# Patient Record
Sex: MALE | Race: WHITE | NOT HISPANIC OR LATINO | Employment: OTHER | URBAN - METROPOLITAN AREA
[De-identification: names, ages, dates, MRNs, and addresses within clinical notes are randomized per-mention and may not be internally consistent; named-entity substitution may affect disease eponyms.]

---

## 2017-08-01 ENCOUNTER — OFFICE VISIT (OUTPATIENT)
Dept: FAMILY MEDICINE | Facility: CLINIC | Age: 62
End: 2017-08-01
Payer: COMMERCIAL

## 2017-08-01 VITALS
RESPIRATION RATE: 20 BRPM | SYSTOLIC BLOOD PRESSURE: 132 MMHG | DIASTOLIC BLOOD PRESSURE: 90 MMHG | HEIGHT: 71 IN | HEART RATE: 72 BPM | WEIGHT: 315 LBS | BODY MASS INDEX: 44.1 KG/M2

## 2017-08-01 DIAGNOSIS — L03.116 BILATERAL LOWER LEG CELLULITIS: ICD-10-CM

## 2017-08-01 DIAGNOSIS — E11.9 TYPE 2 DIABETES MELLITUS WITHOUT COMPLICATION, WITHOUT LONG-TERM CURRENT USE OF INSULIN: Primary | ICD-10-CM

## 2017-08-01 DIAGNOSIS — Z28.39 IMMUNIZATION DEFICIENCY: ICD-10-CM

## 2017-08-01 DIAGNOSIS — R60.0 EDEMA LEG: ICD-10-CM

## 2017-08-01 DIAGNOSIS — L03.115 BILATERAL LOWER LEG CELLULITIS: ICD-10-CM

## 2017-08-01 DIAGNOSIS — E78.01 FAMILIAL HYPERCHOLESTEROLEMIA: ICD-10-CM

## 2017-08-01 DIAGNOSIS — I10 ESSENTIAL HYPERTENSION: ICD-10-CM

## 2017-08-01 DIAGNOSIS — E66.9 NON MORBID OBESITY, UNSPECIFIED OBESITY TYPE: ICD-10-CM

## 2017-08-01 LAB
ALBUMIN SERPL BCP-MCNC: 3.1 G/DL
ALP SERPL-CCNC: 108 U/L
ALT SERPL W/O P-5'-P-CCNC: 8 U/L
ANION GAP SERPL CALC-SCNC: 14 MMOL/L
AST SERPL-CCNC: 28 U/L
BASOPHILS # BLD AUTO: 0.07 K/UL
BASOPHILS NFR BLD: 0.6 %
BILIRUB SERPL-MCNC: 1.4 MG/DL
BUN SERPL-MCNC: 18 MG/DL
CALCIUM SERPL-MCNC: 8.6 MG/DL
CHLORIDE SERPL-SCNC: 105 MMOL/L
CHOLEST/HDLC SERPL: 3 {RATIO}
CO2 SERPL-SCNC: 20 MMOL/L
CREAT SERPL-MCNC: 1 MG/DL
DIFFERENTIAL METHOD: ABNORMAL
EOSINOPHIL # BLD AUTO: 0.7 K/UL
EOSINOPHIL NFR BLD: 5.8 %
ERYTHROCYTE [DISTWIDTH] IN BLOOD BY AUTOMATED COUNT: 19.4 %
ERYTHROCYTE [SEDIMENTATION RATE] IN BLOOD BY WESTERGREN METHOD: 4 MM/HR
EST. GFR  (AFRICAN AMERICAN): >60 ML/MIN/1.73 M^2
EST. GFR  (NON AFRICAN AMERICAN): >60 ML/MIN/1.73 M^2
GLUCOSE SERPL-MCNC: 95 MG/DL
HCT VFR BLD AUTO: 46.9 %
HDL/CHOLESTEROL RATIO: 33.3 %
HDLC SERPL-MCNC: 114 MG/DL
HDLC SERPL-MCNC: 38 MG/DL
HGB BLD-MCNC: 14.3 G/DL
LDLC SERPL CALC-MCNC: 58.8 MG/DL
LYMPHOCYTES # BLD AUTO: 1.3 K/UL
LYMPHOCYTES NFR BLD: 11 %
MCH RBC QN AUTO: 26.8 PG
MCHC RBC AUTO-ENTMCNC: 30.5 G/DL
MCV RBC AUTO: 88 FL
MONOCYTES # BLD AUTO: 1.1 K/UL
MONOCYTES NFR BLD: 9.2 %
NEUTROPHILS # BLD AUTO: 8.9 K/UL
NEUTROPHILS NFR BLD: 73.4 %
NONHDLC SERPL-MCNC: 76 MG/DL
PLATELET # BLD AUTO: 227 K/UL
PMV BLD AUTO: 11.2 FL
POTASSIUM SERPL-SCNC: 4.8 MMOL/L
PROT SERPL-MCNC: 7.9 G/DL
RBC # BLD AUTO: 5.34 M/UL
SODIUM SERPL-SCNC: 139 MMOL/L
T4 FREE SERPL-MCNC: 1.24 NG/DL
TRIGL SERPL-MCNC: 86 MG/DL
TSH SERPL DL<=0.005 MIU/L-ACNC: 0.03 UIU/ML
WBC # BLD AUTO: 12.11 K/UL

## 2017-08-01 PROCEDURE — 90471 IMMUNIZATION ADMIN: CPT | Mod: S$GLB,,, | Performed by: FAMILY MEDICINE

## 2017-08-01 PROCEDURE — 80061 LIPID PANEL: CPT

## 2017-08-01 PROCEDURE — 85651 RBC SED RATE NONAUTOMATED: CPT

## 2017-08-01 PROCEDURE — 83036 HEMOGLOBIN GLYCOSYLATED A1C: CPT

## 2017-08-01 PROCEDURE — 99215 OFFICE O/P EST HI 40 MIN: CPT | Mod: 25,S$GLB,, | Performed by: FAMILY MEDICINE

## 2017-08-01 PROCEDURE — 85025 COMPLETE CBC W/AUTO DIFF WBC: CPT

## 2017-08-01 PROCEDURE — 84439 ASSAY OF FREE THYROXINE: CPT

## 2017-08-01 PROCEDURE — 99999 PR PBB SHADOW E&M-EST. PATIENT-LVL III: CPT | Mod: PBBFAC,,, | Performed by: FAMILY MEDICINE

## 2017-08-01 PROCEDURE — 36415 COLL VENOUS BLD VENIPUNCTURE: CPT | Mod: S$GLB,,, | Performed by: FAMILY MEDICINE

## 2017-08-01 PROCEDURE — 90715 TDAP VACCINE 7 YRS/> IM: CPT | Mod: S$GLB,,, | Performed by: FAMILY MEDICINE

## 2017-08-01 PROCEDURE — 80053 COMPREHEN METABOLIC PANEL: CPT

## 2017-08-01 PROCEDURE — 84443 ASSAY THYROID STIM HORMONE: CPT

## 2017-08-01 PROCEDURE — 3044F HG A1C LEVEL LT 7.0%: CPT | Mod: S$GLB,,, | Performed by: FAMILY MEDICINE

## 2017-08-01 RX ORDER — SILVER SULFADIAZINE 10 G/1000G
CREAM TOPICAL 2 TIMES DAILY
Qty: 85 G | Refills: 3 | Status: SHIPPED | OUTPATIENT
Start: 2017-08-01 | End: 2017-08-21

## 2017-08-01 RX ORDER — NIFEDIPINE 60 MG/1
120 TABLET, EXTENDED RELEASE ORAL DAILY
Qty: 180 TABLET | Refills: 3 | Status: SHIPPED | OUTPATIENT
Start: 2017-08-01 | End: 2017-08-17 | Stop reason: SDUPTHER

## 2017-08-01 RX ORDER — METFORMIN HYDROCHLORIDE 850 MG/1
TABLET ORAL
Qty: 180 TABLET | Refills: 1 | Status: SHIPPED | OUTPATIENT
Start: 2017-08-01 | End: 2018-02-09 | Stop reason: ALTCHOICE

## 2017-08-01 RX ORDER — FUROSEMIDE 40 MG/1
40 TABLET ORAL DAILY
Qty: 90 TABLET | Refills: 3 | Status: SHIPPED | OUTPATIENT
Start: 2017-08-01 | End: 2018-08-24

## 2017-08-01 RX ORDER — AMOXICILLIN 500 MG
2 CAPSULE ORAL DAILY
COMMUNITY
End: 2017-12-08

## 2017-08-01 RX ORDER — CELECOXIB 200 MG/1
200 CAPSULE ORAL DAILY
Qty: 30 CAPSULE | Refills: 5 | Status: SHIPPED | OUTPATIENT
Start: 2017-08-01 | End: 2017-12-08

## 2017-08-01 RX ORDER — PRAVASTATIN SODIUM 40 MG/1
40 TABLET ORAL NIGHTLY
Qty: 90 TABLET | Refills: 1 | Status: SHIPPED | OUTPATIENT
Start: 2017-08-01 | End: 2018-02-09 | Stop reason: SDUPTHER

## 2017-08-01 RX ORDER — ACARBOSE 25 MG/1
25 TABLET ORAL
Qty: 270 TABLET | Refills: 1 | Status: SHIPPED | OUTPATIENT
Start: 2017-08-01 | End: 2017-12-08

## 2017-08-01 RX ORDER — LEVOFLOXACIN 500 MG/1
500 TABLET, FILM COATED ORAL DAILY
Qty: 10 TABLET | Refills: 0 | Status: SHIPPED | OUTPATIENT
Start: 2017-08-01 | End: 2017-08-08 | Stop reason: SINTOL

## 2017-08-01 NOTE — PROGRESS NOTES
Subjective:       Patient ID: Peewee Nunes is a 62 y.o. male.    Chief Complaint: Follow-up (Rx refills) and Knee Pain    Pt is a 62 y.o. male who presents for check up for Type 2 diabetes mellitus without complication, without long-term current use of insulin  (primary encounter diagnosis)  Essential hypertension  Non morbid obesity, unspecified obesity type  Bmi 60.0-69.9, adult  Edema leg  Bilateral lower leg cellulitis. Doing well on current meds. Denies any side effects. Prevention is up to date.      Review of Systems   Constitutional: Negative for activity change, appetite change and unexpected weight change.   HENT: Negative for congestion, ear pain, hearing loss, rhinorrhea, sneezing, sore throat and trouble swallowing.    Eyes: Negative for discharge, redness and visual disturbance.   Respiratory: Negative for cough, chest tightness, wheezing and stridor.    Cardiovascular: Negative for chest pain and palpitations.   Gastrointestinal: Negative for abdominal pain, blood in stool, constipation, diarrhea, nausea and vomiting.   Endocrine: Negative for polydipsia and polyuria.   Genitourinary: Negative for difficulty urinating, dysuria, hematuria and urgency.   Musculoskeletal: Positive for arthralgias. Negative for back pain, joint swelling, myalgias and neck pain.   Skin: Positive for wound. Negative for rash.   Neurological: Negative for dizziness, weakness and headaches.   Psychiatric/Behavioral: Negative for agitation, confusion and dysphoric mood. The patient is not nervous/anxious.        Objective:      Physical Exam   Constitutional: He is oriented to person, place, and time. He appears well-developed and well-nourished.   HENT:   Head: Normocephalic.   Eyes: Pupils are equal, round, and reactive to light.   Neck: Normal range of motion. Neck supple. No thyromegaly present.   Cardiovascular: Normal rate and regular rhythm.  Exam reveals no friction rub.    No murmur heard.  Pulmonary/Chest: Effort  normal. No respiratory distress. He has no wheezes.   Abdominal: There is no tenderness. There is no rebound and no guarding.   Musculoskeletal: Normal range of motion. He exhibits edema. He exhibits no tenderness.   Lymphadenopathy:     He has no cervical adenopathy.   Neurological: He is alert and oriented to person, place, and time. He has normal reflexes. No cranial nerve deficit.   Skin: Skin is warm and dry.   Bli/ legs are weeping and 2++ swollen and red   Psychiatric: He has a normal mood and affect. Judgment and thought content normal.       Assessment:       1. Type 2 diabetes mellitus without complication, without long-term current use of insulin    2. Essential hypertension    3. Non morbid obesity, unspecified obesity type    4. BMI 60.0-69.9, adult    5. Edema leg    6. Bilateral lower leg cellulitis        Plan:   Peewee was seen today for follow-up and knee pain.    Diagnoses and all orders for this visit:    Type 2 diabetes mellitus without complication, without long-term current use of insulin    Essential hypertension    Non morbid obesity, unspecified obesity type    BMI 60.0-69.9, adult    Edema leg    Bilateral lower leg cellulitis

## 2017-08-02 LAB
ESTIMATED AVG GLUCOSE: 148 MG/DL
HBA1C MFR BLD HPLC: 6.8 %

## 2017-08-03 ENCOUNTER — PATIENT MESSAGE (OUTPATIENT)
Dept: FAMILY MEDICINE | Facility: CLINIC | Age: 62
End: 2017-08-03

## 2017-08-05 ENCOUNTER — PATIENT MESSAGE (OUTPATIENT)
Dept: FAMILY MEDICINE | Facility: CLINIC | Age: 62
End: 2017-08-05

## 2017-08-08 ENCOUNTER — OFFICE VISIT (OUTPATIENT)
Dept: FAMILY MEDICINE | Facility: CLINIC | Age: 62
End: 2017-08-08
Payer: COMMERCIAL

## 2017-08-08 VITALS
WEIGHT: 315 LBS | BODY MASS INDEX: 44.1 KG/M2 | RESPIRATION RATE: 22 BRPM | HEIGHT: 71 IN | SYSTOLIC BLOOD PRESSURE: 142 MMHG | DIASTOLIC BLOOD PRESSURE: 80 MMHG | HEART RATE: 81 BPM

## 2017-08-08 DIAGNOSIS — L03.115 BILATERAL LOWER LEG CELLULITIS: Primary | ICD-10-CM

## 2017-08-08 DIAGNOSIS — L03.116 BILATERAL LOWER LEG CELLULITIS: Primary | ICD-10-CM

## 2017-08-08 DIAGNOSIS — I87.309 STASIS EDEMA, UNSPECIFIED LATERALITY: ICD-10-CM

## 2017-08-08 PROBLEM — L03.90 CELLULITIS DUE TO METHICILLIN-RESISTANT STAPHYLOCOCCUS AUREUS (MRSA): Status: ACTIVE | Noted: 2017-08-08

## 2017-08-08 PROBLEM — B95.62 CELLULITIS DUE TO METHICILLIN-RESISTANT STAPHYLOCOCCUS AUREUS (MRSA): Status: ACTIVE | Noted: 2017-08-08

## 2017-08-08 PROCEDURE — 99213 OFFICE O/P EST LOW 20 MIN: CPT | Mod: 25,S$GLB,, | Performed by: FAMILY MEDICINE

## 2017-08-08 PROCEDURE — 99999 PR PBB SHADOW E&M-EST. PATIENT-LVL III: CPT | Mod: PBBFAC,,, | Performed by: FAMILY MEDICINE

## 2017-08-08 PROCEDURE — 3008F BODY MASS INDEX DOCD: CPT | Mod: S$GLB,,, | Performed by: FAMILY MEDICINE

## 2017-08-08 PROCEDURE — 3077F SYST BP >= 140 MM HG: CPT | Mod: S$GLB,,, | Performed by: FAMILY MEDICINE

## 2017-08-08 PROCEDURE — 3079F DIAST BP 80-89 MM HG: CPT | Mod: S$GLB,,, | Performed by: FAMILY MEDICINE

## 2017-08-08 PROCEDURE — 96372 THER/PROPH/DIAG INJ SC/IM: CPT | Mod: S$GLB,,, | Performed by: FAMILY MEDICINE

## 2017-08-08 RX ORDER — CEFTRIAXONE 1 G/1
1 INJECTION, POWDER, FOR SOLUTION INTRAMUSCULAR; INTRAVENOUS
Status: COMPLETED | OUTPATIENT
Start: 2017-08-08 | End: 2017-08-08

## 2017-08-08 RX ORDER — BISOPROLOL FUMARATE 10 MG/1
10 TABLET, FILM COATED ORAL DAILY
COMMUNITY
End: 2017-08-08 | Stop reason: SDUPTHER

## 2017-08-08 RX ORDER — CEPHALEXIN 500 MG/1
500 CAPSULE ORAL EVERY 6 HOURS
Qty: 40 CAPSULE | Refills: 0 | Status: SHIPPED | OUTPATIENT
Start: 2017-08-08 | End: 2017-08-18

## 2017-08-08 RX ORDER — BISOPROLOL FUMARATE 10 MG/1
10 TABLET, FILM COATED ORAL 2 TIMES DAILY
Qty: 60 TABLET | Refills: 11
Start: 2017-08-08 | End: 2017-08-17 | Stop reason: SDUPTHER

## 2017-08-08 RX ADMIN — CEFTRIAXONE 1 G: 1 INJECTION, POWDER, FOR SOLUTION INTRAMUSCULAR; INTRAVENOUS at 12:08

## 2017-08-08 NOTE — PROGRESS NOTES
Subjective:       Patient ID: Peewee Nunes is a 62 y.o. male.    Chief Complaint: Follow-up (1wk)    Pt is a 62 y.o. male who presents for check up for Type 2 diabetes mellitus without complication, without long-term current use of insulin  (primary encounter diagnosis)  Essential hypertension  Non morbid obesity, unspecified obesity type  Bmi 60.0-69.9, adult  Edema leg  Bilateral lower leg cellulitis. Doing well on current meds. Denies any side effects. Prevention is up to date.      Review of Systems   Constitutional: Negative for activity change, appetite change and unexpected weight change.   HENT: Negative for congestion, ear pain, hearing loss, rhinorrhea, sneezing, sore throat and trouble swallowing.    Eyes: Negative for discharge, redness and visual disturbance.   Respiratory: Negative for cough, chest tightness, wheezing and stridor.    Cardiovascular: Negative for chest pain and palpitations.   Gastrointestinal: Negative for abdominal pain, blood in stool, constipation, diarrhea, nausea and vomiting.   Endocrine: Negative for polydipsia and polyuria.   Genitourinary: Negative for difficulty urinating, dysuria, hematuria and urgency.   Musculoskeletal: Positive for arthralgias. Negative for back pain, joint swelling, myalgias and neck pain.   Skin: Positive for wound. Negative for rash.   Neurological: Negative for dizziness, weakness and headaches.   Psychiatric/Behavioral: Negative for agitation, confusion and dysphoric mood. The patient is not nervous/anxious.        Objective:      Physical Exam   Constitutional: He is oriented to person, place, and time. He appears well-developed and well-nourished.   HENT:   Head: Normocephalic.   Eyes: Pupils are equal, round, and reactive to light.   Neck: Normal range of motion. Neck supple. No thyromegaly present.   Cardiovascular: Normal rate and regular rhythm.  Exam reveals no friction rub.    No murmur heard.  Pulmonary/Chest: Effort normal. No  respiratory distress. He has no wheezes.   Abdominal: There is no tenderness. There is no rebound and no guarding.   Musculoskeletal: Normal range of motion. He exhibits edema. He exhibits no tenderness.   Lymphadenopathy:     He has no cervical adenopathy.   Neurological: He is alert and oriented to person, place, and time. He has normal reflexes. No cranial nerve deficit.   Skin: Skin is warm and dry.   Bli/ legs are weeping and 2++ swollen and red   Psychiatric: He has a normal mood and affect. Judgment and thought content normal.       Assessment:       1. Cellulitis due to methicillin-resistant Staphylococcus aureus (MRSA)    2. Bilateral lower leg cellulitis        Plan:   Peewee was seen today for follow-up.    Diagnoses and all orders for this visit:    Cellulitis due to methicillin-resistant Staphylococcus aureus (MRSA)    Bilateral lower leg cellulitis  -     cephALEXin (KEFLEX) 500 MG capsule; Take 1 capsule (500 mg total) by mouth every 6 (six) hours.    Other orders  -     cefTRIAXone injection 1 g; Inject 1 g into the muscle one time.  -     bisoprolol (ZEBETA) 10 MG tablet; Take 1 tablet (10 mg total) by mouth 2 (two) times daily.

## 2017-08-17 ENCOUNTER — OFFICE VISIT (OUTPATIENT)
Dept: FAMILY MEDICINE | Facility: CLINIC | Age: 62
End: 2017-08-17
Payer: COMMERCIAL

## 2017-08-17 VITALS
RESPIRATION RATE: 22 BRPM | HEART RATE: 78 BPM | HEIGHT: 71 IN | OXYGEN SATURATION: 91 % | DIASTOLIC BLOOD PRESSURE: 82 MMHG | WEIGHT: 315 LBS | BODY MASS INDEX: 44.1 KG/M2 | SYSTOLIC BLOOD PRESSURE: 142 MMHG

## 2017-08-17 DIAGNOSIS — Z74.09 IMPAIRED MOBILITY AND ACTIVITIES OF DAILY LIVING: ICD-10-CM

## 2017-08-17 DIAGNOSIS — I87.303 STASIS EDEMA, BILATERAL: Primary | ICD-10-CM

## 2017-08-17 DIAGNOSIS — L03.115 BILATERAL LOWER LEG CELLULITIS: ICD-10-CM

## 2017-08-17 DIAGNOSIS — I10 ESSENTIAL HYPERTENSION: ICD-10-CM

## 2017-08-17 DIAGNOSIS — L03.116 BILATERAL LOWER LEG CELLULITIS: ICD-10-CM

## 2017-08-17 DIAGNOSIS — Z78.9 IMPAIRED MOBILITY AND ACTIVITIES OF DAILY LIVING: ICD-10-CM

## 2017-08-17 PROCEDURE — 3077F SYST BP >= 140 MM HG: CPT | Mod: S$GLB,,, | Performed by: FAMILY MEDICINE

## 2017-08-17 PROCEDURE — 29580 STRAPPING UNNA BOOT: CPT | Mod: 50,S$GLB,, | Performed by: FAMILY MEDICINE

## 2017-08-17 PROCEDURE — 99999 PR PBB SHADOW E&M-EST. PATIENT-LVL III: CPT | Mod: PBBFAC,,, | Performed by: FAMILY MEDICINE

## 2017-08-17 PROCEDURE — 3079F DIAST BP 80-89 MM HG: CPT | Mod: S$GLB,,, | Performed by: FAMILY MEDICINE

## 2017-08-17 PROCEDURE — 3008F BODY MASS INDEX DOCD: CPT | Mod: S$GLB,,, | Performed by: FAMILY MEDICINE

## 2017-08-17 PROCEDURE — 99213 OFFICE O/P EST LOW 20 MIN: CPT | Mod: 25,S$GLB,, | Performed by: FAMILY MEDICINE

## 2017-08-17 RX ORDER — NIFEDIPINE 60 MG/1
120 TABLET, EXTENDED RELEASE ORAL DAILY
Qty: 180 TABLET | Refills: 3 | Status: SHIPPED | OUTPATIENT
Start: 2017-08-17 | End: 2018-07-02 | Stop reason: SDUPTHER

## 2017-08-17 RX ORDER — BISOPROLOL FUMARATE 10 MG/1
10 TABLET, FILM COATED ORAL 2 TIMES DAILY
Qty: 180 TABLET | Refills: 3 | Status: SHIPPED | OUTPATIENT
Start: 2017-08-17 | End: 2018-08-10 | Stop reason: SDUPTHER

## 2017-08-17 RX ORDER — LOSARTAN POTASSIUM 100 MG/1
100 TABLET ORAL DAILY
Qty: 90 TABLET | Refills: 3 | Status: SHIPPED | OUTPATIENT
Start: 2017-08-17 | End: 2018-08-10 | Stop reason: SDUPTHER

## 2017-08-17 NOTE — PROGRESS NOTES
Subjective:       Patient ID: Peewee Nunes is a 62 y.o. male.    Chief Complaint: Follow-up (felicia boot)    Pt is a 62 y.o. male who presents for check up for Stasis edema, bilateral  (primary encounter diagnosis)  Essential hypertension. Doing well on current meds. Denies any side effects. Prevention is up to date.      Review of Systems   Constitutional: Negative for appetite change.   HENT: Negative for congestion, ear pain, sneezing and sore throat.    Eyes: Negative for redness and visual disturbance.   Respiratory: Negative for cough, chest tightness and stridor.    Cardiovascular: Negative for chest pain.   Gastrointestinal: Negative for abdominal pain, blood in stool, diarrhea, nausea and vomiting.   Genitourinary: Negative for difficulty urinating, dysuria and hematuria.   Musculoskeletal: Positive for arthralgias, gait problem and joint swelling. Negative for back pain, myalgias and neck pain.        Legs with 3+++ edema   Skin: Positive for wound. Negative for rash.        Maik lower extremity edema and serous drainage   Neurological: Negative for dizziness.   Psychiatric/Behavioral: Negative for agitation. The patient is not nervous/anxious.        Objective:      Physical Exam   Constitutional: He is oriented to person, place, and time. He appears well-developed. He appears distressed.   Obese   HENT:   Head: Normocephalic.   Eyes: Pupils are equal, round, and reactive to light.   Neck: Normal range of motion. Neck supple. No thyromegaly present.   Cardiovascular: Normal rate and regular rhythm.  Exam reveals no friction rub.    No murmur heard.  Pulmonary/Chest: Effort normal. No respiratory distress. He has no wheezes.   Abdominal: There is no tenderness. There is no rebound and no guarding.   Musculoskeletal: Normal range of motion. He exhibits tenderness and deformity. He exhibits no edema.   Lymphadenopathy:     He has no cervical adenopathy.   Neurological: He is alert and oriented to person,  place, and time. No cranial nerve deficit.   Pt is unable to barely walk nor stand bec/ of weight and joint pain and poor ROM   Skin: Skin is warm and dry.   Maik/lower extremities with stasis edema & patches of ulcerations anterior and behind both calves   Psychiatric: He has a normal mood and affect. Judgment and thought content normal.       Assessment:       1. Stasis edema, bilateral    2. Essential hypertension    3. BMI 60.0-69.9, adult    4. Bilateral lower leg cellulitis    5. Impaired mobility and activities of daily living        Plan:   Peewee was seen today for follow-up.    Diagnoses and all orders for this visit:    Stasis edema, bilateral  -     WHEELCHAIR FOR HOME USE  -     bisoprolol (ZEBETA) 10 MG tablet; Take 1 tablet (10 mg total) by mouth 2 (two) times daily.    Essential hypertension  -     bisoprolol (ZEBETA) 10 MG tablet; Take 1 tablet (10 mg total) by mouth 2 (two) times daily.  -     losartan (COZAAR) 100 MG tablet; Take 1 tablet (100 mg total) by mouth once daily.  -     nifedipine (PROCARDIA-XL) 60 MG (OSM) 24 hr tablet; Take 2 tablets (120 mg total) by mouth once daily.    BMI 60.0-69.9, adult    Bilateral lower leg cellulitis    Impaired mobility and activities of daily living    Unna boots maik for 4 days

## 2017-08-18 ENCOUNTER — TELEPHONE (OUTPATIENT)
Dept: FAMILY MEDICINE | Facility: CLINIC | Age: 62
End: 2017-08-18

## 2017-08-21 ENCOUNTER — OFFICE VISIT (OUTPATIENT)
Dept: FAMILY MEDICINE | Facility: CLINIC | Age: 62
End: 2017-08-21
Payer: COMMERCIAL

## 2017-08-21 VITALS
BODY MASS INDEX: 44.1 KG/M2 | HEART RATE: 71 BPM | DIASTOLIC BLOOD PRESSURE: 70 MMHG | RESPIRATION RATE: 16 BRPM | HEIGHT: 71 IN | OXYGEN SATURATION: 87 % | WEIGHT: 315 LBS | SYSTOLIC BLOOD PRESSURE: 158 MMHG

## 2017-08-21 DIAGNOSIS — I87.309 STASIS EDEMA, UNSPECIFIED LATERALITY: Primary | ICD-10-CM

## 2017-08-21 DIAGNOSIS — L03.116 BILATERAL LOWER LEG CELLULITIS: ICD-10-CM

## 2017-08-21 DIAGNOSIS — L03.115 BILATERAL LOWER LEG CELLULITIS: ICD-10-CM

## 2017-08-21 PROCEDURE — 3008F BODY MASS INDEX DOCD: CPT | Mod: S$GLB,,, | Performed by: FAMILY MEDICINE

## 2017-08-21 PROCEDURE — 3077F SYST BP >= 140 MM HG: CPT | Mod: S$GLB,,, | Performed by: FAMILY MEDICINE

## 2017-08-21 PROCEDURE — 29580 STRAPPING UNNA BOOT: CPT | Mod: 50,S$GLB,, | Performed by: FAMILY MEDICINE

## 2017-08-21 PROCEDURE — 99213 OFFICE O/P EST LOW 20 MIN: CPT | Mod: 25,S$GLB,, | Performed by: FAMILY MEDICINE

## 2017-08-21 PROCEDURE — 99999 PR PBB SHADOW E&M-EST. PATIENT-LVL III: CPT | Mod: PBBFAC,,, | Performed by: FAMILY MEDICINE

## 2017-08-21 PROCEDURE — 3078F DIAST BP <80 MM HG: CPT | Mod: S$GLB,,, | Performed by: FAMILY MEDICINE

## 2017-08-21 NOTE — PROGRESS NOTES
Subjective:       Patient ID: Peewee Nunes is a 62 y.o. male.    Chief Complaint: Follow-up ( legs)    Pt is a 62 y.o. male who presents for check up for Stasis edema, bilateral  (primary encounter diagnosis)  Essential hypertension. Doing well on current meds. Denies any side effects. Prevention is up to date.      Review of Systems   Constitutional: Negative for appetite change.   HENT: Negative for congestion, ear pain, sneezing and sore throat.    Eyes: Negative for redness and visual disturbance.   Respiratory: Negative for cough, chest tightness and stridor.    Cardiovascular: Negative for chest pain.   Gastrointestinal: Negative for abdominal pain, blood in stool, diarrhea, nausea and vomiting.   Genitourinary: Negative for difficulty urinating, dysuria and hematuria.   Musculoskeletal: Positive for arthralgias, gait problem and joint swelling. Negative for back pain, myalgias and neck pain.        Legs with 3+++ edema   Skin: Positive for wound. Negative for rash.        Maik lower extremity edema and serous drainage; unna boots have been on for 4 days, & will be removed and replaced on this visit for 4 more days   Neurological: Negative for dizziness.   Psychiatric/Behavioral: Negative for agitation. The patient is not nervous/anxious.        Objective:      Physical Exam   Constitutional: He is oriented to person, place, and time. He appears well-developed. He appears distressed.   Obese   HENT:   Head: Normocephalic.   Eyes: Pupils are equal, round, and reactive to light.   Neck: Normal range of motion. Neck supple. No thyromegaly present.   Cardiovascular: Normal rate and regular rhythm.  Exam reveals no friction rub.    No murmur heard.  Pulmonary/Chest: Effort normal. No respiratory distress. He has no wheezes.   Abdominal: There is no tenderness. There is no rebound and no guarding.   Musculoskeletal: Normal range of motion. He exhibits tenderness and deformity. He exhibits no edema.    Lymphadenopathy:     He has no cervical adenopathy.   Neurological: He is alert and oriented to person, place, and time. No cranial nerve deficit.   Pt is unable to barely walk nor stand bec/ of weight and joint pain and poor ROM   Skin: Skin is warm and dry.   Maik/lower extremities with stasis edema & patches of ulcerations anterior and behind both calves, will less inflammation and less serous d'c   Psychiatric: He has a normal mood and affect. Judgment and thought content normal.       Assessment:       1. Stasis edema, unspecified laterality    2. Bilateral lower leg cellulitis        Plan:   Peewee was seen today for follow-up.    Diagnoses and all orders for this visit:    Stasis edema, unspecified laterality    Bilateral lower leg cellulitis    Unna boots removed and will be replaced  maik for 3-4 days

## 2017-08-22 DIAGNOSIS — Z12.11 COLON CANCER SCREENING: ICD-10-CM

## 2017-08-24 ENCOUNTER — OFFICE VISIT (OUTPATIENT)
Dept: FAMILY MEDICINE | Facility: CLINIC | Age: 62
End: 2017-08-24
Payer: COMMERCIAL

## 2017-08-24 VITALS
HEIGHT: 71 IN | HEART RATE: 94 BPM | DIASTOLIC BLOOD PRESSURE: 74 MMHG | RESPIRATION RATE: 24 BRPM | SYSTOLIC BLOOD PRESSURE: 168 MMHG | BODY MASS INDEX: 44.1 KG/M2 | WEIGHT: 315 LBS

## 2017-08-24 DIAGNOSIS — L03.116 BILATERAL LOWER LEG CELLULITIS: ICD-10-CM

## 2017-08-24 DIAGNOSIS — I87.309 STASIS EDEMA, UNSPECIFIED LATERALITY: Primary | ICD-10-CM

## 2017-08-24 DIAGNOSIS — L03.115 BILATERAL LOWER LEG CELLULITIS: ICD-10-CM

## 2017-08-24 DIAGNOSIS — E11.9 TYPE 2 DIABETES MELLITUS WITHOUT COMPLICATION, WITHOUT LONG-TERM CURRENT USE OF INSULIN: ICD-10-CM

## 2017-08-24 PROCEDURE — 3077F SYST BP >= 140 MM HG: CPT | Mod: S$GLB,,, | Performed by: FAMILY MEDICINE

## 2017-08-24 PROCEDURE — 3078F DIAST BP <80 MM HG: CPT | Mod: S$GLB,,, | Performed by: FAMILY MEDICINE

## 2017-08-24 PROCEDURE — 4010F ACE/ARB THERAPY RXD/TAKEN: CPT | Mod: S$GLB,,, | Performed by: FAMILY MEDICINE

## 2017-08-24 PROCEDURE — 99999 PR PBB SHADOW E&M-EST. PATIENT-LVL III: CPT | Mod: PBBFAC,,, | Performed by: FAMILY MEDICINE

## 2017-08-24 PROCEDURE — 3044F HG A1C LEVEL LT 7.0%: CPT | Mod: S$GLB,,, | Performed by: FAMILY MEDICINE

## 2017-08-24 PROCEDURE — 3008F BODY MASS INDEX DOCD: CPT | Mod: S$GLB,,, | Performed by: FAMILY MEDICINE

## 2017-08-24 PROCEDURE — 99213 OFFICE O/P EST LOW 20 MIN: CPT | Mod: S$GLB,,, | Performed by: FAMILY MEDICINE

## 2017-08-24 NOTE — PROGRESS NOTES
Subjective:       Patient ID: Peewee Nunes is a 62 y.o. male.    Chief Complaint: Follow-up (legs)    Pt is a 62 y.o. male who presents for check up for dolores lower leg cellulitis. Doing well on current meds. Denies any side effects. Prevention is up to date.      Review of Systems   Constitutional: Negative for appetite change.   HENT: Negative for congestion, ear pain, sneezing and sore throat.    Eyes: Negative for redness and visual disturbance.   Respiratory: Negative for cough, chest tightness and stridor.    Cardiovascular: Negative for chest pain.   Gastrointestinal: Negative for abdominal pain, blood in stool, diarrhea, nausea and vomiting.   Genitourinary: Negative for difficulty urinating, dysuria and hematuria.   Musculoskeletal: Negative for arthralgias, back pain, joint swelling, myalgias and neck pain.   Skin: Positive for color change, rash and wound.        legs with unna boots for the last week w improved results   Neurological: Negative for dizziness.   Psychiatric/Behavioral: Negative for agitation. The patient is not nervous/anxious.        Objective:      Physical Exam   Constitutional: He is oriented to person, place, and time. He appears well-developed.   Obese 63 y/o W M with leg wounds   HENT:   Head: Normocephalic.   Eyes: Pupils are equal, round, and reactive to light.   Neck: Normal range of motion. Neck supple. No thyromegaly present.   Cardiovascular: Normal rate and regular rhythm.  Exam reveals no friction rub.    No murmur heard.  Pulmonary/Chest: Effort normal. No respiratory distress. He has no wheezes.   Abdominal: There is no tenderness. There is no rebound and no guarding.   Musculoskeletal: Normal range of motion. He exhibits no edema or tenderness.   Lymphadenopathy:     He has no cervical adenopathy.   Neurological: He is alert and oriented to person, place, and time. He has normal reflexes. No cranial nerve deficit.   Skin: Skin is warm and dry.   dolores lower extremities  with 1+ edema and less eschar/ exudative dryness--70 % healed   Psychiatric: He has a normal mood and affect. Judgment and thought content normal.       Assessment:       1. Stasis edema, unspecified laterality    2. Type 2 diabetes mellitus without complication, without long-term current use of insulin    3. Bilateral lower leg cellulitis        Plan:   Peewee was seen today for follow-up.    Diagnoses and all orders for this visit:    Stasis edema, unspecified laterality    Type 2 diabetes mellitus without complication, without long-term current use of insulin    Bilateral lower leg cellulitis

## 2017-09-07 ENCOUNTER — OFFICE VISIT (OUTPATIENT)
Dept: FAMILY MEDICINE | Facility: CLINIC | Age: 62
End: 2017-09-07
Payer: COMMERCIAL

## 2017-09-07 VITALS
SYSTOLIC BLOOD PRESSURE: 172 MMHG | BODY MASS INDEX: 44.1 KG/M2 | WEIGHT: 315 LBS | DIASTOLIC BLOOD PRESSURE: 80 MMHG | RESPIRATION RATE: 28 BRPM | HEIGHT: 71 IN | HEART RATE: 64 BPM

## 2017-09-07 DIAGNOSIS — E11.9 TYPE 2 DIABETES MELLITUS WITHOUT COMPLICATION, WITHOUT LONG-TERM CURRENT USE OF INSULIN: Primary | ICD-10-CM

## 2017-09-07 DIAGNOSIS — R60.0 EDEMA LEG: ICD-10-CM

## 2017-09-07 DIAGNOSIS — E66.9 NON MORBID OBESITY, UNSPECIFIED OBESITY TYPE: ICD-10-CM

## 2017-09-07 DIAGNOSIS — I87.309 STASIS EDEMA, UNSPECIFIED LATERALITY: ICD-10-CM

## 2017-09-07 PROCEDURE — 3077F SYST BP >= 140 MM HG: CPT | Mod: S$GLB,,, | Performed by: FAMILY MEDICINE

## 2017-09-07 PROCEDURE — 99213 OFFICE O/P EST LOW 20 MIN: CPT | Mod: S$GLB,,, | Performed by: FAMILY MEDICINE

## 2017-09-07 PROCEDURE — 3044F HG A1C LEVEL LT 7.0%: CPT | Mod: S$GLB,,, | Performed by: FAMILY MEDICINE

## 2017-09-07 PROCEDURE — 99999 PR PBB SHADOW E&M-EST. PATIENT-LVL III: CPT | Mod: PBBFAC,,, | Performed by: FAMILY MEDICINE

## 2017-09-07 PROCEDURE — 3079F DIAST BP 80-89 MM HG: CPT | Mod: S$GLB,,, | Performed by: FAMILY MEDICINE

## 2017-09-07 PROCEDURE — 4010F ACE/ARB THERAPY RXD/TAKEN: CPT | Mod: S$GLB,,, | Performed by: FAMILY MEDICINE

## 2017-09-07 PROCEDURE — 3008F BODY MASS INDEX DOCD: CPT | Mod: S$GLB,,, | Performed by: FAMILY MEDICINE

## 2017-09-07 NOTE — PROGRESS NOTES
Subjective:       Patient ID: Peewee Nunes is a 62 y.o. male.    Chief Complaint: Follow-up (recheck cellulitis)    Pt is a 62 y.o. male who presents for check up for [facility] of dolores lower leg cellulitis and stasis edema. Doing well on current meds. Denies any side effects. Prevention is up to date.      Review of Systems   Constitutional: Negative for appetite change.   HENT: Negative for congestion, ear pain, sneezing and sore throat.    Eyes: Negative for redness and visual disturbance.   Respiratory: Negative for cough, chest tightness and stridor.    Cardiovascular: Negative for chest pain.   Gastrointestinal: Negative for abdominal pain, blood in stool, diarrhea, nausea and vomiting.   Genitourinary: Negative for difficulty urinating, dysuria and hematuria.   Musculoskeletal: Negative for arthralgias, back pain, joint swelling, myalgias and neck pain.   Skin: Positive for color change and wound. Negative for rash.        Less edema and less d'c of both legs   Neurological: Negative for dizziness.   Psychiatric/Behavioral: Negative for agitation. The patient is not nervous/anxious.        Objective:      Physical Exam   Constitutional: He is oriented to person, place, and time. He appears well-developed and well-nourished.   HENT:   Head: Normocephalic.   Eyes: Pupils are equal, round, and reactive to light.   Neck: Normal range of motion. Neck supple. No thyromegaly present.   Cardiovascular: Normal rate and regular rhythm.  Exam reveals no friction rub.    No murmur heard.  Pulmonary/Chest: Effort normal. No respiratory distress. He has no wheezes.   Abdominal: There is no tenderness. There is no rebound and no guarding.   Musculoskeletal: Normal range of motion. He exhibits edema. He exhibits no tenderness.   Lymphadenopathy:     He has no cervical adenopathy.   Neurological: He is alert and oriented to person, place, and time. He has normal reflexes. No cranial nerve deficit.   Skin: Skin is warm and dry.    Psychiatric: He has a normal mood and affect. Judgment and thought content normal.       Assessment:       1. Type 2 diabetes mellitus without complication, without long-term current use of insulin    2. Non morbid obesity, unspecified obesity type    3. Stasis edema, unspecified laterality    4. Edema leg        Plan:   Peewee was seen today for follow-up.    Diagnoses and all orders for this visit:    Type 2 diabetes mellitus without complication, without long-term current use of insulin    Non morbid obesity, unspecified obesity type    Stasis edema, unspecified laterality    Edema leg

## 2017-10-09 ENCOUNTER — OFFICE VISIT (OUTPATIENT)
Dept: FAMILY MEDICINE | Facility: CLINIC | Age: 62
End: 2017-10-09
Payer: COMMERCIAL

## 2017-10-09 VITALS
WEIGHT: 315 LBS | HEART RATE: 72 BPM | RESPIRATION RATE: 18 BRPM | DIASTOLIC BLOOD PRESSURE: 74 MMHG | BODY MASS INDEX: 44.1 KG/M2 | HEIGHT: 71 IN | SYSTOLIC BLOOD PRESSURE: 142 MMHG

## 2017-10-09 DIAGNOSIS — Z78.9 IMPAIRED MOBILITY AND ACTIVITIES OF DAILY LIVING: Primary | ICD-10-CM

## 2017-10-09 DIAGNOSIS — L03.116 BILATERAL LOWER LEG CELLULITIS: ICD-10-CM

## 2017-10-09 DIAGNOSIS — L03.115 BILATERAL LOWER LEG CELLULITIS: ICD-10-CM

## 2017-10-09 DIAGNOSIS — Z74.09 IMPAIRED MOBILITY AND ACTIVITIES OF DAILY LIVING: Primary | ICD-10-CM

## 2017-10-09 DIAGNOSIS — I87.309 STASIS EDEMA, UNSPECIFIED LATERALITY: ICD-10-CM

## 2017-10-09 PROCEDURE — 99213 OFFICE O/P EST LOW 20 MIN: CPT | Mod: S$GLB,,, | Performed by: FAMILY MEDICINE

## 2017-10-09 PROCEDURE — 99999 PR PBB SHADOW E&M-EST. PATIENT-LVL III: CPT | Mod: PBBFAC,,, | Performed by: FAMILY MEDICINE

## 2017-10-09 RX ORDER — FLUOCINOLONE ACETONIDE 0.11 MG/ML
OIL TOPICAL 2 TIMES DAILY
Qty: 118 BOTTLE | Refills: 2 | Status: SHIPPED | OUTPATIENT
Start: 2017-10-09 | End: 2018-08-24

## 2017-10-09 NOTE — PROGRESS NOTES
Subjective:       Patient ID: Peewee Nunes is a 62 y.o. male.    Chief Complaint: Follow-up    Pt is a 62 y.o. male who presents for check up for Impaired mobility and activities of daily living  (primary encounter diagnosis)  Stasis edema, unspecified laterality  Bmi 50.0-59.9, adult  Bilateral lower leg cellulitis. Doing well on current meds. Denies any side effects. Prevention is up to date.      Review of Systems   Constitutional: Negative for appetite change.   HENT: Negative for congestion, ear pain, sneezing and sore throat.    Eyes: Negative for redness and visual disturbance.   Respiratory: Negative for cough, chest tightness and stridor.    Cardiovascular: Negative for chest pain.   Gastrointestinal: Negative for abdominal pain, blood in stool, diarrhea, nausea and vomiting.   Genitourinary: Negative for difficulty urinating, dysuria and hematuria.   Musculoskeletal: Negative for arthralgias, back pain, joint swelling, myalgias and neck pain.   Skin: Positive for wound. Negative for rash.        Wounds are healing well;   Neurological: Negative for dizziness.   Psychiatric/Behavioral: Negative for agitation. The patient is not nervous/anxious.        Objective:      Physical Exam   Constitutional: He is oriented to person, place, and time. He appears well-developed and well-nourished.   HENT:   Head: Normocephalic.   Eyes: Pupils are equal, round, and reactive to light.   Neck: Normal range of motion. Neck supple. No thyromegaly present.   Cardiovascular: Normal rate and regular rhythm.  Exam reveals no friction rub.    No murmur heard.  Pulmonary/Chest: Effort normal. No respiratory distress. He has no wheezes.   Abdominal: There is no tenderness. There is no rebound and no guarding.   Musculoskeletal: Normal range of motion. He exhibits no edema or tenderness.   Lymphadenopathy:     He has no cervical adenopathy.   Neurological: He is alert and oriented to person, place, and time. He has normal  reflexes. No cranial nerve deficit.   Skin: Skin is warm and dry. There is erythema.   Leg wounds are healing but still with dryness   Psychiatric: He has a normal mood and affect. Judgment and thought content normal.       Assessment:       1. Impaired mobility and activities of daily living    2. Stasis edema, unspecified laterality    3. BMI 50.0-59.9, adult    4. Bilateral lower leg cellulitis        Plan:   Peewee was seen today for follow-up.    Diagnoses and all orders for this visit:    Impaired mobility and activities of daily living    Stasis edema, unspecified laterality    BMI 50.0-59.9, adult    Bilateral lower leg cellulitis

## 2017-11-24 ENCOUNTER — PATIENT OUTREACH (OUTPATIENT)
Dept: ADMINISTRATIVE | Facility: HOSPITAL | Age: 62
End: 2017-11-24

## 2017-12-08 ENCOUNTER — OFFICE VISIT (OUTPATIENT)
Dept: FAMILY MEDICINE | Facility: CLINIC | Age: 62
End: 2017-12-08
Payer: COMMERCIAL

## 2017-12-08 VITALS
SYSTOLIC BLOOD PRESSURE: 132 MMHG | RESPIRATION RATE: 18 BRPM | DIASTOLIC BLOOD PRESSURE: 82 MMHG | OXYGEN SATURATION: 98 % | HEART RATE: 68 BPM | HEIGHT: 71 IN | BODY MASS INDEX: 44.1 KG/M2 | WEIGHT: 315 LBS

## 2017-12-08 DIAGNOSIS — E11.9 TYPE 2 DIABETES MELLITUS WITHOUT COMPLICATION, WITHOUT LONG-TERM CURRENT USE OF INSULIN: Primary | ICD-10-CM

## 2017-12-08 DIAGNOSIS — E78.5 HYPERLIPIDEMIA, UNSPECIFIED HYPERLIPIDEMIA TYPE: ICD-10-CM

## 2017-12-08 DIAGNOSIS — I10 ESSENTIAL HYPERTENSION: ICD-10-CM

## 2017-12-08 DIAGNOSIS — M17.10 UNILATERAL OSTEOARTHRITIS OF KNEE: ICD-10-CM

## 2017-12-08 PROCEDURE — 99999 PR PBB SHADOW E&M-EST. PATIENT-LVL III: CPT | Mod: PBBFAC,,, | Performed by: FAMILY MEDICINE

## 2017-12-08 PROCEDURE — 99213 OFFICE O/P EST LOW 20 MIN: CPT | Mod: S$GLB,,, | Performed by: FAMILY MEDICINE

## 2017-12-08 NOTE — PROGRESS NOTES
Subjective:       Patient ID: Peewee Nunes is a 62 y.o. male.    Chief Complaint: Follow-up (2 month followup)    Pt is a 62 y.o. male who presents for check up for DM and metabolic syn. Doing well on current meds. Denies any side effects. Prevention is up to date.      Review of Systems   Constitutional: Negative for appetite change.   HENT: Negative for congestion, ear pain, sneezing and sore throat.    Eyes: Negative for redness and visual disturbance.   Respiratory: Negative for cough, chest tightness and stridor.    Cardiovascular: Negative for chest pain.   Gastrointestinal: Negative for abdominal pain, blood in stool, diarrhea, nausea and vomiting.   Genitourinary: Negative for difficulty urinating, dysuria and hematuria.   Musculoskeletal: Negative for arthralgias, back pain, joint swelling, myalgias and neck pain.   Skin: Negative for rash.        Lower legs with  some redness   Neurological: Negative for dizziness.   Psychiatric/Behavioral: Negative for agitation. The patient is not nervous/anxious.        Objective:      Physical Exam   Constitutional: He is oriented to person, place, and time. He appears well-developed and well-nourished.   HENT:   Head: Normocephalic.   Eyes: Pupils are equal, round, and reactive to light.   Neck: Normal range of motion. Neck supple. No thyromegaly present.   Cardiovascular: Normal rate and regular rhythm.  Exam reveals no friction rub.    No murmur heard.  Pulmonary/Chest: Effort normal. No respiratory distress. He has no wheezes.   Abdominal: There is no tenderness. There is no rebound and no guarding.   Musculoskeletal: Normal range of motion. He exhibits no edema or tenderness.   Lymphadenopathy:     He has no cervical adenopathy.   Neurological: He is alert and oriented to person, place, and time. He has normal reflexes. No cranial nerve deficit.   Skin: Skin is warm and dry.   Psychiatric: He has a normal mood and affect. Judgment and thought content normal.        Assessment:       1. Type 2 diabetes mellitus without complication, without long-term current use of insulin    2. Unilateral osteoarthritis of knee    3. Hyperlipidemia, unspecified hyperlipidemia type    4. Essential hypertension        Plan:   Peewee was seen today for follow-up.    Diagnoses and all orders for this visit:    Type 2 diabetes mellitus without complication, without long-term current use of insulin  -     Hemoglobin A1c; Future  -     Comprehensive metabolic panel; Future  -     CBC auto differential; Future  -     Microalbumin/creatinine urine ratio; Future  -     Vitamin D; Future    Unilateral osteoarthritis of knee    Hyperlipidemia, unspecified hyperlipidemia type    Essential hypertension

## 2018-01-19 DIAGNOSIS — Z13.5 DIABETIC RETINOPATHY SCREENING: ICD-10-CM

## 2018-02-02 ENCOUNTER — CLINICAL SUPPORT (OUTPATIENT)
Dept: FAMILY MEDICINE | Facility: CLINIC | Age: 63
End: 2018-02-02
Payer: COMMERCIAL

## 2018-02-02 DIAGNOSIS — E11.9 TYPE 2 DIABETES MELLITUS WITHOUT COMPLICATION, WITHOUT LONG-TERM CURRENT USE OF INSULIN: ICD-10-CM

## 2018-02-02 LAB
25(OH)D3+25(OH)D2 SERPL-MCNC: 11 NG/ML
ALBUMIN SERPL BCP-MCNC: 3.9 G/DL
ALP SERPL-CCNC: 84 U/L
ALT SERPL W/O P-5'-P-CCNC: 6 U/L
ANION GAP SERPL CALC-SCNC: 12 MMOL/L
AST SERPL-CCNC: 16 U/L
BASOPHILS # BLD AUTO: 0.09 K/UL
BASOPHILS NFR BLD: 0.8 %
BILIRUB SERPL-MCNC: 1.4 MG/DL
BUN SERPL-MCNC: 18 MG/DL
CALCIUM SERPL-MCNC: 9.7 MG/DL
CHLORIDE SERPL-SCNC: 102 MMOL/L
CO2 SERPL-SCNC: 25 MMOL/L
CREAT SERPL-MCNC: 1.1 MG/DL
DIFFERENTIAL METHOD: ABNORMAL
EOSINOPHIL # BLD AUTO: 0.3 K/UL
EOSINOPHIL NFR BLD: 2.8 %
ERYTHROCYTE [DISTWIDTH] IN BLOOD BY AUTOMATED COUNT: 14.7 %
EST. GFR  (AFRICAN AMERICAN): >60 ML/MIN/1.73 M^2
EST. GFR  (NON AFRICAN AMERICAN): >60 ML/MIN/1.73 M^2
ESTIMATED AVG GLUCOSE: 97 MG/DL
GLUCOSE SERPL-MCNC: 104 MG/DL
HBA1C MFR BLD HPLC: 5 %
HCT VFR BLD AUTO: 43.4 %
HGB BLD-MCNC: 14.2 G/DL
LYMPHOCYTES # BLD AUTO: 2 K/UL
LYMPHOCYTES NFR BLD: 16.9 %
MCH RBC QN AUTO: 30 PG
MCHC RBC AUTO-ENTMCNC: 32.7 G/DL
MCV RBC AUTO: 92 FL
MONOCYTES # BLD AUTO: 1 K/UL
MONOCYTES NFR BLD: 8.5 %
NEUTROPHILS # BLD AUTO: 8.3 K/UL
NEUTROPHILS NFR BLD: 71 %
PLATELET # BLD AUTO: 263 K/UL
PMV BLD AUTO: 11.5 FL
POTASSIUM SERPL-SCNC: 4.7 MMOL/L
PROT SERPL-MCNC: 8.2 G/DL
RBC # BLD AUTO: 4.74 M/UL
SODIUM SERPL-SCNC: 139 MMOL/L
WBC # BLD AUTO: 11.71 K/UL

## 2018-02-02 PROCEDURE — 82306 VITAMIN D 25 HYDROXY: CPT

## 2018-02-02 PROCEDURE — 80053 COMPREHEN METABOLIC PANEL: CPT

## 2018-02-02 PROCEDURE — 83036 HEMOGLOBIN GLYCOSYLATED A1C: CPT

## 2018-02-02 PROCEDURE — 36415 COLL VENOUS BLD VENIPUNCTURE: CPT | Mod: S$GLB,,, | Performed by: FAMILY MEDICINE

## 2018-02-02 PROCEDURE — 85025 COMPLETE CBC W/AUTO DIFF WBC: CPT

## 2018-02-09 ENCOUNTER — OFFICE VISIT (OUTPATIENT)
Dept: FAMILY MEDICINE | Facility: CLINIC | Age: 63
End: 2018-02-09
Payer: COMMERCIAL

## 2018-02-09 VITALS
HEART RATE: 66 BPM | RESPIRATION RATE: 16 BRPM | SYSTOLIC BLOOD PRESSURE: 134 MMHG | DIASTOLIC BLOOD PRESSURE: 72 MMHG | BODY MASS INDEX: 44.1 KG/M2 | OXYGEN SATURATION: 97 % | HEIGHT: 71 IN | WEIGHT: 315 LBS

## 2018-02-09 DIAGNOSIS — E55.9 VITAMIN D INSUFFICIENCY: Primary | ICD-10-CM

## 2018-02-09 DIAGNOSIS — E11.9 TYPE 2 DIABETES MELLITUS WITHOUT COMPLICATION, WITHOUT LONG-TERM CURRENT USE OF INSULIN: ICD-10-CM

## 2018-02-09 DIAGNOSIS — E78.01 FAMILIAL HYPERCHOLESTEROLEMIA: ICD-10-CM

## 2018-02-09 PROCEDURE — 99999 PR PBB SHADOW E&M-EST. PATIENT-LVL III: CPT | Mod: PBBFAC,,, | Performed by: FAMILY MEDICINE

## 2018-02-09 PROCEDURE — 99214 OFFICE O/P EST MOD 30 MIN: CPT | Mod: S$GLB,,, | Performed by: FAMILY MEDICINE

## 2018-02-09 PROCEDURE — 3008F BODY MASS INDEX DOCD: CPT | Mod: S$GLB,,, | Performed by: FAMILY MEDICINE

## 2018-02-09 RX ORDER — PRAVASTATIN SODIUM 40 MG/1
40 TABLET ORAL NIGHTLY
Qty: 90 TABLET | Refills: 1 | Status: SHIPPED | OUTPATIENT
Start: 2018-02-09 | End: 2018-08-10 | Stop reason: SDUPTHER

## 2018-02-09 RX ORDER — METFORMIN HYDROCHLORIDE 500 MG/1
500 TABLET ORAL 2 TIMES DAILY WITH MEALS
Qty: 180 TABLET | Refills: 1 | Status: SHIPPED | OUTPATIENT
Start: 2018-02-09 | End: 2018-08-10 | Stop reason: ALTCHOICE

## 2018-02-09 RX ORDER — ASPIRIN 325 MG
50000 TABLET, DELAYED RELEASE (ENTERIC COATED) ORAL WEEKLY
Qty: 24 CAPSULE | Refills: 0 | Status: SHIPPED | OUTPATIENT
Start: 2018-02-09 | End: 2018-02-21 | Stop reason: SDUPTHER

## 2018-02-09 NOTE — PROGRESS NOTES
Subjective:       Patient ID: Peewee Nunes is a 62 y.o. male.    Chief Complaint: Follow-up ( 2 month )    Pt is a 62 y.o. male who presents for check up for Type 2 diabetes mellitus without complication, without long-term current use of insulin  Familial hypercholesterolemia  Vitamin d insufficiency  (primary encounter diagnosis). Doing well on current meds. Denies any side effects. Prevention is up to date.      Review of Systems   Constitutional: Negative for appetite change.   HENT: Negative for congestion, ear pain, sneezing and sore throat.    Eyes: Negative for redness and visual disturbance.   Respiratory: Negative for cough, chest tightness and stridor.    Cardiovascular: Negative for chest pain.   Gastrointestinal: Negative for abdominal pain, blood in stool, diarrhea, nausea and vomiting.   Genitourinary: Negative for difficulty urinating, dysuria and hematuria.   Musculoskeletal: Negative for arthralgias, back pain, joint swelling, myalgias and neck pain.   Skin: Negative for rash.   Neurological: Negative for dizziness.   Psychiatric/Behavioral: Negative for agitation. The patient is not nervous/anxious.        Objective:      Physical Exam   Constitutional: He is oriented to person, place, and time. He appears well-developed.   150 # s wt loss; 61 y/o W M   HENT:   Head: Normocephalic.   Eyes: Pupils are equal, round, and reactive to light.   Neck: Normal range of motion. Neck supple. No thyromegaly present.   Cardiovascular: Normal rate and regular rhythm.  Exam reveals no friction rub.    No murmur heard.  Pulses:       Dorsalis pedis pulses are 2+ on the right side, and 2+ on the left side.        Posterior tibial pulses are 2+ on the right side, and 2+ on the left side.   Pulmonary/Chest: Effort normal. No respiratory distress. He has no wheezes.   Abdominal: There is no tenderness. There is no rebound and no guarding.   Genitourinary: Prostate normal.   Musculoskeletal: Normal range of motion.  He exhibits no edema or tenderness.        Right foot: There is normal range of motion and no deformity.        Left foot: There is normal range of motion and no deformity.   Feet:   Right Foot:   Protective Sensation: 8 sites tested. 8 sites sensed.   Skin Integrity: Positive for dry skin. Negative for ulcer, blister, skin breakdown, erythema, warmth or callus.   Left Foot:   Protective Sensation: 8 sites tested. 8 sites sensed.   Skin Integrity: Positive for dry skin. Negative for ulcer, blister, skin breakdown, erythema, warmth or callus.   Lymphadenopathy:     He has no cervical adenopathy.   Neurological: He is alert and oriented to person, place, and time. He has normal reflexes. No cranial nerve deficit.   Skin: Skin is warm and dry. There is erythema.   Legs are healed; scaly apearance   Psychiatric: He has a normal mood and affect. Judgment and thought content normal.       Assessment:       1. Vitamin D insufficiency    2. Type 2 diabetes mellitus without complication, without long-term current use of insulin    3. Familial hypercholesterolemia    4. BMI 40.0-44.9, adult        Plan:   Peewee was seen today for follow-up.    Diagnoses and all orders for this visit:    Vitamin D insufficiency  -     Vitamin D; Future    Type 2 diabetes mellitus without complication, without long-term current use of insulin  -     linagliptin (TRADJENTA) 5 mg Tab tablet; Take 1 tablet (5 mg total) by mouth once daily.  -     Hemoglobin A1c; Future    Familial hypercholesterolemia  -     pravastatin (PRAVACHOL) 40 MG tablet; Take 1 tablet (40 mg total) by mouth every evening.  -     Comprehensive metabolic panel; Future  -     Lipid panel; Future    BMI 40.0-44.9, adult    Other orders  -     cholecalciferol, vitamin D3, 50,000 unit capsule; Take 1 capsule (50,000 Units total) by mouth once a week.  -     metFORMIN (GLUCOPHAGE) 500 MG tablet; Take 1 tablet (500 mg total) by mouth 2 (two) times daily with meals.

## 2018-02-11 ENCOUNTER — PATIENT MESSAGE (OUTPATIENT)
Dept: FAMILY MEDICINE | Facility: CLINIC | Age: 63
End: 2018-02-11

## 2018-02-14 ENCOUNTER — PATIENT MESSAGE (OUTPATIENT)
Dept: FAMILY MEDICINE | Facility: CLINIC | Age: 63
End: 2018-02-14

## 2018-02-21 NOTE — TELEPHONE ENCOUNTER
Spoke with pt wife please re-send Vit D to [facility] dose not cover.Please review and advise.Thank you.

## 2018-02-22 RX ORDER — ASPIRIN 325 MG
50000 TABLET, DELAYED RELEASE (ENTERIC COATED) ORAL WEEKLY
Qty: 24 CAPSULE | Refills: 0 | Status: SHIPPED | OUTPATIENT
Start: 2018-02-22 | End: 2018-11-16

## 2018-07-02 DIAGNOSIS — I10 ESSENTIAL HYPERTENSION: ICD-10-CM

## 2018-07-02 RX ORDER — NIFEDIPINE 60 MG/1
TABLET, EXTENDED RELEASE ORAL
Qty: 180 TABLET | Refills: 3 | OUTPATIENT
Start: 2018-07-02

## 2018-07-02 RX ORDER — NIFEDIPINE 60 MG/1
120 TABLET, EXTENDED RELEASE ORAL DAILY
Qty: 180 TABLET | Refills: 3 | Status: SHIPPED | OUTPATIENT
Start: 2018-07-02 | End: 2018-08-10 | Stop reason: SDUPTHER

## 2018-07-02 NOTE — TELEPHONE ENCOUNTER
Peewee Nunes would like a refill of the following medications:        nifedipine (PROCARDIA-XL) 60 MG (OSM) 24 hr tablet [Jacob Piedra MD]      Patient Comment: Appointment is scheduled for August.  Somehow Peewee will be short on this med.  he is taking 2 per day -120 mg). [facility] will be calling about a renewal.    Preferred pharmacy: [facility] - [place], [place] - [address] AT PORTAL TO [facility]ED [facility] SITES

## 2018-08-06 ENCOUNTER — CLINICAL SUPPORT (OUTPATIENT)
Dept: FAMILY MEDICINE | Facility: CLINIC | Age: 63
End: 2018-08-06
Payer: MEDICARE

## 2018-08-06 DIAGNOSIS — E11.9 TYPE 2 DIABETES MELLITUS WITHOUT COMPLICATION, WITHOUT LONG-TERM CURRENT USE OF INSULIN: ICD-10-CM

## 2018-08-06 DIAGNOSIS — E78.01 FAMILIAL HYPERCHOLESTEROLEMIA: ICD-10-CM

## 2018-08-06 LAB
25(OH)D3+25(OH)D2 SERPL-MCNC: 16 NG/ML
ALBUMIN SERPL BCP-MCNC: 4 G/DL
ALBUMIN/CREAT UR: 125.8 UG/MG
ALP SERPL-CCNC: 88 U/L
ALT SERPL W/O P-5'-P-CCNC: 9 U/L
ANION GAP SERPL CALC-SCNC: 15 MMOL/L
AST SERPL-CCNC: 15 U/L
BASOPHILS # BLD AUTO: 0.07 K/UL
BASOPHILS NFR BLD: 0.3 %
BILIRUB SERPL-MCNC: 1.5 MG/DL
BUN SERPL-MCNC: 9 MG/DL
CALCIUM SERPL-MCNC: 9.5 MG/DL
CHLORIDE SERPL-SCNC: 97 MMOL/L
CHOLEST SERPL-MCNC: 115 MG/DL
CHOLEST/HDLC SERPL: 2.6 {RATIO}
CO2 SERPL-SCNC: 17 MMOL/L
COMPLEXED PSA SERPL-MCNC: 1 NG/ML
CREAT SERPL-MCNC: 0.8 MG/DL
CREAT UR-MCNC: 152.6 MG/DL
DIFFERENTIAL METHOD: ABNORMAL
EOSINOPHIL # BLD AUTO: 0.1 K/UL
EOSINOPHIL NFR BLD: 0.5 %
ERYTHROCYTE [DISTWIDTH] IN BLOOD BY AUTOMATED COUNT: 13.4 %
EST. GFR  (AFRICAN AMERICAN): >60 ML/MIN/1.73 M^2
EST. GFR  (NON AFRICAN AMERICAN): >60 ML/MIN/1.73 M^2
ESTIMATED AVG GLUCOSE: 105 MG/DL
GLUCOSE SERPL-MCNC: 120 MG/DL
HBA1C MFR BLD HPLC: 5.3 %
HCT VFR BLD AUTO: 46.8 %
HDLC SERPL-MCNC: 44 MG/DL
HDLC SERPL: 38.3 %
HGB BLD-MCNC: 16.8 G/DL
LDLC SERPL CALC-MCNC: 52.8 MG/DL
LYMPHOCYTES # BLD AUTO: 2.8 K/UL
LYMPHOCYTES NFR BLD: 12.6 %
MCH RBC QN AUTO: 31.2 PG
MCHC RBC AUTO-ENTMCNC: 35.9 G/DL
MCV RBC AUTO: 87 FL
MICROALBUMIN UR DL<=1MG/L-MCNC: 192 UG/ML
MONOCYTES # BLD AUTO: 1.8 K/UL
MONOCYTES NFR BLD: 8.1 %
NEUTROPHILS # BLD AUTO: 17.7 K/UL
NEUTROPHILS NFR BLD: 78.5 %
NONHDLC SERPL-MCNC: 71 MG/DL
PLATELET # BLD AUTO: 364 K/UL
PMV BLD AUTO: 11.2 FL
POTASSIUM SERPL-SCNC: 4.3 MMOL/L
PROT SERPL-MCNC: 7.2 G/DL
RBC # BLD AUTO: 5.39 M/UL
SODIUM SERPL-SCNC: 129 MMOL/L
TRIGL SERPL-MCNC: 91 MG/DL
WBC # BLD AUTO: 22.57 K/UL

## 2018-08-06 PROCEDURE — 83036 HEMOGLOBIN GLYCOSYLATED A1C: CPT

## 2018-08-06 PROCEDURE — 84153 ASSAY OF PSA TOTAL: CPT

## 2018-08-06 PROCEDURE — 85025 COMPLETE CBC W/AUTO DIFF WBC: CPT

## 2018-08-06 PROCEDURE — 99999 PR STA SHADOW: CPT | Mod: PBBFAC,,, | Performed by: FAMILY MEDICINE

## 2018-08-06 PROCEDURE — 80053 COMPREHEN METABOLIC PANEL: CPT

## 2018-08-06 PROCEDURE — 36415 COLL VENOUS BLD VENIPUNCTURE: CPT | Mod: PBBFAC

## 2018-08-06 PROCEDURE — 80061 LIPID PANEL: CPT

## 2018-08-06 PROCEDURE — 82043 UR ALBUMIN QUANTITATIVE: CPT

## 2018-08-06 PROCEDURE — 82306 VITAMIN D 25 HYDROXY: CPT

## 2018-08-10 ENCOUNTER — OFFICE VISIT (OUTPATIENT)
Dept: FAMILY MEDICINE | Facility: CLINIC | Age: 63
End: 2018-08-10
Payer: MEDICARE

## 2018-08-10 VITALS
HEART RATE: 98 BPM | DIASTOLIC BLOOD PRESSURE: 82 MMHG | OXYGEN SATURATION: 97 % | RESPIRATION RATE: 20 BRPM | BODY MASS INDEX: 33.58 KG/M2 | HEIGHT: 71 IN | SYSTOLIC BLOOD PRESSURE: 120 MMHG | WEIGHT: 239.88 LBS

## 2018-08-10 DIAGNOSIS — I87.303 STASIS EDEMA, BILATERAL: ICD-10-CM

## 2018-08-10 DIAGNOSIS — E78.01 FAMILIAL HYPERCHOLESTEROLEMIA: ICD-10-CM

## 2018-08-10 DIAGNOSIS — E11.9 TYPE 2 DIABETES MELLITUS WITHOUT COMPLICATION, WITHOUT LONG-TERM CURRENT USE OF INSULIN: ICD-10-CM

## 2018-08-10 DIAGNOSIS — D72.829 LEUKOCYTOSIS, UNSPECIFIED TYPE: Primary | ICD-10-CM

## 2018-08-10 DIAGNOSIS — I10 ESSENTIAL HYPERTENSION: ICD-10-CM

## 2018-08-10 LAB
AMYLASE SERPL-CCNC: 20 U/L
ANISOCYTOSIS BLD QL SMEAR: SLIGHT
BASOPHILS NFR BLD: 0 %
BILIRUB SERPL-MCNC: NORMAL MG/DL
BLOOD URINE, POC: NORMAL
BURR CELLS BLD QL SMEAR: ABNORMAL
COLOR, POC UA: NORMAL
DACRYOCYTES BLD QL SMEAR: ABNORMAL
DIFFERENTIAL METHOD: ABNORMAL
EOSINOPHIL NFR BLD: 0 %
ERYTHROCYTE [DISTWIDTH] IN BLOOD BY AUTOMATED COUNT: 13.4 %
ERYTHROCYTE [SEDIMENTATION RATE] IN BLOOD BY WESTERGREN METHOD: 3 MM/HR
GIANT PLATELETS BLD QL SMEAR: PRESENT
GLUCOSE UR QL STRIP: NORMAL
HCT VFR BLD AUTO: 48.2 %
HGB BLD-MCNC: 17.3 G/DL
KETONES UR QL STRIP: NORMAL
LEUKOCYTE ESTERASE URINE, POC: NORMAL
LIPASE SERPL-CCNC: 23 U/L
LYMPHOCYTES NFR BLD: 15 %
MCH RBC QN AUTO: 31 PG
MCHC RBC AUTO-ENTMCNC: 35.9 G/DL
MCV RBC AUTO: 86 FL
MONOCYTES NFR BLD: 9 %
NEUTROPHILS NFR BLD: 76 %
NITRITE, POC UA: NORMAL
PH, POC UA: 7
PLATELET # BLD AUTO: 399 K/UL
PMV BLD AUTO: 10.9 FL
POIKILOCYTOSIS BLD QL SMEAR: SLIGHT
PROTEIN, POC: NORMAL
RBC # BLD AUTO: 5.58 M/UL
SPECIFIC GRAVITY, POC UA: 1.01
SPHEROCYTES BLD QL SMEAR: ABNORMAL
UROBILINOGEN, POC UA: 4
WBC # BLD AUTO: 24.18 K/UL

## 2018-08-10 PROCEDURE — 81000 URINALYSIS NONAUTO W/SCOPE: CPT | Mod: PBBFAC,59 | Performed by: FAMILY MEDICINE

## 2018-08-10 PROCEDURE — 99213 OFFICE O/P EST LOW 20 MIN: CPT | Mod: PBBFAC | Performed by: FAMILY MEDICINE

## 2018-08-10 PROCEDURE — 81001 URINALYSIS AUTO W/SCOPE: CPT | Mod: PBBFAC | Performed by: FAMILY MEDICINE

## 2018-08-10 PROCEDURE — 83690 ASSAY OF LIPASE: CPT

## 2018-08-10 PROCEDURE — 85027 COMPLETE CBC AUTOMATED: CPT

## 2018-08-10 PROCEDURE — 82150 ASSAY OF AMYLASE: CPT

## 2018-08-10 PROCEDURE — 99999 POCT URINE SEDIMENT EXAM: CPT | Mod: PBBFAC,,, | Performed by: FAMILY MEDICINE

## 2018-08-10 PROCEDURE — 99999 PR PBB SHADOW E&M-EST. PATIENT-LVL III: CPT | Mod: PBBFAC,,, | Performed by: FAMILY MEDICINE

## 2018-08-10 PROCEDURE — 99999 PR STA SHADOW: CPT | Mod: PBBFAC,,, | Performed by: FAMILY MEDICINE

## 2018-08-10 PROCEDURE — 99213 OFFICE O/P EST LOW 20 MIN: CPT | Mod: S$PBB | Performed by: FAMILY MEDICINE

## 2018-08-10 PROCEDURE — 36415 COLL VENOUS BLD VENIPUNCTURE: CPT | Mod: PBBFAC

## 2018-08-10 PROCEDURE — 85007 BL SMEAR W/DIFF WBC COUNT: CPT

## 2018-08-10 PROCEDURE — 85651 RBC SED RATE NONAUTOMATED: CPT

## 2018-08-10 PROCEDURE — 99999 POCT URINALYSIS, DIPSTICK OR TABLET REAGENT, AUTOMATED, WITH MICROSCOP: CPT | Mod: PBBFAC,,, | Performed by: FAMILY MEDICINE

## 2018-08-10 RX ORDER — PRAVASTATIN SODIUM 40 MG/1
40 TABLET ORAL NIGHTLY
Qty: 90 TABLET | Refills: 1 | Status: ON HOLD | OUTPATIENT
Start: 2018-08-10 | End: 2019-02-18 | Stop reason: HOSPADM

## 2018-08-10 RX ORDER — ASPIRIN 325 MG
50000 TABLET, DELAYED RELEASE (ENTERIC COATED) ORAL WEEKLY
Qty: 24 CAPSULE | Refills: 0 | Status: SHIPPED | OUTPATIENT
Start: 2018-08-10 | End: 2018-08-24 | Stop reason: SDUPTHER

## 2018-08-10 RX ORDER — NIFEDIPINE 60 MG/1
120 TABLET, EXTENDED RELEASE ORAL DAILY
Qty: 180 TABLET | Refills: 3 | Status: SHIPPED | OUTPATIENT
Start: 2018-08-10 | End: 2018-11-16

## 2018-08-10 RX ORDER — DOXYCYCLINE HYCLATE 100 MG
100 TABLET ORAL 2 TIMES DAILY
Qty: 20 TABLET | Refills: 0 | Status: SHIPPED | OUTPATIENT
Start: 2018-08-10 | End: 2018-08-20

## 2018-08-10 RX ORDER — LOSARTAN POTASSIUM 100 MG/1
100 TABLET ORAL DAILY
Qty: 90 TABLET | Refills: 3 | Status: SHIPPED | OUTPATIENT
Start: 2018-08-10 | End: 2018-11-16 | Stop reason: ALTCHOICE

## 2018-08-10 RX ORDER — BISOPROLOL FUMARATE 10 MG/1
10 TABLET, FILM COATED ORAL 2 TIMES DAILY
Qty: 180 TABLET | Refills: 3 | Status: SHIPPED | OUTPATIENT
Start: 2018-08-10 | End: 2018-11-16 | Stop reason: ALTCHOICE

## 2018-08-10 NOTE — PROGRESS NOTES
Subjective:       Patient ID: Peewee Nunes is a 63 y.o. male.    Chief Complaint: Follow-up (6 mo)    Pt is a 63 y.o. male who presents for check up for high white count and feeling poorly and weakness. Doing well on current meds. Denies any side effects. Prevention is up to date.      Review of Systems   Constitutional: Negative for appetite change.   HENT: Negative for congestion, ear pain, sneezing and sore throat.    Eyes: Negative for redness and visual disturbance.   Respiratory: Negative for cough, chest tightness and stridor.    Cardiovascular: Negative for chest pain.   Gastrointestinal: Negative for abdominal pain, blood in stool, diarrhea, nausea and vomiting.        No appetite   Genitourinary: Negative for difficulty urinating, dysuria and hematuria.        Nocturia x 2; no dysuria   Musculoskeletal: Negative for arthralgias, back pain, joint swelling, myalgias and neck pain.   Skin: Negative for rash.   Neurological: Negative for dizziness.   Psychiatric/Behavioral: Negative for agitation. The patient is not nervous/anxious.        Objective:      Physical Exam   Constitutional: He is oriented to person, place, and time. He appears well-developed. He appears distressed.   Listless, pale 64 y/o WM   HENT:   Head: Normocephalic.   Eyes: Pupils are equal, round, and reactive to light.   Neck: Normal range of motion. Neck supple. No thyromegaly present.   Cardiovascular: Normal rate and regular rhythm.  Exam reveals no friction rub.    No murmur heard.  Pulmonary/Chest: Effort normal. No respiratory distress. He has no wheezes.   Abdominal: There is no tenderness. There is no rebound and no guarding.   Musculoskeletal: Normal range of motion. He exhibits no edema or tenderness.   Lymphadenopathy:     He has no cervical adenopathy.   Neurological: He is alert and oriented to person, place, and time. He has normal reflexes. No cranial nerve deficit.   Skin: Skin is warm and dry.   Psychiatric: He has a  normal mood and affect. Judgment and thought content normal.       Assessment:       1. Leukocytosis, unspecified type    2. Stasis edema, bilateral    3. Essential hypertension    4. Type 2 diabetes mellitus without complication, without long-term current use of insulin    5. Familial hypercholesterolemia        Plan:   Peewee was seen today for follow-up.    Diagnoses and all orders for this visit:    Leukocytosis, unspecified type  -     Amylase; Future  -     Lipase; Future  -     CBC auto differential; Future  -     Sedimentation rate; Future  -     POCT urinalysis, dipstick or tablet reag  -     POCT URINE SEDIMENT EXAM    Stasis edema, bilateral  -     bisoprolol (ZEBETA) 10 MG tablet; Take 1 tablet (10 mg total) by mouth 2 (two) times daily.    Essential hypertension  -     bisoprolol (ZEBETA) 10 MG tablet; Take 1 tablet (10 mg total) by mouth 2 (two) times daily.  -     NIFEdipine (PROCARDIA-XL) 60 MG (OSM) 24 hr tablet; Take 2 tablets (120 mg total) by mouth once daily.  -     losartan (COZAAR) 100 MG tablet; Take 1 tablet (100 mg total) by mouth once daily.    Type 2 diabetes mellitus without complication, without long-term current use of insulin  -     linagliptin (TRADJENTA) 5 mg Tab tablet; Take 1 tablet (5 mg total) by mouth once daily.    Familial hypercholesterolemia  -     pravastatin (PRAVACHOL) 40 MG tablet; Take 1 tablet (40 mg total) by mouth every evening.

## 2018-08-13 LAB
BACTERIA SPEC CULT: NORMAL
CASTS: NORMAL
CRYSTALS: NORMAL
RBC CELLS COUNTED: NORMAL
WHITE BLOOD CELLS: NORMAL

## 2018-08-15 ENCOUNTER — PATIENT MESSAGE (OUTPATIENT)
Dept: FAMILY MEDICINE | Facility: CLINIC | Age: 63
End: 2018-08-15

## 2018-08-17 ENCOUNTER — TELEPHONE (OUTPATIENT)
Dept: FAMILY MEDICINE | Facility: CLINIC | Age: 63
End: 2018-08-17

## 2018-08-17 DIAGNOSIS — D72.829 LEUKOCYTOSIS, UNSPECIFIED TYPE: Primary | ICD-10-CM

## 2018-08-17 NOTE — TELEPHONE ENCOUNTER
Patient coming in for lab  Work and ua on Tuesday-wife states you wanted to recheck wbc count-please order labs needed. Thanks

## 2018-08-20 ENCOUNTER — PATIENT MESSAGE (OUTPATIENT)
Dept: FAMILY MEDICINE | Facility: CLINIC | Age: 63
End: 2018-08-20

## 2018-08-21 ENCOUNTER — TELEPHONE (OUTPATIENT)
Dept: FAMILY MEDICINE | Facility: CLINIC | Age: 63
End: 2018-08-21

## 2018-08-21 ENCOUNTER — PATIENT MESSAGE (OUTPATIENT)
Dept: FAMILY MEDICINE | Facility: CLINIC | Age: 63
End: 2018-08-21

## 2018-08-21 ENCOUNTER — CLINICAL SUPPORT (OUTPATIENT)
Dept: FAMILY MEDICINE | Facility: CLINIC | Age: 63
End: 2018-08-21
Payer: MEDICARE

## 2018-08-21 DIAGNOSIS — D72.829 LEUKOCYTOSIS, UNSPECIFIED TYPE: Primary | ICD-10-CM

## 2018-08-21 DIAGNOSIS — R30.0 DYSURIA: ICD-10-CM

## 2018-08-21 LAB
BACTERIA SPEC CULT: NORMAL
BASOPHILS NFR BLD: 1 %
BILIRUB SERPL-MCNC: NORMAL MG/DL
BLOOD URINE, POC: NORMAL
BURR CELLS BLD QL SMEAR: ABNORMAL
CASTS: NORMAL
COLOR, POC UA: NORMAL
CRYSTALS: NORMAL
DIFFERENTIAL METHOD: ABNORMAL
EOSINOPHIL NFR BLD: 0 %
ERYTHROCYTE [DISTWIDTH] IN BLOOD BY AUTOMATED COUNT: 13.9 %
GIANT PLATELETS BLD QL SMEAR: PRESENT
GLUCOSE UR QL STRIP: NORMAL
HCT VFR BLD AUTO: 46.6 %
HGB BLD-MCNC: 16.4 G/DL
KETONES UR QL STRIP: NORMAL
LEUKOCYTE ESTERASE URINE, POC: NORMAL
LYMPHOCYTES NFR BLD: 16 %
MCH RBC QN AUTO: 31.5 PG
MCHC RBC AUTO-ENTMCNC: 35.2 G/DL
MCV RBC AUTO: 90 FL
MONOCYTES NFR BLD: 8 %
NEUTROPHILS NFR BLD: 75 %
NITRITE, POC UA: NORMAL
OVALOCYTES BLD QL SMEAR: ABNORMAL
PH, POC UA: 5
PLATELET # BLD AUTO: 326 K/UL
PMV BLD AUTO: 10.9 FL
PROTEIN, POC: 30
RBC # BLD AUTO: 5.2 M/UL
RBC CELLS COUNTED: NORMAL
SPECIFIC GRAVITY, POC UA: 1.02
TOXIC GRANULES BLD QL SMEAR: PRESENT
UROBILINOGEN, POC UA: NORMAL
WBC # BLD AUTO: 25.06 K/UL
WBC TOXIC VACUOLES BLD QL SMEAR: PRESENT
WHITE BLOOD CELLS: NORMAL

## 2018-08-21 PROCEDURE — 99999 PR STA SHADOW: CPT | Mod: PBBFAC,,, | Performed by: FAMILY MEDICINE

## 2018-08-21 PROCEDURE — 81000 URINALYSIS NONAUTO W/SCOPE: CPT | Mod: PBBFAC

## 2018-08-21 PROCEDURE — 36415 COLL VENOUS BLD VENIPUNCTURE: CPT | Mod: PBBFAC

## 2018-08-21 PROCEDURE — 85027 COMPLETE CBC AUTOMATED: CPT

## 2018-08-21 PROCEDURE — 81002 URINALYSIS NONAUTO W/O SCOPE: CPT | Mod: PBBFAC

## 2018-08-21 PROCEDURE — 85007 BL SMEAR W/DIFF WBC COUNT: CPT

## 2018-08-21 PROCEDURE — 99999 POCT URINE SEDIMENT EXAM: CPT | Mod: PBBFAC,,,

## 2018-08-21 PROCEDURE — 99999 POCT URINE DIPSTICK WITHOUT MICROSCOPE: CPT | Mod: PBBFAC,,,

## 2018-08-21 NOTE — PROGRESS NOTES
The following lab results were abnormal. The following recommendations were made:I spoke with his wife about the hi WBCs

## 2018-08-21 NOTE — TELEPHONE ENCOUNTER
Left message for patient to return my call regarding labs. Doctor Tadeo states urinalysis is normal.

## 2018-08-24 ENCOUNTER — OFFICE VISIT (OUTPATIENT)
Dept: FAMILY MEDICINE | Facility: CLINIC | Age: 63
End: 2018-08-24
Payer: MEDICARE

## 2018-08-24 VITALS
HEIGHT: 71 IN | SYSTOLIC BLOOD PRESSURE: 94 MMHG | DIASTOLIC BLOOD PRESSURE: 60 MMHG | HEART RATE: 90 BPM | WEIGHT: 229.38 LBS | BODY MASS INDEX: 32.11 KG/M2 | RESPIRATION RATE: 20 BRPM

## 2018-08-24 DIAGNOSIS — Z78.9 IMPAIRED MOBILITY AND ACTIVITIES OF DAILY LIVING: ICD-10-CM

## 2018-08-24 DIAGNOSIS — D51.0 PERNICIOUS ANEMIA: Primary | ICD-10-CM

## 2018-08-24 DIAGNOSIS — Z74.09 IMPAIRED MOBILITY AND ACTIVITIES OF DAILY LIVING: ICD-10-CM

## 2018-08-24 DIAGNOSIS — S16.1XXA ACUTE STRAIN OF NECK MUSCLE, INITIAL ENCOUNTER: ICD-10-CM

## 2018-08-24 DIAGNOSIS — I10 HYPERTENSION, UNSPECIFIED TYPE: ICD-10-CM

## 2018-08-24 PROCEDURE — 99999 PR PBB SHADOW E&M-EST. PATIENT-LVL III: CPT | Mod: PBBFAC,,, | Performed by: FAMILY MEDICINE

## 2018-08-24 PROCEDURE — 99999 PR STA SHADOW: CPT | Mod: PBBFAC,,, | Performed by: FAMILY MEDICINE

## 2018-08-24 PROCEDURE — 82607 VITAMIN B-12: CPT

## 2018-08-24 PROCEDURE — 99213 OFFICE O/P EST LOW 20 MIN: CPT | Mod: PBBFAC | Performed by: FAMILY MEDICINE

## 2018-08-24 PROCEDURE — 99214 OFFICE O/P EST MOD 30 MIN: CPT | Mod: S$PBB | Performed by: FAMILY MEDICINE

## 2018-08-24 RX ORDER — CYCLOBENZAPRINE HCL 10 MG
10 TABLET ORAL 3 TIMES DAILY PRN
Qty: 30 TABLET | Refills: 2 | Status: SHIPPED | OUTPATIENT
Start: 2018-08-24 | End: 2018-09-03

## 2018-08-24 NOTE — PROGRESS NOTES
Subjective:       Patient ID: Peewee Nunes is a 63 y.o. male.    Chief Complaint: Follow-up (2 weeks)    Pt is a 63 y.o. male who presents for check up for cachexia and UTI and elevated WBCs. Doing well on current meds. Denies any side effects. Prevention is up to date.      Review of Systems   Constitutional: Positive for activity change and unexpected weight change.   HENT: Negative for hearing loss, rhinorrhea and trouble swallowing.    Eyes: Negative for discharge and visual disturbance.   Respiratory: Negative for chest tightness and wheezing.    Cardiovascular: Positive for chest pain. Negative for palpitations.   Gastrointestinal: Positive for constipation. Negative for blood in stool, diarrhea and vomiting.   Endocrine: Negative for polydipsia and polyuria.   Genitourinary: Positive for hematuria. Negative for difficulty urinating and urgency.   Musculoskeletal: Positive for arthralgias and neck pain. Negative for joint swelling.   Neurological: Positive for weakness and headaches.   Psychiatric/Behavioral: Negative for confusion and dysphoric mood.       Objective:      Physical Exam   Constitutional: He is oriented to person, place, and time. He appears well-developed and well-nourished.   HENT:   Head: Normocephalic.   Eyes: Pupils are equal, round, and reactive to light.   Neck: Normal range of motion. Neck supple. No thyromegaly present.   Cardiovascular: Normal rate and regular rhythm. Exam reveals no friction rub.   No murmur heard.  Pulmonary/Chest: Effort normal. No respiratory distress. He has no wheezes.   Abdominal: There is no tenderness. There is no rebound and no guarding.   Musculoskeletal: Normal range of motion. He exhibits no edema or tenderness.   Lymphadenopathy:     He has no cervical adenopathy.   Neurological: He is alert and oriented to person, place, and time. He has normal reflexes. No cranial nerve deficit.   Skin: Skin is warm and dry.   Psychiatric: He has a normal mood and  affect. Judgment and thought content normal.       Assessment:       1. Pernicious anemia    2. Hypertension, unspecified type    3. Impaired mobility and activities of daily living    4. Acute strain of neck muscle, initial encounter        Plan:   Peewee was seen today for follow-up.    Diagnoses and all orders for this visit:    Pernicious anemia  -     Vitamin B12; Future  -     Cancel: Vitamin B12; Future    Hypertension, unspecified type    Impaired mobility and activities of daily living    Acute strain of neck muscle, initial encounter

## 2018-08-25 LAB — VIT B12 SERPL-MCNC: 401 PG/ML

## 2018-09-13 ENCOUNTER — OFFICE VISIT (OUTPATIENT)
Dept: FAMILY MEDICINE | Facility: CLINIC | Age: 63
End: 2018-09-13
Payer: MEDICARE

## 2018-09-13 VITALS
HEIGHT: 71 IN | DIASTOLIC BLOOD PRESSURE: 64 MMHG | HEART RATE: 88 BPM | WEIGHT: 219.38 LBS | SYSTOLIC BLOOD PRESSURE: 108 MMHG | BODY MASS INDEX: 30.71 KG/M2 | RESPIRATION RATE: 18 BRPM

## 2018-09-13 DIAGNOSIS — R63.0 ANOREXIA: Primary | ICD-10-CM

## 2018-09-13 LAB
ALBUMIN SERPL BCP-MCNC: 3.7 G/DL
ALP SERPL-CCNC: 90 U/L
ALT SERPL W/O P-5'-P-CCNC: 17 U/L
ANION GAP SERPL CALC-SCNC: 17 MMOL/L
AST SERPL-CCNC: 27 U/L
BASOPHILS # BLD AUTO: 0.07 K/UL
BASOPHILS NFR BLD: 0.3 %
BILIRUB SERPL-MCNC: 1.4 MG/DL
BUN SERPL-MCNC: 13 MG/DL
CALCIUM SERPL-MCNC: 9.8 MG/DL
CHLORIDE SERPL-SCNC: 103 MMOL/L
CO2 SERPL-SCNC: 18 MMOL/L
CREAT SERPL-MCNC: 1 MG/DL
DIFFERENTIAL METHOD: ABNORMAL
EOSINOPHIL # BLD AUTO: 0.1 K/UL
EOSINOPHIL NFR BLD: 0.4 %
ERYTHROCYTE [DISTWIDTH] IN BLOOD BY AUTOMATED COUNT: 15.2 %
ERYTHROCYTE [SEDIMENTATION RATE] IN BLOOD BY WESTERGREN METHOD: 22 MM/HR
EST. GFR  (AFRICAN AMERICAN): >60 ML/MIN/1.73 M^2
EST. GFR  (NON AFRICAN AMERICAN): >60 ML/MIN/1.73 M^2
GLUCOSE SERPL-MCNC: 178 MG/DL
HCT VFR BLD AUTO: 39.8 %
HGB BLD-MCNC: 13.3 G/DL
LYMPHOCYTES # BLD AUTO: 3.2 K/UL
LYMPHOCYTES NFR BLD: 15.1 %
MCH RBC QN AUTO: 31.5 PG
MCHC RBC AUTO-ENTMCNC: 33.4 G/DL
MCV RBC AUTO: 94 FL
MONOCYTES # BLD AUTO: 1.5 K/UL
MONOCYTES NFR BLD: 7.1 %
NEUTROPHILS # BLD AUTO: 16.5 K/UL
NEUTROPHILS NFR BLD: 77.1 %
PLATELET # BLD AUTO: 377 K/UL
PMV BLD AUTO: 12.1 FL
POTASSIUM SERPL-SCNC: 4 MMOL/L
PROT SERPL-MCNC: 7.3 G/DL
RBC # BLD AUTO: 4.22 M/UL
SODIUM SERPL-SCNC: 138 MMOL/L
WBC # BLD AUTO: 21.42 K/UL

## 2018-09-13 PROCEDURE — 99213 OFFICE O/P EST LOW 20 MIN: CPT | Mod: S$PBB | Performed by: FAMILY MEDICINE

## 2018-09-13 PROCEDURE — 99999 PR STA SHADOW: CPT | Mod: PBBFAC,,, | Performed by: FAMILY MEDICINE

## 2018-09-13 PROCEDURE — 99214 OFFICE O/P EST MOD 30 MIN: CPT | Mod: PBBFAC | Performed by: FAMILY MEDICINE

## 2018-09-13 PROCEDURE — 80053 COMPREHEN METABOLIC PANEL: CPT

## 2018-09-13 PROCEDURE — 36415 COLL VENOUS BLD VENIPUNCTURE: CPT | Mod: PBBFAC

## 2018-09-13 PROCEDURE — 85651 RBC SED RATE NONAUTOMATED: CPT

## 2018-09-13 PROCEDURE — 99999 PR PBB SHADOW E&M-EST. PATIENT-LVL IV: CPT | Mod: PBBFAC,,, | Performed by: FAMILY MEDICINE

## 2018-09-13 PROCEDURE — 85025 COMPLETE CBC W/AUTO DIFF WBC: CPT

## 2018-09-13 NOTE — PROGRESS NOTES
Subjective:       Patient ID: Peewee Nunes is a 63 y.o. male.    Chief Complaint: Follow-up (3 week recheck)    Pt is a 63 y.o. male who presents for check up for Anorexia  (primary encounter diagnosis). Doing well on current meds. Denies any side effects. Prevention is up to date.      Review of Systems   Constitutional: Positive for activity change, appetite change and fatigue.   Gastrointestinal:        Anorexia- food has not taste   Neurological: Negative for syncope.       Objective:      Physical Exam   Constitutional: He is oriented to person, place, and time. He appears well-developed and well-nourished.   Pallor 64 y/o W M   HENT:   Head: Normocephalic.   Eyes: Pupils are equal, round, and reactive to light.   Neck: Normal range of motion. Neck supple. No thyromegaly present.   Cardiovascular: Normal rate and regular rhythm. Exam reveals no friction rub.   No murmur heard.  Pulmonary/Chest: Effort normal. No respiratory distress. He has no wheezes.   Abdominal: There is no tenderness. There is no rebound and no guarding.   Musculoskeletal: Normal range of motion. He exhibits no edema or tenderness.   Lymphadenopathy:     He has no cervical adenopathy.   Neurological: He is alert and oriented to person, place, and time. He has normal reflexes. No cranial nerve deficit.   Skin: Skin is warm and dry.   Psychiatric: He has a normal mood and affect. Judgment and thought content normal.       Assessment:       1. Anorexia        Plan:   Peewee was seen today for follow-up.    Diagnoses and all orders for this visit:    Anorexia  -     CBC auto differential; Future  -     Comprehensive metabolic panel; Future  -     Sedimentation rate; Future

## 2018-09-14 DIAGNOSIS — Z12.11 COLON CANCER SCREENING: ICD-10-CM

## 2018-09-14 NOTE — PROGRESS NOTES
Please inform patient that the blood work labs were normal range.Sed rate is elevated up to 22(0-10), but there is some inflammation going on, as arthritis; blood sugar was a little hi at 178 and only very mild anemia with a lower white count # Mr Peewee

## 2018-10-22 ENCOUNTER — PATIENT MESSAGE (OUTPATIENT)
Dept: ADMINISTRATIVE | Facility: OTHER | Age: 63
End: 2018-10-22

## 2018-10-29 ENCOUNTER — OFFICE VISIT (OUTPATIENT)
Dept: FAMILY MEDICINE | Facility: CLINIC | Age: 63
End: 2018-10-29
Payer: MEDICARE

## 2018-10-29 VITALS
HEIGHT: 71 IN | HEART RATE: 60 BPM | WEIGHT: 204.63 LBS | DIASTOLIC BLOOD PRESSURE: 60 MMHG | BODY MASS INDEX: 28.65 KG/M2 | SYSTOLIC BLOOD PRESSURE: 102 MMHG | RESPIRATION RATE: 18 BRPM

## 2018-10-29 DIAGNOSIS — R63.4 WEIGHT LOSS: Primary | ICD-10-CM

## 2018-10-29 DIAGNOSIS — Z78.9 IMPAIRED MOBILITY AND ACTIVITIES OF DAILY LIVING: ICD-10-CM

## 2018-10-29 DIAGNOSIS — I10 HYPERTENSION, UNSPECIFIED TYPE: ICD-10-CM

## 2018-10-29 DIAGNOSIS — M62.50 MUSCULAR ATROPHY, UNSPECIFIED SITE: ICD-10-CM

## 2018-10-29 DIAGNOSIS — M75.41 IMPINGEMENT SYNDROME OF RIGHT SHOULDER: ICD-10-CM

## 2018-10-29 DIAGNOSIS — E03.9 HYPOTHYROIDISM, UNSPECIFIED TYPE: ICD-10-CM

## 2018-10-29 DIAGNOSIS — Z74.09 IMPAIRED MOBILITY AND ACTIVITIES OF DAILY LIVING: ICD-10-CM

## 2018-10-29 LAB
ALBUMIN SERPL BCP-MCNC: 3.6 G/DL
ALP SERPL-CCNC: 75 U/L
ALT SERPL W/O P-5'-P-CCNC: 6 U/L
ANION GAP SERPL CALC-SCNC: 16 MMOL/L
AST SERPL-CCNC: 24 U/L
BILIRUB SERPL-MCNC: 1.1 MG/DL
BUN SERPL-MCNC: 13 MG/DL
CALCIUM SERPL-MCNC: 9.6 MG/DL
CHLORIDE SERPL-SCNC: 100 MMOL/L
CK SERPL-CCNC: <7 U/L
CO2 SERPL-SCNC: 20 MMOL/L
CREAT SERPL-MCNC: 1 MG/DL
CRP SERPL-MCNC: 1.3 MG/L
ERYTHROCYTE [SEDIMENTATION RATE] IN BLOOD BY WESTERGREN METHOD: 4 MM/HR
EST. GFR  (AFRICAN AMERICAN): >60 ML/MIN/1.73 M^2
EST. GFR  (NON AFRICAN AMERICAN): >60 ML/MIN/1.73 M^2
GLUCOSE SERPL-MCNC: 129 MG/DL
POTASSIUM SERPL-SCNC: 4.2 MMOL/L
PROT SERPL-MCNC: 6.7 G/DL
SODIUM SERPL-SCNC: 136 MMOL/L
T3FREE SERPL-MCNC: 2.9 PG/ML
T4 FREE SERPL-MCNC: 1.39 NG/DL
TSH SERPL DL<=0.005 MIU/L-ACNC: 1.58 UIU/ML

## 2018-10-29 PROCEDURE — 84481 FREE ASSAY (FT-3): CPT

## 2018-10-29 PROCEDURE — 36415 COLL VENOUS BLD VENIPUNCTURE: CPT | Mod: PBBFAC

## 2018-10-29 PROCEDURE — 99999 PR PBB SHADOW E&M-EST. PATIENT-LVL III: CPT | Mod: PBBFAC,,, | Performed by: FAMILY MEDICINE

## 2018-10-29 PROCEDURE — 86800 THYROGLOBULIN ANTIBODY: CPT

## 2018-10-29 PROCEDURE — 86140 C-REACTIVE PROTEIN: CPT

## 2018-10-29 PROCEDURE — 3074F SYST BP LT 130 MM HG: CPT | Mod: CPTII,,, | Performed by: FAMILY MEDICINE

## 2018-10-29 PROCEDURE — 85651 RBC SED RATE NONAUTOMATED: CPT

## 2018-10-29 PROCEDURE — 80053 COMPREHEN METABOLIC PANEL: CPT

## 2018-10-29 PROCEDURE — 84439 ASSAY OF FREE THYROXINE: CPT

## 2018-10-29 PROCEDURE — 82550 ASSAY OF CK (CPK): CPT

## 2018-10-29 PROCEDURE — 3078F DIAST BP <80 MM HG: CPT | Mod: CPTII,,, | Performed by: FAMILY MEDICINE

## 2018-10-29 PROCEDURE — 3008F BODY MASS INDEX DOCD: CPT | Mod: CPTII,,, | Performed by: FAMILY MEDICINE

## 2018-10-29 PROCEDURE — 86376 MICROSOMAL ANTIBODY EACH: CPT

## 2018-10-29 PROCEDURE — 84443 ASSAY THYROID STIM HORMONE: CPT

## 2018-10-29 PROCEDURE — 99214 OFFICE O/P EST MOD 30 MIN: CPT | Mod: S$PBB,,, | Performed by: FAMILY MEDICINE

## 2018-10-29 PROCEDURE — 82085 ASSAY OF ALDOLASE: CPT

## 2018-10-29 PROCEDURE — 1100F PTFALLS ASSESS-DOCD GE2>/YR: CPT | Mod: CPTII,,, | Performed by: FAMILY MEDICINE

## 2018-10-29 PROCEDURE — 99213 OFFICE O/P EST LOW 20 MIN: CPT | Mod: PBBFAC | Performed by: FAMILY MEDICINE

## 2018-10-29 PROCEDURE — 3288F FALL RISK ASSESSMENT DOCD: CPT | Mod: CPTII,,, | Performed by: FAMILY MEDICINE

## 2018-10-29 NOTE — PROGRESS NOTES
Subjective:       Patient ID: Peewee Nunes is a 63 y.o. male.    Chief Complaint: Follow-up (6 week followup) and Dizziness (weak)    Pt is a 63 y.o. male who presents for check up for hypotension and low blood sugars. Doing well on current meds. Denies any side effects. Prevention is up to date.      Review of Systems   Constitutional: Positive for activity change and unexpected weight change. Negative for appetite change.   HENT: Negative for congestion, ear pain, hearing loss, rhinorrhea, sneezing, sore throat and trouble swallowing.    Eyes: Negative for discharge, redness and visual disturbance.   Respiratory: Negative for cough, chest tightness, wheezing and stridor.    Cardiovascular: Positive for chest pain. Negative for palpitations.   Gastrointestinal: Negative for abdominal pain, blood in stool, constipation, diarrhea, nausea and vomiting.        Having a BM once/week or longer   Endocrine: Negative for polydipsia and polyuria.   Genitourinary: Negative for difficulty urinating, dysuria, hematuria and urgency.   Musculoskeletal: Positive for arthralgias and neck pain. Negative for back pain, joint swelling and myalgias.        R shoulder aches on int & ext rotation   Skin: Negative for rash.   Neurological: Positive for weakness and headaches. Negative for dizziness.   Psychiatric/Behavioral: Negative for agitation, confusion and dysphoric mood. The patient is not nervous/anxious.        Objective:      Physical Exam   Constitutional: He is oriented to person, place, and time. He appears well-developed.     Weak in a W/C 64 y/o W M   HENT:   Head: Normocephalic.   Eyes: Pupils are equal, round, and reactive to light.   Neck: Normal range of motion. Neck supple. No thyromegaly present.   Cardiovascular: Normal rate and regular rhythm. Exam reveals no friction rub.   No murmur heard.  Pulmonary/Chest: Effort normal. No respiratory distress. He has no wheezes.   Abdominal: There is no tenderness. There is  no rebound and no guarding.   Musculoskeletal: Normal range of motion. He exhibits no edema or tenderness.   Lymphadenopathy:     He has no cervical adenopathy.   Neurological: He is alert and oriented to person, place, and time. He has normal reflexes. No cranial nerve deficit.   Skin: Skin is warm and dry.   Psychiatric: He has a normal mood and affect. Judgment and thought content normal.       Assessment:       1. Weight loss    2. Muscular atrophy, unspecified site    3. Impaired mobility and activities of daily living    4. Hypothyroidism, unspecified type    5. Hypertension, unspecified type    6. Impingement syndrome of right shoulder        Plan:   Peewee was seen today for follow-up and dizziness.    Diagnoses and all orders for this visit:    Weight loss  -     T3, free; Future  -     T4, free; Future  -     Anti-thyroglobulin antibody; Future  -     Thyroid peroxidase antibody; Future  -     Thyrotropin receptor antibody; Future  -     TSH  -     Comprehensive metabolic panel; Future  -     C-reactive protein; Future  -     Sedimentation rate; Future    Muscular atrophy, unspecified site  -     CK; Future  -     Aldolase; Future    Impaired mobility and activities of daily living    Hypothyroidism, unspecified type    Hypertension, unspecified type    Impingement syndrome of right shoulder

## 2018-10-30 LAB
THYROGLOB AB SERPL IA-ACNC: <4 IU/ML
THYROPEROXIDASE IGG SERPL-ACNC: <6 IU/ML

## 2018-10-31 ENCOUNTER — PATIENT MESSAGE (OUTPATIENT)
Dept: FAMILY MEDICINE | Facility: CLINIC | Age: 63
End: 2018-10-31

## 2018-10-31 LAB — ALDOLASE SERPL-CCNC: 6 U/L

## 2018-11-16 ENCOUNTER — OFFICE VISIT (OUTPATIENT)
Dept: FAMILY MEDICINE | Facility: CLINIC | Age: 63
End: 2018-11-16
Payer: MEDICARE

## 2018-11-16 VITALS
RESPIRATION RATE: 18 BRPM | HEIGHT: 71 IN | DIASTOLIC BLOOD PRESSURE: 62 MMHG | HEART RATE: 68 BPM | BODY MASS INDEX: 28.54 KG/M2 | SYSTOLIC BLOOD PRESSURE: 90 MMHG

## 2018-11-16 DIAGNOSIS — E11.9 TYPE 2 DIABETES MELLITUS WITHOUT COMPLICATION, WITHOUT LONG-TERM CURRENT USE OF INSULIN: ICD-10-CM

## 2018-11-16 DIAGNOSIS — I10 HYPERTENSION, UNSPECIFIED TYPE: Primary | ICD-10-CM

## 2018-11-16 DIAGNOSIS — I10 ESSENTIAL HYPERTENSION: ICD-10-CM

## 2018-11-16 DIAGNOSIS — M17.0 PRIMARY OSTEOARTHRITIS OF BOTH KNEES: ICD-10-CM

## 2018-11-16 DIAGNOSIS — I87.303 STASIS EDEMA, BILATERAL: ICD-10-CM

## 2018-11-16 PROCEDURE — 3044F HG A1C LEVEL LT 7.0%: CPT | Mod: CPTII,S$GLB,, | Performed by: FAMILY MEDICINE

## 2018-11-16 PROCEDURE — 99213 OFFICE O/P EST LOW 20 MIN: CPT | Mod: S$GLB,,, | Performed by: FAMILY MEDICINE

## 2018-11-16 PROCEDURE — 3008F BODY MASS INDEX DOCD: CPT | Mod: CPTII,S$GLB,, | Performed by: FAMILY MEDICINE

## 2018-11-16 PROCEDURE — 1101F PT FALLS ASSESS-DOCD LE1/YR: CPT | Mod: CPTII,S$GLB,, | Performed by: FAMILY MEDICINE

## 2018-11-16 PROCEDURE — 99999 PR PBB SHADOW E&M-EST. PATIENT-LVL III: CPT | Mod: PBBFAC,,, | Performed by: FAMILY MEDICINE

## 2018-11-16 PROCEDURE — 3074F SYST BP LT 130 MM HG: CPT | Mod: CPTII,S$GLB,, | Performed by: FAMILY MEDICINE

## 2018-11-16 PROCEDURE — 3078F DIAST BP <80 MM HG: CPT | Mod: CPTII,S$GLB,, | Performed by: FAMILY MEDICINE

## 2018-11-16 RX ORDER — BISOPROLOL FUMARATE 10 MG/1
10 TABLET, FILM COATED ORAL DAILY
Qty: 90 TABLET | Refills: 3 | Status: ON HOLD
Start: 2018-11-16 | End: 2019-01-18 | Stop reason: HOSPADM

## 2018-11-16 NOTE — PROGRESS NOTES
Subjective:       Patient ID: Peewee Nunes is a 63 y.o. male.    Chief Complaint: Follow-up (2 week follow up)    Pt is a 63 y.o. male who presents for check up for Hypertension, unspecified type  (primary encounter diagnosis)  Type 2 diabetes mellitus without complication, without long-term current use of insulin. Doing well on current meds. Denies any side effects. Prevention is up to date.      Review of Systems   Constitutional: Positive for activity change. Negative for appetite change.        Feeling a little stronger   HENT: Negative for congestion, ear pain, sneezing and sore throat.    Eyes: Negative for redness and visual disturbance.   Respiratory: Negative for cough, chest tightness and stridor.    Cardiovascular: Negative for chest pain.   Gastrointestinal: Negative for abdominal pain, blood in stool, diarrhea, nausea and vomiting.   Genitourinary: Negative for difficulty urinating, dysuria and hematuria.   Musculoskeletal: Negative for arthralgias, back pain, joint swelling, myalgias and neck pain.   Skin: Negative for rash.   Neurological: Positive for weakness. Negative for dizziness.   Psychiatric/Behavioral: Negative for agitation. The patient is not nervous/anxious.        Objective:      Physical Exam   Constitutional: He is oriented to person, place, and time. He appears well-developed and well-nourished.   HENT:   Head: Normocephalic.   Eyes: Pupils are equal, round, and reactive to light.   Neck: Normal range of motion. Neck supple. No thyromegaly present.   Cardiovascular: Normal rate and regular rhythm. Exam reveals no friction rub.   No murmur heard.  Pulmonary/Chest: Effort normal. No respiratory distress. He has no wheezes.   Abdominal: There is no tenderness. There is no rebound and no guarding.   Musculoskeletal: Normal range of motion. He exhibits no edema or tenderness.   Lymphadenopathy:     He has no cervical adenopathy.   Neurological: He is alert and oriented to person, place,  and time. He has normal reflexes. No cranial nerve deficit.   Skin: Skin is warm and dry.   Psychiatric: He has a normal mood and affect. Judgment and thought content normal.       Assessment:       1. Hypertension, unspecified type    2. Type 2 diabetes mellitus without complication, without long-term current use of insulin    3. Stasis edema, bilateral    4. Essential hypertension    5. Primary osteoarthritis of both knees        Plan:   Peewee was seen today for follow-up.    Diagnoses and all orders for this visit:    Hypertension, unspecified type    Type 2 diabetes mellitus without complication, without long-term current use of insulin    Stasis edema, bilateral  -     bisoprolol (ZEBETA) 10 MG tablet; Take 1 tablet (10 mg total) by mouth once daily.    Essential hypertension  -     bisoprolol (ZEBETA) 10 MG tablet; Take 1 tablet (10 mg total) by mouth once daily.    Primary osteoarthritis of both knees

## 2018-12-16 ENCOUNTER — HOSPITAL ENCOUNTER (EMERGENCY)
Facility: HOSPITAL | Age: 63
Discharge: HOME | End: 2018-12-17
Attending: SURGERY
Payer: MEDICARE

## 2018-12-16 DIAGNOSIS — S01.01XA SCALP LACERATION, INITIAL ENCOUNTER: Primary | ICD-10-CM

## 2018-12-16 DIAGNOSIS — R55 SYNCOPE: ICD-10-CM

## 2018-12-16 LAB
ALBUMIN SERPL BCP-MCNC: 3.8 G/DL
ALP SERPL-CCNC: 64 U/L
ALT SERPL W/O P-5'-P-CCNC: 8 U/L
ANION GAP SERPL CALC-SCNC: 14 MMOL/L
APTT BLDCRRT: 29.3 SEC
AST SERPL-CCNC: 17 U/L
BASOPHILS # BLD AUTO: 0.06 K/UL
BASOPHILS NFR BLD: 0.4 %
BILIRUB SERPL-MCNC: 1.5 MG/DL
BNP SERPL-MCNC: 185 PG/ML
BUN SERPL-MCNC: 19 MG/DL
CALCIUM SERPL-MCNC: 9.7 MG/DL
CHLORIDE SERPL-SCNC: 99 MMOL/L
CK MB SERPL-MCNC: 3.2 NG/ML
CK MB SERPL-RTO: 9.7 %
CK SERPL-CCNC: 33 U/L
CK SERPL-CCNC: 33 U/L
CO2 SERPL-SCNC: 22 MMOL/L
CREAT SERPL-MCNC: 0.8 MG/DL
D DIMER PPP IA.FEU-MCNC: 1.62 MG/L FEU
DIFFERENTIAL METHOD: ABNORMAL
EOSINOPHIL # BLD AUTO: 0.1 K/UL
EOSINOPHIL NFR BLD: 0.8 %
ERYTHROCYTE [DISTWIDTH] IN BLOOD BY AUTOMATED COUNT: 14.3 %
EST. GFR  (AFRICAN AMERICAN): >60 ML/MIN/1.73 M^2
EST. GFR  (NON AFRICAN AMERICAN): >60 ML/MIN/1.73 M^2
GLUCOSE SERPL-MCNC: 101 MG/DL
HCT VFR BLD AUTO: 46 %
HGB BLD-MCNC: 15.9 G/DL
INR PPP: 1
LYMPHOCYTES # BLD AUTO: 2.7 K/UL
LYMPHOCYTES NFR BLD: 19.5 %
MAGNESIUM SERPL-MCNC: 2 MG/DL
MCH RBC QN AUTO: 31.4 PG
MCHC RBC AUTO-ENTMCNC: 34.6 G/DL
MCV RBC AUTO: 91 FL
MONOCYTES # BLD AUTO: 1 K/UL
MONOCYTES NFR BLD: 7.5 %
NEUTROPHILS # BLD AUTO: 9.8 K/UL
NEUTROPHILS NFR BLD: 71.8 %
PHOSPHATE SERPL-MCNC: 3.2 MG/DL
PLATELET # BLD AUTO: 269 K/UL
PMV BLD AUTO: 10.3 FL
POTASSIUM SERPL-SCNC: 3.5 MMOL/L
PROT SERPL-MCNC: 6.7 G/DL
PROTHROMBIN TIME: 10.9 SEC
RBC # BLD AUTO: 5.07 M/UL
SODIUM SERPL-SCNC: 135 MMOL/L
TROPONIN I SERPL DL<=0.01 NG/ML-MCNC: 0.03 NG/ML
TSH SERPL DL<=0.005 MIU/L-ACNC: 0.61 UIU/ML
WBC # BLD AUTO: 13.61 K/UL

## 2018-12-16 PROCEDURE — 82550 ASSAY OF CK (CPK): CPT

## 2018-12-16 PROCEDURE — 82962 GLUCOSE BLOOD TEST: CPT

## 2018-12-16 PROCEDURE — 80053 COMPREHEN METABOLIC PANEL: CPT

## 2018-12-16 PROCEDURE — 25000003 PHARM REV CODE 250: Performed by: SURGERY

## 2018-12-16 PROCEDURE — 83880 ASSAY OF NATRIURETIC PEPTIDE: CPT

## 2018-12-16 PROCEDURE — 84443 ASSAY THYROID STIM HORMONE: CPT

## 2018-12-16 PROCEDURE — 99284 EMERGENCY DEPT VISIT MOD MDM: CPT

## 2018-12-16 PROCEDURE — 85379 FIBRIN DEGRADATION QUANT: CPT

## 2018-12-16 PROCEDURE — 84484 ASSAY OF TROPONIN QUANT: CPT

## 2018-12-16 PROCEDURE — 93010 ELECTROCARDIOGRAM REPORT: CPT | Mod: ,,, | Performed by: INTERNAL MEDICINE

## 2018-12-16 PROCEDURE — 93005 ELECTROCARDIOGRAM TRACING: CPT

## 2018-12-16 PROCEDURE — 36415 COLL VENOUS BLD VENIPUNCTURE: CPT

## 2018-12-16 PROCEDURE — 85730 THROMBOPLASTIN TIME PARTIAL: CPT

## 2018-12-16 PROCEDURE — 85610 PROTHROMBIN TIME: CPT

## 2018-12-16 PROCEDURE — 84100 ASSAY OF PHOSPHORUS: CPT

## 2018-12-16 PROCEDURE — 85025 COMPLETE CBC W/AUTO DIFF WBC: CPT

## 2018-12-16 PROCEDURE — 83735 ASSAY OF MAGNESIUM: CPT

## 2018-12-16 PROCEDURE — 82553 CREATINE MB FRACTION: CPT

## 2018-12-16 RX ORDER — CLONIDINE HYDROCHLORIDE 0.1 MG/1
0.2 TABLET ORAL
Status: COMPLETED | OUTPATIENT
Start: 2018-12-16 | End: 2018-12-16

## 2018-12-16 RX ADMIN — CLONIDINE HYDROCHLORIDE 0.2 MG: 0.1 TABLET ORAL at 10:12

## 2018-12-17 ENCOUNTER — PATIENT MESSAGE (OUTPATIENT)
Dept: FAMILY MEDICINE | Facility: CLINIC | Age: 63
End: 2018-12-17

## 2018-12-17 VITALS
OXYGEN SATURATION: 95 % | HEART RATE: 86 BPM | RESPIRATION RATE: 18 BRPM | DIASTOLIC BLOOD PRESSURE: 85 MMHG | WEIGHT: 190 LBS | SYSTOLIC BLOOD PRESSURE: 140 MMHG | BODY MASS INDEX: 26.5 KG/M2

## 2018-12-17 LAB — POCT GLUCOSE: 102 MG/DL (ref 70–110)

## 2018-12-17 NOTE — ED PROVIDER NOTES
Encounter Date: 12/16/2018       History     Chief Complaint   -- Head Injury     HPI   Peewee Nunes is a 63 y.o. male presents after head injury this evening  Patient has recently lost 200 pounds, recently taken off of BP medication  Patient's blood pressure had been running low, very high on ER triage  Patient states he fell and passed out and hit his head, positive LOC HX  Patient is now alert and appropriate with no obvious neuro deficit noted    History is provided by the patient in the family  Allergies: Levaquin  Medical history: CAD, CVD, DM, HLD, HTN, cardiomyopathy, obesity  Surgeries: Appendectomy, CABG, hemorrhoid surgery    Review of Systems and Physical Exam      Review of Systems   Constitutional: Negative for activity change, appetite change, fatigue, fever and unexpected weight change.   HENT: Negative for congestion, ear pain, mouth sores, nosebleeds, rhinorrhea, sinus pressure, sneezing and sore throat.    Eyes: Negative for pain, discharge, redness and itching.   Respiratory: Negative for apnea, cough, chest tightness and shortness of breath.    Cardiovascular: Negative for chest pain, palpitations and leg swelling.   Gastrointestinal: Negative for abdominal distention, abdominal pain, anal bleeding, constipation, diarrhea, nausea and vomiting.   Endocrine: Negative.    Genitourinary: Negative for dysuria, enuresis, flank pain and frequency.   Musculoskeletal: Negative for arthralgias, back pain, neck pain and neck stiffness.   Skin: Negative for color change and wound.   Allergic/Immunologic: Negative.    Neurological: Positive for dizziness, syncope and headaches. Negative for tremors, seizures, facial asymmetry, speech difficulty, weakness, light-headedness and numbness.   Hematological: Negative for adenopathy. Does not bruise/bleed easily.   Psychiatric/Behavioral: Negative for agitation, behavioral problems, hallucinations, self-injury and suicidal ideas. The patient is not  nervous/anxious.      BP (!) 208/119  Pulse 90   Resp 18   Wt 86.2 kg (190 lb)   SpO2 99%    Physical Exam    Nursing note and vitals reviewed.  Constitutional: He appears well-developed.   HENT:   Head: Normocephalic.   Eyes: EOM are normal. Pupils are equal, round, and reactive to light.   Neck: Normal range of motion.   Cardiovascular: Normal rate and regular rhythm.   Pulmonary/Chest: Breath sounds normal.   Abdominal: Soft.   Musculoskeletal: Normal range of motion.   Neurological: He is alert and oriented to person, place, and time. He has normal strength.   Skin: Skin is warm and dry.   Right posterior scalp laceration, 2 centimeters       ED Course   Procedures  Labs Reviewed   TSH   MAGNESIUM   PHOSPHORUS   URINALYSIS, REFLEX TO URINE CULTURE   COMPREHENSIVE METABOLIC PANEL   CBC W/ AUTO DIFFERENTIAL   TROPONIN I   CK   CK-MB   B-TYPE NATRIURETIC PEPTIDE   PROTIME-INR   APTT   D DIMER, QUANTITATIVE     Laceration Repair  -- Performed by: MARILY COOPER  -- Consent Done: Emergent Situation  -- Body area: Right scalp  -- Laceration length: 2 centimeter  -- Tendon involvement: none  -- Nerve involvement: none  -- Vascular damage: no  -- Skin closure: Dermabond  EKG Readings: (Independently Interpreted)   Initial Reading: No STEMI.   Sinus rhythm with PACs; right bundle delay pattern; left axis deviation. Non-specific ST-T changes.       Imaging Results    None       X-Rays:   Independently Interpreted Readings:   Other Readings:  CXR- cardiomegaly; degenerative changes of the aorta and spine.    Medical Decision Making:   Initial Assessment:   Syncope with right parietal scalp laceration  Differential Diagnosis:   Scalp laceration  Syncope  Diabetes mellitus  Independently Interpreted Test(s):   I have ordered and independently interpreted EKG Reading(s) - see prior notes  Clinical Tests:   Lab Tests: Ordered and Reviewed  Radiological Study: Ordered and Reviewed  Medical Tests: Ordered and Reviewed                       Clinical Impression:   The primary encounter diagnosis was Scalp laceration, initial encounter. A diagnosis of Syncope was also pertinent to this visit.      Disposition:   Disposition: Discharged  Condition: Stable                        Zhanna Tracy MD  12/17/18 0106

## 2018-12-17 NOTE — ED TRIAGE NOTES
"Patient got dizzy at home upon standing and fell, hitting head on floor. Spouse reports patient "passed out" for about 2 minutes. Patient currently AAOx3. No deficits.  per EMS.  "

## 2018-12-17 NOTE — ED NOTES
Discharged to home/self care.    - Condition at discharge: Good  - Mode of Discharge: wheelchair  - The patient left the ED accompanied by a family member.  - The discharge instructions were discussed with the patient.  - They state an understanding of the discharge instructions.  - Wheeled pt to the discharge station.

## 2018-12-18 ENCOUNTER — PATIENT MESSAGE (OUTPATIENT)
Dept: FAMILY MEDICINE | Facility: CLINIC | Age: 63
End: 2018-12-18

## 2018-12-20 ENCOUNTER — ANESTHESIA (OUTPATIENT)
Dept: SURGERY | Facility: HOSPITAL | Age: 63
DRG: 003 | End: 2018-12-20
Payer: MEDICARE

## 2018-12-20 ENCOUNTER — ANESTHESIA EVENT (OUTPATIENT)
Dept: SURGERY | Facility: HOSPITAL | Age: 63
DRG: 003 | End: 2018-12-20
Payer: MEDICARE

## 2018-12-20 ENCOUNTER — HOSPITAL ENCOUNTER (INPATIENT)
Facility: HOSPITAL | Age: 63
LOS: 39 days | Discharge: NURSING, REHAB OR LONG-TERM CARE | DRG: 003 | End: 2019-01-28
Attending: EMERGENCY MEDICINE | Admitting: PSYCHIATRY & NEUROLOGY
Payer: MEDICARE

## 2018-12-20 ENCOUNTER — HOSPITAL ENCOUNTER (EMERGENCY)
Facility: HOSPITAL | Age: 63
Discharge: NURSING, REHAB OR LONG-TERM CARE | End: 2018-12-20
Attending: SURGERY
Payer: MEDICARE

## 2018-12-20 VITALS
TEMPERATURE: 98 F | RESPIRATION RATE: 18 BRPM | BODY MASS INDEX: 26.38 KG/M2 | WEIGHT: 189.13 LBS | DIASTOLIC BLOOD PRESSURE: 70 MMHG | HEART RATE: 89 BPM | SYSTOLIC BLOOD PRESSURE: 135 MMHG | OXYGEN SATURATION: 100 %

## 2018-12-20 DIAGNOSIS — I60.9 SUBARACHNOID HEMORRHAGE: ICD-10-CM

## 2018-12-20 DIAGNOSIS — G40.901 STATUS EPILEPTICUS: ICD-10-CM

## 2018-12-20 DIAGNOSIS — I48.91 NEW ONSET ATRIAL FIBRILLATION: ICD-10-CM

## 2018-12-20 DIAGNOSIS — S06.5XAA SDH (SUBDURAL HEMATOMA): ICD-10-CM

## 2018-12-20 DIAGNOSIS — A41.9 SEPTIC SHOCK: ICD-10-CM

## 2018-12-20 DIAGNOSIS — S06.5XAA SUBDURAL HEMATOMA: Primary | ICD-10-CM

## 2018-12-20 DIAGNOSIS — G93.5 BRAIN COMPRESSION: ICD-10-CM

## 2018-12-20 DIAGNOSIS — R65.21 SEPTIC SHOCK: ICD-10-CM

## 2018-12-20 DIAGNOSIS — R53.1 WEAKNESS: ICD-10-CM

## 2018-12-20 DIAGNOSIS — L89.312 PRESSURE INJURY OF RIGHT BUTTOCK, STAGE 2: ICD-10-CM

## 2018-12-20 DIAGNOSIS — R00.0 TACHYCARDIA: ICD-10-CM

## 2018-12-20 DIAGNOSIS — I10 ELEVATED HEART RATE WITH ELEVATED BLOOD PRESSURE AND DIAGNOSIS OF HYPERTENSION: ICD-10-CM

## 2018-12-20 DIAGNOSIS — R00.9 ELEVATED HEART RATE WITH ELEVATED BLOOD PRESSURE AND DIAGNOSIS OF HYPERTENSION: ICD-10-CM

## 2018-12-20 DIAGNOSIS — R56.9 SEIZURE: ICD-10-CM

## 2018-12-20 DIAGNOSIS — J96.01 ACUTE RESPIRATORY FAILURE WITH HYPOXIA: ICD-10-CM

## 2018-12-20 DIAGNOSIS — G93.6 CEREBRAL EDEMA: ICD-10-CM

## 2018-12-20 PROBLEM — S06.4XAA EPIDURAL HEMATOMA: Status: ACTIVE | Noted: 2018-12-20

## 2018-12-20 LAB
ABO GROUP BLD: NORMAL
ALBUMIN SERPL BCP-MCNC: 3.4 G/DL
ALP SERPL-CCNC: 63 U/L
ALT SERPL W/O P-5'-P-CCNC: 6 U/L
ANION GAP SERPL CALC-SCNC: 16 MMOL/L
APTT BLDCRRT: 22.1 SEC
APTT BLDCRRT: 31.9 SEC
AST SERPL-CCNC: 13 U/L
BASOPHILS # BLD AUTO: 0.02 K/UL
BASOPHILS # BLD AUTO: 0.03 K/UL
BASOPHILS NFR BLD: 0.1 %
BASOPHILS NFR BLD: 0.2 %
BILIRUB SERPL-MCNC: 1.5 MG/DL
BLD GP AB SCN CELLS X3 SERPL QL: NORMAL
BLD PROD TYP BPU: NORMAL
BLOOD UNIT EXPIRATION DATE: NORMAL
BLOOD UNIT TYPE CODE: 600
BLOOD UNIT TYPE: NORMAL
BNP SERPL-MCNC: 719 PG/ML
BUN SERPL-MCNC: 16 MG/DL
CALCIUM SERPL-MCNC: 9.7 MG/DL
CHLORIDE SERPL-SCNC: 99 MMOL/L
CHOLEST SERPL-MCNC: 169 MG/DL
CHOLEST/HDLC SERPL: 3 {RATIO}
CK MB SERPL-MCNC: 2.6 NG/ML
CK MB SERPL-RTO: 23.6 %
CK SERPL-CCNC: 11 U/L
CK SERPL-CCNC: 11 U/L
CO2 SERPL-SCNC: 22 MMOL/L
CODING SYSTEM: NORMAL
CREAT SERPL-MCNC: 0.8 MG/DL
DIFFERENTIAL METHOD: ABNORMAL
DIFFERENTIAL METHOD: ABNORMAL
DISPENSE STATUS: NORMAL
EOSINOPHIL # BLD AUTO: 0 K/UL
EOSINOPHIL # BLD AUTO: 0 K/UL
EOSINOPHIL NFR BLD: 0.1 %
EOSINOPHIL NFR BLD: 0.1 %
ERYTHROCYTE [DISTWIDTH] IN BLOOD BY AUTOMATED COUNT: 14.2 %
ERYTHROCYTE [DISTWIDTH] IN BLOOD BY AUTOMATED COUNT: 14.3 %
EST. GFR  (AFRICAN AMERICAN): >60 ML/MIN/1.73 M^2
EST. GFR  (NON AFRICAN AMERICAN): >60 ML/MIN/1.73 M^2
ESTIMATED AVG GLUCOSE: 91 MG/DL
ETHANOL SERPL-MCNC: <10 MG/DL
GLUCOSE SERPL-MCNC: 103 MG/DL
HBA1C MFR BLD HPLC: 4.8 %
HCT VFR BLD AUTO: 44 %
HCT VFR BLD AUTO: 45 %
HDLC SERPL-MCNC: 56 MG/DL
HDLC SERPL: 33.1 %
HGB BLD-MCNC: 15.2 G/DL
HGB BLD-MCNC: 15.6 G/DL
IMM GRANULOCYTES # BLD AUTO: 0.09 K/UL
IMM GRANULOCYTES NFR BLD AUTO: 0.5 %
INFLUENZA A, MOLECULAR: NEGATIVE
INFLUENZA B, MOLECULAR: NEGATIVE
INR PPP: 1
INR PPP: 1
LDLC SERPL CALC-MCNC: 92.6 MG/DL
LYMPHOCYTES # BLD AUTO: 1.7 K/UL
LYMPHOCYTES # BLD AUTO: 1.9 K/UL
LYMPHOCYTES NFR BLD: 10.8 %
LYMPHOCYTES NFR BLD: 9.9 %
MCH RBC QN AUTO: 31.5 PG
MCH RBC QN AUTO: 31.6 PG
MCHC RBC AUTO-ENTMCNC: 34.5 G/DL
MCHC RBC AUTO-ENTMCNC: 34.7 G/DL
MCV RBC AUTO: 91 FL
MCV RBC AUTO: 91 FL
MONOCYTES # BLD AUTO: 1.1 K/UL
MONOCYTES # BLD AUTO: 1.2 K/UL
MONOCYTES NFR BLD: 6.6 %
MONOCYTES NFR BLD: 6.8 %
NEUTROPHILS # BLD AUTO: 14.1 K/UL
NEUTROPHILS # BLD AUTO: 14.6 K/UL
NEUTROPHILS NFR BLD: 81.6 %
NEUTROPHILS NFR BLD: 83.3 %
NONHDLC SERPL-MCNC: 113 MG/DL
NRBC BLD-RTO: 0 /100 WBC
PLATELET # BLD AUTO: 243 K/UL
PLATELET # BLD AUTO: 248 K/UL
PMV BLD AUTO: 11 FL
PMV BLD AUTO: 11.2 FL
POTASSIUM SERPL-SCNC: 4.8 MMOL/L
PROT SERPL-MCNC: 6.5 G/DL
PROTHROMBIN TIME: 10.6 SEC
PROTHROMBIN TIME: 10.9 SEC
RBC # BLD AUTO: 4.82 M/UL
RBC # BLD AUTO: 4.94 M/UL
RH BLD: NORMAL
SODIUM SERPL-SCNC: 137 MMOL/L
SPECIMEN SOURCE: NORMAL
TRANS PLATPHERESIS VOL PATIENT: NORMAL ML
TRIGL SERPL-MCNC: 102 MG/DL
TROPONIN I SERPL DL<=0.01 NG/ML-MCNC: 0.02 NG/ML
TSH SERPL DL<=0.005 MIU/L-ACNC: 0.67 UIU/ML
WBC # BLD AUTO: 16.97 K/UL
WBC # BLD AUTO: 17.85 K/UL

## 2018-12-20 PROCEDURE — 63600175 PHARM REV CODE 636 W HCPCS: Performed by: NURSE PRACTITIONER

## 2018-12-20 PROCEDURE — 82553 CREATINE MB FRACTION: CPT

## 2018-12-20 PROCEDURE — 27201423 OPTIME MED/SURG SUP & DEVICES STERILE SUPPLY: Performed by: NEUROLOGICAL SURGERY

## 2018-12-20 PROCEDURE — 93005 ELECTROCARDIOGRAM TRACING: CPT

## 2018-12-20 PROCEDURE — 36000710: Performed by: NEUROLOGICAL SURGERY

## 2018-12-20 PROCEDURE — 96374 THER/PROPH/DIAG INJ IV PUSH: CPT

## 2018-12-20 PROCEDURE — 63600175 PHARM REV CODE 636 W HCPCS: Performed by: NEUROLOGICAL SURGERY

## 2018-12-20 PROCEDURE — 80320 DRUG SCREEN QUANTALCOHOLS: CPT

## 2018-12-20 PROCEDURE — D9220A PRA ANESTHESIA: Mod: ANES,,, | Performed by: ANESTHESIOLOGY

## 2018-12-20 PROCEDURE — C1729 CATH, DRAINAGE: HCPCS | Performed by: NEUROLOGICAL SURGERY

## 2018-12-20 PROCEDURE — 99285 EMERGENCY DEPT VISIT HI MDM: CPT | Mod: 25

## 2018-12-20 PROCEDURE — 61312 CRNEC/CRNOT STTL XDRL/SDRL: CPT | Mod: ,,, | Performed by: NEUROLOGICAL SURGERY

## 2018-12-20 PROCEDURE — 86900 BLOOD TYPING SEROLOGIC ABO: CPT | Mod: 91

## 2018-12-20 PROCEDURE — 86850 RBC ANTIBODY SCREEN: CPT | Mod: 91

## 2018-12-20 PROCEDURE — 83880 ASSAY OF NATRIURETIC PEPTIDE: CPT

## 2018-12-20 PROCEDURE — P9021 RED BLOOD CELLS UNIT: HCPCS

## 2018-12-20 PROCEDURE — 84443 ASSAY THYROID STIM HORMONE: CPT

## 2018-12-20 PROCEDURE — 85730 THROMBOPLASTIN TIME PARTIAL: CPT | Mod: 91

## 2018-12-20 PROCEDURE — 36430 TRANSFUSION BLD/BLD COMPNT: CPT

## 2018-12-20 PROCEDURE — 86901 BLOOD TYPING SEROLOGIC RH(D): CPT | Mod: 91

## 2018-12-20 PROCEDURE — 63600175 PHARM REV CODE 636 W HCPCS: Performed by: SURGERY

## 2018-12-20 PROCEDURE — C1713 ANCHOR/SCREW BN/BN,TIS/BN: HCPCS | Performed by: NEUROLOGICAL SURGERY

## 2018-12-20 PROCEDURE — 80053 COMPREHEN METABOLIC PANEL: CPT

## 2018-12-20 PROCEDURE — 85610 PROTHROMBIN TIME: CPT

## 2018-12-20 PROCEDURE — 20000000 HC ICU ROOM

## 2018-12-20 PROCEDURE — 85025 COMPLETE CBC W/AUTO DIFF WBC: CPT

## 2018-12-20 PROCEDURE — 36415 COLL VENOUS BLD VENIPUNCTURE: CPT

## 2018-12-20 PROCEDURE — 37000008 HC ANESTHESIA 1ST 15 MINUTES: Performed by: NEUROLOGICAL SURGERY

## 2018-12-20 PROCEDURE — 83036 HEMOGLOBIN GLYCOSYLATED A1C: CPT

## 2018-12-20 PROCEDURE — 25000003 PHARM REV CODE 250: Performed by: NURSE ANESTHETIST, CERTIFIED REGISTERED

## 2018-12-20 PROCEDURE — 63600175 PHARM REV CODE 636 W HCPCS: Performed by: NURSE ANESTHETIST, CERTIFIED REGISTERED

## 2018-12-20 PROCEDURE — 99291 PR CRITICAL CARE, E/M 30-74 MINUTES: ICD-10-PCS | Mod: GC,,, | Performed by: PSYCHIATRY & NEUROLOGY

## 2018-12-20 PROCEDURE — 99283 PR EMERGENCY DEPT VISIT,LEVEL III: ICD-10-PCS | Mod: ,,, | Performed by: PHYSICIAN ASSISTANT

## 2018-12-20 PROCEDURE — 80061 LIPID PANEL: CPT

## 2018-12-20 PROCEDURE — P9035 PLATELET PHERES LEUKOREDUCED: HCPCS

## 2018-12-20 PROCEDURE — 25000003 PHARM REV CODE 250: Performed by: NURSE PRACTITIONER

## 2018-12-20 PROCEDURE — 87502 INFLUENZA DNA AMP PROBE: CPT

## 2018-12-20 PROCEDURE — 99223 1ST HOSP IP/OBS HIGH 75: CPT | Mod: ,,, | Performed by: NEUROLOGICAL SURGERY

## 2018-12-20 PROCEDURE — 99291 CRITICAL CARE FIRST HOUR: CPT | Mod: GC,,, | Performed by: PSYCHIATRY & NEUROLOGY

## 2018-12-20 PROCEDURE — 99291 PR CRITICAL CARE, E/M 30-74 MINUTES: ICD-10-PCS | Mod: ,,, | Performed by: EMERGENCY MEDICINE

## 2018-12-20 PROCEDURE — 99223 PR INITIAL HOSPITAL CARE,LEVL III: ICD-10-PCS | Mod: ,,, | Performed by: NEUROLOGICAL SURGERY

## 2018-12-20 PROCEDURE — 93010 ELECTROCARDIOGRAM REPORT: CPT | Mod: ,,, | Performed by: INTERNAL MEDICINE

## 2018-12-20 PROCEDURE — 84484 ASSAY OF TROPONIN QUANT: CPT

## 2018-12-20 PROCEDURE — 99291 CRITICAL CARE FIRST HOUR: CPT

## 2018-12-20 PROCEDURE — 25000003 PHARM REV CODE 250

## 2018-12-20 PROCEDURE — 86920 COMPATIBILITY TEST SPIN: CPT

## 2018-12-20 PROCEDURE — 96361 HYDRATE IV INFUSION ADD-ON: CPT

## 2018-12-20 PROCEDURE — 96365 THER/PROPH/DIAG IV INF INIT: CPT | Mod: 59

## 2018-12-20 PROCEDURE — 63600175 PHARM REV CODE 636 W HCPCS: Performed by: PSYCHIATRY & NEUROLOGY

## 2018-12-20 PROCEDURE — 61312 PR OPEN SKULL EVAC HEMATOMA, SUPRATENTORIAL, SUB/ EXTRADURAL: ICD-10-PCS | Mod: ,,, | Performed by: NEUROLOGICAL SURGERY

## 2018-12-20 PROCEDURE — 36000711: Performed by: NEUROLOGICAL SURGERY

## 2018-12-20 PROCEDURE — D9220A PRA ANESTHESIA: Mod: CRNA,,, | Performed by: NURSE ANESTHETIST, CERTIFIED REGISTERED

## 2018-12-20 PROCEDURE — 85730 THROMBOPLASTIN TIME PARTIAL: CPT

## 2018-12-20 PROCEDURE — 85025 COMPLETE CBC W/AUTO DIFF WBC: CPT | Mod: 91

## 2018-12-20 PROCEDURE — 99291 CRITICAL CARE FIRST HOUR: CPT | Mod: ,,, | Performed by: EMERGENCY MEDICINE

## 2018-12-20 PROCEDURE — 85610 PROTHROMBIN TIME: CPT | Mod: 91

## 2018-12-20 PROCEDURE — 99283 EMERGENCY DEPT VISIT LOW MDM: CPT | Mod: ,,, | Performed by: PHYSICIAN ASSISTANT

## 2018-12-20 PROCEDURE — 82550 ASSAY OF CK (CPK): CPT

## 2018-12-20 PROCEDURE — 37000009 HC ANESTHESIA EA ADD 15 MINS: Performed by: NEUROLOGICAL SURGERY

## 2018-12-20 PROCEDURE — 86850 RBC ANTIBODY SCREEN: CPT

## 2018-12-20 RX ORDER — NICARDIPINE HYDROCHLORIDE 0.2 MG/ML
INJECTION INTRAVENOUS
Status: COMPLETED
Start: 2018-12-20 | End: 2018-12-20

## 2018-12-20 RX ORDER — HYDROCODONE BITARTRATE AND ACETAMINOPHEN 500; 5 MG/1; MG/1
TABLET ORAL
Status: DISCONTINUED | OUTPATIENT
Start: 2018-12-20 | End: 2018-12-28

## 2018-12-20 RX ORDER — SODIUM,POTASSIUM PHOSPHATES 280-250MG
2 POWDER IN PACKET (EA) ORAL
Status: DISCONTINUED | OUTPATIENT
Start: 2018-12-20 | End: 2018-12-23

## 2018-12-20 RX ORDER — HYDRALAZINE HYDROCHLORIDE 20 MG/ML
INJECTION INTRAMUSCULAR; INTRAVENOUS
Status: DISPENSED
Start: 2018-12-20 | End: 2018-12-21

## 2018-12-20 RX ORDER — NICARDIPINE HYDROCHLORIDE 0.2 MG/ML
2.5 INJECTION INTRAVENOUS CONTINUOUS
Status: DISCONTINUED | OUTPATIENT
Start: 2018-12-20 | End: 2018-12-20

## 2018-12-20 RX ORDER — FENTANYL CITRATE 50 UG/ML
INJECTION, SOLUTION INTRAMUSCULAR; INTRAVENOUS
Status: DISCONTINUED | OUTPATIENT
Start: 2018-12-20 | End: 2018-12-21

## 2018-12-20 RX ORDER — SODIUM CHLORIDE 0.9 % (FLUSH) 0.9 %
3 SYRINGE (ML) INJECTION
Status: CANCELLED | OUTPATIENT
Start: 2018-12-20

## 2018-12-20 RX ORDER — AMOXICILLIN 250 MG
1 CAPSULE ORAL DAILY
Status: DISCONTINUED | OUTPATIENT
Start: 2018-12-21 | End: 2018-12-26

## 2018-12-20 RX ORDER — NICARDIPINE HYDROCHLORIDE 0.2 MG/ML
2.5 INJECTION INTRAVENOUS CONTINUOUS
Status: DISCONTINUED | OUTPATIENT
Start: 2018-12-20 | End: 2018-12-22

## 2018-12-20 RX ORDER — OXYCODONE HYDROCHLORIDE 5 MG/1
5 TABLET ORAL
Status: CANCELLED | OUTPATIENT
Start: 2018-12-20

## 2018-12-20 RX ORDER — POTASSIUM CHLORIDE 20 MEQ/15ML
40 SOLUTION ORAL
Status: DISCONTINUED | OUTPATIENT
Start: 2018-12-20 | End: 2018-12-23

## 2018-12-20 RX ORDER — SODIUM CHLORIDE 0.9 % (FLUSH) 0.9 %
3 SYRINGE (ML) INJECTION EVERY 8 HOURS
Status: DISCONTINUED | OUTPATIENT
Start: 2018-12-20 | End: 2019-01-28 | Stop reason: HOSPADM

## 2018-12-20 RX ORDER — HYDRALAZINE HYDROCHLORIDE 20 MG/ML
10 INJECTION INTRAMUSCULAR; INTRAVENOUS
Status: COMPLETED | OUTPATIENT
Start: 2018-12-20 | End: 2018-12-20

## 2018-12-20 RX ORDER — LEVETIRACETAM 5 MG/ML
500 INJECTION INTRAVASCULAR EVERY 12 HOURS
Status: DISCONTINUED | OUTPATIENT
Start: 2018-12-20 | End: 2018-12-22

## 2018-12-20 RX ORDER — LANOLIN ALCOHOL/MO/W.PET/CERES
800 CREAM (GRAM) TOPICAL
Status: DISCONTINUED | OUTPATIENT
Start: 2018-12-20 | End: 2018-12-23

## 2018-12-20 RX ORDER — HYDRALAZINE HYDROCHLORIDE 20 MG/ML
10 INJECTION INTRAMUSCULAR; INTRAVENOUS EVERY 4 HOURS PRN
Status: DISCONTINUED | OUTPATIENT
Start: 2018-12-20 | End: 2018-12-24

## 2018-12-20 RX ORDER — NICARDIPINE HYDROCHLORIDE 0.2 MG/ML
INJECTION INTRAVENOUS
Status: DISPENSED
Start: 2018-12-20 | End: 2018-12-21

## 2018-12-20 RX ORDER — CLONIDINE HYDROCHLORIDE 0.1 MG/1
0.2 TABLET ORAL
Status: COMPLETED | OUTPATIENT
Start: 2018-12-20 | End: 2018-12-20

## 2018-12-20 RX ORDER — LIDOCAINE HCL/PF 100 MG/5ML
SYRINGE (ML) INTRAVENOUS
Status: DISCONTINUED | OUTPATIENT
Start: 2018-12-20 | End: 2018-12-21

## 2018-12-20 RX ORDER — SUCCINYLCHOLINE CHLORIDE 20 MG/ML
INJECTION INTRAMUSCULAR; INTRAVENOUS
Status: DISCONTINUED | OUTPATIENT
Start: 2018-12-20 | End: 2018-12-21

## 2018-12-20 RX ORDER — PROPOFOL 10 MG/ML
VIAL (ML) INTRAVENOUS
Status: DISCONTINUED | OUTPATIENT
Start: 2018-12-20 | End: 2018-12-21

## 2018-12-20 RX ORDER — ONDANSETRON 2 MG/ML
4 INJECTION INTRAMUSCULAR; INTRAVENOUS DAILY PRN
Status: CANCELLED | OUTPATIENT
Start: 2018-12-20

## 2018-12-20 RX ORDER — FENTANYL CITRATE 50 UG/ML
25 INJECTION, SOLUTION INTRAMUSCULAR; INTRAVENOUS EVERY 5 MIN PRN
Status: CANCELLED | OUTPATIENT
Start: 2018-12-20

## 2018-12-20 RX ORDER — ONDANSETRON 8 MG/1
8 TABLET, ORALLY DISINTEGRATING ORAL EVERY 8 HOURS PRN
Status: DISCONTINUED | OUTPATIENT
Start: 2018-12-20 | End: 2018-12-24

## 2018-12-20 RX ORDER — ROCURONIUM BROMIDE 10 MG/ML
INJECTION, SOLUTION INTRAVENOUS
Status: DISCONTINUED | OUTPATIENT
Start: 2018-12-20 | End: 2018-12-21

## 2018-12-20 RX ADMIN — ROCURONIUM BROMIDE 50 MG: 10 INJECTION, SOLUTION INTRAVENOUS at 11:12

## 2018-12-20 RX ADMIN — SODIUM CHLORIDE 500 ML: 0.9 INJECTION, SOLUTION INTRAVENOUS at 09:12

## 2018-12-20 RX ADMIN — PROPOFOL 150 MG: 10 INJECTION, EMULSION INTRAVENOUS at 11:12

## 2018-12-20 RX ADMIN — NICARDIPINE HYDROCHLORIDE 2.5 MG/HR: 0.2 INJECTION, SOLUTION INTRAVENOUS at 03:12

## 2018-12-20 RX ADMIN — PHENYLEPHRINE HYDROCHLORIDE 100 MCG: 10 INJECTION INTRAVENOUS at 11:12

## 2018-12-20 RX ADMIN — PROPOFOL 50 MG: 10 INJECTION, EMULSION INTRAVENOUS at 11:12

## 2018-12-20 RX ADMIN — LIDOCAINE HYDROCHLORIDE 80 MG: 20 INJECTION, SOLUTION INTRAVENOUS at 11:12

## 2018-12-20 RX ADMIN — HYDRALAZINE HYDROCHLORIDE 10 MG: 20 INJECTION INTRAMUSCULAR; INTRAVENOUS at 09:12

## 2018-12-20 RX ADMIN — SUCCINYLCHOLINE CHLORIDE 160 MG: 20 INJECTION, SOLUTION INTRAMUSCULAR; INTRAVENOUS at 11:12

## 2018-12-20 RX ADMIN — SODIUM CHLORIDE, SODIUM GLUCONATE, SODIUM ACETATE, POTASSIUM CHLORIDE, MAGNESIUM CHLORIDE, SODIUM PHOSPHATE, DIBASIC, AND POTASSIUM PHOSPHATE: .53; .5; .37; .037; .03; .012; .00082 INJECTION, SOLUTION INTRAVENOUS at 11:12

## 2018-12-20 RX ADMIN — NICARDIPINE HYDROCHLORIDE 2.5 MG/HR: 0.2 INJECTION INTRAVENOUS at 03:12

## 2018-12-20 RX ADMIN — HYDRALAZINE HYDROCHLORIDE 10 MG: 20 INJECTION INTRAMUSCULAR; INTRAVENOUS at 11:12

## 2018-12-20 RX ADMIN — CLONIDINE HYDROCHLORIDE 0.2 MG: 0.1 TABLET ORAL at 09:12

## 2018-12-20 RX ADMIN — LEVETIRACETAM 500 MG: 5 INJECTION INTRAVENOUS at 05:12

## 2018-12-20 NOTE — ED NOTES
Hourly rounding complete. Patient resting in stretcher and is in NAD at this time. Pt is awake and alert, respirations even and unlabored. Pt denies pain at this time. Pt updated on POC. Family at bedside. Bed low and locked with side rails up x2, call bell in pt reach. Pt provided blankets per request. Pt denies any other needs at this time.

## 2018-12-20 NOTE — ED NOTES
ED MD at bedside and aware of pt /91, MD doing neuro exam during measurement, states to reassess BP in 15 minutes.

## 2018-12-20 NOTE — ED NOTES
ED MD notified pt BP remains elevated at 161/99, verbal order with read back received from Dr Zamora to start cardene infusion.

## 2018-12-20 NOTE — SUBJECTIVE & OBJECTIVE
(Not in a hospital admission)    Review of patient's allergies indicates:   Allergen Reactions    Levaquin [levofloxacin] Nausea Only       Past Medical History:   Diagnosis Date    Coronary artery disease     CVD (cardiovascular disease)     Diabetes mellitus type II     Hyperlipidemia     Hypertension     Ischemic cardiomyopathy     Obesity     Obesity, morbid      Past Surgical History:   Procedure Laterality Date    APPENDECTOMY      CORONARY ARTERY BYPASS GRAFT      HEMORRHOID SURGERY       Family History     Problem Relation (Age of Onset)    Cancer Father    Diabetes Mother    Heart disease Mother, Father    Hyperlipidemia Mother    Hypertension Mother    Stroke Mother        Tobacco Use    Smoking status: Former Smoker     Packs/day: 1.50     Years: 25.00     Pack years: 37.50     Types: Cigarettes     Last attempt to quit: 2002     Years since quittin.0    Smokeless tobacco: Never Used   Substance and Sexual Activity    Alcohol use: No    Drug use: No    Sexual activity: Yes     Review of Systems   Constitutional: no fever or chills  Eyes: no visual changes  ENT: no nasal congestion or sore throat  Respiratory: no cough or shortness of breath  Cardiovascular: no chest pain or palpitations  Gastrointestinal: no nausea or vomiting, tolerating diet  Genitourinary: no hematuria or dysuria  Integument/Breast: no rash or pruritis  Hematologic/Lymphatic: no easy bruising or lymphadenopathy  Musculoskeletal: no arthralgias or myalgias. Weakness in proximal LEs  Neurological: no seizures or tremors. Admits confusion, AMS  Behavioral/Psych: no auditory or visual hallucinations  Endocrine: no heat or cold intolerance      Objective:     Weight: 85.7 kg (189 lb)  Body mass index is 26.36 kg/m².  Vital Signs (Most Recent):  Temp: 98.8 °F (37.1 °C) (18 1412)  Pulse: 92 (18 1555)  Resp: 18 (18 1555)  BP: 114/67 (18 1555)  SpO2: 99 % (18 1555) Vital Signs (24h  Range):  Temp:  [98 °F (36.7 °C)-98.8 °F (37.1 °C)] 98.8 °F (37.1 °C)  Pulse:  [81-94] 92  Resp:  [16-28] 18  SpO2:  [96 %-100 %] 99 %  BP: (112-212)/() 114/67            Neurosurgery Physical Exam  General: well developed, well nourished, no distress.   Head: normocephalic, contusion on right lateral head  Neurologic: Alert and oriented to place. Thought content appropriate.   GCS: Motor: 6/Verbal: 5/Eyes: 4 GCS Total: 15  Mental Status: Awake, Alert, Oriented x1 to place. Not oriented to year or name.  Language: Expressive and Receptive aphasia  Speech: No dysarthria  Cranial nerves: face symmetric, tongue midline, CN II-XII grossly intact.   Eyes: pupils equal, round, reactive to light, EOMI.   Pulmonary: normal respirations, no signs of respiratory distress  Sensory: intact to light touch throughout    Motor Strength: No abnormal movements seen. Good tone. Patient confused and unable to follow some commands.     Strength  Deltoids Triceps Biceps Wrist Extension Wrist Flexion Hand    Upper: R 5/5 5/5 5/5 5/5 5/5 5/5    L 5/5 5/5 5/5 5/5 5/5 5/5     Iliopsoas Quadriceps Knee  Flexion Tibialis  anterior Gastro- cnemius EHL   Lower: R 1/5 5/5 5/5 5/5 5/5 5/5    L 1/5 5/5 5/5 5/5 5/5 5/5     Pronator Drift: right sided drift.  Finger-to-nose: unable to assess 2/2 confusion  Bailey: absent  Clonus: absent  Babinski: absent   Skin: Skin is warm, dry and intact.      Significant Labs:  Recent Labs   Lab 12/20/18  0935         K 4.8   CL 99   CO2 22*   BUN 16   CREATININE 0.8   CALCIUM 9.7     Recent Labs   Lab 12/20/18  0935   WBC 16.97*   HGB 15.2   HCT 44.0        Recent Labs   Lab 12/20/18  0935   INR 1.0   APTT 31.9     Microbiology Results (last 7 days)     ** No results found for the last 168 hours. **          Significant Diagnostics:  CT: Ct Head Without Contrast    Result Date: 12/20/2018  Primarily left frontal parietal and temporal subdural hematoma measuring up to 1.7 cm in  greatest thickness with blood extending along the left tentorium and left anterior falx.  There is localized mass effect midline shift to the right of approximately 5 mm.  Additionally, there is subarachnoid blood seen in the left quadrigeminal plate cistern. COMMUNICATION This critical result was discovered/received at 10/20/2018 at 09:55 hours.  The critical information above was relayed directly by me by telephone to Dr. Rosenbaum on 12/20/2018 at 957 hr. Electronically signed by: Keara Goldstein MD Date:    12/20/2018 Time:    10:03

## 2018-12-20 NOTE — HPI
63 year old male h/o CABG on ASA 81 mg (last dose 72 hours PTA) presenting as transfer from OSH for EDH with SDH. Patient reportedly had a mechanical fall from standing on Sunday and went to ED where CTH was negative for any acute intracranial pathology. Overnight, patient developed confusion and presented to OSH ED where CTH was consistent with EDH with SDH. Patient has progressively worsened throughout the day developing intermittent global aphasia. NSGY consulted in ED at [facility] and stat CTH has been ordered. NCC consulted for admission and medical management. Patient on weaning off of cardene at time of evaluation with sbp of 120. Reported presenting sbp in 150s.     Interval HPI: course complicated by S.E.; was on ketamine gtt, now controlled on vimpat. T&P, opens eyes to voice, not following requests. Hyponatremic, on salt tabs, resolved. Insurance issues prolonged post-acute care placement. Stable, ready for LTAC.

## 2018-12-20 NOTE — H&P
[facility]  Neurocritical Care  History & Physical    Admit Date: 12/20/2018  Service Date: 12/20/2018  Length of Stay: 0    Subjective:     Chief Complaint: SDH (subdural hematoma)    History of Present Illness: 63 year old male h/o CABG on ASA 81 mg (last dose 72 hours PTA) presenting as transfer from OSH for EDH with SDH. Patient reportedly had a mechanical fall from standing on Sunday and went to ED where CTH was negative for any acute intracranial pathology. Overnight, patient developed confusion and presented to OSH ED where CTH was consistent with EDH with SDH. Patient has progressively worsened throughout the day developing intermittent global aphasia. NSGY consulted in ED at [facility] and stat CTH has been ordered. NCC consulted for admission and medical management. Patient on weaning off of cardene at time of evaluation with sbp of 120. Reported presenting sbp in 150s.    Past Medical History:   Diagnosis Date    Coronary artery disease     CVD (cardiovascular disease)     Diabetes mellitus type II     Hyperlipidemia     Hypertension     Ischemic cardiomyopathy     Obesity     Obesity, morbid      Past Surgical History:   Procedure Laterality Date    APPENDECTOMY      CORONARY ARTERY BYPASS GRAFT      HEMORRHOID SURGERY        Current Facility-Administered Medications on File Prior to Encounter   Medication Dose Route Frequency Provider Last Rate Last Dose    [COMPLETED] cloNIDine tablet 0.2 mg  0.2 mg Oral ED 1 Time Ariela Rice NP   0.2 mg at 12/20/18 0929    [COMPLETED] hydrALAZINE injection 10 mg  10 mg Intravenous ED 1 Time Andrew Rosenbaum Jr., MD   10 mg at 12/20/18 1114    [COMPLETED] sodium chloride 0.9% bolus 500 mL  500 mL Intravenous ED 1 Time Ariela Rice NP   Stopped at 12/20/18 1015     Current Outpatient Medications on File Prior to Encounter   Medication Sig Dispense Refill    aspirin (ECOTRIN) 81 MG EC tablet Take 81 mg by mouth once daily.         bisoprolol (ZEBETA) 10 MG tablet Take 1 tablet (10 mg total) by mouth once daily. 90 tablet 3    linagliptin (TRADJENTA) 5 mg Tab tablet Take 1 tablet (5 mg total) by mouth once daily. 90 tablet 1    mv-min 16/folic acid/lut/lycop (REQ49+ ORAL)       pravastatin (PRAVACHOL) 40 MG tablet Take 1 tablet (40 mg total) by mouth every evening. 90 tablet 1      Allergies: Levaquin [levofloxacin]    Family History   Problem Relation Age of Onset    Heart disease Mother     Hypertension Mother     Hyperlipidemia Mother     Diabetes Mother     Stroke Mother     Heart disease Father     Cancer Father         LUNG     Social History     Tobacco Use    Smoking status: Former Smoker     Packs/day: 1.50     Years: 25.00     Pack years: 37.50     Types: Cigarettes     Last attempt to quit: 2002     Years since quittin.0    Smokeless tobacco: Never Used   Substance Use Topics    Alcohol use: No    Drug use: No     Review of Systems   Unable to perform ROS: Patient nonverbal     Objective:     Vitals:    Temp: 98.8 °F (37.1 °C)  Pulse: 94  BP: 132/77  MAP (mmHg): 98  Resp: 18  SpO2: 98 %  O2 Device (Oxygen Therapy): room air    Temp  Min: 98 °F (36.7 °C)  Max: 98.8 °F (37.1 °C)  Pulse  Min: 81  Max: 94  BP  Min: 112/73  Max: 212/115  MAP (mmHg)  Min: 82  Max: 158  Resp  Min: 16  Max: 28  SpO2  Min: 95 %  Max: 100 %    No intake/output data recorded.           Physical Exam   Constitutional: He appears well-developed.   HENT:   R occipital laceration   Eyes: EOM are normal. Pupils are equal, round, and reactive to light.   Hazy conjunctivae    Neck: Normal range of motion. Neck supple.   Cardiovascular: Normal rate.   Pulmonary/Chest: Effort normal. No respiratory distress.   Abdominal: Soft. He exhibits no distension. There is no tenderness.   Neurological: He is alert. No cranial nerve deficit or sensory deficit. GCS eye subscore is 4. GCS verbal subscore is 2. GCS motor subscore is 5.   BL UE strength  intact  BL LE 2/5  Intermittently follows commands           Assessment/Plan:     Neuro   * SDH (subdural hematoma)    Epidural with subdural component  -last asa was approx 72 hours ago  -STAT CT head without contrast  -NSGY consulted  --160  -pt/ot/st  -elevate HOB  -NPO until repeat CTH  -keppra  -cEEG if patient's CTH is non-revealing      Cerebral edema    See SDH     Brain compression    See SDH     Epidural hematoma    See SDH     Cardiac/Vascular   Essential hypertension    Wean cardene  PRN hydralazine             The patient is being Prophylaxed for:  Venous Thromboembolism with: Mechanical  Stress Ulcer with: Not Applicable   Ventilator Pneumonia with: not applicable    Activity Orders          None        Full Code    Don Vincent MD  Neurocritical Care  [facility] [address]

## 2018-12-20 NOTE — HOSPITAL COURSE
12/20: pt presents to the ED with SDH and SAH per CTH. Repeat CTH. Admit to NCC and NSGY to follow  12/21: POD#0 from [facility] for SDH evacuation.   1/5: naeon  1/6: naeon  1/7: AFVSS, NAEON, labs stable, exam stable  1/8: NAEON. Peg and Trach today  1/9:  -122,  -210, ow AFVSS, trach and PEG yesterday, ow NAEON, leukocytosis likely reactive, ow labs stable, exam stable   1/10:  -130,  -190, ow AFVSS, NAEON, leukocytosis to 14, ow labs stable, exam stable  1/12-1/13: NAEON,  exam stable  1/14: HR range 83 -126, SBP range 138 -196, leukocytosis 17 from 15, Na 132, ow labs stable, exam stable  1/15: HR 75 -136,  -172, NAEON, labs stable, exam stable  1/16 -  -185, NAEON, labs stable, exam stable  1/17 - continues to be intermittently tachycardic, labs stable, exam stable  1/18 - tachycardic, NAEON, labs stable, exam stable  1/19-20 - AFVSS, NAEON, labs stable, exam stable

## 2018-12-20 NOTE — CONSULTS
[facility]  Neurosurgery  Consult Note    Consults  Subjective:     Chief Complaint/Reason for Admission: SDH    History of Present Illness: 64 yo M with PMH of CAD, CABG in 2006, DM, HTN, HLD on ASA presents to the ED for AMS. Patient fell 4 days ago with about 2 minutes of LOC. Family is unsure of what caused the fall. Full workup done including a CTH at [facility] that was negative. Patient went home and stopped taking his ASA. This morning, his family noticed he was more confused. He went to [facility] and CTH was done that revealed a 1.7cm left frontalpariental SDH with a 5 mm right sided midline shift and a left SAH. [place] [facility] History was obtained per family as patient unable to give history 2/2 confusion with expressive and receptive aphasia.      (Not in a hospital admission)    Review of patient's allergies indicates:   Allergen Reactions    Levaquin [levofloxacin] Nausea Only       Past Medical History:   Diagnosis Date    Coronary artery disease     CVD (cardiovascular disease)     Diabetes mellitus type II     Hyperlipidemia     Hypertension     Ischemic cardiomyopathy     Obesity     Obesity, morbid      Past Surgical History:   Procedure Laterality Date    APPENDECTOMY      CORONARY ARTERY BYPASS GRAFT      HEMORRHOID SURGERY       Family History     Problem Relation (Age of Onset)    Cancer Father    Diabetes Mother    Heart disease Mother, Father    Hyperlipidemia Mother    Hypertension Mother    Stroke Mother        Tobacco Use    Smoking status: Former Smoker     Packs/day: 1.50     Years: 25.00     Pack years: 37.50     Types: Cigarettes     Last attempt to quit: 2002     Years since quittin.0    Smokeless tobacco: Never Used   Substance and Sexual Activity    Alcohol use: No    Drug use: No    Sexual activity: Yes     Review of Systems   Constitutional: no fever or chills  Eyes: no visual changes  ENT: no nasal congestion or sore  throat  Respiratory: no cough or shortness of breath  Cardiovascular: no chest pain or palpitations  Gastrointestinal: no nausea or vomiting, tolerating diet  Genitourinary: no hematuria or dysuria  Integument/Breast: no rash or pruritis  Hematologic/Lymphatic: no easy bruising or lymphadenopathy  Musculoskeletal: no arthralgias or myalgias. Weakness in proximal LEs  Neurological: no seizures or tremors. Admits confusion, AMS  Behavioral/Psych: no auditory or visual hallucinations  Endocrine: no heat or cold intolerance      Objective:     Weight: 85.7 kg (189 lb)  Body mass index is 26.36 kg/m².  Vital Signs (Most Recent):  Temp: 98.8 °F (37.1 °C) (12/20/18 1412)  Pulse: 92 (12/20/18 1555)  Resp: 18 (12/20/18 1555)  BP: 114/67 (12/20/18 1555)  SpO2: 99 % (12/20/18 1555) Vital Signs (24h Range):  Temp:  [98 °F (36.7 °C)-98.8 °F (37.1 °C)] 98.8 °F (37.1 °C)  Pulse:  [81-94] 92  Resp:  [16-28] 18  SpO2:  [96 %-100 %] 99 %  BP: (112-212)/() 114/67            Neurosurgery Physical Exam  General: well developed, well nourished, no distress.   Head: normocephalic, contusion on right lateral head  Neurologic: Alert and oriented to place. Thought content appropriate.   GCS: Motor: 6/Verbal: 5/Eyes: 4 GCS Total: 15  Mental Status: Awake, Alert, Oriented x1 to place. Not oriented to year or name.  Language: Expressive and Receptive aphasia  Speech: No dysarthria  Cranial nerves: face symmetric, tongue midline, CN II-XII grossly intact.   Eyes: pupils equal, round, reactive to light, EOMI.   Pulmonary: normal respirations, no signs of respiratory distress  Sensory: intact to light touch throughout    Motor Strength: No abnormal movements seen. Good tone. Patient confused and unable to follow some commands.     Strength  Deltoids Triceps Biceps Wrist Extension Wrist Flexion Hand    Upper: R 5/5 5/5 5/5 5/5 5/5 5/5    L 5/5 5/5 5/5 5/5 5/5 5/5     Iliopsoas Quadriceps Knee  Flexion Tibialis  anterior Gastro- cnemius  EHL   Lower: R 1/5 5/5 5/5 5/5 5/5 5/5    L 1/5 5/5 5/5 5/5 5/5 5/5     Pronator Drift: right sided drift.  Finger-to-nose: unable to assess 2/2 confusion  Bailey: absent  Clonus: absent  Babinski: absent   Skin: Skin is warm, dry and intact.      Significant Labs:  Recent Labs   Lab 12/20/18  0935         K 4.8   CL 99   CO2 22*   BUN 16   CREATININE 0.8   CALCIUM 9.7     Recent Labs   Lab 12/20/18  0935   WBC 16.97*   HGB 15.2   HCT 44.0        Recent Labs   Lab 12/20/18  0935   INR 1.0   APTT 31.9     Microbiology Results (last 7 days)     ** No results found for the last 168 hours. **          Significant Diagnostics:  CT: Ct Head Without Contrast    Result Date: 12/20/2018  Primarily left frontal parietal and temporal subdural hematoma measuring up to 1.7 cm in greatest thickness with blood extending along the left tentorium and left anterior falx.  There is localized mass effect midline shift to the right of approximately 5 mm.  Additionally, there is subarachnoid blood seen in the left quadrigeminal plate cistern. COMMUNICATION This critical result was discovered/received at 10/20/2018 at 09:55 hours.  The critical information above was relayed directly by me by telephone to Dr. Rosenbaum on 12/20/2018 at 957 hr. Electronically signed by: Keara Goldstein MD Date:    12/20/2018 Time:    10:03    Assessment/Plan:     * SDH (subdural hematoma)    64 yo M with PMH of CAD s/p CABG 2006 on ASA, HTN, DM, HLD presents to ED s/p fall 4 days ago acquiring left 1.7cm frontoparietal SDH with 5 mm midline shift and left SAH.     -- No acute neurosurgical interventions at this time  -- Repeat CTH  -- H/H, PT/INR and PTT wnl  -- Consulted NCC and discussed patient. Recommend NCC to follow  -- hold aspirin  -- SBP < 160  -- Answered all patient's and family's questions  -- Discussed with Dr. Hull             Thank you for your consult. I will follow-up with patient. Please contact us if you have any  additional questions.    Roselyn Raman PA-C  Neurosurgery  [facility]

## 2018-12-20 NOTE — ED NOTES
Bed: 25  Expected date: 12/20/18  Expected time: 11:54 AM  Means of arrival:   Comments:  Subdural Transfer

## 2018-12-20 NOTE — ASSESSMENT & PLAN NOTE
64 yo M with PMH of CAD s/p CABG 2006 on ASA, HTN, DM, HLD presents to ED s/p fall 4 days ago acquiring left 1.7cm frontoparietal SDH with 5 mm midline shift and left SAH.     -- No acute neurosurgical interventions at this time  -- Repeat CTH  -- H/H, PT/INR and PTT wnl  -- Recommend consulting NCC to follow  -- hold aspirin  -- SBP < 160  -- Answered all patient's and family's questions  -- Discussed with Dr. Hull

## 2018-12-20 NOTE — ED TRIAGE NOTES
Patient transferred from [facility] for aphasia and confusion by anjum. Patient had a fall 4 days ago. Patients CT scan showed a subdural. Patient is oriented to self at this time.

## 2018-12-20 NOTE — ED PROVIDER NOTES
Encounter Date: 12/20/2018       History     Chief Complaint   Patient presents with    Fatigue     Patient presents with a 2-3 day history of progressive weakness, generalized body aches, fatigue, and altered mental status.  The family reports that 4 days ago he had a fall with positive loss of consciousness.  He was seen and evaluated in the ER.  He had an extensive workup including head CT and was discharged home.  The family is concerned because since then he has developed progressive weakness, generalized body aches, fatigue, and altered mental status.  Patient reports a mild headache at this time.  Pain is rated 2/10.  Patient denies chest pain, shortness of breath, abdominal pain, nausea, diarrhea, fever, recent URI symptoms.  Denies any numbness or tingling.  Remains a full range of motion.  He reports 1 episode of vomiting yesterday.  No vomiting today.  No blood in vomit.  Patient also reports symptoms of dehydration including difficulty urinating.  Reports that he last urinated last night.  Current the patient is awake alert and oriented.  He is answering questions appropriately.      The history is provided by the patient and the spouse.     Review of patient's allergies indicates:   Allergen Reactions    Levaquin [levofloxacin] Nausea Only     Past Medical History:   Diagnosis Date    Coronary artery disease     CVD (cardiovascular disease)     Diabetes mellitus type II     Hyperlipidemia     Hypertension     Ischemic cardiomyopathy     Obesity     Obesity, morbid      Past Surgical History:   Procedure Laterality Date    APPENDECTOMY      CORONARY ARTERY BYPASS GRAFT      HEMORRHOID SURGERY       Family History   Problem Relation Age of Onset    Heart disease Mother     Hypertension Mother     Hyperlipidemia Mother     Diabetes Mother     Stroke Mother     Heart disease Father     Cancer Father         LUNG     Social History     Tobacco Use    Smoking status: Former Smoker      Packs/day: 1.50     Years: 25.00     Pack years: 37.50     Types: Cigarettes     Last attempt to quit: 2002     Years since quittin.0    Smokeless tobacco: Never Used   Substance Use Topics    Alcohol use: No    Drug use: No     Review of Systems   Constitutional: Positive for fatigue. Negative for fever.   HENT: Negative for congestion, ear pain, rhinorrhea, sore throat and trouble swallowing.    Eyes: Negative for pain, discharge, redness and visual disturbance.   Respiratory: Negative for cough, shortness of breath and wheezing.    Cardiovascular: Negative for chest pain and leg swelling.   Gastrointestinal: Positive for vomiting. Negative for abdominal pain, constipation, diarrhea and nausea.   Genitourinary: Positive for difficulty urinating. Negative for dysuria and flank pain.   Musculoskeletal: Positive for myalgias. Negative for arthralgias, back pain and neck pain.   Skin: Negative for rash and wound.   Neurological: Positive for weakness. Negative for seizures, facial asymmetry, speech difficulty, numbness and headaches.   Psychiatric/Behavioral: Positive for confusion.       Physical Exam     Initial Vitals [18 0903]   BP Pulse Resp Temp SpO2   (!) 212/115 90 (!) 22 98 °F (36.7 °C) 100 %      MAP       --         Physical Exam    Nursing note and vitals reviewed.  Constitutional: No distress.   HENT:   Head: Normocephalic and atraumatic.   Right Ear: Tympanic membrane, external ear and ear canal normal.   Left Ear: Tympanic membrane, external ear and ear canal normal.   Nose: Nose normal.   Mouth/Throat: Oropharynx is clear and moist.   Eyes: Conjunctivae, EOM and lids are normal. Pupils are equal, round, and reactive to light.   Neck: Neck supple.   Cardiovascular: Normal rate, regular rhythm, normal heart sounds and intact distal pulses.   Pulmonary/Chest: Breath sounds normal. No respiratory distress.   Abdominal: Soft. Bowel sounds are normal. There is no tenderness.    Musculoskeletal: Normal range of motion.   Neurological: He is alert and oriented to person, place, and time. He has normal strength.   Skin: Skin is warm and dry. Capillary refill takes less than 2 seconds. No rash noted.   Psychiatric: He has a normal mood and affect. His behavior is normal.         ED Course   Procedures  Labs Reviewed   INFLUENZA A & B BY MOLECULAR   CBC W/ AUTO DIFFERENTIAL   COMPREHENSIVE METABOLIC PANEL   CK-MB   CK   TROPONIN I   ALCOHOL,MEDICAL (ETHANOL)   APTT   PROTIME-INR   B-TYPE NATRIURETIC PEPTIDE   TSH   URINALYSIS, REFLEX TO URINE CULTURE   DRUG SCREEN PANEL, URINE EMERGENCY     EKG Readings: (Independently Interpreted)   EKG read with MD at the time it was performed. EKG without concerning findings.        Imaging Results          CT Head Without Contrast (In process)                    CT findings noted.  Transfer Center contacted to arrange transfer for neurosurgical services.                    Clinical Impression:   The primary encounter diagnosis was Subdural hematoma. A diagnosis of Weakness was also pertinent to this visit.      Disposition:   Disposition: Transferred  Condition: Stable                        Andrew Rosenbaum Jr., MD  12/20/18 1112       Andrew Rosenbaum Jr., MD  12/20/18 1112
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
## 2018-12-20 NOTE — ED NOTES
Pt accepted to [facility] by Dr. Hull. ED to ED. Number for report is [phone]. Transfer center reported they will set up transfer.

## 2018-12-20 NOTE — HPI
64 yo M with PMH of CAD, CABG in 2006, DM, HTN, HLD on ASA presents to the ED for AMS. Patient fell 4 days ago with about 2 minutes of LOC. Family is unsure of what caused the fall. Full workup done including a CTH at [facility] that was negative. Patient went home and stopped taking his ASA. This morning, his family noticed he was more confused. He went to [facility] and CTH was done that revealed a 1.7cm left frontalpariental SDH with a 5 mm right sided midline shift and a left SAH. Patient was transferred to [facility]. History was obtained per family as patient unable to give history 2/2 confusion with expressive and receptive aphasia.

## 2018-12-20 NOTE — ED NOTES
Hourly rounding complete. Patient resting in stretcher and is in NAD at this time. Pt is awake and alert, respirations even and unlabored. Pt denies pain at this time. Pt updated on POC. Family at bedside. Bed low and locked with side rails up x2, call bell in pt reach. Pt voices no needs at this time.

## 2018-12-20 NOTE — ED NOTES
Patient identifiers verified verbally with patient and correct for Peewee Nunes.    LOC/ APPEARANCE: Pt updated on POC. Bed low and locked with side rails up x2, call bell in pt reach.  SKIN: Skin is warm dry and intact, and color is pallor. Capillary refill <3 seconds. Wound noted to right posterior of head, scabbed, no active bleeding noted.. Mucus membranes moist, acyanotic.  RESPIRATORY: Airway is open and patent. Respirations-spontaneous, unlabored, regular rate, equal bilaterally on inspiration and expiration. No accessory muscle use noted. Lungs clear to auscultation in all fields bilaterally anterior and posterior.   CARDIAC: Patient has regular heart rate. No peripheral edema noted, and patient has no c/o chest pain. Peripheral pulses present equal and strong throughout.  ABDOMEN: Soft and non-tender to palpation with no distention noted. Normoactive bowel sounds x4 quadrants. Pt has no complaints of abnormal bowel movements, denies blood in stool. Pt reports normal appetite.    NEUROLOGIC: SEE NEURO ASSESSMENT FLOW SHEET  MUSCULOSKELETAL: Spontaneous movement noted to all extremities.   : No complaints of frequency, burning, urgency or blood in the urine. No complaints of incontinence.

## 2018-12-20 NOTE — ED NOTES
Neuro ICU at bedside, notified that pt would state name on arrival to ED, now does not state name but will agree to correct name with nod of head.

## 2018-12-20 NOTE — SUBJECTIVE & OBJECTIVE
Past Medical History:   Diagnosis Date    Coronary artery disease     CVD (cardiovascular disease)     Diabetes mellitus type II     Hyperlipidemia     Hypertension     Ischemic cardiomyopathy     Obesity     Obesity, morbid      Past Surgical History:   Procedure Laterality Date    APPENDECTOMY      CORONARY ARTERY BYPASS GRAFT      HEMORRHOID SURGERY        Current Facility-Administered Medications on File Prior to Encounter   Medication Dose Route Frequency Provider Last Rate Last Dose    [COMPLETED] cloNIDine tablet 0.2 mg  0.2 mg Oral ED 1 Time Ariela Rice NP   0.2 mg at 18 0929    [COMPLETED] hydrALAZINE injection 10 mg  10 mg Intravenous ED 1 Time Andrew Rosenbaum Jr., MD   10 mg at 18 1114    [COMPLETED] sodium chloride 0.9% bolus 500 mL  500 mL Intravenous ED 1 Time Ariela Rice NP   Stopped at 18 1015     Current Outpatient Medications on File Prior to Encounter   Medication Sig Dispense Refill    aspirin (ECOTRIN) 81 MG EC tablet Take 81 mg by mouth once daily.        bisoprolol (ZEBETA) 10 MG tablet Take 1 tablet (10 mg total) by mouth once daily. 90 tablet 3    linagliptin (TRADJENTA) 5 mg Tab tablet Take 1 tablet (5 mg total) by mouth once daily. 90 tablet 1    mv-min 16/folic acid/lut/lycop (REQ49+ ORAL)       pravastatin (PRAVACHOL) 40 MG tablet Take 1 tablet (40 mg total) by mouth every evening. 90 tablet 1      Allergies: Levaquin [levofloxacin]    Family History   Problem Relation Age of Onset    Heart disease Mother     Hypertension Mother     Hyperlipidemia Mother     Diabetes Mother     Stroke Mother     Heart disease Father     Cancer Father         LUNG     Social History     Tobacco Use    Smoking status: Former Smoker     Packs/day: 1.50     Years: 25.00     Pack years: 37.50     Types: Cigarettes     Last attempt to quit: 2002     Years since quittin.0    Smokeless tobacco: Never Used   Substance Use Topics    Alcohol use: No     Drug use: No     Review of Systems   Unable to perform ROS: Patient nonverbal     Objective:     Vitals:    Temp: 98.8 °F (37.1 °C)  Pulse: 94  BP: 132/77  MAP (mmHg): 98  Resp: 18  SpO2: 98 %  O2 Device (Oxygen Therapy): room air    Temp  Min: 98 °F (36.7 °C)  Max: 98.8 °F (37.1 °C)  Pulse  Min: 81  Max: 94  BP  Min: 112/73  Max: 212/115  MAP (mmHg)  Min: 82  Max: 158  Resp  Min: 16  Max: 28  SpO2  Min: 95 %  Max: 100 %    No intake/output data recorded.           Physical Exam   Constitutional: He appears well-developed.   HENT:   R occipital laceration   Eyes: EOM are normal. Pupils are equal, round, and reactive to light.   Hazy conjunctivae    Neck: Normal range of motion. Neck supple.   Cardiovascular: Normal rate.   Pulmonary/Chest: Effort normal. No respiratory distress.   Abdominal: Soft. He exhibits no distension. There is no tenderness.   Neurological: He is alert. No cranial nerve deficit or sensory deficit. GCS eye subscore is 4. GCS verbal subscore is 2. GCS motor subscore is 5.   BL UE strength intact  BL LE 2/5  Intermittently follows commands

## 2018-12-20 NOTE — ASSESSMENT & PLAN NOTE
Epidural with subdural component  -last asa was approx 72 hours ago  -STAT CT head without contrast  -NSGY consulted  --160  -pt/ot/st  -elevate HOB  -NPO until repeat CTH  -keppra  -cEEG if patient's CTH is non-revealing

## 2018-12-21 PROBLEM — L89.302 PRESSURE INJURY OF BUTTOCK, STAGE 2: Status: ACTIVE | Noted: 2018-12-21

## 2018-12-21 PROBLEM — E43 SEVERE MALNUTRITION: Status: ACTIVE | Noted: 2018-12-21

## 2018-12-21 LAB
ALBUMIN SERPL BCP-MCNC: 3.2 G/DL
ALP SERPL-CCNC: 64 U/L
ALT SERPL W/O P-5'-P-CCNC: 7 U/L
ANION GAP SERPL CALC-SCNC: 22 MMOL/L
ASCENDING AORTA: 3.5 CM
AST SERPL-CCNC: 19 U/L
AV INDEX (PROSTH): 0.66
AV MEAN GRADIENT: 8.92 MMHG
AV PEAK GRADIENT: 14.9 MMHG
AV VALVE AREA: 2.11 CM2
BILIRUB SERPL-MCNC: 1.3 MG/DL
BSA FOR ECHO PROCEDURE: 2.06 M2
BUN SERPL-MCNC: 15 MG/DL
CALCIUM SERPL-MCNC: 9 MG/DL
CHLORIDE SERPL-SCNC: 100 MMOL/L
CO2 SERPL-SCNC: 15 MMOL/L
CREAT SERPL-MCNC: 0.7 MG/DL
CV ECHO LV RWT: 0.57 CM
DOP CALC AO PEAK VEL: 1.93 M/S
DOP CALC AO VTI: 31.13 CM
DOP CALC LVOT AREA: 3.2 CM2
DOP CALC LVOT DIAMETER: 2.02 CM
DOP CALC LVOT STROKE VOLUME: 65.73 CM3
DOP CALCLVOT PEAK VEL VTI: 20.52 CM
ECHO LV POSTERIOR WALL: 1.25 CM (ref 0.6–1.1)
EST. GFR  (AFRICAN AMERICAN): >60 ML/MIN/1.73 M^2
EST. GFR  (NON AFRICAN AMERICAN): >60 ML/MIN/1.73 M^2
FRACTIONAL SHORTENING: 32 % (ref 28–44)
GLUCOSE SERPL-MCNC: 125 MG/DL
INTERVENTRICULAR SEPTUM: 1.22 CM (ref 0.6–1.1)
LA MAJOR: 6.27 CM
LA MINOR: 6.55 CM
LA WIDTH: 4.36 CM
LEFT ATRIUM SIZE: 4.26 CM
LEFT ATRIUM VOLUME INDEX: 49.6 ML/M2
LEFT ATRIUM VOLUME: 101.15 CM3
LEFT INTERNAL DIMENSION IN SYSTOLE: 2.96 CM (ref 2.1–4)
LEFT VENTRICLE DIASTOLIC VOLUME INDEX: 41.92 ML/M2
LEFT VENTRICLE DIASTOLIC VOLUME: 85.42 ML
LEFT VENTRICLE MASS INDEX: 96.2 G/M2
LEFT VENTRICLE SYSTOLIC VOLUME INDEX: 16.7 ML/M2
LEFT VENTRICLE SYSTOLIC VOLUME: 33.98 ML
LEFT VENTRICULAR INTERNAL DIMENSION IN DIASTOLE: 4.35 CM (ref 3.5–6)
LEFT VENTRICULAR MASS: 196.04 G
MAGNESIUM SERPL-MCNC: 1.9 MG/DL
PHOSPHATE SERPL-MCNC: 3 MG/DL
POCT GLUCOSE: 122 MG/DL (ref 70–110)
POTASSIUM SERPL-SCNC: 3.2 MMOL/L
PROT SERPL-MCNC: 6.3 G/DL
RA MAJOR: 5.44 CM
RA WIDTH: 4.26 CM
RIGHT VENTRICULAR END-DIASTOLIC DIMENSION: 3.22 CM
SINUS: 3.07 CM
SODIUM SERPL-SCNC: 137 MMOL/L
STJ: 3.33 CM
TDI LATERAL: 0.06
TDI SEPTAL: 0.06
TDI: 0.06
TRICUSPID ANNULAR PLANE SYSTOLIC EXCURSION: 1.73 CM

## 2018-12-21 PROCEDURE — C9113 INJ PANTOPRAZOLE SODIUM, VIA: HCPCS | Performed by: STUDENT IN AN ORGANIZED HEALTH CARE EDUCATION/TRAINING PROGRAM

## 2018-12-21 PROCEDURE — 25000003 PHARM REV CODE 250: Performed by: PSYCHIATRY & NEUROLOGY

## 2018-12-21 PROCEDURE — 25000003 PHARM REV CODE 250: Performed by: NURSE ANESTHETIST, CERTIFIED REGISTERED

## 2018-12-21 PROCEDURE — 92610 EVALUATE SWALLOWING FUNCTION: CPT

## 2018-12-21 PROCEDURE — 25000003 PHARM REV CODE 250: Performed by: NEUROLOGICAL SURGERY

## 2018-12-21 PROCEDURE — 63600175 PHARM REV CODE 636 W HCPCS: Performed by: STUDENT IN AN ORGANIZED HEALTH CARE EDUCATION/TRAINING PROGRAM

## 2018-12-21 PROCEDURE — 20000000 HC ICU ROOM

## 2018-12-21 PROCEDURE — 97162 PT EVAL MOD COMPLEX 30 MIN: CPT

## 2018-12-21 PROCEDURE — 92523 SPEECH SOUND LANG COMPREHEN: CPT

## 2018-12-21 PROCEDURE — A4216 STERILE WATER/SALINE, 10 ML: HCPCS | Performed by: PSYCHIATRY & NEUROLOGY

## 2018-12-21 PROCEDURE — 94761 N-INVAS EAR/PLS OXIMETRY MLT: CPT

## 2018-12-21 PROCEDURE — G8989 SELF CARE D/C STATUS: HCPCS | Mod: CL

## 2018-12-21 PROCEDURE — 99233 PR SUBSEQUENT HOSPITAL CARE,LEVL III: ICD-10-PCS | Mod: GC,,, | Performed by: PSYCHIATRY & NEUROLOGY

## 2018-12-21 PROCEDURE — 97165 OT EVAL LOW COMPLEX 30 MIN: CPT

## 2018-12-21 PROCEDURE — 63600175 PHARM REV CODE 636 W HCPCS: Performed by: PSYCHIATRY & NEUROLOGY

## 2018-12-21 PROCEDURE — 86580 TB INTRADERMAL TEST: CPT | Performed by: PSYCHIATRY & NEUROLOGY

## 2018-12-21 PROCEDURE — 25000003 PHARM REV CODE 250: Performed by: STUDENT IN AN ORGANIZED HEALTH CARE EDUCATION/TRAINING PROGRAM

## 2018-12-21 PROCEDURE — G8987 SELF CARE CURRENT STATUS: HCPCS | Mod: CL

## 2018-12-21 PROCEDURE — 25000003 PHARM REV CODE 250: Performed by: NURSE PRACTITIONER

## 2018-12-21 PROCEDURE — G8988 SELF CARE GOAL STATUS: HCPCS | Mod: CK

## 2018-12-21 PROCEDURE — 63600175 PHARM REV CODE 636 W HCPCS: Performed by: NURSE ANESTHETIST, CERTIFIED REGISTERED

## 2018-12-21 PROCEDURE — 84100 ASSAY OF PHOSPHORUS: CPT

## 2018-12-21 PROCEDURE — 83735 ASSAY OF MAGNESIUM: CPT

## 2018-12-21 PROCEDURE — 80053 COMPREHEN METABOLIC PANEL: CPT

## 2018-12-21 PROCEDURE — 99233 SBSQ HOSP IP/OBS HIGH 50: CPT | Mod: GC,,, | Performed by: PSYCHIATRY & NEUROLOGY

## 2018-12-21 DEVICE — PLATE BONE 2X2 HOLE SM BOX: Type: IMPLANTABLE DEVICE | Site: CRANIAL | Status: FUNCTIONAL

## 2018-12-21 DEVICE — IMPLANTABLE DEVICE: Type: IMPLANTABLE DEVICE | Site: CRANIAL | Status: FUNCTIONAL

## 2018-12-21 DEVICE — PLATE BONE BUR HOLE COVER 10MM: Type: IMPLANTABLE DEVICE | Site: CRANIAL | Status: FUNCTIONAL

## 2018-12-21 RX ORDER — MUPIROCIN 20 MG/G
1 OINTMENT TOPICAL 2 TIMES DAILY
Status: COMPLETED | OUTPATIENT
Start: 2018-12-21 | End: 2018-12-25

## 2018-12-21 RX ORDER — CEFAZOLIN SODIUM 1 G/3ML
1 INJECTION, POWDER, FOR SOLUTION INTRAMUSCULAR; INTRAVENOUS
Status: DISCONTINUED | OUTPATIENT
Start: 2018-12-21 | End: 2018-12-22

## 2018-12-21 RX ORDER — BUPIVACAINE HYDROCHLORIDE AND EPINEPHRINE 5; 5 MG/ML; UG/ML
INJECTION, SOLUTION EPIDURAL; INTRACAUDAL; PERINEURAL
Status: DISCONTINUED | OUTPATIENT
Start: 2018-12-21 | End: 2018-12-21 | Stop reason: HOSPADM

## 2018-12-21 RX ORDER — ONDANSETRON HYDROCHLORIDE 2 MG/ML
INJECTION, SOLUTION INTRAMUSCULAR; INTRAVENOUS
Status: DISCONTINUED | OUTPATIENT
Start: 2018-12-21 | End: 2018-12-21

## 2018-12-21 RX ORDER — ACETAMINOPHEN 325 MG/1
650 TABLET ORAL EVERY 6 HOURS PRN
Status: DISCONTINUED | OUTPATIENT
Start: 2018-12-21 | End: 2018-12-26

## 2018-12-21 RX ORDER — LIDOCAINE HYDROCHLORIDE AND EPINEPHRINE 10; 10 MG/ML; UG/ML
INJECTION, SOLUTION INFILTRATION; PERINEURAL
Status: DISCONTINUED | OUTPATIENT
Start: 2018-12-21 | End: 2018-12-21 | Stop reason: HOSPADM

## 2018-12-21 RX ORDER — SODIUM CHLORIDE, SODIUM LACTATE, POTASSIUM CHLORIDE, CALCIUM CHLORIDE 600; 310; 30; 20 MG/100ML; MG/100ML; MG/100ML; MG/100ML
INJECTION, SOLUTION INTRAVENOUS CONTINUOUS
Status: DISCONTINUED | OUTPATIENT
Start: 2018-12-21 | End: 2018-12-24

## 2018-12-21 RX ORDER — PHENYLEPHRINE HYDROCHLORIDE 10 MG/ML
INJECTION INTRAVENOUS
Status: DISCONTINUED | OUTPATIENT
Start: 2018-12-20 | End: 2018-12-21

## 2018-12-21 RX ORDER — BACITRACIN ZINC 500 UNIT/G
OINTMENT (GRAM) TOPICAL
Status: DISCONTINUED | OUTPATIENT
Start: 2018-12-21 | End: 2018-12-21 | Stop reason: HOSPADM

## 2018-12-21 RX ORDER — FENTANYL CITRATE 50 UG/ML
INJECTION, SOLUTION INTRAMUSCULAR; INTRAVENOUS
Status: DISCONTINUED | OUTPATIENT
Start: 2018-12-21 | End: 2018-12-21

## 2018-12-21 RX ORDER — BACITRACIN 50000 [IU]/1
INJECTION, POWDER, FOR SOLUTION INTRAMUSCULAR
Status: DISCONTINUED | OUTPATIENT
Start: 2018-12-20 | End: 2018-12-21 | Stop reason: HOSPADM

## 2018-12-21 RX ORDER — HYDROCODONE BITARTRATE AND ACETAMINOPHEN 5; 325 MG/1; MG/1
1 TABLET ORAL EVERY 4 HOURS PRN
Status: DISCONTINUED | OUTPATIENT
Start: 2018-12-21 | End: 2018-12-26

## 2018-12-21 RX ORDER — PANTOPRAZOLE SODIUM 40 MG/10ML
40 INJECTION, POWDER, LYOPHILIZED, FOR SOLUTION INTRAVENOUS DAILY
Status: DISCONTINUED | OUTPATIENT
Start: 2018-12-21 | End: 2018-12-21

## 2018-12-21 RX ORDER — MORPHINE SULFATE 2 MG/ML
0.5 INJECTION, SOLUTION INTRAMUSCULAR; INTRAVENOUS EVERY 6 HOURS PRN
Status: DISCONTINUED | OUTPATIENT
Start: 2018-12-21 | End: 2018-12-28

## 2018-12-21 RX ORDER — FENTANYL CITRATE 50 UG/ML
25 INJECTION, SOLUTION INTRAMUSCULAR; INTRAVENOUS ONCE
Status: DISCONTINUED | OUTPATIENT
Start: 2018-12-21 | End: 2018-12-21

## 2018-12-21 RX ORDER — SODIUM CHLORIDE 9 MG/ML
INJECTION, SOLUTION INTRAVENOUS CONTINUOUS
Status: DISCONTINUED | OUTPATIENT
Start: 2018-12-21 | End: 2018-12-21

## 2018-12-21 RX ORDER — OXYCODONE HYDROCHLORIDE 5 MG/1
10 TABLET ORAL EVERY 4 HOURS PRN
Status: DISCONTINUED | OUTPATIENT
Start: 2018-12-21 | End: 2018-12-21

## 2018-12-21 RX ORDER — ONDANSETRON 2 MG/ML
4 INJECTION INTRAMUSCULAR; INTRAVENOUS EVERY 12 HOURS PRN
Status: DISCONTINUED | OUTPATIENT
Start: 2018-12-21 | End: 2019-01-24

## 2018-12-21 RX ADMIN — VASOPRESSIN 1 UNITS: 20 INJECTION INTRAVENOUS at 12:12

## 2018-12-21 RX ADMIN — Medication 3 ML: at 06:12

## 2018-12-21 RX ADMIN — MUPIROCIN 1 G: 20 OINTMENT TOPICAL at 08:12

## 2018-12-21 RX ADMIN — PHENYLEPHRINE HYDROCHLORIDE 100 MCG: 10 INJECTION INTRAVENOUS at 12:12

## 2018-12-21 RX ADMIN — SODIUM CHLORIDE, SODIUM LACTATE, POTASSIUM CHLORIDE, AND CALCIUM CHLORIDE: .6; .31; .03; .02 INJECTION, SOLUTION INTRAVENOUS at 12:12

## 2018-12-21 RX ADMIN — NICARDIPINE HYDROCHLORIDE 12.5 MG/HR: 0.2 INJECTION, SOLUTION INTRAVENOUS at 05:12

## 2018-12-21 RX ADMIN — Medication 0.5 MG: at 10:12

## 2018-12-21 RX ADMIN — FENTANYL CITRATE 50 MCG: 50 INJECTION, SOLUTION INTRAMUSCULAR; INTRAVENOUS at 12:12

## 2018-12-21 RX ADMIN — Medication 0.5 MG: at 03:12

## 2018-12-21 RX ADMIN — CEFAZOLIN 1 G: 1 INJECTION, POWDER, FOR SOLUTION INTRAMUSCULAR; INTRAVENOUS at 02:12

## 2018-12-21 RX ADMIN — PANTOPRAZOLE SODIUM 40 MG: 40 INJECTION, POWDER, FOR SOLUTION INTRAVENOUS at 08:12

## 2018-12-21 RX ADMIN — Medication 5 UNITS: at 05:12

## 2018-12-21 RX ADMIN — PHENYLEPHRINE HYDROCHLORIDE 200 MCG: 10 INJECTION INTRAVENOUS at 12:12

## 2018-12-21 RX ADMIN — Medication 3 ML: at 10:12

## 2018-12-21 RX ADMIN — LEVETIRACETAM 500 MG: 5 INJECTION INTRAVENOUS at 08:12

## 2018-12-21 RX ADMIN — VASOPRESSIN 1 UNITS: 20 INJECTION INTRAVENOUS at 01:12

## 2018-12-21 RX ADMIN — MUPIROCIN 1 G: 20 OINTMENT TOPICAL at 10:12

## 2018-12-21 RX ADMIN — CEFAZOLIN 1 G: 1 INJECTION, POWDER, FOR SOLUTION INTRAMUSCULAR; INTRAVENOUS at 05:12

## 2018-12-21 RX ADMIN — Medication 3 ML: at 02:12

## 2018-12-21 RX ADMIN — LEVETIRACETAM 500 MG: 5 INJECTION INTRAVENOUS at 10:12

## 2018-12-21 RX ADMIN — ONDANSETRON 4 MG: 2 INJECTION, SOLUTION INTRAMUSCULAR; INTRAVENOUS at 01:12

## 2018-12-21 RX ADMIN — HYDROCODONE BITARTRATE AND ACETAMINOPHEN 1 TABLET: 5; 325 TABLET ORAL at 02:12

## 2018-12-21 RX ADMIN — CEFAZOLIN 1 G: 1 INJECTION, POWDER, FOR SOLUTION INTRAMUSCULAR; INTRAVENOUS at 10:12

## 2018-12-21 RX ADMIN — SODIUM CHLORIDE, SODIUM LACTATE, POTASSIUM CHLORIDE, AND CALCIUM CHLORIDE 1000 ML: .6; .31; .03; .02 INJECTION, SOLUTION INTRAVENOUS at 11:12

## 2018-12-21 NOTE — PLAN OF CARE
Problem: Adult Inpatient Plan of Care  Goal: Plan of Care Review  Outcome: Ongoing (interventions implemented as appropriate)  Nutrition assessment completed. Please see RD note for details.    Recommendation/Intervention:   As medically able, advance diet to Regular with texture per SLP recommendations. Add Boost Plus (Chocolate) TID to increase caloric intake.     Pt with 130lb unintentional weight loss over previous year and decreased intake.  Pt with severe malnutrition in the context of chronic illness.    RD to monitor.  Goals: Pt to receive nutrition by RD follow up  Nutrition Goal Status: new  Communication of RD Recs: reviewed with RN

## 2018-12-21 NOTE — PROGRESS NOTES
[facility] team notified of decrease in pt urine output. 1L LR bolus ordered and pt started on IV fluids. Will continue to monitor

## 2018-12-21 NOTE — PROGRESS NOTES
Reviewed repeat CTH with Dr. Hull and scan slightly worsened. Patient more confused now than during previous exam.     Plan:  -- transfused 1 unit platelets as patient has been on ASA  -- Plan for SDH evacuation today  -- Notified ED and NCC of update    Roselyn Raman PA-C  Neurosurgery

## 2018-12-21 NOTE — PLAN OF CARE
Problem: Adult Inpatient Plan of Care  Goal: Plan of Care Review  Outcome: Ongoing (interventions implemented as appropriate)  POC reviewed with pt at 0500. Pt unable to verbalize understanding. Questions and concerns addressed with pts wife.  Pt went to OR for evac.  2 drains placed.  Cardene for BP control.  CT of head completed.   No acute events overnight. Pt progressing toward goals. Will continue to monitor. See flowsheets for full assessment and VS info

## 2018-12-21 NOTE — SUBJECTIVE & OBJECTIVE
Interval History: NAEON. POD#0 from L crani for SDH evacuation.     Medications:  Continuous Infusions:   lactated ringers 100 mL/hr at 12/21/18 1400    nicardipine Stopped (12/21/18 1134)     Scheduled Meds:   ceFAZolin (ANCEF) IVPB  1 g Intravenous Q8H    levetiracetam IVPB  500 mg Intravenous Q12H    mupirocin  1 g Nasal BID    senna-docusate 8.6-50 mg  1 tablet Oral Daily    sodium chloride 0.9%  3 mL Intravenous Q8H     PRN Meds:sodium chloride, acetaminophen, hydrALAZINE, HYDROcodone-acetaminophen, magnesium oxide, magnesium oxide, morphine, ondansetron, ondansetron, potassium chloride 10%, potassium chloride 10%, potassium chloride 10%, potassium, sodium phosphates, potassium, sodium phosphates, potassium, sodium phosphates     Review of Systems  Objective:     Weight: 85.7 kg (189 lb 0 oz)  Body mass index is 26.36 kg/m².  Vital Signs (Most Recent):  Temp: 97 °F (36.1 °C) (12/21/18 1102)  Pulse: 103 (12/21/18 1400)  Resp: (!) 21 (12/21/18 1400)  BP: (!) 152/72 (12/21/18 1400)  SpO2: (!) 94 % (12/21/18 1400) Vital Signs (24h Range):  Temp:  [97 °F (36.1 °C)-98 °F (36.7 °C)] 97 °F (36.1 °C)  Pulse:  [] 103  Resp:  [14-34] 21  SpO2:  [94 %-100 %] 94 %  BP: ()/() 152/72     Date 12/21/18 0700 - 12/22/18 0659   Shift 8272-7551 0191-1851 3148-8334 24 Hour Total   INTAKE   I.V.(mL/kg) 249.4(2.9)   249.4(2.9)   IV Piggyback 100   100   Shift Total(mL/kg) 349.4(4.1)   349.4(4.1)   OUTPUT   Urine(mL/kg/hr) 425   425   Drains 70   70   Shift Total(mL/kg) 495(5.8)   495(5.8)   Weight (kg) 85.7 85.7 85.7 85.7                        Closed/Suction Drain 12/21/18 0111 Left Other (Comment) Accordion 10 Fr. (Active)   Site Description Unable to view 12/21/2018 11:02 AM   Dressing Type [place] 12/21/2018 11:02 AM   Dressing Status Clean;Dry;Intact 12/21/2018 11:02 AM   Dressing Intervention Dressing reinforced 12/21/2018 11:02 AM   Drainage Bloody 12/21/2018 11:02 AM   Status To bulb suction  "12/21/2018 11:02 AM   Output (mL) 80 mL 12/21/2018  6:00 AM            Closed/Suction Drain 12/21/18 0112 Left Other (Comment) Accordion 10 Fr. (Active)   Site Description Unable to view 12/21/2018 11:02 AM   Dressing Type [place] 12/21/2018 11:02 AM   Dressing Status Clean;Dry;Intact 12/21/2018 11:02 AM   Dressing Intervention Dressing reinforced 12/21/2018 11:02 AM   Drainage Bloody 12/21/2018 11:02 AM   Status To bulb suction 12/21/2018 11:02 AM   Output (mL) 70 mL 12/21/2018 11:00 AM            Urethral Catheter 12/20/18 2350 Non-latex;Straight-tip 16 Fr. (Active)   Site Assessment Clean;Intact 12/21/2018 11:02 AM   Collection Container Urimeter 12/21/2018 11:02 AM   Securement Method secured to top of thigh w/ adhesive device 12/21/2018 11:02 AM   Catheter Care Performed yes 12/21/2018 11:02 AM   Reason for Continuing Urinary Catheterization Urinary retention 12/21/2018 11:02 AM   CAUTI Prevention Bundle StatLock in place w 1" slack;Intact seal between catheter & drainage tubing;Drainage bag off the floor;Green sheeting clip in use;Drainage bag not overfilled (<2/3 full);No dependent loops or kinks;Drainage bag below bladder 12/21/2018 11:02 AM   Output (mL) 100 mL 12/21/2018  2:00 PM       Neurosurgery Physical Exam   E3V2M5  Eyes to voice  PERRL  WDx4      Significant Labs:  Recent Labs   Lab 12/20/18  0935 12/21/18  0203    125*    137   K 4.8 3.2*   CL 99 100   CO2 22* 15*   BUN 16 15   CREATININE 0.8 0.7   CALCIUM 9.7 9.0   MG  --  1.9     Recent Labs   Lab 12/20/18  0935 12/20/18  1559   WBC 16.97* 17.85*   HGB 15.2 15.6   HCT 44.0 45.0    248     Recent Labs   Lab 12/20/18  0935 12/20/18  1559   INR 1.0 1.0   APTT 31.9 22.1     Microbiology Results (last 7 days)     ** No results found for the last 168 hours. **        Recent Lab Results       12/21/18  0257   12/21/18  0203   12/20/18  1707   12/20/18  1559        Immature Granulocytes       0.5     Immature Grans (Abs)       " 0.09  Comment:  Mild elevation in immature granulocytes is non specific and   can be seen in a variety of conditions including stress response,   acute inflammation, trauma and pregnancy. Correlation with other   laboratory and clinical findings is essential.       Unit Blood Type Code     9500            9500            0600       Unit Expiration     [id number]            [id number]            [id number]       Unit Blood Type     O NEG            O NEG            A NEG       ABO     O       Albumin   3.2         Alkaline Phosphatase   64         ALT   7         Anion Gap   22         aPTT       22.1  Comment:  aPTT therapeutic range = 39-69 seconds     AST   19         Baso #       0.03     Basophil%       0.2     Total Bilirubin   1.3  Comment:  For infants and newborns, interpretation of results should be based  on gestational age, weight and in agreement with clinical  observations.  Premature Infant recommended reference ranges:  Up to 24 hours.............<8.0 mg/dL  Up to 48 hours............<12.0 mg/dL  3-5 days..................<15.0 mg/dL  6-29 days.................<15.0 mg/dL           BUN, Bld   15         Calcium   9.0         Chloride   100         Cholesterol       169  Comment:  The National Cholesterol Education Program (NCEP) has set the  following guidelines (reference ranges) for Cholesterol:  Optimal.....................<200 mg/dL  Borderline High.............200-239 mg/dL  High........................> or = 240 mg/dL       CO2   15         CODING SYSTEM     OOPL212            [id number]            [id number]       Creatinine   0.7         Differential Method       Automated     DISPENSE STATUS     TRANSFUSED            TRANSFUSED            TRANSFUSED       eGFR if    >60.0         eGFR if non    >60.0  Comment:  Calculation used to obtain the estimated glomerular filtration  rate (eGFR) is the CKD-EPI equation.            Eos #       0.0     Eosinophil%        0.1     Estimated Avg Glucose       91     Glucose   125         Gran # (ANC)       14.6     Gran%       81.6     HDL       56  Comment:  The National Cholesterol Education Program (NCEP) has set the  following guidelines (reference values) for HDL Cholesterol:  Low...............<40 mg/dL  Optimal...........>60 mg/dL       HDL/Chol Ratio       33.1     Hematocrit       45.0     Hemoglobin       15.6     Hemoglobin A1C       4.8  Comment:  ADA Screening Guidelines:  5.7-6.4%  Consistent with prediabetes  >or=6.5%  Consistent with diabetes  High levels of fetal hemoglobin interfere with the HbA1C  assay. Heterozygous hemoglobin variants (HbS, HgC, etc)do  not significantly interfere with this assay.   However, presence of multiple variants may affect accuracy.       INDIRECT INEZ     NEG       Coumadin Monitoring INR       1.0  Comment:  Coumadin Therapy:  2.0 - 3.0 for INR for all indicators except mechanical heart valves  and antiphospholipid syndromes which should use 2.5 - 3.5.       LDL Cholesterol       92.6  Comment:  The National Cholesterol Education Program (NCEP) has set the  following guidelines (reference values) for LDL Cholesterol:  Optimal.......................<130 mg/dL  Borderline High...............130-159 mg/dL  High..........................160-189 mg/dL  Very High.....................>190 mg/dL       Lymph #       1.9     Lymph%       10.8     Magnesium   1.9         MCH       31.6     MCHC       34.7     MCV       91     Mono #       1.2     Mono%       6.8     MPV       11.2     Non-HDL Cholesterol       113  Comment:  Risk category and Non-HDL cholesterol goals:  Coronary heart disease (CHD)or equivalent (10-year risk of CHD >20%):  Non-HDL cholesterol goal     <130 mg/dL  Two or more CHD risk factors and 10-year risk of CHD <= 20%:  Non-HDL cholesterol goal     <160 mg/dL  0 to 1 CHD risk factor:  Non-HDL cholesterol goal     <190 mg/dL       nRBC       0     Phosphorus   3.0          Platelets       248     POCT Glucose 122           Potassium   3.2         PRODUCT CODE     [id number]            [id number]            [id number]       Total Protein   6.3         Protime       10.6     RBC       4.94     RDW       14.2     Rh Type     NEG       Sodium   137         Total Cholesterol/HDL Ratio       3.0     Triglycerides       102  Comment:  The National Cholesterol Education Program (NCEP) has set the  following guidelines (reference values) for triglycerides:  Normal......................<150 mg/dL  Borderline High.............150-199 mg/dL  High........................200-499 mg/dL       UNIT NUMBER     [id number]            [id number]            [id number]       WBC       17.85           Significant Diagnostics:  I have reviewed all pertinent imaging results/findings within the past 24 hours.

## 2018-12-21 NOTE — ASSESSMENT & PLAN NOTE
62 yo M with PMH of CAD s/p CABG 2006 on ASA, HTN, DM, HLD presents to ED s/p fall 4 days ago acquiring left 1.7cm frontoparietal SDH with 5 mm midline shift and left SAH.     -- POD#0 L crani for SDH evacuation  -- Continue medical management per primary team   -- Continue HV x2  -- Continue abx ppx whiel HV in place.   -- SBP < 160  - CPT  - Post op CT reviewed    Consider TTF tomorrow if off Cardene gtt    Discussed w/Dr. Hull

## 2018-12-21 NOTE — PROGRESS NOTES
Consult received on patient. Stage 2 pressure injury noted to patient's sacral spine.   Recommend dressing with sacral foam dressing and change twice a week. Patient on EHOB waffle overlay and jeffrey low airloss surface.Turn every 2hrs. Nursing to continue care. Wound care to follow prn.     12/21/18 1136       Pressure Injury 12/21/18 1136 Sacral spine Stage 2   Date First Assessed/Time First Assessed: 12/21/18 1136   Pressure Injury Present on Admission: yes  Location: Sacral spine  Is this injury device related?: No  Staging: Stage 2   Wound Image    Staging 2   Drainage Amount Scant   Drainage Characteristics/Odor Serous;No odor   Appearance Moist;Pink   Tissue loss description Partial thickness   Periwound Area Redness  (non blanchable redness)   Wound Length (cm) 1 cm   Wound Width (cm) 0.5 cm   Wound Depth (cm) 0 cm   Wound Volume (cm^3) 0 cm^3   Wound Surface Area (cm^2) 0.5 cm^2   Dressing Foam   Dressing Change Due 12/25/18   Skin Interventions   Pressure Reduction Devices pressure-redistributing mattress utilized  (EHOB waffle overlay and jeffrey low airloss surface)

## 2018-12-21 NOTE — ED NOTES
Neuro ICU notified pt has not urinated since arrival and bladder scan result 400cc, states to straight cath patient for urine.

## 2018-12-21 NOTE — PT/OT/SLP EVAL
"Occupational Therapy   Evaluation    Name: Peewee Nunes  MRN: [id number]  Admitting Diagnosis:  SDH (subdural hematoma) 1 Day Post-Op  CRANIOTOMY, FOR SUBDURAL HEMATOMA EVACUATION (Left)    Recommendations:     Discharge Recommendations: nursing facility, skilled  Discharge Equipment Recommendations:  (TBD)  Barriers to discharge: increased assistance required    History:     Occupational Profile:  *history obtained from pt's spouse as pt unable to provide due to aphasia  Living Environment: Pt lives with spouse and son in [facility] with ramp to enter.  Prior Level of Function: previously ambulating with SW short distances, occasionally requiring wheelchair; progressive decline since fall and has lost ~260 lbs since August 2017 due to "losing taste buds".   DME owned: standard walker, BSC, wheelchair   Caregiver Assistance: 24/7 assistance from spouse as needed    Past Medical History:   Diagnosis Date    Coronary artery disease     CVD (cardiovascular disease)     Diabetes mellitus type II     Hyperlipidemia     Hypertension     Ischemic cardiomyopathy     Obesity     Obesity, morbid        Past Surgical History:   Procedure Laterality Date    APPENDECTOMY      CORONARY ARTERY BYPASS GRAFT      CRANIOTOMY, FOR SUBDURAL HEMATOMA EVACUATION Left 12/20/2018    Performed by Stevan Hull MD at [facility] OR [address] [place] [address]    HEMORRHOID SURGERY         Subjective     Chief Complaint: Unable to state  Patient/Family Comments/goals: Pt unable to state    Pain/Comfort:  · Pain Rating 1: (unable to rate; indicated pain with movement)    Patients cultural, spiritual, Mu-ism conflicts given the current situation: no    Objective:     Communicated with: RN prior to session. Patient found with HOB elevated telemetry, blood pressure cuff, pulse ox (continuous), hemovac, mobley catheter, central line upon OT entry to room, family present.    General Precautions: Standard, aphasia, aspiration, fall   Orthopedic Precautions:N/A "   Braces: N/A     Occupational Performance:    Bed Mobility:    · Patient completed Rolling/Turning to Right with minimum assistance  · Patient completed Supine to Sit with maximal assistance  · Patient completed Sit to Supine with maximal assistance    Functional Mobility/Transfers:  · Patient completed Sit <> Stand Transfer with Min A x 2 person assist from EOB with hand-held assist   · Functional Mobility: Did not occur; pt maintained static standing ~15 seconds and had near syncopal episode, eyes rolled back into head; RN notified immediately and pt was returned to supine and was alert and responsive-- following this episode, spouse reports this happens at home    Activities of Daily Living:  · Upper Body Dressing: moderate assistance to don gown around back    Cognitive/Visual Perceptual:  Cognitive/Psychosocial Skills:    -       Oriented to: QIANA; expressive aphasia  -       Follows Commands/attention: one-step commands ~75%  -       Communication: nonverbal throughout session, expressive aphasia  -       Safety awareness/insight to disability: Impaired  Visual/Perceptual: QIANA    Physical Exam:  Balance: good-fair sitting, fair-poor standing  Postural examination/scapula alignment: rounded shoulders, head forward  Skin integrity: thin, bruised  Upper Extremity Range of Motion: WFL PROM, decreased AROM due to arthritis and weakness  Upper Extremity Strength: generalized weakness B UE   Strength: B grossly decreased    AMPAC 6 Click ADL:  AMPAC Total Score: 12    Treatment & Education:  OT/PT eval; educated pt and family on OT role and POC  Education:    Patient left HOB elevated with all lines intact, call button in reach, RN notified and family present    Assessment:     Peewee Nunes is a 63 y.o. male with a medical diagnosis of SDH (subdural hematoma).  He presents with the following performance deficits affecting function: weakness, impaired endurance, impaired self care skills, impaired functional  "mobilty, impaired balance, impaired cognition, decreased coordination, decreased upper extremity function, decreased lower extremity function, decreased safety awareness, impaired cardiopulmonary response to activity. Pt would continue to benefit from OT to increase functional independence and safety. Recommend SNF upon D/C to return to PLOF.    Rehab Prognosis: Good; patient would benefit from acute skilled OT services to address these deficits and reach maximum level of function.         Clinical Decision Makin.  OT Low:  "Pt evaluation falls under low complexity for evaluation coding due to performance deficits noted in 1-3 areas as stated above and 0 co-morbities affecting current functional status. Data obtained from problem focused assessments. No modifications or assistance was required for completion of evaluation. Only brief occupational profile and history review completed."     Plan:     Patient to be seen 3 x/week to address the above listed problems via self-care/home management, therapeutic activities, therapeutic exercises, neuromuscular re-education, cognitive retraining  · Plan of Care Expires: 19  · Plan of Care Reviewed with: patient, spouse    This Plan of care has been discussed with the patient who was involved in its development and understands and is in agreement with the identified goals and treatment plan    GOALS:   Multidisciplinary Problems     Occupational Therapy Goals        Problem: Occupational Therapy Goal    Goal Priority Disciplines Outcome Interventions   Occupational Therapy Goal     OT, PT/OT Ongoing (interventions implemented as appropriate)    Description:  Goals to be met by: 7 days (18)     Patient will increase functional independence with ADLs by performing:    UE Dressing with Stand-by Assistance.  LE Dressing with Minimal Assistance.  Grooming while EOB with Contact Guard Assistance.  Toileting from bedside commode with Moderate Assistance for " hygiene and clothing management.   Supine to sit with Minimal Assistance.  Toilet transfer to bedside commode with Moderate Assistance.  Increased functional strength to WFL for ADLs.                      Time Tracking:     OT Date of Treatment: 12/21/18  OT Start Time: 1006  OT Stop Time: 1028  OT Total Time (min): 22 min    Billable Minutes:Evaluation 22 minutes    ASHLEY Escudero  12/21/2018

## 2018-12-21 NOTE — PLAN OF CARE
ALFONSO met with Pt wife and family at bedside. Discussed therapy recs for SNF and provided list. Family to review and give choices asap.    ALFONSO completed the LOCET via phone. ALFONSO faxed [facility] to obtain the 142 for NH admission.    Caroline Soares LMSW  Neurocritical Care   [facility]  [id number]

## 2018-12-21 NOTE — PT/OT/SLP EVAL
"Speech Language Pathology Evaluation  Cognitive/Bedside Swallow    Patient Name:  Peewee Nunes   MRN:  [id number]  Admitting Diagnosis: SDH (subdural hematoma)    Recommendations:                  General Recommendations:  Dysphagia therapy and Speech/language therapy  Diet recommendations:  Puree, Thin   Aspiration Precautions: 1 bite/sip at a time, Feed only when awake/alert, HOB to 90 degrees, Meds crushed in puree and Small bites/sips   General Precautions: Standard, aspiration    History:     Past Medical History:   Diagnosis Date    Coronary artery disease     CVD (cardiovascular disease)     Diabetes mellitus type II     Hyperlipidemia     Hypertension     Ischemic cardiomyopathy     Obesity     Obesity, morbid        Past Surgical History:   Procedure Laterality Date    APPENDECTOMY      CORONARY ARTERY BYPASS GRAFT      CRANIOTOMY, FOR SUBDURAL HEMATOMA EVACUATION Left 12/20/2018    Performed by Stevan Hull MD at [facility] OR [address] [place] [address]    HEMORRHOID SURGERY         Social History: Patient lives with family.    Prior diet: Soft/thin    Subjective     Awake/aphasic      Pain/Comfort:  Pain Rating 1: 0/10  Pain Rating Post-Intervention 1: 0/10    Objective:     Cognitive Status:    Attention No obvious deficits observed        Receptive Language:   Comprehension:      Questions Simple yes/no 75% accy via head nod  Commands  One step 2/8 accy with max cues      Expressive Language:  Verbal:    Pt unabel to complete rote speech tasks despite max cues. He did perseverate on "yes" throughout session.   Nonverbal:    Able to utilize head nod appropriately    Motor Speech:  TBA    Voice:   TBA    Visual-Spatial:  TBA    Reading:   TBA     Written Expression:   TBA    Oral Musculature Evaluation  Oral Musculature: unable to assess due to poor participation/comprehension  Volitional Swallow: delayed    Bedside Swallow Eval:   Consistencies Assessed:  · Thin liquids x5 oz tsp, cup, straw  · Puree x5     Oral " Phase:   · WFL    Pharyngeal Phase:   · delayed swallow initation  · no overt clinical signs/symptoms of aspiration    Treatment: SLP educated pt's family on aphasia, diet recs, swallow precautions, and tasks to complete outside of tx to increase expression.    Assessment:     Peewee Nunes is a 63 y.o. male with an SLP diagnosis of Aphasia and Dysphagia.    Goals:   Multidisciplinary Problems     SLP Goals        Problem: SLP Goal    Goal Priority Disciplines Outcome   SLP Goal     SLP    Description:  Speech Language Pathology Goals  Goals expected to be met by 12/28    1. Pt will tolerate puree diet/thin liquids without overt clinical signs of airway compromise.   2. Pt will participate in ongoing swallow assessment  3. Pt will participate in speech language cognitive eval                       Plan:     · Patient to be seen:  4 x/week   · Plan of Care expires:   1/19/19  · Plan of Care reviewed with:  patient   · SLP Follow-Up:  Yes       Discharge recommendations:    TBD      Time Tracking:     SLP Treatment Date:   12/21/18  Speech Start Time:  1113  Speech Stop Time:  1129     Speech Total Time (min):  16 min    Billable Minutes: Eval 8  and Eval Swallow and Oral Function 8    Jia Johnson CCC-SLP  12/21/2018

## 2018-12-21 NOTE — ED NOTES
Neurosurgery at bedside. States pt will go to OR tonight and is to remains NPO. SEBLE not performed for this reason. Family informed. Blood and surgical consents signed by MD, patient's wife, and hospital witness - left at bedside.

## 2018-12-21 NOTE — PROGRESS NOTES
[facility]  Neurosurgery  Progress Note    Subjective:     History of Present Illness: 62 yo M with PMH of CAD, CABG in 2006, DM, HTN, HLD on ASA presents to the ED for AMS. Patient fell 4 days ago with about 2 minutes of LOC. Family is unsure of what caused the fall. Full workup done including a CTH at [facility] that was negative. Patient went home and stopped taking his ASA. This morning, his family noticed he was more confused. He went to [facility] and CTH was done that revealed a 1.7cm left frontalpariental SDH with a 5 mm right sided midline shift and a left SAH. Patient was transferred to [facility]. History was obtained per family as patient unable to give history 2/2 confusion with expressive and receptive aphasia.    Post-Op Info:  Procedure(s) (LRB):  CRANIOTOMY, FOR SUBDURAL HEMATOMA EVACUATION (Left)   1 Day Post-Op     Interval History: NAEON. POD#0 from  crani for SDH evacuation.     Medications:  Continuous Infusions:   lactated ringers 100 mL/hr at 12/21/18 1400    nicardipine Stopped (12/21/18 1134)     Scheduled Meds:   ceFAZolin (ANCEF) IVPB  1 g Intravenous Q8H    levetiracetam IVPB  500 mg Intravenous Q12H    mupirocin  1 g Nasal BID    senna-docusate 8.6-50 mg  1 tablet Oral Daily    sodium chloride 0.9%  3 mL Intravenous Q8H     PRN Meds:sodium chloride, acetaminophen, hydrALAZINE, HYDROcodone-acetaminophen, magnesium oxide, magnesium oxide, morphine, ondansetron, ondansetron, potassium chloride 10%, potassium chloride 10%, potassium chloride 10%, potassium, sodium phosphates, potassium, sodium phosphates, potassium, sodium phosphates     Review of Systems  Objective:     Weight: 85.7 kg (189 lb 0 oz)  Body mass index is 26.36 kg/m².  Vital Signs (Most Recent):  Temp: 97 °F (36.1 °C) (12/21/18 1102)  Pulse: 103 (12/21/18 1400)  Resp: (!) 21 (12/21/18 1400)  BP: (!) 152/72 (12/21/18 1400)  SpO2: (!) 94 % (12/21/18 1400) Vital Signs (24h Range):  Temp:  [97 °F (36.1 °C)-98 °F  "(36.7 °C)] 97 °F (36.1 °C)  Pulse:  [] 103  Resp:  [14-34] 21  SpO2:  [94 %-100 %] 94 %  BP: ()/() 152/72     Date 12/21/18 0700 - 12/22/18 0659   Shift 7099-7179 3882-9508 6848-8539 24 Hour Total   INTAKE   I.V.(mL/kg) 249.4(2.9)   249.4(2.9)   IV Piggyback 100   100   Shift Total(mL/kg) 349.4(4.1)   349.4(4.1)   OUTPUT   Urine(mL/kg/hr) 425   425   Drains 70   70   Shift Total(mL/kg) 495(5.8)   495(5.8)   Weight (kg) 85.7 85.7 85.7 85.7                        Closed/Suction Drain 12/21/18 0111 Left Other (Comment) Accordion 10 Fr. (Active)   Site Description Unable to view 12/21/2018 11:02 AM   Dressing Type [place] 12/21/2018 11:02 AM   Dressing Status Clean;Dry;Intact 12/21/2018 11:02 AM   Dressing Intervention Dressing reinforced 12/21/2018 11:02 AM   Drainage Bloody 12/21/2018 11:02 AM   Status To bulb suction 12/21/2018 11:02 AM   Output (mL) 80 mL 12/21/2018  6:00 AM            Closed/Suction Drain 12/21/18 0112 Left Other (Comment) Accordion 10 Fr. (Active)   Site Description Unable to view 12/21/2018 11:02 AM   Dressing Type [place] 12/21/2018 11:02 AM   Dressing Status Clean;Dry;Intact 12/21/2018 11:02 AM   Dressing Intervention Dressing reinforced 12/21/2018 11:02 AM   Drainage Bloody 12/21/2018 11:02 AM   Status To bulb suction 12/21/2018 11:02 AM   Output (mL) 70 mL 12/21/2018 11:00 AM            Urethral Catheter 12/20/18 2350 Non-latex;Straight-tip 16 Fr. (Active)   Site Assessment Clean;Intact 12/21/2018 11:02 AM   Collection Container Urimeter 12/21/2018 11:02 AM   Securement Method secured to top of thigh w/ adhesive device 12/21/2018 11:02 AM   Catheter Care Performed yes 12/21/2018 11:02 AM   Reason for Continuing Urinary Catheterization Urinary retention 12/21/2018 11:02 AM   CAUTI Prevention Bundle StatLock in place w 1" slack;Intact seal between catheter & drainage tubing;Drainage bag off the floor;Green sheeting clip in use;Drainage bag not overfilled (<2/3 " full);No dependent loops or kinks;Drainage bag below bladder 12/21/2018 11:02 AM   Output (mL) 100 mL 12/21/2018  2:00 PM       Neurosurgery Physical Exam   E3V2M5  Eyes to voice  PERRL  WDx4      Significant Labs:  Recent Labs   Lab 12/20/18  0935 12/21/18  0203    125*    137   K 4.8 3.2*   CL 99 100   CO2 22* 15*   BUN 16 15   CREATININE 0.8 0.7   CALCIUM 9.7 9.0   MG  --  1.9     Recent Labs   Lab 12/20/18  0935 12/20/18  1559   WBC 16.97* 17.85*   HGB 15.2 15.6   HCT 44.0 45.0    248     Recent Labs   Lab 12/20/18  0935 12/20/18  1559   INR 1.0 1.0   APTT 31.9 22.1     Microbiology Results (last 7 days)     ** No results found for the last 168 hours. **        Recent Lab Results       12/21/18  0257   12/21/18  0203   12/20/18  1707   12/20/18  1559        Immature Granulocytes       0.5     Immature Grans (Abs)       0.09  Comment:  Mild elevation in immature granulocytes is non specific and   can be seen in a variety of conditions including stress response,   acute inflammation, trauma and pregnancy. Correlation with other   laboratory and clinical findings is essential.       Unit Blood Type Code     9500            9500            0600       Unit Expiration     [id number]            [id number]            [id number]       Unit Blood Type     O NEG            O NEG            A NEG       ABO     O       Albumin   3.2         Alkaline Phosphatase   64         ALT   7         Anion Gap   22         aPTT       22.1  Comment:  aPTT therapeutic range = 39-69 seconds     AST   19         Baso #       0.03     Basophil%       0.2     Total Bilirubin   1.3  Comment:  For infants and newborns, interpretation of results should be based  on gestational age, weight and in agreement with clinical  observations.  Premature Infant recommended reference ranges:  Up to 24 hours.............<8.0 mg/dL  Up to 48 hours............<12.0 mg/dL  3-5 days..................<15.0 mg/dL  6-29  days.................<15.0 mg/dL           BUN, Bld   15         Calcium   9.0         Chloride   100         Cholesterol       169  Comment:  The National Cholesterol Education Program (NCEP) has set the  following guidelines (reference ranges) for Cholesterol:  Optimal.....................<200 mg/dL  Borderline High.............200-239 mg/dL  High........................> or = 240 mg/dL       CO2   15         CODING SYSTEM     [id number]            [id number]            [id number]       Creatinine   0.7         Differential Method       Automated     DISPENSE STATUS     TRANSFUSED            TRANSFUSED            TRANSFUSED       eGFR if    >60.0         eGFR if non    >60.0  Comment:  Calculation used to obtain the estimated glomerular filtration  rate (eGFR) is the CKD-EPI equation.            Eos #       0.0     Eosinophil%       0.1     Estimated Avg Glucose       91     Glucose   125         Gran # (ANC)       14.6     Gran%       81.6     HDL       56  Comment:  The National Cholesterol Education Program (NCEP) has set the  following guidelines (reference values) for HDL Cholesterol:  Low...............<40 mg/dL  Optimal...........>60 mg/dL       HDL/Chol Ratio       33.1     Hematocrit       45.0     Hemoglobin       15.6     Hemoglobin A1C       4.8  Comment:  ADA Screening Guidelines:  5.7-6.4%  Consistent with prediabetes  >or=6.5%  Consistent with diabetes  High levels of fetal hemoglobin interfere with the HbA1C  assay. Heterozygous hemoglobin variants (HbS, HgC, etc)do  not significantly interfere with this assay.   However, presence of multiple variants may affect accuracy.       INDIRECT INEZ     NEG       Coumadin Monitoring INR       1.0  Comment:  Coumadin Therapy:  2.0 - 3.0 for INR for all indicators except mechanical heart valves  and antiphospholipid syndromes which should use 2.5 - 3.5.       LDL Cholesterol       92.6  Comment:  The National Cholesterol Education  Program (NCEP) has set the  following guidelines (reference values) for LDL Cholesterol:  Optimal.......................<130 mg/dL  Borderline High...............130-159 mg/dL  High..........................160-189 mg/dL  Very High.....................>190 mg/dL       Lymph #       1.9     Lymph%       10.8     Magnesium   1.9         MCH       31.6     MCHC       34.7     MCV       91     Mono #       1.2     Mono%       6.8     MPV       11.2     Non-HDL Cholesterol       113  Comment:  Risk category and Non-HDL cholesterol goals:  Coronary heart disease (CHD)or equivalent (10-year risk of CHD >20%):  Non-HDL cholesterol goal     <130 mg/dL  Two or more CHD risk factors and 10-year risk of CHD <= 20%:  Non-HDL cholesterol goal     <160 mg/dL  0 to 1 CHD risk factor:  Non-HDL cholesterol goal     <190 mg/dL       nRBC       0     Phosphorus   3.0         Platelets       248     POCT Glucose 122           Potassium   3.2         PRODUCT CODE     [id number]            [id number]            [id number]       Total Protein   6.3         Protime       10.6     RBC       4.94     RDW       14.2     Rh Type     NEG       Sodium   137         Total Cholesterol/HDL Ratio       3.0     Triglycerides       102  Comment:  The National Cholesterol Education Program (NCEP) has set the  following guidelines (reference values) for triglycerides:  Normal......................<150 mg/dL  Borderline High.............150-199 mg/dL  High........................200-499 mg/dL       UNIT NUMBER     [id number]            [id number]            [id number]       WBC       17.85           Significant Diagnostics:  I have reviewed all pertinent imaging results/findings within the past 24 hours.    Assessment/Plan:     * SDH (subdural hematoma)    62 yo M with PMH of CAD s/p CABG 2006 on ASA, HTN, DM, HLD presents to ED s/p fall 4 days ago acquiring left 1.7cm frontoparietal SDH with 5 mm midline shift and left SAH.     -- POD#0 L crani for  SDH evacuation  -- Continue medical management per primary team   -- Continue HV x2  -- Continue abx ppx whiel HV in place.   -- SBP < 160  - CPT  - Post op CT reviewed    Consider TTF tomorrow if off Cardene gtt    Discussed w/Dr. Kurtis Rasheed MD  Neurosurgery  [facility]

## 2018-12-21 NOTE — PLAN OF CARE
Problem: Adult Inpatient Plan of Care  Goal: Plan of Care Review  POC reviewed with pt and family at 1400. Family verbalized understanding. Questions and concerns addressed. No acute events today. Pt passed by speech to have meds crushed with apple sauce. Pt has order to have pain BP taken while the pt is sitting or supine because of orthostatic hypertension. Pt spouse states he has had multiple occurrences of the pt fainting after standing. Pt remains nonverbal but will follow commands. No fevers noted. Pt progressing toward goals. Will continue to monitor. See flowsheets for full assessment and VS info.

## 2018-12-21 NOTE — PROGRESS NOTES
Patient arrived to [facility] from [facility] ED     Type of stroke/diagnosis: SDH    Current symptoms: pupils 3mm/brisk/equal; oriented to place and self; intermittently following commands      Skin assessment done: Yes    Wounds noted: Laceration to R head; blanchable redness/shear to sacrum    NCC notified: NANDINI Hyatt

## 2018-12-21 NOTE — ASSESSMENT & PLAN NOTE
Malnutrition in the context of Chronic Illness/Injury    Related to (etiology):  Decreased intake, appetite    Signs and Symptoms (as evidenced by):  Energy Intake: <75% of estimated energy requirement for > 1 year  Body Fat Depletion: severe depletion of orbitals, triceps and thoracic and lumbar region   Muscle Mass Depletion: severe depletion of temples, clavicle region, scapular region, interosseous muscle and lower extremities   Weight Loss: 44% x 1 year     Interventions/Recommendations (treatment strategy):  Initiate general healthful diet, add commercial beverage    Nutrition Diagnosis Status:  New

## 2018-12-21 NOTE — PROGRESS NOTES
Unable to draw blood from pt to send CBC to lab. NCC notifed. Plan for arterial line insertion later today.   PA ok at this time with holding off on attempting to draw blood for CBC again.

## 2018-12-21 NOTE — PLAN OF CARE
12/21/18 1214   Discharge Assessment   Assessment Type Discharge Planning Assessment   Confirmed/corrected address and phone number on facesheet? Yes   Assessment information obtained from? Caregiver  (wife)   Expected Length of Stay (days) 5   Communicated expected length of stay with patient/caregiver yes   Prior to hospitilization cognitive status: Alert/Oriented   Prior to hospitalization functional status: Independent   Current cognitive status: Unable to Assess   Current Functional Status: Completely Dependent   Facility Arrived From: [facility]   Lives With spouse   Able to Return to Prior Arrangements yes   Is patient able to care for self after discharge? Unable to determine at this time (comments)   Who are your caregiver(s) and their phone number(s)? Lyndsey  (wife) [phone]   Patient's perception of discharge disposition admitted as an inpatient   Readmission Within the Last 30 Days no previous admission in last 30 days   Patient currently being followed by outpatient case management? No   Patient currently receives any other outside agency services? No   Equipment Currently Used at Home none   Do you have any problems affording any of your prescribed medications? No   Is the patient taking medications as prescribed? yes   Does the patient have transportation home? Yes   Transportation Anticipated family or friend will provide   Does the patient receive services at the [facility]madin Clinic? No   Discharge Plan A Rehab   Discharge Plan B Home with family;Home Health   Patient/Family in Agreement with Plan yes         Discharge/ My Health Packet Folder Given to patient/family:    yes        PCP:  Jacob Piedra MD        Pharmacy:    [facility]/pharmacy #[id number] - [place] - [address]  [address]  [place] [address]  Phone: [phone] Fax: [phone]    [facility] [place] [place] - [address] AT Portal to Registered [facility] Sites  [address]  [facility] [address]  Phone:  [phone] Fax: [phone]    [facility] [address] [place] [address]  Phone: [phone] Fax: [phone]          Emergency Contacts:  Extended Emergency Contact Information  Primary Emergency Contact: Dona Nunes  Address: [address] [place] [address] [id number] [place] [address]  Home Phone: [phone]  Mobile Phone: [phone]  Relation: Spouse      Insurance:  Payor: [employer] MANAGED MCARE / Plan: [facility] [employer] MEDICARE ADVANTAGE / Product Type: Medicare Advantage /     Azeb Flores RN, CCRN-K, CCM  Neuro-Critical Care   X [phone]

## 2018-12-21 NOTE — PROGRESS NOTES
[facility]  Neurocritical Care  Progress Note    Admit Date: 12/20/2018  Service Date: 12/21/2018  Length of Stay: 1    Subjective:     Chief Complaint: SDH (subdural hematoma)    History of Present Illness: 63 year old male h/o CABG on ASA 81 mg (last dose 72 hours PTA) presenting as transfer from OSH for EDH with SDH. Patient reportedly had a mechanical fall from standing on Sunday and went to ED where CTH was negative for any acute intracranial pathology. Overnight, patient developed confusion and presented to OSH ED where CTH was consistent with EDH with SDH. Patient has progressively worsened throughout the day developing intermittent global aphasia. NSGY consulted in ED at [facility] and stat CTH has been ordered. NCC consulted for admission and medical management. Patient on weaning off of cardene at time of evaluation with sbp of 120. Reported presenting sbp in 150s.    Hospital Course: No notes on file    Past Medical History:   Diagnosis Date    Coronary artery disease     CVD (cardiovascular disease)     Diabetes mellitus type II     Hyperlipidemia     Hypertension     Ischemic cardiomyopathy     Obesity     Obesity, morbid      Past Surgical History:   Procedure Laterality Date    APPENDECTOMY      CORONARY ARTERY BYPASS GRAFT      CRANIOTOMY, FOR SUBDURAL HEMATOMA EVACUATION Left 12/20/2018    Performed by Stevan Hull MD at [facility] OR [address] [place] [address]    HEMORRHOID SURGERY        No current facility-administered medications on file prior to encounter.      Current Outpatient Medications on File Prior to Encounter   Medication Sig Dispense Refill    aspirin (ECOTRIN) 81 MG EC tablet Take 81 mg by mouth once daily.        bisoprolol (ZEBETA) 10 MG tablet Take 1 tablet (10 mg total) by mouth once daily. 90 tablet 3    linagliptin (TRADJENTA) 5 mg Tab tablet Take 1 tablet (5 mg total) by mouth once daily. 90 tablet 1    mv-min 16/folic acid/lut/lycop (REQ49+ ORAL)       pravastatin  (PRAVACHOL) 40 MG tablet Take 1 tablet (40 mg total) by mouth every evening. 90 tablet 1      Allergies: Levaquin [levofloxacin]    Family History   Problem Relation Age of Onset    Heart disease Mother     Hypertension Mother     Hyperlipidemia Mother     Diabetes Mother     Stroke Mother     Heart disease Father     Cancer Father         LUNG     Social History     Tobacco Use    Smoking status: Former Smoker     Packs/day: 1.50     Years: 25.00     Pack years: 37.50     Types: Cigarettes     Last attempt to quit: 2002     Years since quittin.0    Smokeless tobacco: Never Used   Substance Use Topics    Alcohol use: No    Drug use: No     Review of Systems   Unable to perform ROS: Patient nonverbal     Objective:     Vitals:    Temp: 97.3 °F (36.3 °C)  Pulse: 106  Rhythm: sinus tachycardia  BP: 130/72  MAP (mmHg): 95  Resp: 18  SpO2: 95 %  O2 Device (Oxygen Therapy): room air    Temp  Min: 97 °F (36.1 °C)  Max: 98 °F (36.7 °C)  Pulse  Min: 86  Max: 107  BP  Min: 99/54  Max: 190/101  MAP (mmHg)  Min: 71  Max: 138  Resp  Min: 14  Max: 34  SpO2  Min: 94 %  Max: 100 %     0701 -  0700  In: 1578.6 [I.V.:1328.6]  Out: 2167 [Urine:1992; Drains:175]   Unmeasured Output  Stool Occurrence: 0       Physical Exam   Constitutional: He appears well-developed.   HENT:   R occipital laceration   Eyes: EOM are normal. Pupils are equal, round, and reactive to light.   Hazy conjunctivae    Neck: Normal range of motion. Neck supple.   Cardiovascular: Normal rate.   Pulmonary/Chest: Effort normal. No respiratory distress.   Abdominal: Soft. He exhibits no distension. There is no tenderness.   Neurological: He is alert. No cranial nerve deficit or sensory deficit. GCS eye subscore is 4. GCS verbal subscore is 2. GCS motor subscore is 5.   BL UE strength intact  BL LE 2/5  Intermittently follows commands           Assessment/Plan:     Neuro   * SDH (subdural hematoma)    Epidural with subdural  component  -last asa was approx 72 hours PTA  -NSGY consulted s/p evac with SG drain  --160  -pt/ot/st  -elevate HOB  -NPO until cleared  -keppra  -cEEG as patient's speech has not improved s/p evac of SDH     Cerebral edema    See SDH     Brain compression    See SDH     Epidural hematoma    See SDH     Cardiac/Vascular   Essential hypertension    Wean cardene  PRN hydralazine             The patient is being Prophylaxed for:  Venous Thromboembolism with: Mechanical  Stress Ulcer with: Not Applicable   Ventilator Pneumonia with: not applicable    Activity Orders          None        Full Code    Don Vincnet MD  Neurocritical Care  [facility]

## 2018-12-21 NOTE — ANESTHESIA POSTPROCEDURE EVALUATION
"Anesthesia Post Evaluation    Patient: Peewee Nunes    Procedure(s) Performed: Procedure(s) (LRB):  CRANIOTOMY, FOR SUBDURAL HEMATOMA EVACUATION (Left)    Final Anesthesia Type: general  Patient location during evaluation: ICU  Patient participation: No - Unable to Participate, Sedation  Level of consciousness: sedated  Post-procedure vital signs: reviewed and stable  Pain management: adequate  Airway patency: patent  PONV status at discharge: No PONV  Anesthetic complications: no      Cardiovascular status: blood pressure returned to baseline  Respiratory status: ventilator and ETT  Hydration status: euvolemic  Follow-up not needed.        Visit Vitals  /62 (BP Location: Left arm, Patient Position: Lying)   Pulse 102   Temp 36.6 °C (97.8 °F) (Axillary)   Resp (!) 30   Ht 5' 11" (1.803 m)   Wt 85.7 kg (189 lb 0 oz)   SpO2 97%   BMI 26.36 kg/m²       Pain/Enrrique Score: Pain Rating Prior to Med Admin: 7 (12/21/2018  3:40 AM)  Pain Rating Post Med Admin: 0 (12/21/2018  4:10 AM)        "

## 2018-12-21 NOTE — SUBJECTIVE & OBJECTIVE
Past Medical History:   Diagnosis Date    Coronary artery disease     CVD (cardiovascular disease)     Diabetes mellitus type II     Hyperlipidemia     Hypertension     Ischemic cardiomyopathy     Obesity     Obesity, morbid      Past Surgical History:   Procedure Laterality Date    APPENDECTOMY      CORONARY ARTERY BYPASS GRAFT      CRANIOTOMY, FOR SUBDURAL HEMATOMA EVACUATION Left 2018    Performed by Stevan Hull MD at [facility] OR [address] [place] [address]    HEMORRHOID SURGERY        No current facility-administered medications on file prior to encounter.      Current Outpatient Medications on File Prior to Encounter   Medication Sig Dispense Refill    aspirin (ECOTRIN) 81 MG EC tablet Take 81 mg by mouth once daily.        bisoprolol (ZEBETA) 10 MG tablet Take 1 tablet (10 mg total) by mouth once daily. 90 tablet 3    linagliptin (TRADJENTA) 5 mg Tab tablet Take 1 tablet (5 mg total) by mouth once daily. 90 tablet 1    mv-min 16/folic acid/lut/lycop (REQ49+ ORAL)       pravastatin (PRAVACHOL) 40 MG tablet Take 1 tablet (40 mg total) by mouth every evening. 90 tablet 1      Allergies: Levaquin [levofloxacin]    Family History   Problem Relation Age of Onset    Heart disease Mother     Hypertension Mother     Hyperlipidemia Mother     Diabetes Mother     Stroke Mother     Heart disease Father     Cancer Father         LUNG     Social History     Tobacco Use    Smoking status: Former Smoker     Packs/day: 1.50     Years: 25.00     Pack years: 37.50     Types: Cigarettes     Last attempt to quit: 2002     Years since quittin.0    Smokeless tobacco: Never Used   Substance Use Topics    Alcohol use: No    Drug use: No     Review of Systems   Unable to perform ROS: Patient nonverbal     Objective:     Vitals:    Temp: 97.3 °F (36.3 °C)  Pulse: 106  Rhythm: sinus tachycardia  BP: 130/72  MAP (mmHg): 95  Resp: 18  SpO2: 95 %  O2 Device (Oxygen Therapy): room air    Temp  Min: 97 °F (36.1 °C)   Max: 98 °F (36.7 °C)  Pulse  Min: 86  Max: 107  BP  Min: 99/54  Max: 190/101  MAP (mmHg)  Min: 71  Max: 138  Resp  Min: 14  Max: 34  SpO2  Min: 94 %  Max: 100 %    12/20 0701 - 12/21 0700  In: 1578.6 [I.V.:1328.6]  Out: 2167 [Urine:1992; Drains:175]   Unmeasured Output  Stool Occurrence: 0       Physical Exam   Constitutional: He appears well-developed.   HENT:   R occipital laceration   Eyes: EOM are normal. Pupils are equal, round, and reactive to light.   Hazy conjunctivae    Neck: Normal range of motion. Neck supple.   Cardiovascular: Normal rate.   Pulmonary/Chest: Effort normal. No respiratory distress.   Abdominal: Soft. He exhibits no distension. There is no tenderness.   Neurological: He is alert. No cranial nerve deficit or sensory deficit. GCS eye subscore is 4. GCS verbal subscore is 2. GCS motor subscore is 5.   BL UE strength intact  BL LE 2/5  Intermittently follows commands

## 2018-12-21 NOTE — CONSULTS
"  [facility]  Adult Nutrition  Consult Note    SUMMARY     Recommendations    Recommendation/Intervention:   As medically able, advance diet to Regular with texture per SLP recommendations. Add Boost Plus (Chocolate) TID to increase caloric intake.     Pt with 130lb unintentional weight loss over previous year and decreased intake.  Pt with severe malnutrition in the context of chronic illness.    RD to monitor.  Goals: Pt to receive nutrition by RD follow up  Nutrition Goal Status: new  Communication of RD Recs: reviewed with RN    Reason for Assessment    Reason For Assessment: consult  Diagnosis: other (see comments)(SDH)  Relevant Medical History: CAD, DM, HTN, HLD  Interdisciplinary Rounds: attended  General Information Comments: Pt remains NPO s/p craniotomy. SLP recs for puree and thin liquids. Per wife pt with approx 130lb weight loss over previosu year. Chart review confirms weight loss. Pt previously 337lb in Dec 2017, now 189lb. NFPE completed- pt with severe muscle and fat depletion meeting criteria for severe malnutrition at this time. Wife reports decreased intake 2/2 loss of appetite and taste bud changes. Pt occassionally drinks Boost/Ensure per wife.  Nutrition Discharge Planning: adequate po intake for optimal nutrition    Nutrition Risk Screen    Nutrition Risk Screen: no indicators present    Nutrition/Diet History    Spiritual, Cultural Beliefs, Latter-day Practices, Values that Affect Care: no  Factors Affecting Nutritional Intake: NPO    Anthropometrics    Temp: 97 °F (36.1 °C)  Height Method: Estimated  Height: 5' 11" (180.3 cm)  Height (inches): 71 in  Weight Method: Bed Scale  Weight: 85.7 kg (189 lb 0 oz)  Weight (lb): 189 lb  Ideal Body Weight (IBW), Male: 172 lb  % Ideal Body Weight, Male (lb): 109.88 lb  BMI (Calculated): 26.4  BMI Grade: 25 - 29.9 - overweight  Weight Loss: unintentional  Usual Body Weight (UBW), kg: (!) 153.1 kg  % Usual Body Weight: 56.11  % Weight " Change From Usual Weight: -44 %       Lab/Procedures/Meds    Pertinent Labs Reviewed: reviewed  Pertinent Labs Comments: HgbA1c 4.8, K 3.2  Pertinent Medications Reviewed: reviewed  Pertinent Medications Comments: cardene    Physical Findings/Assessment         Estimated/Assessed Needs    Weight Used For Calorie Calculations: 85.7 kg (188 lb 15 oz)  Energy Calorie Requirements (kcal): 2080  Energy Need Method: McNairy-St Jeor(PAL 1.25)  Protein Requirements: 86-103g(1.0-1.2g/kg)  Weight Used For Protein Calculations: 85.7 kg (188 lb 15 oz)  Fluid Requirements (mL): 1mL/kcal or per MD     RDA Method (mL): 2080         Nutrition Prescription Ordered    Current Diet Order: NPO    Evaluation of Received Nutrient/Fluid Intake       % Intake of Estimated Energy Needs: 0 - 25 %  % Meal Intake: NPO    Nutrition Risk    Level of Risk/Frequency of Follow-up: (f/u 2x/week)     Assessment and Plan    Severe malnutrition    Malnutrition in the context of Chronic Illness/Injury    Related to (etiology):  Decreased intake, appetite    Signs and Symptoms (as evidenced by):  Energy Intake: <75% of estimated energy requirement for > 1 year  Body Fat Depletion: severe depletion of orbitals, triceps and thoracic and lumbar region   Muscle Mass Depletion: severe depletion of temples, clavicle region, scapular region, interosseous muscle and lower extremities   Weight Loss: 44% x 1 year     Interventions/Recommendations (treatment strategy):  Initiate general healthful diet, add commercial beverage    Nutrition Diagnosis Status:  New              Monitor and Evaluation    Food and Nutrient Intake: energy intake, food and beverage intake  Food and Nutrient Adminstration: diet order  Anthropometric Measurements: weight, weight change, body mass index  Biochemical Data, Medical Tests and Procedures: electrolyte and renal panel, glucose/endocrine profile, gastrointestinal profile, inflammatory profile, lipid profile  Nutrition-Focused  Physical Findings: overall appearance     Malnutrition Assessment  Malnutrition Type: chronic illness  Energy Intake: severe energy intake          Weight Loss (Malnutrition): greater than 20% in 1 year  Energy Intake (Malnutrition): less than 75% for greater than or equal to 3 months  Subcutaneous Fat (Malnutrition): severe depletion  Muscle Mass (Malnutrition): severe depletion   Orbital Region (Subcutaneous Fat Loss): moderate depletion  Upper Arm Region (Subcutaneous Fat Loss): severe depletion  Thoracic and Lumbar Region: severe depletion   Lutheran Region (Muscle Loss): moderate depletion  Clavicle Bone Region (Muscle Loss): severe depletion  Clavicle and Acromion Bone Region (Muscle Loss): severe depletion  Scapular Bone Region (Muscle Loss): severe depletion  Dorsal Hand (Muscle Loss): moderate depletion  Patellar Region (Muscle Loss): severe depletion  Anterior Thigh Region (Muscle Loss): severe depletion  Posterior Calf Region (Muscle Loss): severe depletion       Subcutaneous Fat Loss (Final Summary): severe protein-calorie malnutrition  Muscle Loss Evaluation (Final Summary): severe protein-calorie malnutrition    Moderate Weight Loss (Malnutrition): other (see comments)  Severe Weight Loss (Malnutrition): greater than 20% in 1 year    Nutrition Follow-Up    RD Follow-up?: Yes

## 2018-12-21 NOTE — ASSESSMENT & PLAN NOTE
Epidural with subdural component  -last asa was approx 72 hours PTA  -NSGY consulted s/p evac with SG drain  --160  -pt/ot/st  -elevate HOB  -NPO until cleared  -keppra  -cEEG as patient's speech has not improved s/p evac of SDH

## 2018-12-21 NOTE — PLAN OF CARE
Problem: Occupational Therapy Goal  Goal: Occupational Therapy Goal  Goals to be met by: 7 days (12/28/18)     Patient will increase functional independence with ADLs by performing:    UE Dressing with Stand-by Assistance.  LE Dressing with Minimal Assistance.  Grooming while EOB with Contact Guard Assistance.  Toileting from bedside commode with Moderate Assistance for hygiene and clothing management.   Supine to sit with Minimal Assistance.  Toilet transfer to bedside commode with Moderate Assistance.  Increased functional strength to WFL for ADLs.    Outcome: Ongoing (interventions implemented as appropriate)  Eval and POC set 12/21/18

## 2018-12-21 NOTE — CONSULTS
Inpatient consult to Physical Medicine Rehab  Consult performed by: Ofelia Vickers NP  Consult ordered by: Don Vincent MD  Reason for consult: assess rehab needs        Reviewed patient history and current admission.  Rehab team following.  Full consult to follow.    SALVATORE Quiroz, FNP-C  Physical Medicine & Rehabilitation   12/21/2018  Spectralink: [phone]

## 2018-12-21 NOTE — PLAN OF CARE
Problem: Physical Therapy Goal  Goal: Physical Therapy Goal  Goals to be met by: 19    Patient will increase functional independence with mobility by performin. Supine to sit with Contact Guard Assistance  2. Sit to supine with Contact Guard Assistance  3. Sit to stand transfer with Contact Guard Assistance  4. Bed to chair transfer with Minimal Assistance  5. Gait  x 15 feet with Minimal Assistance using Rolling Walker.   6. Stand for 3 minutes with Contact Guard Assistance using Rolling Walker  7. Lower extremity exercise program x15 reps per handout, with assistance as needed    Outcome: Ongoing (interventions implemented as appropriate)  PT evaluation completed- see note for details. Goals established and POC initiated.     Alena Lee, PT, DPT   2018  Pager: [phone]

## 2018-12-21 NOTE — PROGRESS NOTES
Anesthesia at bedside to transport pt to OR.  Portable monitor and cardene infusion transported with patient.

## 2018-12-21 NOTE — BRIEF OP NOTE
[facility]  Brief Operative Note    SUMMARY     Surgery Date: 12/20/2018     Surgeon(s) and Role:     * Stevan Hull MD - Primary     * Christine Lozoya MD - Resident - Assisting        Pre-op Diagnosis:  Subdural hematoma [S06.5X9A]    Post-op Diagnosis:  Post-Op Diagnosis Codes:     * Subdural hematoma [S06.5X9A]    Procedure(s) (LRB):  CRANIOTOMY, FOR SUBDURAL HEMATOMA EVACUATION (Left)    Anesthesia: General    Description of Procedure: As above    Description of the findings of the procedure: L craniotomy for evacuation of SDH, 2 subgaleal HV drains placed.     Estimated Blood Loss: * No values recorded between 12/21/2018 12:15 AM and 12/21/2018  1:48 AM *         Specimens:   Specimen (12h ago, onward)    None

## 2018-12-21 NOTE — PT/OT/SLP EVAL
Physical Therapy Evaluation    Patient Name:  Peewee Nunes   MRN:  [id number]    Recommendations:     Discharge Recommendations:  nursing facility, skilled   Discharge Equipment Recommendations: walker, rolling   Barriers to discharge: increased level of assistance required; fall risk     Assessment:     Peewee Nunes is a 63 y.o. male admitted with a medical diagnosis of SDH (subdural hematoma); pt is s/p craniotomy for SDH evacuation. Pt able to follow simple commands inconsistently, difficulty with expressive communication noted. Pt with near syncopal episode during trial of sit>stand, RN notified and further activity held. Vital signs stable.  He presents with the following impairments/functional limitations:  weakness, impaired endurance, impaired self care skills, gait instability, impaired functional mobilty, impaired balance, decreased safety awareness, impaired cognition, impaired cardiopulmonary response to activity, impaired skin. Pt would benefit from skilled PT intervention to address listed functional deficits, reduce fall risk, and maximize (I) and safety with functional mobility.     Rehab Prognosis: Good; patient would benefit from acute skilled PT services to address these deficits and reach maximum level of function.      Recent Surgery: Procedure(s) (LRB):  CRANIOTOMY, FOR SUBDURAL HEMATOMA EVACUATION (Left) 1 Day Post-Op    Plan:     During this hospitalization, patient to be seen 4 x/week to address the identified rehab impairments via gait training, therapeutic activities, therapeutic exercises, neuromuscular re-education, wheelchair management/training and progress toward the following goals:    · Plan of Care Expires:  01/19/19    Subjective     Patient/Family Comments/goals: Pt unable to state goals   Pain/Comfort:  · Pain Rating 1: (Pt unable to rate pain 2/2 aphasia)    Patients cultural, spiritual, Rastafari conflicts given the current situation: no    Patient History:   *history  obtained from pt's spouse  Living Environment: Pt lives spouse and son in [facility] with ramp to enter.  Prior Level of Function: previously ambulating with SW short distances, occasionally requiring wheelchair; progressive decline since fall.   DME owned: standard walker, BSC, wheelchair   Caregiver Assistance: 24/7 assistance from spouse as needed    Objective:     Communicated with RN (Justin) prior to session.  Patient found HOB elevated with  telemetry, peripheral IV, pulse ox (continuous), blood pressure cuff, mobley catheter, central line, hemovac  upon PT entry to room. Pt's spouse at bedside.     General Precautions: Standard, fall, aspiration, aphasia   Orthopedic Precautions:N/A   Braces: N/A     Exams:  · Cognitive Exam:  Pt with aphasia- unable to assess orientation; pt able to follow simple commands   · Postural Exam:  Patient presented with the following abnormalities:    · -       Rounded shoulders  · -       Forward head  · Sensation:    · -      grossly Intact light touch sensation  · RLE ROM: WFL  · RLE Strength: 3+/5   · LLE ROM: WFL  · LLE Strength: 3+/5     Functional Mobility:    Bed Mobility:   · Rolling: to right with minimum assistance  · Supine > Sit: maximum assistance   · Sit > Supine: maximum assistance      Sitting Balance at Edge of Bed:  · Assistance level: contact guard assistance    Transfers:   · Sit <> Stand Transfer: minimum assistance x 2 persons from EOB x 1 trial with no AD  *Pt tolerated static standing ~10-15 second trial; appeared to roll eyes back in head and non responsive with near syncopal episode- therapist immediately called RN to room while returning pt to supine. Pt alert and responsive following return to supine. RN assumed care. Further activity held.   *Per pt's wife, pt has history of syncopal episodes prior to admit.     Standing Balance:   · Assistance level: moderate assistance with no AD                 Gait: Unable to assess       Therapeutic Activities and  Exercises:  Pt and spouse educated on:  - Role of PT and POC/goals for therapy   - Safety with mobility  - Instructed to call nursing staff for assistance with mobility as needed 2/2 fall risk     Pt's spouse verbalized understanding and expressed no further concerns/questions.     AM-PAC 6 CLICK MOBILITY  Total Score:11     Patient left HOB elevated with all lines intact, call button in reach, bed alarm on and RN present.    GOALS:   Multidisciplinary Problems     Physical Therapy Goals        Problem: Physical Therapy Goal    Goal Priority Disciplines Outcome Goal Variances Interventions   Physical Therapy Goal     PT, PT/OT Ongoing (interventions implemented as appropriate)     Description:  Goals to be met by: 19    Patient will increase functional independence with mobility by performin. Supine to sit with Contact Guard Assistance  2. Sit to supine with Contact Guard Assistance  3. Sit to stand transfer with Contact Guard Assistance  4. Bed to chair transfer with Minimal Assistance  5. Gait  x 15 feet with Minimal Assistance using Rolling Walker.   6. Stand for 3 minutes with Contact Guard Assistance using Rolling Walker  7. Lower extremity exercise program x15 reps per handout, with assistance as needed                      History:     Past Medical History:   Diagnosis Date    Coronary artery disease     CVD (cardiovascular disease)     Diabetes mellitus type II     Hyperlipidemia     Hypertension     Ischemic cardiomyopathy     Obesity     Obesity, morbid        Past Surgical History:   Procedure Laterality Date    APPENDECTOMY      CORONARY ARTERY BYPASS GRAFT      CRANIOTOMY, FOR SUBDURAL HEMATOMA EVACUATION Left 2018    Performed by Stevan Hull MD at [facility] OR [address] [place] [address]    HEMORRHOID SURGERY         Clinical Decision Making:     Moderate complexity evaluation:   · Evolving clinical presentation   · 1-2 personal factors/comorbidities affecting treatment   · Examining and  addressing 3+ functional deficits, activity limitations, and participation restrictions    Time Tracking:     PT Received On: 12/21/18  PT Start Time: 1006     PT Stop Time: 1030  PT Total Time (min): 24 min     Billable Minutes: Evaluation 24 min (Co-eval with OT)      Alena Lee PT, DPT   12/21/2018  Pager: [phone]

## 2018-12-21 NOTE — TRANSFER OF CARE
"Anesthesia Transfer of Care Note    Patient: Peewee Nunes    Procedure(s) Performed: Procedure(s) (LRB):  CRANIOTOMY, FOR SUBDURAL HEMATOMA EVACUATION (Left)    Patient location: ICU    Anesthesia Type: general    Transport from OR: Upon arrival to PACU/ICU, patient attached to ventilator and auscultated to confirm bilateral breath sounds and adequate TV. Continuous ECG monitoring in transport. Continuous SpO2 monitoring in transport. Continuos invasive BP monitoring in transport    Post pain: adequate analgesia    Post assessment: no apparent anesthetic complications and tolerated procedure well    Post vital signs: stable    Level of consciousness: sedated and unresponsive    Nausea/Vomiting: no nausea/vomiting    Complications: none    Transfer of care protocol was followed      Last vitals:   Visit Vitals  BP (!) 147/77 (BP Location: Left arm, Patient Position: Lying)   Pulse 97   Temp 36.3 °C (97.3 °F) (Oral)   Resp 15   Ht 5' 11" (1.803 m)   Wt 85.7 kg (189 lb 0 oz)   SpO2 97%   BMI 26.36 kg/m²     "
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
## 2018-12-21 NOTE — PROGRESS NOTES
Yas BAILEY of [facility] team notified of pt having an fainting episodes while standing up working with pt. Yas came to bed side. Pt returned to baseline  neuro exam prior to fainting after laying supine in bed. Pt wife states it common for pt to have fainting episodes. Will continue to monitor

## 2018-12-21 NOTE — PLAN OF CARE
12/21/18 1616   Post-Acute Status   Post-Acute Authorization Placement   Post-Acute Placement Status Patient List Provided

## 2018-12-22 LAB
ALBUMIN SERPL BCP-MCNC: 3 G/DL
ALP SERPL-CCNC: 55 U/L
ALT SERPL W/O P-5'-P-CCNC: 5 U/L
ANION GAP SERPL CALC-SCNC: 12 MMOL/L
AST SERPL-CCNC: 15 U/L
BASOPHILS # BLD AUTO: 0.08 K/UL
BASOPHILS NFR BLD: 0.5 %
BILIRUB SERPL-MCNC: 1.5 MG/DL
BUN SERPL-MCNC: 9 MG/DL
CALCIUM SERPL-MCNC: 9.2 MG/DL
CHLORIDE SERPL-SCNC: 102 MMOL/L
CO2 SERPL-SCNC: 25 MMOL/L
CREAT SERPL-MCNC: 0.6 MG/DL
DIFFERENTIAL METHOD: ABNORMAL
EOSINOPHIL # BLD AUTO: 0 K/UL
EOSINOPHIL NFR BLD: 0.1 %
ERYTHROCYTE [DISTWIDTH] IN BLOOD BY AUTOMATED COUNT: 14.1 %
EST. GFR  (AFRICAN AMERICAN): >60 ML/MIN/1.73 M^2
EST. GFR  (NON AFRICAN AMERICAN): >60 ML/MIN/1.73 M^2
GLUCOSE SERPL-MCNC: 89 MG/DL
HCT VFR BLD AUTO: 41.1 %
HGB BLD-MCNC: 13.8 G/DL
IMM GRANULOCYTES # BLD AUTO: 0.06 K/UL
IMM GRANULOCYTES NFR BLD AUTO: 0.3 %
LYMPHOCYTES # BLD AUTO: 1.6 K/UL
LYMPHOCYTES NFR BLD: 9.1 %
MAGNESIUM SERPL-MCNC: 1.4 MG/DL
MAGNESIUM SERPL-MCNC: 1.5 MG/DL
MCH RBC QN AUTO: 31.9 PG
MCHC RBC AUTO-ENTMCNC: 33.6 G/DL
MCV RBC AUTO: 95 FL
MONOCYTES # BLD AUTO: 1.7 K/UL
MONOCYTES NFR BLD: 9.3 %
NEUTROPHILS # BLD AUTO: 14.3 K/UL
NEUTROPHILS NFR BLD: 80.7 %
NRBC BLD-RTO: 0 /100 WBC
PHOSPHATE SERPL-MCNC: 2.3 MG/DL
PLATELET # BLD AUTO: 219 K/UL
PMV BLD AUTO: 10.6 FL
POTASSIUM SERPL-SCNC: 3 MMOL/L
POTASSIUM SERPL-SCNC: 3.6 MMOL/L
PROT SERPL-MCNC: 6.1 G/DL
RBC # BLD AUTO: 4.33 M/UL
SODIUM SERPL-SCNC: 139 MMOL/L
WBC # BLD AUTO: 17.67 K/UL

## 2018-12-22 PROCEDURE — 83735 ASSAY OF MAGNESIUM: CPT

## 2018-12-22 PROCEDURE — 99233 SBSQ HOSP IP/OBS HIGH 50: CPT | Mod: GC,,, | Performed by: PSYCHIATRY & NEUROLOGY

## 2018-12-22 PROCEDURE — 25000003 PHARM REV CODE 250: Performed by: PSYCHIATRY & NEUROLOGY

## 2018-12-22 PROCEDURE — 25000003 PHARM REV CODE 250: Performed by: NURSE PRACTITIONER

## 2018-12-22 PROCEDURE — A4216 STERILE WATER/SALINE, 10 ML: HCPCS | Performed by: PSYCHIATRY & NEUROLOGY

## 2018-12-22 PROCEDURE — 63600175 PHARM REV CODE 636 W HCPCS: Performed by: PSYCHIATRY & NEUROLOGY

## 2018-12-22 PROCEDURE — 63600175 PHARM REV CODE 636 W HCPCS: Performed by: STUDENT IN AN ORGANIZED HEALTH CARE EDUCATION/TRAINING PROGRAM

## 2018-12-22 PROCEDURE — 85025 COMPLETE CBC W/AUTO DIFF WBC: CPT

## 2018-12-22 PROCEDURE — 94667 MNPJ CHEST WALL 1ST: CPT

## 2018-12-22 PROCEDURE — 95813 EEG EXTND MNTR 61-119 MIN: CPT

## 2018-12-22 PROCEDURE — 20000000 HC ICU ROOM

## 2018-12-22 PROCEDURE — 63600175 PHARM REV CODE 636 W HCPCS: Performed by: NURSE PRACTITIONER

## 2018-12-22 PROCEDURE — 95813 EEG EXTND MNTR 61-119 MIN: CPT | Mod: 26,,, | Performed by: PSYCHIATRY & NEUROLOGY

## 2018-12-22 PROCEDURE — 84132 ASSAY OF SERUM POTASSIUM: CPT

## 2018-12-22 PROCEDURE — 95813 PR EEG, EXTENDED, 61-119 MINS: ICD-10-PCS | Mod: 26,,, | Performed by: PSYCHIATRY & NEUROLOGY

## 2018-12-22 PROCEDURE — S0166 INJ OLANZAPINE 2.5MG: HCPCS | Performed by: PSYCHIATRY & NEUROLOGY

## 2018-12-22 PROCEDURE — 25000003 PHARM REV CODE 250: Performed by: STUDENT IN AN ORGANIZED HEALTH CARE EDUCATION/TRAINING PROGRAM

## 2018-12-22 PROCEDURE — 84100 ASSAY OF PHOSPHORUS: CPT

## 2018-12-22 PROCEDURE — 80053 COMPREHEN METABOLIC PANEL: CPT

## 2018-12-22 PROCEDURE — 83735 ASSAY OF MAGNESIUM: CPT | Mod: 91

## 2018-12-22 PROCEDURE — 99233 PR SUBSEQUENT HOSPITAL CARE,LEVL III: ICD-10-PCS | Mod: GC,,, | Performed by: PSYCHIATRY & NEUROLOGY

## 2018-12-22 RX ORDER — CLONIDINE HYDROCHLORIDE 0.1 MG/1
0.1 TABLET ORAL 2 TIMES DAILY
Status: DISCONTINUED | OUTPATIENT
Start: 2018-12-22 | End: 2018-12-24

## 2018-12-22 RX ORDER — BISOPROLOL FUMARATE 5 MG/1
10 TABLET, FILM COATED ORAL DAILY
Status: DISCONTINUED | OUTPATIENT
Start: 2018-12-22 | End: 2018-12-24

## 2018-12-22 RX ORDER — LEVETIRACETAM 500 MG/1
500 TABLET ORAL 2 TIMES DAILY
Status: DISCONTINUED | OUTPATIENT
Start: 2018-12-22 | End: 2018-12-23

## 2018-12-22 RX ORDER — HEPARIN SODIUM 5000 [USP'U]/ML
5000 INJECTION, SOLUTION INTRAVENOUS; SUBCUTANEOUS EVERY 8 HOURS
Status: DISCONTINUED | OUTPATIENT
Start: 2018-12-23 | End: 2019-01-28 | Stop reason: HOSPADM

## 2018-12-22 RX ORDER — POTASSIUM CHLORIDE 20 MEQ/1
40 TABLET, EXTENDED RELEASE ORAL ONCE
Status: COMPLETED | OUTPATIENT
Start: 2018-12-22 | End: 2018-12-22

## 2018-12-22 RX ORDER — NICARDIPINE HYDROCHLORIDE 0.2 MG/ML
1 INJECTION INTRAVENOUS CONTINUOUS
Status: DISCONTINUED | OUTPATIENT
Start: 2018-12-22 | End: 2018-12-24

## 2018-12-22 RX ORDER — OLANZAPINE 10 MG/2ML
2.5 INJECTION, POWDER, FOR SOLUTION INTRAMUSCULAR ONCE
Status: COMPLETED | OUTPATIENT
Start: 2018-12-22 | End: 2018-12-22

## 2018-12-22 RX ORDER — POTASSIUM CHLORIDE 750 MG/1
10 CAPSULE, EXTENDED RELEASE ORAL ONCE
Status: COMPLETED | OUTPATIENT
Start: 2018-12-22 | End: 2018-12-22

## 2018-12-22 RX ORDER — MORPHINE SULFATE 2 MG/ML
0.5 INJECTION, SOLUTION INTRAMUSCULAR; INTRAVENOUS ONCE
Status: COMPLETED | OUTPATIENT
Start: 2018-12-22 | End: 2018-12-22

## 2018-12-22 RX ORDER — LEVETIRACETAM 5 MG/ML
500 INJECTION INTRAVASCULAR ONCE
Status: COMPLETED | OUTPATIENT
Start: 2018-12-22 | End: 2018-12-23

## 2018-12-22 RX ORDER — PRAVASTATIN SODIUM 40 MG/1
40 TABLET ORAL NIGHTLY
Status: DISCONTINUED | OUTPATIENT
Start: 2018-12-22 | End: 2018-12-26

## 2018-12-22 RX ADMIN — Medication 3 ML: at 06:12

## 2018-12-22 RX ADMIN — MUPIROCIN 1 G: 20 OINTMENT TOPICAL at 08:12

## 2018-12-22 RX ADMIN — POTASSIUM & SODIUM PHOSPHATES POWDER PACK 280-160-250 MG 2 PACKET: 280-160-250 PACK at 08:12

## 2018-12-22 RX ADMIN — Medication 0.5 MG: at 06:12

## 2018-12-22 RX ADMIN — LEVETIRACETAM 500 MG: 5 INJECTION INTRAVENOUS at 11:12

## 2018-12-22 RX ADMIN — MAGNESIUM OXIDE TAB 400 MG (241.3 MG ELEMENTAL MG) 800 MG: 400 (241.3 MG) TAB at 04:12

## 2018-12-22 RX ADMIN — Medication 0.5 MG: at 03:12

## 2018-12-22 RX ADMIN — MAGNESIUM OXIDE TAB 400 MG (241.3 MG ELEMENTAL MG) 800 MG: 400 (241.3 MG) TAB at 02:12

## 2018-12-22 RX ADMIN — POTASSIUM CHLORIDE 10 MEQ: 750 CAPSULE, EXTENDED RELEASE ORAL at 06:12

## 2018-12-22 RX ADMIN — Medication 3 ML: at 02:12

## 2018-12-22 RX ADMIN — MUPIROCIN 1 G: 20 OINTMENT TOPICAL at 10:12

## 2018-12-22 RX ADMIN — CEFAZOLIN 1 G: 1 INJECTION, POWDER, FOR SOLUTION INTRAMUSCULAR; INTRAVENOUS at 10:12

## 2018-12-22 RX ADMIN — HYDRALAZINE HYDROCHLORIDE 10 MG: 20 INJECTION INTRAMUSCULAR; INTRAVENOUS at 11:12

## 2018-12-22 RX ADMIN — NICARDIPINE HYDROCHLORIDE 5 MG/HR: 0.2 INJECTION, SOLUTION INTRAVENOUS at 07:12

## 2018-12-22 RX ADMIN — CEFAZOLIN 1 G: 1 INJECTION, POWDER, FOR SOLUTION INTRAMUSCULAR; INTRAVENOUS at 02:12

## 2018-12-22 RX ADMIN — POTASSIUM CHLORIDE 40 MEQ: 1500 TABLET, EXTENDED RELEASE ORAL at 04:12

## 2018-12-22 RX ADMIN — LEVETIRACETAM 500 MG: 5 INJECTION INTRAVENOUS at 08:12

## 2018-12-22 RX ADMIN — BISOPROLOL FUMARATE 10 MG: 5 TABLET ORAL at 02:12

## 2018-12-22 RX ADMIN — OLANZAPINE 2.5 MG: 10 INJECTION, POWDER, LYOPHILIZED, FOR SOLUTION INTRAMUSCULAR at 05:12

## 2018-12-22 RX ADMIN — POTASSIUM & SODIUM PHOSPHATES POWDER PACK 280-160-250 MG 2 PACKET: 280-160-250 PACK at 04:12

## 2018-12-22 RX ADMIN — MAGNESIUM OXIDE TAB 400 MG (241.3 MG ELEMENTAL MG) 800 MG: 400 (241.3 MG) TAB at 08:12

## 2018-12-22 RX ADMIN — Medication 0.5 MG: at 04:12

## 2018-12-22 RX ADMIN — Medication 0.5 MG: at 11:12

## 2018-12-22 RX ADMIN — HYDRALAZINE HYDROCHLORIDE 10 MG: 20 INJECTION INTRAMUSCULAR; INTRAVENOUS at 04:12

## 2018-12-22 RX ADMIN — STANDARDIZED SENNA CONCENTRATE AND DOCUSATE SODIUM 1 TABLET: 8.6; 5 TABLET, FILM COATED ORAL at 08:12

## 2018-12-22 NOTE — PLAN OF CARE
Problem: Adult Inpatient Plan of Care  Goal: Plan of Care Review  POC reviewed with pt and family. Family verbalized understanding. Questions and concerns addressed. Pt after having drains removed by neuro surgery had a neuro change. Travelled to Ct. No abnormal findings noted. Pt nonverbal indicators of pain became evident after drain was removed. Vitals change post removal. NG placed. Pt transfer orders to the floor d/c after sudden changes in pt exam.  Will continue to monitor. See flowsheets for full assessment and VS info.

## 2018-12-22 NOTE — PLAN OF CARE
Problem: Adult Inpatient Plan of Care  Goal: Plan of Care Review  Outcome: Ongoing (interventions implemented as appropriate)   12/22/18 0713   Plan of Care Review   Plan of Care Reviewed With patient;family     POC reviewed with pt and pt's son at 0500. Pt verbalized understanding. Questions and concerns addressed. No acute events overnight. Prn meds given for pain and electrolyte replacements initiated. Pt progressing toward goals. Will continue to monitor. See flowsheets for full assessment and VS info     Goal: Patient-Specific Goal (Individualization)  Outcome: Ongoing (interventions implemented as appropriate)   12/20/18 2100 12/20/18 2230   OTHER   Anxieties, Fears or Concerns surgery  --    Individualized Care Needs extra blankets, family at bedside  --    Patient-Specific Goal (Individualization)   Patient-Specific Goals (Include Timeframe) --  pt will not develop skin breakdown during stay

## 2018-12-22 NOTE — HOSPITAL COURSE
12/22: no major events overnight. Weaned off of cardene. This morning AOx3. Drain removed by NSGY at 11 AM, and planned to step down to NSGY.  AMS noted around 1PM, patient aphasic and not following commands, STAT CTH unremarkable.   12/26: wean ketamine, follow EEG, send urine studuies-Na 133, NS bolus, advance TF  12/27: off ketamine, d/c levo, continue EEG, and AEDs, 500 NS bolus, increase enteral water flushes, continue abx for another day  1/2: extubation trial.   1/3: failed extubation trial. Plan for trach and peg  1/4: plan for trach, general surgery consult  1/5: NAEO  1/6: NAEO. Awaiting trach and peg  1/7: plan for trach/peg, opens eyes to voice no commands  1/8: s/p trach/peg today  1/9: restarting tf this afternoon, ltac planning, eeg today to help guide aed weaning  1/12 Trach trials tolerating well. Neuro checks q2 in am/ q4in pm metoprolol prn HR.120  1/13 Continues to tolerate Trach. Start Norvasc. eval thyroid function baseline.   1/14 hyponatremic, pending urine studies, finasteride, increased secretions, CPT, pending LTAC  01/15: Awaiting approval for transfer to LTAC  01/16: Gen surg about sutures and trach pressure wound  01/17: Med route adjustments and awaiting wound care recs, Pending LTAC placement  01/18: Placed orders for LTAC placement, Insurance company issues  1/19 No  Significant events overnight. No changes in assessment. Awaiting LTAC placement  1/20 no significant changes overnight. Continue trach collar. No vent over night. Hyperventilation therapy - bag/suction q6h. CXR in am pending  LTAC pending  1/20 alk phos, AST elevated, will check ggt. cEEG stable will decrease vimpat 50mg bid. [facility]  1/21: Dispo Planning  1/22: Pending Case Management Rounds  1/23: dc to LTAC  1/24: discharge held due to documentation   1/27: reports of some confusion at night, rozerem hs prn

## 2018-12-22 NOTE — ASSESSMENT & PLAN NOTE
64 yo male with L aSDH now s/p craniotomy for evacuation (12/20).    No acute events overnight. Pt GCS 15 this morning. No focal deficit. Surgical dressing removed, incision is dry,intact and non-inflammed. Subgaleal drains removed at bedside.     --Continue care per primary team.  --Continue levetiracetam 500 bid.  --Pt cleared from neurosurgical perspective for stepdown under neurosurgery service.   --We will continue to monitor closely, please contact us with any questions or concerns.

## 2018-12-22 NOTE — SUBJECTIVE & OBJECTIVE
Interval History:    AAOx3 this AM, drain removed by NSGY. Patient off of cardene, and started on a diet.  Patient became disoriented and aphasic later in the day. STAT CTH unchanged. NSGY aware    Review of Systems   Unable to perform ROS: Mental status change   Constitutional: Negative for chills and fever.   HENT: Positive for trouble swallowing.    Gastrointestinal: Negative for vomiting.   Genitourinary: Negative for decreased urine volume.   Allergic/Immunologic: Negative for immunocompromised state.   Neurological: Positive for speech difficulty and weakness. Negative for seizures.   Psychiatric/Behavioral: Positive for confusion. Negative for agitation. The patient is not nervous/anxious.        Objective:     Vitals:  Temp: 98.2 °F (36.8 °C)  Pulse: 108  Rhythm: sinus tachycardia  BP: (!) 192/89  MAP (mmHg): 126  Resp: (!) 38  SpO2: 95 %  O2 Device (Oxygen Therapy): nasal cannula    Temp  Min: 97 °F (36.1 °C)  Max: 98.4 °F (36.9 °C)  Pulse  Min: 98  Max: 121  BP  Min: 130/73  Max: 192/89  MAP (mmHg)  Min: 95  Max: 132  Resp  Min: 12  Max: 38  SpO2  Min: 93 %  Max: 100 %    12/21 0701 - 12/22 0700  In: 2140.2 [I.V.:1940.2]  Out: 2017.5 [Urine:1825; Drains:192.5]   Unmeasured Output  Stool Occurrence: 0       Physical Exam   Constitutional: He appears well-developed and well-nourished. He is cooperative. No distress.   HENT:   Head: Normocephalic and atraumatic.   Mouth/Throat: Mucous membranes are normal.   Eyes: Conjunctivae are normal.   Neck: Normal range of motion. Neck supple.   Cardiovascular: Normal rate and regular rhythm. Exam reveals no gallop.   No murmur heard.  Pulses:       Radial pulses are 2+ on the right side, and 2+ on the left side.        Dorsalis pedis pulses are 2+ on the right side, and 2+ on the left side.   Pulmonary/Chest: Effort normal. No tachypnea. No respiratory distress. He has no decreased breath sounds.   Abdominal: Soft. He exhibits no distension.   Musculoskeletal: Normal  range of motion. He exhibits no edema or tenderness.   Neurological: He displays no seizure activity.   Initially alert, awake and oriented, following commands  Later during the day became disoriented and aphasic, no commands     Skin: Skin is warm. No rash noted. He is not diaphoretic. No erythema.   Nursing note and vitals reviewed.    Unable to test coordination, gait due to level of consciousness and weakness.    Medications:  Continuous  lactated ringers Last Rate: 100 mL/hr at 12/22/18 1600   Scheduled  bisoprolol 10 mg Daily   cloNIDine 0.1 mg BID   [START ON 12/23/2018] heparin (porcine) 5,000 Units Q8H   levETIRAcetam 500 mg BID   mupirocin 1 g BID   pravastatin 40 mg QHS   senna-docusate 8.6-50 mg 1 tablet Daily   sodium chloride 0.9% 3 mL Q8H   PRN  sodium chloride  Q24H PRN   acetaminophen 650 mg Q6H PRN   hydrALAZINE 10 mg Q4H PRN   HYDROcodone-acetaminophen 1 tablet Q4H PRN   magnesium oxide 800 mg PRN   magnesium oxide 800 mg PRN   morphine 0.5 mg Q6H PRN   ondansetron 8 mg Q8H PRN   ondansetron 4 mg Q12H PRN   potassium chloride 10% 40 mEq PRN   potassium chloride 10% 40 mEq PRN   potassium chloride 10% 40 mEq PRN   potassium, sodium phosphates 2 packet PRN   potassium, sodium phosphates 2 packet PRN   potassium, sodium phosphates 2 packet PRN     Today I personally reviewed pertinent medications, lines/drains/airways, imaging, cardiology results, laboratory results, microbiology results, notably:    EEG (12/22):  Mild diffuse slowing. No seizure activity    Diet  No diet orders on file  No diet orders on file

## 2018-12-22 NOTE — PROGRESS NOTES
Pt travelling to ED for stat CT with Justin RN for neuro exam change. Vitals stable. Will continue to monitor.

## 2018-12-22 NOTE — PROGRESS NOTES
Notified Lamont Dumont NP of pt's potassium level 3.0. Reported pt is NPO w/ meds crushed in puree only. Per orders ok to oral pill form. Please give pt 40 meq now and another 40 meq in 1 hr. Please recheck 4 hrs after last replacement.

## 2018-12-22 NOTE — PROGRESS NOTES
Dr Geo BAILEY of NCC team notified of changes in pt vitals and increased restlessness and that the pt was to go to the floor. Ordered by MD to stop pt from going to the floor after the changes in pt health post drain removal

## 2018-12-22 NOTE — PROGRESS NOTES
Altagracia NP of [facility] team called because of increased vocal pain expressed by the pt accompanied by restlessness. Extra dose of morphine ordered.

## 2018-12-22 NOTE — PROGRESS NOTES
[facility]  Neurosurgery  Progress Note    Subjective:     History of Present Illness: 62 yo M with PMH of CAD, CABG in 2006, DM, HTN, HLD on ASA presents to the ED for AMS. Patient fell 4 days ago with about 2 minutes of LOC. Family is unsure of what caused the fall. Full workup done including a CTH at [place] that was negative. Patient went home and stopped taking his ASA. This morning, his family noticed he was more confused. He went to [facility] and CTH was done that revealed a 1.7cm left frontalpariental SDH with a 5 mm right sided midline shift and a left SAH. Patient was transferred to [facility]. History was obtained per family as patient unable to give history 2/2 confusion with expressive and receptive aphasia.    Post-Op Info:  Procedure(s) (LRB):  CRANIOTOMY, FOR SUBDURAL HEMATOMA EVACUATION (Left)   2 Days Post-Op         Medications:  Continuous Infusions:   lactated ringers 100 mL/hr at 12/22/18 1300     Scheduled Meds:   bisoprolol  10 mg Oral Daily    [START ON 12/23/2018] heparin (porcine)  5,000 Units Subcutaneous Q8H    levetiracetam IVPB  500 mg Intravenous Q12H    mupirocin  1 g Nasal BID    pravastatin  40 mg Oral QHS    senna-docusate 8.6-50 mg  1 tablet Oral Daily    sodium chloride 0.9%  3 mL Intravenous Q8H     PRN Meds:sodium chloride, acetaminophen, hydrALAZINE, HYDROcodone-acetaminophen, magnesium oxide, magnesium oxide, morphine, ondansetron, ondansetron, potassium chloride 10%, potassium chloride 10%, potassium chloride 10%, potassium, sodium phosphates, potassium, sodium phosphates, potassium, sodium phosphates     Review of Systems    Objective:     Weight: 85.7 kg (189 lb)  Body mass index is 27.12 kg/m².  Vital Signs (Most Recent):  Temp: 98.1 °F (36.7 °C) (12/22/18 1102)  Pulse: (!) 117 (12/22/18 1302)  Resp: (!) 21 (12/22/18 1302)  BP: 130/73 (12/22/18 1302)  SpO2: (!) 94 % (12/22/18 1302) Vital Signs (24h Range):  Temp:  [97 °F (36.1 °C)-98.4 °F (36.9 °C)]  "98.1 °F (36.7 °C)  Pulse:  [] 117  Resp:  [12-38] 21  SpO2:  [93 %-100 %] 94 %  BP: (130-180)/() 130/73     Date 12/22/18 0700 - 12/23/18 0659   Shift 0049-4717 4494-2895 0142-2407 24 Hour Total   INTAKE   I.V.(mL/kg) 700(8.2)   700(8.2)   IV Piggyback 100   100   Shift Total(mL/kg) 800(9.3)   800(9.3)   OUTPUT   Urine(mL/kg/hr) 830   830   Shift Total(mL/kg) 830(9.7)   830(9.7)   Weight (kg) 85.7 85.7 85.7 85.7                        Closed/Suction Drain 12/21/18 0111 Left Other (Comment) Accordion 10 Fr. (Active)   Site Description Unable to view 12/21/2018 11:02 AM   Dressing Type [place] 12/21/2018 11:02 AM   Dressing Status Clean;Dry;Intact 12/21/2018 11:02 AM   Dressing Intervention Dressing reinforced 12/21/2018 11:02 AM   Drainage Bloody 12/21/2018 11:02 AM   Status To bulb suction 12/21/2018 11:02 AM   Output (mL) 80 mL 12/21/2018  6:00 AM            Closed/Suction Drain 12/21/18 0112 Left Other (Comment) Accordion 10 Fr. (Active)   Site Description Unable to view 12/21/2018 11:02 AM   Dressing Type [place] 12/21/2018 11:02 AM   Dressing Status Clean;Dry;Intact 12/21/2018 11:02 AM   Dressing Intervention Dressing reinforced 12/21/2018 11:02 AM   Drainage Bloody 12/21/2018 11:02 AM   Status To bulb suction 12/21/2018 11:02 AM   Output (mL) 70 mL 12/21/2018 11:00 AM            Urethral Catheter 12/20/18 2350 Non-latex;Straight-tip 16 Fr. (Active)   Site Assessment Clean;Intact 12/21/2018 11:02 AM   Collection Container Urimeter 12/21/2018 11:02 AM   Securement Method secured to top of thigh w/ adhesive device 12/21/2018 11:02 AM   Catheter Care Performed yes 12/21/2018 11:02 AM   Reason for Continuing Urinary Catheterization Urinary retention 12/21/2018 11:02 AM   CAUTI Prevention Bundle StatLock in place w 1" slack;Intact seal between catheter & drainage tubing;Drainage bag off the floor;Green sheeting clip in use;Drainage bag not overfilled (<2/3 full);No dependent loops or " kinks;Drainage bag below bladder 12/21/2018 11:02 AM   Output (mL) 100 mL 12/21/2018  2:00 PM       Neurosurgery Physical Exam     [id number]  Eyes to voice  PERRL  WDx4      Significant Labs:  Recent Labs   Lab 12/21/18  0203 12/22/18  0117 12/22/18  1053   * 89  --     139  --    K 3.2* 3.0* 3.6    102  --    CO2 15* 25  --    BUN 15 9  --    CREATININE 0.7 0.6  --    CALCIUM 9.0 9.2  --    MG 1.9 1.5* 1.4*     Recent Labs   Lab 12/20/18  1559 12/22/18  0117   WBC 17.85* 17.67*   HGB 15.6 13.8*   HCT 45.0 41.1    219     Recent Labs   Lab 12/20/18  1559   INR 1.0   APTT 22.1     Microbiology Results (last 7 days)     ** No results found for the last 168 hours. **        Recent Lab Results       12/22/18  1053   12/22/18  0117   12/21/18  1522        Immature Granulocytes   0.3       Immature Grans (Abs)   0.06  Comment:  Mild elevation in immature granulocytes is non specific and   can be seen in a variety of conditions including stress response,   acute inflammation, trauma and pregnancy. Correlation with other   laboratory and clinical findings is essential.         Albumin   3.0       Alkaline Phosphatase   55       ALT   5       Anion Gap   12       Ascending aorta     3.50     Ao peak hien     1.93     Ao VTI     31.13     AST   15       AV valve area     2.11     AV mean gradient     8.92     AV peak gradient     14.90     Baso #   0.08       Basophil%   0.5       Total Bilirubin   1.5  Comment:  For infants and newborns, interpretation of results should be based  on gestational age, weight and in agreement with clinical  observations.  Premature Infant recommended reference ranges:  Up to 24 hours.............<8.0 mg/dL  Up to 48 hours............<12.0 mg/dL  3-5 days..................<15.0 mg/dL  6-29 days.................<15.0 mg/dL         BSA     2.06     BUN, Bld   9       Calcium   9.2       Chloride   102       CO2   25       Creatinine   0.6       Left Ventricle Relative Wall  Thickness     0.57     Differential Method   Automated       AV index (prosthetic)     0.66     eGFR if    >60.0       eGFR if non    >60.0  Comment:  Calculation used to obtain the estimated glomerular filtration  rate (eGFR) is the CKD-EPI equation.          Eos #   0.0       Eosinophil%   0.1       FS     32     Glucose   89       Gran # (ANC)   14.3       Gran%   80.7       Hematocrit   41.1       Hemoglobin   13.8       IVS     1.22     LA WIDTH     4.36     Left Atrium Major Axis     6.27     Left Atrium Minor Axis     6.55     LA size     4.26     LA volume     101.15     LA Volume Index     49.6     LVOT area     3.20     LV Diastolic Volume     85.42     LV Diastolic Volume Index     41.92     LVIDD     4.35     LVIDS     2.96     LV mass     196.04     LV Mass Index     96.2     LVOT diameter     2.02     LVOT stroke volume     65.73     LVOT peak VTI     20.52     LV Systolic Volume     33.98     LV Systolic Volume Index     16.7     Lymph #   1.6       Lymph%   9.1       Magnesium 1.4 1.5       MCH   31.9       MCHC   33.6       MCV   95       Mean e'     0.06     Mono #   1.7       Mono%   9.3       MPV   10.6       nRBC   0       Phosphorus   2.3       Platelets   219       Potassium 3.6 3.0       Total Protein   6.1       PW     1.25     RA Major Axis     5.44     RA Width     4.26     RBC   4.33       RDW   14.1       RVDD     3.22     Sinus     3.07     Sodium   139       STJ     3.33     TAPSE     1.73     TDI SEPTAL     0.06     TDI LATERAL     0.06     WBC   17.67             Significant Diagnostics:  I have reviewed all pertinent imaging results/findings within the past 24 hours.    Assessment/Plan:     * SDH (subdural hematoma)    62 yo male with L aSDH now s/p craniotomy for evacuation (12/20).    No acute events overnight. Pt GCS 15 this morning. No focal deficit. Surgical dressing removed, incision is dry,intact and non-inflammed. Subgaleal drains removed at  bedside.     --Continue care per primary team.  --Continue levetiracetam 500 bid.  --Pt cleared from neurosurgical perspective for stepdown under neurosurgery service.   --We will continue to monitor closely, please contact us with any questions or concerns.               Murray Mendes MD  Neurosurgery  [facility]

## 2018-12-22 NOTE — PROGRESS NOTES
"Altagracia of [facility] team notified of change in pt neuro exam. Pt no longer able to say name or where he is. His response to questions are "yo"  "

## 2018-12-22 NOTE — PROGRESS NOTES
Pharmacist Intervention IV to PO Note    Peewee Nunes is a 63 y.o. male being treated with IV medication levetiracetam    Patient Data:    Vital Signs (Most Recent):  Temp: 98.2 °F (36.8 °C) (12/22/18 1502)  Pulse: (!) 121 (12/22/18 1502)  Resp: 19 (12/22/18 1502)  BP: (!) 167/88 (12/22/18 1502)  SpO2: 98 % (12/22/18 1502)   Vital Signs (72h Range):  Temp:  [97 °F (36.1 °C)-98.8 °F (37.1 °C)]   Pulse:  []   Resp:  [12-38]   BP: ()/()   SpO2:  [93 %-100 %]      Dietary Orders:  Diet Orders            Diet Dysphagia Pureed (IDDSI Level 4) [facility]; Thin: Dysphagia 1 (Pureed) starting at 12/22 1033            Based on the following criteria, this patient qualifies for intravenous to oral conversion:  [x] The patients gastrointestinal tract is functioning (tolerating medications via oral or enteral route for 24 hours and tolerating food or enteral feeds for 24 hours).  [x] The patient is hemodynamically stable for 24 hours (heart rate <100 beats per minute, systolic blood pressure >99 mm Hg, and respiratory rate <20 breaths per minute).    IV levetiracetam 500 mg BID will be changed to PO levetiracetam tabs 500 mg BID    Pharmacist's Name: Neda Bender PharmD  Pharmacist's Extension: [phone]

## 2018-12-22 NOTE — PROCEDURES
DATE OF SERVICE:  12/22/2018    ELECTROENCEPHALOGRAM REPORT    Extended Recording    METHODOLOGY:  Electroencephalographic (EEG) is recorded with electrodes placed   according to the International 10-20 placement system.  Thirty two (32) channels   of digital signal (sampling rate of 512/sec), including T1 and T2, were   simultaneously recorded from the scalp and may include EKG, EMG, and/or eye   monitors.  Recording band pass was 0.1 to 512 Hz.  Digital video recording of   the patient is simultaneously recorded with the EEG.  The patient is instructed   to report clinical symptoms which may occur during the recording session.  EEG   and video recording are stored and archived in digital format.  Activation   procedures, which include photic stimulation, hyperventilation and instructing   patients to perform simple tasks, are done in selected patients.    The EEG is displayed on a monitor screen and can be reviewed using different   montages.  Computer-assisted analysis is employed to detect spike and   electrographic seizure activity.  The entire record is submitted for computer   analysis.  The entire recording is visually reviewed, and the times identified   by computer analysis as being spikes or seizures are reviewed again.    Compressed spectral analysis (CSA) is also performed on the activity recorded   from each individual channel.  This is displayed as a power display of   frequencies from 0 to 30 Hz over time.  The CSA is reviewed looking for   asymmetries in power between homologous areas of the scalp, then compared with   the original EEG recording.    [employer] software was also utilized in the review of this study.  This software   suite analyzes the EEG recording in multiple domains.  Coherence and rhythmicity   are computed to identify EEG sections which may contain organized seizures.    Each channel undergoes analysis to detect the presence of spike and sharp waves   which have special and  morphological characteristics of epileptic activity.  The   routine EEG recording is converted from special into frequency domain.  This is   then displayed comparing homologous areas to identify areas of significant   asymmetry.  Algorithm to identify non-cortically generated artifact is used to   separate artifact from the EEG.    RECORDING TIMES:  Duration is 1 hour and 57 minutes.    EEG FINDINGS:  This study is medium amplitude and consists of primarily 6 to 8   Hz theta activity at baseline with abundant frontally predominant and   generalized rhythmic delta activity 2 Hz seen on and off throughout the record.    Bursts of rhythmic delta activity typically last 1 to 3 seconds.  The overall   background is dominated by theta activity with some superimposed faster   frequencies.  The record is generally symmetrical without any epileptiform   discharges.  There are no seizures.  There are no clinical events noted.  No   other significant findings are seen.    INTERPRETATION:  Abnormal EEG due to mild diffuse slowing of the waking   background rhythms as described above.  Sleep was not captured.  No focal or   epileptiform features were seen.      NBB/IN  dd: 12/22/2018 14:36:20 (CST)  td: 12/22/2018 14:48:14 (CST)  Doc ID   #[id number]  Job ID #[id number]    CC:

## 2018-12-22 NOTE — PROGRESS NOTES
Pt back in the room from CT. No events during travel. Altagracia ARRIETA notified of pt return from CT

## 2018-12-22 NOTE — SUBJECTIVE & OBJECTIVE
Medications:  Continuous Infusions:   lactated ringers 100 mL/hr at 12/22/18 1300     Scheduled Meds:   bisoprolol  10 mg Oral Daily    [START ON 12/23/2018] heparin (porcine)  5,000 Units Subcutaneous Q8H    levetiracetam IVPB  500 mg Intravenous Q12H    mupirocin  1 g Nasal BID    pravastatin  40 mg Oral QHS    senna-docusate 8.6-50 mg  1 tablet Oral Daily    sodium chloride 0.9%  3 mL Intravenous Q8H     PRN Meds:sodium chloride, acetaminophen, hydrALAZINE, HYDROcodone-acetaminophen, magnesium oxide, magnesium oxide, morphine, ondansetron, ondansetron, potassium chloride 10%, potassium chloride 10%, potassium chloride 10%, potassium, sodium phosphates, potassium, sodium phosphates, potassium, sodium phosphates     Review of Systems    Objective:     Weight: 85.7 kg (189 lb)  Body mass index is 27.12 kg/m².  Vital Signs (Most Recent):  Temp: 98.1 °F (36.7 °C) (12/22/18 1102)  Pulse: (!) 117 (12/22/18 1302)  Resp: (!) 21 (12/22/18 1302)  BP: 130/73 (12/22/18 1302)  SpO2: (!) 94 % (12/22/18 1302) Vital Signs (24h Range):  Temp:  [97 °F (36.1 °C)-98.4 °F (36.9 °C)] 98.1 °F (36.7 °C)  Pulse:  [] 117  Resp:  [12-38] 21  SpO2:  [93 %-100 %] 94 %  BP: (130-180)/() 130/73     Date 12/22/18 0700 - 12/23/18 0659   Shift 8972-9996 0636-5394 1805-2229 24 Hour Total   INTAKE   I.V.(mL/kg) 700(8.2)   700(8.2)   IV Piggyback 100   100   Shift Total(mL/kg) 800(9.3)   800(9.3)   OUTPUT   Urine(mL/kg/hr) 830   830   Shift Total(mL/kg) 830(9.7)   830(9.7)   Weight (kg) 85.7 85.7 85.7 85.7                        Closed/Suction Drain 12/21/18 0111 Left Other (Comment) Accordion 10 Fr. (Active)   Site Description Unable to view 12/21/2018 11:02 AM   Dressing Type [place] 12/21/2018 11:02 AM   Dressing Status Clean;Dry;Intact 12/21/2018 11:02 AM   Dressing Intervention Dressing reinforced 12/21/2018 11:02 AM   Drainage Bloody 12/21/2018 11:02 AM   Status To bulb suction 12/21/2018 11:02 AM   Output (mL) 80  "mL 12/21/2018  6:00 AM            Closed/Suction Drain 12/21/18 0112 Left Other (Comment) Accordion 10 Fr. (Active)   Site Description Unable to view 12/21/2018 11:02 AM   Dressing Type [place] 12/21/2018 11:02 AM   Dressing Status Clean;Dry;Intact 12/21/2018 11:02 AM   Dressing Intervention Dressing reinforced 12/21/2018 11:02 AM   Drainage Bloody 12/21/2018 11:02 AM   Status To bulb suction 12/21/2018 11:02 AM   Output (mL) 70 mL 12/21/2018 11:00 AM            Urethral Catheter 12/20/18 2350 Non-latex;Straight-tip 16 Fr. (Active)   Site Assessment Clean;Intact 12/21/2018 11:02 AM   Collection Container Urimeter 12/21/2018 11:02 AM   Securement Method secured to top of thigh w/ adhesive device 12/21/2018 11:02 AM   Catheter Care Performed yes 12/21/2018 11:02 AM   Reason for Continuing Urinary Catheterization Urinary retention 12/21/2018 11:02 AM   CAUTI Prevention Bundle StatLock in place w 1" slack;Intact seal between catheter & drainage tubing;Drainage bag off the floor;Green sheeting clip in use;Drainage bag not overfilled (<2/3 full);No dependent loops or kinks;Drainage bag below bladder 12/21/2018 11:02 AM   Output (mL) 100 mL 12/21/2018  2:00 PM       Neurosurgery Physical Exam     E3V2M5  Eyes to voice  PERRL  WDx4      Significant Labs:  Recent Labs   Lab 12/21/18  0203 12/22/18  0117 12/22/18  1053   * 89  --     139  --    K 3.2* 3.0* 3.6    102  --    CO2 15* 25  --    BUN 15 9  --    CREATININE 0.7 0.6  --    CALCIUM 9.0 9.2  --    MG 1.9 1.5* 1.4*     Recent Labs   Lab 12/20/18  1559 12/22/18  0117   WBC 17.85* 17.67*   HGB 15.6 13.8*   HCT 45.0 41.1    219     Recent Labs   Lab 12/20/18  1559   INR 1.0   APTT 22.1     Microbiology Results (last 7 days)     ** No results found for the last 168 hours. **        Recent Lab Results       12/22/18  1053   12/22/18  0117   12/21/18  1522        Immature Granulocytes   0.3       Immature Grans (Abs)   0.06  Comment:  Mild " elevation in immature granulocytes is non specific and   can be seen in a variety of conditions including stress response,   acute inflammation, trauma and pregnancy. Correlation with other   laboratory and clinical findings is essential.         Albumin   3.0       Alkaline Phosphatase   55       ALT   5       Anion Gap   12       Ascending aorta     3.50     Ao peak hien     1.93     Ao VTI     31.13     AST   15       AV valve area     2.11     AV mean gradient     8.92     AV peak gradient     14.90     Baso #   0.08       Basophil%   0.5       Total Bilirubin   1.5  Comment:  For infants and newborns, interpretation of results should be based  on gestational age, weight and in agreement with clinical  observations.  Premature Infant recommended reference ranges:  Up to 24 hours.............<8.0 mg/dL  Up to 48 hours............<12.0 mg/dL  3-5 days..................<15.0 mg/dL  6-29 days.................<15.0 mg/dL         BSA     2.06     BUN, Bld   9       Calcium   9.2       Chloride   102       CO2   25       Creatinine   0.6       Left Ventricle Relative Wall Thickness     0.57     Differential Method   Automated       AV index (prosthetic)     0.66     eGFR if    >60.0       eGFR if non    >60.0  Comment:  Calculation used to obtain the estimated glomerular filtration  rate (eGFR) is the CKD-EPI equation.          Eos #   0.0       Eosinophil%   0.1       FS     32     Glucose   89       Gran # (ANC)   14.3       Gran%   80.7       Hematocrit   41.1       Hemoglobin   13.8       IVS     1.22     LA WIDTH     4.36     Left Atrium Major Axis     6.27     Left Atrium Minor Axis     6.55     LA size     4.26     LA volume     101.15     LA Volume Index     49.6     LVOT area     3.20     LV Diastolic Volume     85.42     LV Diastolic Volume Index     41.92     LVIDD     4.35     LVIDS     2.96     LV mass     196.04     LV Mass Index     96.2     LVOT diameter     2.02     LVOT  stroke volume     65.73     LVOT peak VTI     20.52     LV Systolic Volume     33.98     LV Systolic Volume Index     16.7     Lymph #   1.6       Lymph%   9.1       Magnesium 1.4 1.5       MCH   31.9       MCHC   33.6       MCV   95       Mean e'     0.06     Mono #   1.7       Mono%   9.3       MPV   10.6       nRBC   0       Phosphorus   2.3       Platelets   219       Potassium 3.6 3.0       Total Protein   6.1       PW     1.25     RA Major Axis     5.44     RA Width     4.26     RBC   4.33       RDW   14.1       RVDD     3.22     Sinus     3.07     Sodium   139       STJ     3.33     TAPSE     1.73     TDI SEPTAL     0.06     TDI LATERAL     0.06     WBC   17.67             Significant Diagnostics:  I have reviewed all pertinent imaging results/findings within the past 24 hours.

## 2018-12-23 ENCOUNTER — ANESTHESIA EVENT (OUTPATIENT)
Dept: NEUROLOGY | Facility: HOSPITAL | Age: 63
DRG: 003 | End: 2018-12-23
Payer: MEDICARE

## 2018-12-23 ENCOUNTER — ANESTHESIA (OUTPATIENT)
Dept: NEUROLOGY | Facility: HOSPITAL | Age: 63
DRG: 003 | End: 2018-12-23
Payer: MEDICARE

## 2018-12-23 LAB
ALBUMIN SERPL BCP-MCNC: 2.1 G/DL
ALBUMIN SERPL BCP-MCNC: 2.5 G/DL
ALLENS TEST: ABNORMAL
ALP SERPL-CCNC: 54 U/L
ALP SERPL-CCNC: 59 U/L
ALT SERPL W/O P-5'-P-CCNC: <5 U/L
ALT SERPL W/O P-5'-P-CCNC: <5 U/L
ANION GAP SERPL CALC-SCNC: 12 MMOL/L
ANION GAP SERPL CALC-SCNC: 13 MMOL/L
APTT BLDCRRT: 28 SEC
AST SERPL-CCNC: 10 U/L
AST SERPL-CCNC: 13 U/L
BASOPHILS # BLD AUTO: 0.02 K/UL
BASOPHILS NFR BLD: 0.1 %
BILIRUB SERPL-MCNC: 1.4 MG/DL
BILIRUB SERPL-MCNC: 1.6 MG/DL
BUN SERPL-MCNC: 18 MG/DL
BUN SERPL-MCNC: 9 MG/DL
BURR CELLS BLD QL SMEAR: ABNORMAL
BURR CELLS BLD QL SMEAR: ABNORMAL
CALCIUM SERPL-MCNC: 8.9 MG/DL
CALCIUM SERPL-MCNC: 9.2 MG/DL
CHLORIDE SERPL-SCNC: 98 MMOL/L
CHLORIDE SERPL-SCNC: 99 MMOL/L
CO2 SERPL-SCNC: 25 MMOL/L
CO2 SERPL-SCNC: 26 MMOL/L
CREAT SERPL-MCNC: 0.6 MG/DL
CREAT SERPL-MCNC: 0.9 MG/DL
DELSYS: ABNORMAL
DIFFERENTIAL METHOD: ABNORMAL
EOSINOPHIL # BLD AUTO: 0 K/UL
EOSINOPHIL NFR BLD: 0 %
EOSINOPHIL NFR BLD: 0 %
EOSINOPHIL NFR BLD: 0.1 %
ERYTHROCYTE [DISTWIDTH] IN BLOOD BY AUTOMATED COUNT: 14.4 %
ERYTHROCYTE [DISTWIDTH] IN BLOOD BY AUTOMATED COUNT: 14.5 %
ERYTHROCYTE [DISTWIDTH] IN BLOOD BY AUTOMATED COUNT: 14.5 %
ERYTHROCYTE [SEDIMENTATION RATE] IN BLOOD BY WESTERGREN METHOD: 16 MM/H
EST. GFR  (AFRICAN AMERICAN): >60 ML/MIN/1.73 M^2
EST. GFR  (AFRICAN AMERICAN): >60 ML/MIN/1.73 M^2
EST. GFR  (NON AFRICAN AMERICAN): >60 ML/MIN/1.73 M^2
EST. GFR  (NON AFRICAN AMERICAN): >60 ML/MIN/1.73 M^2
FIO2: 65
GIANT PLATELETS BLD QL SMEAR: PRESENT
GIANT PLATELETS BLD QL SMEAR: PRESENT
GLUCOSE SERPL-MCNC: 121 MG/DL
GLUCOSE SERPL-MCNC: 98 MG/DL
HCO3 UR-SCNC: 26.3 MMOL/L (ref 24–28)
HCT VFR BLD AUTO: 32.4 %
HCT VFR BLD AUTO: 32.4 %
HCT VFR BLD AUTO: 35.3 %
HGB BLD-MCNC: 11.3 G/DL
HGB BLD-MCNC: 11.3 G/DL
HGB BLD-MCNC: 12.3 G/DL
IMM GRANULOCYTES # BLD AUTO: 0.13 K/UL
IMM GRANULOCYTES NFR BLD AUTO: 0.6 %
IMM GRANULOCYTES NFR BLD AUTO: 0.6 %
IMM GRANULOCYTES NFR BLD AUTO: 0.8 %
INR PPP: 1.1
LACTATE SERPL-SCNC: 1.2 MMOL/L
LYMPHOCYTES # BLD AUTO: 1.3 K/UL
LYMPHOCYTES NFR BLD: 5.6 %
LYMPHOCYTES NFR BLD: 5.6 %
LYMPHOCYTES NFR BLD: 7.9 %
MAGNESIUM SERPL-MCNC: 1.5 MG/DL
MAGNESIUM SERPL-MCNC: 2.1 MG/DL
MCH RBC QN AUTO: 31.3 PG
MCH RBC QN AUTO: 31.5 PG
MCH RBC QN AUTO: 31.5 PG
MCHC RBC AUTO-ENTMCNC: 34.8 G/DL
MCHC RBC AUTO-ENTMCNC: 34.9 G/DL
MCHC RBC AUTO-ENTMCNC: 34.9 G/DL
MCV RBC AUTO: 90 FL
MODE: ABNORMAL
MONOCYTES # BLD AUTO: 1.2 K/UL
MONOCYTES # BLD AUTO: 1.2 K/UL
MONOCYTES # BLD AUTO: 1.5 K/UL
MONOCYTES NFR BLD: 5.4 %
MONOCYTES NFR BLD: 5.4 %
MONOCYTES NFR BLD: 8.7 %
NEUTROPHILS # BLD AUTO: 14 K/UL
NEUTROPHILS # BLD AUTO: 20.1 K/UL
NEUTROPHILS # BLD AUTO: 20.1 K/UL
NEUTROPHILS NFR BLD: 82.4 %
NEUTROPHILS NFR BLD: 88.3 %
NEUTROPHILS NFR BLD: 88.3 %
NRBC BLD-RTO: 0 /100 WBC
PCO2 BLDA: 33.9 MMHG (ref 35–45)
PEEP: 5
PH SMN: 7.5 [PH] (ref 7.35–7.45)
PHOSPHATE SERPL-MCNC: 2.5 MG/DL
PHOSPHATE SERPL-MCNC: 2.9 MG/DL
PHOSPHATE SERPL-MCNC: 2.9 MG/DL
PLATELET # BLD AUTO: 204 K/UL
PLATELET BLD QL SMEAR: ABNORMAL
PLATELET BLD QL SMEAR: ABNORMAL
PMV BLD AUTO: 10.9 FL
PMV BLD AUTO: 10.9 FL
PMV BLD AUTO: 11.1 FL
PO2 BLDA: 194 MMHG (ref 80–100)
POC BE: 3 MMOL/L
POC SATURATED O2: 100 % (ref 95–100)
POC TCO2: 27 MMOL/L (ref 23–27)
POIKILOCYTOSIS BLD QL SMEAR: SLIGHT
POIKILOCYTOSIS BLD QL SMEAR: SLIGHT
POTASSIUM SERPL-SCNC: 3.2 MMOL/L
POTASSIUM SERPL-SCNC: 3.7 MMOL/L
PROT SERPL-MCNC: 4.9 G/DL
PROT SERPL-MCNC: 5.6 G/DL
PROTHROMBIN TIME: 11.6 SEC
RBC # BLD AUTO: 3.59 M/UL
RBC # BLD AUTO: 3.59 M/UL
RBC # BLD AUTO: 3.93 M/UL
SAMPLE: ABNORMAL
SITE: ABNORMAL
SODIUM SERPL-SCNC: 136 MMOL/L
SODIUM SERPL-SCNC: 137 MMOL/L
SP02: 100
VT: 500
WBC # BLD AUTO: 16.94 K/UL
WBC # BLD AUTO: 22.78 K/UL
WBC # BLD AUTO: 22.78 K/UL

## 2018-12-23 PROCEDURE — 63600175 PHARM REV CODE 636 W HCPCS: Performed by: NURSE PRACTITIONER

## 2018-12-23 PROCEDURE — 84100 ASSAY OF PHOSPHORUS: CPT

## 2018-12-23 PROCEDURE — 31500 INSERT EMERGENCY AIRWAY: CPT | Mod: ,,, | Performed by: PSYCHIATRY & NEUROLOGY

## 2018-12-23 PROCEDURE — 95951 HC EEG MONITORING/VIDEO RECORD: CPT

## 2018-12-23 PROCEDURE — 86901 BLOOD TYPING SEROLOGIC RH(D): CPT

## 2018-12-23 PROCEDURE — 25000003 PHARM REV CODE 250

## 2018-12-23 PROCEDURE — 63600175 PHARM REV CODE 636 W HCPCS: Performed by: PSYCHIATRY & NEUROLOGY

## 2018-12-23 PROCEDURE — 85730 THROMBOPLASTIN TIME PARTIAL: CPT

## 2018-12-23 PROCEDURE — 25500020 PHARM REV CODE 255: Performed by: PSYCHIATRY & NEUROLOGY

## 2018-12-23 PROCEDURE — 82803 BLOOD GASES ANY COMBINATION: CPT

## 2018-12-23 PROCEDURE — 63600175 PHARM REV CODE 636 W HCPCS: Performed by: STUDENT IN AN ORGANIZED HEALTH CARE EDUCATION/TRAINING PROGRAM

## 2018-12-23 PROCEDURE — 25000003 PHARM REV CODE 250: Performed by: STUDENT IN AN ORGANIZED HEALTH CARE EDUCATION/TRAINING PROGRAM

## 2018-12-23 PROCEDURE — 25000003 PHARM REV CODE 250: Performed by: PSYCHIATRY & NEUROLOGY

## 2018-12-23 PROCEDURE — 83605 ASSAY OF LACTIC ACID: CPT

## 2018-12-23 PROCEDURE — 85025 COMPLETE CBC W/AUTO DIFF WBC: CPT | Mod: 91

## 2018-12-23 PROCEDURE — 94761 N-INVAS EAR/PLS OXIMETRY MLT: CPT

## 2018-12-23 PROCEDURE — 99291 CRITICAL CARE FIRST HOUR: CPT | Mod: 25,GC,, | Performed by: PSYCHIATRY & NEUROLOGY

## 2018-12-23 PROCEDURE — 25000003 PHARM REV CODE 250: Performed by: NURSE PRACTITIONER

## 2018-12-23 PROCEDURE — 31622 DX BRONCHOSCOPE/WASH: CPT

## 2018-12-23 PROCEDURE — 85610 PROTHROMBIN TIME: CPT

## 2018-12-23 PROCEDURE — 99900025 HC BRONCHOSCOPY-ASST (STAT)

## 2018-12-23 PROCEDURE — A4216 STERILE WATER/SALINE, 10 ML: HCPCS | Performed by: PSYCHIATRY & NEUROLOGY

## 2018-12-23 PROCEDURE — 31500 INTUBATION: ICD-10-PCS | Mod: ,,, | Performed by: PSYCHIATRY & NEUROLOGY

## 2018-12-23 PROCEDURE — 36620 INSERTION CATHETER ARTERY: CPT

## 2018-12-23 PROCEDURE — A9585 GADOBUTROL INJECTION: HCPCS | Performed by: PSYCHIATRY & NEUROLOGY

## 2018-12-23 PROCEDURE — 84100 ASSAY OF PHOSPHORUS: CPT | Mod: 91

## 2018-12-23 PROCEDURE — 99900026 HC AIRWAY MAINTENANCE (STAT)

## 2018-12-23 PROCEDURE — 95951 PR EEG MONITORING/VIDEORECORD: CPT | Mod: 26,,, | Performed by: PSYCHIATRY & NEUROLOGY

## 2018-12-23 PROCEDURE — 94002 VENT MGMT INPAT INIT DAY: CPT

## 2018-12-23 PROCEDURE — 95951 PR EEG MONITORING/VIDEORECORD: ICD-10-PCS | Mod: 26,,, | Performed by: PSYCHIATRY & NEUROLOGY

## 2018-12-23 PROCEDURE — 83735 ASSAY OF MAGNESIUM: CPT

## 2018-12-23 PROCEDURE — 83735 ASSAY OF MAGNESIUM: CPT | Mod: 91

## 2018-12-23 PROCEDURE — 37799 UNLISTED PX VASCULAR SURGERY: CPT

## 2018-12-23 PROCEDURE — 20000000 HC ICU ROOM

## 2018-12-23 PROCEDURE — 63600175 PHARM REV CODE 636 W HCPCS

## 2018-12-23 PROCEDURE — 80053 COMPREHEN METABOLIC PANEL: CPT

## 2018-12-23 PROCEDURE — 80053 COMPREHEN METABOLIC PANEL: CPT | Mod: 91

## 2018-12-23 PROCEDURE — 99291 PR CRITICAL CARE, E/M 30-74 MINUTES: ICD-10-PCS | Mod: 25,GC,, | Performed by: PSYCHIATRY & NEUROLOGY

## 2018-12-23 RX ORDER — MAGNESIUM SULFATE HEPTAHYDRATE 40 MG/ML
2 INJECTION, SOLUTION INTRAVENOUS
Status: DISCONTINUED | OUTPATIENT
Start: 2018-12-23 | End: 2018-12-28 | Stop reason: SDUPTHER

## 2018-12-23 RX ORDER — LEVETIRACETAM 15 MG/ML
1500 INJECTION INTRAVASCULAR ONCE
Status: COMPLETED | OUTPATIENT
Start: 2018-12-23 | End: 2018-12-23

## 2018-12-23 RX ORDER — CHLORHEXIDINE GLUCONATE ORAL RINSE 1.2 MG/ML
15 SOLUTION DENTAL 2 TIMES DAILY
Status: DISCONTINUED | OUTPATIENT
Start: 2018-12-23 | End: 2018-12-29

## 2018-12-23 RX ORDER — PHENYLEPHRINE HCL IN 0.9% NACL 1 MG/10 ML
SYRINGE (ML) INTRAVENOUS
Status: DISPENSED
Start: 2018-12-23 | End: 2018-12-24

## 2018-12-23 RX ORDER — GADOBUTROL 604.72 MG/ML
9 INJECTION INTRAVENOUS
Status: COMPLETED | OUTPATIENT
Start: 2018-12-23 | End: 2018-12-23

## 2018-12-23 RX ORDER — MAGNESIUM SULFATE HEPTAHYDRATE 40 MG/ML
4 INJECTION, SOLUTION INTRAVENOUS
Status: DISCONTINUED | OUTPATIENT
Start: 2018-12-23 | End: 2018-12-28 | Stop reason: SDUPTHER

## 2018-12-23 RX ORDER — DICLOFENAC SODIUM 10 MG/G
GEL TOPICAL 3 TIMES DAILY PRN
Status: DISCONTINUED | OUTPATIENT
Start: 2018-12-23 | End: 2018-12-28

## 2018-12-23 RX ORDER — ETOMIDATE 2 MG/ML
10 INJECTION INTRAVENOUS ONCE
Status: COMPLETED | OUTPATIENT
Start: 2018-12-23 | End: 2018-12-23

## 2018-12-23 RX ORDER — POTASSIUM CHLORIDE 20 MEQ/15ML
40 SOLUTION ORAL
Status: DISCONTINUED | OUTPATIENT
Start: 2018-12-23 | End: 2019-01-19

## 2018-12-23 RX ORDER — DEXMEDETOMIDINE HYDROCHLORIDE 4 UG/ML
INJECTION, SOLUTION INTRAVENOUS
Status: DISPENSED
Start: 2018-12-23 | End: 2018-12-23

## 2018-12-23 RX ORDER — SODIUM,POTASSIUM PHOSPHATES 280-250MG
2 POWDER IN PACKET (EA) ORAL
Status: DISCONTINUED | OUTPATIENT
Start: 2018-12-23 | End: 2019-01-19

## 2018-12-23 RX ORDER — LIDOCAINE HYDROCHLORIDE 10 MG/ML
1 INJECTION, SOLUTION EPIDURAL; INFILTRATION; INTRACAUDAL; PERINEURAL ONCE
Status: COMPLETED | OUTPATIENT
Start: 2018-12-23 | End: 2018-12-23

## 2018-12-23 RX ORDER — NOREPINEPHRINE BITARTRATE/D5W 4MG/250ML
0.02 PLASTIC BAG, INJECTION (ML) INTRAVENOUS CONTINUOUS
Status: DISCONTINUED | OUTPATIENT
Start: 2018-12-23 | End: 2018-12-27

## 2018-12-23 RX ORDER — POTASSIUM CHLORIDE 20 MEQ/15ML
60 SOLUTION ORAL
Status: DISCONTINUED | OUTPATIENT
Start: 2018-12-23 | End: 2019-01-19

## 2018-12-23 RX ORDER — LIDOCAINE HYDROCHLORIDE 10 MG/ML
1 INJECTION INFILTRATION; PERINEURAL ONCE
Status: DISCONTINUED | OUTPATIENT
Start: 2018-12-23 | End: 2018-12-23

## 2018-12-23 RX ORDER — LIDOCAINE HYDROCHLORIDE 10 MG/ML
INJECTION, SOLUTION EPIDURAL; INFILTRATION; INTRACAUDAL; PERINEURAL
Status: COMPLETED
Start: 2018-12-23 | End: 2018-12-23

## 2018-12-23 RX ORDER — POTASSIUM CHLORIDE 7.45 MG/ML
60 INJECTION INTRAVENOUS
Status: DISCONTINUED | OUTPATIENT
Start: 2018-12-23 | End: 2018-12-23

## 2018-12-23 RX ORDER — MAGNESIUM SULFATE HEPTAHYDRATE 40 MG/ML
4 INJECTION, SOLUTION INTRAVENOUS
Status: DISCONTINUED | OUTPATIENT
Start: 2018-12-23 | End: 2018-12-23

## 2018-12-23 RX ORDER — LEVETIRACETAM 5 MG/ML
500 INJECTION INTRAVASCULAR EVERY 12 HOURS
Status: DISCONTINUED | OUTPATIENT
Start: 2018-12-23 | End: 2018-12-23

## 2018-12-23 RX ORDER — FAMOTIDINE 20 MG/1
20 TABLET, FILM COATED ORAL 2 TIMES DAILY
Status: DISCONTINUED | OUTPATIENT
Start: 2018-12-23 | End: 2019-01-17

## 2018-12-23 RX ORDER — POTASSIUM CHLORIDE 7.45 MG/ML
40 INJECTION INTRAVENOUS
Status: DISCONTINUED | OUTPATIENT
Start: 2018-12-23 | End: 2018-12-23

## 2018-12-23 RX ORDER — LORAZEPAM 2 MG/ML
2 INJECTION INTRAMUSCULAR ONCE
Status: COMPLETED | OUTPATIENT
Start: 2018-12-23 | End: 2018-12-23

## 2018-12-23 RX ORDER — MIDAZOLAM HYDROCHLORIDE 1 MG/ML
2 INJECTION INTRAMUSCULAR; INTRAVENOUS ONCE
Status: DISCONTINUED | OUTPATIENT
Start: 2018-12-23 | End: 2018-12-23

## 2018-12-23 RX ORDER — LORAZEPAM 2 MG/ML
2 INJECTION INTRAMUSCULAR ONCE
Status: DISCONTINUED | OUTPATIENT
Start: 2018-12-23 | End: 2018-12-28

## 2018-12-23 RX ORDER — PHENYLEPHRINE HYDROCHLORIDE 10 MG/ML
INJECTION INTRAVENOUS
Status: DISCONTINUED
Start: 2018-12-23 | End: 2018-12-23 | Stop reason: WASHOUT

## 2018-12-23 RX ORDER — POTASSIUM CHLORIDE 7.45 MG/ML
80 INJECTION INTRAVENOUS
Status: DISCONTINUED | OUTPATIENT
Start: 2018-12-23 | End: 2018-12-23

## 2018-12-23 RX ORDER — ROCURONIUM BROMIDE 10 MG/ML
100 INJECTION, SOLUTION INTRAVENOUS ONCE
Status: COMPLETED | OUTPATIENT
Start: 2018-12-23 | End: 2018-12-23

## 2018-12-23 RX ORDER — POTASSIUM CHLORIDE 7.45 MG/ML
10 INJECTION INTRAVENOUS
Status: COMPLETED | OUTPATIENT
Start: 2018-12-23 | End: 2018-12-23

## 2018-12-23 RX ORDER — DEXMEDETOMIDINE HYDROCHLORIDE 4 UG/ML
0.2 INJECTION, SOLUTION INTRAVENOUS CONTINUOUS
Status: DISCONTINUED | OUTPATIENT
Start: 2018-12-23 | End: 2018-12-24

## 2018-12-23 RX ORDER — LANOLIN ALCOHOL/MO/W.PET/CERES
800 CREAM (GRAM) TOPICAL
Status: DISCONTINUED | OUTPATIENT
Start: 2018-12-23 | End: 2019-01-19

## 2018-12-23 RX ORDER — LEVETIRACETAM 10 MG/ML
1000 INJECTION INTRAVASCULAR EVERY 12 HOURS
Status: DISCONTINUED | OUTPATIENT
Start: 2018-12-23 | End: 2018-12-28

## 2018-12-23 RX ORDER — LORAZEPAM 2 MG/ML
INJECTION INTRAMUSCULAR
Status: DISPENSED
Start: 2018-12-23 | End: 2018-12-23

## 2018-12-23 RX ORDER — LORAZEPAM 2 MG/ML
2 INJECTION INTRAMUSCULAR ONCE
Status: COMPLETED | OUTPATIENT
Start: 2018-12-24 | End: 2018-12-23

## 2018-12-23 RX ORDER — MAGNESIUM SULFATE HEPTAHYDRATE 40 MG/ML
2 INJECTION, SOLUTION INTRAVENOUS
Status: DISCONTINUED | OUTPATIENT
Start: 2018-12-23 | End: 2018-12-23

## 2018-12-23 RX ORDER — LORAZEPAM 2 MG/ML
INJECTION INTRAMUSCULAR
Status: COMPLETED
Start: 2018-12-23 | End: 2018-12-23

## 2018-12-23 RX ADMIN — LEVETIRACETAM INJECTION 1500 MG: 15 INJECTION INTRAVENOUS at 02:12

## 2018-12-23 RX ADMIN — LIDOCAINE HYDROCHLORIDE 10 MG: 10 INJECTION, SOLUTION EPIDURAL; INFILTRATION; INTRACAUDAL; PERINEURAL at 01:12

## 2018-12-23 RX ADMIN — CLONIDINE HYDROCHLORIDE 0.1 MG: 0.1 TABLET ORAL at 09:12

## 2018-12-23 RX ADMIN — POTASSIUM CHLORIDE 10 MEQ: 7.46 INJECTION, SOLUTION INTRAVENOUS at 10:12

## 2018-12-23 RX ADMIN — HYDRALAZINE HYDROCHLORIDE 10 MG: 20 INJECTION INTRAMUSCULAR; INTRAVENOUS at 04:12

## 2018-12-23 RX ADMIN — LORAZEPAM 2 MG: 2 INJECTION INTRAMUSCULAR; INTRAVENOUS at 10:12

## 2018-12-23 RX ADMIN — LORAZEPAM 2 MG: 2 INJECTION INTRAMUSCULAR at 11:12

## 2018-12-23 RX ADMIN — CHLORHEXIDINE GLUCONATE 0.12% ORAL RINSE 15 ML: 1.2 LIQUID ORAL at 09:12

## 2018-12-23 RX ADMIN — NICARDIPINE HYDROCHLORIDE 2.5 MG/HR: 0.2 INJECTION, SOLUTION INTRAVENOUS at 04:12

## 2018-12-23 RX ADMIN — LEVETIRACETAM 500 MG: 5 INJECTION INTRAVENOUS at 09:12

## 2018-12-23 RX ADMIN — MUPIROCIN 1 G: 20 OINTMENT TOPICAL at 09:12

## 2018-12-23 RX ADMIN — HEPARIN SODIUM 5000 UNITS: 5000 INJECTION, SOLUTION INTRAVENOUS; SUBCUTANEOUS at 02:12

## 2018-12-23 RX ADMIN — LORAZEPAM 2 MG: 2 INJECTION INTRAMUSCULAR; INTRAVENOUS at 11:12

## 2018-12-23 RX ADMIN — FAMOTIDINE 20 MG: 20 TABLET ORAL at 09:12

## 2018-12-23 RX ADMIN — Medication 0.02 MCG/KG/MIN: at 10:12

## 2018-12-23 RX ADMIN — Medication 3 ML: at 09:12

## 2018-12-23 RX ADMIN — LEVETIRACETAM 1000 MG: 10 INJECTION INTRAVENOUS at 09:12

## 2018-12-23 RX ADMIN — GADOBUTROL 9 ML: 604.72 INJECTION INTRAVENOUS at 12:12

## 2018-12-23 RX ADMIN — HEPARIN SODIUM 5000 UNITS: 5000 INJECTION, SOLUTION INTRAVENOUS; SUBCUTANEOUS at 09:12

## 2018-12-23 RX ADMIN — POTASSIUM CHLORIDE 10 MEQ: 7.46 INJECTION, SOLUTION INTRAVENOUS at 08:12

## 2018-12-23 RX ADMIN — MAGNESIUM SULFATE IN WATER 4 G: 40 INJECTION, SOLUTION INTRAVENOUS at 06:12

## 2018-12-23 RX ADMIN — POTASSIUM CHLORIDE 10 MEQ: 7.46 INJECTION, SOLUTION INTRAVENOUS at 09:12

## 2018-12-23 RX ADMIN — ROCURONIUM BROMIDE 100 MG: 10 INJECTION, SOLUTION INTRAVENOUS at 01:12

## 2018-12-23 RX ADMIN — LEVETIRACETAM INJECTION 1500 MG: 15 INJECTION INTRAVENOUS at 05:12

## 2018-12-23 RX ADMIN — POTASSIUM CHLORIDE 10 MEQ: 7.46 INJECTION, SOLUTION INTRAVENOUS at 06:12

## 2018-12-23 RX ADMIN — HEPARIN SODIUM 5000 UNITS: 5000 INJECTION, SOLUTION INTRAVENOUS; SUBCUTANEOUS at 06:12

## 2018-12-23 RX ADMIN — ETOMIDATE 10 MG: 2 INJECTION, SOLUTION INTRAVENOUS at 01:12

## 2018-12-23 RX ADMIN — PRAVASTATIN SODIUM 40 MG: 40 TABLET ORAL at 09:12

## 2018-12-23 RX ADMIN — Medication 3 ML: at 02:12

## 2018-12-23 NOTE — PROCEDURES
Emergency Diagnostic Flexible Bronchoscopy Procedure Note:     Date of Procedure: 12/23/2018     Indication: SDH (subdural hematoma)    Post-procedure diagnosis: same    Consent: Emergency procedure secondary to persistent hypoxia with suspected mucous plug    Technique: A time out was performed. Clean technique per hospital protocol was used, including gloves and mask. The patient was placed on 100% FiO2 with adequate minute ventilation and sedated with RSI for intubation immediately prior to bronchoscopy. Blood pressure was supported. A fiberoptic bronchoscope was inserted, and 5 mL 1% lidocaine was injected above the abby. The abby was blunted. The right and left lungs were examined to the subsegmental level. Secretions were copious in the LLL as well as RML and suctioned. Lung collapse and mucus plugging in LLL were relieved. Estimated blood loss was none.    Complications: None.      Post-bronchoscopy saturations were 100% on reduced ventilator settings.

## 2018-12-23 NOTE — ASSESSMENT & PLAN NOTE
62 yo male with L aSDH now s/p craniotomy for evacuation (12/20).    Interval development of global aphasia. Head CT and EEG negative.    --Continue care per primary team.  --Continue levetiracetam 500 bid.  --Will obtain MRI brain to investigate etiology of neurological decline.  --We will continue to monitor closely, please contact us with any questions or concerns.

## 2018-12-23 NOTE — PROGRESS NOTES
[facility]  Neurosurgery  Progress Note    Subjective:     History of Present Illness: 64 yo M with PMH of CAD, CABG in 2006, DM, HTN, HLD on ASA presents to the ED for AMS. Patient fell 4 days ago with about 2 minutes of LOC. Family is unsure of what caused the fall. Full workup done including a CTH at [facility] that was negative. Patient went home and stopped taking his ASA. This morning, his family noticed he was more confused. He went to [facility] and CTH was done that revealed a 1.7cm left frontalpariental SDH with a 5 mm right sided midline shift and a left SAH. Patient was transferred to [facility]. History was obtained per family as patient unable to give history 2/2 confusion with expressive and receptive aphasia.    Post-Op Info:  Procedure(s) (LRB):  CRANIOTOMY, FOR SUBDURAL HEMATOMA EVACUATION (Left)   3 Days Post-Op         Medications:  Continuous Infusions:   lactated ringers 50 mL/hr at 12/23/18 1000    nicardipine Stopped (12/23/18 0501)     Scheduled Meds:   bisoprolol  10 mg Oral Daily    cloNIDine  0.1 mg Per NG tube BID    heparin (porcine)  5,000 Units Subcutaneous Q8H    levETIRAcetam in NaCl (iso-os)  500 mg Intravenous Q12H    mupirocin  1 g Nasal BID    pravastatin  40 mg Oral QHS    senna-docusate 8.6-50 mg  1 tablet Oral Daily    sodium chloride 0.9%  3 mL Intravenous Q8H     PRN Meds:sodium chloride, acetaminophen, diclofenac sodium, hydrALAZINE, HYDROcodone-acetaminophen, magnesium sulfate IVPB, magnesium sulfate IVPB, morphine, ondansetron, ondansetron     Review of Systems    Objective:     Weight: 85.7 kg (189 lb)  Body mass index is 27.12 kg/m².  Vital Signs (Most Recent):  Temp: 98.8 °F (37.1 °C) (12/23/18 0702)  Pulse: 105 (12/23/18 1000)  Resp: 16 (12/23/18 1000)  BP: (!) 156/89 (12/23/18 1000)  SpO2: 98 % (12/23/18 1000) Vital Signs (24h Range):  Temp:  [98.1 °F (36.7 °C)-98.8 °F (37.1 °C)] 98.8 °F (37.1 °C)  Pulse:  [105-121] 105  Resp:  [16-41] 16  SpO2:   "[88 %-100 %] 98 %  BP: (130-199)/() 156/89     Date 12/23/18 0700 - 12/24/18 0659   Shift 8645-1247 5966-6730 2943-6876 24 Hour Total   INTAKE   I.V.(mL/kg) 195.8(2.3)   195.8(2.3)   Shift Total(mL/kg) 195.8(2.3)   195.8(2.3)   OUTPUT   Urine(mL/kg/hr) 195   195   Shift Total(mL/kg) 195(2.3)   195(2.3)   Weight (kg) 85.7 85.7 85.7 85.7                        Closed/Suction Drain 12/21/18 0111 Left Other (Comment) Accordion 10 Fr. (Active)   Site Description Unable to view 12/21/2018 11:02 AM   Dressing Type [place] 12/21/2018 11:02 AM   Dressing Status Clean;Dry;Intact 12/21/2018 11:02 AM   Dressing Intervention Dressing reinforced 12/21/2018 11:02 AM   Drainage Bloody 12/21/2018 11:02 AM   Status To bulb suction 12/21/2018 11:02 AM   Output (mL) 80 mL 12/21/2018  6:00 AM            Closed/Suction Drain 12/21/18 0112 Left Other (Comment) Accordion 10 Fr. (Active)   Site Description Unable to view 12/21/2018 11:02 AM   Dressing Type [place] 12/21/2018 11:02 AM   Dressing Status Clean;Dry;Intact 12/21/2018 11:02 AM   Dressing Intervention Dressing reinforced 12/21/2018 11:02 AM   Drainage Bloody 12/21/2018 11:02 AM   Status To bulb suction 12/21/2018 11:02 AM   Output (mL) 70 mL 12/21/2018 11:00 AM            Urethral Catheter 12/20/18 2350 Non-latex;Straight-tip 16 Fr. (Active)   Site Assessment Clean;Intact 12/21/2018 11:02 AM   Collection Container Urimeter 12/21/2018 11:02 AM   Securement Method secured to top of thigh w/ adhesive device 12/21/2018 11:02 AM   Catheter Care Performed yes 12/21/2018 11:02 AM   Reason for Continuing Urinary Catheterization Urinary retention 12/21/2018 11:02 AM   CAUTI Prevention Bundle StatLock in place w 1" slack;Intact seal between catheter & drainage tubing;Drainage bag off the floor;Green sheeting clip in use;Drainage bag not overfilled (<2/3 full);No dependent loops or kinks;Drainage bag below bladder 12/21/2018 11:02 AM   Output (mL) 100 mL 12/21/2018  2:00 PM "       Neurosurgery Physical Exam     [id number]  Eyes to voice  PERRL        Significant Labs:  Recent Labs   Lab 12/22/18  0117 12/22/18  1053 12/23/18  0419   GLU 89  --  98     --  137   K 3.0* 3.6 3.2*     --  98   CO2 25  --  26   BUN 9  --  9   CREATININE 0.6  --  0.6   CALCIUM 9.2  --  9.2   MG 1.5* 1.4* 1.5*     Recent Labs   Lab 12/22/18  0117 12/23/18  0419   WBC 17.67* 16.94*   HGB 13.8* 12.3*   HCT 41.1 35.3*    204     Recent Labs   Lab 12/23/18  0430   INR 1.1   APTT 28.0     Microbiology Results (last 7 days)     ** No results found for the last 168 hours. **        Recent Lab Results       12/23/18  0430   12/23/18 0419   12/22/18  1053        Immature Granulocytes   0.8       Immature Grans (Abs)   0.13  Comment:  Mild elevation in immature granulocytes is non specific and   can be seen in a variety of conditions including stress response,   acute inflammation, trauma and pregnancy. Correlation with other   laboratory and clinical findings is essential.         Albumin   2.5       Alkaline Phosphatase   59       ALT   <5       Anion Gap   13       aPTT 28.0  Comment:  aPTT therapeutic range = 39-69 seconds         AST   13       Baso #   0.02       Basophil%   0.1       Total Bilirubin   1.4  Comment:  For infants and newborns, interpretation of results should be based  on gestational age, weight and in agreement with clinical  observations.  Premature Infant recommended reference ranges:  Up to 24 hours.............<8.0 mg/dL  Up to 48 hours............<12.0 mg/dL  3-5 days..................<15.0 mg/dL  6-29 days.................<15.0 mg/dL         BUN, Bld   9       Calcium   9.2       Chloride   98       CO2   26       Creatinine   0.6       Differential Method   Automated       eGFR if    >60.0       eGFR if non    >60.0  Comment:  Calculation used to obtain the estimated glomerular filtration  rate (eGFR) is the CKD-EPI equation.          Eos #    0.0       Eosinophil%   0.1       Glucose   98       Gran # (ANC)   14.0       Gran%   82.4       Hematocrit   35.3       Hemoglobin   12.3       Coumadin Monitoring INR 1.1  Comment:  Coumadin Therapy:  2.0 - 3.0 for INR for all indicators except mechanical heart valves  and antiphospholipid syndromes which should use 2.5 - 3.5.           Lymph #   1.3       Lymph%   7.9       Magnesium   1.5 1.4     MCH   31.3       MCHC   34.8       MCV   90       Mono #   1.5       Mono%   8.7       MPV   11.1       nRBC   0       Phosphorus   2.5       Platelets   204       Potassium   3.2 3.6     Total Protein   5.6       Protime 11.6         RBC   3.93       RDW   14.4       Sodium   137       WBC   16.94             Significant Diagnostics:  I have reviewed all pertinent imaging results/findings within the past 24 hours.    Assessment/Plan:     * SDH (subdural hematoma)    62 yo male with L aSDH now s/p craniotomy for evacuation (12/20).    Interval development of global aphasia. Head CT and EEG negative.    --Continue care per primary team.  --Continue levetiracetam 500 bid.  --Will obtain MRI brain to investigate etiology of neurological decline.  --We will continue to monitor closely, please contact us with any questions or concerns.               Murray Mendes MD  Neurosurgery  [facility]

## 2018-12-23 NOTE — ANESTHESIA PROCEDURE NOTES
Intubation    Diagnosis: acute respiratory failure  Patient location during procedure: ICU  Procedure start time: 12/23/2018 1:00 PM  Timeout: 12/23/2018 12:59 PM  Procedure end time: 12/23/2018 1:03 PM  Staffing  Resident/CRNA: Madhavi Cruz DO  Performed: resident/CRNA   Preanesthetic Checklist  Completed: patient identified, site marked, surgical consent, pre-op evaluation, timeout performed, IV checked, risks and benefits discussed, monitors and equipment checked and anesthesia consent given  Intubation  Indication: respiratory failure  Pre-oxygenation. Induction: intravenous, mask ventilation: moderately difficult with oral airway.  Intubation: postinduction, laryngoscopy video-assisted, Allie 3.  Endotracheal Tube: oral, 8.0 mm ID, cuffed (inflated to minimal occlusive pressure)  Attempts: 1, Grade I - full view of cords  Tube secured at 24 cm at the lips.  Findings post-intubation: bilateral breath sounds, positive ETCO2  Position Confirmation: auscultation

## 2018-12-23 NOTE — PROGRESS NOTES
Witness seizure like activity noted while Dr. SHRESTHA of NCC team was at bedside examining pt. Ativan and keppra ordered. Will continue to monitor

## 2018-12-23 NOTE — ASSESSMENT & PLAN NOTE
Epidural with subdural component s/p evac with SG drain (12/20)  - last asa was approx 72 hours PTA  - EEG w/o seizure  - goal sBP <160  - cleared for puree thin by SLP  - continue PT/OT  - keppra 500 mg q12

## 2018-12-23 NOTE — PROGRESS NOTES
Altagracia of [facility] team notified of the pt persistent high BP above 160 after prn BP and pain medication given. Awaiting new orders. Will continue to monitor

## 2018-12-23 NOTE — CARE UPDATE
Per Pharmacy, patient not eligible to receive Phos replacements at this time through IV due to shortage.  Eligible for one time dose of 40 mg IV potassium.  MD Philip Notified.  Will carry out orders and cont to monitor.

## 2018-12-23 NOTE — PROGRESS NOTES
Per primary RN, during transport back to room from [facility], pt desaturated to 60's. Pt brought back to room on 15 L non re-breather.     Pt continued to desaturate despite non re-breather. Decision made by MD to emergently intubate pt. Pt ventilated w/ 100% FiO2 via ambu bag. 10 mg Etomidate given IV followed by 100 mg Rocuronium IV. Pt successfully intubated w/ size 8.0 ETT measured at 25 at the lips. Positive color change noted on EtCO2 detector. Breath sounds auscultated. CXR ordered by MD. Will continue to monitor.

## 2018-12-23 NOTE — CARE UPDATE
NGT attempted by day shift with resulting tube in lung.  Tube removed and retried at 1900.  Resulted tube coiling in distal esophagus.  Removed and retried at 2200.  Again coiled in distal esophagus.  Appropriate medication changed to IV.  MD Philip ordered to retry in AM.  Possible structural issue.

## 2018-12-23 NOTE — ASSESSMENT & PLAN NOTE
Off of cardene in AM, however resumed in the evening  Resumed home meds: clonidine 0.1 q12, bisoprolol 10 qd  PRN hydralazine for sBP>160

## 2018-12-23 NOTE — EICU
Called into room for emergent intubation. Dr Decker at bedside preparing to intubate , SP02 82, /69.  12:52 ETT in place with positive color change and bilateral breath sounds per team CXR ordered.  12:54 SP02 70% ETT removed and bagging commenced with oral airway  12:56 SP02 83% BP95/53, anesthesia present with Dr Decker  12:59 , SP02 81%, BP 86/53  13:03 Pt reintubated with an 8.0 ETT 24 cms at the lip SP02 98%

## 2018-12-23 NOTE — SUBJECTIVE & OBJECTIVE
Medications:  Continuous Infusions:   lactated ringers 50 mL/hr at 12/23/18 1000    nicardipine Stopped (12/23/18 0501)     Scheduled Meds:   bisoprolol  10 mg Oral Daily    cloNIDine  0.1 mg Per NG tube BID    heparin (porcine)  5,000 Units Subcutaneous Q8H    levETIRAcetam in NaCl (iso-os)  500 mg Intravenous Q12H    mupirocin  1 g Nasal BID    pravastatin  40 mg Oral QHS    senna-docusate 8.6-50 mg  1 tablet Oral Daily    sodium chloride 0.9%  3 mL Intravenous Q8H     PRN Meds:sodium chloride, acetaminophen, diclofenac sodium, hydrALAZINE, HYDROcodone-acetaminophen, magnesium sulfate IVPB, magnesium sulfate IVPB, morphine, ondansetron, ondansetron     Review of Systems    Objective:     Weight: 85.7 kg (189 lb)  Body mass index is 27.12 kg/m².  Vital Signs (Most Recent):  Temp: 98.8 °F (37.1 °C) (12/23/18 0702)  Pulse: 105 (12/23/18 1000)  Resp: 16 (12/23/18 1000)  BP: (!) 156/89 (12/23/18 1000)  SpO2: 98 % (12/23/18 1000) Vital Signs (24h Range):  Temp:  [98.1 °F (36.7 °C)-98.8 °F (37.1 °C)] 98.8 °F (37.1 °C)  Pulse:  [105-121] 105  Resp:  [16-41] 16  SpO2:  [88 %-100 %] 98 %  BP: (130-199)/() 156/89     Date 12/23/18 0700 - 12/24/18 0659   Shift 9830-7363 9746-1057 0050-4982 24 Hour Total   INTAKE   I.V.(mL/kg) 195.8(2.3)   195.8(2.3)   Shift Total(mL/kg) 195.8(2.3)   195.8(2.3)   OUTPUT   Urine(mL/kg/hr) 195   195   Shift Total(mL/kg) 195(2.3)   195(2.3)   Weight (kg) 85.7 85.7 85.7 85.7                        Closed/Suction Drain 12/21/18 0111 Left Other (Comment) Accordion 10 Fr. (Active)   Site Description Unable to view 12/21/2018 11:02 AM   Dressing Type [place] 12/21/2018 11:02 AM   Dressing Status Clean;Dry;Intact 12/21/2018 11:02 AM   Dressing Intervention Dressing reinforced 12/21/2018 11:02 AM   Drainage Bloody 12/21/2018 11:02 AM   Status To bulb suction 12/21/2018 11:02 AM   Output (mL) 80 mL 12/21/2018  6:00 AM            Closed/Suction Drain 12/21/18 0112 Left Other  "(Comment) Accordion 10 Fr. (Active)   Site Description Unable to view 12/21/2018 11:02 AM   Dressing Type [place] 12/21/2018 11:02 AM   Dressing Status Clean;Dry;Intact 12/21/2018 11:02 AM   Dressing Intervention Dressing reinforced 12/21/2018 11:02 AM   Drainage Bloody 12/21/2018 11:02 AM   Status To bulb suction 12/21/2018 11:02 AM   Output (mL) 70 mL 12/21/2018 11:00 AM            Urethral Catheter 12/20/18 2350 Non-latex;Straight-tip 16 Fr. (Active)   Site Assessment Clean;Intact 12/21/2018 11:02 AM   Collection Container Urimeter 12/21/2018 11:02 AM   Securement Method secured to top of thigh w/ adhesive device 12/21/2018 11:02 AM   Catheter Care Performed yes 12/21/2018 11:02 AM   Reason for Continuing Urinary Catheterization Urinary retention 12/21/2018 11:02 AM   CAUTI Prevention Bundle StatLock in place w 1" slack;Intact seal between catheter & drainage tubing;Drainage bag off the floor;Green sheeting clip in use;Drainage bag not overfilled (<2/3 full);No dependent loops or kinks;Drainage bag below bladder 12/21/2018 11:02 AM   Output (mL) 100 mL 12/21/2018  2:00 PM       Neurosurgery Physical Exam     E3V2M5  Eyes to voice  PERRL        Significant Labs:  Recent Labs   Lab 12/22/18 0117 12/22/18  1053 12/23/18  0419   GLU 89  --  98     --  137   K 3.0* 3.6 3.2*     --  98   CO2 25  --  26   BUN 9  --  9   CREATININE 0.6  --  0.6   CALCIUM 9.2  --  9.2   MG 1.5* 1.4* 1.5*     Recent Labs   Lab 12/22/18  0117 12/23/18  0419   WBC 17.67* 16.94*   HGB 13.8* 12.3*   HCT 41.1 35.3*    204     Recent Labs   Lab 12/23/18  0430   INR 1.1   APTT 28.0     Microbiology Results (last 7 days)     ** No results found for the last 168 hours. **        Recent Lab Results       12/23/18  0430   12/23/18  0419   12/22/18  1053        Immature Granulocytes   0.8       Immature Grans (Abs)   0.13  Comment:  Mild elevation in immature granulocytes is non specific and   can be seen in a variety of " conditions including stress response,   acute inflammation, trauma and pregnancy. Correlation with other   laboratory and clinical findings is essential.         Albumin   2.5       Alkaline Phosphatase   59       ALT   <5       Anion Gap   13       aPTT 28.0  Comment:  aPTT therapeutic range = 39-69 seconds         AST   13       Baso #   0.02       Basophil%   0.1       Total Bilirubin   1.4  Comment:  For infants and newborns, interpretation of results should be based  on gestational age, weight and in agreement with clinical  observations.  Premature Infant recommended reference ranges:  Up to 24 hours.............<8.0 mg/dL  Up to 48 hours............<12.0 mg/dL  3-5 days..................<15.0 mg/dL  6-29 days.................<15.0 mg/dL         BUN, Bld   9       Calcium   9.2       Chloride   98       CO2   26       Creatinine   0.6       Differential Method   Automated       eGFR if    >60.0       eGFR if non    >60.0  Comment:  Calculation used to obtain the estimated glomerular filtration  rate (eGFR) is the CKD-EPI equation.          Eos #   0.0       Eosinophil%   0.1       Glucose   98       Gran # (ANC)   14.0       Gran%   82.4       Hematocrit   35.3       Hemoglobin   12.3       Coumadin Monitoring INR 1.1  Comment:  Coumadin Therapy:  2.0 - 3.0 for INR for all indicators except mechanical heart valves  and antiphospholipid syndromes which should use 2.5 - 3.5.           Lymph #   1.3       Lymph%   7.9       Magnesium   1.5 1.4     MCH   31.3       MCHC   34.8       MCV   90       Mono #   1.5       Mono%   8.7       MPV   11.1       nRBC   0       Phosphorus   2.5       Platelets   204       Potassium   3.2 3.6     Total Protein   5.6       Protime 11.6         RBC   3.93       RDW   14.4       Sodium   137       WBC   16.94             Significant Diagnostics:  I have reviewed all pertinent imaging results/findings within the past 24 hours.

## 2018-12-23 NOTE — CARE UPDATE
Patient having trouble clearing secretions.  Respiratory therapist at bedside to perform CPT and suctioning.

## 2018-12-23 NOTE — PROGRESS NOTES
[facility]  Neurocritical Care  Progress Note    Admit Date: 12/20/2018  Service Date: 12/22/2018  Length of Stay: 2    Subjective:     Chief Complaint: SDH (subdural hematoma)    History of Present Illness: 63 year old male h/o CABG on ASA 81 mg (last dose 72 hours PTA) presenting as transfer from OSH for EDH with SDH. Patient reportedly had a mechanical fall from standing on Sunday and went to ED where CTH was negative for any acute intracranial pathology. Overnight, patient developed confusion and presented to OSH ED where CTH was consistent with EDH with SDH. Patient has progressively worsened throughout the day developing intermittent global aphasia. NSGY consulted in ED at [facility] and stat CTH has been ordered. NCC consulted for admission and medical management. Patient on weaning off of cardene at time of evaluation with sbp of 120. Reported presenting sbp in 150s.    Hospital Course: 12/22: no major events overnight. Weaned off of cardene. This morning AOx3. Drain removed by NSGY at 11 AM, and planned to step down to NSGY.  AMS noted around 1PM, patient aphasic and not following commands, STAT CTH unremarkable.     Interval History:    AAOx3 this AM, drain removed by NSGY. Patient off of cardene, and started on a diet.  Patient became disoriented and aphasic later in the day. STAT CTH unchanged. NSGY aware    Review of Systems   Unable to perform ROS: Mental status change   Constitutional: Negative for chills and fever.   HENT: Positive for trouble swallowing.    Gastrointestinal: Negative for vomiting.   Genitourinary: Negative for decreased urine volume.   Allergic/Immunologic: Negative for immunocompromised state.   Neurological: Positive for speech difficulty and weakness. Negative for seizures.   Psychiatric/Behavioral: Positive for confusion. Negative for agitation. The patient is not nervous/anxious.        Objective:     Vitals:  Temp: 98.2 °F (36.8 °C)  Pulse: 108  Rhythm: sinus  tachycardia  BP: (!) 192/89  MAP (mmHg): 126  Resp: (!) 38  SpO2: 95 %  O2 Device (Oxygen Therapy): nasal cannula    Temp  Min: 97 °F (36.1 °C)  Max: 98.4 °F (36.9 °C)  Pulse  Min: 98  Max: 121  BP  Min: 130/73  Max: 192/89  MAP (mmHg)  Min: 95  Max: 132  Resp  Min: 12  Max: 38  SpO2  Min: 93 %  Max: 100 %    12/21 0701 - 12/22 0700  In: 2140.2 [I.V.:1940.2]  Out: 2017.5 [Urine:1825; Drains:192.5]   Unmeasured Output  Stool Occurrence: 0       Physical Exam   Constitutional: He appears well-developed and well-nourished. He is cooperative. No distress.   HENT:   Head: Normocephalic and atraumatic.   Mouth/Throat: Mucous membranes are normal.   Eyes: Conjunctivae are normal.   Neck: Normal range of motion. Neck supple.   Cardiovascular: Normal rate and regular rhythm. Exam reveals no gallop.   No murmur heard.  Pulses:       Radial pulses are 2+ on the right side, and 2+ on the left side.        Dorsalis pedis pulses are 2+ on the right side, and 2+ on the left side.   Pulmonary/Chest: Effort normal. No tachypnea. No respiratory distress. He has no decreased breath sounds.   Abdominal: Soft. He exhibits no distension.   Musculoskeletal: Normal range of motion. He exhibits no edema or tenderness.   Neurological: He displays no seizure activity.   Initially alert, awake and oriented, following commands  Later during the day became disoriented and aphasic, no commands     Skin: Skin is warm. No rash noted. He is not diaphoretic. No erythema.   Nursing note and vitals reviewed.    Unable to test coordination, gait due to level of consciousness and weakness.    Medications:  Continuous  lactated ringers Last Rate: 100 mL/hr at 12/22/18 1600   Scheduled  bisoprolol 10 mg Daily   cloNIDine 0.1 mg BID   [START ON 12/23/2018] heparin (porcine) 5,000 Units Q8H   levETIRAcetam 500 mg BID   mupirocin 1 g BID   pravastatin 40 mg QHS   senna-docusate 8.6-50 mg 1 tablet Daily   sodium chloride 0.9% 3 mL Q8H   PRN  sodium chloride   Q24H PRN   acetaminophen 650 mg Q6H PRN   hydrALAZINE 10 mg Q4H PRN   HYDROcodone-acetaminophen 1 tablet Q4H PRN   magnesium oxide 800 mg PRN   magnesium oxide 800 mg PRN   morphine 0.5 mg Q6H PRN   ondansetron 8 mg Q8H PRN   ondansetron 4 mg Q12H PRN   potassium chloride 10% 40 mEq PRN   potassium chloride 10% 40 mEq PRN   potassium chloride 10% 40 mEq PRN   potassium, sodium phosphates 2 packet PRN   potassium, sodium phosphates 2 packet PRN   potassium, sodium phosphates 2 packet PRN     Today I personally reviewed pertinent medications, lines/drains/airways, imaging, cardiology results, laboratory results, microbiology results, notably:    EEG (12/22):  Mild diffuse slowing. No seizure activity    Diet  No diet orders on file  No diet orders on file        Assessment/Plan:     Neuro   * SDH (subdural hematoma)    Epidural with subdural component s/p evac with SG drain (12/20)  - last asa was approx 72 hours PTA  - EEG w/o seizure  - goal sBP <160  - cleared for puree thin by SLP  - continue PT/OT  - keppra 500 mg q12     Brain compression    See SDH     Cardiac/Vascular   Essential hypertension    Off of cardene in AM, however resumed in the evening  Resumed home meds: clonidine 0.1 q12, bisoprolol 10 qd  PRN hydralazine for sBP>160         The patient is being Prophylaxed for:  Venous Thromboembolism with: Chemical  Stress Ulcer with: Not Applicable   Ventilator Pneumonia with: not applicable    Activity Orders          None        Full Code    Yas Eddy MD  Neurocritical Care  [facility]

## 2018-12-23 NOTE — PLAN OF CARE
Problem: Adult Inpatient Plan of Care  Goal: Plan of Care Review  Outcome: Ongoing (interventions implemented as appropriate)  POC reviewed with pt at 0500. Pt unable to verbalize understanding due to aphasia. Questions and concerns of wife addressed.  See notes for overnight events.  Patient started on cardene at start of shift.  PRNs utilized.  Voltaren Gel ordered for arthritis pain.  NGT still unsuccessful may need x ray guided assistence.  Night bath complete.  Patient turned q 2 with skin breakdown remaining present on sacrum.  Dressing intact, in place per wound care.  Pt progressing toward goals. Will continue to monitor. See flowsheets for full assessment and VS info

## 2018-12-24 PROBLEM — R65.21 SEPTIC SHOCK: Status: ACTIVE | Noted: 2018-12-24

## 2018-12-24 PROBLEM — A41.9 SEPTIC SHOCK: Status: ACTIVE | Noted: 2018-12-24

## 2018-12-24 PROBLEM — J96.00 ACUTE RESPIRATORY FAILURE: Status: ACTIVE | Noted: 2018-12-24

## 2018-12-24 PROBLEM — G40.901 STATUS EPILEPTICUS: Status: ACTIVE | Noted: 2018-12-24

## 2018-12-24 PROBLEM — I48.91 NEW ONSET ATRIAL FIBRILLATION: Status: ACTIVE | Noted: 2018-12-24

## 2018-12-24 PROBLEM — N17.9 AKI (ACUTE KIDNEY INJURY): Status: ACTIVE | Noted: 2018-12-24

## 2018-12-24 LAB
ABO + RH BLD: NORMAL
ALBUMIN SERPL BCP-MCNC: 2 G/DL
ALLENS TEST: ABNORMAL
ALP SERPL-CCNC: 59 U/L
ALT SERPL W/O P-5'-P-CCNC: <5 U/L
ANION GAP SERPL CALC-SCNC: 13 MMOL/L
ANISOCYTOSIS BLD QL SMEAR: SLIGHT
AST SERPL-CCNC: 9 U/L
BASOPHILS # BLD AUTO: 0.02 K/UL
BASOPHILS NFR BLD: 0.1 %
BILIRUB SERPL-MCNC: 1.6 MG/DL
BLD GP AB SCN CELLS X3 SERPL QL: NORMAL
BLD PROD TYP BPU: NORMAL
BLD PROD TYP BPU: NORMAL
BLOOD UNIT EXPIRATION DATE: NORMAL
BLOOD UNIT EXPIRATION DATE: NORMAL
BLOOD UNIT TYPE CODE: 9500
BLOOD UNIT TYPE CODE: 9500
BLOOD UNIT TYPE: NORMAL
BLOOD UNIT TYPE: NORMAL
BUN SERPL-MCNC: 20 MG/DL
BURR CELLS BLD QL SMEAR: ABNORMAL
CALCIUM SERPL-MCNC: 8.8 MG/DL
CHLORIDE SERPL-SCNC: 99 MMOL/L
CO2 SERPL-SCNC: 23 MMOL/L
CODING SYSTEM: NORMAL
CODING SYSTEM: NORMAL
CREAT SERPL-MCNC: 1.1 MG/DL
DELSYS: ABNORMAL
DIFFERENTIAL METHOD: ABNORMAL
DISPENSE STATUS: NORMAL
DISPENSE STATUS: NORMAL
EOSINOPHIL # BLD AUTO: 0 K/UL
EOSINOPHIL NFR BLD: 0 %
ERYTHROCYTE [DISTWIDTH] IN BLOOD BY AUTOMATED COUNT: 14.6 %
ERYTHROCYTE [SEDIMENTATION RATE] IN BLOOD BY WESTERGREN METHOD: 16 MM/H
EST. GFR  (AFRICAN AMERICAN): >60 ML/MIN/1.73 M^2
EST. GFR  (NON AFRICAN AMERICAN): >60 ML/MIN/1.73 M^2
FIO2: 50
GLUCOSE SERPL-MCNC: 124 MG/DL
HCO3 UR-SCNC: 29.1 MMOL/L (ref 24–28)
HCT VFR BLD AUTO: 33 %
HGB BLD-MCNC: 11.6 G/DL
IMM GRANULOCYTES # BLD AUTO: 0.14 K/UL
IMM GRANULOCYTES NFR BLD AUTO: 0.6 %
LACTATE SERPL-SCNC: 1 MMOL/L
LYMPHOCYTES # BLD AUTO: 2.1 K/UL
LYMPHOCYTES NFR BLD: 8.6 %
MAGNESIUM SERPL-MCNC: 2 MG/DL
MCH RBC QN AUTO: 32.5 PG
MCHC RBC AUTO-ENTMCNC: 35.2 G/DL
MCV RBC AUTO: 92 FL
MODE: ABNORMAL
MONOCYTES # BLD AUTO: 1.6 K/UL
MONOCYTES NFR BLD: 6.6 %
NEUTROPHILS # BLD AUTO: 20.5 K/UL
NEUTROPHILS NFR BLD: 84.1 %
NRBC BLD-RTO: 0 /100 WBC
PCO2 BLDA: 34 MMHG (ref 35–45)
PEEP: 5
PH SMN: 7.54 [PH] (ref 7.35–7.45)
PHOSPHATE SERPL-MCNC: 2.9 MG/DL
PLATELET # BLD AUTO: 255 K/UL
PLATELET BLD QL SMEAR: ABNORMAL
PMV BLD AUTO: 10.7 FL
PO2 BLDA: 126 MMHG (ref 80–100)
POC BE: 7 MMOL/L
POC SATURATED O2: 99 % (ref 95–100)
POC TCO2: 30 MMOL/L (ref 23–27)
POIKILOCYTOSIS BLD QL SMEAR: SLIGHT
POTASSIUM SERPL-SCNC: 3.7 MMOL/L
POTASSIUM SERPL-SCNC: 4.1 MMOL/L
PROCALCITONIN SERPL IA-MCNC: 0.33 NG/ML
PROT SERPL-MCNC: 5.1 G/DL
RBC # BLD AUTO: 3.57 M/UL
SAMPLE: ABNORMAL
SITE: ABNORMAL
SODIUM SERPL-SCNC: 135 MMOL/L
SP02: 100
TB INDURATION 48 - 72 HR READ: 0 MM
TRANS ERYTHROCYTES VOL PATIENT: NORMAL ML
TRANS ERYTHROCYTES VOL PATIENT: NORMAL ML
VT: 500
WBC # BLD AUTO: 24.31 K/UL

## 2018-12-24 PROCEDURE — 99900035 HC TECH TIME PER 15 MIN (STAT)

## 2018-12-24 PROCEDURE — 63600175 PHARM REV CODE 636 W HCPCS: Performed by: STUDENT IN AN ORGANIZED HEALTH CARE EDUCATION/TRAINING PROGRAM

## 2018-12-24 PROCEDURE — 87070 CULTURE OTHR SPECIMN AEROBIC: CPT

## 2018-12-24 PROCEDURE — 94761 N-INVAS EAR/PLS OXIMETRY MLT: CPT

## 2018-12-24 PROCEDURE — A4216 STERILE WATER/SALINE, 10 ML: HCPCS | Performed by: PSYCHIATRY & NEUROLOGY

## 2018-12-24 PROCEDURE — 63600175 PHARM REV CODE 636 W HCPCS: Performed by: NURSE PRACTITIONER

## 2018-12-24 PROCEDURE — 84100 ASSAY OF PHOSPHORUS: CPT

## 2018-12-24 PROCEDURE — 94668 MNPJ CHEST WALL SBSQ: CPT

## 2018-12-24 PROCEDURE — 94003 VENT MGMT INPAT SUBQ DAY: CPT

## 2018-12-24 PROCEDURE — 25000003 PHARM REV CODE 250: Performed by: STUDENT IN AN ORGANIZED HEALTH CARE EDUCATION/TRAINING PROGRAM

## 2018-12-24 PROCEDURE — 84145 PROCALCITONIN (PCT): CPT

## 2018-12-24 PROCEDURE — 85025 COMPLETE CBC W/AUTO DIFF WBC: CPT

## 2018-12-24 PROCEDURE — 99291 PR CRITICAL CARE, E/M 30-74 MINUTES: ICD-10-PCS | Mod: 25,GC,, | Performed by: PSYCHIATRY & NEUROLOGY

## 2018-12-24 PROCEDURE — 87205 SMEAR GRAM STAIN: CPT

## 2018-12-24 PROCEDURE — 84132 ASSAY OF SERUM POTASSIUM: CPT

## 2018-12-24 PROCEDURE — 83735 ASSAY OF MAGNESIUM: CPT

## 2018-12-24 PROCEDURE — 31622 DX BRONCHOSCOPE/WASH: CPT | Mod: GC,,, | Performed by: PSYCHIATRY & NEUROLOGY

## 2018-12-24 PROCEDURE — C9254 INJECTION, LACOSAMIDE: HCPCS | Performed by: NURSE PRACTITIONER

## 2018-12-24 PROCEDURE — 87040 BLOOD CULTURE FOR BACTERIA: CPT

## 2018-12-24 PROCEDURE — 95951 HC EEG MONITORING/VIDEO RECORD: CPT

## 2018-12-24 PROCEDURE — 95951 PR EEG MONITORING/VIDEORECORD: CPT | Mod: 26,,, | Performed by: PSYCHIATRY & NEUROLOGY

## 2018-12-24 PROCEDURE — 95951 PR EEG MONITORING/VIDEORECORD: ICD-10-PCS | Mod: 26,,, | Performed by: PSYCHIATRY & NEUROLOGY

## 2018-12-24 PROCEDURE — 25000003 PHARM REV CODE 250: Performed by: PSYCHIATRY & NEUROLOGY

## 2018-12-24 PROCEDURE — 25000003 PHARM REV CODE 250: Performed by: NURSE PRACTITIONER

## 2018-12-24 PROCEDURE — 80053 COMPREHEN METABOLIC PANEL: CPT

## 2018-12-24 PROCEDURE — 27000221 HC OXYGEN, UP TO 24 HOURS

## 2018-12-24 PROCEDURE — 99291 CRITICAL CARE FIRST HOUR: CPT | Mod: 25,GC,, | Performed by: PSYCHIATRY & NEUROLOGY

## 2018-12-24 PROCEDURE — 20000000 HC ICU ROOM

## 2018-12-24 PROCEDURE — 83605 ASSAY OF LACTIC ACID: CPT

## 2018-12-24 PROCEDURE — 27200966 HC CLOSED SUCTION SYSTEM

## 2018-12-24 PROCEDURE — 99900026 HC AIRWAY MAINTENANCE (STAT)

## 2018-12-24 PROCEDURE — 31622 PR BRONCHOSCOPY,DIAGNOSTIC: ICD-10-PCS | Mod: GC,,, | Performed by: PSYCHIATRY & NEUROLOGY

## 2018-12-24 PROCEDURE — 63600175 PHARM REV CODE 636 W HCPCS: Performed by: PSYCHIATRY & NEUROLOGY

## 2018-12-24 RX ORDER — CEFEPIME HYDROCHLORIDE 1 G/1
1 INJECTION, POWDER, FOR SOLUTION INTRAMUSCULAR; INTRAVENOUS
Status: DISCONTINUED | OUTPATIENT
Start: 2018-12-24 | End: 2018-12-24

## 2018-12-24 RX ORDER — VANCOMYCIN HCL IN 5 % DEXTROSE 1.5G/250ML
1500 PLASTIC BAG, INJECTION (ML) INTRAVENOUS ONCE
Status: COMPLETED | OUTPATIENT
Start: 2018-12-24 | End: 2018-12-24

## 2018-12-24 RX ORDER — CEFEPIME HYDROCHLORIDE 1 G/1
1 INJECTION, POWDER, FOR SOLUTION INTRAMUSCULAR; INTRAVENOUS
Status: DISCONTINUED | OUTPATIENT
Start: 2018-12-24 | End: 2018-12-25

## 2018-12-24 RX ADMIN — CHLORHEXIDINE GLUCONATE 0.12% ORAL RINSE 15 ML: 1.2 LIQUID ORAL at 08:12

## 2018-12-24 RX ADMIN — SODIUM CHLORIDE, SODIUM LACTATE, POTASSIUM CHLORIDE, AND CALCIUM CHLORIDE 500 ML: .6; .31; .03; .02 INJECTION, SOLUTION INTRAVENOUS at 10:12

## 2018-12-24 RX ADMIN — LEVETIRACETAM 1000 MG: 10 INJECTION INTRAVENOUS at 08:12

## 2018-12-24 RX ADMIN — Medication 3 ML: at 02:12

## 2018-12-24 RX ADMIN — FAMOTIDINE 20 MG: 20 TABLET ORAL at 09:12

## 2018-12-24 RX ADMIN — FAMOTIDINE 20 MG: 20 TABLET ORAL at 08:12

## 2018-12-24 RX ADMIN — MUPIROCIN 1 G: 20 OINTMENT TOPICAL at 09:12

## 2018-12-24 RX ADMIN — Medication 0.02 MCG/KG/MIN: at 09:12

## 2018-12-24 RX ADMIN — CHLORHEXIDINE GLUCONATE 0.12% ORAL RINSE 15 ML: 1.2 LIQUID ORAL at 09:12

## 2018-12-24 RX ADMIN — POTASSIUM CHLORIDE 40 MEQ: 20 SOLUTION ORAL at 05:12

## 2018-12-24 RX ADMIN — HEPARIN SODIUM 5000 UNITS: 5000 INJECTION, SOLUTION INTRAVENOUS; SUBCUTANEOUS at 05:12

## 2018-12-24 RX ADMIN — HEPARIN SODIUM 5000 UNITS: 5000 INJECTION, SOLUTION INTRAVENOUS; SUBCUTANEOUS at 02:12

## 2018-12-24 RX ADMIN — MUPIROCIN 1 G: 20 OINTMENT TOPICAL at 08:12

## 2018-12-24 RX ADMIN — KETAMINE HYDROCHLORIDE 30 MCG/KG/MIN: 100 INJECTION INTRAMUSCULAR; INTRAVENOUS at 08:12

## 2018-12-24 RX ADMIN — STANDARDIZED SENNA CONCENTRATE AND DOCUSATE SODIUM 1 TABLET: 8.6; 5 TABLET, FILM COATED ORAL at 08:12

## 2018-12-24 RX ADMIN — SODIUM CHLORIDE 400 MG: 9 INJECTION, SOLUTION INTRAVENOUS at 09:12

## 2018-12-24 RX ADMIN — VANCOMYCIN HYDROCHLORIDE 1500 MG: 10 INJECTION, POWDER, LYOPHILIZED, FOR SOLUTION INTRAVENOUS at 11:12

## 2018-12-24 RX ADMIN — PRAVASTATIN SODIUM 40 MG: 40 TABLET ORAL at 09:12

## 2018-12-24 RX ADMIN — CEFEPIME 1 G: 1 INJECTION, POWDER, FOR SOLUTION INTRAMUSCULAR; INTRAVENOUS at 09:12

## 2018-12-24 RX ADMIN — Medication 3 ML: at 09:12

## 2018-12-24 RX ADMIN — LEVETIRACETAM 1000 MG: 10 INJECTION INTRAVENOUS at 09:12

## 2018-12-24 RX ADMIN — HEPARIN SODIUM 5000 UNITS: 5000 INJECTION, SOLUTION INTRAVENOUS; SUBCUTANEOUS at 09:12

## 2018-12-24 RX ADMIN — SODIUM CHLORIDE 200 MG: 9 INJECTION, SOLUTION INTRAVENOUS at 09:12

## 2018-12-24 RX ADMIN — Medication 3 ML: at 05:12

## 2018-12-24 NOTE — PROCEDURES
"Peewee Nunes is a 63 y.o. male patient.    Temp: 98.9 °F (37.2 °C) (12/24/18 0305)  Pulse: 81 (12/24/18 0605)  Resp: 17 (12/24/18 0605)  BP: (!) 148/86 (12/24/18 0605)  SpO2: 100 % (12/24/18 0605)  Weight: 85.7 kg (189 lb) (12/21/18 1502)  Height: 5' 10" (177.8 cm) (12/21/18 1502)       Intubation  Date/Time: 12/23/2018 7:01 AM  Location procedure was performed: [facility]  Performed by: Kenneth Decker MD  Authorized by: Kenneth Decker MD   Consent Done: Emergent Situation  Indications: respiratory failure and  hypoxemia  Intubation method: direct  Patient status: paralyzed (RSI)  Preoxygenation: BVM and nasal cannula  Sedatives: etomidate  Paralytic: rocuronium  Laryngoscope size: Mac 4  Tube size: 8.0 mm  Tube type: cuffed  Number of attempts: 2  Cricoid pressure: yes  Cords visualized: yes  Post-procedure assessment: CO2 detector and chest rise  Breath sounds: reduced on left  Cuff inflated: yes  ETT to lip: 25 cm  Complications: Yes (hypoxia)  Comments: ETT placed through vc under direct visualization, note of copious thick secretions in oropharynx, hypoxia persistent  Due to hypoxia ett removed and bvm attempted w sats in mid 80s despite oral airway  Anesthesia called and placed tube w jose luis scope with improved sats          Kenneth Decker  12/24/2018  "

## 2018-12-24 NOTE — PROGRESS NOTES
RN notified NCC that patient's are now sustaining in the low 50's. MD Cedrick to bedside. 250cc LR bolus given per verbal orders. RN will continue to monitor.

## 2018-12-24 NOTE — PROCEDURES
DATE OF SERVICE:  12/23/2018    EEG NUMBER: [id number].PD--2,  -3,  -4,  -5,  -6,   -7,   -8    REFERRING PHYSICIAN:  Dr. Decker.    This EEG was performed to assess for status epilepticus.    ICU EEG/VIDEO MONITORING REPORT     METHODOLOGY:  Electroencephalographic (EEG) is recorded with electrodes placed   according to the International 10-20 placement system.  Thirty two (32) channels   of digital signal (sampling rate of 512/sec), including T1 and T2, were   simultaneously recorded from the scalp and may include EKG, EMG, and/or eye   monitors.  Recording band pass was 0.1 to 512 Hz.  Digital video recording of   the patient is simultaneously recorded with the EEG.  The patient is instructed   to report clinical symptoms which may occur during the recording session.  EEG   and video recording are stored and archived in digital format.  Activation   procedures, which include photic stimulation, hyperventilation and instructing   patients to perform simple tasks, are done in selected patients.    The EEG is displayed on a monitor screen and can be reviewed using different   montages.  Computer-assisted analysis is employed to detect spike and   electrographic seizure activity.   The entire record is submitted for computer   analysis.  The entire recording is visually reviewed, and the times identified   by computer analysis as being spikes or seizures are reviewed again.    Compressed spectral analysis (CSA) is also performed on the activity recorded   from each individual channel.  This is displayed as a power display of   frequencies from 0 to 30 Hz over time.   The CSA is reviewed looking for   asymmetries in power between homologous areas of the scalp, then compared with   the original EEG recording.    Stitcher software was also utilized in the review of this study.  This software   suite analyzes the EEG recording in multiple domains.  Coherence  and rhythmicity   are computed to identify EEG sections which may contain organized seizures.    Each channel undergoes analysis to detect the presence of spike and sharp waves   which have special and morphological characteristics of epileptic activity.  The   routine EEG recording is converted from special into frequency domain.  This is   then displayed comparing homologous areas to identify areas of significant   asymmetry.  Algorithm to identify non-cortically generated artifact is used to   separate artifact from the EEG.      RECORDING TIMES:  Start on 12/23/2018, at hour 15, minute 9, second 15.  End on 12/24/2018 at hour 7, minute 0, second 6.  Total time of video EEG recording for this study was 15 hours and 50 minutes.    EEG FINDINGS:  The recording was obtained with a number of standard bipolar and   referential montages during obtunded state.  In this state, the background was   diffusely disorganized and asymmetric.  The right hemisphere comprised of a   nonsustained posterior dominant rhythm of up to 8 Hz.  Mixed theta range slowing   was noted throughout the record.  The left hemisphere was relatively suppressed   and comprised of low-amplitude fast activity intermixed delta range activity.    The left hemisphere was punctuated by frequent runs of 1 to 2 per second sharp   waves, maximal at T3, C3 and P3.  At times, these runs of periodic sharp waves   evolved in frequency and morphology.  In addition, several electrographic   seizures were recorded, which were associated with facial twitches and jaw jerk   on video observation.  Electrographically, the seizures were characterized by   buildup of sharply contoured periodic activity in the left parasagittal and   temporal regions, which evolved in periodicity and morphology to a spike and   wave discharges at a frequency of 1 to 2 per second.  These electrographic   seizures were associated with spread to the contralateral hemisphere   characterized  by rhythmical slowing.  The overall burden of the seizures varied   throughout the record.  From the beginning of the record until hour 19, minute   25, two electrographic seizures were recorded.  From hour 19, minute 25 on   12/23/2018 to hour 0, minute 24 on 12/24/2018, the frequency of seizures ranged   from 6 to 10 per hour.  From hour 0, minute 24 on 12/24/2018 until the end of   this study, continuous 1 to 2 per second left lateralized spike and polyspike   and wave discharges were noted, which persisted till the end of the study.  At   times, these wax and wane in amplitude and morphology and were associated with   clinical symptoms.    The EKG channel revealed an irregular rhythm.    IMPRESSION:  This is an abnormal EEG during obtunded state.  Diffuse slowing of   the background was noted.  Left hemisphere suppression and asymmetry in the   background was noted.  Pseudoperiodic left parietotemporal maximum sharp waves   were noted.  Several electrographic seizures emanating from the left   parasagittal region were noted.  Periodic left lateralized epileptiform   discharges were also noted.    RECORDING TIMES:  Start on 12/24/2018, at hour 7, minute 0, second 50.  End on 12/25/2018 at hour 7, minute 0, second 7.  Total time of video EEG recording for this study was 23 hours and 58 minutes.    EEG FINDINGS:  The recording was obtained with a number of standard bipolar and   referential montages during obtunded state.  In this state, the background was   diffusely disorganized and asymmetric.  The right hemisphere comprised of a   nonsustained posterior dominant rhythm of up to 8 Hz.  Mixed theta range slowing   was noted throughout the record.  The left hemisphere showed continuous 1 to 2 per second left lateralized spike and polyspike   and wave discharges. At times, these wax and wane in amplitude and morphology and were associated with   clinical symptoms. By 10:16 the background was discontinuous with  periods of suppression lasting up to 6 seconds.   Left PLEDs persisted but without clinical correlate. Over the course oft he recording there was a decrease in the amplitude of the PLEDs    The EKG channel revealed an irregular rhythm.    IMPRESSION:  This is an abnormal EEG during obtunded state.  Diffuse slowing of   the background was noted.  Left hemisphere suppression and asymmetry in the   background was noted. By 10:16 the background was discontinuous with periods of suppression lasting up to 6 seconds.   Periodic left lateralized epileptiform discharges were noted.    RECORDING TIMES:  Start on 12/25/2018, at hour 7, minute 0, second 33.  End on 12/26/2018 at hour 7, minute 0, second 9.  Total time of video EEG recording for this study was 23 hours and 58 minutes.    EEG FINDINGS:  The recording was obtained with a number of standard bipolar and   referential montages during obtunded state.  In this state, the background was   diffusely disorganized and asymmetric.  The right hemisphere comprised of a   nonsustained posterior dominant rhythm of up to 8 Hz.  Mixed theta range slowing   was noted throughout the record.Left PLEDs persisted but without clinical correlate.   Over the course oft he recording there was a decrease in the amplitude of the PLEDs  Rare seizures were noted. These were restricted to FP1 and without clinical correlate.  The EKG channel revealed an irregular rhythm.    IMPRESSION:  This is an abnormal EEG during obtunded state.  Diffuse slowing of   the background was noted.  Left hemisphere suppression and asymmetry in the   background was noted. Periodic left lateralized epileptiform discharges were noted.  Rare seizures restricted to FP1 and without clinical correlate.      RECORDING TIMES:  Start on 12/26/2018, at hour 7, minute 0, second 36.  End on 12/27/2018 at hour 7, minute 0, second 8.  Total time of video EEG recording for this study was 23 hours and 57 minutes.    EEG FINDINGS:   The recording was obtained with a number of standard bipolar and   referential montages during obtunded state.  In this state, the background was   diffusely disorganized and supressed. Mixed delta and theta range slowing   was noted throughout the record.Low amplitude left PLEDs persisted but without clinical correlate.   Intermittent seizures were noted. These were restricted to T3 / Fp1 and without clinical correlate.   Beginning 11:12 on 12/26 the seizures were repetitive and consistent with focal status epilepticus.   The EKG channel revealed an irregular rhythm.  Portions of the record were obscured by artifacts related to electrode F8.     IMPRESSION:  This is an abnormal EEG during obtunded state.  Diffuse slowing of   the background was noted.  Periodic left lateralized epileptiform discharges were noted.  Beginning 11:12 on 12/26 the seizures were repetitive and consistent with focal status epilepticus.     RECORDING TIMES:  Start on 12/27/2018, at hour 7, minute 0, second 48.  End on 12/28/2018 at hour 7, minute 0, second 3.  Total time of video EEG recording for this study was 23 hours and 42 minutes.    EEG FINDINGS:  The recording was obtained with a number of standard bipolar and   referential montages during obtunded state.  In this state, the background was   diffusely disorganized and supressed. Mixed delta and theta range slowing   was noted throughout the record.Low amplitude left PLEDs persisted but without clinical correlate.   Focal SE with  seizures were restricted to T3 / Fp1 and without clinical correlate.  Portions of the record were obscured by artifacts related to electrode F8.     IMPRESSION:  This is an abnormal EEG during obtunded state.  Diffuse slowing of   the background was noted.  Periodic left lateralized epileptiform discharges were noted.  Focal SE with  seizures were restricted to T3 / Fp1 and without clinical correlate.    RECORDING TIMES:  Start on 12/28/2018, at hour 7,  minute 0, second 31.  End on 12/28/2018 at hour 17, minute 46, second 27.  Total time of video EEG recording for this study was 10 hours and 44 minutes.    EEG FINDINGS:  The recording was obtained with a number of standard bipolar and   referential montages during obtunded state.  In this state, the background was   diffusely disorganized and supressed. Mixed delta and theta range slowing   was noted throughout the record.Low amplitude left PLEDs persisted but without clinical correlate. FIRDA was noted.   Frequent  seizures were restricted to T3 / Fp1 and without clinical correlate.  Portions of the record were obscured by artifacts related to electrode F8.     IMPRESSION:  This is an abnormal EEG during obtunded state.  Diffuse slowing of   the background was noted.  Periodic left lateralized epileptiform discharges were noted.  Frequent seizures were restricted to T3 / Fp1 and without clinical correlate.    RECORDING TIMES:  Start on 12/28/2018, at hour 20, minute 09, second 4.  End on 12/29/2018 at hour 8, minute 19, second 55.  Total time of video EEG recording for this study was 12 hours and 10 minutes.    EEG FINDINGS:  The recording was obtained with a number of standard bipolar and   referential montages during obtunded state.  In this state, the background was   diffusely disorganized and supressed. Mixed delta and theta range slowing   was noted on the right. The left electrodes were obscured by artifacts. Portions of the record were obscured by artifacts related to electrodes T4,F8.   Runs of low amplitude fast activity was noted in FP2 concerning for electrographic seizures.     IMPRESSION:  This is an abnormal EEG during obtunded state.  Diffuse slowing of   the background was noted.  The study was technically limited. Runs of low amplitude fast activity was noted in FP2 concerning for electrographic seizures.     RECORDING TIMES:  Start on 12/29/2018, at hour 12, minute 47, second 36.  End on  12/30/2018 at hour 7, minute 0, second 4.  Total time of video EEG recording for this study was 17 hours and 57 minutes.    EEG FINDINGS:  The recording was obtained with a number of standard bipolar and   referential montages during obtunded state.  In this state, the background was   diffusely disorganized and supressed. FIRDA was noted. Symmetric spindles were noted.   Left temporal focal slowing was noted. Left lateralized slow complexes were noted rarely.     IMPRESSION:  This is an abnormal EEG during obtunded state.  Diffuse slowing of   the background was noted.  FIRDA was noted. Left temporal focal slowing was noted. Left lateralized slow complexes were noted rarely.

## 2018-12-24 NOTE — PT/OT/SLP PROGRESS
Speech Language Pathology  Discharge    Peewee Nunes  MRN: [id number]    Patient not seen today secondary to pt intubated at this time. Please re-consult when pt medically appropriate    Jia Johnson CCC-SLP  12/24/2018

## 2018-12-24 NOTE — PROGRESS NOTES
While in  MRI, after its completion while pt was transferred from MRI table to his bed and the new pulse ox was placed, pt O2 sats were in the 60s.Prior to swap pt O2 sats were in the 90s and pt was responding to commands. Pt immediately swapped from nasal canula to non-rebreather. O2 sats did not improve. Pt rushed to unit from [facility] with Justin RN and Kathy RN. Charge called while pt being bagged and brought up and told about pt resp status and to have Resp and MD at bedside.While travelling to room pt O2 sats remained in the 60s. when in the room pt sats in the 70s while being bagged. Pt not following commands or responding to commands of nurse. Call made by MD to intubate. First pass was unsuccessful by MD. Anesthesia called. Anesthesia able to intubate pt. After intubation O2 sats improved to high 90s. Pt bronched because it was believed a mucus plugged caused his resp failure. Will continue to monitor

## 2018-12-24 NOTE — PLAN OF CARE
Problem: Adult Inpatient Plan of Care  Goal: Plan of Care Review  POC reviewed with pt and family. Family verbalized understanding. Questions and concerns addressed. New onset of a-fib noted. nCC team notified. Pt is now creating urine after multiple hours of not. Pt urine oputput is about 10ml/hr. To treat seizures, Pt started on ketamine @50mcq/kg/min. Pt back on Norepi to maintain MAPs above 65 @ 0.03mcq/kg/min. Will continue to monitor. See flowsheets for full assessment and VS info.

## 2018-12-24 NOTE — PT/OT/SLP DISCHARGE
Physical Therapy Discharge Summary    Name: Peewee Nunes  MRN: [id number]   Principal Problem: SDH (subdural hematoma)     Patient Discharged from acute Physical Therapy on 18.  Please refer to prior PT noted date on 18 for functional status.     Assessment:     Patient has not met goals.    Objective:     GOALS:   Multidisciplinary Problems     Physical Therapy Goals        Problem: Physical Therapy Goal    Goal Priority Disciplines Outcome Goal Variances Interventions   Physical Therapy Goal     PT, PT/OT Ongoing (interventions implemented as appropriate)     Description:  Goals to be met by: 19    Patient will increase functional independence with mobility by performin. Supine to sit with Contact Guard Assistance  2. Sit to supine with Contact Guard Assistance  3. Sit to stand transfer with Contact Guard Assistance  4. Bed to chair transfer with Minimal Assistance  5. Gait  x 15 feet with Minimal Assistance using Rolling Walker.   6. Stand for 3 minutes with Contact Guard Assistance using Rolling Walker  7. Lower extremity exercise program x15 reps per handout, with assistance as needed                      Reasons for Discontinuation of Therapy Services  Patient is unable to continue work toward goals because of medical or psychosocial complications.      Plan:     Patient remains in NCC; pt emergently intubated 18. Please re-consult therapy services when appropriate.     Alena Lee, PT  2018

## 2018-12-24 NOTE — PROGRESS NOTES
Dr SHRESTHA of [facility] team notified and came to bedside by Justin ALVAREZ because was having facial twitches. EEg read by Dr SHRESTHA.  Ordered to start pt on ketamine. Will continue to monitor

## 2018-12-24 NOTE — CONSULTS
Attempted to see patient this AM and intubated. Patient is too acute at this time; rehab potential cannot be appropriately assessed.  Recommend early mobility, OOB in chair, and PT/OT/SLP when appropriate.  Will follow for additional rehab needs and post-acute recommendation when patient is medically stable and able to participate with therapy.    Thank you for consult.    SALVATORE Garcia, FNP-C  Physical Medicine & Rehabilitation   12/24/2018

## 2018-12-24 NOTE — PROGRESS NOTES
RN notified NCC that patient's MAPs are now sustaining in the 40's despite fluid bolus currently being given. RN ordered to give Itz bump and start Levo if MAP does not sustain >60. RN also ordered to send labs and complete 12-lead. Levo started @ 0.02. MD Cedrick to bedside and seizure was noted. Ativan 2mg given by RN for seizure activity. RN will continue to monitor closely.

## 2018-12-24 NOTE — PLAN OF CARE
Problem: Adult Inpatient Plan of Care  Goal: Plan of Care Review  Outcome: Ongoing (interventions implemented as appropriate)  POC reviewed with patient and patient's family at 0300. Patient unable to verbalize understanding. Questions and concerns addressed with patient's family.  Levo gtt started for unresolved hypotension after 2x LR boluses. 2mg ativan given for seizure activity. A-line placed. Midline consult in. LR gtt increased to 100cc/hr. Patient turned per protocol. Oral care provided per protocol. RN Will continue to monitor. See flowsheets for full assessment and VS info

## 2018-12-24 NOTE — PROGRESS NOTES
[facility]  Neurosurgery  Progress Note    Subjective:     History of Present Illness: 64 yo M with PMH of CAD, CABG in 2006, DM, HTN, HLD on ASA presents to the ED for AMS. Patient fell 4 days ago with about 2 minutes of LOC. Family is unsure of what caused the fall. Full workup done including a CTH at [place] that was negative. Patient went home and stopped taking his ASA. This morning, his family noticed he was more confused. He went to [place] and CTH was done that revealed a 1.7cm left frontalpariental SDH with a 5 mm right sided midline shift and a left SAH. Patient was transferred to [facility]. History was obtained per family as patient unable to give history 2/2 confusion with expressive and receptive aphasia.    Post-Op Info:  Procedure(s) (LRB):  CRANIOTOMY, FOR SUBDURAL HEMATOMA EVACUATION (Left)   4 Days Post-Op     Interval History: Intubated yesterday for desaturation despite supplemental O2. Ativan given overnight for possible seizure activity, no seizure activity on spot EEG. Started on continuous EEG this AM and levo gtt for hypotension.     Medications:  Continuous Infusions:   ketamine (KETALAR) infusion 50 mcg/kg/min (12/24/18 1100)    norepinephrine bitartrate-D5W 0.5 mcg/kg/min (12/24/18 1109)     Scheduled Meds:   ceFEPime (MAXIPIME) IVPB  1 g Intravenous Q12H    chlorhexidine  15 mL Mouth/Throat BID    famotidine  20 mg Per NG tube BID    heparin (porcine)  5,000 Units Subcutaneous Q8H    lacosamide (VIMPAT) in saline IVPB  200 mg Intravenous Q12H    lactated ringers  500 mL Intravenous Once    levetiracetam IVPB  1,000 mg Intravenous Q12H    lorazepam  2 mg Intravenous Once    mupirocin  1 g Nasal BID    pravastatin  40 mg Oral QHS    senna-docusate 8.6-50 mg  1 tablet Oral Daily    sodium chloride 0.9%  3 mL Intravenous Q8H    vancomycin (VANCOCIN) IVPB  1,500 mg Intravenous Once    Followed by    [START ON 12/25/2018] vancomycin (VANCOCIN) IVPB  15 mg/kg  (Dosing Weight) Intravenous Q24H     PRN Meds:sodium chloride, acetaminophen, diclofenac sodium, HYDROcodone-acetaminophen, magnesium oxide, magnesium oxide, magnesium sulfate IVPB, magnesium sulfate IVPB, morphine, ondansetron, potassium chloride 10%, potassium chloride 10%, potassium chloride 10%, potassium, sodium phosphates, potassium, sodium phosphates, potassium, sodium phosphates     Review of Systems  Objective:     Weight: 85.7 kg (189 lb)  Body mass index is 27.12 kg/m².  Vital Signs (Most Recent):  Temp: 97.9 °F (36.6 °C) (12/24/18 1102)  Pulse: (!) 112 (12/24/18 1113)  Resp: (!) 8 (12/24/18 1113)  BP: (!) 106/51 (12/24/18 1113)  SpO2: 99 % (12/24/18 1113) Vital Signs (24h Range):  Temp:  [97.9 °F (36.6 °C)-99.2 °F (37.3 °C)] 97.9 °F (36.6 °C)  Pulse:  [] 112  Resp:  [8-37] 8  SpO2:  [78 %-100 %] 99 %  BP: ()/(51-98) 106/51  Arterial Line BP: ()/(53-86) 87/60     Date 12/24/18 0700 - 12/25/18 0659   Shift 4545-1587 9808-9001 7106-1121 24 Hour Total   INTAKE   I.V.(mL/kg) 497.9(5.8)   497.9(5.8)   NG/GT 30   30   IV Piggyback 700   700   Shift Total(mL/kg) 1227.9(14.3)   1227.9(14.3)   OUTPUT   Urine(mL/kg/hr) 40   40   Shift Total(mL/kg) 40(0.5)   40(0.5)   Weight (kg) 85.7 85.7 85.7 85.7              Vent Mode: A/C  Oxygen Concentration (%):  [] 50  Resp Rate Total:  [16 br/min-27 br/min] 16 br/min  Vt Set:  [500 mL] 500 mL  PEEP/CPAP:  [5 cmH20] 5 cmH20  Mean Airway Pressure:  [8.1 cmH20-10 cmH20] 8.8 cmH20         NG/OG Tube 12/23/18 1500 Center mouth (Active)   Placement Check placement verified by aspirate characteristics;physician notified 12/24/2018  7:02 AM   Securement taped to commercial device 12/24/2018  7:02 AM   Clamp Status/Tolerance no abdominal discomfort;no abdominal distention;no emesis;no nausea;no residual;no restlessness;clamped 12/24/2018  7:02 AM   Suction Setting/Drainage Method dependent drainage 12/24/2018  7:02 AM   Insertion Site Appearance no redness,  "warmth, tenderness, skin breakdown, drainage 12/24/2018  7:02 AM   Drainage None 12/24/2018  7:02 AM   Flush/Irrigation flushed w/;water;no resistance met 12/24/2018  7:02 AM   Intake (mL) 30 mL 12/24/2018  9:00 AM   Residual Amount (ml) 0 ml 12/24/2018  7:02 AM            Urethral Catheter 12/20/18 2350 Non-latex;Straight-tip 16 Fr. (Active)   Site Assessment Clean;Intact 12/24/2018  7:02 AM   Collection Container Urimeter 12/24/2018  7:02 AM   Securement Method secured to top of thigh w/ adhesive device 12/24/2018  7:02 AM   Catheter Care Performed yes 12/24/2018  7:02 AM   Reason for Continuing Urinary Catheterization Urinary retention 12/24/2018  7:02 AM   CAUTI Prevention Bundle StatLock in place w 1" slack;Intact seal between catheter & drainage tubing;Drainage bag off the floor;Green sheeting clip in use;No dependent loops or kinks;Drainage bag not overfilled (<2/3 full);Drainage bag below bladder 12/24/2018  7:02 AM   Output (mL) 10 mL 12/24/2018 11:00 AM       Neurosurgery Physical Exam   [id number]  Intubated  Does not open eyes  PERRL  WDx4      Significant Labs:  Recent Labs   Lab 12/23/18 0419 12/23/18 2245 12/24/18 0248 12/24/18  0954   GLU 98 121* 124*  --     136 135*  --    K 3.2* 3.7 3.7 4.1   CL 98 99 99  --    CO2 26 25 23  --    BUN 9 18 20  --    CREATININE 0.6 0.9 1.1  --    CALCIUM 9.2 8.9 8.8  --    MG 1.5* 2.1 2.0  --      Recent Labs   Lab 12/23/18 0419 12/23/18 2245 12/24/18 0248   WBC 16.94* 22.78*  22.78* 24.31*   HGB 12.3* 11.3*  11.3* 11.6*   HCT 35.3* 32.4*  32.4* 33.0*    204  204 255     Recent Labs   Lab 12/23/18  0430   INR 1.1   APTT 28.0     Microbiology Results (last 7 days)     Procedure Component Value Units Date/Time    Blood culture [[id number]] Collected:  12/24/18 0005    Order Status:  Completed Specimen:  Blood from Peripheral, Hand, Left Updated:  12/24/18 0915     Blood Culture, Routine No Growth to date    Blood culture [[id number]] Collected:  " 12/24/18 0006    Order Status:  Completed Specimen:  Blood from Line, Arterial, Right Updated:  12/24/18 0915     Blood Culture, Routine No Growth to date    Culture, Respiratory with Gram Stain [[id number]] Collected:  12/24/18 0446    Order Status:  Completed Specimen:  Respiratory from Endotracheal Aspirate Updated:  12/24/18 0905     Gram Stain (Respiratory) <10 epithelial cells per low power field.     Gram Stain (Respiratory) Moderate WBC's     Gram Stain (Respiratory) No organisms seen    Urine culture [[id number]]     Order Status:  Canceled Specimen:  Urine, Catheterized         Recent Lab Results       12/24/18  0954   12/24/18  0843   12/24/18 0446   12/24/18  0439   12/24/18  0248        Immature Granulocytes         0.6     Immature Grans (Abs)         0.14  Comment:  Mild elevation in immature granulocytes is non specific and   can be seen in a variety of conditions including stress response,   acute inflammation, trauma and pregnancy. Correlation with other   laboratory and clinical findings is essential.       TB Induration 48 - 72 hr read   0           Procalcitonin               Albumin         2.0     Alkaline Phosphatase         59     Allens Test       N/A       ALT         <5     Anion Gap         13     Aniso         Slight     AST         9     Baso #         0.02     Basophil%         0.1     Total Bilirubin         1.6  Comment:  For infants and newborns, interpretation of results should be based  on gestational age, weight and in agreement with clinical  observations.  Premature Infant recommended reference ranges:  Up to 24 hours.............<8.0 mg/dL  Up to 48 hours............<12.0 mg/dL  3-5 days..................<15.0 mg/dL  6-29 days.................<15.0 mg/dL       Blood Culture, Routine               Site       Kenny/UAC       BUN, Bld         20     [place] Cells         Occasional     Calcium         8.8     Chloride         99     CO2         23     Creatinine         1.1      [facility]       Adult Vent       Differential Method         Automated     eGFR if          >60.0     eGFR if non          >60.0  Comment:  Calculation used to obtain the estimated glomerular filtration  rate (eGFR) is the CKD-EPI equation.        Eos #         0.0     Eosinophil%         0.0     FiO2       50       Large/Giant Platelets               Glucose         124     Gram Stain (Respiratory)     <10 epithelial cells per low power field.              Moderate WBC's              No organisms seen         Gran # (ANC)         20.5     Gran%         84.1     Group & Rh               Hematocrit         33.0     Hemoglobin         11.6     INDIRECT INEZ               Lactate, Hemal 1.0  Comment:  Falsely low lactic acid results can be found in samples   containing >=13.0 mg/dL total bilirubin and/or >=3.5 mg/dL   direct bilirubin.               Lymph #         2.1     Lymph%         8.6     Magnesium         2.0     MCH         32.5     MCHC         35.2     MCV         92     Mode       AC/PRVC       Mono #         1.6     Mono%         6.6     MPV         10.7     nRBC         0     PEEP       5       Phosphorus         2.9     Platelet Estimate         Appears normal     Platelets         255     POC BE       7       POC HCO3       29.1       POC PCO2       34.0       POC PH       7.539       POC PO2       126       POC SATURATED O2       99       POC TCO2       30       Poik         Slight     Potassium 4.1       3.7     Total Protein         5.1     Rate       16       RBC         3.57     RDW         14.6     Sample       ARTERIAL       Sodium         135     Sp02       100       Vt       500       WBC         24.31                      12/24/18  0007   12/24/18  0006   12/24/18  0005   12/23/18  2258   12/23/18  2245        Immature Granulocytes         0.6              0.6     Immature Grans (Abs)         0.13  Comment:  Mild elevation in immature granulocytes is non specific  and   can be seen in a variety of conditions including stress response,   acute inflammation, trauma and pregnancy. Correlation with other   laboratory and clinical findings is essential.                0.13  Comment:  Mild elevation in immature granulocytes is non specific and   can be seen in a variety of conditions including stress response,   acute inflammation, trauma and pregnancy. Correlation with other   laboratory and clinical findings is essential.       TB Induration 48 - 72 hr read               Procalcitonin 0.33  Comment:  A concentration < 0.25 ng/mL represents a low risk bacterial   infection.  Procalcitonin may not be accurate among patients with localized   infection, recent trauma or major surgery, immunosuppressed state,   invasive fungal infection, renal dysfunction. Decisions regarding   initiation or continuation of antibiotic therapy should not be based   solely on procalcitonin levels.               Albumin         2.1     Alkaline Phosphatase         54     Allens Test               ALT         <5     Anion Gap         12     Aniso               AST         10     Baso #         0.02              0.02     Basophil%         0.1              0.1     Total Bilirubin         1.6  Comment:  For infants and newborns, interpretation of results should be based  on gestational age, weight and in agreement with clinical  observations.  Premature Infant recommended reference ranges:  Up to 24 hours.............<8.0 mg/dL  Up to 48 hours............<12.0 mg/dL  3-5 days..................<15.0 mg/dL  6-29 days.................<15.0 mg/dL       Blood Culture, Routine   No Growth to date[P] No Growth to date[P]         Site               BUN, Bld         18     [place] Cells         Occasional              Occasional     Calcium         8.9     Chloride         99     CO2         25     Creatinine         0.9     DelSys               Differential Method         Automated              Automated     eGFR if           >60.0     eGFR if non          >60.0  Comment:  Calculation used to obtain the estimated glomerular filtration  rate (eGFR) is the CKD-EPI equation.        Eos #         0.0              0.0     Eosinophil%         0.0              0.0     FiO2               Large/Giant Platelets         Present              Present     Glucose         121     Gram Stain (Respiratory)               Gran # (ANC)         20.1              20.1     Gran%         88.3              88.3     Group & Rh         O NEG     Hematocrit         32.4              32.4     Hemoglobin         11.3              11.3     INDIRECT INEZ         NEG     Lactate, Hemal       1.2  Comment:  Falsely low lactic acid results can be found in samples   containing >=13.0 mg/dL total bilirubin and/or >=3.5 mg/dL   direct bilirubin.         Lymph #         1.3              1.3     Lymph%         5.6              5.6     Magnesium         2.1     MCH         31.5              31.5     MCHC         34.9              34.9     MCV         90              90     Mode               Mono #         1.2              1.2     Mono%         5.4              5.4     MPV         10.9              10.9     nRBC         0              0     PEEP               Phosphorus         2.9              2.9     Platelet Estimate         Appears normal              Appears normal     Platelets         204              204     POC BE               POC HCO3               POC PCO2               POC PH               POC PO2               POC SATURATED O2               POC TCO2               Poik         Slight              Slight     Potassium         3.7     Total Protein         4.9     Rate               RBC         3.59              3.59     RDW         14.5              14.5     Sample               Sodium         136     Sp02               Vt               WBC         22.78              22.78                      12/23/18 2016        Immature  Granulocytes       Immature Grans (Abs)       TB Induration 48 - 72 hr read       Procalcitonin       Albumin       Alkaline Phosphatase       Allens Test N/A     ALT       Anion Gap       Aniso       AST       Baso #       Basophil%       Total Bilirubin       Blood Culture, Routine       Site Kenny/UAC     BUN, Bld       [place] Cells       Calcium       Chloride       CO2       Creatinine       DelSys Adult Vent     Differential Method       eGFR if        eGFR if non        Eos #       Eosinophil%       FiO2 65     Large/Giant Platelets       Glucose       Gram Stain (Respiratory)       Gran # (ANC)       Gran%       Group & Rh       Hematocrit       Hemoglobin       INDIRECT INEZ       Lactate, Hemal       Lymph #       Lymph%       Magnesium       MCH       MCHC       MCV       Mode AC/PRVC     Mono #       Mono%       MPV       nRBC       PEEP 5     Phosphorus       Platelet Estimate       Platelets       POC BE 3     POC HCO3 26.3     POC PCO2 33.9     POC PH 7.498     POC PO2 194     POC SATURATED O2 100     POC TCO2 27     Poik       Potassium       Total Protein       Rate 16     RBC       RDW       Sample ARTERIAL     Sodium       Sp02 100     Vt 500     WBC             Significant Diagnostics:  I have reviewed all pertinent imaging results/findings within the past 24 hours.    Assessment/Plan:     * SDH (subdural hematoma)    64 yo male with L aSDH now s/p craniotomy for evacuation (12/20).    --Continue care per primary team.  --Continue AED per NCC, f/u cEEG  --WTE per primary team  --MRI reviewed  --We will continue to monitor closely, please contact us with any questions or concerns.               Christine Rasheed MD  Neurosurgery  [facility]

## 2018-12-24 NOTE — ASSESSMENT & PLAN NOTE
62 yo male with L aSDH now s/p craniotomy for evacuation (12/20).    --Continue care per primary team.  --Continue AED per NCC, f/u cEEG  --WTE per primary team  --MRI reviewed  --We will continue to monitor closely, please contact us with any questions or concerns.

## 2018-12-24 NOTE — PLAN OF CARE
Problem: Occupational Therapy Goal  Goal: Occupational Therapy Goal  Goals to be met by: 7 days (12/28/18)     Patient will increase functional independence with ADLs by performing:    UE Dressing with Stand-by Assistance.  LE Dressing with Minimal Assistance.  Grooming while EOB with Contact Guard Assistance.  Toileting from bedside commode with Moderate Assistance for hygiene and clothing management.   Supine to sit with Minimal Assistance.  Toilet transfer to bedside commode with Moderate Assistance.  Increased functional strength to WFL for ADLs.     Outcome: Outcome(s) achieved Date Met: 12/24/18  D/C OT orders 12/24; patient remains in NCC; pt emergently intubated 12/23/18. Please re-consult therapy services when appropriate.

## 2018-12-24 NOTE — PROGRESS NOTES
Dr SHRESTHA notified of zero urine out put for over 6 hours. During rounds, pt heart, kidneys and bladder.  assessed by ultrasound by Dr. SHRESTHA. No urine found in pt bladder. Md reviewed pt labs. No definite finding to suggest reason for no urine output. Will continue to monitor

## 2018-12-24 NOTE — PROCEDURES
"Peewee Nunes is a 63 y.o. male patient.    Temp: 99 °F (37.2 °C) (18)  Pulse: 96 (18)  Resp: 19 (18)  BP: 108/70 (18)  SpO2: 100 % (18)  Weight: 85.7 kg (189 lb) (18 1502)  Height: 5' 10" (177.8 cm) (18 1502)       Arterial Line  Date/Time: 2018 8:43 PM  Location procedure was performed: [facility] NEURO CRITICAL CARE  Performed by: Alejandro Philip MD  Authorized by: Alejandro Philip MD   Consent Done: Yes  Consent: Verbal consent obtained.  Risks and benefits: risks, benefits and alternatives were discussed  Consent given by: spouse  Patient understanding: patient states understanding of the procedure being performed  Patient consent: the patient's understanding of the procedure matches consent given  Procedure consent: procedure consent matches procedure scheduled  Required items: required blood products, implants, devices, and special equipment available  Patient identity confirmed: , MRN and name  Time out: Immediately prior to procedure a "time out" was called to verify the correct patient, procedure, equipment, support staff and site/side marked as required.  Preparation: Patient was prepped and draped in the usual sterile fashion.  Indications: multiple ABGs and hemodynamic monitoring  Location: right radial  Needle gauge: 20  Seldinger technique: Seldinger technique used  Number of attempts: 1  Post-procedure: line sutured  Post-procedure CMS: normal  Patient tolerance: Patient tolerated the procedure well with no immediate complications          Alejandro Philip  2018    "

## 2018-12-24 NOTE — PROGRESS NOTES
[facility] team notified of change in pt heart rhythm from NS to a-fib with a rate of 105-115. 12 lead ordered. Will continue to monitor

## 2018-12-24 NOTE — SUBJECTIVE & OBJECTIVE
Interval History: Intubated yesterday for desaturation despite supplemental O2. Ativan given overnight for possible seizure activity, no seizure activity on spot EEG. Started on continuous EEG this AM and levo gtt for hypotension.     Medications:  Continuous Infusions:   ketamine (KETALAR) infusion 50 mcg/kg/min (12/24/18 1100)    norepinephrine bitartrate-D5W 0.5 mcg/kg/min (12/24/18 1109)     Scheduled Meds:   ceFEPime (MAXIPIME) IVPB  1 g Intravenous Q12H    chlorhexidine  15 mL Mouth/Throat BID    famotidine  20 mg Per NG tube BID    heparin (porcine)  5,000 Units Subcutaneous Q8H    lacosamide (VIMPAT) in saline IVPB  200 mg Intravenous Q12H    lactated ringers  500 mL Intravenous Once    levetiracetam IVPB  1,000 mg Intravenous Q12H    lorazepam  2 mg Intravenous Once    mupirocin  1 g Nasal BID    pravastatin  40 mg Oral QHS    senna-docusate 8.6-50 mg  1 tablet Oral Daily    sodium chloride 0.9%  3 mL Intravenous Q8H    vancomycin (VANCOCIN) IVPB  1,500 mg Intravenous Once    Followed by    [START ON 12/25/2018] vancomycin (VANCOCIN) IVPB  15 mg/kg (Dosing Weight) Intravenous Q24H     PRN Meds:sodium chloride, acetaminophen, diclofenac sodium, HYDROcodone-acetaminophen, magnesium oxide, magnesium oxide, magnesium sulfate IVPB, magnesium sulfate IVPB, morphine, ondansetron, potassium chloride 10%, potassium chloride 10%, potassium chloride 10%, potassium, sodium phosphates, potassium, sodium phosphates, potassium, sodium phosphates     Review of Systems  Objective:     Weight: 85.7 kg (189 lb)  Body mass index is 27.12 kg/m².  Vital Signs (Most Recent):  Temp: 97.9 °F (36.6 °C) (12/24/18 1102)  Pulse: (!) 112 (12/24/18 1113)  Resp: (!) 8 (12/24/18 1113)  BP: (!) 106/51 (12/24/18 1113)  SpO2: 99 % (12/24/18 1113) Vital Signs (24h Range):  Temp:  [97.9 °F (36.6 °C)-99.2 °F (37.3 °C)] 97.9 °F (36.6 °C)  Pulse:  [] 112  Resp:  [8-37] 8  SpO2:  [78 %-100 %] 99 %  BP: ()/(51-98)  "106/51  Arterial Line BP: ()/(53-86) 87/60     Date 12/24/18 0700 - 12/25/18 0659   Shift 8410-8657 6921-6501 0838-3967 24 Hour Total   INTAKE   I.V.(mL/kg) 497.9(5.8)   497.9(5.8)   NG/GT 30   30   IV Piggyback 700   700   Shift Total(mL/kg) 1227.9(14.3)   1227.9(14.3)   OUTPUT   Urine(mL/kg/hr) 40   40   Shift Total(mL/kg) 40(0.5)   40(0.5)   Weight (kg) 85.7 85.7 85.7 85.7              Vent Mode: A/C  Oxygen Concentration (%):  [] 50  Resp Rate Total:  [16 br/min-27 br/min] 16 br/min  Vt Set:  [500 mL] 500 mL  PEEP/CPAP:  [5 cmH20] 5 cmH20  Mean Airway Pressure:  [8.1 cmH20-10 cmH20] 8.8 cmH20         NG/OG Tube 12/23/18 1500 Center mouth (Active)   Placement Check placement verified by aspirate characteristics;physician notified 12/24/2018  7:02 AM   Securement taped to commercial device 12/24/2018  7:02 AM   Clamp Status/Tolerance no abdominal discomfort;no abdominal distention;no emesis;no nausea;no residual;no restlessness;clamped 12/24/2018  7:02 AM   Suction Setting/Drainage Method dependent drainage 12/24/2018  7:02 AM   Insertion Site Appearance no redness, warmth, tenderness, skin breakdown, drainage 12/24/2018  7:02 AM   Drainage None 12/24/2018  7:02 AM   Flush/Irrigation flushed w/;water;no resistance met 12/24/2018  7:02 AM   Intake (mL) 30 mL 12/24/2018  9:00 AM   Residual Amount (ml) 0 ml 12/24/2018  7:02 AM            Urethral Catheter 12/20/18 2350 Non-latex;Straight-tip 16 Fr. (Active)   Site Assessment Clean;Intact 12/24/2018  7:02 AM   Collection Container Urimeter 12/24/2018  7:02 AM   Securement Method secured to top of thigh w/ adhesive device 12/24/2018  7:02 AM   Catheter Care Performed yes 12/24/2018  7:02 AM   Reason for Continuing Urinary Catheterization Urinary retention 12/24/2018  7:02 AM   CAUTI Prevention Bundle StatLock in place w 1" slack;Intact seal between catheter & drainage tubing;Drainage bag off the floor;Green sheeting clip in use;No dependent loops or " kinks;Drainage bag not overfilled (<2/3 full);Drainage bag below bladder 12/24/2018  7:02 AM   Output (mL) 10 mL 12/24/2018 11:00 AM       Neurosurgery Physical Exam   [id number]  Intubated  Does not open eyes  PERRL  WDx4      Significant Labs:  Recent Labs   Lab 12/23/18 0419 12/23/18 2245 12/24/18  0248 12/24/18  0954   GLU 98 121* 124*  --     136 135*  --    K 3.2* 3.7 3.7 4.1   CL 98 99 99  --    CO2 26 25 23  --    BUN 9 18 20  --    CREATININE 0.6 0.9 1.1  --    CALCIUM 9.2 8.9 8.8  --    MG 1.5* 2.1 2.0  --      Recent Labs   Lab 12/23/18 0419 12/23/18 2245 12/24/18  0248   WBC 16.94* 22.78*  22.78* 24.31*   HGB 12.3* 11.3*  11.3* 11.6*   HCT 35.3* 32.4*  32.4* 33.0*    204  204 255     Recent Labs   Lab 12/23/18  0430   INR 1.1   APTT 28.0     Microbiology Results (last 7 days)     Procedure Component Value Units Date/Time    Blood culture [[id number]] Collected:  12/24/18 0005    Order Status:  Completed Specimen:  Blood from Peripheral, Hand, Left Updated:  12/24/18 0915     Blood Culture, Routine No Growth to date    Blood culture [[id number]] Collected:  12/24/18 0006    Order Status:  Completed Specimen:  Blood from Line, Arterial, Right Updated:  12/24/18 0915     Blood Culture, Routine No Growth to date    Culture, Respiratory with Gram Stain [[id number]] Collected:  12/24/18 0446    Order Status:  Completed Specimen:  Respiratory from Endotracheal Aspirate Updated:  12/24/18 0905     Gram Stain (Respiratory) <10 epithelial cells per low power field.     Gram Stain (Respiratory) Moderate WBC's     Gram Stain (Respiratory) No organisms seen    Urine culture [[id number]]     Order Status:  Canceled Specimen:  Urine, Catheterized         Recent Lab Results       12/24/18  0954   12/24/18  0843   12/24/18  0446   12/24/18  0439   12/24/18  0248        Immature Granulocytes         0.6     Immature Grans (Abs)         0.14  Comment:  Mild elevation in immature granulocytes is non specific  and   can be seen in a variety of conditions including stress response,   acute inflammation, trauma and pregnancy. Correlation with other   laboratory and clinical findings is essential.       TB Induration 48 - 72 hr read   0           Procalcitonin               Albumin         2.0     Alkaline Phosphatase         59     Allens Test       N/A       ALT         <5     Anion Gap         13     Aniso         Slight     AST         9     Baso #         0.02     Basophil%         0.1     Total Bilirubin         1.6  Comment:  For infants and newborns, interpretation of results should be based  on gestational age, weight and in agreement with clinical  observations.  Premature Infant recommended reference ranges:  Up to 24 hours.............<8.0 mg/dL  Up to 48 hours............<12.0 mg/dL  3-5 days..................<15.0 mg/dL  6-29 days.................<15.0 mg/dL       Blood Culture, Routine               Site       eKnny/UAC       BUN, Bld         20     [place] Cells         Occasional     Calcium         8.8     Chloride         99     CO2         23     Creatinine         1.1     DelSys       Adult Vent       Differential Method         Automated     eGFR if          >60.0     eGFR if non          >60.0  Comment:  Calculation used to obtain the estimated glomerular filtration  rate (eGFR) is the CKD-EPI equation.        Eos #         0.0     Eosinophil%         0.0     FiO2       50       Large/Giant Platelets               Glucose         124     Gram Stain (Respiratory)     <10 epithelial cells per low power field.              Moderate WBC's              No organisms seen         Gran # (ANC)         20.5     Gran%         84.1     Group & Rh               Hematocrit         33.0     Hemoglobin         11.6     INDIRECT INEZ               Lactate, Hemal 1.0  Comment:  Falsely low lactic acid results can be found in samples   containing >=13.0 mg/dL total bilirubin and/or >=3.5  mg/dL   direct bilirubin.               Lymph #         2.1     Lymph%         8.6     Magnesium         2.0     MCH         32.5     MCHC         35.2     MCV         92     Mode       AC/PRVC       Mono #         1.6     Mono%         6.6     MPV         10.7     nRBC         0     PEEP       5       Phosphorus         2.9     Platelet Estimate         Appears normal     Platelets         255     POC BE       7       POC HCO3       29.1       POC PCO2       34.0       POC PH       7.539       POC PO2       126       POC SATURATED O2       99       POC TCO2       30       Poik         Slight     Potassium 4.1       3.7     Total Protein         5.1     Rate       16       RBC         3.57     RDW         14.6     Sample       ARTERIAL       Sodium         135     Sp02       100       Vt       500       WBC         24.31                      12/24/18  0007   12/24/18  0006   12/24/18  0005   12/23/18  2258   12/23/18  2245        Immature Granulocytes         0.6              0.6     Immature Grans (Abs)         0.13  Comment:  Mild elevation in immature granulocytes is non specific and   can be seen in a variety of conditions including stress response,   acute inflammation, trauma and pregnancy. Correlation with other   laboratory and clinical findings is essential.                0.13  Comment:  Mild elevation in immature granulocytes is non specific and   can be seen in a variety of conditions including stress response,   acute inflammation, trauma and pregnancy. Correlation with other   laboratory and clinical findings is essential.       TB Induration 48 - 72 hr read               Procalcitonin 0.33  Comment:  A concentration < 0.25 ng/mL represents a low risk bacterial   infection.  Procalcitonin may not be accurate among patients with localized   infection, recent trauma or major surgery, immunosuppressed state,   invasive fungal infection, renal dysfunction. Decisions regarding   initiation or continuation  of antibiotic therapy should not be based   solely on procalcitonin levels.               Albumin         2.1     Alkaline Phosphatase         54     Allens Test               ALT         <5     Anion Gap         12     Aniso               AST         10     Baso #         0.02              0.02     Basophil%         0.1              0.1     Total Bilirubin         1.6  Comment:  For infants and newborns, interpretation of results should be based  on gestational age, weight and in agreement with clinical  observations.  Premature Infant recommended reference ranges:  Up to 24 hours.............<8.0 mg/dL  Up to 48 hours............<12.0 mg/dL  3-5 days..................<15.0 mg/dL  6-29 days.................<15.0 mg/dL       Blood Culture, Routine   No Growth to date[P] No Growth to date[P]         Site               BUN, Bld         18     [place] Cells         Occasional              Occasional     Calcium         8.9     Chloride         99     CO2         25     Creatinine         0.9     DelSys               Differential Method         Automated              Automated     eGFR if          >60.0     eGFR if non          >60.0  Comment:  Calculation used to obtain the estimated glomerular filtration  rate (eGFR) is the CKD-EPI equation.        Eos #         0.0              0.0     Eosinophil%         0.0              0.0     FiO2               Large/Giant Platelets         Present              Present     Glucose         121     Gram Stain (Respiratory)               Gran # (ANC)         20.1              20.1     Gran%         88.3              88.3     Group & Rh         O NEG     Hematocrit         32.4              32.4     Hemoglobin         11.3              11.3     INDIRECT INEZ         NEG     Lactate, Hemal       1.2  Comment:  Falsely low lactic acid results can be found in samples   containing >=13.0 mg/dL total bilirubin and/or >=3.5 mg/dL   direct bilirubin.          Lymph #         1.3              1.3     Lymph%         5.6              5.6     Magnesium         2.1     MCH         31.5              31.5     MCHC         34.9              34.9     MCV         90              90     Mode               Mono #         1.2              1.2     Mono%         5.4              5.4     MPV         10.9              10.9     nRBC         0              0     PEEP               Phosphorus         2.9              2.9     Platelet Estimate         Appears normal              Appears normal     Platelets         204              204     POC BE               POC HCO3               POC PCO2               POC PH               POC PO2               POC SATURATED O2               POC TCO2               Poik         Slight              Slight     Potassium         3.7     Total Protein         4.9     Rate               RBC         3.59              3.59     RDW         14.5              14.5     Sample               Sodium         136     Sp02               Vt               WBC         22.78              22.78                      12/23/18 2016        Immature Granulocytes       Immature Grans (Abs)       TB Induration 48 - 72 hr read       Procalcitonin       Albumin       Alkaline Phosphatase       Allens Test N/A     ALT       Anion Gap       Aniso       AST       Baso #       Basophil%       Total Bilirubin       Blood Culture, Routine       Site Kenny/UAC     BUN, Bld       [place] Cells       Calcium       Chloride       CO2       Creatinine       DelSys Adult Vent     Differential Method       eGFR if        eGFR if non        Eos #       Eosinophil%       FiO2 65     Large/Giant Platelets       Glucose       Gram Stain (Respiratory)       Gran # (ANC)       Gran%       Group & Rh       Hematocrit       Hemoglobin       INDIRECT INEZ       Lactate, Hemal       Lymph #       Lymph%       Magnesium       MCH       MCHC       MCV       Mode AC/PRVC     Mono #        Mono%       MPV       nRBC       PEEP 5     Phosphorus       Platelet Estimate       Platelets       POC BE 3     POC HCO3 26.3     POC PCO2 33.9     POC PH 7.498     POC PO2 194     POC SATURATED O2 100     POC TCO2 27     Poik       Potassium       Total Protein       Rate 16     RBC       RDW       Sample ARTERIAL     Sodium       Sp02 100     Vt 500     WBC             Significant Diagnostics:  I have reviewed all pertinent imaging results/findings within the past 24 hours.

## 2018-12-24 NOTE — PROGRESS NOTES
Worsening mental status since sd drains pulled  Clinical seizures seen at bedside this am w R facial twitching, tx w keppra load and ativan  S/p mri this am, ceeg placed after  became hypoxic with emergency ett placed     Vital signs, lab studies, and imaging reviewed by me  obtunded, no commands, eom full weak cough/gag, moves all extremities spontaneously   Warm and well perfused, regular rate and rhythm, no murmurs  No dependent edema  Prior to intubation breathing comfortably, copious thick secretions seen during laryngoscopy  Belly soft, nontender, no hepatosplenomegaly    A/p    Traumatic L sdh s/p evac, drains out, repeat ct without any reaccumulation, mri with punctate stroke and cortical irritation  -nsgy on board  -sbp < 160, will liberalize in setting of stroke    Seizure from problem above  -loaded w keppra, cont maintenance  -ceeg    Hypoxic respiratory failure, aspiration, s/p toileting bronch  -maintain ett today     I spent 30 min of uninterrupted critical care time caring for this critically ill patient exclusive of procedures and teaching.

## 2018-12-24 NOTE — PROGRESS NOTES
RN notified NCC that patient's MAPs are sustaining in the low 60's. RN ordered to give 250cc bolus of LR per MD Cedrick. MD Cedrick told RN to keep patient's MAP >60. RN will continue to monitor.

## 2018-12-24 NOTE — PT/OT/SLP DISCHARGE
Occupational Therapy Discharge Summary    Peewee Nunes  MRN: [id number]   Principal Problem: SDH (subdural hematoma)      Patient Discharged from acute Occupational Therapy on 12/24/18.  Please refer to prior OT note dated 12/21/18 for functional status.    Assessment:      Patient has not met goals.    Objective:     GOALS:   Multidisciplinary Problems     Occupational Therapy Goals        Problem: Occupational Therapy Goal    Goal Priority Disciplines Outcome Interventions   Occupational Therapy Goal     OT, PT/OT Ongoing (interventions implemented as appropriate)    Description:  Goals to be met by: 7 days (12/28/18)     Patient will increase functional independence with ADLs by performing:    UE Dressing with Stand-by Assistance.  LE Dressing with Minimal Assistance.  Grooming while EOB with Contact Guard Assistance.  Toileting from bedside commode with Moderate Assistance for hygiene and clothing management.   Supine to sit with Minimal Assistance.  Toilet transfer to bedside commode with Moderate Assistance.  Increased functional strength to WFL for ADLs.                      Reasons for Discontinuation of Therapy Services  Patient is unable to continue work toward goals because of medical or psychosocial complications.      Plan:     Patient Discharged to:   Patient remains in NCC; pt emergently intubated 12/23/18. Please re-consult therapy services when appropriate.         ASHLEY Escudero  12/24/2018

## 2018-12-24 NOTE — PROGRESS NOTES
RN notified NCC that patient had 0 U.O.  Bladder scan showed 0. MD Cedrick told RN to increase LR rate to 100ml/hr. RN will continue to monitor closely.

## 2018-12-25 LAB
ALBUMIN SERPL BCP-MCNC: 1.8 G/DL
ALLENS TEST: ABNORMAL
ALP SERPL-CCNC: 59 U/L
ALT SERPL W/O P-5'-P-CCNC: <5 U/L
ANION GAP SERPL CALC-SCNC: 10 MMOL/L
AST SERPL-CCNC: 10 U/L
BASOPHILS # BLD AUTO: 0.02 K/UL
BASOPHILS NFR BLD: 0.1 %
BILIRUB SERPL-MCNC: 1.5 MG/DL
BUN SERPL-MCNC: 26 MG/DL
CALCIUM SERPL-MCNC: 8.7 MG/DL
CHLORIDE SERPL-SCNC: 103 MMOL/L
CO2 SERPL-SCNC: 22 MMOL/L
CREAT SERPL-MCNC: 0.8 MG/DL
DELSYS: ABNORMAL
DIFFERENTIAL METHOD: ABNORMAL
EOSINOPHIL # BLD AUTO: 0 K/UL
EOSINOPHIL NFR BLD: 0.2 %
ERYTHROCYTE [DISTWIDTH] IN BLOOD BY AUTOMATED COUNT: 14.6 %
ERYTHROCYTE [SEDIMENTATION RATE] IN BLOOD BY WESTERGREN METHOD: 16 MM/H
EST. GFR  (AFRICAN AMERICAN): >60 ML/MIN/1.73 M^2
EST. GFR  (NON AFRICAN AMERICAN): >60 ML/MIN/1.73 M^2
FIO2: 70
GLUCOSE SERPL-MCNC: 96 MG/DL
HCO3 UR-SCNC: 26.2 MMOL/L (ref 24–28)
HCT VFR BLD AUTO: 31.5 %
HGB BLD-MCNC: 10.3 G/DL
IMM GRANULOCYTES # BLD AUTO: 0.09 K/UL
IMM GRANULOCYTES NFR BLD AUTO: 0.5 %
LYMPHOCYTES # BLD AUTO: 1.5 K/UL
LYMPHOCYTES NFR BLD: 8.7 %
MAGNESIUM SERPL-MCNC: 2 MG/DL
MCH RBC QN AUTO: 31.3 PG
MCHC RBC AUTO-ENTMCNC: 32.7 G/DL
MCV RBC AUTO: 96 FL
MIN VOL: 9.48
MODE: ABNORMAL
MONOCYTES # BLD AUTO: 1.5 K/UL
MONOCYTES NFR BLD: 8.5 %
NEUTROPHILS # BLD AUTO: 14 K/UL
NEUTROPHILS NFR BLD: 82 %
NRBC BLD-RTO: 0 /100 WBC
PCO2 BLDA: 35.5 MMHG (ref 35–45)
PEEP: 5
PH SMN: 7.48 [PH] (ref 7.35–7.45)
PHOSPHATE SERPL-MCNC: 2.8 MG/DL
PIP: 21
PLATELET # BLD AUTO: 220 K/UL
PMV BLD AUTO: 10.9 FL
PO2 BLDA: 104 MMHG (ref 80–100)
POC BE: 3 MMOL/L
POC SATURATED O2: 98 % (ref 95–100)
POC TCO2: 27 MMOL/L (ref 23–27)
POTASSIUM SERPL-SCNC: 4 MMOL/L
PROCALCITONIN SERPL IA-MCNC: 0.16 NG/ML
PROT SERPL-MCNC: 4.8 G/DL
RBC # BLD AUTO: 3.29 M/UL
SAMPLE: ABNORMAL
SITE: ABNORMAL
SODIUM SERPL-SCNC: 135 MMOL/L
SP02: 98
VT: 500
WBC # BLD AUTO: 17.06 K/UL

## 2018-12-25 PROCEDURE — 99291 CRITICAL CARE FIRST HOUR: CPT | Mod: GC,,, | Performed by: PSYCHIATRY & NEUROLOGY

## 2018-12-25 PROCEDURE — 94761 N-INVAS EAR/PLS OXIMETRY MLT: CPT

## 2018-12-25 PROCEDURE — 25000003 PHARM REV CODE 250: Performed by: PSYCHIATRY & NEUROLOGY

## 2018-12-25 PROCEDURE — 27000221 HC OXYGEN, UP TO 24 HOURS

## 2018-12-25 PROCEDURE — 25000003 PHARM REV CODE 250: Performed by: NURSE PRACTITIONER

## 2018-12-25 PROCEDURE — C9254 INJECTION, LACOSAMIDE: HCPCS | Performed by: NURSE PRACTITIONER

## 2018-12-25 PROCEDURE — 95951 HC EEG MONITORING/VIDEO RECORD: CPT

## 2018-12-25 PROCEDURE — 80053 COMPREHEN METABOLIC PANEL: CPT

## 2018-12-25 PROCEDURE — 27200966 HC CLOSED SUCTION SYSTEM

## 2018-12-25 PROCEDURE — 20000000 HC ICU ROOM

## 2018-12-25 PROCEDURE — 95951 PR EEG MONITORING/VIDEORECORD: ICD-10-PCS | Mod: 26,,, | Performed by: PSYCHIATRY & NEUROLOGY

## 2018-12-25 PROCEDURE — 84145 PROCALCITONIN (PCT): CPT

## 2018-12-25 PROCEDURE — 99900026 HC AIRWAY MAINTENANCE (STAT)

## 2018-12-25 PROCEDURE — 99291 PR CRITICAL CARE, E/M 30-74 MINUTES: ICD-10-PCS | Mod: GC,,, | Performed by: PSYCHIATRY & NEUROLOGY

## 2018-12-25 PROCEDURE — 63600175 PHARM REV CODE 636 W HCPCS: Performed by: NURSE PRACTITIONER

## 2018-12-25 PROCEDURE — 63600175 PHARM REV CODE 636 W HCPCS: Performed by: STUDENT IN AN ORGANIZED HEALTH CARE EDUCATION/TRAINING PROGRAM

## 2018-12-25 PROCEDURE — 84100 ASSAY OF PHOSPHORUS: CPT

## 2018-12-25 PROCEDURE — 63600175 PHARM REV CODE 636 W HCPCS: Performed by: PSYCHIATRY & NEUROLOGY

## 2018-12-25 PROCEDURE — 37799 UNLISTED PX VASCULAR SURGERY: CPT

## 2018-12-25 PROCEDURE — 83735 ASSAY OF MAGNESIUM: CPT

## 2018-12-25 PROCEDURE — 99900035 HC TECH TIME PER 15 MIN (STAT)

## 2018-12-25 PROCEDURE — 95951 PR EEG MONITORING/VIDEORECORD: CPT | Mod: 26,,, | Performed by: PSYCHIATRY & NEUROLOGY

## 2018-12-25 PROCEDURE — 94003 VENT MGMT INPAT SUBQ DAY: CPT

## 2018-12-25 PROCEDURE — 82803 BLOOD GASES ANY COMBINATION: CPT

## 2018-12-25 PROCEDURE — A4216 STERILE WATER/SALINE, 10 ML: HCPCS | Performed by: PSYCHIATRY & NEUROLOGY

## 2018-12-25 PROCEDURE — 85025 COMPLETE CBC W/AUTO DIFF WBC: CPT

## 2018-12-25 RX ORDER — CLONAZEPAM 1 MG/1
1 TABLET ORAL EVERY 8 HOURS
Status: DISCONTINUED | OUTPATIENT
Start: 2018-12-25 | End: 2018-12-25

## 2018-12-25 RX ORDER — CEFEPIME HYDROCHLORIDE 1 G/1
1 INJECTION, POWDER, FOR SOLUTION INTRAMUSCULAR; INTRAVENOUS
Status: DISCONTINUED | OUTPATIENT
Start: 2018-12-25 | End: 2018-12-27

## 2018-12-25 RX ORDER — POLYETHYLENE GLYCOL 3350 17 G/17G
17 POWDER, FOR SOLUTION ORAL DAILY
Status: DISCONTINUED | OUTPATIENT
Start: 2018-12-25 | End: 2019-01-07

## 2018-12-25 RX ADMIN — HEPARIN SODIUM 5000 UNITS: 5000 INJECTION, SOLUTION INTRAVENOUS; SUBCUTANEOUS at 09:12

## 2018-12-25 RX ADMIN — FAMOTIDINE 20 MG: 20 TABLET ORAL at 09:12

## 2018-12-25 RX ADMIN — Medication 3 ML: at 02:12

## 2018-12-25 RX ADMIN — FAMOTIDINE 20 MG: 20 TABLET ORAL at 08:12

## 2018-12-25 RX ADMIN — LEVETIRACETAM 1000 MG: 10 INJECTION INTRAVENOUS at 09:12

## 2018-12-25 RX ADMIN — Medication 3 ML: at 09:12

## 2018-12-25 RX ADMIN — Medication 3 ML: at 05:12

## 2018-12-25 RX ADMIN — KETAMINE HYDROCHLORIDE 50 MCG/KG/MIN: 100 INJECTION INTRAMUSCULAR; INTRAVENOUS at 05:12

## 2018-12-25 RX ADMIN — PRAVASTATIN SODIUM 40 MG: 40 TABLET ORAL at 09:12

## 2018-12-25 RX ADMIN — MUPIROCIN 1 G: 20 OINTMENT TOPICAL at 08:12

## 2018-12-25 RX ADMIN — POLYETHYLENE GLYCOL 3350 17 G: 17 POWDER, FOR SOLUTION ORAL at 01:12

## 2018-12-25 RX ADMIN — CHLORHEXIDINE GLUCONATE 0.12% ORAL RINSE 15 ML: 1.2 LIQUID ORAL at 08:12

## 2018-12-25 RX ADMIN — CEFEPIME 1 G: 1 INJECTION, POWDER, FOR SOLUTION INTRAMUSCULAR; INTRAVENOUS at 08:12

## 2018-12-25 RX ADMIN — MUPIROCIN 1 G: 20 OINTMENT TOPICAL at 09:12

## 2018-12-25 RX ADMIN — LEVETIRACETAM 1000 MG: 10 INJECTION INTRAVENOUS at 08:12

## 2018-12-25 RX ADMIN — CEFEPIME 1 G: 1 INJECTION, POWDER, FOR SOLUTION INTRAMUSCULAR; INTRAVENOUS at 03:12

## 2018-12-25 RX ADMIN — SODIUM CHLORIDE 200 MG: 9 INJECTION, SOLUTION INTRAVENOUS at 08:12

## 2018-12-25 RX ADMIN — CHLORHEXIDINE GLUCONATE 0.12% ORAL RINSE 15 ML: 1.2 LIQUID ORAL at 09:12

## 2018-12-25 RX ADMIN — STANDARDIZED SENNA CONCENTRATE AND DOCUSATE SODIUM 1 TABLET: 8.6; 5 TABLET, FILM COATED ORAL at 08:12

## 2018-12-25 RX ADMIN — HEPARIN SODIUM 5000 UNITS: 5000 INJECTION, SOLUTION INTRAVENOUS; SUBCUTANEOUS at 05:12

## 2018-12-25 RX ADMIN — SODIUM CHLORIDE 200 MG: 9 INJECTION, SOLUTION INTRAVENOUS at 09:12

## 2018-12-25 RX ADMIN — HEPARIN SODIUM 5000 UNITS: 5000 INJECTION, SOLUTION INTRAVENOUS; SUBCUTANEOUS at 01:12

## 2018-12-25 RX ADMIN — SODIUM CHLORIDE, SODIUM LACTATE, POTASSIUM CHLORIDE, AND CALCIUM CHLORIDE 1000 ML: .6; .31; .03; .02 INJECTION, SOLUTION INTRAVENOUS at 01:12

## 2018-12-25 RX ADMIN — VANCOMYCIN HYDROCHLORIDE 1250 MG: 10 INJECTION, POWDER, LYOPHILIZED, FOR SOLUTION INTRAVENOUS at 09:12

## 2018-12-25 NOTE — ASSESSMENT & PLAN NOTE
64 yo male with L aSDH now s/p craniotomy for evacuation (12/20).    Neuro stable  Cont neuro checks  --Continue care per primary team.  --Continue AED per NCC, f/u cEEG  SBP < 160  --WTE per primary team when safe.   Goal eunatremia.   SARAH/SCD's, sqh. Gi ppx.     dispo- continue ICU care.

## 2018-12-25 NOTE — ASSESSMENT & PLAN NOTE
Clinical R facial twitching  Continued ictal spikes and periodic discharges over L temporal     Start on ketamine   Add lacosamide  Cont keppra

## 2018-12-25 NOTE — ASSESSMENT & PLAN NOTE
eeg improved, now with intermittent low amplitude periodic discharges  On ketamine, on keppra, and lacosamide    Plan to begin weaning ketamine tomorrow  Consider adding clonazepam if wean unsucessful

## 2018-12-25 NOTE — PROCEDURES
"Peewee Nunes is a 63 y.o. male patient.    Temp: 97.9 °F (36.6 °C) (12/24/18 2305)  Pulse: 92 (12/25/18 0135)  Resp: 12 (12/25/18 0135)  BP: (!) 145/93 (12/25/18 0135)  SpO2: 96 % (12/25/18 0135)  Weight: 85.7 kg (189 lb) (12/21/18 1502)  Height: 5' 10" (177.8 cm) (12/21/18 1502)       Central Line  Date/Time: 12/25/2018 3:33 AM  Location procedure was performed: [facility]  Performed by: Don Vincent MD  Pre-operative Diagnosis: NCSE  Post-operative diagnosis: NCSE  Consent Done: Yes  Time out: Immediately prior to procedure a "time out" was called to verify the correct patient, procedure, equipment, support staff and site/side marked as required.  Indications: med administration, hemodynamic monitoring and vascular access  Preparation: skin prepped with ChloraPrep  Skin prep agent dried: skin prep agent completely dried prior to procedure  Sterile barriers: all five maximum sterile barriers used - cap, mask, sterile gown, sterile gloves, and large sterile sheet  Hand hygiene: hand hygiene performed prior to central venous catheter insertion  Location details: left internal jugular  Catheter type: triple lumen  Catheter size: 7 Fr  Catheter Length: 19cm    Ultrasound guidance: yes  Vessel Caliber: small, patent, compressibility normal  Needle advanced into vessel with real time Ultrasound guidance.  Guidewire confirmed in vessel.  Sterile sheath used.  Manometry: Yes  Number of attempts: 2  Assessment: placement verified by x-ray,  no pneumothorax on x-ray and successful placement  Complications: none  Post-procedure: line sutured,  chlorhexidine patch,  sterile dressing applied and blood return through all ports  Comments: Arterial stick on first attempt, no hematoma. Hemostasis ensured.          Don Vincent  12/25/2018  "

## 2018-12-25 NOTE — SUBJECTIVE & OBJECTIVE
Interval History:     Hypotensive overnight requiring levophed  Clinical seizures overnight, dosed w ativan    Objective:     Vitals:  Temp: 98.5 °F (36.9 °C)  Pulse: 105  Rhythm: atrial rhythm  BP: 130/79  MAP (mmHg): 104  CI (L/min/m2): 1.7 L/min/m2  SVI: 23  SVV: 17 %  Resp: 16  SpO2: 100 %  Oxygen Concentration (%): 50  O2 Device (Oxygen Therapy): ventilator  Vent Mode: A/C  Set Rate: 16 bmp  Vt Set: 500 mL  PEEP/CPAP: 5 cmH20  Peak Airway Pressure: 21 cmH2O  Mean Airway Pressure: 9.5 cmH20  Plateau Pressure: 0 cmH20    Temp  Min: 97.8 °F (36.6 °C)  Max: 99.2 °F (37.3 °C)  Pulse  Min: 77  Max: 112  BP  Min: 106/51  Max: 188/88  MAP (mmHg)  Min: 78  Max: 126  CI (L/min/m2)  Min: 1.7 L/min/m2  Max: 3.3 L/min/m2  SVI  Min: 23  Max: 34  SVV  Min: 11 %  Max: 17 %  Resp  Min: 5  Max: 27  SpO2  Min: 99 %  Max: 100 %  Oxygen Concentration (%)  Min: 50  Max: 51    12/23 0701 - 12/24 0700  In: 2354.5 [I.V.:1654.5]  Out: 385 [Urine:385]   Unmeasured Output  Stool Occurrence: 0       Physical Exam  Vital signs, lab studies, and imaging reviewed by me  obtunded, no commands, eom full weak cough/gag, moves all extremities spontaneously   Warm and well perfused, regular rate and rhythm, no murmurs  No dependent edema  Breathing comfortably on vent  Belly soft, nontender, no hepatosplenomegaly        Medications:  Continuous  ketamine (KETALAR) infusion Last Rate: 50 mcg/kg/min (12/24/18 2005)   norepinephrine bitartrate-D5W Last Rate: 0.03 mcg/kg/min (12/24/18 2005)   Scheduled  ceFEPime (MAXIPIME) IVPB 1 g Q12H   chlorhexidine 15 mL BID   famotidine 20 mg BID   heparin (porcine) 5,000 Units Q8H   lacosamide (VIMPAT) in saline IVPB 200 mg Q12H   levetiracetam IVPB 1,000 mg Q12H   lorazepam 2 mg Once   mupirocin 1 g BID   pravastatin 40 mg QHS   senna-docusate 8.6-50 mg 1 tablet Daily   sodium chloride 0.9% 3 mL Q8H   [START ON 12/25/2018] vancomycin (VANCOCIN) IVPB 15 mg/kg (Dosing Weight) Q24H   PRN  sodium chloride  Q24H PRN    acetaminophen 650 mg Q6H PRN   diclofenac sodium  TID PRN   HYDROcodone-acetaminophen 1 tablet Q4H PRN   magnesium oxide 800 mg PRN   magnesium oxide 800 mg PRN   magnesium sulfate IVPB 2 g PRN   magnesium sulfate IVPB 4 g PRN   morphine 0.5 mg Q6H PRN   ondansetron 4 mg Q12H PRN   potassium chloride 10% 40 mEq PRN   potassium chloride 10% 40 mEq PRN   potassium chloride 10% 60 mEq PRN   potassium, sodium phosphates 2 packet PRN   potassium, sodium phosphates 2 packet PRN   potassium, sodium phosphates 2 packet PRN     Diet  No diet orders on file  No diet orders on file

## 2018-12-25 NOTE — SUBJECTIVE & OBJECTIVE
Interval History:      eeg improving on anesthetics and aeds  uop reduced, s/p volume and pressors with some improvement  Improved fever curve, improved leukocytosis    Objective:     Vitals:  Temp: 97.7 °F (36.5 °C)  Pulse: (!) 115  Rhythm: atrial rhythm  BP: (!) 158/91  MAP (mmHg): 115  CI (L/min/m2): 1.6 L/min/m2  SVI: 17  SVV: 19 %  Resp: (!) 24  SpO2: 100 %  Oxygen Concentration (%): 60  O2 Device (Oxygen Therapy): ventilator  Vent Mode: A/C  Set Rate: 16 bmp  Vt Set: 500 mL  PEEP/CPAP: 8 cmH20  Peak Airway Pressure: 22 cmH2O  Mean Airway Pressure: 12 cmH20  Plateau Pressure: 0 cmH20    Temp  Min: 97.3 °F (36.3 °C)  Max: 98.5 °F (36.9 °C)  Pulse  Min: 88  Max: 115  BP  Min: 102/72  Max: 158/91  MAP (mmHg)  Min: 77  Max: 118  CI (L/min/m2)  Min: 1.6 L/min/m2  Max: 3.3 L/min/m2  SVI  Min: 17  Max: 34  SVV  Min: 12 %  Max: 19 %  Resp  Min: 0  Max: 27  SpO2  Min: 93 %  Max: 100 %  Oxygen Concentration (%)  Min: 50  Max: 100    12/24 0701 - 12/25 0700  In: 2730 [I.V.:1710]  Out: 440 [Urine:440]   Unmeasured Output  Stool Occurrence: 0       Physical Exam  Vital signs, lab studies, and imaging reviewed by me  obtunded, no commands, eom full w vor weak cough/gag, moves all extremities spontaneously   Warm and well perfused, regular rate and rhythm, no murmurs  No dependent edema  Breathing comfortably on vent  Belly soft, nontender, no hepatosplenomegaly    Medications:  Continuous  ketamine (KETALAR) infusion Last Rate: 50 mcg/kg/min (12/25/18 1600)   norepinephrine bitartrate-D5W Last Rate: 0.01 mcg/kg/min (12/25/18 1600)   Scheduled  ceFEPime (MAXIPIME) IVPB 1 g Q8H   chlorhexidine 15 mL BID   famotidine 20 mg BID   heparin (porcine) 5,000 Units Q8H   lacosamide (VIMPAT) in saline IVPB 200 mg Q12H   levetiracetam IVPB 1,000 mg Q12H   lorazepam 2 mg Once   mupirocin 1 g BID   polyethylene glycol 17 g Daily   pravastatin 40 mg QHS   senna-docusate 8.6-50 mg 1 tablet Daily   sodium chloride 0.9% 3 mL Q8H   vancomycin  (VANCOCIN) IVPB 15 mg/kg (Dosing Weight) Q24H   PRN  sodium chloride  Q24H PRN   acetaminophen 650 mg Q6H PRN   diclofenac sodium  TID PRN   HYDROcodone-acetaminophen 1 tablet Q4H PRN   magnesium oxide 800 mg PRN   magnesium oxide 800 mg PRN   magnesium sulfate IVPB 2 g PRN   magnesium sulfate IVPB 4 g PRN   morphine 0.5 mg Q6H PRN   ondansetron 4 mg Q12H PRN   potassium chloride 10% 40 mEq PRN   potassium chloride 10% 40 mEq PRN   potassium chloride 10% 60 mEq PRN   potassium, sodium phosphates 2 packet PRN   potassium, sodium phosphates 2 packet PRN   potassium, sodium phosphates 2 packet PRN     Diet  No diet orders on file  No diet orders on file

## 2018-12-25 NOTE — SUBJECTIVE & OBJECTIVE
Interval History: no AE. AFVSS.      Medications:  Continuous Infusions:   ketamine (KETALAR) infusion 50 mcg/kg/min (12/25/18 0700)    norepinephrine bitartrate-D5W 0.01 mcg/kg/min (12/25/18 0700)     Scheduled Meds:   ceFEPime (MAXIPIME) IVPB  1 g Intravenous Q12H    chlorhexidine  15 mL Mouth/Throat BID    famotidine  20 mg Per NG tube BID    heparin (porcine)  5,000 Units Subcutaneous Q8H    lacosamide (VIMPAT) in saline IVPB  200 mg Intravenous Q12H    levetiracetam IVPB  1,000 mg Intravenous Q12H    lorazepam  2 mg Intravenous Once    mupirocin  1 g Nasal BID    pravastatin  40 mg Oral QHS    senna-docusate 8.6-50 mg  1 tablet Oral Daily    sodium chloride 0.9%  3 mL Intravenous Q8H    vancomycin (VANCOCIN) IVPB  15 mg/kg (Dosing Weight) Intravenous Q24H     PRN Meds:sodium chloride, acetaminophen, diclofenac sodium, HYDROcodone-acetaminophen, magnesium oxide, magnesium oxide, magnesium sulfate IVPB, magnesium sulfate IVPB, morphine, ondansetron, potassium chloride 10%, potassium chloride 10%, potassium chloride 10%, potassium, sodium phosphates, potassium, sodium phosphates, potassium, sodium phosphates       Objective:     Weight: 85.7 kg (189 lb)  Body mass index is 27.12 kg/m².  Vital Signs (Most Recent):  Temp: 97.3 °F (36.3 °C) (12/25/18 0702)  Pulse: 93 (12/25/18 0702)  Resp: 12 (12/25/18 0702)  BP: 110/75 (12/25/18 0702)  SpO2: 95 % (12/25/18 0702) Vital Signs (24h Range):  Temp:  [97.3 °F (36.3 °C)-98.5 °F (36.9 °C)] 97.3 °F (36.3 °C)  Pulse:  [] 93  Resp:  [5-27] 12  SpO2:  [93 %-100 %] 95 %  BP: (102-188)/() 110/75  Arterial Line BP: ()/() 111/77     Date 12/25/18 0700 - 12/26/18 0659   Shift 1823-6162 7127-3276 9039-1112 24 Hour Total   INTAKE   I.V.(mL/kg) 36.5(0.4)   36.5(0.4)   Shift Total(mL/kg) 36.5(0.4)   36.5(0.4)   OUTPUT   Urine(mL/kg/hr) 10   10   Shift Total(mL/kg) 10(0.1)   10(0.1)   Weight (kg) 85.7 85.7 85.7 85.7              Vent Mode:  "A/C  Oxygen Concentration (%):  [] 71  Resp Rate Total:  [16 br/min-20 br/min] 16 br/min  Vt Set:  [500 mL] 500 mL  PEEP/CPAP:  [5 cmH20] 5 cmH20  Mean Airway Pressure:  [8.4 cmH20-10 cmH20] 8.8 cmH20         NG/OG Tube 12/23/18 1500 Center mouth (Active)   Placement Check placement verified by aspirate characteristics;physician notified 12/24/2018  7:02 AM   Securement taped to commercial device 12/24/2018  7:02 AM   Clamp Status/Tolerance no abdominal discomfort;no abdominal distention;no emesis;no nausea;no residual;no restlessness;clamped 12/24/2018  7:02 AM   Suction Setting/Drainage Method dependent drainage 12/24/2018  7:02 AM   Insertion Site Appearance no redness, warmth, tenderness, skin breakdown, drainage 12/24/2018  7:02 AM   Drainage None 12/24/2018  7:02 AM   Flush/Irrigation flushed w/;water;no resistance met 12/24/2018  7:02 AM   Intake (mL) 30 mL 12/24/2018  9:00 AM   Residual Amount (ml) 0 ml 12/24/2018  7:02 AM            Urethral Catheter 12/20/18 2350 Non-latex;Straight-tip 16 Fr. (Active)   Site Assessment Clean;Intact 12/24/2018  7:02 AM   Collection Container Urimeter 12/24/2018  7:02 AM   Securement Method secured to top of thigh w/ adhesive device 12/24/2018  7:02 AM   Catheter Care Performed yes 12/24/2018  7:02 AM   Reason for Continuing Urinary Catheterization Urinary retention 12/24/2018  7:02 AM   CAUTI Prevention Bundle StatLock in place w 1" slack;Intact seal between catheter & drainage tubing;Drainage bag off the floor;Green sheeting clip in use;No dependent loops or kinks;Drainage bag not overfilled (<2/3 full);Drainage bag below bladder 12/24/2018  7:02 AM   Output (mL) 10 mL 12/24/2018 11:00 AM       Neurosurgery Physical Exam     [id number]  Intubated., sedated. ketamine. Levo.   Does not open eyes  PERRL. + cough/gag.   WithDraws to stimx4      Significant Labs:  Recent Labs   Lab 12/23/18  0475 12/24/18  0248 12/24/18  0954 12/25/18  0232   * 124*  --  96    " 135*  --  135*   K 3.7 3.7 4.1 4.0   CL 99 99  --  103   CO2 25 23  --  22*   BUN 18 20  --  26*   CREATININE 0.9 1.1  --  0.8   CALCIUM 8.9 8.8  --  8.7   MG 2.1 2.0  --  2.0     Recent Labs   Lab 12/23/18  2245 12/24/18  0248 12/25/18  0232   WBC 22.78*  22.78* 24.31* 17.06*   HGB 11.3*  11.3* 11.6* 10.3*   HCT 32.4*  32.4* 33.0* 31.5*     204 255 220     No results for input(s): LABPT, INR, APTT in the last 48 hours.  Microbiology Results (last 7 days)     Procedure Component Value Units Date/Time    Blood culture [[id number]] Collected:  12/24/18 0005    Order Status:  Completed Specimen:  Blood from Peripheral, Hand, Left Updated:  12/25/18 0612     Blood Culture, Routine No Growth to date     Blood Culture, Routine No Growth to date    Blood culture [[id number]] Collected:  12/24/18 0006    Order Status:  Completed Specimen:  Blood from Line, Arterial, Right Updated:  12/25/18 0612     Blood Culture, Routine No Growth to date     Blood Culture, Routine No Growth to date    Culture, Respiratory with Gram Stain [[id number]] Collected:  12/24/18 0446    Order Status:  Completed Specimen:  Respiratory from Endotracheal Aspirate Updated:  12/24/18 0905     Gram Stain (Respiratory) <10 epithelial cells per low power field.     Gram Stain (Respiratory) Moderate WBC's     Gram Stain (Respiratory) No organisms seen    Urine culture [[id number]]     Order Status:  Canceled Specimen:  Urine, Catheterized         Recent Lab Results       12/25/18  0330   12/25/18  0232   12/24/18  0954   12/24/18  0843        Immature Granulocytes   0.5         Immature Grans (Abs)   0.09  Comment:  Mild elevation in immature granulocytes is non specific and   can be seen in a variety of conditions including stress response,   acute inflammation, trauma and pregnancy. Correlation with other   laboratory and clinical findings is essential.           TB Induration 48 - 72 hr read       0     Albumin   1.8         Alkaline Phosphatase    59         Allens Test N/A           ALT   <5         Anion Gap   10         AST   10         Baso #   0.02         Basophil%   0.1         Total Bilirubin   1.5  Comment:  For infants and newborns, interpretation of results should be based  on gestational age, weight and in agreement with clinical  observations.  Premature Infant recommended reference ranges:  Up to 24 hours.............<8.0 mg/dL  Up to 48 hours............<12.0 mg/dL  3-5 days..................<15.0 mg/dL  6-29 days.................<15.0 mg/dL           [facility]           BUN, Bld   26         Calcium   8.7         Chloride   103         CO2   22         Creatinine   0.8         [facility] Adult Vent           Differential Method   Automated         eGFR if    >60.0         eGFR if non    >60.0  Comment:  Calculation used to obtain the estimated glomerular filtration  rate (eGFR) is the CKD-EPI equation.            Eos #   0.0         Eosinophil%   0.2         FiO2 70           Glucose   96         Gran # (ANC)   14.0         Gran%   82.0         Hematocrit   31.5         Hemoglobin   10.3         Lactate, Hemal     1.0  Comment:  Falsely low lactic acid results can be found in samples   containing >=13.0 mg/dL total bilirubin and/or >=3.5 mg/dL   direct bilirubin.         Lymph #   1.5         Lymph%   8.7         Magnesium   2.0         MCH   31.3         MCHC   32.7         MCV   96         Min Vol 9.48           Mode AC/PRVC           Mono #   1.5         Mono%   8.5         MPV   10.9         nRBC   0         PEEP 5           Phosphorus   2.8         PiP 21           Platelets   220         POC BE 3           POC HCO3 26.2           POC PCO2 35.5           POC PH 7.476           POC PO2 104           POC SATURATED O2 98           POC TCO2 27           Potassium   4.0 4.1       Total Protein   4.8         Rate 16           RBC   3.29         RDW   14.6         Sample ARTERIAL           Sodium   135          Sp02 98           Vt 500           WBC   17.06               Significant Diagnostics:  I have reviewed all pertinent imaging results/findings within the past 24 hours.

## 2018-12-25 NOTE — ASSESSMENT & PLAN NOTE
Hypoxic, aspiration, bronch with mucous plugging in L lung    Maintain today in setting of mental status

## 2018-12-25 NOTE — PROGRESS NOTES
[facility]  Neurocritical Care  Progress Note    Admit Date: 12/20/2018  Service Date: 12/25/2018  Length of Stay: 5    Subjective:     Chief Complaint: SDH (subdural hematoma)    History of Present Illness: 63 year old male h/o CABG on ASA 81 mg (last dose 72 hours PTA) presenting as transfer from OSH for EDH with SDH. Patient reportedly had a mechanical fall from standing on Sunday and went to ED where CTH was negative for any acute intracranial pathology. Overnight, patient developed confusion and presented to OSH ED where CTH was consistent with EDH with SDH. Patient has progressively worsened throughout the day developing intermittent global aphasia. [place] [facility] [place]. NCC consulted for admission and medical management. Patient on weaning off of cardene at time of evaluation with sbp of 120. Reported presenting sbp in 150s.    Hospital Course: 12/22: no major events overnight. Weaned off of cardene. This morning AOx3. Drain removed by NSGY at 11 AM, and planned to step down to NSGY.  AMS noted around 1PM, patient aphasic and not following commands, STAT CTH unremarkable.     Interval History:      eeg improving on anesthetics and aeds  uop reduced, s/p volume and pressors with some improvement  Improved fever curve, improved leukocytosis    Objective:     Vitals:  Temp: 97.7 °F (36.5 °C)  Pulse: (!) 115  Rhythm: atrial rhythm  BP: (!) 158/91  MAP (mmHg): 115  CI (L/min/m2): 1.6 L/min/m2  SVI: 17  SVV: 19 %  Resp: (!) 24  SpO2: 100 %  Oxygen Concentration (%): 60  O2 Device (Oxygen Therapy): ventilator  Vent Mode: A/C  Set Rate: 16 bmp  Vt Set: 500 mL  PEEP/CPAP: 8 cmH20  Peak Airway Pressure: 22 cmH2O  Mean Airway Pressure: 12 cmH20  Plateau Pressure: 0 cmH20    Temp  Min: 97.3 °F (36.3 °C)  Max: 98.5 °F (36.9 °C)  Pulse  Min: 88  Max: 115  BP  Min: 102/72  Max: 158/91  MAP (mmHg)  Min: 77  Max: 118  CI (L/min/m2)  Min: 1.6 L/min/m2  Max: 3.3  L/min/m2  SVI  Min: 17  Max: 34  SVV  Min: 12 %  Max: 19 %  Resp  Min: 0  Max: 27  SpO2  Min: 93 %  Max: 100 %  Oxygen Concentration (%)  Min: 50  Max: 100    12/24 0701 - 12/25 0700  In: 2730 [I.V.:1710]  Out: 440 [Urine:440]   Unmeasured Output  Stool Occurrence: 0       Physical Exam  Vital signs, lab studies, and imaging reviewed by me  obtunded, no commands, eom full w vor weak cough/gag, moves all extremities spontaneously   Warm and well perfused, regular rate and rhythm, no murmurs  No dependent edema  Breathing comfortably on vent  Belly soft, nontender, no hepatosplenomegaly    Medications:  Continuous  ketamine (KETALAR) infusion Last Rate: 50 mcg/kg/min (12/25/18 1600)   norepinephrine bitartrate-D5W Last Rate: 0.01 mcg/kg/min (12/25/18 1600)   Scheduled  ceFEPime (MAXIPIME) IVPB 1 g Q8H   chlorhexidine 15 mL BID   famotidine 20 mg BID   heparin (porcine) 5,000 Units Q8H   lacosamide (VIMPAT) in saline IVPB 200 mg Q12H   levetiracetam IVPB 1,000 mg Q12H   lorazepam 2 mg Once   mupirocin 1 g BID   polyethylene glycol 17 g Daily   pravastatin 40 mg QHS   senna-docusate 8.6-50 mg 1 tablet Daily   sodium chloride 0.9% 3 mL Q8H   vancomycin (VANCOCIN) IVPB 15 mg/kg (Dosing Weight) Q24H   PRN  sodium chloride  Q24H PRN   acetaminophen 650 mg Q6H PRN   diclofenac sodium  TID PRN   HYDROcodone-acetaminophen 1 tablet Q4H PRN   magnesium oxide 800 mg PRN   magnesium oxide 800 mg PRN   magnesium sulfate IVPB 2 g PRN   magnesium sulfate IVPB 4 g PRN   morphine 0.5 mg Q6H PRN   ondansetron 4 mg Q12H PRN   potassium chloride 10% 40 mEq PRN   potassium chloride 10% 40 mEq PRN   potassium chloride 10% 60 mEq PRN   potassium, sodium phosphates 2 packet PRN   potassium, sodium phosphates 2 packet PRN   potassium, sodium phosphates 2 packet PRN     Diet  No diet orders on file  No diet orders on file        Assessment/Plan:     Neuro   Status epilepticus    eeg improved, now with intermittent low amplitude periodic  discharges  On ketamine, on keppra, and lacosamide    Plan to begin weaning ketamine tomorrow  Consider adding clonazepam if wean unsucessful     Pulmonary   Acute respiratory failure    Hypoxic, aspiration, bronch with mucous plugging in L lung    Maintain today in setting of mental status     Cardiac/Vascular   New onset atrial fibrillation    Rate acceptable    Avoiding beta blockers in setting of pressor requirement  Consider amio      ID   Septic shock    Leukocytosis, reduced uop, hypotensive  Suspect pulmonary source  On vanc/cefepime    F/u cultures  Prn pressors and fluid resuscitation     Other   Pressure injury of buttock, stage 2    Appreciate wound care reccs           The patient is being Prophylaxed for:  Venous Thromboembolism with: Mechanical or Chemical  Stress Ulcer with: H2B  Ventilator Pneumonia with: chlorhexidine oral care    Activity Orders          None        Full Code     I spent 30 min of uninterrupted critical care time caring for this critically ill patient exclusive of procedures and teaching.     Kenneth Decker MD  Neurocritical Care  [facility]

## 2018-12-25 NOTE — ASSESSMENT & PLAN NOTE
Leukocytosis, reduced uop, hypotensive  Suspect pulmonary source  On vanc/cefepime    F/u cultures  Prn pressors and fluid resuscitation

## 2018-12-25 NOTE — ASSESSMENT & PLAN NOTE
Epidural with subdural component s/p evac 12/20  Repeat cth stable  Mri with punctate stroke    nsgy on board  Pt/ot/slp when able

## 2018-12-25 NOTE — PROGRESS NOTES
[facility]  Neurocritical Care  Progress Note    Admit Date: 12/20/2018  Service Date: 12/24/2018  Length of Stay: 4    Subjective:     Chief Complaint: SDH (subdural hematoma)    History of Present Illness: 63 year old male h/o CABG on ASA 81 mg (last dose 72 hours PTA) presenting as transfer from OSH for EDH with SDH. Patient reportedly had a mechanical fall from standing on Sunday and went to ED where CTH was negative for any acute intracranial pathology. Overnight, patient developed confusion and presented to OSH ED where CTH was consistent with EDH with SDH. Patient has progressively worsened throughout the day developing intermittent global aphasia. [place] [facility] [place]. NCC consulted for admission and medical management. Patient on weaning off of cardene at time of evaluation with sbp of 120. Reported presenting sbp in 150s.    Hospital Course: 12/22: no major events overnight. Weaned off of cardene. This morning AOx3. Drain removed by NSGY at 11 AM, and planned to step down to NSGY.  AMS noted around 1PM, patient aphasic and not following commands, STAT CTH unremarkable.     Interval History:     Hypotensive overnight requiring levophed  Clinical seizures overnight, dosed w ativan    Objective:     Vitals:  Temp: 98.5 °F (36.9 °C)  Pulse: 105  Rhythm: atrial rhythm  BP: 130/79  MAP (mmHg): 104  CI (L/min/m2): 1.7 L/min/m2  SVI: 23  SVV: 17 %  Resp: 16  SpO2: 100 %  Oxygen Concentration (%): 50  O2 Device (Oxygen Therapy): ventilator  Vent Mode: A/C  Set Rate: 16 bmp  Vt Set: 500 mL  PEEP/CPAP: 5 cmH20  Peak Airway Pressure: 21 cmH2O  Mean Airway Pressure: 9.5 cmH20  Plateau Pressure: 0 cmH20    Temp  Min: 97.8 °F (36.6 °C)  Max: 99.2 °F (37.3 °C)  Pulse  Min: 77  Max: 112  BP  Min: 106/51  Max: 188/88  MAP (mmHg)  Min: 78  Max: 126  CI (L/min/m2)  Min: 1.7 L/min/m2  Max: 3.3 L/min/m2  SVI  Min: 23  Max: 34  SVV  Min: 11 %  Max: 17 %  Resp  Min: 5  Max:  27  SpO2  Min: 99 %  Max: 100 %  Oxygen Concentration (%)  Min: 50  Max: 51    12/23 0701 - 12/24 0700  In: 2354.5 [I.V.:1654.5]  Out: 385 [Urine:385]   Unmeasured Output  Stool Occurrence: 0       Physical Exam  Vital signs, lab studies, and imaging reviewed by me  obtunded, no commands, eom full weak cough/gag, moves all extremities spontaneously   Warm and well perfused, regular rate and rhythm, no murmurs  No dependent edema  Breathing comfortably on vent  Belly soft, nontender, no hepatosplenomegaly        Medications:  Continuous  ketamine (KETALAR) infusion Last Rate: 50 mcg/kg/min (12/24/18 2005)   norepinephrine bitartrate-D5W Last Rate: 0.03 mcg/kg/min (12/24/18 2005)   Scheduled  ceFEPime (MAXIPIME) IVPB 1 g Q12H   chlorhexidine 15 mL BID   famotidine 20 mg BID   heparin (porcine) 5,000 Units Q8H   lacosamide (VIMPAT) in saline IVPB 200 mg Q12H   levetiracetam IVPB 1,000 mg Q12H   lorazepam 2 mg Once   mupirocin 1 g BID   pravastatin 40 mg QHS   senna-docusate 8.6-50 mg 1 tablet Daily   sodium chloride 0.9% 3 mL Q8H   [START ON 12/25/2018] vancomycin (VANCOCIN) IVPB 15 mg/kg (Dosing Weight) Q24H   PRN  sodium chloride  Q24H PRN   acetaminophen 650 mg Q6H PRN   diclofenac sodium  TID PRN   HYDROcodone-acetaminophen 1 tablet Q4H PRN   magnesium oxide 800 mg PRN   magnesium oxide 800 mg PRN   magnesium sulfate IVPB 2 g PRN   magnesium sulfate IVPB 4 g PRN   morphine 0.5 mg Q6H PRN   ondansetron 4 mg Q12H PRN   potassium chloride 10% 40 mEq PRN   potassium chloride 10% 40 mEq PRN   potassium chloride 10% 60 mEq PRN   potassium, sodium phosphates 2 packet PRN   potassium, sodium phosphates 2 packet PRN   potassium, sodium phosphates 2 packet PRN     Diet  No diet orders on file  No diet orders on file          Assessment/Plan:     Neuro   * SDH (subdural hematoma)    Epidural with subdural component s/p evac 12/20  Repeat cth stable  Mri with punctate stroke    nsgy on board  Pt/ot/slp when able       Status  epilepticus    Clinical R facial twitching  Continued ictal spikes and periodic discharges over L temporal     Start on ketamine   Add lacosamide  Cont keppra     Pulmonary   Acute respiratory failure    Hypoxic, aspiration, bronch with mucous plugging in L lung    Maintain today in setting of mental status     Renal/   AYLIN (acute kidney injury)    Reduced uop in setting of shock    Fluid resuscitation  Monitor uop  Follow cmp daily     ID   Septic shock    Leukocytosis, reduced uop, hypotensive  Suspect pulmonary source    Start vanc and cefepime     Other   Pressure injury of buttock, stage 2    Appreciate wound care reccs           The patient is being Prophylaxed for:  Venous Thromboembolism with: Mechanical or Chemical  Stress Ulcer with: H2B  Ventilator Pneumonia with: chlorhexidine oral care    Activity Orders          None        Full Code     I spent 30 min of uninterrupted critical care time caring for this critically ill patient exclusive of procedures and teaching.   1111    Kenneth Decker MD  Neurocritical Care  [facility]

## 2018-12-26 LAB
ALBUMIN SERPL BCP-MCNC: 1.7 G/DL
ALLENS TEST: ABNORMAL
ALP SERPL-CCNC: 72 U/L
ALT SERPL W/O P-5'-P-CCNC: 5 U/L
ANION GAP SERPL CALC-SCNC: 7 MMOL/L
AST SERPL-CCNC: 9 U/L
BACTERIA SPEC AEROBE CULT: NORMAL
BACTERIA SPEC AEROBE CULT: NORMAL
BASOPHILS # BLD AUTO: 0.02 K/UL
BASOPHILS NFR BLD: 0.1 %
BILIRUB SERPL-MCNC: 1 MG/DL
BUN SERPL-MCNC: 27 MG/DL
CALCIUM SERPL-MCNC: 8.2 MG/DL
CHLORIDE SERPL-SCNC: 103 MMOL/L
CO2 SERPL-SCNC: 23 MMOL/L
CREAT SERPL-MCNC: 0.8 MG/DL
DELSYS: ABNORMAL
DIFFERENTIAL METHOD: ABNORMAL
EOSINOPHIL # BLD AUTO: 0 K/UL
EOSINOPHIL NFR BLD: 0.1 %
ERYTHROCYTE [DISTWIDTH] IN BLOOD BY AUTOMATED COUNT: 14.7 %
ERYTHROCYTE [SEDIMENTATION RATE] IN BLOOD BY WESTERGREN METHOD: 16 MM/H
EST. GFR  (AFRICAN AMERICAN): >60 ML/MIN/1.73 M^2
EST. GFR  (NON AFRICAN AMERICAN): >60 ML/MIN/1.73 M^2
FIO2: 60
GLUCOSE SERPL-MCNC: 97 MG/DL
GRAM STN SPEC: NORMAL
HCO3 UR-SCNC: 24.8 MMOL/L (ref 24–28)
HCT VFR BLD AUTO: 29.8 %
HGB BLD-MCNC: 10 G/DL
IMM GRANULOCYTES # BLD AUTO: 0.18 K/UL
IMM GRANULOCYTES NFR BLD AUTO: 1 %
LYMPHOCYTES # BLD AUTO: 1.9 K/UL
LYMPHOCYTES NFR BLD: 10.2 %
MAGNESIUM SERPL-MCNC: 1.7 MG/DL
MCH RBC QN AUTO: 31.1 PG
MCHC RBC AUTO-ENTMCNC: 33.6 G/DL
MCV RBC AUTO: 93 FL
MODE: ABNORMAL
MONOCYTES # BLD AUTO: 1.7 K/UL
MONOCYTES NFR BLD: 9.2 %
NEUTROPHILS # BLD AUTO: 14.5 K/UL
NEUTROPHILS NFR BLD: 79.4 %
NRBC BLD-RTO: 0 /100 WBC
OSMOLALITY UR: 427 MOSM/KG
PCO2 BLDA: 32.7 MMHG (ref 35–45)
PEEP: 8
PH SMN: 7.49 [PH] (ref 7.35–7.45)
PHOSPHATE SERPL-MCNC: 2.8 MG/DL
PLATELET # BLD AUTO: 244 K/UL
PMV BLD AUTO: 10.6 FL
PO2 BLDA: 150 MMHG (ref 80–100)
POC BE: 1 MMOL/L
POC SATURATED O2: 99 % (ref 95–100)
POC TCO2: 26 MMOL/L (ref 23–27)
POCT GLUCOSE: 96 MG/DL (ref 70–110)
POTASSIUM SERPL-SCNC: 4 MMOL/L
PROT SERPL-MCNC: 4.7 G/DL
RBC # BLD AUTO: 3.22 M/UL
SAMPLE: ABNORMAL
SITE: ABNORMAL
SODIUM SERPL-SCNC: 133 MMOL/L
SODIUM SERPL-SCNC: 136 MMOL/L
SODIUM UR-SCNC: <20 MMOL/L
SP02: 100
VANCOMYCIN TROUGH SERPL-MCNC: 13.1 UG/ML
VIT B12 SERPL-MCNC: 685 PG/ML
VT: 500
WBC # BLD AUTO: 18.25 K/UL

## 2018-12-26 PROCEDURE — 84295 ASSAY OF SERUM SODIUM: CPT

## 2018-12-26 PROCEDURE — 95951 PR EEG MONITORING/VIDEORECORD: ICD-10-PCS | Mod: 26,,, | Performed by: PSYCHIATRY & NEUROLOGY

## 2018-12-26 PROCEDURE — 25000003 PHARM REV CODE 250: Performed by: NURSE PRACTITIONER

## 2018-12-26 PROCEDURE — 94761 N-INVAS EAR/PLS OXIMETRY MLT: CPT

## 2018-12-26 PROCEDURE — 95951 HC EEG MONITORING/VIDEO RECORD: CPT

## 2018-12-26 PROCEDURE — 63600175 PHARM REV CODE 636 W HCPCS: Performed by: NURSE PRACTITIONER

## 2018-12-26 PROCEDURE — 94003 VENT MGMT INPAT SUBQ DAY: CPT

## 2018-12-26 PROCEDURE — 95951 PR EEG MONITORING/VIDEORECORD: CPT | Mod: 26,,, | Performed by: PSYCHIATRY & NEUROLOGY

## 2018-12-26 PROCEDURE — 37799 UNLISTED PX VASCULAR SURGERY: CPT

## 2018-12-26 PROCEDURE — 20000000 HC ICU ROOM

## 2018-12-26 PROCEDURE — 83735 ASSAY OF MAGNESIUM: CPT

## 2018-12-26 PROCEDURE — 84100 ASSAY OF PHOSPHORUS: CPT

## 2018-12-26 PROCEDURE — A4216 STERILE WATER/SALINE, 10 ML: HCPCS | Performed by: PSYCHIATRY & NEUROLOGY

## 2018-12-26 PROCEDURE — 27000221 HC OXYGEN, UP TO 24 HOURS

## 2018-12-26 PROCEDURE — 80202 ASSAY OF VANCOMYCIN: CPT

## 2018-12-26 PROCEDURE — 84300 ASSAY OF URINE SODIUM: CPT

## 2018-12-26 PROCEDURE — 63600175 PHARM REV CODE 636 W HCPCS: Performed by: PSYCHIATRY & NEUROLOGY

## 2018-12-26 PROCEDURE — 80053 COMPREHEN METABOLIC PANEL: CPT

## 2018-12-26 PROCEDURE — 82803 BLOOD GASES ANY COMBINATION: CPT

## 2018-12-26 PROCEDURE — 99291 PR CRITICAL CARE, E/M 30-74 MINUTES: ICD-10-PCS | Mod: GC,,, | Performed by: PSYCHIATRY & NEUROLOGY

## 2018-12-26 PROCEDURE — 85025 COMPLETE CBC W/AUTO DIFF WBC: CPT

## 2018-12-26 PROCEDURE — 63600175 PHARM REV CODE 636 W HCPCS: Performed by: STUDENT IN AN ORGANIZED HEALTH CARE EDUCATION/TRAINING PROGRAM

## 2018-12-26 PROCEDURE — 83921 ORGANIC ACID SINGLE QUANT: CPT

## 2018-12-26 PROCEDURE — 82607 VITAMIN B-12: CPT

## 2018-12-26 PROCEDURE — C9254 INJECTION, LACOSAMIDE: HCPCS | Performed by: NURSE PRACTITIONER

## 2018-12-26 PROCEDURE — 99291 CRITICAL CARE FIRST HOUR: CPT | Mod: GC,,, | Performed by: PSYCHIATRY & NEUROLOGY

## 2018-12-26 PROCEDURE — 25000003 PHARM REV CODE 250: Performed by: PSYCHIATRY & NEUROLOGY

## 2018-12-26 PROCEDURE — 99900026 HC AIRWAY MAINTENANCE (STAT)

## 2018-12-26 PROCEDURE — 36415 COLL VENOUS BLD VENIPUNCTURE: CPT

## 2018-12-26 PROCEDURE — 83935 ASSAY OF URINE OSMOLALITY: CPT

## 2018-12-26 RX ORDER — HYDROCODONE BITARTRATE AND ACETAMINOPHEN 5; 325 MG/1; MG/1
1 TABLET ORAL EVERY 4 HOURS PRN
Status: DISCONTINUED | OUTPATIENT
Start: 2018-12-26 | End: 2018-12-28

## 2018-12-26 RX ORDER — PRAVASTATIN SODIUM 40 MG/1
40 TABLET ORAL NIGHTLY
Status: DISCONTINUED | OUTPATIENT
Start: 2018-12-26 | End: 2019-01-17

## 2018-12-26 RX ORDER — VANCOMYCIN HCL IN 5 % DEXTROSE 1.5G/250ML
1500 PLASTIC BAG, INJECTION (ML) INTRAVENOUS
Status: DISCONTINUED | OUTPATIENT
Start: 2018-12-27 | End: 2018-12-27

## 2018-12-26 RX ORDER — AMOXICILLIN 250 MG
1 CAPSULE ORAL DAILY
Status: DISCONTINUED | OUTPATIENT
Start: 2018-12-27 | End: 2018-12-28

## 2018-12-26 RX ORDER — ACETAMINOPHEN 325 MG/1
650 TABLET ORAL EVERY 6 HOURS PRN
Status: DISCONTINUED | OUTPATIENT
Start: 2018-12-26 | End: 2019-01-17

## 2018-12-26 RX ADMIN — CEFEPIME 1 G: 1 INJECTION, POWDER, FOR SOLUTION INTRAMUSCULAR; INTRAVENOUS at 12:12

## 2018-12-26 RX ADMIN — SODIUM CHLORIDE 200 MG: 9 INJECTION, SOLUTION INTRAVENOUS at 08:12

## 2018-12-26 RX ADMIN — CEFEPIME 1 G: 1 INJECTION, POWDER, FOR SOLUTION INTRAMUSCULAR; INTRAVENOUS at 05:12

## 2018-12-26 RX ADMIN — PRAVASTATIN SODIUM 40 MG: 40 TABLET ORAL at 09:12

## 2018-12-26 RX ADMIN — HEPARIN SODIUM 5000 UNITS: 5000 INJECTION, SOLUTION INTRAVENOUS; SUBCUTANEOUS at 09:12

## 2018-12-26 RX ADMIN — Medication 3 ML: at 05:12

## 2018-12-26 RX ADMIN — FAMOTIDINE 20 MG: 20 TABLET ORAL at 08:12

## 2018-12-26 RX ADMIN — MAGNESIUM OXIDE TAB 400 MG (241.3 MG ELEMENTAL MG) 800 MG: 400 (241.3 MG) TAB at 08:12

## 2018-12-26 RX ADMIN — POLYETHYLENE GLYCOL 3350 17 G: 17 POWDER, FOR SOLUTION ORAL at 08:12

## 2018-12-26 RX ADMIN — CHLORHEXIDINE GLUCONATE 0.12% ORAL RINSE 15 ML: 1.2 LIQUID ORAL at 08:12

## 2018-12-26 RX ADMIN — LEVETIRACETAM 1000 MG: 10 INJECTION INTRAVENOUS at 08:12

## 2018-12-26 RX ADMIN — KETAMINE HYDROCHLORIDE 50 MCG/KG/MIN: 100 INJECTION INTRAMUSCULAR; INTRAVENOUS at 12:12

## 2018-12-26 RX ADMIN — MAGNESIUM OXIDE TAB 400 MG (241.3 MG ELEMENTAL MG) 800 MG: 400 (241.3 MG) TAB at 05:12

## 2018-12-26 RX ADMIN — VANCOMYCIN HYDROCHLORIDE 1250 MG: 10 INJECTION, POWDER, LYOPHILIZED, FOR SOLUTION INTRAVENOUS at 12:12

## 2018-12-26 RX ADMIN — Medication 3 ML: at 09:12

## 2018-12-26 RX ADMIN — SODIUM CHLORIDE 200 MG: 9 INJECTION, SOLUTION INTRAVENOUS at 09:12

## 2018-12-26 RX ADMIN — HEPARIN SODIUM 5000 UNITS: 5000 INJECTION, SOLUTION INTRAVENOUS; SUBCUTANEOUS at 02:12

## 2018-12-26 RX ADMIN — CHLORHEXIDINE GLUCONATE 0.12% ORAL RINSE 15 ML: 1.2 LIQUID ORAL at 09:12

## 2018-12-26 RX ADMIN — LEVETIRACETAM 1000 MG: 10 INJECTION INTRAVENOUS at 09:12

## 2018-12-26 RX ADMIN — HEPARIN SODIUM 5000 UNITS: 5000 INJECTION, SOLUTION INTRAVENOUS; SUBCUTANEOUS at 05:12

## 2018-12-26 RX ADMIN — CEFEPIME 1 G: 1 INJECTION, POWDER, FOR SOLUTION INTRAMUSCULAR; INTRAVENOUS at 08:12

## 2018-12-26 RX ADMIN — STANDARDIZED SENNA CONCENTRATE AND DOCUSATE SODIUM 1 TABLET: 8.6; 5 TABLET, FILM COATED ORAL at 08:12

## 2018-12-26 RX ADMIN — FAMOTIDINE 20 MG: 20 TABLET ORAL at 09:12

## 2018-12-26 NOTE — PROGRESS NOTES
[facility]  Neuorsurgery  Progress Notes      Subjective:     HPI:  62 yo M with PMH of CAD, CABG in 2006, DM, HTN, HLD on ASA presents to the ED for AMS. Patient fell 4 days ago with about 2 minutes of LOC. Family is unsure of what caused the fall. Full workup done including a CTH at [facility] that was negative. Patient went home and stopped taking his ASA. This morning, his family noticed he was more confused. He went to [facility] and CTH was done that revealed a 1.7cm left frontalpariental SDH with a 5 mm right sided midline shift and a left SAH. [place] [facility] was obtained per family as patient unable to give history 2/2 confusion with expressive and receptive aphasia.    Interval History: no AE. AFVSS.      Medications:  Continuous Infusions:   ketamine (KETALAR) infusion 50 mcg/kg/min (12/26/18 0605)    norepinephrine bitartrate-D5W 0.02 mcg/kg/min (12/26/18 0605)     Scheduled Meds:   ceFEPime (MAXIPIME) IVPB  1 g Intravenous Q8H    chlorhexidine  15 mL Mouth/Throat BID    famotidine  20 mg Per NG tube BID    heparin (porcine)  5,000 Units Subcutaneous Q8H    lacosamide (VIMPAT) in saline IVPB  200 mg Intravenous Q12H    levetiracetam IVPB  1,000 mg Intravenous Q12H    lorazepam  2 mg Intravenous Once    polyethylene glycol  17 g Per NG tube Daily    pravastatin  40 mg Oral QHS    senna-docusate 8.6-50 mg  1 tablet Oral Daily    sodium chloride 0.9%  3 mL Intravenous Q8H    vancomycin (VANCOCIN) IVPB  15 mg/kg (Dosing Weight) Intravenous Q24H     PRN Meds:sodium chloride, acetaminophen, diclofenac sodium, HYDROcodone-acetaminophen, magnesium oxide, magnesium oxide, magnesium sulfate IVPB, magnesium sulfate IVPB, morphine, ondansetron, potassium chloride 10%, potassium chloride 10%, potassium chloride 10%, potassium, sodium phosphates, potassium, sodium phosphates, potassium, sodium phosphates       Objective:     Weight: 85.7 kg (189 lb)  Body mass  "index is 27.12 kg/m².  Vital Signs (Most Recent):  Temp: 99.1 °F (37.3 °C) (12/26/18 0305)  Pulse: 96 (12/26/18 0721)  Resp: 17 (12/26/18 0721)  BP: (!) 142/95 (12/26/18 0605)  SpO2: 100 % (12/26/18 0721) Vital Signs (24h Range):  Temp:  [97.7 °F (36.5 °C)-99.1 °F (37.3 °C)] 99.1 °F (37.3 °C)  Pulse:  [] 96  Resp:  [0-24] 17  SpO2:  [95 %-100 %] 100 %  BP: (108-158)/() 142/95  Arterial Line BP: ()/() 136/86                 Vent Mode: A/C  Oxygen Concentration (%):  [60-71] 60  Resp Rate Total:  [16 br/min-21 br/min] 16 br/min  Vt Set:  [500 mL] 500 mL  PEEP/CPAP:  [5 cmH20-8 cmH20] 8 cmH20  Mean Airway Pressure:  [8.2 sqF87-71 cmH20] 11 cmH20         NG/OG Tube 12/23/18 1500 Center mouth (Active)   Placement Check placement verified by aspirate characteristics;physician notified 12/24/2018  7:02 AM   Securement taped to commercial device 12/24/2018  7:02 AM   Clamp Status/Tolerance no abdominal discomfort;no abdominal distention;no emesis;no nausea;no residual;no restlessness;clamped 12/24/2018  7:02 AM   Suction Setting/Drainage Method dependent drainage 12/24/2018  7:02 AM   Insertion Site Appearance no redness, warmth, tenderness, skin breakdown, drainage 12/24/2018  7:02 AM   Drainage None 12/24/2018  7:02 AM   Flush/Irrigation flushed w/;water;no resistance met 12/24/2018  7:02 AM   Intake (mL) 30 mL 12/24/2018  9:00 AM   Residual Amount (ml) 0 ml 12/24/2018  7:02 AM            Urethral Catheter 12/20/18 4770 Non-latex;Straight-tip 16 Fr. (Active)   Site Assessment Clean;Intact 12/24/2018  7:02 AM   Collection Container Urimeter 12/24/2018  7:02 AM   Securement Method secured to top of thigh w/ adhesive device 12/24/2018  7:02 AM   Catheter Care Performed yes 12/24/2018  7:02 AM   Reason for Continuing Urinary Catheterization Urinary retention 12/24/2018  7:02 AM   CAUTI Prevention Bundle StatLock in place w 1" slack;Intact seal between catheter & drainage tubing;Drainage bag off the " floor;Green sheeting clip in use;No dependent loops or kinks;Drainage bag not overfilled (<2/3 full);Drainage bag below bladder 12/24/2018  7:02 AM   Output (mL) 10 mL 12/24/2018 11:00 AM       Neurosurgery Physical Exam     E1VTM5  Intubated., sedated. ketamine. Levo.   Does not open eyes  PERRL. + cough/gag.   WithDraws to stimx4      Significant Labs:  Recent Labs   Lab 12/24/18  0954 12/25/18 0232 12/26/18 0225   GLU  --  96 97   NA  --  135* 133*   K 4.1 4.0 4.0   CL  --  103 103   CO2  --  22* 23   BUN  --  26* 27*   CREATININE  --  0.8 0.8   CALCIUM  --  8.7 8.2*   MG  --  2.0 1.7     Recent Labs   Lab 12/25/18 0232 12/26/18 0225   WBC 17.06* 18.25*   HGB 10.3* 10.0*   HCT 31.5* 29.8*    244     No results for input(s): LABPT, INR, APTT in the last 48 hours.  Microbiology Results (last 7 days)     Procedure Component Value Units Date/Time    Blood culture [[id number]] Collected:  12/24/18 0006    Order Status:  Completed Specimen:  Blood from Line, Arterial, Right Updated:  12/26/18 0612     Blood Culture, Routine No Growth to date     Blood Culture, Routine No Growth to date     Blood Culture, Routine No Growth to date    Blood culture [[id number]] Collected:  12/24/18 0005    Order Status:  Completed Specimen:  Blood from Peripheral, Hand, Left Updated:  12/26/18 0612     Blood Culture, Routine No Growth to date     Blood Culture, Routine No Growth to date     Blood Culture, Routine No Growth to date    Culture, Respiratory with Gram Stain [[id number]] Collected:  12/24/18 0446    Order Status:  Completed Specimen:  Respiratory from Endotracheal Aspirate Updated:  12/24/18 0905     Gram Stain (Respiratory) <10 epithelial cells per low power field.     Gram Stain (Respiratory) Moderate WBC's     Gram Stain (Respiratory) No organisms seen    Urine culture [[id number]]     Order Status:  Canceled Specimen:  Urine, Catheterized         Recent Lab Results       12/26/18  0321   12/26/18 0225    12/25/18  1400        Immature Granulocytes   1.0       Immature Grans (Abs)   0.18  Comment:  Mild elevation in immature granulocytes is non specific and   can be seen in a variety of conditions including stress response,   acute inflammation, trauma and pregnancy. Correlation with other   laboratory and clinical findings is essential.         Procalcitonin     0.16  Comment:  A concentration < 0.25 ng/mL represents a low risk bacterial   infection.  Procalcitonin may not be accurate among patients with localized   infection, recent trauma or major surgery, immunosuppressed state,   invasive fungal infection, renal dysfunction. Decisions regarding   initiation or continuation of antibiotic therapy should not be based   solely on procalcitonin levels.       Albumin   1.7       Alkaline Phosphatase   72       Allens Test N/A         ALT   5       Anion Gap   7       AST   9       Baso #   0.02       Basophil%   0.1       Total Bilirubin   1.0  Comment:  For infants and newborns, interpretation of results should be based  on gestational age, weight and in agreement with clinical  observations.  Premature Infant recommended reference ranges:  Up to 24 hours.............<8.0 mg/dL  Up to 48 hours............<12.0 mg/dL  3-5 days..................<15.0 mg/dL  6-29 days.................<15.0 mg/dL         Site [place]         BUN, Bld   27       Calcium   8.2       Chloride   103       CO2   23       Creatinine   0.8       DelSys Adult Vent         Differential Method   Automated       eGFR if    >60.0       eGFR if non    >60.0  Comment:  Calculation used to obtain the estimated glomerular filtration  rate (eGFR) is the CKD-EPI equation.          Eos #   0.0       Eosinophil%   0.1       FiO2 60         Glucose   97       Gran # (ANC)   14.5       Gran%   79.4       Hematocrit   29.8       Hemoglobin   10.0       Lymph #   1.9       Lymph%   10.2       Magnesium   1.7       MCH   31.1        MCHC   33.6       MCV   93       Mode AC/PRVC         Mono #   1.7       Mono%   9.2       MPV   10.6       nRBC   0       PEEP 8         Phosphorus   2.8       Platelets   244       POC BE 1         POC HCO3 24.8         POC PCO2 32.7         POC PH 7.488         POC PO2 150         POC SATURATED O2 99         POC TCO2 26         Potassium   4.0       Total Protein   4.7       Rate 16         RBC   3.22       RDW   14.7       Sample ARTERIAL         Sodium   133       Sp02 100         Vt 500         WBC   18.25             Significant Diagnostics:  I have reviewed all pertinent imaging results/findings within the past 24 hours.    Review of Systems        Assessment and Plan:     * SDH (subdural hematoma)    64 yo male with L aSDH now s/p craniotomy for evacuation (12/20).    No acute events overnight. Pt remains on norepinephrine and ketamine gtt. Minimal exam on sedation. E1VTM4.    --Continue care per primary team.  --Continue cEEG and antiepileptic regimen per epilepsy and primary team.  --Continue blood pressure parameters, SBP < 160.  --Continue to elevate head of bed 30-45 degrees.  --We will continue to monitor closely, please contact us with any questions or concerns.           Murrya Mendes MD  Neurosurgery  [facility]

## 2018-12-26 NOTE — ASSESSMENT & PLAN NOTE
62 yo male with L aSDH now s/p craniotomy for evacuation (12/20).    No acute events overnight. Pt remains on norepinephrine and ketamine gtt. Minimal exam on sedation. E1VTM4.    --Continue care per primary team.  --Continue cEEG and antiepileptic regimen per epilepsy and primary team.  --Continue blood pressure parameters, SBP < 160.  --Continue to elevate head of bed 30-45 degrees.  --We will continue to monitor closely, please contact us with any questions or concerns.

## 2018-12-26 NOTE — SUBJECTIVE & OBJECTIVE
Interval History: no AE. AFVSS.      Medications:  Continuous Infusions:   ketamine (KETALAR) infusion 50 mcg/kg/min (12/26/18 0605)    norepinephrine bitartrate-D5W 0.02 mcg/kg/min (12/26/18 0605)     Scheduled Meds:   ceFEPime (MAXIPIME) IVPB  1 g Intravenous Q8H    chlorhexidine  15 mL Mouth/Throat BID    famotidine  20 mg Per NG tube BID    heparin (porcine)  5,000 Units Subcutaneous Q8H    lacosamide (VIMPAT) in saline IVPB  200 mg Intravenous Q12H    levetiracetam IVPB  1,000 mg Intravenous Q12H    lorazepam  2 mg Intravenous Once    polyethylene glycol  17 g Per NG tube Daily    pravastatin  40 mg Oral QHS    senna-docusate 8.6-50 mg  1 tablet Oral Daily    sodium chloride 0.9%  3 mL Intravenous Q8H    vancomycin (VANCOCIN) IVPB  15 mg/kg (Dosing Weight) Intravenous Q24H     PRN Meds:sodium chloride, acetaminophen, diclofenac sodium, HYDROcodone-acetaminophen, magnesium oxide, magnesium oxide, magnesium sulfate IVPB, magnesium sulfate IVPB, morphine, ondansetron, potassium chloride 10%, potassium chloride 10%, potassium chloride 10%, potassium, sodium phosphates, potassium, sodium phosphates, potassium, sodium phosphates       Objective:     Weight: 85.7 kg (189 lb)  Body mass index is 27.12 kg/m².  Vital Signs (Most Recent):  Temp: 99.1 °F (37.3 °C) (12/26/18 0305)  Pulse: 96 (12/26/18 0721)  Resp: 17 (12/26/18 0721)  BP: (!) 142/95 (12/26/18 0605)  SpO2: 100 % (12/26/18 0721) Vital Signs (24h Range):  Temp:  [97.7 °F (36.5 °C)-99.1 °F (37.3 °C)] 99.1 °F (37.3 °C)  Pulse:  [] 96  Resp:  [0-24] 17  SpO2:  [95 %-100 %] 100 %  BP: (108-158)/() 142/95  Arterial Line BP: ()/() 136/86                 Vent Mode: A/C  Oxygen Concentration (%):  [60-71] 60  Resp Rate Total:  [16 br/min-21 br/min] 16 br/min  Vt Set:  [500 mL] 500 mL  PEEP/CPAP:  [5 cmH20-8 cmH20] 8 cmH20  Mean Airway Pressure:  [8.2 ocS15-74 cmH20] 11 cmH20         NG/OG Tube 12/23/18 1500 Center mouth (Active)  "  Placement Check placement verified by aspirate characteristics;physician notified 12/24/2018  7:02 AM   Securement taped to commercial device 12/24/2018  7:02 AM   Clamp Status/Tolerance no abdominal discomfort;no abdominal distention;no emesis;no nausea;no residual;no restlessness;clamped 12/24/2018  7:02 AM   Suction Setting/Drainage Method dependent drainage 12/24/2018  7:02 AM   Insertion Site Appearance no redness, warmth, tenderness, skin breakdown, drainage 12/24/2018  7:02 AM   Drainage None 12/24/2018  7:02 AM   Flush/Irrigation flushed w/;water;no resistance met 12/24/2018  7:02 AM   Intake (mL) 30 mL 12/24/2018  9:00 AM   Residual Amount (ml) 0 ml 12/24/2018  7:02 AM            Urethral Catheter 12/20/18 2350 Non-latex;Straight-tip 16 Fr. (Active)   Site Assessment Clean;Intact 12/24/2018  7:02 AM   Collection Container Urimeter 12/24/2018  7:02 AM   Securement Method secured to top of thigh w/ adhesive device 12/24/2018  7:02 AM   Catheter Care Performed yes 12/24/2018  7:02 AM   Reason for Continuing Urinary Catheterization Urinary retention 12/24/2018  7:02 AM   CAUTI Prevention Bundle StatLock in place w 1" slack;Intact seal between catheter & drainage tubing;Drainage bag off the floor;Green sheeting clip in use;No dependent loops or kinks;Drainage bag not overfilled (<2/3 full);Drainage bag below bladder 12/24/2018  7:02 AM   Output (mL) 10 mL 12/24/2018 11:00 AM       Neurosurgery Physical Exam     E1VTM5  Intubated., sedated. ketamine. Levo.   Does not open eyes  PERRL. + cough/gag.   WithDraws to stimx4      Significant Labs:  Recent Labs   Lab 12/24/18  0954 12/25/18 0232 12/26/18  0225   GLU  --  96 97   NA  --  135* 133*   K 4.1 4.0 4.0   CL  --  103 103   CO2  --  22* 23   BUN  --  26* 27*   CREATININE  --  0.8 0.8   CALCIUM  --  8.7 8.2*   MG  --  2.0 1.7     Recent Labs   Lab 12/25/18 0232 12/26/18  0225   WBC 17.06* 18.25*   HGB 10.3* 10.0*   HCT 31.5* 29.8*    244     No results " for input(s): LABPT, INR, APTT in the last 48 hours.  Microbiology Results (last 7 days)     Procedure Component Value Units Date/Time    Blood culture [[id number]] Collected:  12/24/18 0006    Order Status:  Completed Specimen:  Blood from Line, Arterial, Right Updated:  12/26/18 0612     Blood Culture, Routine No Growth to date     Blood Culture, Routine No Growth to date     Blood Culture, Routine No Growth to date    Blood culture [[id number]] Collected:  12/24/18 0005    Order Status:  Completed Specimen:  Blood from Peripheral, Hand, Left Updated:  12/26/18 0612     Blood Culture, Routine No Growth to date     Blood Culture, Routine No Growth to date     Blood Culture, Routine No Growth to date    Culture, Respiratory with Gram Stain [[id number]] Collected:  12/24/18 0446    Order Status:  Completed Specimen:  Respiratory from Endotracheal Aspirate Updated:  12/24/18 0905     Gram Stain (Respiratory) <10 epithelial cells per low power field.     Gram Stain (Respiratory) Moderate WBC's     Gram Stain (Respiratory) No organisms seen    Urine culture [[id number]]     Order Status:  Canceled Specimen:  Urine, Catheterized         Recent Lab Results       12/26/18  0321   12/26/18  0225   12/25/18  1400        Immature Granulocytes   1.0       Immature Grans (Abs)   0.18  Comment:  Mild elevation in immature granulocytes is non specific and   can be seen in a variety of conditions including stress response,   acute inflammation, trauma and pregnancy. Correlation with other   laboratory and clinical findings is essential.         Procalcitonin     0.16  Comment:  A concentration < 0.25 ng/mL represents a low risk bacterial   infection.  Procalcitonin may not be accurate among patients with localized   infection, recent trauma or major surgery, immunosuppressed state,   invasive fungal infection, renal dysfunction. Decisions regarding   initiation or continuation of antibiotic therapy should not be based   solely on  procalcitonin levels.       Albumin   1.7       Alkaline Phosphatase   72       Allens Test N/A         ALT   5       Anion Gap   7       AST   9       Baso #   0.02       Basophil%   0.1       Total Bilirubin   1.0  Comment:  For infants and newborns, interpretation of results should be based  on gestational age, weight and in agreement with clinical  observations.  Premature Infant recommended reference ranges:  Up to 24 hours.............<8.0 mg/dL  Up to 48 hours............<12.0 mg/dL  3-5 days..................<15.0 mg/dL  6-29 days.................<15.0 mg/dL         Site [place]/[facility]         BUN, Bld   27       Calcium   8.2       Chloride   103       CO2   23       Creatinine   0.8       DelSys Adult Vent         Differential Method   Automated       eGFR if    >60.0       eGFR if non    >60.0  Comment:  Calculation used to obtain the estimated glomerular filtration  rate (eGFR) is the CKD-EPI equation.          Eos #   0.0       Eosinophil%   0.1       FiO2 60         Glucose   97       Gran # (ANC)   14.5       Gran%   79.4       Hematocrit   29.8       Hemoglobin   10.0       Lymph #   1.9       Lymph%   10.2       Magnesium   1.7       MCH   31.1       MCHC   33.6       MCV   93       Mode AC/PRVC         Mono #   1.7       Mono%   9.2       MPV   10.6       nRBC   0       PEEP 8         Phosphorus   2.8       Platelets   244       POC BE 1         POC HCO3 24.8         POC PCO2 32.7         POC PH 7.488         POC PO2 150         POC SATURATED O2 99         POC TCO2 26         Potassium   4.0       Total Protein   4.7       Rate 16         RBC   3.22       RDW   14.7       Sample ARTERIAL         Sodium   133       Sp02 100         Vt 500         WBC   18.25             Significant Diagnostics:  I have reviewed all pertinent imaging results/findings within the past 24 hours.    Review of Systems

## 2018-12-26 NOTE — ASSESSMENT & PLAN NOTE
eeg improved, now with intermittent low amplitude periodic discharges  On ketamine, on keppra, and lacosamide    Plan to begin weaning ketamine today  Consider adding clonazepam if wean unsucessful

## 2018-12-26 NOTE — PLAN OF CARE
Problem: Adult Inpatient Plan of Care  Goal: Plan of Care Review  POC reviewed with pt and family at 1400. Family verbalized understanding. Questions and concerns addressed. No acute events today. Pt no longer showing seizure like activity on eeg. Plan is to possibly remove ketamine tomorrow if no new seizure occur. Pt started on trickle feeds and pt tolerating it well. Ketamine is at 50mcq/kg/min and levo is at 0.02 mcq/kg/min. WB of 30 ml q6 added.  Pt progressing toward goals. Will continue to monitor. See flowsheets for full assessment and VS info.

## 2018-12-26 NOTE — PROGRESS NOTES
[facility]  Neurocritical Care  Progress Note    Admit Date: 12/20/2018  Service Date: 12/26/2018  Length of Stay: 6    Subjective:     Chief Complaint: SDH (subdural hematoma)    History of Present Illness: 63 year old male h/o CABG on ASA 81 mg (last dose 72 hours PTA) presenting as transfer from OSH for EDH with SDH. Patient reportedly had a mechanical fall from standing on Sunday and went to ED where CTH was negative for any acute intracranial pathology. Overnight, patient developed confusion and presented to OSH ED where CTH was consistent with EDH with SDH. Patient has progressively worsened throughout the day developing intermittent global aphasia. NSGY consulted in ED at [facility] and stat CTH has been ordered. NCC consulted for admission and medical management. Patient on weaning off of cardene at time of evaluation with sbp of 120. Reported presenting sbp in 150s.    Hospital Course: 12/22: no major events overnight. Weaned off of cardene. This morning AOx3. Drain removed by NSGY at 11 AM, and planned to step down to NSGY.  AMS noted around 1PM, patient aphasic and not following commands, STAT CTH unremarkable.   12/26: wean ketamine, follow EEG, send urine studuies-Na 133, NS bolus, advance TF        Review of Systems   Unable to obtain a complete ROS due to level of consciousness-pt intubated  Objective:     Vitals:  Temp: 98.1 °F (36.7 °C)  Pulse: 100  Rhythm: atrial rhythm  BP: 117/77  MAP (mmHg): 92  Resp: 19  SpO2: 99 %  Oxygen Concentration (%): 51  O2 Device (Oxygen Therapy): ventilator  Vent Mode: A/C  Set Rate: 16 bmp  Vt Set: 500 mL  PEEP/CPAP: 8 cmH20  Peak Airway Pressure: 23 cmH2O  Mean Airway Pressure: 12 cmH20  Plateau Pressure: 0 cmH20    Temp  Min: 97.7 °F (36.5 °C)  Max: 99.1 °F (37.3 °C)  Pulse  Min: 88  Max: 115  BP  Min: 102/73  Max: 160/105  MAP (mmHg)  Min: 76  Max: 127  Resp  Min: 16  Max: 24  SpO2  Min: 94 %  Max: 100 %  Oxygen Concentration (%)  Min: 51  Max:  61    12/25 0701 - 12/26 0700  In: 2953.3 [I.V.:943.3]  Out: 695 [Urine:695]   Unmeasured Output  Stool Occurrence: 0       Physical Exam  GA: well developed, no acute distress on EEG  HEENT: No scleral icterus or JVD.   Pulmonary: Clear to auscultation A/L.   Cardiac: RRR S1 & S2 w/o rubs/murmurs/gallops.   Abdominal: Bowel sounds present x 4. No appreciable hepatosplenomegaly.  Skin: No jaundice, rashes, or visible lesions.  Neuro:  --GCS: E1 VT1 M4  --Mental Status:   sedated  --CN II-XII grossly intact.   --Pupils 3mm, PERRL.   --Corneal reflex, gag, cough intact.  --Unable to assess strength or movement pt is sedated on ketamine    Medications:  Continuous  ketamine (KETALAR) infusion Last Rate: 30 mcg/kg/min (12/26/18 1200)   norepinephrine bitartrate-D5W Last Rate: 0.01 mcg/kg/min (12/26/18 1200)   Scheduled  ceFEPime (MAXIPIME) IVPB 1 g Q8H   chlorhexidine 15 mL BID   famotidine 20 mg BID   heparin (porcine) 5,000 Units Q8H   lacosamide (VIMPAT) in saline IVPB 200 mg Q12H   levetiracetam IVPB 1,000 mg Q12H   lorazepam 2 mg Once   polyethylene glycol 17 g Daily   pravastatin 40 mg QHS   senna-docusate 8.6-50 mg 1 tablet Daily   sodium chloride 0.9% 3 mL Q8H   vancomycin (VANCOCIN) IVPB 15 mg/kg (Dosing Weight) Q24H   PRN  sodium chloride  Q24H PRN   acetaminophen 650 mg Q6H PRN   diclofenac sodium  TID PRN   HYDROcodone-acetaminophen 1 tablet Q4H PRN   magnesium oxide 800 mg PRN   magnesium oxide 800 mg PRN   magnesium sulfate IVPB 2 g PRN   magnesium sulfate IVPB 4 g PRN   morphine 0.5 mg Q6H PRN   ondansetron 4 mg Q12H PRN   potassium chloride 10% 40 mEq PRN   potassium chloride 10% 40 mEq PRN   potassium chloride 10% 60 mEq PRN   potassium, sodium phosphates 2 packet PRN   potassium, sodium phosphates 2 packet PRN   potassium, sodium phosphates 2 packet PRN     Today I personally reviewed pertinent medications, lines/drains/airways, imaging, laboratory results,    Diet  No diet orders on  file        Assessment/Plan:     Neuro   * SDH (subdural hematoma)    Epidural with subdural component s/p evac 12/20  Repeat cth stable  Mri with punctate stroke    nsgy on board  Pt/ot/slp when able       Status epilepticus    eeg improved, now with intermittent low amplitude periodic discharges  On ketamine, on keppra, and lacosamide    Plan to begin weaning ketamine today  Consider adding clonazepam if wean unsucessful     Pulmonary   Acute respiratory failure    Hypoxic, aspiration, bronch with mucous plugging in L lung    Maintain today in setting of mental status  Follow CXR and ABG daily     Cardiac/Vascular   New onset atrial fibrillation    Rate acceptable    Avoiding beta blockers in setting of pressor requirement  Consider amio      Essential hypertension    Off of cardene in AM, however resumed in the evening  Resumed home meds: clonidine 0.1 q12, bisoprolol 10 qd  PRN hydralazine for sBP>160       Renal/   AYLIN (acute kidney injury)    Reduced uop in setting of shock    Fluid resuscitation  Monitor uop  Follow cmp daily     Other   Pressure injury of buttock, stage 2    Appreciate wound care reccs           The patient is being Prophylaxed for:  Venous Thromboembolism with: Chemical  Stress Ulcer with: H2B  Ventilator Pneumonia with: chlorhexidine oral care    Full Code    Altagracia Gomez NP  Neurocritical Care  [facility]

## 2018-12-26 NOTE — PLAN OF CARE
Wean vent, ketamine, levo.  Discharge plan previously SNF, to be re-evaluated.      [id number] - [place], [place] - [address]  [address]  [place] [address]  Phone: [phone] Fax: [phone]    [facility] - [place], [place] - [address] AT Portal to [facility]  [address]  [facility] [address]  Phone: [phone] Fax: [phone]    [facility] [address] [place] [address]  Phone: [phone] Fax: [phone]    Payor: [employer] MCARE / Plan: [employer] MEDICARE ADVANTAGE / Product Type: Medicare Advantage /         12/26/18 1138   Discharge Reassessment   Assessment Type Discharge Planning Reassessment   Provided patient/caregiver education on the expected discharge date and the discharge plan No   Do you have any problems affording any of your prescribed medications? No   Discharge Plan A Skilled Nursing Facility   Discharge Plan B Home with family   Patient choice form signed by patient/caregiver No   Anticipated Discharge Disposition (mamadou)   Can the patient answer the patient profile reliably? No, cognitively impaired   How does the patient rate their overall health at the present time? (mamadou)   Describe the patient's ability to walk at the present time. Does not walk or unable to take any steps at all   How often would a person be available to care for the patient? Occasionally   Number of comorbid conditions (as recorded on the chart) Four   During the past month, has the patient often been bothered by feeling down, depressed or hopeless? (mamadou)   During the past month, has the patient often been bothered by little interest or pleasure in doing things? (mamadou)   Post-Acute Status   Post-Acute Authorization Placement   Post-Acute Placement Status Patient List Provided     Taylor Boyd RN/BSN/NICHOLAS  [phone]  [facility]

## 2018-12-26 NOTE — ASSESSMENT & PLAN NOTE
Hypoxic, aspiration, bronch with mucous plugging in L lung    Maintain today in setting of mental status  Follow CXR and ABG daily

## 2018-12-26 NOTE — SUBJECTIVE & OBJECTIVE
Review of Systems   Unable to obtain a complete ROS due to level of consciousness-pt intubated  Objective:     Vitals:  Temp: 98.1 °F (36.7 °C)  Pulse: 100  Rhythm: atrial rhythm  BP: 117/77  MAP (mmHg): 92  Resp: 19  SpO2: 99 %  Oxygen Concentration (%): 51  O2 Device (Oxygen Therapy): ventilator  Vent Mode: A/C  Set Rate: 16 bmp  Vt Set: 500 mL  PEEP/CPAP: 8 cmH20  Peak Airway Pressure: 23 cmH2O  Mean Airway Pressure: 12 cmH20  Plateau Pressure: 0 cmH20    Temp  Min: 97.7 °F (36.5 °C)  Max: 99.1 °F (37.3 °C)  Pulse  Min: 88  Max: 115  BP  Min: 102/73  Max: 160/105  MAP (mmHg)  Min: 76  Max: 127  Resp  Min: 16  Max: 24  SpO2  Min: 94 %  Max: 100 %  Oxygen Concentration (%)  Min: 51  Max: 61    12/25 0701 - 12/26 0700  In: 2953.3 [I.V.:943.3]  Out: 695 [Urine:695]   Unmeasured Output  Stool Occurrence: 0       Physical Exam  GA: well developed, no acute distress on EEG  HEENT: No scleral icterus or JVD.   Pulmonary: Clear to auscultation A/L.   Cardiac: RRR S1 & S2 w/o rubs/murmurs/gallops.   Abdominal: Bowel sounds present x 4. No appreciable hepatosplenomegaly.  Skin: No jaundice, rashes, or visible lesions.  Neuro:  --GCS: E1 VT1 M4  --Mental Status:   sedated  --CN II-XII grossly intact.   --Pupils 3mm, PERRL.   --Corneal reflex, gag, cough intact.  --Unable to assess strength or movement pt is sedated on ketamine    Medications:  Continuous  ketamine (KETALAR) infusion Last Rate: 30 mcg/kg/min (12/26/18 1200)   norepinephrine bitartrate-D5W Last Rate: 0.01 mcg/kg/min (12/26/18 1200)   Scheduled  ceFEPime (MAXIPIME) IVPB 1 g Q8H   chlorhexidine 15 mL BID   famotidine 20 mg BID   heparin (porcine) 5,000 Units Q8H   lacosamide (VIMPAT) in saline IVPB 200 mg Q12H   levetiracetam IVPB 1,000 mg Q12H   lorazepam 2 mg Once   polyethylene glycol 17 g Daily   pravastatin 40 mg QHS   senna-docusate 8.6-50 mg 1 tablet Daily   sodium chloride 0.9% 3 mL Q8H   vancomycin (VANCOCIN) IVPB 15 mg/kg (Dosing Weight) Q24H    PRN  sodium chloride  Q24H PRN   acetaminophen 650 mg Q6H PRN   diclofenac sodium  TID PRN   HYDROcodone-acetaminophen 1 tablet Q4H PRN   magnesium oxide 800 mg PRN   magnesium oxide 800 mg PRN   magnesium sulfate IVPB 2 g PRN   magnesium sulfate IVPB 4 g PRN   morphine 0.5 mg Q6H PRN   ondansetron 4 mg Q12H PRN   potassium chloride 10% 40 mEq PRN   potassium chloride 10% 40 mEq PRN   potassium chloride 10% 60 mEq PRN   potassium, sodium phosphates 2 packet PRN   potassium, sodium phosphates 2 packet PRN   potassium, sodium phosphates 2 packet PRN     Today I personally reviewed pertinent medications, lines/drains/airways, imaging, laboratory results,    Diet  No diet orders on file

## 2018-12-26 NOTE — PLAN OF CARE
Problem: Adult Inpatient Plan of Care  Goal: Plan of Care Review  Outcome: Ongoing (interventions implemented as appropriate)  POC reviewed with patient and patient's wife at 0300. Pt unable to verbalize understanding. Questions and concerns addressed with patient's spouse. Ketamine gtt continued. Levo gtt continued. Trickle feeds @10 continued. Bath given. Patient turned per protocol. Oral care provided per protocol. RN Will continue to monitor. See flowsheets for full assessment and VS info

## 2018-12-27 LAB
ABO + RH BLD: NORMAL
ALBUMIN SERPL BCP-MCNC: 1.5 G/DL
ALP SERPL-CCNC: 90 U/L
ALT SERPL W/O P-5'-P-CCNC: <5 U/L
ANION GAP SERPL CALC-SCNC: 5 MMOL/L
AST SERPL-CCNC: 14 U/L
BASOPHILS # BLD AUTO: 0.06 K/UL
BASOPHILS NFR BLD: 0.4 %
BILIRUB SERPL-MCNC: 0.6 MG/DL
BLD GP AB SCN CELLS X3 SERPL QL: NORMAL
BUN SERPL-MCNC: 32 MG/DL
CALCIUM SERPL-MCNC: 8.1 MG/DL
CHLORIDE SERPL-SCNC: 108 MMOL/L
CO2 SERPL-SCNC: 23 MMOL/L
CREAT SERPL-MCNC: 0.7 MG/DL
DIFFERENTIAL METHOD: ABNORMAL
EOSINOPHIL # BLD AUTO: 0 K/UL
EOSINOPHIL NFR BLD: 0.1 %
ERYTHROCYTE [DISTWIDTH] IN BLOOD BY AUTOMATED COUNT: 14.7 %
EST. GFR  (AFRICAN AMERICAN): >60 ML/MIN/1.73 M^2
EST. GFR  (NON AFRICAN AMERICAN): >60 ML/MIN/1.73 M^2
GLUCOSE SERPL-MCNC: 117 MG/DL
HCT VFR BLD AUTO: 30.7 %
HGB BLD-MCNC: 10 G/DL
IMM GRANULOCYTES # BLD AUTO: 0.12 K/UL
IMM GRANULOCYTES NFR BLD AUTO: 0.9 %
LYMPHOCYTES # BLD AUTO: 1.7 K/UL
LYMPHOCYTES NFR BLD: 12.3 %
MAGNESIUM SERPL-MCNC: 1.7 MG/DL
MCH RBC QN AUTO: 30.4 PG
MCHC RBC AUTO-ENTMCNC: 32.6 G/DL
MCV RBC AUTO: 93 FL
MONOCYTES # BLD AUTO: 1.3 K/UL
MONOCYTES NFR BLD: 9.5 %
NEUTROPHILS # BLD AUTO: 10.5 K/UL
NEUTROPHILS NFR BLD: 76.8 %
NRBC BLD-RTO: 0 /100 WBC
PHOSPHATE SERPL-MCNC: 1.9 MG/DL
PLATELET # BLD AUTO: 231 K/UL
PMV BLD AUTO: 10.7 FL
POTASSIUM SERPL-SCNC: 4.2 MMOL/L
PROT SERPL-MCNC: 4.7 G/DL
RBC # BLD AUTO: 3.29 M/UL
SODIUM SERPL-SCNC: 136 MMOL/L
WBC # BLD AUTO: 13.63 K/UL

## 2018-12-27 PROCEDURE — 63600175 PHARM REV CODE 636 W HCPCS: Performed by: NURSE PRACTITIONER

## 2018-12-27 PROCEDURE — 99900035 HC TECH TIME PER 15 MIN (STAT)

## 2018-12-27 PROCEDURE — 20000000 HC ICU ROOM

## 2018-12-27 PROCEDURE — 63600175 PHARM REV CODE 636 W HCPCS: Performed by: STUDENT IN AN ORGANIZED HEALTH CARE EDUCATION/TRAINING PROGRAM

## 2018-12-27 PROCEDURE — 94761 N-INVAS EAR/PLS OXIMETRY MLT: CPT

## 2018-12-27 PROCEDURE — A4216 STERILE WATER/SALINE, 10 ML: HCPCS | Performed by: PSYCHIATRY & NEUROLOGY

## 2018-12-27 PROCEDURE — 99291 PR CRITICAL CARE, E/M 30-74 MINUTES: ICD-10-PCS | Mod: GC,,, | Performed by: PSYCHIATRY & NEUROLOGY

## 2018-12-27 PROCEDURE — 95951 PR EEG MONITORING/VIDEORECORD: ICD-10-PCS | Mod: 26,,, | Performed by: PSYCHIATRY & NEUROLOGY

## 2018-12-27 PROCEDURE — 94003 VENT MGMT INPAT SUBQ DAY: CPT

## 2018-12-27 PROCEDURE — 99291 CRITICAL CARE FIRST HOUR: CPT | Mod: GC,,, | Performed by: PSYCHIATRY & NEUROLOGY

## 2018-12-27 PROCEDURE — C9254 INJECTION, LACOSAMIDE: HCPCS | Performed by: NURSE PRACTITIONER

## 2018-12-27 PROCEDURE — 99223 PR INITIAL HOSPITAL CARE,LEVL III: ICD-10-PCS | Mod: ,,, | Performed by: PSYCHIATRY & NEUROLOGY

## 2018-12-27 PROCEDURE — 25000003 PHARM REV CODE 250: Performed by: NURSE PRACTITIONER

## 2018-12-27 PROCEDURE — 85025 COMPLETE CBC W/AUTO DIFF WBC: CPT

## 2018-12-27 PROCEDURE — 99223 1ST HOSP IP/OBS HIGH 75: CPT | Mod: ,,, | Performed by: PSYCHIATRY & NEUROLOGY

## 2018-12-27 PROCEDURE — 27000221 HC OXYGEN, UP TO 24 HOURS

## 2018-12-27 PROCEDURE — 63600175 PHARM REV CODE 636 W HCPCS: Performed by: PSYCHIATRY & NEUROLOGY

## 2018-12-27 PROCEDURE — 25000003 PHARM REV CODE 250: Performed by: PSYCHIATRY & NEUROLOGY

## 2018-12-27 PROCEDURE — 95951 PR EEG MONITORING/VIDEORECORD: CPT | Mod: 26,,, | Performed by: PSYCHIATRY & NEUROLOGY

## 2018-12-27 PROCEDURE — 86901 BLOOD TYPING SEROLOGIC RH(D): CPT

## 2018-12-27 PROCEDURE — 99900026 HC AIRWAY MAINTENANCE (STAT)

## 2018-12-27 PROCEDURE — 95951 HC EEG MONITORING/VIDEO RECORD: CPT

## 2018-12-27 PROCEDURE — 80053 COMPREHEN METABOLIC PANEL: CPT

## 2018-12-27 PROCEDURE — 27200966 HC CLOSED SUCTION SYSTEM

## 2018-12-27 PROCEDURE — 84100 ASSAY OF PHOSPHORUS: CPT

## 2018-12-27 PROCEDURE — 83735 ASSAY OF MAGNESIUM: CPT

## 2018-12-27 RX ORDER — CEFEPIME HYDROCHLORIDE 1 G/1
1 INJECTION, POWDER, FOR SOLUTION INTRAMUSCULAR; INTRAVENOUS
Status: DISCONTINUED | OUTPATIENT
Start: 2018-12-27 | End: 2018-12-28

## 2018-12-27 RX ORDER — VANCOMYCIN HCL IN 5 % DEXTROSE 1.5G/250ML
1500 PLASTIC BAG, INJECTION (ML) INTRAVENOUS
Status: DISCONTINUED | OUTPATIENT
Start: 2018-12-27 | End: 2018-12-28

## 2018-12-27 RX ADMIN — PRAVASTATIN SODIUM 40 MG: 40 TABLET ORAL at 09:12

## 2018-12-27 RX ADMIN — STANDARDIZED SENNA CONCENTRATE AND DOCUSATE SODIUM 1 TABLET: 8.6; 5 TABLET, FILM COATED ORAL at 08:12

## 2018-12-27 RX ADMIN — CEFEPIME 1 G: 1 INJECTION, POWDER, FOR SOLUTION INTRAMUSCULAR; INTRAVENOUS at 08:12

## 2018-12-27 RX ADMIN — Medication 3 ML: at 06:12

## 2018-12-27 RX ADMIN — HEPARIN SODIUM 5000 UNITS: 5000 INJECTION, SOLUTION INTRAVENOUS; SUBCUTANEOUS at 06:12

## 2018-12-27 RX ADMIN — CEFEPIME 1 G: 1 INJECTION, POWDER, FOR SOLUTION INTRAMUSCULAR; INTRAVENOUS at 02:12

## 2018-12-27 RX ADMIN — CEFEPIME 1 G: 1 INJECTION, POWDER, FOR SOLUTION INTRAMUSCULAR; INTRAVENOUS at 03:12

## 2018-12-27 RX ADMIN — POTASSIUM & SODIUM PHOSPHATES POWDER PACK 280-160-250 MG 2 PACKET: 280-160-250 PACK at 04:12

## 2018-12-27 RX ADMIN — CHLORHEXIDINE GLUCONATE 0.12% ORAL RINSE 15 ML: 1.2 LIQUID ORAL at 08:12

## 2018-12-27 RX ADMIN — MAGNESIUM OXIDE TAB 400 MG (241.3 MG ELEMENTAL MG) 800 MG: 400 (241.3 MG) TAB at 04:12

## 2018-12-27 RX ADMIN — VANCOMYCIN HYDROCHLORIDE 1500 MG: 10 INJECTION, POWDER, LYOPHILIZED, FOR SOLUTION INTRAVENOUS at 11:12

## 2018-12-27 RX ADMIN — HEPARIN SODIUM 5000 UNITS: 5000 INJECTION, SOLUTION INTRAVENOUS; SUBCUTANEOUS at 09:12

## 2018-12-27 RX ADMIN — FAMOTIDINE 20 MG: 20 TABLET ORAL at 08:12

## 2018-12-27 RX ADMIN — SODIUM CHLORIDE 200 MG: 9 INJECTION, SOLUTION INTRAVENOUS at 08:12

## 2018-12-27 RX ADMIN — HEPARIN SODIUM 5000 UNITS: 5000 INJECTION, SOLUTION INTRAVENOUS; SUBCUTANEOUS at 02:12

## 2018-12-27 RX ADMIN — SODIUM CHLORIDE 200 MG: 9 INJECTION, SOLUTION INTRAVENOUS at 09:12

## 2018-12-27 RX ADMIN — POTASSIUM & SODIUM PHOSPHATES POWDER PACK 280-160-250 MG 2 PACKET: 280-160-250 PACK at 09:12

## 2018-12-27 RX ADMIN — POLYETHYLENE GLYCOL 3350 17 G: 17 POWDER, FOR SOLUTION ORAL at 08:12

## 2018-12-27 RX ADMIN — LEVETIRACETAM 1000 MG: 10 INJECTION INTRAVENOUS at 09:12

## 2018-12-27 RX ADMIN — SODIUM CHLORIDE 500 ML: 0.9 INJECTION, SOLUTION INTRAVENOUS at 10:12

## 2018-12-27 RX ADMIN — FAMOTIDINE 20 MG: 20 TABLET ORAL at 09:12

## 2018-12-27 RX ADMIN — Medication 3 ML: at 09:12

## 2018-12-27 RX ADMIN — MAGNESIUM OXIDE TAB 400 MG (241.3 MG ELEMENTAL MG) 800 MG: 400 (241.3 MG) TAB at 09:12

## 2018-12-27 RX ADMIN — LEVETIRACETAM 1000 MG: 10 INJECTION INTRAVENOUS at 08:12

## 2018-12-27 RX ADMIN — CHLORHEXIDINE GLUCONATE 0.12% ORAL RINSE 15 ML: 1.2 LIQUID ORAL at 09:12

## 2018-12-27 NOTE — ASSESSMENT & PLAN NOTE
eeg improved, now with intermittent low amplitude periodic discharges  Off ketamine   on keppra 1gm BID, and lacosamide 200mg BID  continuous EEG

## 2018-12-27 NOTE — PROGRESS NOTES
[facility]  Neurocritical Care  Progress Note    Admit Date: 12/20/2018  Service Date: 12/27/2018  Length of Stay: 7    Subjective:     Chief Complaint: SDH (subdural hematoma)    History of Present Illness: 63 year old male h/o CABG on ASA 81 mg (last dose 72 hours PTA) presenting as transfer from OSH for EDH with SDH. Patient reportedly had a mechanical fall from standing on Sunday and went to ED where CTH was negative for any acute intracranial pathology. Overnight, patient developed confusion and presented to OSH ED where CTH was consistent with EDH with SDH. Patient has progressively worsened throughout the day developing intermittent global aphasia. NSGY consulted in ED at [facility] and stat CTH has been ordered. NCC consulted for admission and medical management. Patient on weaning off of cardene at time of evaluation with sbp of 120. Reported presenting sbp in 150s.    Hospital Course: 12/22: no major events overnight. Weaned off of cardene. This morning AOx3. Drain removed by NSGY at 11 AM, and planned to step down to NSGY.  AMS noted around 1PM, patient aphasic and not following commands, STAT CTH unremarkable.   12/26: wean ketamine, follow EEG, send urine studuies-Na 133, NS bolus, advance TF  12/27: off ketamine, d/c levo, continue EEG, and AEDs, 500 NS bolus, increase enteral water flushes, continue abx for another day        Review of Systems  Unable to obtain a complete ROS due to level of consciousness.  Objective:     Vitals:  Temp: 99.4 °F (37.4 °C)  Pulse: (!) 115  Rhythm: (P) atrial rhythm  BP: 116/74  MAP (mmHg): 91  Resp: (!) 24  SpO2: 100 %  Oxygen Concentration (%): 50  O2 Device (Oxygen Therapy): ventilator  Vent Mode: A/C  Set Rate: 16 bmp  Vt Set: 500 mL  PEEP/CPAP: 8 cmH20  Peak Airway Pressure: 22 cmH2O  Mean Airway Pressure: 12 cmH20  Plateau Pressure: 0 cmH20    Temp  Min: 98.3 °F (36.8 °C)  Max: 99.6 °F (37.6 °C)  Pulse  Min: 103  Max: 115  BP  Min: 97/62  Max:  143/80  MAP (mmHg)  Min: 76  Max: 112  Resp  Min: 16  Max: 24  SpO2  Min: 99 %  Max: 100 %  Oxygen Concentration (%)  Min: 50  Max: 51    12/26 0701 - 12/27 0700  In: 2357.1 [I.V.:387.1]  Out: 1067 [Urine:1067]   Unmeasured Output  Stool Occurrence: 0       Physical Exam  GA: comfortable, no acute distress. On EEG  HEENT: No scleral icterus or JVD.   Pulmonary: Clear to auscultation A/L.  Cardiac: RRR S1 & S2 w/o rubs/murmurs/gallops.   Abdominal: Bowel sounds present x 4. No appreciable hepatosplenomegaly.  Skin: No jaundice, rashes, or visible lesions.  Neuro:  --GCS: E2 VT1 M5  --Mental Status:  Doesn't open eyes to pain, doesnt follow commands  --CN II-XII grossly intact.   --Pupils 3mm, PERRL.   --Corneal reflex, gag, cough intact.  --LUE and RUE withdraws  --LLE and RLE triple flexion    Medications:  Continuous Scheduled  ceFEPime (MAXIPIME) IVPB 1 g Q8H   chlorhexidine 15 mL BID   famotidine 20 mg BID   heparin (porcine) 5,000 Units Q8H   lacosamide (VIMPAT) in saline IVPB 200 mg Q12H   levetiracetam IVPB 1,000 mg Q12H   lorazepam 2 mg Once   polyethylene glycol 17 g Daily   pravastatin 40 mg QHS   senna-docusate 8.6-50 mg 1 tablet Daily   sodium chloride 0.9% 3 mL Q8H   vancomycin (VANCOCIN) IVPB 1,500 mg Q24H   PRN  sodium chloride  Q24H PRN   acetaminophen 650 mg Q6H PRN   diclofenac sodium  TID PRN   HYDROcodone-acetaminophen 1 tablet Q4H PRN   magnesium oxide 800 mg PRN   magnesium oxide 800 mg PRN   magnesium sulfate IVPB 2 g PRN   magnesium sulfate IVPB 4 g PRN   morphine 0.5 mg Q6H PRN   ondansetron 4 mg Q12H PRN   potassium chloride 10% 40 mEq PRN   potassium chloride 10% 40 mEq PRN   potassium chloride 10% 60 mEq PRN   potassium, sodium phosphates 2 packet PRN   potassium, sodium phosphates 2 packet PRN   potassium, sodium phosphates 2 packet PRN     Today I personally reviewed pertinent medications, lines/drains/airways, imaging, laboratory results,    Diet  No diet orders on  file      Assessment/Plan:     Neuro   * SDH (subdural hematoma)    Epidural with subdural component s/p evac 12/20  Repeat cth stable  Mri with punctate stroke    nsgy following  Pt/ot/slp when able       Status epilepticus    eeg improved, now with intermittent low amplitude periodic discharges  Off ketamine   on keppra 1gm BID, and lacosamide 200mg BID  continuous EEG     Epidural hematoma    See SDH     Pulmonary   Acute respiratory failure    Hypoxic, aspiration, bronch with mucous plugging in L lung    Maintain today in setting of mental status  Follow CXR and ABG daily     Cardiac/Vascular   Essential hypertension      Off Levo  SBP<160  If hypertenive weill resume home meds  PRN hydralazine for sBP>160       ID   Septic shock    Leukocytosis, reduced uop, hypotensive  Suspect pulmonary source  On vanc/cefepime    F/u cultures  Continue abx for one more day,            The patient is being Prophylaxed for:  Venous Thromboembolism with: Chemical  Stress Ulcer with: H2B  Ventilator Pneumonia with: chlorhexidine oral care    Full Code    Altagracia Gomze NP  Neurocritical Care  [facility]    Critical care >35 min

## 2018-12-27 NOTE — ASSESSMENT & PLAN NOTE
- In setting of recent SDH and evacuation    EE/26>27: diffuse slowing, some left sided periodic discharges, at times evolving into focal seizures      Recommendations:  - Continue Vimpat 200 mg BID, Keppra 1000 mg BID  - Ketamine discontinued   - Continue vEEG  - Consider Clobazam if additional AED needed    Plan of care discussed with NCC team, family at bedside.

## 2018-12-27 NOTE — ASSESSMENT & PLAN NOTE
Epidural with subdural component s/p evac 12/20  Repeat cth stable  Mri with punctate stroke    nsgy following  Pt/ot/slp when able

## 2018-12-27 NOTE — HPI
Ms. Nunes is a 62 yo male with significant past medical history of CABG on ASA who presented to [facility] as transfer from OSH for evaluation of SDH after mechanical fall from standing. Initial CTH negative for acute intracranial pathology. Patient reportedly became more confused, presented to OSH ER when CTH revealed SDH. Patient transferred to [facility] for higher level of care. He progressively worsened with development of intermittent global aphasia. NSGY consulted, patient taken for L craniotomy for evacuation of SDH on 12/20. Repeat CTH stable, MRI with punctate stroke. EEG initiated which showed intermittent low amplitude periodic discharges on left. Neurology Epilepsy consulted for additional assistance in management.

## 2018-12-27 NOTE — ASSESSMENT & PLAN NOTE
64 yo male with L aSDH now s/p craniotomy for evacuation (12/20).    --Continue care per primary team.  --Continue cEEG and antiepileptic regimen per epilepsy and primary team, f/up cEEG  --Continue blood pressure parameters, SBP < 160.  --Continue to elevate head of bed 30-45 degrees.  --We will continue to monitor closely, please contact us with any questions or concerns.

## 2018-12-27 NOTE — CONSULTS
[facility]  Neurology-Epilepsy  Consult Note    Patient Name: Peewee Nunes  MRN: [id number]  Admission Date: 12/20/2018  Hospital Length of Stay: 7 days  Code Status: Full Code   Attending Provider: Kenneth Decker MD   Consulting Provider: Roselyn Mead PA-C  Primary Care Physician: Jacob Piedra MD  Principal Problem:SDH (subdural hematoma)    Consults   Subjective:     HPI:   Ms. Nunes is a 62 yo male with significant past medical history of CABG on ASA who presented to [facility] as transfer from OSH for evaluation of SDH after mechanical fall from standing. Initial CTH negative for acute intracranial pathology. Patient reportedly became more confused, presented to OSH ER when CTH revealed SDH. Patient [place] [facility] [place] level of care. He progressively worsened with development of intermittent global aphasia. NSGY consulted, patient taken for L craniotomy for evacuation of SDH on 12/20. Repeat CTH stable, MRI with punctate stroke. EEG initiated which showed intermittent low amplitude periodic discharges on left. Neurology Epilepsy consulted for additional assistance in management.      Hospital Course:   No notes on file     Past Medical History:   Diagnosis Date    Coronary artery disease     CVD (cardiovascular disease)     Diabetes mellitus type II     Hyperlipidemia     Hypertension     Ischemic cardiomyopathy     Obesity     Obesity, morbid        Past Surgical History:   Procedure Laterality Date    APPENDECTOMY      CORONARY ARTERY BYPASS GRAFT      CRANIOTOMY, FOR SUBDURAL HEMATOMA EVACUATION Left 12/20/2018    Performed by Stevan Hull MD at [facility] OR [address] [place] [address]    HEMORRHOID SURGERY         Review of patient's allergies indicates:   Allergen Reactions    Levaquin [levofloxacin] Nausea Only       No current facility-administered medications on file prior to encounter.      Current Outpatient Medications on File Prior to Encounter   Medication Sig    aspirin  (ECOTRIN) 81 MG EC tablet Take 81 mg by mouth once daily.      bisoprolol (ZEBETA) 10 MG tablet Take 1 tablet (10 mg total) by mouth once daily.    linagliptin (TRADJENTA) 5 mg Tab tablet Take 1 tablet (5 mg total) by mouth once daily.    mv-min 16/folic acid/lut/lycop (REQ49+ ORAL)     pravastatin (PRAVACHOL) 40 MG tablet Take 1 tablet (40 mg total) by mouth every evening.     Continuous Infusions:    Family History     Problem Relation (Age of Onset)    Cancer Father    Diabetes Mother    Heart disease Mother, Father    Hyperlipidemia Mother    Hypertension Mother    Stroke Mother        Tobacco Use    Smoking status: Former Smoker     Packs/day: 1.50     Years: 25.00     Pack years: 37.50     Types: Cigarettes     Last attempt to quit: 2002     Years since quittin.0    Smokeless tobacco: Never Used   Substance and Sexual Activity    Alcohol use: No    Drug use: No    Sexual activity: Yes     Review of Systems   Unable to perform ROS: Intubated     Objective:     Vital Signs (Most Recent):  Temp: 99.4 °F (37.4 °C) (18 0700)  Pulse: (!) 111 (18 1116)  Resp: (!) 24 (18 1116)  BP: 116/74 (18 0505)  SpO2: 100 % (18 1116) Vital Signs (24h Range):  Temp:  [98.3 °F (36.8 °C)-99.6 °F (37.6 °C)] 99.4 °F (37.4 °C)  Pulse:  [] 111  Resp:  [16-24] 24  SpO2:  [99 %-100 %] 100 %  BP: ()/(62-98) 116/74  Arterial Line BP: (101-140)/() 114/85     Weight: 85.7 kg (189 lb)  Body mass index is 27.12 kg/m².    Physical Exam   Constitutional: He appears well-developed and well-nourished. He is intubated.   Eyes: Pupils are equal, round, and reactive to light.   Pulmonary/Chest: He is intubated.   Musculoskeletal: He exhibits no deformity.   Skin: He is not diaphoretic.       NEUROLOGICAL EXAMINATION:     CRANIAL NERVES     CN III, IV, VI   Pupils are equal, round, and reactive to light.      Significant Labs: All pertinent lab results from the past 24 hours have been  reviewed.    Significant Studies: I have reviewed all pertinent imaging results/findings within the past 24 hours.    Assessment and Plan:     * SDH (subdural hematoma)    - s/p L craniotomy for subdural hematoma evacuation, 7 days postop     Status epilepticus    - In setting of recent SDH and evacuation    EE/26>27: diffuse slowing, some left sided periodic discharges, at times evolving into focal seizures      Recommendations:  - Continue Vimpat 200 mg BID, Keppra 1000 mg BID  - Ketamine discontinued   - Continue vEEG  - Consider Clobazam if additional AED needed    Plan of care discussed with [facility] team, family at bedside.     New onset atrial fibrillation    - vitals reviewed, tachycardic, management per [facility]     Acute respiratory failure    - daily CXR, ABG, management per [facility]         VTE Risk Mitigation (From admission, onward)        Ordered     heparin (porcine) injection 5,000 Units  Every 8 hours      18 1405     Reason for No Pharmacological VTE Prophylaxis  Once      18 1640     IP VTE HIGH RISK PATIENT  Once      18 1640     Place sequential compression device  Until discontinued      18 1640          Thank you for your consult. I will follow-up with patient peripherally for now. Please contact us if you have any additional questions.    Roselyn Mead PA-C  Neurology-Epilepsy  [facility]  Staff: Dr. Rendon

## 2018-12-27 NOTE — OP NOTE
DATE OF PROCEDURE:  12/20/2018    SURGEON:  Stevan Hull M.D., Ph.D.    ASSISTANT:  Dr. Christine Polanco (the assistant is a [facility]   resident).    PREOPERATIVE DIAGNOSES:  Left frontotemporoparietal subdural hematoma with   associated cerebral edema and mass effect.    POSTOPERATIVE DIAGNOSES:  Left frontotemporoparietal subdural hematoma with   associated cerebral edema and mass effect.    PROCEDURE PERFORMED:  Left frontotemporoparietal craniotomy for removal of   subdural hematoma.    INDICATIONS IN DETAIL:  Mr. Peewee Nunes is a very pleasant 63-year-old   gentleman with past medical history of coronary artery disease, status post CABG   in 2006, diabetes mellitus who is on aspirin.  The patient fell four days prior   to admission with about 2-minute loss of consciousness.  The patient comes in   today with altered sensorium.  The patient had a CT scan that showed a left   frontotemporoparietal subdural hematoma measuring 1.7 cm at its greatest extent   and causing significant mass effect and midline shift.  The patient was seen and   after the risks, benefits and alternatives were described, wished to proceed   with surgical evacuation.    PROCEDURE IN DETAIL:  The patient was brought to the Operating Room and a   general anesthetic was administered.  All proper lines were placed.  A bump was   placed under the patient's left shoulder and head was turned towards the right   to allow access into the subdural hematoma.  The patient's head was placed in   3-point fixation.  A reverse question miesha line was drawn on the patient's scalp   over the temporoparietal and then frontal area.  The patient's head was then   cleaned, prepped and draped in the usual fashion.  A lidocaine-bupivacaine mix   was infiltrated under the skin.  An incision was made along the aforementioned   line and carried down to the calvarium using Bovie cautery.  A myocutaneous flap   was dissected forward and held in  retraction using fishhooks.  Three bur holes   were made, one in the frontal region, one in the temporal region and the other   one in the parietal.  This was done using a high-speed Benitez drill with an   acorn bur.  Once this was performed, the dura was dissected away from the   calvarium and then using a high-speed Benitez drill with a craniotome   attachment, a large frontotemporoparietal craniotomy flap was turned.  This was   removed by dissecting it away from the dura.  The dura was then opened at the   most posterior parietal region.  This was done using #15 blade.  We then   continued to open the dura along its edges using Metzenbaum scissors.  There was   a large area of hemorrhage underneath the dura.  This came out immediately   under pressure.  At this point, we then irrigated copiously over the cerebral   cortex allowing the subdural hematoma to gently move away.  There was a region   in the left parietal region where there was active bleeding.  This was   coagulated using bipolar cautery.  We continued to irrigate on brain until the   subdural hematoma that was immediately exposed was completely removed.  We then   irrigated under the bone edges and removed the hemorrhage there.  Once this was   complete, the Surgicel was placed underneath all the bone edges and all bleeding   was completely stopped.  At this point, we turned our attention to closure.    The dura was closed using 4-0 Nurolon sutures in interrupted fashion.  The bone   flap was replaced using Archbald plates and screws.  The soft tissues were then   closed in layers with staples on the skin.  The patient had a clean dressing   placed.  The patient was taken out of 3-point fixation and awakened by the   Anesthesia staff.  At the point, the patient was awake and following commands,   he was extubated.    There were no specimen during the surgery.    EBL was approximately 150 mL.    There were no intraprocedural complications.    All  counts were correct at the end of surgery.    Dr. Stevan Hull was present during the entire procedure.      TERE/AIMEE  dd: 12/26/2018 14:47:31 (CST)  td: 12/26/2018 20:23:20 (CST)  Doc ID   #[id number]  Job ID #[id number]    CC:

## 2018-12-27 NOTE — PROGRESS NOTES
[facility]  Neurosurgery  Progress Note    Subjective:     History of Present Illness: 62 yo M with PMH of CAD, CABG in 2006, DM, HTN, HLD on ASA presents to the ED for AMS. Patient fell 4 days ago with about 2 minutes of LOC. Family is unsure of what caused the fall. Full workup done including a CTH at [facility] that was negative. Patient went home and stopped taking his ASA. This morning, his family noticed he was more confused. He went to [facility] and CTH was done that revealed a 1.7cm left frontalpariental SDH with a 5 mm right sided midline shift and a left SAH. [place] [facility] History was obtained per family as patient unable to give history 2/2 confusion with expressive and receptive aphasia.    Post-Op Info:  Procedure(s) (LRB):  CRANIOTOMY, FOR SUBDURAL HEMATOMA EVACUATION (Left)   7 Days Post-Op     Interval History: NAEON. Off ketamine gtt. cEEG in place.     Medications:  Continuous Infusions:  Scheduled Meds:   ceFEPime (MAXIPIME) IVPB  1 g Intravenous Q8H    chlorhexidine  15 mL Mouth/Throat BID    famotidine  20 mg Per NG tube BID    heparin (porcine)  5,000 Units Subcutaneous Q8H    lacosamide (VIMPAT) in saline IVPB  200 mg Intravenous Q12H    levetiracetam IVPB  1,000 mg Intravenous Q12H    lorazepam  2 mg Intravenous Once    polyethylene glycol  17 g Per NG tube Daily    pravastatin  40 mg Per NG tube QHS    senna-docusate 8.6-50 mg  1 tablet Per NG tube Daily    sodium chloride 0.9%  3 mL Intravenous Q8H    vancomycin (VANCOCIN) IVPB  1,500 mg Intravenous Q24H     PRN Meds:sodium chloride, acetaminophen, diclofenac sodium, HYDROcodone-acetaminophen, magnesium oxide, magnesium oxide, magnesium sulfate IVPB, magnesium sulfate IVPB, morphine, ondansetron, potassium chloride 10%, potassium chloride 10%, potassium chloride 10%, potassium, sodium phosphates, potassium, sodium phosphates, potassium, sodium phosphates     Review of Systems  Objective:      Weight: 85.7 kg (189 lb)  Body mass index is 27.12 kg/m².  Vital Signs (Most Recent):  Temp: 99.4 °F (37.4 °C) (12/27/18 0700)  Pulse: (!) 111 (12/27/18 1116)  Resp: (!) 24 (12/27/18 1116)  BP: 116/74 (12/27/18 0505)  SpO2: 100 % (12/27/18 1116) Vital Signs (24h Range):  Temp:  [98.3 °F (36.8 °C)-99.6 °F (37.6 °C)] 99.4 °F (37.4 °C)  Pulse:  [] 111  Resp:  [16-24] 24  SpO2:  [99 %-100 %] 100 %  BP: ()/(62-98) 116/74  Arterial Line BP: (101-140)/() 114/85     Date 12/27/18 0700 - 12/28/18 0659   Shift 0267-2870 5147-8765 8786-7452 24 Hour Total   INTAKE   I.V.(mL/kg) 30(0.3)   30(0.3)   NG/   120   IV Piggyback 200   200   Shift Total(mL/kg) 350(4.1)   350(4.1)   OUTPUT   Urine(mL/kg/hr) 100   100   Shift Total(mL/kg) 100(1.2)   100(1.2)   Weight (kg) 85.7 85.7 85.7 85.7              Vent Mode: A/C  Oxygen Concentration (%):  [50-51] 50  Resp Rate Total:  [18 br/min-24 br/min] 24 br/min  Vt Set:  [500 mL] 500 mL  PEEP/CPAP:  [8 cmH20] 8 cmH20  Mean Airway Pressure:  [11 uqA69-04 cmH20] 14 cmH20         NG/OG Tube 12/23/18 1500 Center mouth (Active)   Placement Check placement verified by x-ray;placement verified by aspirate characteristics 12/27/2018  3:05 AM   Tolerance no signs/symptoms of discomfort 12/27/2018  3:05 AM   Securement taped to commercial device 12/27/2018  3:05 AM   Clamp Status/Tolerance unclamped 12/27/2018  3:05 AM   Suction Setting/Drainage Method dependent drainage 12/26/2018  3:05 AM   Insertion Site Appearance no redness, warmth, tenderness, skin breakdown, drainage 12/27/2018  3:05 AM   Drainage None 12/26/2018  3:05 AM   Flush/Irrigation flushed w/;water;no resistance met 12/27/2018  3:05 AM   Feeding Method continuous 12/27/2018  3:05 AM   Feeding Action feeding continued 12/27/2018  3:05 AM   Current Rate (mL/hr) 40 mL/hr 12/27/2018  3:05 AM   Goal Rate (mL/hr) 40 mL/hr 12/27/2018  3:05 AM   Intake (mL) 50 mL 12/26/2018  9:05 PM   Water Bolus (mL) 30 mL  "12/26/2018  5:05 AM   Intake (mL) - Formula Tube Feeding 40 12/27/2018  9:00 AM   Residual Amount (ml) 0 ml 12/26/2018  9:05 PM            Urethral Catheter 12/20/18 2350 Non-latex;Straight-tip 16 Fr. (Active)   Site Assessment Clean;Intact 12/27/2018  3:05 AM   Collection Container Urimeter 12/27/2018  3:05 AM   Securement Method secured to top of thigh w/ adhesive device 12/27/2018  3:05 AM   Catheter Care Performed yes 12/27/2018  3:05 AM   Reason for Continuing Urinary Catheterization Urinary retention 12/27/2018  3:05 AM   CAUTI Prevention Bundle StatLock in place w 1" slack;Intact seal between catheter & drainage tubing;Drainage bag off the floor;Green sheeting clip in use;No dependent loops or kinks;Drainage bag not overfilled (<2/3 full);Drainage bag below bladder 12/27/2018  3:05 AM   Output (mL) 40 mL 12/27/2018  9:00 AM       Neurosurgery Physical Exam     [id number]  Intubated  Does not open eyes  PERRL. + cough/gag.   Withdraws BUE  TF in BLE        Significant Labs:  Recent Labs   Lab 12/26/18 0225 12/26/18  0955 12/27/18  0243   GLU 97  --  117*   * 136 136   K 4.0  --  4.2     --  108   CO2 23  --  23   BUN 27*  --  32*   CREATININE 0.8  --  0.7   CALCIUM 8.2*  --  8.1*   MG 1.7  --  1.7     Recent Labs   Lab 12/26/18 0225 12/27/18  0243   WBC 18.25* 13.63*   HGB 10.0* 10.0*   HCT 29.8* 30.7*    231     No results for input(s): LABPT, INR, APTT in the last 48 hours.  Microbiology Results (last 7 days)     Procedure Component Value Units Date/Time    Blood culture [[id number]] Collected:  12/24/18 0006    Order Status:  Completed Specimen:  Blood from Line, Arterial, Right Updated:  12/27/18 0612     Blood Culture, Routine No Growth to date     Blood Culture, Routine No Growth to date     Blood Culture, Routine No Growth to date     Blood Culture, Routine No Growth to date    Blood culture [[id number]] Collected:  12/24/18 0005    Order Status:  Completed Specimen:  Blood from " Peripheral, Hand, Left Updated:  12/27/18 0612     Blood Culture, Routine No Growth to date     Blood Culture, Routine No Growth to date     Blood Culture, Routine No Growth to date     Blood Culture, Routine No Growth to date    Culture, Respiratory with Gram Stain [[id number]] Collected:  12/24/18 0446    Order Status:  Completed Specimen:  Respiratory from Endotracheal Aspirate Updated:  12/26/18 1007     Respiratory Culture Normal respiratory donnell     Respiratory Culture No S aureus or Pseudomonas isolated.     Gram Stain (Respiratory) <10 epithelial cells per low power field.     Gram Stain (Respiratory) Moderate WBC's     Gram Stain (Respiratory) No organisms seen    Urine culture [[id number]]     Order Status:  Canceled Specimen:  Urine, Catheterized         Recent Lab Results       12/27/18  0243        Immature Granulocytes 0.9     Immature Grans (Abs) 0.12  Comment:  Mild elevation in immature granulocytes is non specific and   can be seen in a variety of conditions including stress response,   acute inflammation, trauma and pregnancy. Correlation with other   laboratory and clinical findings is essential.       Albumin 1.5     Alkaline Phosphatase 90     ALT <5     Anion Gap 5     AST 14     Baso # 0.06     Basophil% 0.4     Total Bilirubin 0.6  Comment:  For infants and newborns, interpretation of results should be based  on gestational age, weight and in agreement with clinical  observations.  Premature Infant recommended reference ranges:  Up to 24 hours.............<8.0 mg/dL  Up to 48 hours............<12.0 mg/dL  3-5 days..................<15.0 mg/dL  6-29 days.................<15.0 mg/dL       BUN, Bld 32     Calcium 8.1     Chloride 108     CO2 23     Creatinine 0.7     Differential Method Automated     eGFR if African American >60.0     eGFR if non  >60.0  Comment:  Calculation used to obtain the estimated glomerular filtration  rate (eGFR) is the CKD-EPI equation.        Eos # 0.0      Eosinophil% 0.1     Glucose 117     Gran # (ANC) 10.5     Gran% 76.8     Group & Rh O NEG     Hematocrit 30.7     Hemoglobin 10.0     INDIRECT INEZ NEG     Lymph # 1.7     Lymph% 12.3     Magnesium 1.7     MCH 30.4     MCHC 32.6     MCV 93     Mono # 1.3     Mono% 9.5     MPV 10.7     nRBC 0     Phosphorus 1.9     Platelets 231     Potassium 4.2     Total Protein 4.7     RBC 3.29     RDW 14.7     Sodium 136     WBC 13.63           Significant Diagnostics:  I have reviewed all pertinent imaging results/findings within the past 24 hours.    Assessment/Plan:     * SDH (subdural hematoma)    62 yo male with L aSDH now s/p craniotomy for evacuation (12/20).    --Continue care per primary team.  --Continue cEEG and antiepileptic regimen per epilepsy and primary team, f/up cEEG  --Continue blood pressure parameters, SBP < 160.  --Continue to elevate head of bed 30-45 degrees.  --We will continue to monitor closely, please contact us with any questions or concerns.           Christine Rasheed MD  Neurosurgery  [facility]

## 2018-12-27 NOTE — SUBJECTIVE & OBJECTIVE
Past Medical History:   Diagnosis Date    Coronary artery disease     CVD (cardiovascular disease)     Diabetes mellitus type II     Hyperlipidemia     Hypertension     Ischemic cardiomyopathy     Obesity     Obesity, morbid        Past Surgical History:   Procedure Laterality Date    APPENDECTOMY      CORONARY ARTERY BYPASS GRAFT      CRANIOTOMY, FOR SUBDURAL HEMATOMA EVACUATION Left 2018    Performed by Stevan Hull MD at [facility] OR [address] [place] [address]    HEMORRHOID SURGERY         Review of patient's allergies indicates:   Allergen Reactions    Levaquin [levofloxacin] Nausea Only       No current facility-administered medications on file prior to encounter.      Current Outpatient Medications on File Prior to Encounter   Medication Sig    aspirin (ECOTRIN) 81 MG EC tablet Take 81 mg by mouth once daily.      bisoprolol (ZEBETA) 10 MG tablet Take 1 tablet (10 mg total) by mouth once daily.    linagliptin (TRADJENTA) 5 mg Tab tablet Take 1 tablet (5 mg total) by mouth once daily.    mv-min 16/folic acid/lut/lycop (REQ49+ ORAL)     pravastatin (PRAVACHOL) 40 MG tablet Take 1 tablet (40 mg total) by mouth every evening.     Continuous Infusions:    Family History     Problem Relation (Age of Onset)    Cancer Father    Diabetes Mother    Heart disease Mother, Father    Hyperlipidemia Mother    Hypertension Mother    Stroke Mother        Tobacco Use    Smoking status: Former Smoker     Packs/day: 1.50     Years: 25.00     Pack years: 37.50     Types: Cigarettes     Last attempt to quit: 2002     Years since quittin.0    Smokeless tobacco: Never Used   Substance and Sexual Activity    Alcohol use: No    Drug use: No    Sexual activity: Yes     Review of Systems   Unable to perform ROS: Intubated     Objective:     Vital Signs (Most Recent):  Temp: 99.4 °F (37.4 °C) (18 0700)  Pulse: (!) 111 (18 1116)  Resp: (!) 24 (18 1116)  BP: 116/74 (18 0505)  SpO2: 100 % (18  1116) Vital Signs (24h Range):  Temp:  [98.3 °F (36.8 °C)-99.6 °F (37.6 °C)] 99.4 °F (37.4 °C)  Pulse:  [] 111  Resp:  [16-24] 24  SpO2:  [99 %-100 %] 100 %  BP: ()/(62-98) 116/74  Arterial Line BP: (101-140)/() 114/85     Weight: 85.7 kg (189 lb)  Body mass index is 27.12 kg/m².    Physical Exam   Constitutional: He appears well-developed and well-nourished. He is intubated.   Eyes: Pupils are equal, round, and reactive to light.   Pulmonary/Chest: He is intubated.   Musculoskeletal: He exhibits no deformity.   Skin: He is not diaphoretic.       NEUROLOGICAL EXAMINATION:     CRANIAL NERVES     CN III, IV, VI   Pupils are equal, round, and reactive to light.      Significant Labs: All pertinent lab results from the past 24 hours have been reviewed.    Significant Studies: I have reviewed all pertinent imaging results/findings within the past 24 hours.

## 2018-12-27 NOTE — PLAN OF CARE
PT IS NOT MEDICALLY READY FOR DISCHARGE     12/27/18 110   Discharge Reassessment   Assessment Type Discharge Planning Reassessment   Provided patient/caregiver education on the expected discharge date and the discharge plan No   Do you have any problems affording any of your prescribed medications? No   Discharge Plan A Rehab

## 2018-12-27 NOTE — PLAN OF CARE
Problem: Adult Inpatient Plan of Care  Goal: Plan of Care Review  Outcome: Ongoing (interventions implemented as appropriate)  POC reviewed with pt and spouse at 2200. Pt displayed no evidence of learning due to being intubated and cognitively impaired. Spouse verbalized understanding. Questions and concerns addressed. No acute events today. Pt progressing toward goals. Will continue to monitor. See flowsheets for full assessment and VS info.

## 2018-12-27 NOTE — ASSESSMENT & PLAN NOTE
Malnutrition in the context of Chronic Illness/Injury    Related to (etiology):  Decreased intake, appetite    Signs and Symptoms (as evidenced by):  Energy Intake: <75% of estimated energy requirement for > 1 year  Body Fat Depletion: severe depletion of orbitals, triceps and thoracic and lumbar region   Muscle Mass Depletion: severe depletion of temples, clavicle region, scapular region, interosseous muscle and lower extremities   Weight Loss: 44% x 1 year     Interventions/Recommendations (treatment strategy):  Modify rate of enteral nutrition    Nutrition Diagnosis Status:  Continues

## 2018-12-27 NOTE — ASSESSMENT & PLAN NOTE
Leukocytosis, reduced uop, hypotensive  Suspect pulmonary source  On vanc/cefepime    F/u cultures  Continue abx for one more day,

## 2018-12-27 NOTE — SUBJECTIVE & OBJECTIVE
Review of Systems  Unable to obtain a complete ROS due to level of consciousness.  Objective:     Vitals:  Temp: 99.4 °F (37.4 °C)  Pulse: (!) 115  Rhythm: (P) atrial rhythm  BP: 116/74  MAP (mmHg): 91  Resp: (!) 24  SpO2: 100 %  Oxygen Concentration (%): 50  O2 Device (Oxygen Therapy): ventilator  Vent Mode: A/C  Set Rate: 16 bmp  Vt Set: 500 mL  PEEP/CPAP: 8 cmH20  Peak Airway Pressure: 22 cmH2O  Mean Airway Pressure: 12 cmH20  Plateau Pressure: 0 cmH20    Temp  Min: 98.3 °F (36.8 °C)  Max: 99.6 °F (37.6 °C)  Pulse  Min: 103  Max: 115  BP  Min: 97/62  Max: 143/80  MAP (mmHg)  Min: 76  Max: 112  Resp  Min: 16  Max: 24  SpO2  Min: 99 %  Max: 100 %  Oxygen Concentration (%)  Min: 50  Max: 51    12/26 0701 - 12/27 0700  In: 2357.1 [I.V.:387.1]  Out: 1067 [Urine:1067]   Unmeasured Output  Stool Occurrence: 0       Physical Exam  GA: comfortable, no acute distress. On EEG  HEENT: No scleral icterus or JVD.   Pulmonary: Clear to auscultation A/L.  Cardiac: RRR S1 & S2 w/o rubs/murmurs/gallops.   Abdominal: Bowel sounds present x 4. No appreciable hepatosplenomegaly.  Skin: No jaundice, rashes, or visible lesions.  Neuro:  --GCS: E2 VT1 M5  --Mental Status:  Doesn't open eyes to pain, doesnt follow commands  --CN II-XII grossly intact.   --Pupils 3mm, PERRL.   --Corneal reflex, gag, cough intact.  --LUE and RUE withdraws  --LLE and RLE triple flexion    Medications:  Continuous Scheduled  ceFEPime (MAXIPIME) IVPB 1 g Q8H   chlorhexidine 15 mL BID   famotidine 20 mg BID   heparin (porcine) 5,000 Units Q8H   lacosamide (VIMPAT) in saline IVPB 200 mg Q12H   levetiracetam IVPB 1,000 mg Q12H   lorazepam 2 mg Once   polyethylene glycol 17 g Daily   pravastatin 40 mg QHS   senna-docusate 8.6-50 mg 1 tablet Daily   sodium chloride 0.9% 3 mL Q8H   vancomycin (VANCOCIN) IVPB 1,500 mg Q24H   PRN  sodium chloride  Q24H PRN   acetaminophen 650 mg Q6H PRN   diclofenac sodium  TID PRN   HYDROcodone-acetaminophen 1 tablet Q4H PRN    magnesium oxide 800 mg PRN   magnesium oxide 800 mg PRN   magnesium sulfate IVPB 2 g PRN   magnesium sulfate IVPB 4 g PRN   morphine 0.5 mg Q6H PRN   ondansetron 4 mg Q12H PRN   potassium chloride 10% 40 mEq PRN   potassium chloride 10% 40 mEq PRN   potassium chloride 10% 60 mEq PRN   potassium, sodium phosphates 2 packet PRN   potassium, sodium phosphates 2 packet PRN   potassium, sodium phosphates 2 packet PRN     Today I personally reviewed pertinent medications, lines/drains/airways, imaging, laboratory results,    Diet  No diet orders on file

## 2018-12-27 NOTE — PROGRESS NOTES
"[facility]  Adult Nutrition  Progress Note    SUMMARY       Recommendations    Recommendation/Intervention:   1. Recommend increasing TF goal rate to best meet pt's needs.  Impact Peptide @ 55mL/hr.   - Provides 1980kcals, 124g protein and 1016mL free water.   - Hold for residuals >500mL.     2. If able to extubate and advance diet, recommend Regular diet with Boost Plus TID, texture per SLP recommendations.     Pt continues to have severe malnutrition in the context of chronic illness.     RD to monitor.    Goals: Pt to receive nutrition by RD follow up  Nutrition Goal Status: goal met  Communication of RD Recs: reviewed with RN    Reason for Assessment    Reason For Assessment: RD follow-up  Diagnosis: other (see comments)(SDH)  Relevant Medical History: CAD, DM, HTN, HLD  Interdisciplinary Rounds: attended  General Information Comments: Pt intubated 12/23 with respiratory distress. Remains intubated with TF at goal, tolerating appropriately. Pt previous diagnosed with severe malnutrition on 12/21- see note for details. Exam remains unchanged.   Nutrition Discharge Planning: unable to determine at this time    Nutrition Risk Screen    Nutrition Risk Screen: no indicators present    Nutrition/Diet History    Spiritual, Cultural Beliefs, Confucianism Practices, Values that Affect Care: no  Factors Affecting Nutritional Intake: NPO, on mechanical ventilation    Anthropometrics    Temp: 99.4 °F (37.4 °C)  Height Method: Estimated  Height: 5' 10" (177.8 cm)  Height (inches): 70 in  Weight Method: Bed Scale  Weight: 85.7 kg (189 lb)  Weight (lb): 189 lb  Ideal Body Weight (IBW), Male: 166 lb  % Ideal Body Weight, Male (lb): 113.86 lb  BMI (Calculated): 27.2  BMI Grade: 25 - 29.9 - overweight  Weight Loss: unintentional  Usual Body Weight (UBW), kg: (!) 153.1 kg  % Usual Body Weight: 56.11  % Weight Change From Usual Weight: -44 %       Lab/Procedures/Meds    Pertinent Labs Reviewed: reviewed  Pertinent " Labs Comments: noted  Pertinent Medications Reviewed: reviewed  Pertinent Medications Comments: heparin, vimpat    Estimated/Assessed Needs    Weight Used For Calorie Calculations: 85.7 kg (188 lb 15 oz)  Energy Calorie Requirements (kcal): 2058  Energy Need Method: [facility]  Protein Requirements: 103-129g(1.2-1.5g/kg)  Weight Used For Protein Calculations: 85.7 kg (188 lb 15 oz)  Fluid Requirements (mL): 1mL/kcal or per MD     RDA Method (mL): 2058         Nutrition Prescription Ordered    Current Diet Order: NPO  Nutrition Order Comments: TF at goal  Current Nutrition Support Formula Ordered: Impact Peptide 1.5  Current Nutrition Support Rate Ordered: 40 (ml)  Current Nutrition Support Frequency Ordered: mL/hr    Evaluation of Received Nutrient/Fluid Intake    Enteral Calories (kcal): 1440  Enteral Protein (gm): 90  Enteral (Free Water) Fluid (mL): 739    % Kcal Needs: 70  % Protein Needs: 87    I/O: +I/O, good UOP, LBM 12/16    Energy Calories Required: not meeting needs  Protein Required: meeting needs  Total Fluid Intake (mL/kg): 1mL/kcal or per MD    Comments: 0mL residuals 12/25  Tolerance: tolerating    % Intake of Estimated Energy Needs: 50 - 75 %  % Meal Intake: NPO    Nutrition Risk    Level of Risk/Frequency of Follow-up: (f/u 2x/week)     Assessment and Plan    Severe malnutrition    Malnutrition in the context of Chronic Illness/Injury    Related to (etiology):  Decreased intake, appetite    Signs and Symptoms (as evidenced by):  Energy Intake: <75% of estimated energy requirement for > 1 year  Body Fat Depletion: severe depletion of orbitals, triceps and thoracic and lumbar region   Muscle Mass Depletion: severe depletion of temples, clavicle region, scapular region, interosseous muscle and lower extremities   Weight Loss: 44% x 1 year     Interventions/Recommendations (treatment strategy):  Modify rate of enteral nutrition    Nutrition Diagnosis Status:  Continues              Monitor and  Evaluation    Food and Nutrient Intake: energy intake, food and beverage intake, enteral nutrition intake  Food and Nutrient Adminstration: enteral and parenteral nutrition administration, diet order  Anthropometric Measurements: weight, weight change, body mass index  Biochemical Data, Medical Tests and Procedures: electrolyte and renal panel, glucose/endocrine profile, gastrointestinal profile, inflammatory profile, lipid profile  Nutrition-Focused Physical Findings: overall appearance     Malnutrition Assessment  Malnutrition Type: chronic illness  Energy Intake: severe energy intake          Weight Loss (Malnutrition): greater than 20% in 1 year  Energy Intake (Malnutrition): less than 75% for greater than or equal to 3 months  Subcutaneous Fat (Malnutrition): severe depletion  Muscle Mass (Malnutrition): severe depletion   Orbital Region (Subcutaneous Fat Loss): moderate depletion  Upper Arm Region (Subcutaneous Fat Loss): severe depletion  Thoracic and Lumbar Region: severe depletion   Christianity Region (Muscle Loss): moderate depletion  Clavicle Bone Region (Muscle Loss): severe depletion  Clavicle and Acromion Bone Region (Muscle Loss): severe depletion  Scapular Bone Region (Muscle Loss): severe depletion  Dorsal Hand (Muscle Loss): moderate depletion  Patellar Region (Muscle Loss): severe depletion  Anterior Thigh Region (Muscle Loss): severe depletion  Posterior Calf Region (Muscle Loss): severe depletion       Subcutaneous Fat Loss (Final Summary): severe protein-calorie malnutrition  Muscle Loss Evaluation (Final Summary): severe protein-calorie malnutrition    Moderate Weight Loss (Malnutrition): other (see comments)  Severe Weight Loss (Malnutrition): greater than 20% in 1 year    Nutrition Follow-Up    RD Follow-up?: Yes

## 2018-12-27 NOTE — SUBJECTIVE & OBJECTIVE
Interval History: NAEON. Off ketamine gtt. cEEG in place.     Medications:  Continuous Infusions:  Scheduled Meds:   ceFEPime (MAXIPIME) IVPB  1 g Intravenous Q8H    chlorhexidine  15 mL Mouth/Throat BID    famotidine  20 mg Per NG tube BID    heparin (porcine)  5,000 Units Subcutaneous Q8H    lacosamide (VIMPAT) in saline IVPB  200 mg Intravenous Q12H    levetiracetam IVPB  1,000 mg Intravenous Q12H    lorazepam  2 mg Intravenous Once    polyethylene glycol  17 g Per NG tube Daily    pravastatin  40 mg Per NG tube QHS    senna-docusate 8.6-50 mg  1 tablet Per NG tube Daily    sodium chloride 0.9%  3 mL Intravenous Q8H    vancomycin (VANCOCIN) IVPB  1,500 mg Intravenous Q24H     PRN Meds:sodium chloride, acetaminophen, diclofenac sodium, HYDROcodone-acetaminophen, magnesium oxide, magnesium oxide, magnesium sulfate IVPB, magnesium sulfate IVPB, morphine, ondansetron, potassium chloride 10%, potassium chloride 10%, potassium chloride 10%, potassium, sodium phosphates, potassium, sodium phosphates, potassium, sodium phosphates     Review of Systems  Objective:     Weight: 85.7 kg (189 lb)  Body mass index is 27.12 kg/m².  Vital Signs (Most Recent):  Temp: 99.4 °F (37.4 °C) (12/27/18 0700)  Pulse: (!) 111 (12/27/18 1116)  Resp: (!) 24 (12/27/18 1116)  BP: 116/74 (12/27/18 0505)  SpO2: 100 % (12/27/18 1116) Vital Signs (24h Range):  Temp:  [98.3 °F (36.8 °C)-99.6 °F (37.6 °C)] 99.4 °F (37.4 °C)  Pulse:  [] 111  Resp:  [16-24] 24  SpO2:  [99 %-100 %] 100 %  BP: ()/(62-98) 116/74  Arterial Line BP: (101-140)/() 114/85     Date 12/27/18 0700 - 12/28/18 0659   Shift 0068-0587 1183-8849 5333-3652 24 Hour Total   INTAKE   I.V.(mL/kg) 30(0.3)   30(0.3)   NG/   120   IV Piggyback 200   200   Shift Total(mL/kg) 350(4.1)   350(4.1)   OUTPUT   Urine(mL/kg/hr) 100   100   Shift Total(mL/kg) 100(1.2)   100(1.2)   Weight (kg) 85.7 85.7 85.7 85.7              Vent Mode: A/C  Oxygen Concentration  "(%):  [50-51] 50  Resp Rate Total:  [18 br/min-24 br/min] 24 br/min  Vt Set:  [500 mL] 500 mL  PEEP/CPAP:  [8 cmH20] 8 cmH20  Mean Airway Pressure:  [11 eyT57-25 cmH20] 14 cmH20         NG/OG Tube 12/23/18 1500 Center mouth (Active)   Placement Check placement verified by x-ray;placement verified by aspirate characteristics 12/27/2018  3:05 AM   Tolerance no signs/symptoms of discomfort 12/27/2018  3:05 AM   Securement taped to commercial device 12/27/2018  3:05 AM   Clamp Status/Tolerance unclamped 12/27/2018  3:05 AM   Suction Setting/Drainage Method dependent drainage 12/26/2018  3:05 AM   Insertion Site Appearance no redness, warmth, tenderness, skin breakdown, drainage 12/27/2018  3:05 AM   Drainage None 12/26/2018  3:05 AM   Flush/Irrigation flushed w/;water;no resistance met 12/27/2018  3:05 AM   Feeding Method continuous 12/27/2018  3:05 AM   Feeding Action feeding continued 12/27/2018  3:05 AM   Current Rate (mL/hr) 40 mL/hr 12/27/2018  3:05 AM   Goal Rate (mL/hr) 40 mL/hr 12/27/2018  3:05 AM   Intake (mL) 50 mL 12/26/2018  9:05 PM   Water Bolus (mL) 30 mL 12/26/2018  5:05 AM   Intake (mL) - Formula Tube Feeding 40 12/27/2018  9:00 AM   Residual Amount (ml) 0 ml 12/26/2018  9:05 PM            Urethral Catheter 12/20/18 2350 Non-latex;Straight-tip 16 Fr. (Active)   Site Assessment Clean;Intact 12/27/2018  3:05 AM   Collection Container Urimeter 12/27/2018  3:05 AM   Securement Method secured to top of thigh w/ adhesive device 12/27/2018  3:05 AM   Catheter Care Performed yes 12/27/2018  3:05 AM   Reason for Continuing Urinary Catheterization Urinary retention 12/27/2018  3:05 AM   CAUTI Prevention Bundle StatLock in place w 1" slack;Intact seal between catheter & drainage tubing;Drainage bag off the floor;Green sheeting clip in use;No dependent loops or kinks;Drainage bag not overfilled (<2/3 full);Drainage bag below bladder 12/27/2018  3:05 AM   Output (mL) 40 mL 12/27/2018  9:00 AM       Neurosurgery " Physical Exam     E1VTM4  Intubated  Does not open eyes  PERRL. + cough/gag.   Withdraws BUE  TF in BLE        Significant Labs:  Recent Labs   Lab 12/26/18 0225 12/26/18  0955 12/27/18  0243   GLU 97  --  117*   * 136 136   K 4.0  --  4.2     --  108   CO2 23  --  23   BUN 27*  --  32*   CREATININE 0.8  --  0.7   CALCIUM 8.2*  --  8.1*   MG 1.7  --  1.7     Recent Labs   Lab 12/26/18 0225 12/27/18 0243   WBC 18.25* 13.63*   HGB 10.0* 10.0*   HCT 29.8* 30.7*    231     No results for input(s): LABPT, INR, APTT in the last 48 hours.  Microbiology Results (last 7 days)     Procedure Component Value Units Date/Time    Blood culture [[id number]] Collected:  12/24/18 0006    Order Status:  Completed Specimen:  Blood from Line, Arterial, Right Updated:  12/27/18 0612     Blood Culture, Routine No Growth to date     Blood Culture, Routine No Growth to date     Blood Culture, Routine No Growth to date     Blood Culture, Routine No Growth to date    Blood culture [[id number]] Collected:  12/24/18 0005    Order Status:  Completed Specimen:  Blood from Peripheral, Hand, Left Updated:  12/27/18 0612     Blood Culture, Routine No Growth to date     Blood Culture, Routine No Growth to date     Blood Culture, Routine No Growth to date     Blood Culture, Routine No Growth to date    Culture, Respiratory with Gram Stain [[id number]] Collected:  12/24/18 0446    Order Status:  Completed Specimen:  Respiratory from Endotracheal Aspirate Updated:  12/26/18 1007     Respiratory Culture Normal respiratory donnell     Respiratory Culture No S aureus or Pseudomonas isolated.     Gram Stain (Respiratory) <10 epithelial cells per low power field.     Gram Stain (Respiratory) Moderate WBC's     Gram Stain (Respiratory) No organisms seen    Urine culture [[id number]]     Order Status:  Canceled Specimen:  Urine, Catheterized         Recent Lab Results       12/27/18 0243        Immature Granulocytes 0.9     Immature Grans  (Abs) 0.12  Comment:  Mild elevation in immature granulocytes is non specific and   can be seen in a variety of conditions including stress response,   acute inflammation, trauma and pregnancy. Correlation with other   laboratory and clinical findings is essential.       Albumin 1.5     Alkaline Phosphatase 90     ALT <5     Anion Gap 5     AST 14     Baso # 0.06     Basophil% 0.4     Total Bilirubin 0.6  Comment:  For infants and newborns, interpretation of results should be based  on gestational age, weight and in agreement with clinical  observations.  Premature Infant recommended reference ranges:  Up to 24 hours.............<8.0 mg/dL  Up to 48 hours............<12.0 mg/dL  3-5 days..................<15.0 mg/dL  6-29 days.................<15.0 mg/dL       BUN, Bld 32     Calcium 8.1     Chloride 108     CO2 23     Creatinine 0.7     Differential Method Automated     eGFR if African American >60.0     eGFR if non  >60.0  Comment:  Calculation used to obtain the estimated glomerular filtration  rate (eGFR) is the CKD-EPI equation.        Eos # 0.0     Eosinophil% 0.1     Glucose 117     Gran # (ANC) 10.5     Gran% 76.8     Group & Rh O NEG     Hematocrit 30.7     Hemoglobin 10.0     INDIRECT INEZ NEG     Lymph # 1.7     Lymph% 12.3     Magnesium 1.7     MCH 30.4     MCHC 32.6     MCV 93     Mono # 1.3     Mono% 9.5     MPV 10.7     nRBC 0     Phosphorus 1.9     Platelets 231     Potassium 4.2     Total Protein 4.7     RBC 3.29     RDW 14.7     Sodium 136     WBC 13.63           Significant Diagnostics:  I have reviewed all pertinent imaging results/findings within the past 24 hours.

## 2018-12-28 LAB
ALBUMIN SERPL BCP-MCNC: 1.6 G/DL
ALLENS TEST: ABNORMAL
ALP SERPL-CCNC: 136 U/L
ALT SERPL W/O P-5'-P-CCNC: 6 U/L
AMMONIA PLAS-SCNC: 32 UMOL/L
ANION GAP SERPL CALC-SCNC: 7 MMOL/L
AST SERPL-CCNC: 15 U/L
BASOPHILS # BLD AUTO: 0.05 K/UL
BASOPHILS NFR BLD: 0.3 %
BILIRUB SERPL-MCNC: 0.6 MG/DL
BUN SERPL-MCNC: 41 MG/DL
CALCIUM SERPL-MCNC: 8.1 MG/DL
CHLORIDE SERPL-SCNC: 108 MMOL/L
CO2 SERPL-SCNC: 24 MMOL/L
CREAT SERPL-MCNC: 0.7 MG/DL
DELSYS: ABNORMAL
DIFFERENTIAL METHOD: ABNORMAL
EOSINOPHIL # BLD AUTO: 0 K/UL
EOSINOPHIL NFR BLD: 0.1 %
ERYTHROCYTE [DISTWIDTH] IN BLOOD BY AUTOMATED COUNT: 14.9 %
ERYTHROCYTE [SEDIMENTATION RATE] IN BLOOD BY WESTERGREN METHOD: 16 MM/H
EST. GFR  (AFRICAN AMERICAN): >60 ML/MIN/1.73 M^2
EST. GFR  (NON AFRICAN AMERICAN): >60 ML/MIN/1.73 M^2
FIO2: 50
GLUCOSE SERPL-MCNC: 137 MG/DL
HCO3 UR-SCNC: 25.3 MMOL/L (ref 24–28)
HCT VFR BLD AUTO: 32 %
HGB BLD-MCNC: 10.4 G/DL
IMM GRANULOCYTES # BLD AUTO: 0.19 K/UL
IMM GRANULOCYTES NFR BLD AUTO: 1.3 %
LYMPHOCYTES # BLD AUTO: 1.3 K/UL
LYMPHOCYTES NFR BLD: 8.7 %
MAGNESIUM SERPL-MCNC: 1.8 MG/DL
MCH RBC QN AUTO: 31.3 PG
MCHC RBC AUTO-ENTMCNC: 32.5 G/DL
MCV RBC AUTO: 96 FL
MIN VOL: 11.7
MODE: ABNORMAL
MONOCYTES # BLD AUTO: 1.5 K/UL
MONOCYTES NFR BLD: 10 %
NEUTROPHILS # BLD AUTO: 11.7 K/UL
NEUTROPHILS NFR BLD: 79.6 %
NRBC BLD-RTO: 0 /100 WBC
PCO2 BLDA: 31.1 MMHG (ref 35–45)
PEEP: 8
PH SMN: 7.52 [PH] (ref 7.35–7.45)
PHOSPHATE SERPL-MCNC: 1.5 MG/DL
PHOSPHATE SERPL-MCNC: 1.7 MG/DL
PLATELET # BLD AUTO: 230 K/UL
PMV BLD AUTO: 10.2 FL
PO2 BLDA: 156 MMHG (ref 80–100)
POC BE: 2 MMOL/L
POC SATURATED O2: 100 % (ref 95–100)
POC TCO2: 26 MMOL/L (ref 23–27)
POCT GLUCOSE: 137 MG/DL (ref 70–110)
POTASSIUM SERPL-SCNC: 4.4 MMOL/L
PROT SERPL-MCNC: 5.1 G/DL
RBC # BLD AUTO: 3.32 M/UL
SAMPLE: ABNORMAL
SITE: ABNORMAL
SODIUM SERPL-SCNC: 139 MMOL/L
SP02: 100
VT: 500
WBC # BLD AUTO: 14.76 K/UL

## 2018-12-28 PROCEDURE — 25000003 PHARM REV CODE 250: Performed by: PSYCHIATRY & NEUROLOGY

## 2018-12-28 PROCEDURE — 84100 ASSAY OF PHOSPHORUS: CPT | Mod: 91

## 2018-12-28 PROCEDURE — 25000003 PHARM REV CODE 250: Performed by: STUDENT IN AN ORGANIZED HEALTH CARE EDUCATION/TRAINING PROGRAM

## 2018-12-28 PROCEDURE — 99900026 HC AIRWAY MAINTENANCE (STAT)

## 2018-12-28 PROCEDURE — 25000003 PHARM REV CODE 250: Performed by: NURSE PRACTITIONER

## 2018-12-28 PROCEDURE — 37799 UNLISTED PX VASCULAR SURGERY: CPT

## 2018-12-28 PROCEDURE — 99900035 HC TECH TIME PER 15 MIN (STAT)

## 2018-12-28 PROCEDURE — 36415 COLL VENOUS BLD VENIPUNCTURE: CPT

## 2018-12-28 PROCEDURE — 95951 PR EEG MONITORING/VIDEORECORD: ICD-10-PCS | Mod: 26,,, | Performed by: PSYCHIATRY & NEUROLOGY

## 2018-12-28 PROCEDURE — 99233 SBSQ HOSP IP/OBS HIGH 50: CPT | Mod: ,,, | Performed by: PSYCHIATRY & NEUROLOGY

## 2018-12-28 PROCEDURE — 82803 BLOOD GASES ANY COMBINATION: CPT

## 2018-12-28 PROCEDURE — 99291 PR CRITICAL CARE, E/M 30-74 MINUTES: ICD-10-PCS | Mod: GC,,, | Performed by: PSYCHIATRY & NEUROLOGY

## 2018-12-28 PROCEDURE — 83735 ASSAY OF MAGNESIUM: CPT

## 2018-12-28 PROCEDURE — 94003 VENT MGMT INPAT SUBQ DAY: CPT

## 2018-12-28 PROCEDURE — A4216 STERILE WATER/SALINE, 10 ML: HCPCS | Performed by: PSYCHIATRY & NEUROLOGY

## 2018-12-28 PROCEDURE — 63600175 PHARM REV CODE 636 W HCPCS: Performed by: NURSE PRACTITIONER

## 2018-12-28 PROCEDURE — 94761 N-INVAS EAR/PLS OXIMETRY MLT: CPT

## 2018-12-28 PROCEDURE — 82140 ASSAY OF AMMONIA: CPT

## 2018-12-28 PROCEDURE — 95951 HC EEG MONITORING/VIDEO RECORD: CPT

## 2018-12-28 PROCEDURE — 99291 CRITICAL CARE FIRST HOUR: CPT | Mod: GC,,, | Performed by: PSYCHIATRY & NEUROLOGY

## 2018-12-28 PROCEDURE — 20000000 HC ICU ROOM

## 2018-12-28 PROCEDURE — 80053 COMPREHEN METABOLIC PANEL: CPT

## 2018-12-28 PROCEDURE — 27000221 HC OXYGEN, UP TO 24 HOURS

## 2018-12-28 PROCEDURE — 99233 PR SUBSEQUENT HOSPITAL CARE,LEVL III: ICD-10-PCS | Mod: ,,, | Performed by: PSYCHIATRY & NEUROLOGY

## 2018-12-28 PROCEDURE — 95951 PR EEG MONITORING/VIDEORECORD: CPT | Mod: 26,,, | Performed by: PSYCHIATRY & NEUROLOGY

## 2018-12-28 PROCEDURE — 84100 ASSAY OF PHOSPHORUS: CPT

## 2018-12-28 PROCEDURE — 85025 COMPLETE CBC W/AUTO DIFF WBC: CPT

## 2018-12-28 PROCEDURE — 63600175 PHARM REV CODE 636 W HCPCS: Performed by: STUDENT IN AN ORGANIZED HEALTH CARE EDUCATION/TRAINING PROGRAM

## 2018-12-28 PROCEDURE — 27200966 HC CLOSED SUCTION SYSTEM

## 2018-12-28 PROCEDURE — C9254 INJECTION, LACOSAMIDE: HCPCS | Performed by: NURSE PRACTITIONER

## 2018-12-28 PROCEDURE — 82800 BLOOD PH: CPT

## 2018-12-28 RX ORDER — CLONAZEPAM 1 MG/1
1 TABLET ORAL 3 TIMES DAILY
Status: DISCONTINUED | OUTPATIENT
Start: 2018-12-28 | End: 2019-01-03

## 2018-12-28 RX ORDER — AMOXICILLIN 250 MG
1 CAPSULE ORAL 2 TIMES DAILY
Status: DISCONTINUED | OUTPATIENT
Start: 2018-12-28 | End: 2019-01-17

## 2018-12-28 RX ORDER — LEVETIRACETAM 5 MG/ML
500 INJECTION INTRAVASCULAR EVERY 12 HOURS
Status: DISCONTINUED | OUTPATIENT
Start: 2018-12-28 | End: 2018-12-29

## 2018-12-28 RX ADMIN — HEPARIN SODIUM 5000 UNITS: 5000 INJECTION, SOLUTION INTRAVENOUS; SUBCUTANEOUS at 06:12

## 2018-12-28 RX ADMIN — POTASSIUM & SODIUM PHOSPHATES POWDER PACK 280-160-250 MG 2 PACKET: 280-160-250 PACK at 02:12

## 2018-12-28 RX ADMIN — HEPARIN SODIUM 5000 UNITS: 5000 INJECTION, SOLUTION INTRAVENOUS; SUBCUTANEOUS at 10:12

## 2018-12-28 RX ADMIN — CHLORHEXIDINE GLUCONATE 0.12% ORAL RINSE 15 ML: 1.2 LIQUID ORAL at 08:12

## 2018-12-28 RX ADMIN — CEFEPIME 1 G: 1 INJECTION, POWDER, FOR SOLUTION INTRAMUSCULAR; INTRAVENOUS at 09:12

## 2018-12-28 RX ADMIN — SODIUM CHLORIDE 200 MG: 9 INJECTION, SOLUTION INTRAVENOUS at 08:12

## 2018-12-28 RX ADMIN — POLYETHYLENE GLYCOL 3350 17 G: 17 POWDER, FOR SOLUTION ORAL at 09:12

## 2018-12-28 RX ADMIN — CLONAZEPAM 1 MG: 1 TABLET ORAL at 06:12

## 2018-12-28 RX ADMIN — STANDARDIZED SENNA CONCENTRATE AND DOCUSATE SODIUM 1 TABLET: 8.6; 5 TABLET, FILM COATED ORAL at 08:12

## 2018-12-28 RX ADMIN — CLONAZEPAM 1 MG: 1 TABLET ORAL at 08:12

## 2018-12-28 RX ADMIN — FAMOTIDINE 20 MG: 20 TABLET ORAL at 08:12

## 2018-12-28 RX ADMIN — STANDARDIZED SENNA CONCENTRATE AND DOCUSATE SODIUM 1 TABLET: 8.6; 5 TABLET, FILM COATED ORAL at 09:12

## 2018-12-28 RX ADMIN — CHLORHEXIDINE GLUCONATE 0.12% ORAL RINSE 15 ML: 1.2 LIQUID ORAL at 09:12

## 2018-12-28 RX ADMIN — SODIUM CHLORIDE 200 MG: 9 INJECTION, SOLUTION INTRAVENOUS at 09:12

## 2018-12-28 RX ADMIN — Medication 3 ML: at 06:12

## 2018-12-28 RX ADMIN — POTASSIUM & SODIUM PHOSPHATES POWDER PACK 280-160-250 MG 2 PACKET: 280-160-250 PACK at 03:12

## 2018-12-28 RX ADMIN — Medication 3 ML: at 02:12

## 2018-12-28 RX ADMIN — LEVETIRACETAM 500 MG: 5 INJECTION INTRAVENOUS at 08:12

## 2018-12-28 RX ADMIN — CEFEPIME 1 G: 1 INJECTION, POWDER, FOR SOLUTION INTRAMUSCULAR; INTRAVENOUS at 02:12

## 2018-12-28 RX ADMIN — SODIUM CHLORIDE 1000 ML: 0.9 INJECTION, SOLUTION INTRAVENOUS at 12:12

## 2018-12-28 RX ADMIN — FAMOTIDINE 20 MG: 20 TABLET ORAL at 09:12

## 2018-12-28 RX ADMIN — PRAVASTATIN SODIUM 40 MG: 40 TABLET ORAL at 08:12

## 2018-12-28 RX ADMIN — ACETAMINOPHEN 650 MG: 325 TABLET, FILM COATED ORAL at 09:12

## 2018-12-28 RX ADMIN — Medication 3 ML: at 10:12

## 2018-12-28 RX ADMIN — HEPARIN SODIUM 5000 UNITS: 5000 INJECTION, SOLUTION INTRAVENOUS; SUBCUTANEOUS at 03:12

## 2018-12-28 RX ADMIN — VANCOMYCIN HYDROCHLORIDE 1500 MG: 10 INJECTION, POWDER, LYOPHILIZED, FOR SOLUTION INTRAVENOUS at 11:12

## 2018-12-28 RX ADMIN — LEVETIRACETAM 1000 MG: 10 INJECTION INTRAVENOUS at 10:12

## 2018-12-28 RX ADMIN — POTASSIUM & SODIUM PHOSPHATES POWDER PACK 280-160-250 MG 2 PACKET: 280-160-250 PACK at 08:12

## 2018-12-28 NOTE — PROGRESS NOTES
[facility]  Neurosurgery  Progress Note    Subjective:     History of Present Illness: 64 yo M with PMH of CAD, CABG in 2006, DM, HTN, HLD on ASA presents to the ED for AMS. Patient fell 4 days ago with about 2 minutes of LOC. Family is unsure of what caused the fall. Full workup done including a CTH at [place] that was negative. Patient went home and stopped taking his ASA. This morning, his family noticed he was more confused. He went to [place] and CTH was done that revealed a 1.7cm left frontalpariental SDH with a 5 mm right sided midline shift and a left SAH. [place] [facility] [place] was obtained per family as patient unable to give history 2/2 confusion with expressive and receptive aphasia.    Post-Op Info:  Procedure(s) (LRB):  CRANIOTOMY, FOR SUBDURAL HEMATOMA EVACUATION (Left)   8 Days Post-Op         Medications:  Continuous Infusions:    Scheduled Meds:   ceFEPime (MAXIPIME) IVPB  1 g Intravenous Q8H    chlorhexidine  15 mL Mouth/Throat BID    famotidine  20 mg Per NG tube BID    heparin (porcine)  5,000 Units Subcutaneous Q8H    lacosamide (VIMPAT) in saline IVPB  200 mg Intravenous Q12H    levetiracetam IVPB  1,000 mg Intravenous Q12H    lorazepam  2 mg Intravenous Once    polyethylene glycol  17 g Per NG tube Daily    pravastatin  40 mg Per NG tube QHS    senna-docusate 8.6-50 mg  1 tablet Per NG tube Daily    sodium chloride 0.9%  3 mL Intravenous Q8H    vancomycin (VANCOCIN) IVPB  1,500 mg Intravenous Q24H     PRN Meds:sodium chloride, acetaminophen, diclofenac sodium, HYDROcodone-acetaminophen, magnesium oxide, magnesium oxide, magnesium sulfate IVPB, magnesium sulfate IVPB, morphine, ondansetron, potassium chloride 10%, potassium chloride 10%, potassium chloride 10%, potassium, sodium phosphates, potassium, sodium phosphates, potassium, sodium phosphates       Objective:     Weight: 85.7 kg (189 lb)  Body mass index is 27.12 kg/m².  Vital Signs  "(Most Recent):  Temp: (!) 100.4 °F (38 °C) (12/28/18 0702)  Pulse: (!) 118 (12/28/18 0733)  Resp: (!) 35 (12/28/18 0733)  BP: 126/75 (12/28/18 0702)  SpO2: 100 % (12/28/18 0733) Vital Signs (24h Range):  Temp:  [99.1 °F (37.3 °C)-100.4 °F (38 °C)] 100.4 °F (38 °C)  Pulse:  [108-127] 118  Resp:  [18-36] 35  SpO2:  [99 %-100 %] 100 %  BP: (116-159)/() 126/75  Arterial Line BP: ()/() 119/76                 Vent Mode: A/C  Oxygen Concentration (%):  [50-54] 51  Resp Rate Total:  [18 br/min-28 br/min] 18 br/min  Vt Set:  [500 mL] 500 mL  PEEP/CPAP:  [8 cmH20] 8 cmH20  Mean Airway Pressure:  [11 cmH20-15 cmH20] 11 cmH20         NG/OG Tube 12/23/18 1500 Center mouth (Active)   Placement Check placement verified by aspirate characteristics;physician notified 12/24/2018  7:02 AM   Securement taped to commercial device 12/24/2018  7:02 AM   Clamp Status/Tolerance no abdominal discomfort;no abdominal distention;no emesis;no nausea;no residual;no restlessness;clamped 12/24/2018  7:02 AM   Suction Setting/Drainage Method dependent drainage 12/24/2018  7:02 AM   Insertion Site Appearance no redness, warmth, tenderness, skin breakdown, drainage 12/24/2018  7:02 AM   Drainage None 12/24/2018  7:02 AM   Flush/Irrigation flushed w/;water;no resistance met 12/24/2018  7:02 AM   Intake (mL) 30 mL 12/24/2018  9:00 AM   Residual Amount (ml) 0 ml 12/24/2018  7:02 AM            Urethral Catheter 12/20/18 6040 Non-latex;Straight-tip 16 Fr. (Active)   Site Assessment Clean;Intact 12/24/2018  7:02 AM   Collection Container Urimeter 12/24/2018  7:02 AM   Securement Method secured to top of thigh w/ adhesive device 12/24/2018  7:02 AM   Catheter Care Performed yes 12/24/2018  7:02 AM   Reason for Continuing Urinary Catheterization Urinary retention 12/24/2018  7:02 AM   CAUTI Prevention Bundle StatLock in place w 1" slack;Intact seal between catheter & drainage tubing;Drainage bag off the floor;Green sheeting clip in use;No " dependent loops or kinks;Drainage bag not overfilled (<2/3 full);Drainage bag below bladder 12/24/2018  7:02 AM   Output (mL) 10 mL 12/24/2018 11:00 AM       Neurosurgery Physical Exam     E1VTM5  Intubated., sedated. ketamine. Levo.   Does not open eyes  PERRL. + cough/gag.   WithDraws to stimx4      Significant Labs:  Recent Labs   Lab 12/26/18  0955 12/27/18 0243 12/28/18  0230   GLU  --  117* 137*    136 139   K  --  4.2 4.4   CL  --  108 108   CO2  --  23 24   BUN  --  32* 41*   CREATININE  --  0.7 0.7   CALCIUM  --  8.1* 8.1*   MG  --  1.7 1.8     Recent Labs   Lab 12/27/18  0243 12/28/18  0230   WBC 13.63* 14.76*   HGB 10.0* 10.4*   HCT 30.7* 32.0*    230     No results for input(s): LABPT, INR, APTT in the last 48 hours.  Microbiology Results (last 7 days)     Procedure Component Value Units Date/Time    Blood culture [[id number]] Collected:  12/24/18 0005    Order Status:  Completed Specimen:  Blood from Peripheral, Hand, Left Updated:  12/28/18 0612     Blood Culture, Routine No Growth to date     Blood Culture, Routine No Growth to date     Blood Culture, Routine No Growth to date     Blood Culture, Routine No Growth to date     Blood Culture, Routine No Growth to date    Blood culture [[id number]] Collected:  12/24/18 0006    Order Status:  Completed Specimen:  Blood from Line, Arterial, Right Updated:  12/28/18 0612     Blood Culture, Routine No Growth to date     Blood Culture, Routine No Growth to date     Blood Culture, Routine No Growth to date     Blood Culture, Routine No Growth to date     Blood Culture, Routine No Growth to date    Culture, Respiratory with Gram Stain [[id number]] Collected:  12/24/18 0446    Order Status:  Completed Specimen:  Respiratory from Endotracheal Aspirate Updated:  12/26/18 1007     Respiratory Culture Normal respiratory donnell     Respiratory Culture No S aureus or Pseudomonas isolated.     Gram Stain (Respiratory) <10 epithelial cells per low power field.      Gram Stain (Respiratory) Moderate WBC's     Gram Stain (Respiratory) No organisms seen    Urine culture [[id number]]     Order Status:  Canceled Specimen:  Urine, Catheterized         Recent Lab Results       12/28/18  0351   12/28/18  0230        Immature Granulocytes   1.3     Immature Grans (Abs)   0.19  Comment:  Mild elevation in immature granulocytes is non specific and   can be seen in a variety of conditions including stress response,   acute inflammation, trauma and pregnancy. Correlation with other   laboratory and clinical findings is essential.       Albumin   1.6     Alkaline Phosphatase   136     Allens Test N/A       ALT   6     Anion Gap   7     AST   15     Baso #   0.05     Basophil%   0.3     Total Bilirubin   0.6  Comment:  For infants and newborns, interpretation of results should be based  on gestational age, weight and in agreement with clinical  observations.  Premature Infant recommended reference ranges:  Up to 24 hours.............<8.0 mg/dL  Up to 48 hours............<12.0 mg/dL  3-5 days..................<15.0 mg/dL  6-29 days.................<15.0 mg/dL       Site Kenny/UAC       BUN, Bld   41     Calcium   8.1     Chloride   108     CO2   24     Creatinine   0.7     Dels Adult Vent       Differential Method   Automated     eGFR if    >60.0     eGFR if non    >60.0  Comment:  Calculation used to obtain the estimated glomerular filtration  rate (eGFR) is the CKD-EPI equation.        Eos #   0.0     Eosinophil%   0.1     FiO2 50       Glucose   137     Gran # (ANC)   11.7     Gran%   79.6     Hematocrit   32.0     Hemoglobin   10.4     Lymph #   1.3     Lymph%   8.7     Magnesium   1.8     MCH   31.3     MCHC   32.5     MCV   96     Min Vol 11.7       Mode AC/PRVC       Mono #   1.5     Mono%   10.0     MPV   10.2     nRBC   0     PEEP 8       Phosphorus   1.7     Platelets   230     POC BE 2       POC HCO3 25.3       POC PCO2 31.1       POC PH 7.519        POC PO2 156       POC SATURATED O2 100       POC TCO2 26       Potassium   4.4     Total Protein   5.1     Rate 16       RBC   3.32     RDW   14.9     Sample ARTERIAL       Sodium   139     Sp02 100       Vt 500       WBC   14.76           Significant Diagnostics:  I have reviewed all pertinent imaging results/findings within the past 24 hours.    Review of Systems        Assessment/Plan:     * SDH (subdural hematoma)    64 yo male with L aSDH now s/p craniotomy for evacuation (12/20).    Pt with persistent poor clinical exam. Pt with continued PLEDs on cEEG.     --Continue care per primary team.  --Continue cEEG and antiepileptic regimen per epilepsy.  --Will obtain interval head CT once cEEG is discontinued.  --Recommend consideration for early tracheostomy and gastrostomy placement.  --We will continue to monitor closely, please contact us with any questions or concerns.           Murray Mendes MD  Neurosurgery  [facility]

## 2018-12-28 NOTE — ASSESSMENT & PLAN NOTE
- In setting of recent SDH and evacuation    EE/26>: diffuse slowing, some left sided periodic discharges, at times evolving into focal seizures  >: Similar to prior day, diffuse slowing, no spread of seizure activity    Recommendations:  - Continue Vimpat 200 mg BID, Keppra 1000 mg BID  - Ketamine discontinued   - Continue vEEG  - Consider Clobazam if additional AED needed    Plan of care discussed with [facility] team, family at bedside.

## 2018-12-28 NOTE — PROGRESS NOTES
[facility]  Neurology-Epilepsy  Progress Note    Patient Name: Peewee Nunes  MRN: [id number]  Admission Date: 2018  Hospital Length of Stay: 8 days  Code Status: Full Code   Attending Provider: Kenneth Decker MD  Primary Care Physician: Jacob Piedra MD   Principal Problem:SDH (subdural hematoma)    Subjective:     Hospital Course:   EE/23>: left hemisphere suppression and asymmetry in the background, pseudoperiodic left parietotemporal maximum sharp waves, several electrographic seizures emanating from the left parasaggital region  >: diffuse slowing of background, left hemisphere suppression and asymmetry. Periodic left lateralized epileptiform discharges.  : diffuse slowing, left sided periodic complexes, focal L seizures  : diffuse slowing, left sided discharges which at times evolve, but no spread to R    Interval History: No acute events overnight. EEG stable, no evidence of spread of epileptiform activity to R.    Current Facility-Administered Medications   Medication Dose Route Frequency Provider Last Rate Last Dose    acetaminophen tablet 650 mg  650 mg Per NG tube Q6H PRN Altagracia Miinea, NP   650 mg at 18 0911    chlorhexidine 0.12 % solution 15 mL  15 mL Mouth/Throat BID Don Vincent MD   15 mL at 18 09    famotidine tablet 20 mg  20 mg Per NG tube BID Don Vincent MD   20 mg at 18 0911    heparin (porcine) injection 5,000 Units  5,000 Units Subcutaneous Q8H Murray Mendes MD   5,000 Units at 18 0625    lacosamide (VIMPAT) 200 mg in sodium chloride 0.9% 100 mL IVPB  200 mg Intravenous Q12H Marie Maher  mL/hr at 18 0911 200 mg at 18 0911    levETIRAcetam in NaCl (iso-os) IVPB 500 mg  500 mg Intravenous Q12H Yas Eddy MD        magnesium oxide tablet 800 mg  800 mg Per NG tube PRN Altagracia Miinea, NP   800 mg at 18    magnesium oxide tablet 800 mg  800 mg  Per NG tube PRN Altagraciadayami Gomez, NP        ondansetron injection 4 mg  4 mg Intravenous Q12H PRN Christine Lozoya MD        polyethylene glycol packet 17 g  17 g Per NG tube Daily Altagracia Jason, NP   17 g at 12/28/18 0911    potassium chloride 10% oral solution 40 mEq  40 mEq Per NG tube PRN Altagracia Miinea, NP   40 mEq at 12/24/18 0501    potassium chloride 10% oral solution 40 mEq  40 mEq Per NG tube PRN Altagracia Jason, NP        potassium chloride 10% oral solution 60 mEq  60 mEq Per NG tube PRN Altagracia Jason, NP        potassium, sodium phosphates 280-160-250 mg packet 2 packet  2 packet Per NG tube PRN Altagracia Miinea, NP   2 packet at 12/27/18 1600    potassium, sodium phosphates 280-160-250 mg packet 2 packet  2 packet Per NG tube PRN Altagracia Miinea, NP   2 packet at 12/28/18 0235    potassium, sodium phosphates 280-160-250 mg packet 2 packet  2 packet Per NG tube PRN Altagraciadayami Gomez, NP        pravastatin tablet 40 mg  40 mg Per NG tube QHS Altagracia Jason, NP   40 mg at 12/27/18 2130    senna-docusate 8.6-50 mg per tablet 1 tablet  1 tablet Per NG tube Daily Altagracia Jason, NP   1 tablet at 12/28/18 0911    sodium chloride 0.9% flush 3 mL  3 mL Intravenous Q8H Don Vincent MD   3 mL at 12/28/18 0625     Continuous Infusions:    Review of Systems   Unable to perform ROS: Intubated     Objective:     Vital Signs (Most Recent):  Temp: 99.6 °F (37.6 °C) (12/28/18 1102)  Pulse: 110 (12/28/18 1202)  Resp: (!) 26 (12/28/18 1202)  BP: (!) 139/91 (12/28/18 1202)  SpO2: 100 % (12/28/18 1202) Vital Signs (24h Range):  Temp:  [99.4 °F (37.4 °C)-100.4 °F (38 °C)] 99.6 °F (37.6 °C)  Pulse:  [107-127] 110  Resp:  [18-36] 26  SpO2:  [97 %-100 %] 100 %  BP: (116-159)/() 139/91  Arterial Line BP: ()/() 100/92     Weight: 85.7 kg (189 lb)  Body mass index is 27.12 kg/m².    Physical Exam   Constitutional: He appears well-developed and well-nourished. He is intubated.   Eyes: Pupils are equal, round, and reactive  to light.   Cardiovascular: Intact distal pulses.   Pulmonary/Chest: No stridor. He is intubated.   Musculoskeletal: He exhibits no deformity.   Skin: Skin is warm and dry. He is not diaphoretic.   Psychiatric:   Unable to assess       NEUROLOGICAL EXAMINATION:     CRANIAL NERVES     CN III, IV, VI   Pupils are equal, round, and reactive to light.       Some spontaneous movement of extremities in response to stimulation, does not follow commands  CN:   Corneal intact OU   + gag, weak cough  Face symmetric   Tongue midline   Some spontaneous movement of extremities in response to stimulation  DTR 2+ symmetric     Exam findings to suggest seizures:  Myoclonus - no   eye twitching - no   Nystagmus - no   gaze deviation - no   waxy rigidity - no        Significant Labs: All pertinent lab results from the past 24 hours have been reviewed.    Significant Studies: I have reviewed all pertinent imaging results/findings within the past 24 hours.    Assessment and Plan:     * SDH (subdural hematoma)    - s/p L craniotomy for subdural hematoma evacuation on 18 by NSGY (Dr. Hull)     Status epilepticus    - In setting of recent SDH and evacuation    EE/26>: diffuse slowing, some left sided periodic discharges, at times evolving into focal seizures  >: Similar to prior day, diffuse slowing, no spread of seizure activity    Recommendations:  - Continue Vimpat 200 mg BID, Keppra 1000 mg BID  - Ketamine discontinued   - Continue vEEG  - Consider Clobazam if additional AED needed    Plan of care discussed with [facility] team, family at bedside.     New onset atrial fibrillation    - vitals reviewed, tachycardic, management per [facility]     Acute respiratory failure    - daily CXR, ABG, management per [facility]         VTE Risk Mitigation (From admission, onward)        Ordered     heparin (porcine) injection 5,000 Units  Every 8 hours      18 1405     Reason for No Pharmacological VTE Prophylaxis  Once      18  1640     IP VTE HIGH RISK PATIENT  Once      12/20/18 1640     Place sequential compression device  Until discontinued      12/20/18 1640          Roselyn Mead PA-C  Neurology-Epilepsy  [facility]  Staff: Dr. Rendon

## 2018-12-28 NOTE — ASSESSMENT & PLAN NOTE
Hypoxic, aspiration, bronch with mucous plugging in L lung  Hypoxia improving today    Maintain today in setting of mental status  Follow CXR and ABG daily

## 2018-12-28 NOTE — PHYSICIAN QUERY
PT Name: Peewee Nunes  MR #: [id number]    Physician Query Form - Atrial Fibrillation Specificity     CDS/: Bean Olvera               Contact information: [phone]     This form is a permanent document in the medical record.     Query Date: December 28, 2018    By submitting this query, we are merely seeking further clarification of documentation. Please utilize your independent clinical judgment when addressing the question(s) below.    The medical record contains the following:   Indicators     Supporting Clinical Findings Location in Medical Record   x Atrial Fibrillation New onset atrial fibrillation     Rate acceptable      Progress note 12/26 [facility] ([facility])    EKG results     x Medication Avoiding beta blockers in setting of pressor requirement  Consider amio  Progress note 12/26 [facility] ([facility])    Treatment      Other         Provider, please further specify the Atrial Fibrillation diagnosis.    [  ] Paroxysmal   [  ] Permanent   [ x ] Persistent   [  ] Other (please specify):   [  ] Clinically Undetermined       Please document in your progress notes daily for the duration of treatment until resolved, and include in your discharge summary.

## 2018-12-28 NOTE — HOSPITAL COURSE
EE: left hemisphere suppression and asymmetry in the background, pseudoperiodic left parietotemporal maximum sharp waves, several electrographic seizures emanating from the left parasaggital region  >: diffuse slowing of background, left hemisphere suppression and asymmetry. Periodic left lateralized epileptiform discharges.  : diffuse slowing, left sided periodic complexes, focal L seizures  >: diffuse slowing, left sided discharges which at times evolve, but no spread to R

## 2018-12-28 NOTE — SUBJECTIVE & OBJECTIVE
Medications:  Continuous Infusions:    Scheduled Meds:   ceFEPime (MAXIPIME) IVPB  1 g Intravenous Q8H    chlorhexidine  15 mL Mouth/Throat BID    famotidine  20 mg Per NG tube BID    heparin (porcine)  5,000 Units Subcutaneous Q8H    lacosamide (VIMPAT) in saline IVPB  200 mg Intravenous Q12H    levetiracetam IVPB  1,000 mg Intravenous Q12H    lorazepam  2 mg Intravenous Once    polyethylene glycol  17 g Per NG tube Daily    pravastatin  40 mg Per NG tube QHS    senna-docusate 8.6-50 mg  1 tablet Per NG tube Daily    sodium chloride 0.9%  3 mL Intravenous Q8H    vancomycin (VANCOCIN) IVPB  1,500 mg Intravenous Q24H     PRN Meds:sodium chloride, acetaminophen, diclofenac sodium, HYDROcodone-acetaminophen, magnesium oxide, magnesium oxide, magnesium sulfate IVPB, magnesium sulfate IVPB, morphine, ondansetron, potassium chloride 10%, potassium chloride 10%, potassium chloride 10%, potassium, sodium phosphates, potassium, sodium phosphates, potassium, sodium phosphates       Objective:     Weight: 85.7 kg (189 lb)  Body mass index is 27.12 kg/m².  Vital Signs (Most Recent):  Temp: (!) 100.4 °F (38 °C) (12/28/18 0702)  Pulse: (!) 118 (12/28/18 0733)  Resp: (!) 35 (12/28/18 0733)  BP: 126/75 (12/28/18 0702)  SpO2: 100 % (12/28/18 0733) Vital Signs (24h Range):  Temp:  [99.1 °F (37.3 °C)-100.4 °F (38 °C)] 100.4 °F (38 °C)  Pulse:  [108-127] 118  Resp:  [18-36] 35  SpO2:  [99 %-100 %] 100 %  BP: (116-159)/() 126/75  Arterial Line BP: ()/() 119/76                 Vent Mode: A/C  Oxygen Concentration (%):  [50-54] 51  Resp Rate Total:  [18 br/min-28 br/min] 18 br/min  Vt Set:  [500 mL] 500 mL  PEEP/CPAP:  [8 cmH20] 8 cmH20  Mean Airway Pressure:  [11 cmH20-15 cmH20] 11 cmH20         NG/OG Tube 12/23/18 1500 Center mouth (Active)   Placement Check placement verified by aspirate characteristics;physician notified 12/24/2018  7:02 AM   Securement taped to commercial device 12/24/2018  7:02 AM  "  Clamp Status/Tolerance no abdominal discomfort;no abdominal distention;no emesis;no nausea;no residual;no restlessness;clamped 12/24/2018  7:02 AM   Suction Setting/Drainage Method dependent drainage 12/24/2018  7:02 AM   Insertion Site Appearance no redness, warmth, tenderness, skin breakdown, drainage 12/24/2018  7:02 AM   Drainage None 12/24/2018  7:02 AM   Flush/Irrigation flushed w/;water;no resistance met 12/24/2018  7:02 AM   Intake (mL) 30 mL 12/24/2018  9:00 AM   Residual Amount (ml) 0 ml 12/24/2018  7:02 AM            Urethral Catheter 12/20/18 2350 Non-latex;Straight-tip 16 Fr. (Active)   Site Assessment Clean;Intact 12/24/2018  7:02 AM   Collection Container Urimeter 12/24/2018  7:02 AM   Securement Method secured to top of thigh w/ adhesive device 12/24/2018  7:02 AM   Catheter Care Performed yes 12/24/2018  7:02 AM   Reason for Continuing Urinary Catheterization Urinary retention 12/24/2018  7:02 AM   CAUTI Prevention Bundle StatLock in place w 1" slack;Intact seal between catheter & drainage tubing;Drainage bag off the floor;Green sheeting clip in use;No dependent loops or kinks;Drainage bag not overfilled (<2/3 full);Drainage bag below bladder 12/24/2018  7:02 AM   Output (mL) 10 mL 12/24/2018 11:00 AM       Neurosurgery Physical Exam     [id number]  Intubated., sedated. ketamine. Levo.   Does not open eyes  PERRL. + cough/gag.   WithDraws to stimx4      Significant Labs:  Recent Labs   Lab 12/26/18  0955 12/27/18 0243 12/28/18  0230   GLU  --  117* 137*    136 139   K  --  4.2 4.4   CL  --  108 108   CO2  --  23 24   BUN  --  32* 41*   CREATININE  --  0.7 0.7   CALCIUM  --  8.1* 8.1*   MG  --  1.7 1.8     Recent Labs   Lab 12/27/18  0243 12/28/18  0230   WBC 13.63* 14.76*   HGB 10.0* 10.4*   HCT 30.7* 32.0*    230     No results for input(s): LABPT, INR, APTT in the last 48 hours.  Microbiology Results (last 7 days)     Procedure Component Value Units Date/Time    Blood culture " [[id number]] Collected:  12/24/18 0005    Order Status:  Completed Specimen:  Blood from Peripheral, Hand, Left Updated:  12/28/18 0612     Blood Culture, Routine No Growth to date     Blood Culture, Routine No Growth to date     Blood Culture, Routine No Growth to date     Blood Culture, Routine No Growth to date     Blood Culture, Routine No Growth to date    Blood culture [[id number]] Collected:  12/24/18 0006    Order Status:  Completed Specimen:  Blood from Line, Arterial, Right Updated:  12/28/18 0612     Blood Culture, Routine No Growth to date     Blood Culture, Routine No Growth to date     Blood Culture, Routine No Growth to date     Blood Culture, Routine No Growth to date     Blood Culture, Routine No Growth to date    Culture, Respiratory with Gram Stain [[id number]] Collected:  12/24/18 0446    Order Status:  Completed Specimen:  Respiratory from Endotracheal Aspirate Updated:  12/26/18 1007     Respiratory Culture Normal respiratory donnell     Respiratory Culture No S aureus or Pseudomonas isolated.     Gram Stain (Respiratory) <10 epithelial cells per low power field.     Gram Stain (Respiratory) Moderate WBC's     Gram Stain (Respiratory) No organisms seen    Urine culture [[id number]]     Order Status:  Canceled Specimen:  Urine, Catheterized         Recent Lab Results       12/28/18  0351   12/28/18  0230        Immature Granulocytes   1.3     Immature Grans (Abs)   0.19  Comment:  Mild elevation in immature granulocytes is non specific and   can be seen in a variety of conditions including stress response,   acute inflammation, trauma and pregnancy. Correlation with other   laboratory and clinical findings is essential.       Albumin   1.6     Alkaline Phosphatase   136     Allens Test N/A       ALT   6     Anion Gap   7     AST   15     Baso #   0.05     Basophil%   0.3     Total Bilirubin   0.6  Comment:  For infants and newborns, interpretation of results should be based  on gestational age, weight  and in agreement with clinical  observations.  Premature Infant recommended reference ranges:  Up to 24 hours.............<8.0 mg/dL  Up to 48 hours............<12.0 mg/dL  3-5 days..................<15.0 mg/dL  6-29 days.................<15.0 mg/dL       Site Kenny/UAC       BUN, Bld   41     Calcium   8.1     Chloride   108     CO2   24     Creatinine   0.7     DelSys Adult Vent       Differential Method   Automated     eGFR if    >60.0     eGFR if non    >60.0  Comment:  Calculation used to obtain the estimated glomerular filtration  rate (eGFR) is the CKD-EPI equation.        Eos #   0.0     Eosinophil%   0.1     FiO2 50       Glucose   137     Gran # (ANC)   11.7     Gran%   79.6     Hematocrit   32.0     Hemoglobin   10.4     Lymph #   1.3     Lymph%   8.7     Magnesium   1.8     MCH   31.3     MCHC   32.5     MCV   96     Min Vol 11.7       Mode AC/PRVC       Mono #   1.5     Mono%   10.0     MPV   10.2     nRBC   0     PEEP 8       Phosphorus   1.7     Platelets   230     POC BE 2       POC HCO3 25.3       POC PCO2 31.1       POC PH 7.519       POC PO2 156       POC SATURATED O2 100       POC TCO2 26       Potassium   4.4     Total Protein   5.1     Rate 16       RBC   3.32     RDW   14.9     Sample ARTERIAL       Sodium   139     Sp02 100       Vt 500       WBC   14.76           Significant Diagnostics:  I have reviewed all pertinent imaging results/findings within the past 24 hours.    Review of Systems

## 2018-12-28 NOTE — SUBJECTIVE & OBJECTIVE
Interval History:      No further seizures, mental status remains poor off ketamine  Remains off pressors, low grade temp, wbcs improving  Bun high, uop adequate    Objective:     Vitals:  Temp: 99.6 °F (37.6 °C)  Pulse: (!) 112  Rhythm: atrial rhythm  BP: (!) 139/91  MAP (mmHg): 108  Resp: (!) 29  SpO2: 99 %  Oxygen Concentration (%): 40  O2 Device (Oxygen Therapy): ventilator  Vent Mode: A/C  Set Rate: 16 bmp  Vt Set: 440 mL  PEEP/CPAP: 8 cmH20  Peak Airway Pressure: 21 cmH2O  Mean Airway Pressure: 13 cmH20  Plateau Pressure: 0 cmH20    Temp  Min: 99.4 °F (37.4 °C)  Max: 100.4 °F (38 °C)  Pulse  Min: 107  Max: 127  BP  Min: 116/86  Max: 159/96  MAP (mmHg)  Min: 94  Max: 119  Resp  Min: 20  Max: 36  SpO2  Min: 97 %  Max: 100 %  Oxygen Concentration (%)  Min: 40  Max: 100    12/27 0701 - 12/28 0700  In: 2630 [I.V.:230]  Out: 772 [Urine:772]   Unmeasured Output  Stool Occurrence: 0       Physical Exam  Vital signs, lab studies, and imaging reviewed by me  Obtunded, pupils reactive, no commands, withdraws to pain, moves all limbs spontaneously intermittently   Warm and well perfused, regular rate and rhythm, no murmurs  No dependent edema  Breathing comfortably, breath sounds clear  Belly soft, nontender, no hepatosplenomegaly    Medications:  Continuous Scheduled  chlorhexidine 15 mL BID   famotidine 20 mg BID   heparin (porcine) 5,000 Units Q8H   lacosamide (VIMPAT) in saline IVPB 200 mg Q12H   levetiracetam IVPB 500 mg Q12H   polyethylene glycol 17 g Daily   pravastatin 40 mg QHS   senna-docusate 8.6-50 mg 1 tablet Daily   sodium chloride 0.9% 3 mL Q8H   PRN  acetaminophen 650 mg Q6H PRN   magnesium oxide 800 mg PRN   magnesium oxide 800 mg PRN   ondansetron 4 mg Q12H PRN   potassium chloride 10% 40 mEq PRN   potassium chloride 10% 40 mEq PRN   potassium chloride 10% 60 mEq PRN   potassium, sodium phosphates 2 packet PRN   potassium, sodium phosphates 2 packet PRN   potassium, sodium phosphates 2 packet PRN      Diet  No diet orders on file  No diet orders on file

## 2018-12-28 NOTE — PHYSICIAN QUERY
PT Name: Peewee Nunes  MR #: [id number]    Physician Query Form - Nutrition Clarification     CDS/: Bean Olvera               Contact information: [phone]    This form is a permanent document in the medical record.     Query Date: December 28, 2018    By submitting this query, we are merely seeking further clarification of documentation.. Please utilize your independent clinical judgment when addressing the question(s) below.    The Medical record contains the following:   Indicators  Supporting Clinical Findings Location in Medical Record   x % of Estimated Energy Intake over a time frame from p.o., TF, or TPN . Recommend increasing TF goal rate to best meet pt's needs.  Impact Peptide @ 55mL/hr.   - Provides 1980kcals, 124g protein and 1016mL free water.   - Hold for residuals >500mL.     Related to (etiology):  Decreased intake, appetite   Nutrition Note 12/27    Weight Status over a time frame     x Subcutaneous Fat and/or Muscle Loss Body Fat Depletion: severe depletion of orbitals, triceps and thoracic and lumbar region   Muscle Mass Depletion: severe depletion of temples, clavicle region, scapular region, interosseous muscle and lower extremities   Weight Loss: 44% x 1 year  Nutrition Note 12/27    Fluid Accumulation or Edema      Reduced  Strength     x Wt / BMI / Usual Body Weight BMI (Calculated): 27.2  BMI Grade: 25 - 29.9 - overweight  Weight Loss: unintentional  Usual Body Weight (UBW), kg: (!) 153.1 kg  % Usual Body Weight: 56.11  % Weight Change From Usual Weight: -44 %     Nutrition Note 12/27    Delayed Wound Healing / Failure to Thrive     x Acute or Chronic Illness Relevant Medical History: CAD, DM, HTN, HLD  Interdisciplinary Rounds: attended  General Information Comments: Pt intubated 12/23 with respiratory distress     Nutrition Note 12/27    Medication      Treatment     x Other Pt continues to have severe malnutrition in the context of chronic illness    Pt previous  diagnosed with severe malnutrition on 12/21- see note for details. Exam remains unchanged.     Energy Calories Required: not meeting needs  Protein Required: meeting needs    Severe malnutrition     Malnutrition in the context of Chronic Illness/Injury             Nutrition Note 12/27     AND / ASPEN Clinical Characteristics (October 2011)  A minimum of two characteristics is recommended for diagnosing either moderate or severe malnutrition   Mild Malnutrition Moderate Malnutrition Severe Malnutrition   Energy Intake from p.o., TF or TPN. < 75% intake of estimated energy needs for less than 7 days < 75% intake of estimated energy needs for greater than 7 days < 50% intake of estimated energy needs for > 5 days   Weight Loss 1-2% in 1 month  5% in 3 months  7.5% in 6 months  10% in 1 year 1-2 % in 1 week  5% in 1 month  7.5% in 3 months  10% in 6 months  20% in 1 year > 2% in 1 week  > 5% in 1 month  > 7.5% in 3 months  > 10% in 6 months  > 20% in 1 year   Physical Findings     None *Mild subcutaneous fat and/or muscle loss  *Mild fluid accumulation  *Stage II decubitus  *Surgical wound or non-healing wound *Mod/severe subcutaneous fat and/or muscle loss  *Mod/severe fluid accumulation  *Stage III or IV decubitus  *Non-healing surgical wound     Provider, please specify diagnosis or diagnoses associated with above clinical findings.    [ x ] Severe Protein-Calorie Malnutrition   [  ] Other Nutritional Diagnosis (please specify):    [  ] Other:    [  ] Clinically Undetermined       Please document in your progress notes daily for the duration of treatment until resolved and include in your discharge summary.

## 2018-12-28 NOTE — SUBJECTIVE & OBJECTIVE
Interval History: No acute events overnight. EEG stable, no evidence of spread of epileptiform activity to R.    Current Facility-Administered Medications   Medication Dose Route Frequency Provider Last Rate Last Dose    acetaminophen tablet 650 mg  650 mg Per NG tube Q6H PRN Altagracia Mijovita, NP   650 mg at 12/28/18 0911    chlorhexidine 0.12 % solution 15 mL  15 mL Mouth/Throat BID Don Vincent MD   15 mL at 12/28/18 0911    famotidine tablet 20 mg  20 mg Per NG tube BID Don Vincent MD   20 mg at 12/28/18 0911    heparin (porcine) injection 5,000 Units  5,000 Units Subcutaneous Q8H Murray Mendes MD   5,000 Units at 12/28/18 0625    lacosamide (VIMPAT) 200 mg in sodium chloride 0.9% 100 mL IVPB  200 mg Intravenous Q12H Marie Maher  mL/hr at 12/28/18 0911 200 mg at 12/28/18 0911    levETIRAcetam in NaCl (iso-os) IVPB 500 mg  500 mg Intravenous Q12H Yas Eddy MD        magnesium oxide tablet 800 mg  800 mg Per NG tube PRN Altagracia Miinea, NP   800 mg at 12/27/18 2132    magnesium oxide tablet 800 mg  800 mg Per NG tube PRN Altagracia Jason, NP        ondansetron injection 4 mg  4 mg Intravenous Q12H PRN Christine Lozoya MD        polyethylene glycol packet 17 g  17 g Per NG tube Daily Altagracia Mijovita, NP   17 g at 12/28/18 0911    potassium chloride 10% oral solution 40 mEq  40 mEq Per NG tube PRN Altagracia Miinea, NP   40 mEq at 12/24/18 0501    potassium chloride 10% oral solution 40 mEq  40 mEq Per NG tube PRN Altagracia Miinea, NP        potassium chloride 10% oral solution 60 mEq  60 mEq Per NG tube PRN Altagracia Miinea, NP        potassium, sodium phosphates 280-160-250 mg packet 2 packet  2 packet Per NG tube PRN Altagracia Miinea, NP   2 packet at 12/27/18 1600    potassium, sodium phosphates 280-160-250 mg packet 2 packet  2 packet Per NG tube PRN Altagracia Miinea, NP   2 packet at 12/28/18 0235    potassium, sodium phosphates 280-160-250 mg packet 2 packet  2 packet Per NG tube PRN Altagracia  Jason, NP        pravastatin tablet 40 mg  40 mg Per NG tube QHS Altagracia Gomez NP   40 mg at 12/27/18 2130    senna-docusate 8.6-50 mg per tablet 1 tablet  1 tablet Per NG tube Daily Altagracia Gomez NP   1 tablet at 12/28/18 0911    sodium chloride 0.9% flush 3 mL  3 mL Intravenous Q8H Don Vincent MD   3 mL at 12/28/18 0625     Continuous Infusions:    Review of Systems   Unable to perform ROS: Intubated     Objective:     Vital Signs (Most Recent):  Temp: 99.6 °F (37.6 °C) (12/28/18 1102)  Pulse: 110 (12/28/18 1202)  Resp: (!) 26 (12/28/18 1202)  BP: (!) 139/91 (12/28/18 1202)  SpO2: 100 % (12/28/18 1202) Vital Signs (24h Range):  Temp:  [99.4 °F (37.4 °C)-100.4 °F (38 °C)] 99.6 °F (37.6 °C)  Pulse:  [107-127] 110  Resp:  [18-36] 26  SpO2:  [97 %-100 %] 100 %  BP: (116-159)/() 139/91  Arterial Line BP: ()/() 100/92     Weight: 85.7 kg (189 lb)  Body mass index is 27.12 kg/m².    Physical Exam   Constitutional: He appears well-developed and well-nourished. He is intubated.   Eyes: Pupils are equal, round, and reactive to light.   Cardiovascular: Intact distal pulses.   Pulmonary/Chest: No stridor. He is intubated.   Musculoskeletal: He exhibits no deformity.   Skin: Skin is warm and dry. He is not diaphoretic.   Psychiatric:   Unable to assess       NEUROLOGICAL EXAMINATION:     CRANIAL NERVES     CN III, IV, VI   Pupils are equal, round, and reactive to light.       Some spontaneous movement of extremities in response to stimulation, does not follow commands  CN:   Corneal intact OU   + gag, weak cough  Face symmetric   Tongue midline   Some spontaneous movement of extremities in response to stimulation  DTR 2+ symmetric     Exam findings to suggest seizures:  Myoclonus - no   eye twitching - no   Nystagmus - no   gaze deviation - no   waxy rigidity - no        Significant Labs: All pertinent lab results from the past 24 hours have been reviewed.    Significant Studies: I have reviewed  all pertinent imaging results/findings within the past 24 hours.

## 2018-12-28 NOTE — ASSESSMENT & PLAN NOTE
64 yo male with L aSDH now s/p craniotomy for evacuation (12/20).    Pt with persistent poor clinical exam. Pt with continued PLEDs on cEEG.     --Continue care per primary team.  --Continue cEEG and antiepileptic regimen per epilepsy.  --Will obtain interval head CT once cEEG is discontinued.  --Recommend consideration for early tracheostomy and gastrostomy placement.  --We will continue to monitor closely, please contact us with any questions or concerns.

## 2018-12-28 NOTE — PLAN OF CARE
Problem: Adult Inpatient Plan of Care  Goal: Plan of Care Review  Outcome: Ongoing (interventions implemented as appropriate)  POC reviewed with pt and son at 2200. Pt displayed no evidence of learning due to being intubated and cognitively impaired. Son verbalized understanding. Questions and concerns addressed. No acute events today. Pt progressing toward goals. Will continue to monitor. See flowsheets for full assessment and VS info.

## 2018-12-28 NOTE — PHYSICIAN QUERY
PT Name: Peewee Nunes  MR #: [id number]     PHYSICIAN QUERY -  ELECTROLYTE CLARIFICATION      CDS/: Bean Olvera               Contact information: [phone]  This form is a permanent document in the medical record.     Query Date: December 28, 2018    By submitting this query, we are merely seeking further clarification of documentation to reflect the severity of illness of your patient. Please utilize your independent clinical judgment when addressing the question(s) below.    The Medical record reflects the following:     Indicators   Supporting Clinical Findings Location in Medical Record   x Lab Value(s) Potassium Level 4.8, 3.0, 4.4    Magnesium Level 1.9, 1.4, 1.8   Labs 12/20, 12/22, 12/28    Labs 12/21, 12/22, 12/28   x Treatment                                 Medication Magnesium Oxide 800 Mg    Magnesium Sulfate 2 G IV PB    Potassium Chloride 10% Oral solution 40 Meq   Mar 12/26, 12/27    Mar 12/23    Mar 12/24   x Other   Eligible for one time dose of 40 mg IV potassium     Nursing note 12/23     Provider, please specify the diagnosis or diagnoses that correspond(s) to the above indicators. Perry all that apply:    [  x ] Hypokalemia   [ x  ] Hypomagnesemia   [   ] Other electrolyte disturbance (please specify): _______   [   ]  Clinically Undetermined       Please document in your progress notes daily for the duration of treatment until resolved, and include in your discharge summary.

## 2018-12-28 NOTE — PROGRESS NOTES
[facility]  Neurocritical Care  Progress Note    Admit Date: 12/20/2018  Service Date: 12/28/2018  Length of Stay: 8    Subjective:     Chief Complaint: SDH (subdural hematoma)    History of Present Illness: 63 year old male h/o CABG on ASA 81 mg (last dose 72 hours PTA) presenting as transfer from OSH for EDH with SDH. Patient reportedly had a mechanical fall from standing on Sunday and went to ED where CTH was negative for any acute intracranial pathology. Overnight, patient developed confusion and presented to OSH ED where CTH was consistent with EDH with SDH. Patient has progressively worsened throughout the day developing intermittent global aphasia. NSGY consulted in [facility] and stat CTH has been ordered. NCC consulted for admission and medical management. Patient on weaning off of cardene at time of evaluation with sbp of 120. Reported presenting sbp in 150s.    Hospital Course: 12/22: no major events overnight. Weaned off of cardene. This morning AOx3. Drain removed by NSGY at 11 AM, and planned to step down to NSGY.  AMS noted around 1PM, patient aphasic and not following commands, STAT CTH unremarkable.   12/26: wean ketamine, follow EEG, send urine studuies-Na 133, NS bolus, advance TF  12/27: off ketamine, d/c levo, continue EEG, and AEDs, 500 NS bolus, increase enteral water flushes, continue abx for another day    Interval History:      No further seizures, mental status remains poor off ketamine  Remains off pressors, low grade temp, wbcs improving  Bun high, uop adequate    Objective:     Vitals:  Temp: 99.6 °F (37.6 °C)  Pulse: (!) 112  Rhythm: atrial rhythm  BP: (!) 139/91  MAP (mmHg): 108  Resp: (!) 29  SpO2: 99 %  Oxygen Concentration (%): 40  O2 Device (Oxygen Therapy): ventilator  Vent Mode: A/C  Set Rate: 16 bmp  Vt Set: 440 mL  PEEP/CPAP: 8 cmH20  Peak Airway Pressure: 21 cmH2O  Mean Airway Pressure: 13 cmH20  Plateau Pressure: 0 cmH20    Temp  Min: 99.4 °F (37.4 °C)   Max: 100.4 °F (38 °C)  Pulse  Min: 107  Max: 127  BP  Min: 116/86  Max: 159/96  MAP (mmHg)  Min: 94  Max: 119  Resp  Min: 20  Max: 36  SpO2  Min: 97 %  Max: 100 %  Oxygen Concentration (%)  Min: 40  Max: 100    12/27 0701 - 12/28 0700  In: 2630 [I.V.:230]  Out: 772 [Urine:772]   Unmeasured Output  Stool Occurrence: 0       Physical Exam  Vital signs, lab studies, and imaging reviewed by me  Obtunded, pupils reactive, no commands, withdraws to pain, moves all limbs spontaneously intermittently   Warm and well perfused, regular rate and rhythm, no murmurs  No dependent edema  Breathing comfortably, breath sounds clear  Belly soft, nontender, no hepatosplenomegaly    Medications:  Continuous Scheduled  chlorhexidine 15 mL BID   famotidine 20 mg BID   heparin (porcine) 5,000 Units Q8H   lacosamide (VIMPAT) in saline IVPB 200 mg Q12H   levetiracetam IVPB 500 mg Q12H   polyethylene glycol 17 g Daily   pravastatin 40 mg QHS   senna-docusate 8.6-50 mg 1 tablet Daily   sodium chloride 0.9% 3 mL Q8H   PRN  acetaminophen 650 mg Q6H PRN   magnesium oxide 800 mg PRN   magnesium oxide 800 mg PRN   ondansetron 4 mg Q12H PRN   potassium chloride 10% 40 mEq PRN   potassium chloride 10% 40 mEq PRN   potassium chloride 10% 60 mEq PRN   potassium, sodium phosphates 2 packet PRN   potassium, sodium phosphates 2 packet PRN   potassium, sodium phosphates 2 packet PRN     Diet  No diet orders on file  No diet orders on file        Assessment/Plan:     Neuro   * SDH (subdural hematoma)    Epidural with subdural component s/p evac 12/20  Repeat cth stable  Mri with punctate stroke    nsgy following  Pt/ot/slp when able       Status epilepticus    No further seizures off ketamine,   Mental status remains poor    Reduce keppra to , and lacosamide 200mg BID  continuous EEG  Consider repeat imaging if mental status doesn't improve     Pulmonary   Acute respiratory failure    Hypoxic, aspiration, bronch with mucous plugging in L lung  Hypoxia  improving today    Maintain today in setting of mental status  Follow CXR and ABG daily     Cardiac/Vascular   New onset atrial fibrillation    Rate acceptable    Monitor off ac     Renal/   AYLIN (acute kidney injury)    uop improved  Bun rising    Cont fluid resuscitation  Increase fwf  Follow uop           The patient is being Prophylaxed for:  Venous Thromboembolism with: Mechanical or Chemical  Stress Ulcer with: H2B  Ventilator Pneumonia with: chlorhexidine oral care    Activity Orders          None        Full Code     I spent 30 min of uninterrupted critical care time caring for this critically ill patient exclusive of procedures and teaching.       Kenneth Decker MD  Neurocritical Care  [facility]

## 2018-12-29 LAB
ALBUMIN SERPL BCP-MCNC: 1.6 G/DL
ALLENS TEST: ABNORMAL
ALP SERPL-CCNC: 180 U/L
ALT SERPL W/O P-5'-P-CCNC: 18 U/L
ANION GAP SERPL CALC-SCNC: 5 MMOL/L
AST SERPL-CCNC: 44 U/L
BACTERIA BLD CULT: NORMAL
BACTERIA BLD CULT: NORMAL
BASOPHILS # BLD AUTO: 0.08 K/UL
BASOPHILS NFR BLD: 0.6 %
BILIRUB SERPL-MCNC: 0.6 MG/DL
BUN SERPL-MCNC: 48 MG/DL
CALCIUM SERPL-MCNC: 7.8 MG/DL
CHLORIDE SERPL-SCNC: 109 MMOL/L
CO2 SERPL-SCNC: 25 MMOL/L
CREAT SERPL-MCNC: 0.6 MG/DL
DELSYS: ABNORMAL
DIFFERENTIAL METHOD: ABNORMAL
EOSINOPHIL # BLD AUTO: 0.1 K/UL
EOSINOPHIL NFR BLD: 0.7 %
ERYTHROCYTE [DISTWIDTH] IN BLOOD BY AUTOMATED COUNT: 15.1 %
ERYTHROCYTE [SEDIMENTATION RATE] IN BLOOD BY WESTERGREN METHOD: 16 MM/H
EST. GFR  (AFRICAN AMERICAN): >60 ML/MIN/1.73 M^2
EST. GFR  (NON AFRICAN AMERICAN): >60 ML/MIN/1.73 M^2
FIO2: 40
GLUCOSE SERPL-MCNC: 124 MG/DL
HCO3 UR-SCNC: 22.6 MMOL/L (ref 24–28)
HCT VFR BLD AUTO: 29.9 %
HGB BLD-MCNC: 9.8 G/DL
IMM GRANULOCYTES # BLD AUTO: 0.2 K/UL
IMM GRANULOCYTES NFR BLD AUTO: 1.5 %
LYMPHOCYTES # BLD AUTO: 1.9 K/UL
LYMPHOCYTES NFR BLD: 14.7 %
MAGNESIUM SERPL-MCNC: 1.6 MG/DL
MAGNESIUM SERPL-MCNC: 1.7 MG/DL
MCH RBC QN AUTO: 32 PG
MCHC RBC AUTO-ENTMCNC: 32.8 G/DL
MCV RBC AUTO: 98 FL
MODE: ABNORMAL
MONOCYTES # BLD AUTO: 1.2 K/UL
MONOCYTES NFR BLD: 9.3 %
NEUTROPHILS # BLD AUTO: 9.5 K/UL
NEUTROPHILS NFR BLD: 73.2 %
NRBC BLD-RTO: 0 /100 WBC
PCO2 BLDA: 31.2 MMHG (ref 35–45)
PEEP: 8
PH SMN: 7.47 [PH] (ref 7.35–7.45)
PHOSPHATE SERPL-MCNC: 1.7 MG/DL
PHOSPHATE SERPL-MCNC: 1.7 MG/DL
PLATELET # BLD AUTO: 245 K/UL
PMV BLD AUTO: 10.2 FL
PO2 BLDA: 169 MMHG (ref 80–100)
POC BE: -1 MMOL/L
POC SATURATED O2: 100 % (ref 95–100)
POC TCO2: 24 MMOL/L (ref 23–27)
POCT GLUCOSE: 128 MG/DL (ref 70–110)
POCT GLUCOSE: 136 MG/DL (ref 70–110)
POCT GLUCOSE: 138 MG/DL (ref 70–110)
POCT GLUCOSE: 143 MG/DL (ref 70–110)
POTASSIUM SERPL-SCNC: 4.5 MMOL/L
PROT SERPL-MCNC: 4.9 G/DL
RBC # BLD AUTO: 3.06 M/UL
SAMPLE: ABNORMAL
SITE: ABNORMAL
SODIUM SERPL-SCNC: 139 MMOL/L
VT: 440
WBC # BLD AUTO: 12.92 K/UL

## 2018-12-29 PROCEDURE — 95951 PR EEG MONITORING/VIDEORECORD: CPT | Mod: 26,,, | Performed by: PSYCHIATRY & NEUROLOGY

## 2018-12-29 PROCEDURE — 85025 COMPLETE CBC W/AUTO DIFF WBC: CPT

## 2018-12-29 PROCEDURE — 27200966 HC CLOSED SUCTION SYSTEM

## 2018-12-29 PROCEDURE — 25000003 PHARM REV CODE 250: Performed by: NURSE PRACTITIONER

## 2018-12-29 PROCEDURE — 94761 N-INVAS EAR/PLS OXIMETRY MLT: CPT

## 2018-12-29 PROCEDURE — 82803 BLOOD GASES ANY COMBINATION: CPT

## 2018-12-29 PROCEDURE — C9254 INJECTION, LACOSAMIDE: HCPCS | Performed by: NURSE PRACTITIONER

## 2018-12-29 PROCEDURE — 99900035 HC TECH TIME PER 15 MIN (STAT)

## 2018-12-29 PROCEDURE — 20000000 HC ICU ROOM

## 2018-12-29 PROCEDURE — 63600175 PHARM REV CODE 636 W HCPCS: Performed by: STUDENT IN AN ORGANIZED HEALTH CARE EDUCATION/TRAINING PROGRAM

## 2018-12-29 PROCEDURE — 94668 MNPJ CHEST WALL SBSQ: CPT

## 2018-12-29 PROCEDURE — 36600 WITHDRAWAL OF ARTERIAL BLOOD: CPT

## 2018-12-29 PROCEDURE — 25000003 PHARM REV CODE 250: Performed by: STUDENT IN AN ORGANIZED HEALTH CARE EDUCATION/TRAINING PROGRAM

## 2018-12-29 PROCEDURE — 83735 ASSAY OF MAGNESIUM: CPT | Mod: 91

## 2018-12-29 PROCEDURE — 94003 VENT MGMT INPAT SUBQ DAY: CPT

## 2018-12-29 PROCEDURE — 84100 ASSAY OF PHOSPHORUS: CPT

## 2018-12-29 PROCEDURE — 63600175 PHARM REV CODE 636 W HCPCS: Performed by: NURSE PRACTITIONER

## 2018-12-29 PROCEDURE — 84100 ASSAY OF PHOSPHORUS: CPT | Mod: 91

## 2018-12-29 PROCEDURE — 25000003 PHARM REV CODE 250: Performed by: PSYCHIATRY & NEUROLOGY

## 2018-12-29 PROCEDURE — 27000221 HC OXYGEN, UP TO 24 HOURS

## 2018-12-29 PROCEDURE — 95951 HC EEG MONITORING/VIDEO RECORD: CPT

## 2018-12-29 PROCEDURE — 99291 CRITICAL CARE FIRST HOUR: CPT | Mod: GC,,, | Performed by: PSYCHIATRY & NEUROLOGY

## 2018-12-29 PROCEDURE — 99900026 HC AIRWAY MAINTENANCE (STAT)

## 2018-12-29 PROCEDURE — 95951 PR EEG MONITORING/VIDEORECORD: ICD-10-PCS | Mod: 26,,, | Performed by: PSYCHIATRY & NEUROLOGY

## 2018-12-29 PROCEDURE — 83735 ASSAY OF MAGNESIUM: CPT

## 2018-12-29 PROCEDURE — A4216 STERILE WATER/SALINE, 10 ML: HCPCS | Performed by: PSYCHIATRY & NEUROLOGY

## 2018-12-29 PROCEDURE — 99291 PR CRITICAL CARE, E/M 30-74 MINUTES: ICD-10-PCS | Mod: GC,,, | Performed by: PSYCHIATRY & NEUROLOGY

## 2018-12-29 PROCEDURE — 80053 COMPREHEN METABOLIC PANEL: CPT

## 2018-12-29 RX ORDER — METOPROLOL TARTRATE 25 MG/1
25 TABLET, FILM COATED ORAL 3 TIMES DAILY
Status: DISCONTINUED | OUTPATIENT
Start: 2018-12-29 | End: 2018-12-31

## 2018-12-29 RX ORDER — BISACODYL 10 MG
10 SUPPOSITORY, RECTAL RECTAL DAILY
Status: DISCONTINUED | OUTPATIENT
Start: 2018-12-29 | End: 2018-12-30

## 2018-12-29 RX ORDER — CHLORHEXIDINE GLUCONATE ORAL RINSE 1.2 MG/ML
15 SOLUTION DENTAL 4 TIMES DAILY
Status: DISCONTINUED | OUTPATIENT
Start: 2018-12-29 | End: 2019-01-28 | Stop reason: HOSPADM

## 2018-12-29 RX ADMIN — CLONAZEPAM 1 MG: 1 TABLET ORAL at 03:12

## 2018-12-29 RX ADMIN — Medication 3 ML: at 10:12

## 2018-12-29 RX ADMIN — METOPROLOL TARTRATE 25 MG: 25 TABLET ORAL at 12:12

## 2018-12-29 RX ADMIN — PRAVASTATIN SODIUM 40 MG: 40 TABLET ORAL at 08:12

## 2018-12-29 RX ADMIN — LEVETIRACETAM 500 MG: 5 INJECTION INTRAVENOUS at 08:12

## 2018-12-29 RX ADMIN — CHLORHEXIDINE GLUCONATE 0.12% ORAL RINSE 15 ML: 1.2 LIQUID ORAL at 08:12

## 2018-12-29 RX ADMIN — STANDARDIZED SENNA CONCENTRATE AND DOCUSATE SODIUM 1 TABLET: 8.6; 5 TABLET, FILM COATED ORAL at 08:12

## 2018-12-29 RX ADMIN — POTASSIUM & SODIUM PHOSPHATES POWDER PACK 280-160-250 MG 2 PACKET: 280-160-250 PACK at 12:12

## 2018-12-29 RX ADMIN — HEPARIN SODIUM 5000 UNITS: 5000 INJECTION, SOLUTION INTRAVENOUS; SUBCUTANEOUS at 05:12

## 2018-12-29 RX ADMIN — HEPARIN SODIUM 5000 UNITS: 5000 INJECTION, SOLUTION INTRAVENOUS; SUBCUTANEOUS at 09:12

## 2018-12-29 RX ADMIN — SODIUM CHLORIDE 200 MG: 9 INJECTION, SOLUTION INTRAVENOUS at 08:12

## 2018-12-29 RX ADMIN — SODIUM CHLORIDE 1000 ML: 0.9 INJECTION, SOLUTION INTRAVENOUS at 04:12

## 2018-12-29 RX ADMIN — CLONAZEPAM 1 MG: 1 TABLET ORAL at 08:12

## 2018-12-29 RX ADMIN — CHLORHEXIDINE GLUCONATE 0.12% ORAL RINSE 15 ML: 1.2 LIQUID ORAL at 04:12

## 2018-12-29 RX ADMIN — BISACODYL 10 MG: 10 SUPPOSITORY RECTAL at 12:12

## 2018-12-29 RX ADMIN — Medication 3 ML: at 03:12

## 2018-12-29 RX ADMIN — HEPARIN SODIUM 5000 UNITS: 5000 INJECTION, SOLUTION INTRAVENOUS; SUBCUTANEOUS at 03:12

## 2018-12-29 RX ADMIN — FAMOTIDINE 20 MG: 20 TABLET ORAL at 08:12

## 2018-12-29 RX ADMIN — METOPROLOL TARTRATE 25 MG: 25 TABLET ORAL at 03:12

## 2018-12-29 RX ADMIN — Medication 3 ML: at 05:12

## 2018-12-29 RX ADMIN — METOPROLOL TARTRATE 25 MG: 25 TABLET ORAL at 08:12

## 2018-12-29 RX ADMIN — CHLORHEXIDINE GLUCONATE 0.12% ORAL RINSE 15 ML: 1.2 LIQUID ORAL at 12:12

## 2018-12-29 RX ADMIN — POLYETHYLENE GLYCOL 3350 17 G: 17 POWDER, FOR SOLUTION ORAL at 08:12

## 2018-12-29 RX ADMIN — SODIUM CHLORIDE 1000 ML: 0.9 INJECTION, SOLUTION INTRAVENOUS at 12:12

## 2018-12-29 RX ADMIN — MAGNESIUM OXIDE TAB 400 MG (241.3 MG ELEMENTAL MG) 800 MG: 400 (241.3 MG) TAB at 08:12

## 2018-12-29 RX ADMIN — MAGNESIUM OXIDE TAB 400 MG (241.3 MG ELEMENTAL MG) 800 MG: 400 (241.3 MG) TAB at 05:12

## 2018-12-29 NOTE — PLAN OF CARE
Problem: Adult Inpatient Plan of Care  Goal: Plan of Care Review  POC reviewed with pt and sister at 0500. Pt unable to verbalize understanding due to ETT and cognitive status. Questions and concerns addressed. Electrolytes replaced. No acute events overnight. Pt progressing toward goals. Will continue to monitor. See flowsheets for full assessment and VS info

## 2018-12-29 NOTE — SUBJECTIVE & OBJECTIVE
CLARK.    Medications:  Continuous Infusions:    Scheduled Meds:   chlorhexidine  15 mL Mouth/Throat BID    clonazePAM  1 mg Per NG tube TID    famotidine  20 mg Per NG tube BID    heparin (porcine)  5,000 Units Subcutaneous Q8H    lacosamide (VIMPAT) in saline IVPB  200 mg Intravenous Q12H    levetiracetam IVPB  500 mg Intravenous Q12H    polyethylene glycol  17 g Per NG tube Daily    pravastatin  40 mg Per NG tube QHS    senna-docusate 8.6-50 mg  1 tablet Per NG tube BID    sodium chloride 0.9%  3 mL Intravenous Q8H     PRN Meds:acetaminophen, magnesium oxide, magnesium oxide, ondansetron, potassium chloride 10%, potassium chloride 10%, potassium chloride 10%, potassium, sodium phosphates, potassium, sodium phosphates, potassium, sodium phosphates       Objective:     Weight: 85.7 kg (189 lb)  Body mass index is 27.12 kg/m².  Vital Signs (Most Recent):  Temp: 98.1 °F (36.7 °C) (12/29/18 0505)  Pulse: (!) 122 (12/29/18 0605)  Resp: (!) 30 (12/29/18 0605)  BP: (!) 156/81 (12/29/18 0605)  SpO2: 100 % (12/29/18 0605) Vital Signs (24h Range):  Temp:  [98.1 °F (36.7 °C)-100.1 °F (37.8 °C)] 98.1 °F (36.7 °C)  Pulse:  [106-122] 122  Resp:  [20-37] 30  SpO2:  [96 %-100 %] 100 %  BP: (113-157)/(69-99) 156/81  Arterial Line BP: ()/(63-96) 127/86            Vent Mode: A/C  Oxygen Concentration (%):  [] 40  Resp Rate Total:  [18 br/min-29 br/min] 29 br/min  Vt Set:  [440 mL-500 mL] 440 mL  PEEP/CPAP:  [8 cmH20] 8 cmH20  Mean Airway Pressure:  [10 dzE67-75 cmH20] 10 cmH20         NG/OG Tube 12/23/18 1500 Center mouth (Active)   Placement Check placement verified by aspirate characteristics;physician notified 12/24/2018  7:02 AM   Securement taped to commercial device 12/24/2018  7:02 AM   Clamp Status/Tolerance no abdominal discomfort;no abdominal distention;no emesis;no nausea;no residual;no restlessness;clamped 12/24/2018  7:02 AM   Suction Setting/Drainage Method dependent drainage 12/24/2018  7:02 AM  "  Insertion Site Appearance no redness, warmth, tenderness, skin breakdown, drainage 12/24/2018  7:02 AM   Drainage None 12/24/2018  7:02 AM   Flush/Irrigation flushed w/;water;no resistance met 12/24/2018  7:02 AM   Intake (mL) 30 mL 12/24/2018  9:00 AM   Residual Amount (ml) 0 ml 12/24/2018  7:02 AM            Urethral Catheter 12/20/18 2350 Non-latex;Straight-tip 16 Fr. (Active)   Site Assessment Clean;Intact 12/24/2018  7:02 AM   Collection Container Urimeter 12/24/2018  7:02 AM   Securement Method secured to top of thigh w/ adhesive device 12/24/2018  7:02 AM   Catheter Care Performed yes 12/24/2018  7:02 AM   Reason for Continuing Urinary Catheterization Urinary retention 12/24/2018  7:02 AM   CAUTI Prevention Bundle StatLock in place w 1" slack;Intact seal between catheter & drainage tubing;Drainage bag off the floor;Green sheeting clip in use;No dependent loops or kinks;Drainage bag not overfilled (<2/3 full);Drainage bag below bladder 12/24/2018  7:02 AM   Output (mL) 10 mL 12/24/2018 11:00 AM       Neurosurgery Physical Exam     E1VTM5  Intubated  Does not open eyes  PERRL. + cough/gag.   WithDraws to stim x4      Significant Labs:  Recent Labs   Lab 12/28/18  0230 12/29/18  0242   * 124*    139   K 4.4 4.5    109   CO2 24 25   BUN 41* 48*   CREATININE 0.7 0.6   CALCIUM 8.1* 7.8*   MG 1.8 1.7     Recent Labs   Lab 12/28/18  0230 12/29/18  0242   WBC 14.76* 12.92*   HGB 10.4* 9.8*   HCT 32.0* 29.9*    245     No results for input(s): LABPT, INR, APTT in the last 48 hours.  Microbiology Results (last 7 days)     Procedure Component Value Units Date/Time    Blood culture [[id number]] Collected:  12/24/18 0006    Order Status:  Completed Specimen:  Blood from Line, Arterial, Right Updated:  12/29/18 0612     Blood Culture, Routine No growth after 5 days.    Blood culture [[id number]] Collected:  12/24/18 0005    Order Status:  Completed Specimen:  Blood from Peripheral, Hand, Left " Updated:  12/29/18 0612     Blood Culture, Routine No growth after 5 days.    Culture, Respiratory with Gram Stain [[id number]] Collected:  12/24/18 0446    Order Status:  Completed Specimen:  Respiratory from Endotracheal Aspirate Updated:  12/26/18 1007     Respiratory Culture Normal respiratory donnell     Respiratory Culture No S aureus or Pseudomonas isolated.     Gram Stain (Respiratory) <10 epithelial cells per low power field.     Gram Stain (Respiratory) Moderate WBC's     Gram Stain (Respiratory) No organisms seen    Urine culture [[id number]]     Order Status:  Canceled Specimen:  Urine, Catheterized         Recent Lab Results       12/29/18  0535   12/29/18  0407   12/29/18  0242   12/29/18  0007   12/28/18  1441        Immature Granulocytes     1.5         Immature Grans (Abs)     0.20  Comment:  Mild elevation in immature granulocytes is non specific and   can be seen in a variety of conditions including stress response,   acute inflammation, trauma and pregnancy. Correlation with other   laboratory and clinical findings is essential.           Albumin     1.6         Alkaline Phosphatase     180         Allens Test   Pass           ALT     18         Ammonia         32     Anion Gap     5         AST     44         Baso #     0.08         Basophil%     0.6         Total Bilirubin     0.6  Comment:  For infants and newborns, interpretation of results should be based  on gestational age, weight and in agreement with clinical  observations.  Premature Infant recommended reference ranges:  Up to 24 hours.............<8.0 mg/dL  Up to 48 hours............<12.0 mg/dL  3-5 days..................<15.0 mg/dL  6-29 days.................<15.0 mg/dL           Site   LR           BUN, Bld     48         Calcium     7.8         Chloride     109         CO2     25         Creatinine     0.6         [employer]   Adult Vent           Differential Method     Automated         eGFR if      >60.0         eGFR if  non      >60.0  Comment:  Calculation used to obtain the estimated glomerular filtration  rate (eGFR) is the CKD-EPI equation.            Eos #     0.1         Eosinophil%     0.7         FiO2   40           Glucose     124         Gran # (ANC)     9.5         Gran%     73.2         Hematocrit     29.9         Hemoglobin     9.8         Lymph #     1.9         Lymph%     14.7         Magnesium     1.7         MCH     32.0         MCHC     32.8         MCV     98         Mode   AC/PRVC           Mono #     1.2         Mono%     9.3         MPV     10.2         nRBC     0         PEEP   8           Phosphorus     1.7         Platelets     245         POC BE   -1           POC HCO3   22.6           POC PCO2   31.2           POC PH   7.469           POC PO2   169           POC SATURATED O2   100           POC TCO2   24           POCT Glucose 143     136       Potassium     4.5         Total Protein     4.9         Rate   16           RBC     3.06         RDW     15.1         Sample   ARTERIAL           Sodium     139         Vt   440           WBC     12.92                          12/28/18  1100   12/28/18  1054        Immature Granulocytes         Immature Grans (Abs)         Albumin         Alkaline Phosphatase         Allens Test         ALT         Ammonia         Anion Gap         AST         Baso #         Basophil%         Total Bilirubin         Site         BUN, Bld         Calcium         Chloride         CO2         Creatinine         DelSys         Differential Method         eGFR if          eGFR if non          Eos #         Eosinophil%         FiO2         Glucose         Gran # (ANC)         Gran%         Hematocrit         Hemoglobin         Lymph #         Lymph%         Magnesium         MCH         MCHC         MCV         Mode         Mono #         Mono%         MPV         nRBC         PEEP         Phosphorus   1.5     Platelets         POC BE          POC HCO3         POC PCO2         POC PH         POC PO2         POC SATURATED O2         POC TCO2         POCT Glucose 137       Potassium         Total Protein         Rate         RBC         RDW         Sample         Sodium         Vt         WBC               Significant Diagnostics:  I have reviewed all pertinent imaging results/findings within the past 24 hours.    Review of Systems

## 2018-12-29 NOTE — NURSING
Pt transferred to CT per RN and RT on portable vent and continuous monitoring.  cEEG removed for scan.  Pt tolerated well, returned to room in stable condition. Will continue to monitor closely.

## 2018-12-29 NOTE — PLAN OF CARE
Problem: Adult Inpatient Plan of Care  Goal: Plan of Care Review  Outcome: Ongoing (interventions implemented as appropriate)  POC reviewed with pt and family at 1600. Pt unable to verbalize understanding at this time.  Wife and two sons are at bedside and agreed to plan of care.  All questions and concerns were addressed by myself or Dr. Decker. verbalized understanding. Pt remains on tube feeds at goal of 55cc/hr, cEEG monitoring in progress.  Neuro exam unchanged at this time. SBP <160 without PRN medication.  Will continue to monitor. See flowsheets for full assessment and VS info.

## 2018-12-29 NOTE — PROGRESS NOTES
EEG cap placed and EGG monitor set up by RN x 2. Epileptology paged to confirm setup. Will continue to monitor.

## 2018-12-29 NOTE — PROGRESS NOTES
[facility]  Neurosurgery  Progress Note    Subjective:     History of Present Illness: 62 yo M with PMH of CAD, CABG in 2006, DM, HTN, HLD on ASA presents to the ED for AMS. Patient fell 4 days ago with about 2 minutes of LOC. Family is unsure of what caused the fall. Full workup done including a CTH at [place] that was negative. Patient went home and stopped taking his ASA. This morning, his family noticed he was more confused. He went to [place] and CTH was done that revealed a 1.7cm left frontalpariental SDH with a 5 mm right sided midline shift and a left SAH. Patient was transferred to [facility]. History was obtained per family as patient unable to give history 2/2 confusion with expressive and receptive aphasia.    Post-Op Info:  Procedure(s) (LRB):  CRANIOTOMY, FOR SUBDURAL HEMATOMA EVACUATION (Left)   9 Days Post-Op     NAEON.    Medications:  Continuous Infusions:    Scheduled Meds:   chlorhexidine  15 mL Mouth/Throat BID    clonazePAM  1 mg Per NG tube TID    famotidine  20 mg Per NG tube BID    heparin (porcine)  5,000 Units Subcutaneous Q8H    lacosamide (VIMPAT) in saline IVPB  200 mg Intravenous Q12H    levetiracetam IVPB  500 mg Intravenous Q12H    polyethylene glycol  17 g Per NG tube Daily    pravastatin  40 mg Per NG tube QHS    senna-docusate 8.6-50 mg  1 tablet Per NG tube BID    sodium chloride 0.9%  3 mL Intravenous Q8H     PRN Meds:acetaminophen, magnesium oxide, magnesium oxide, ondansetron, potassium chloride 10%, potassium chloride 10%, potassium chloride 10%, potassium, sodium phosphates, potassium, sodium phosphates, potassium, sodium phosphates       Objective:     Weight: 85.7 kg (189 lb)  Body mass index is 27.12 kg/m².  Vital Signs (Most Recent):  Temp: 98.1 °F (36.7 °C) (12/29/18 0505)  Pulse: (!) 122 (12/29/18 0605)  Resp: (!) 30 (12/29/18 0605)  BP: (!) 156/81 (12/29/18 0605)  SpO2: 100 % (12/29/18 0605) Vital Signs (24h Range):  Temp:  [98.1 °F (36.7  "°C)-100.1 °F (37.8 °C)] 98.1 °F (36.7 °C)  Pulse:  [106-122] 122  Resp:  [20-37] 30  SpO2:  [96 %-100 %] 100 %  BP: (113-157)/(69-99) 156/81  Arterial Line BP: ()/(63-96) 127/86            Vent Mode: A/C  Oxygen Concentration (%):  [] 40  Resp Rate Total:  [18 br/min-29 br/min] 29 br/min  Vt Set:  [440 mL-500 mL] 440 mL  PEEP/CPAP:  [8 cmH20] 8 cmH20  Mean Airway Pressure:  [10 reR87-90 cmH20] 10 cmH20         NG/OG Tube 12/23/18 1500 Center mouth (Active)   Placement Check placement verified by aspirate characteristics;physician notified 12/24/2018  7:02 AM   Securement taped to commercial device 12/24/2018  7:02 AM   Clamp Status/Tolerance no abdominal discomfort;no abdominal distention;no emesis;no nausea;no residual;no restlessness;clamped 12/24/2018  7:02 AM   Suction Setting/Drainage Method dependent drainage 12/24/2018  7:02 AM   Insertion Site Appearance no redness, warmth, tenderness, skin breakdown, drainage 12/24/2018  7:02 AM   Drainage None 12/24/2018  7:02 AM   Flush/Irrigation flushed w/;water;no resistance met 12/24/2018  7:02 AM   Intake (mL) 30 mL 12/24/2018  9:00 AM   Residual Amount (ml) 0 ml 12/24/2018  7:02 AM            Urethral Catheter 12/20/18 2350 Non-latex;Straight-tip 16 Fr. (Active)   Site Assessment Clean;Intact 12/24/2018  7:02 AM   Collection Container Urimeter 12/24/2018  7:02 AM   Securement Method secured to top of thigh w/ adhesive device 12/24/2018  7:02 AM   Catheter Care Performed yes 12/24/2018  7:02 AM   Reason for Continuing Urinary Catheterization Urinary retention 12/24/2018  7:02 AM   CAUTI Prevention Bundle StatLock in place w 1" slack;Intact seal between catheter & drainage tubing;Drainage bag off the floor;Green sheeting clip in use;No dependent loops or kinks;Drainage bag not overfilled (<2/3 full);Drainage bag below bladder 12/24/2018  7:02 AM   Output (mL) 10 mL 12/24/2018 11:00 AM       Neurosurgery Physical Exam     E1VTM5  Intubated  Does not open " eyes  PERRL. + cough/gag.   WithDraws to stim x4      Significant Labs:  Recent Labs   Lab 12/28/18  0230 12/29/18  0242   * 124*    139   K 4.4 4.5    109   CO2 24 25   BUN 41* 48*   CREATININE 0.7 0.6   CALCIUM 8.1* 7.8*   MG 1.8 1.7     Recent Labs   Lab 12/28/18  0230 12/29/18  0242   WBC 14.76* 12.92*   HGB 10.4* 9.8*   HCT 32.0* 29.9*    245     No results for input(s): LABPT, INR, APTT in the last 48 hours.  Microbiology Results (last 7 days)     Procedure Component Value Units Date/Time    Blood culture [[id number]] Collected:  12/24/18 0006    Order Status:  Completed Specimen:  Blood from Line, Arterial, Right Updated:  12/29/18 0612     Blood Culture, Routine No growth after 5 days.    Blood culture [[id number]] Collected:  12/24/18 0005    Order Status:  Completed Specimen:  Blood from Peripheral, Hand, Left Updated:  12/29/18 0612     Blood Culture, Routine No growth after 5 days.    Culture, Respiratory with Gram Stain [[id number]] Collected:  12/24/18 0446    Order Status:  Completed Specimen:  Respiratory from Endotracheal Aspirate Updated:  12/26/18 1007     Respiratory Culture Normal respiratory donnell     Respiratory Culture No S aureus or Pseudomonas isolated.     Gram Stain (Respiratory) <10 epithelial cells per low power field.     Gram Stain (Respiratory) Moderate WBC's     Gram Stain (Respiratory) No organisms seen    Urine culture [[id number]]     Order Status:  Canceled Specimen:  Urine, Catheterized         Recent Lab Results       12/29/18  0535   12/29/18  0407   12/29/18  0242   12/29/18  0007   12/28/18  1441        Immature Granulocytes     1.5         Immature Grans (Abs)     0.20  Comment:  Mild elevation in immature granulocytes is non specific and   can be seen in a variety of conditions including stress response,   acute inflammation, trauma and pregnancy. Correlation with other   laboratory and clinical findings is essential.           Albumin     1.6          Alkaline Phosphatase     180         Allens Test   Pass           ALT     18         Ammonia         32     Anion Gap     5         AST     44         Baso #     0.08         Basophil%     0.6         Total Bilirubin     0.6  Comment:  For infants and newborns, interpretation of results should be based  on gestational age, weight and in agreement with clinical  observations.  Premature Infant recommended reference ranges:  Up to 24 hours.............<8.0 mg/dL  Up to 48 hours............<12.0 mg/dL  3-5 days..................<15.0 mg/dL  6-29 days.................<15.0 mg/dL           Site   LR           BUN, Bld     48         Calcium     7.8         Chloride     109         CO2     25         Creatinine     0.6         DelSys   Adult Vent           Differential Method     Automated         eGFR if      >60.0         eGFR if non      >60.0  Comment:  Calculation used to obtain the estimated glomerular filtration  rate (eGFR) is the CKD-EPI equation.            Eos #     0.1         Eosinophil%     0.7         FiO2   40           Glucose     124         Gran # (ANC)     9.5         Gran%     73.2         Hematocrit     29.9         Hemoglobin     9.8         Lymph #     1.9         Lymph%     14.7         Magnesium     1.7         MCH     32.0         MCHC     32.8         MCV     98         Mode   AC/PRVC           Mono #     1.2         Mono%     9.3         MPV     10.2         nRBC     0         PEEP   8           Phosphorus     1.7         Platelets     245         POC BE   -1           POC HCO3   22.6           POC PCO2   31.2           POC PH   7.469           POC PO2   169           POC SATURATED O2   100           POC TCO2   24           POCT Glucose 143     136       Potassium     4.5         Total Protein     4.9         Rate   16           RBC     3.06         RDW     15.1         Sample   ARTERIAL           Sodium     139         Vt   440           WBC     12.92                           12/28/18  1100   12/28/18  1054        Immature Granulocytes         Immature Grans (Abs)         Albumin         Alkaline Phosphatase         Allens Test         ALT         Ammonia         Anion Gap         AST         Baso #         Basophil%         Total Bilirubin         Site         BUN, Bld         Calcium         Chloride         CO2         Creatinine         DelSys         Differential Method         eGFR if          eGFR if non          Eos #         Eosinophil%         FiO2         Glucose         Gran # (ANC)         Gran%         Hematocrit         Hemoglobin         Lymph #         Lymph%         Magnesium         MCH         MCHC         MCV         Mode         Mono #         Mono%         MPV         nRBC         PEEP         Phosphorus   1.5     Platelets         POC BE         POC HCO3         POC PCO2         POC PH         POC PO2         POC SATURATED O2         POC TCO2         POCT Glucose 137       Potassium         Total Protein         Rate         RBC         RDW         Sample         Sodium         Vt         WBC               Significant Diagnostics:  I have reviewed all pertinent imaging results/findings within the past 24 hours.    Review of Systems        Assessment/Plan:     * SDH (subdural hematoma)    62 yo male with L aSDH now s/p craniotomy for evacuation (12/20).    Pt with persistent poor clinical exam.     -- Continue care per primary team.  -- CTH with expected postoperative changes, nothing to explain current exam.  -- Recommend consideration for early tracheostomy and gastrostomy placement.  -- Continue seizure treatment per neurology.   -- Staple removal 1/3.  -- We will continue to monitor closely, please contact us with any questions or concerns.           Christine Lozoya MD  Neurosurgery  [facility]

## 2018-12-30 PROBLEM — R63.4 WEIGHT LOSS, UNINTENTIONAL: Status: ACTIVE | Noted: 2018-12-30

## 2018-12-30 LAB
ALBUMIN SERPL BCP-MCNC: 1.5 G/DL
ALLENS TEST: ABNORMAL
ALP SERPL-CCNC: 205 U/L
ALT SERPL W/O P-5'-P-CCNC: 33 U/L
ANION GAP SERPL CALC-SCNC: 4 MMOL/L
AST SERPL-CCNC: 60 U/L
BASOPHILS # BLD AUTO: 0.05 K/UL
BASOPHILS NFR BLD: 0.5 %
BILIRUB SERPL-MCNC: 0.5 MG/DL
BUN SERPL-MCNC: 43 MG/DL
CALCIUM SERPL-MCNC: 7.6 MG/DL
CHLORIDE SERPL-SCNC: 111 MMOL/L
CO2 SERPL-SCNC: 24 MMOL/L
CREAT SERPL-MCNC: 0.5 MG/DL
DELSYS: ABNORMAL
DIFFERENTIAL METHOD: ABNORMAL
EOSINOPHIL # BLD AUTO: 0.2 K/UL
EOSINOPHIL NFR BLD: 2 %
ERYTHROCYTE [DISTWIDTH] IN BLOOD BY AUTOMATED COUNT: 15.1 %
ERYTHROCYTE [SEDIMENTATION RATE] IN BLOOD BY WESTERGREN METHOD: 16 MM/H
EST. GFR  (AFRICAN AMERICAN): >60 ML/MIN/1.73 M^2
EST. GFR  (NON AFRICAN AMERICAN): >60 ML/MIN/1.73 M^2
FIO2: 40
GLUCOSE SERPL-MCNC: 120 MG/DL
HCO3 UR-SCNC: 22 MMOL/L (ref 24–28)
HCT VFR BLD AUTO: 27.4 %
HGB BLD-MCNC: 8.7 G/DL
IMM GRANULOCYTES # BLD AUTO: 0.23 K/UL
IMM GRANULOCYTES NFR BLD AUTO: 2.1 %
LYMPHOCYTES # BLD AUTO: 2 K/UL
LYMPHOCYTES NFR BLD: 18.4 %
MAGNESIUM SERPL-MCNC: 1.8 MG/DL
MCH RBC QN AUTO: 30.7 PG
MCHC RBC AUTO-ENTMCNC: 31.8 G/DL
MCV RBC AUTO: 97 FL
MODE: ABNORMAL
MONOCYTES # BLD AUTO: 0.8 K/UL
MONOCYTES NFR BLD: 7.5 %
NEUTROPHILS # BLD AUTO: 7.6 K/UL
NEUTROPHILS NFR BLD: 69.5 %
NRBC BLD-RTO: 0 /100 WBC
PCO2 BLDA: 31.4 MMHG (ref 35–45)
PEEP: 8
PH SMN: 7.45 [PH] (ref 7.35–7.45)
PHOSPHATE SERPL-MCNC: 1.4 MG/DL
PLATELET # BLD AUTO: 213 K/UL
PMV BLD AUTO: 10.4 FL
PO2 BLDA: 161 MMHG (ref 80–100)
POC BE: -2 MMOL/L
POC SATURATED O2: 100 % (ref 95–100)
POC TCO2: 23 MMOL/L (ref 23–27)
POCT GLUCOSE: 131 MG/DL (ref 70–110)
POCT GLUCOSE: 131 MG/DL (ref 70–110)
POCT GLUCOSE: 140 MG/DL (ref 70–110)
POCT GLUCOSE: 144 MG/DL (ref 70–110)
POTASSIUM SERPL-SCNC: 4.3 MMOL/L
PROT SERPL-MCNC: 4.4 G/DL
RBC # BLD AUTO: 2.83 M/UL
SAMPLE: ABNORMAL
SITE: ABNORMAL
SODIUM SERPL-SCNC: 139 MMOL/L
VT: 440
WBC # BLD AUTO: 10.95 K/UL

## 2018-12-30 PROCEDURE — 25000003 PHARM REV CODE 250: Performed by: NURSE PRACTITIONER

## 2018-12-30 PROCEDURE — 95951 HC EEG MONITORING/VIDEO RECORD: CPT

## 2018-12-30 PROCEDURE — 99900035 HC TECH TIME PER 15 MIN (STAT)

## 2018-12-30 PROCEDURE — C9254 INJECTION, LACOSAMIDE: HCPCS | Performed by: NURSE PRACTITIONER

## 2018-12-30 PROCEDURE — 25000003 PHARM REV CODE 250: Performed by: STUDENT IN AN ORGANIZED HEALTH CARE EDUCATION/TRAINING PROGRAM

## 2018-12-30 PROCEDURE — 84100 ASSAY OF PHOSPHORUS: CPT

## 2018-12-30 PROCEDURE — 25000003 PHARM REV CODE 250: Performed by: PSYCHIATRY & NEUROLOGY

## 2018-12-30 PROCEDURE — 36600 WITHDRAWAL OF ARTERIAL BLOOD: CPT

## 2018-12-30 PROCEDURE — 99291 PR CRITICAL CARE, E/M 30-74 MINUTES: ICD-10-PCS | Mod: GC,,, | Performed by: PSYCHIATRY & NEUROLOGY

## 2018-12-30 PROCEDURE — 95951 PR EEG MONITORING/VIDEORECORD: ICD-10-PCS | Mod: 26,,, | Performed by: PSYCHIATRY & NEUROLOGY

## 2018-12-30 PROCEDURE — 95951 PR EEG MONITORING/VIDEORECORD: CPT | Mod: 26,,, | Performed by: PSYCHIATRY & NEUROLOGY

## 2018-12-30 PROCEDURE — A4216 STERILE WATER/SALINE, 10 ML: HCPCS | Performed by: PSYCHIATRY & NEUROLOGY

## 2018-12-30 PROCEDURE — 83735 ASSAY OF MAGNESIUM: CPT

## 2018-12-30 PROCEDURE — 99900026 HC AIRWAY MAINTENANCE (STAT)

## 2018-12-30 PROCEDURE — 82803 BLOOD GASES ANY COMBINATION: CPT

## 2018-12-30 PROCEDURE — 94761 N-INVAS EAR/PLS OXIMETRY MLT: CPT

## 2018-12-30 PROCEDURE — 63600175 PHARM REV CODE 636 W HCPCS: Performed by: STUDENT IN AN ORGANIZED HEALTH CARE EDUCATION/TRAINING PROGRAM

## 2018-12-30 PROCEDURE — 94003 VENT MGMT INPAT SUBQ DAY: CPT

## 2018-12-30 PROCEDURE — 80053 COMPREHEN METABOLIC PANEL: CPT

## 2018-12-30 PROCEDURE — 20000000 HC ICU ROOM

## 2018-12-30 PROCEDURE — 63600175 PHARM REV CODE 636 W HCPCS: Performed by: NURSE PRACTITIONER

## 2018-12-30 PROCEDURE — 27000221 HC OXYGEN, UP TO 24 HOURS

## 2018-12-30 PROCEDURE — 85025 COMPLETE CBC W/AUTO DIFF WBC: CPT

## 2018-12-30 PROCEDURE — 99291 CRITICAL CARE FIRST HOUR: CPT | Mod: GC,,, | Performed by: PSYCHIATRY & NEUROLOGY

## 2018-12-30 RX ADMIN — Medication 3 ML: at 10:12

## 2018-12-30 RX ADMIN — POTASSIUM & SODIUM PHOSPHATES POWDER PACK 280-160-250 MG 2 PACKET: 280-160-250 PACK at 09:12

## 2018-12-30 RX ADMIN — CLONAZEPAM 1 MG: 1 TABLET ORAL at 04:12

## 2018-12-30 RX ADMIN — MAGNESIUM OXIDE TAB 400 MG (241.3 MG ELEMENTAL MG) 800 MG: 400 (241.3 MG) TAB at 05:12

## 2018-12-30 RX ADMIN — CHLORHEXIDINE GLUCONATE 0.12% ORAL RINSE 15 ML: 1.2 LIQUID ORAL at 08:12

## 2018-12-30 RX ADMIN — CHLORHEXIDINE GLUCONATE 0.12% ORAL RINSE 15 ML: 1.2 LIQUID ORAL at 12:12

## 2018-12-30 RX ADMIN — METOPROLOL TARTRATE 25 MG: 25 TABLET ORAL at 04:12

## 2018-12-30 RX ADMIN — STANDARDIZED SENNA CONCENTRATE AND DOCUSATE SODIUM 1 TABLET: 8.6; 5 TABLET, FILM COATED ORAL at 08:12

## 2018-12-30 RX ADMIN — SODIUM CHLORIDE 200 MG: 9 INJECTION, SOLUTION INTRAVENOUS at 09:12

## 2018-12-30 RX ADMIN — CHLORHEXIDINE GLUCONATE 0.12% ORAL RINSE 15 ML: 1.2 LIQUID ORAL at 04:12

## 2018-12-30 RX ADMIN — CLONAZEPAM 1 MG: 1 TABLET ORAL at 09:12

## 2018-12-30 RX ADMIN — FAMOTIDINE 20 MG: 20 TABLET ORAL at 08:12

## 2018-12-30 RX ADMIN — CHLORHEXIDINE GLUCONATE 0.12% ORAL RINSE 15 ML: 1.2 LIQUID ORAL at 09:12

## 2018-12-30 RX ADMIN — STANDARDIZED SENNA CONCENTRATE AND DOCUSATE SODIUM 1 TABLET: 8.6; 5 TABLET, FILM COATED ORAL at 09:12

## 2018-12-30 RX ADMIN — CLONAZEPAM 1 MG: 1 TABLET ORAL at 08:12

## 2018-12-30 RX ADMIN — POTASSIUM & SODIUM PHOSPHATES POWDER PACK 280-160-250 MG 2 PACKET: 280-160-250 PACK at 12:12

## 2018-12-30 RX ADMIN — POTASSIUM & SODIUM PHOSPHATES POWDER PACK 280-160-250 MG 2 PACKET: 280-160-250 PACK at 05:12

## 2018-12-30 RX ADMIN — Medication 3 ML: at 06:12

## 2018-12-30 RX ADMIN — HEPARIN SODIUM 5000 UNITS: 5000 INJECTION, SOLUTION INTRAVENOUS; SUBCUTANEOUS at 05:12

## 2018-12-30 RX ADMIN — METOPROLOL TARTRATE 25 MG: 25 TABLET ORAL at 09:12

## 2018-12-30 RX ADMIN — Medication 3 ML: at 02:12

## 2018-12-30 RX ADMIN — HEPARIN SODIUM 5000 UNITS: 5000 INJECTION, SOLUTION INTRAVENOUS; SUBCUTANEOUS at 09:12

## 2018-12-30 RX ADMIN — METOPROLOL TARTRATE 25 MG: 25 TABLET ORAL at 08:12

## 2018-12-30 RX ADMIN — FAMOTIDINE 20 MG: 20 TABLET ORAL at 09:12

## 2018-12-30 RX ADMIN — HEPARIN SODIUM 5000 UNITS: 5000 INJECTION, SOLUTION INTRAVENOUS; SUBCUTANEOUS at 04:12

## 2018-12-30 RX ADMIN — PRAVASTATIN SODIUM 40 MG: 40 TABLET ORAL at 08:12

## 2018-12-30 NOTE — SUBJECTIVE & OBJECTIVE
CLARK. Mild improvement in exam.    Medications:  Continuous Infusions:    Scheduled Meds:   bisacodyl  10 mg Rectal Daily    chlorhexidine  15 mL Mouth/Throat QID    clonazePAM  1 mg Per NG tube TID    famotidine  20 mg Per NG tube BID    heparin (porcine)  5,000 Units Subcutaneous Q8H    lacosamide (VIMPAT) in saline IVPB  200 mg Intravenous Q12H    metoprolol tartrate  25 mg Oral TID    polyethylene glycol  17 g Per NG tube Daily    pravastatin  40 mg Per NG tube QHS    senna-docusate 8.6-50 mg  1 tablet Per NG tube BID    sodium chloride 0.9%  3 mL Intravenous Q8H     PRN Meds:acetaminophen, magnesium oxide, magnesium oxide, ondansetron, potassium chloride 10%, potassium chloride 10%, potassium chloride 10%, potassium, sodium phosphates, potassium, sodium phosphates, potassium, sodium phosphates       Objective:     Weight: 85.7 kg (189 lb)  Body mass index is 27.12 kg/m².  Vital Signs (Most Recent):  Temp: 98.2 °F (36.8 °C) (12/30/18 0702)  Pulse: (!) 114 (12/30/18 0902)  Resp: (!) 24 (12/30/18 0902)  BP: (!) 142/88 (12/30/18 0902)  SpO2: 100 % (12/30/18 0902) Vital Signs (24h Range):  Temp:  [97.7 °F (36.5 °C)-99 °F (37.2 °C)] 98.2 °F (36.8 °C)  Pulse:  [] 114  Resp:  [22-34] 24  SpO2:  [96 %-100 %] 100 %  BP: (110-166)/(66-97) 142/88  Arterial Line BP: (82)/(68) 82/68            Vent Mode: A/C  Oxygen Concentration (%):  [40] 40  Resp Rate Total:  [23 br/min-31 br/min] 23 br/min  Vt Set:  [440 mL] 440 mL  PEEP/CPAP:  [8 cmH20] 8 cmH20  Mean Airway Pressure:  [10 byX74-71 cmH20] 10 cmH20         NG/OG Tube 12/23/18 1500 Center mouth (Active)   Placement Check placement verified by aspirate characteristics;physician notified 12/24/2018  7:02 AM   Securement taped to commercial device 12/24/2018  7:02 AM   Clamp Status/Tolerance no abdominal discomfort;no abdominal distention;no emesis;no nausea;no residual;no restlessness;clamped 12/24/2018  7:02 AM   Suction Setting/Drainage Method dependent  "drainage 12/24/2018  7:02 AM   Insertion Site Appearance no redness, warmth, tenderness, skin breakdown, drainage 12/24/2018  7:02 AM   Drainage None 12/24/2018  7:02 AM   Flush/Irrigation flushed w/;water;no resistance met 12/24/2018  7:02 AM   Intake (mL) 30 mL 12/24/2018  9:00 AM   Residual Amount (ml) 0 ml 12/24/2018  7:02 AM            Urethral Catheter 12/20/18 2350 Non-latex;Straight-tip 16 Fr. (Active)   Site Assessment Clean;Intact 12/24/2018  7:02 AM   Collection Container Urimeter 12/24/2018  7:02 AM   Securement Method secured to top of thigh w/ adhesive device 12/24/2018  7:02 AM   Catheter Care Performed yes 12/24/2018  7:02 AM   Reason for Continuing Urinary Catheterization Urinary retention 12/24/2018  7:02 AM   CAUTI Prevention Bundle StatLock in place w 1" slack;Intact seal between catheter & drainage tubing;Drainage bag off the floor;Green sheeting clip in use;No dependent loops or kinks;Drainage bag not overfilled (<2/3 full);Drainage bag below bladder 12/24/2018  7:02 AM   Output (mL) 10 mL 12/24/2018 11:00 AM       Neurosurgery Physical Exam     [id number]  Intubated  PERRL. + cough/gag.   WithDraws to stim x4      Significant Labs:  Recent Labs   Lab 12/29/18  0242 12/29/18  1810 12/30/18  0406   *  --  120*     --  139   K 4.5  --  4.3     --  111*   CO2 25  --  24   BUN 48*  --  43*   CREATININE 0.6  --  0.5   CALCIUM 7.8*  --  7.6*   MG 1.7 1.6 1.8     Recent Labs   Lab 12/29/18  0242 12/30/18  0406   WBC 12.92* 10.95   HGB 9.8* 8.7*   HCT 29.9* 27.4*    213     No results for input(s): LABPT, INR, APTT in the last 48 hours.  Microbiology Results (last 7 days)     Procedure Component Value Units Date/Time    Blood culture [[id number]] Collected:  12/24/18 0006    Order Status:  Completed Specimen:  Blood from Line, Arterial, Right Updated:  12/29/18 0612     Blood Culture, Routine No growth after 5 days.    Blood culture [[id number]] Collected:  12/24/18 0005    Order " Status:  Completed Specimen:  Blood from Peripheral, Hand, Left Updated:  12/29/18 0612     Blood Culture, Routine No growth after 5 days.    Culture, Respiratory with Gram Stain [[id number]] Collected:  12/24/18 0446    Order Status:  Completed Specimen:  Respiratory from Endotracheal Aspirate Updated:  12/26/18 1007     Respiratory Culture Normal respiratory donnell     Respiratory Culture No S aureus or Pseudomonas isolated.     Gram Stain (Respiratory) <10 epithelial cells per low power field.     Gram Stain (Respiratory) Moderate WBC's     Gram Stain (Respiratory) No organisms seen    Urine culture [[id number]]     Order Status:  Canceled Specimen:  Urine, Catheterized         Recent Lab Results       12/30/18  0532   12/30/18  0440   12/30/18  0406   12/30/18  0043   12/29/18  1813        Immature Granulocytes     2.1         Immature Grans (Abs)     0.23  Comment:  Mild elevation in immature granulocytes is non specific and   can be seen in a variety of conditions including stress response,   acute inflammation, trauma and pregnancy. Correlation with other   laboratory and clinical findings is essential.           Albumin     1.5         Alkaline Phosphatase     205         Allens Test   Pass           ALT     33         Anion Gap     4         AST     60         Baso #     0.05         Basophil%     0.5         Total Bilirubin     0.5  Comment:  For infants and newborns, interpretation of results should be based  on gestational age, weight and in agreement with clinical  observations.  Premature Infant recommended reference ranges:  Up to 24 hours.............<8.0 mg/dL  Up to 48 hours............<12.0 mg/dL  3-5 days..................<15.0 mg/dL  6-29 days.................<15.0 mg/dL           Site   LR           BUN, Bld     43         Calcium     7.6         Chloride     111         CO2     24         Creatinine     0.5         DelSys   Adult Vent           Differential Method     Automated         eGFR if       >60.0         eGFR if non      >60.0  Comment:  Calculation used to obtain the estimated glomerular filtration  rate (eGFR) is the CKD-EPI equation.            Eos #     0.2         Eosinophil%     2.0         FiO2   40           Glucose     120         Gran # (ANC)     7.6         Gran%     69.5         Hematocrit     27.4         Hemoglobin     8.7         Lymph #     2.0         Lymph%     18.4         Magnesium     1.8         MCH     30.7         MCHC     31.8         MCV     97         Mode   AC/PRVC           Mono #     0.8         Mono%     7.5         MPV     10.4         nRBC     0         PEEP   8           Phosphorus     1.4         Platelets     213         POC BE   -2           POC HCO3   22.0           POC PCO2   31.4           POC PH   7.454           POC PO2   161           POC SATURATED O2   100           POC TCO2   23           POCT Glucose 140     144 128     Potassium     4.3         Total Protein     4.4         Rate   16           RBC     2.83         RDW     15.1         Sample   ARTERIAL           Sodium     139         Vt   440           WBC     10.95                          12/29/18  1810   12/29/18  1144        Immature Granulocytes         Immature Grans (Abs)         Albumin         Alkaline Phosphatase         Allens Test         ALT         Anion Gap         AST         Baso #         Basophil%         Total Bilirubin         Site         BUN, Bld         Calcium         Chloride         CO2         Creatinine         DelSys         Differential Method         eGFR if          eGFR if non          Eos #         Eosinophil%         FiO2         Glucose         Gran # (ANC)         Gran%         Hematocrit         Hemoglobin         Lymph #         Lymph%         Magnesium 1.6       MCH         MCHC         MCV         Mode         Mono #         Mono%         MPV         nRBC         PEEP         Phosphorus          Platelets         POC BE         POC HCO3         POC PCO2         POC PH         POC PO2         POC SATURATED O2         POC TCO2         POCT Glucose   138     Potassium         Total Protein         Rate         RBC         RDW         Sample         Sodium         Vt         WBC               Significant Diagnostics:  I have reviewed all pertinent imaging results/findings within the past 24 hours.    Review of Systems

## 2018-12-30 NOTE — PLAN OF CARE
Problem: Adult Inpatient Plan of Care  Goal: Plan of Care Review  Outcome: Ongoing (interventions implemented as appropriate)  POC reviewed with pt and spouse at 0500. Pt unable to verbalize understanding, spouse verbalized understanding. Questions and concerns addressed. No acute events overnight. Pt progressing toward goals. Will continue to monitor. See flowsheets for full assessment and VS info

## 2018-12-30 NOTE — ASSESSMENT & PLAN NOTE
uop improved  Bun rising  Increased insensible losses due to open mouth    Cont fluid resuscitation  Increase fwf  Follow uop

## 2018-12-30 NOTE — PROGRESS NOTES
[facility]  Neurosurgery  Progress Note    Subjective:     History of Present Illness: 64 yo M with PMH of CAD, CABG in 2006, DM, HTN, HLD on ASA presents to the ED for AMS. Patient fell 4 days ago with about 2 minutes of LOC. Family is unsure of what caused the fall. Full workup done including a CTH at [place] that was negative. Patient went home and stopped taking his ASA. This morning, his family noticed he was more confused. He went to [place] and CTH was done that revealed a 1.7cm left frontalpariental SDH with a 5 mm right sided midline shift and a left SAH. Patient was transferred to [facility]. History was obtained per family as patient unable to give history 2/2 confusion with expressive and receptive aphasia.    Post-Op Info:  Procedure(s) (LRB):  CRANIOTOMY, FOR SUBDURAL HEMATOMA EVACUATION (Left)   10 Days Post-Op     NAEON. Mild improvement in exam.    Medications:  Continuous Infusions:    Scheduled Meds:   bisacodyl  10 mg Rectal Daily    chlorhexidine  15 mL Mouth/Throat QID    clonazePAM  1 mg Per NG tube TID    famotidine  20 mg Per NG tube BID    heparin (porcine)  5,000 Units Subcutaneous Q8H    lacosamide (VIMPAT) in saline IVPB  200 mg Intravenous Q12H    metoprolol tartrate  25 mg Oral TID    polyethylene glycol  17 g Per NG tube Daily    pravastatin  40 mg Per NG tube QHS    senna-docusate 8.6-50 mg  1 tablet Per NG tube BID    sodium chloride 0.9%  3 mL Intravenous Q8H     PRN Meds:acetaminophen, magnesium oxide, magnesium oxide, ondansetron, potassium chloride 10%, potassium chloride 10%, potassium chloride 10%, potassium, sodium phosphates, potassium, sodium phosphates, potassium, sodium phosphates       Objective:     Weight: 85.7 kg (189 lb)  Body mass index is 27.12 kg/m².  Vital Signs (Most Recent):  Temp: 98.2 °F (36.8 °C) (12/30/18 0702)  Pulse: (!) 114 (12/30/18 0902)  Resp: (!) 24 (12/30/18 0902)  BP: (!) 142/88 (12/30/18 0902)  SpO2: 100 % (12/30/18 0902)  "Vital Signs (24h Range):  Temp:  [97.7 °F (36.5 °C)-99 °F (37.2 °C)] 98.2 °F (36.8 °C)  Pulse:  [] 114  Resp:  [22-34] 24  SpO2:  [96 %-100 %] 100 %  BP: (110-166)/(66-97) 142/88  Arterial Line BP: (82)/(68) 82/68            Vent Mode: A/C  Oxygen Concentration (%):  [40] 40  Resp Rate Total:  [23 br/min-31 br/min] 23 br/min  Vt Set:  [440 mL] 440 mL  PEEP/CPAP:  [8 cmH20] 8 cmH20  Mean Airway Pressure:  [10 agE95-31 cmH20] 10 cmH20         NG/OG Tube 12/23/18 1500 Center mouth (Active)   Placement Check placement verified by aspirate characteristics;physician notified 12/24/2018  7:02 AM   Securement taped to commercial device 12/24/2018  7:02 AM   Clamp Status/Tolerance no abdominal discomfort;no abdominal distention;no emesis;no nausea;no residual;no restlessness;clamped 12/24/2018  7:02 AM   Suction Setting/Drainage Method dependent drainage 12/24/2018  7:02 AM   Insertion Site Appearance no redness, warmth, tenderness, skin breakdown, drainage 12/24/2018  7:02 AM   Drainage None 12/24/2018  7:02 AM   Flush/Irrigation flushed w/;water;no resistance met 12/24/2018  7:02 AM   Intake (mL) 30 mL 12/24/2018  9:00 AM   Residual Amount (ml) 0 ml 12/24/2018  7:02 AM            Urethral Catheter 12/20/18 2350 Non-latex;Straight-tip 16 Fr. (Active)   Site Assessment Clean;Intact 12/24/2018  7:02 AM   Collection Container Urimeter 12/24/2018  7:02 AM   Securement Method secured to top of thigh w/ adhesive device 12/24/2018  7:02 AM   Catheter Care Performed yes 12/24/2018  7:02 AM   Reason for Continuing Urinary Catheterization Urinary retention 12/24/2018  7:02 AM   CAUTI Prevention Bundle StatLock in place w 1" slack;Intact seal between catheter & drainage tubing;Drainage bag off the floor;Green sheeting clip in use;No dependent loops or kinks;Drainage bag not overfilled (<2/3 full);Drainage bag below bladder 12/24/2018  7:02 AM   Output (mL) 10 mL 12/24/2018 11:00 AM       Neurosurgery Physical Exam   "   E3VTM5  Intubated  PERRL. + cough/gag.   WithDraws to stim x4      Significant Labs:  Recent Labs   Lab 12/29/18  0242 12/29/18  1810 12/30/18  0406   *  --  120*     --  139   K 4.5  --  4.3     --  111*   CO2 25  --  24   BUN 48*  --  43*   CREATININE 0.6  --  0.5   CALCIUM 7.8*  --  7.6*   MG 1.7 1.6 1.8     Recent Labs   Lab 12/29/18  0242 12/30/18  0406   WBC 12.92* 10.95   HGB 9.8* 8.7*   HCT 29.9* 27.4*    213     No results for input(s): LABPT, INR, APTT in the last 48 hours.  Microbiology Results (last 7 days)     Procedure Component Value Units Date/Time    Blood culture [[id number]] Collected:  12/24/18 0006    Order Status:  Completed Specimen:  Blood from Line, Arterial, Right Updated:  12/29/18 0612     Blood Culture, Routine No growth after 5 days.    Blood culture [[id number]] Collected:  12/24/18 0005    Order Status:  Completed Specimen:  Blood from Peripheral, Hand, Left Updated:  12/29/18 0612     Blood Culture, Routine No growth after 5 days.    Culture, Respiratory with Gram Stain [[id number]] Collected:  12/24/18 0446    Order Status:  Completed Specimen:  Respiratory from Endotracheal Aspirate Updated:  12/26/18 1007     Respiratory Culture Normal respiratory donnell     Respiratory Culture No S aureus or Pseudomonas isolated.     Gram Stain (Respiratory) <10 epithelial cells per low power field.     Gram Stain (Respiratory) Moderate WBC's     Gram Stain (Respiratory) No organisms seen    Urine culture [[id number]]     Order Status:  Canceled Specimen:  Urine, Catheterized         Recent Lab Results       12/30/18  0532   12/30/18  0440   12/30/18  0406   12/30/18  0043   12/29/18  1813        Immature Granulocytes     2.1         Immature Grans (Abs)     0.23  Comment:  Mild elevation in immature granulocytes is non specific and   can be seen in a variety of conditions including stress response,   acute inflammation, trauma and pregnancy. Correlation with other    laboratory and clinical findings is essential.           Albumin     1.5         Alkaline Phosphatase     205         Allens Test   Pass           ALT     33         Anion Gap     4         AST     60         Baso #     0.05         Basophil%     0.5         Total Bilirubin     0.5  Comment:  For infants and newborns, interpretation of results should be based  on gestational age, weight and in agreement with clinical  observations.  Premature Infant recommended reference ranges:  Up to 24 hours.............<8.0 mg/dL  Up to 48 hours............<12.0 mg/dL  3-5 days..................<15.0 mg/dL  6-29 days.................<15.0 mg/dL           Site   LR           BUN, Bld     43         Calcium     7.6         Chloride     111         CO2     24         Creatinine     0.5         DelSys   Adult Vent           Differential Method     Automated         eGFR if      >60.0         eGFR if non      >60.0  Comment:  Calculation used to obtain the estimated glomerular filtration  rate (eGFR) is the CKD-EPI equation.            Eos #     0.2         Eosinophil%     2.0         FiO2   40           Glucose     120         Gran # (ANC)     7.6         Gran%     69.5         Hematocrit     27.4         Hemoglobin     8.7         Lymph #     2.0         Lymph%     18.4         Magnesium     1.8         MCH     30.7         MCHC     31.8         MCV     97         Mode   AC/PRVC           Mono #     0.8         Mono%     7.5         MPV     10.4         nRBC     0         PEEP   8           Phosphorus     1.4         Platelets     213         POC BE   -2           POC HCO3   22.0           POC PCO2   31.4           POC PH   7.454           POC PO2   161           POC SATURATED O2   100           POC TCO2   23           POCT Glucose 140     144 128     Potassium     4.3         Total Protein     4.4         Rate   16           RBC     2.83         RDW     15.1         Sample   ARTERIAL            Sodium     139         Vt   440           WBC     10.95                          12/29/18  1810   12/29/18  1144        Immature Granulocytes         Immature Grans (Abs)         Albumin         Alkaline Phosphatase         Allens Test         ALT         Anion Gap         AST         Baso #         Basophil%         Total Bilirubin         Site         BUN, Bld         Calcium         Chloride         CO2         Creatinine         DelSys         Differential Method         eGFR if          eGFR if non          Eos #         Eosinophil%         FiO2         Glucose         Gran # (ANC)         Gran%         Hematocrit         Hemoglobin         Lymph #         Lymph%         Magnesium 1.6       MCH         MCHC         MCV         Mode         Mono #         Mono%         MPV         nRBC         PEEP         Phosphorus         Platelets         POC BE         POC HCO3         POC PCO2         POC PH         POC PO2         POC SATURATED O2         POC TCO2         POCT Glucose   138     Potassium         Total Protein         Rate         RBC         RDW         Sample         Sodium         Vt         WBC               Significant Diagnostics:  I have reviewed all pertinent imaging results/findings within the past 24 hours.    Review of Systems        Assessment/Plan:     * SDH (subdural hematoma)    64 yo male with L aSDH now s/p craniotomy for evacuation (12/20).    Pt with persistent poor clinical exam.     -- Continue care per primary team.  -- Recommend consideration for early tracheostomy and gastrostomy placement.  -- Continue seizure treatment per neurology.   -- Staple removal 1/3.  -- We will continue to monitor closely, please contact us with any questions or concerns.           Pancho Zapata MD  Neurosurgery  [facility]

## 2018-12-30 NOTE — SUBJECTIVE & OBJECTIVE
Interval History:      Overnight complicated by  Tachycardia  Oliguria  Low grade fever    Cth done yday for more suppressed eeg, no changes from prior    Objective:     Vitals:  Temp: 97.7 °F (36.5 °C)  Pulse: 100  Rhythm: atrial rhythm  BP: (!) 141/84  MAP (mmHg): 107  Resp: (!) 29  SpO2: 100 %  Oxygen Concentration (%): 40  O2 Device (Oxygen Therapy): ventilator  Vent Mode: A/C  Set Rate: 16 bmp  Vt Set: 440 mL  PEEP/CPAP: 8 cmH20  Peak Airway Pressure: 21 cmH2O  Mean Airway Pressure: 12 cmH20  Plateau Pressure: 0 cmH20    Temp  Min: 97.7 °F (36.5 °C)  Max: 100.1 °F (37.8 °C)  Pulse  Min: 90  Max: 130  BP  Min: 110/77  Max: 172/81  MAP (mmHg)  Min: 86  Max: 115  Resp  Min: 24  Max: 37  SpO2  Min: 96 %  Max: 100 %  Oxygen Concentration (%)  Min: 40  Max: 40    12/28 0701 - 12/29 0700  In: 3723 [I.V.:176]  Out: 970 [Urine:970]   Unmeasured Output  Stool Occurrence: 0       Physical Exam  Vital signs, lab studies, and imaging reviewed by me  Obtunded, pupils reactive, no commands, withdraws to pain in all limbs, less spontaneous movements   Warm and well perfused, regular rate and rhythm, no murmurs  No dependent edema  Breathing comfortably, breath sounds clear  Belly soft, nontender, no hepatosplenomegaly      Medications:  Continuous Scheduled  bisacodyl 10 mg Daily   chlorhexidine 15 mL QID   clonazePAM 1 mg TID   famotidine 20 mg BID   heparin (porcine) 5,000 Units Q8H   lacosamide (VIMPAT) in saline IVPB 200 mg Q12H   metoprolol tartrate 25 mg TID   polyethylene glycol 17 g Daily   pravastatin 40 mg QHS   senna-docusate 8.6-50 mg 1 tablet BID   sodium chloride 0.9% 3 mL Q8H   PRN  acetaminophen 650 mg Q6H PRN   magnesium oxide 800 mg PRN   magnesium oxide 800 mg PRN   ondansetron 4 mg Q12H PRN   potassium chloride 10% 40 mEq PRN   potassium chloride 10% 40 mEq PRN   potassium chloride 10% 60 mEq PRN   potassium, sodium phosphates 2 packet PRN   potassium, sodium phosphates 2 packet PRN   potassium, sodium  phosphates 2 packet PRN     Diet  No diet orders on file  No diet orders on file

## 2018-12-30 NOTE — PROGRESS NOTES
[facility]  Neurocritical Care  Progress Note    Admit Date: 12/20/2018  Service Date: 12/29/2018  Length of Stay: 9    Subjective:     Chief Complaint: SDH (subdural hematoma)    History of Present Illness: 63 year old male h/o CABG on ASA 81 mg (last dose 72 hours PTA) presenting as transfer from OSH for EDH with SDH. Patient reportedly had a mechanical fall from standing on Sunday and went to ED where CTH was negative for any acute intracranial pathology. Overnight, patient developed confusion and presented to OSH ED where CTH was consistent with EDH with SDH. Patient has progressively worsened throughout the day developing intermittent global aphasia. NSGY consulted in ED at [facility] [place] and stat CTH has been ordered. NCC consulted for admission and medical management. Patient on weaning off of cardene at time of evaluation with sbp of 120. Reported presenting sbp in 150s.    Hospital Course: 12/22: no major events overnight. Weaned off of cardene. This morning AOx3. Drain removed by NSGY at 11 AM, and planned to step down to NSGY.  AMS noted around 1PM, patient aphasic and not following commands, STAT CTH unremarkable.   12/26: wean ketamine, follow EEG, send urine studuies-Na 133, NS bolus, advance TF  12/27: off ketamine, d/c levo, continue EEG, and AEDs, 500 NS bolus, increase enteral water flushes, continue abx for another day    Interval History:      Overnight complicated by  Tachycardia  Oliguria  Low grade fever    Cth done yday for more suppressed eeg, no changes from prior    Objective:     Vitals:  Temp: 97.7 °F (36.5 °C)  Pulse: 100  Rhythm: atrial rhythm  BP: (!) 141/84  MAP (mmHg): 107  Resp: (!) 29  SpO2: 100 %  Oxygen Concentration (%): 40  O2 Device (Oxygen Therapy): ventilator  Vent Mode: A/C  Set Rate: 16 bmp  Vt Set: 440 mL  PEEP/CPAP: 8 cmH20  Peak Airway Pressure: 21 cmH2O  Mean Airway Pressure: 12 cmH20  Plateau Pressure: 0 cmH20    Temp  Min: 97.7 °F (36.5 °C)  Max: 100.1 °F  (37.8 °C)  Pulse  Min: 90  Max: 130  BP  Min: 110/77  Max: 172/81  MAP (mmHg)  Min: 86  Max: 115  Resp  Min: 24  Max: 37  SpO2  Min: 96 %  Max: 100 %  Oxygen Concentration (%)  Min: 40  Max: 40    12/28 0701 - 12/29 0700  In: 3723 [I.V.:176]  Out: 970 [Urine:970]   Unmeasured Output  Stool Occurrence: 0       Physical Exam  Vital signs, lab studies, and imaging reviewed by me  Obtunded, pupils reactive, no commands, withdraws to pain in all limbs, less spontaneous movements   Warm and well perfused, regular rate and rhythm, no murmurs  No dependent edema  Breathing comfortably, breath sounds clear  Belly soft, nontender, no hepatosplenomegaly      Medications:  Continuous Scheduled  bisacodyl 10 mg Daily   chlorhexidine 15 mL QID   clonazePAM 1 mg TID   famotidine 20 mg BID   heparin (porcine) 5,000 Units Q8H   lacosamide (VIMPAT) in saline IVPB 200 mg Q12H   metoprolol tartrate 25 mg TID   polyethylene glycol 17 g Daily   pravastatin 40 mg QHS   senna-docusate 8.6-50 mg 1 tablet BID   sodium chloride 0.9% 3 mL Q8H   PRN  acetaminophen 650 mg Q6H PRN   magnesium oxide 800 mg PRN   magnesium oxide 800 mg PRN   ondansetron 4 mg Q12H PRN   potassium chloride 10% 40 mEq PRN   potassium chloride 10% 40 mEq PRN   potassium chloride 10% 60 mEq PRN   potassium, sodium phosphates 2 packet PRN   potassium, sodium phosphates 2 packet PRN   potassium, sodium phosphates 2 packet PRN     Diet  No diet orders on file  No diet orders on file        Assessment/Plan:     Neuro   Status epilepticus    No further seizures off ketamine,   Mental status remains poor    Dc keppra, cont klp and lac  continuous EEG       Pulmonary   Acute respiratory failure    Hypoxic, aspiration, bronch with mucous plugging in L lung  Now on minimal vent settings    Maintain today in setting of mental status  Follow CXR and ABG daily     Cardiac/Vascular   New onset atrial fibrillation    Tachycardic    Monitor off ac  Start bb today     Renal/   AYLIN  (acute kidney injury)    uop improved  Bun rising  Increased insensible losses due to open mouth    Cont fluid resuscitation  Increase fwf  Follow uop           The patient is being Prophylaxed for:  Venous Thromboembolism with: Mechanical or Chemical  Stress Ulcer with: H2B  Ventilator Pneumonia with: chlorhexidine oral care    Activity Orders          None        Full Code     I spent 30 min of uninterrupted critical care time caring for this critically ill patient exclusive of procedures and teaching.     Kenneth Decker MD  Neurocritical Care  [facility]

## 2018-12-30 NOTE — ASSESSMENT & PLAN NOTE
64 yo male with L aSDH now s/p craniotomy for evacuation (12/20).    Pt with persistent poor clinical exam.     -- Continue care per primary team.  -- Recommend consideration for early tracheostomy and gastrostomy placement.  -- Continue seizure treatment per neurology.   -- Staple removal 1/3.  -- We will continue to monitor closely, please contact us with any questions or concerns.

## 2018-12-30 NOTE — ASSESSMENT & PLAN NOTE
Hypoxic, aspiration, bronch with mucous plugging in L lung  Now on minimal vent settings    Maintain today in setting of mental status  Follow CXR and ABG daily

## 2018-12-30 NOTE — ASSESSMENT & PLAN NOTE
No further seizures off ketamine,   Mental status remains poor    Dc keppra, cont klp and lac  continuous EEG

## 2018-12-31 LAB
ALBUMIN SERPL BCP-MCNC: 1.6 G/DL
ALLENS TEST: ABNORMAL
ALP SERPL-CCNC: 272 U/L
ALT SERPL W/O P-5'-P-CCNC: 45 U/L
AMYLASE SERPL-CCNC: 22 U/L
ANION GAP SERPL CALC-SCNC: 4 MMOL/L
AST SERPL-CCNC: 63 U/L
BASOPHILS # BLD AUTO: 0.04 K/UL
BASOPHILS NFR BLD: 0.3 %
BILIRUB SERPL-MCNC: 0.6 MG/DL
BUN SERPL-MCNC: 41 MG/DL
CALCIUM SERPL-MCNC: 7.9 MG/DL
CHLORIDE SERPL-SCNC: 108 MMOL/L
CO2 SERPL-SCNC: 24 MMOL/L
CREAT SERPL-MCNC: 0.5 MG/DL
DELSYS: ABNORMAL
DIFFERENTIAL METHOD: ABNORMAL
EOSINOPHIL # BLD AUTO: 0.3 K/UL
EOSINOPHIL NFR BLD: 2 %
ERYTHROCYTE [DISTWIDTH] IN BLOOD BY AUTOMATED COUNT: 15.3 %
ERYTHROCYTE [SEDIMENTATION RATE] IN BLOOD BY WESTERGREN METHOD: 16 MM/H
EST. GFR  (AFRICAN AMERICAN): >60 ML/MIN/1.73 M^2
EST. GFR  (NON AFRICAN AMERICAN): >60 ML/MIN/1.73 M^2
FIO2: 40
GLUCOSE SERPL-MCNC: 113 MG/DL
HCO3 UR-SCNC: 23.4 MMOL/L (ref 24–28)
HCT VFR BLD AUTO: 27.9 %
HGB BLD-MCNC: 9.3 G/DL
IMM GRANULOCYTES # BLD AUTO: 0.29 K/UL
IMM GRANULOCYTES NFR BLD AUTO: 2.1 %
LIPASE SERPL-CCNC: 24 U/L
LYMPHOCYTES # BLD AUTO: 2.2 K/UL
LYMPHOCYTES NFR BLD: 15.8 %
MAGNESIUM SERPL-MCNC: 1.8 MG/DL
MCH RBC QN AUTO: 31.7 PG
MCHC RBC AUTO-ENTMCNC: 33.3 G/DL
MCV RBC AUTO: 95 FL
MODE: ABNORMAL
MONOCYTES # BLD AUTO: 1 K/UL
MONOCYTES NFR BLD: 7.1 %
NEUTROPHILS # BLD AUTO: 9.9 K/UL
NEUTROPHILS NFR BLD: 72.7 %
NRBC BLD-RTO: 0 /100 WBC
PCO2 BLDA: 31.8 MMHG (ref 35–45)
PEEP: 8
PH SMN: 7.47 [PH] (ref 7.35–7.45)
PHOSPHATE SERPL-MCNC: 1.7 MG/DL
PLATELET # BLD AUTO: 241 K/UL
PMV BLD AUTO: 10.1 FL
PO2 BLDA: 109 MMHG (ref 80–100)
POC BE: 0 MMOL/L
POC SATURATED O2: 99 % (ref 95–100)
POC TCO2: 24 MMOL/L (ref 23–27)
POCT GLUCOSE: 139 MG/DL (ref 70–110)
POTASSIUM SERPL-SCNC: 4.3 MMOL/L
PROT SERPL-MCNC: 4.7 G/DL
RBC # BLD AUTO: 2.93 M/UL
SAMPLE: ABNORMAL
SITE: ABNORMAL
SODIUM SERPL-SCNC: 136 MMOL/L
VT: 440
WBC # BLD AUTO: 13.57 K/UL

## 2018-12-31 PROCEDURE — 95813 PR EEG, EXTENDED, 61-119 MINS: ICD-10-PCS | Mod: 26,,, | Performed by: PSYCHIATRY & NEUROLOGY

## 2018-12-31 PROCEDURE — 99233 SBSQ HOSP IP/OBS HIGH 50: CPT | Mod: GC,,, | Performed by: PSYCHIATRY & NEUROLOGY

## 2018-12-31 PROCEDURE — 63600175 PHARM REV CODE 636 W HCPCS: Performed by: STUDENT IN AN ORGANIZED HEALTH CARE EDUCATION/TRAINING PROGRAM

## 2018-12-31 PROCEDURE — 25000003 PHARM REV CODE 250: Performed by: PSYCHIATRY & NEUROLOGY

## 2018-12-31 PROCEDURE — 99900035 HC TECH TIME PER 15 MIN (STAT)

## 2018-12-31 PROCEDURE — 94761 N-INVAS EAR/PLS OXIMETRY MLT: CPT

## 2018-12-31 PROCEDURE — 25500020 PHARM REV CODE 255: Performed by: STUDENT IN AN ORGANIZED HEALTH CARE EDUCATION/TRAINING PROGRAM

## 2018-12-31 PROCEDURE — 80053 COMPREHEN METABOLIC PANEL: CPT

## 2018-12-31 PROCEDURE — 36600 WITHDRAWAL OF ARTERIAL BLOOD: CPT

## 2018-12-31 PROCEDURE — 85025 COMPLETE CBC W/AUTO DIFF WBC: CPT

## 2018-12-31 PROCEDURE — A4216 STERILE WATER/SALINE, 10 ML: HCPCS | Performed by: PSYCHIATRY & NEUROLOGY

## 2018-12-31 PROCEDURE — 82150 ASSAY OF AMYLASE: CPT

## 2018-12-31 PROCEDURE — 25000003 PHARM REV CODE 250: Performed by: NURSE PRACTITIONER

## 2018-12-31 PROCEDURE — 83735 ASSAY OF MAGNESIUM: CPT

## 2018-12-31 PROCEDURE — 27200966 HC CLOSED SUCTION SYSTEM

## 2018-12-31 PROCEDURE — 27000221 HC OXYGEN, UP TO 24 HOURS

## 2018-12-31 PROCEDURE — 99900026 HC AIRWAY MAINTENANCE (STAT)

## 2018-12-31 PROCEDURE — 83690 ASSAY OF LIPASE: CPT

## 2018-12-31 PROCEDURE — C9254 INJECTION, LACOSAMIDE: HCPCS | Performed by: NURSE PRACTITIONER

## 2018-12-31 PROCEDURE — 25000003 PHARM REV CODE 250: Performed by: STUDENT IN AN ORGANIZED HEALTH CARE EDUCATION/TRAINING PROGRAM

## 2018-12-31 PROCEDURE — 82803 BLOOD GASES ANY COMBINATION: CPT

## 2018-12-31 PROCEDURE — 63600175 PHARM REV CODE 636 W HCPCS: Performed by: NURSE PRACTITIONER

## 2018-12-31 PROCEDURE — 25500020 PHARM REV CODE 255: Performed by: PSYCHIATRY & NEUROLOGY

## 2018-12-31 PROCEDURE — 84100 ASSAY OF PHOSPHORUS: CPT

## 2018-12-31 PROCEDURE — 94003 VENT MGMT INPAT SUBQ DAY: CPT

## 2018-12-31 PROCEDURE — 99233 PR SUBSEQUENT HOSPITAL CARE,LEVL III: ICD-10-PCS | Mod: GC,,, | Performed by: PSYCHIATRY & NEUROLOGY

## 2018-12-31 PROCEDURE — 95813 EEG EXTND MNTR 61-119 MIN: CPT | Mod: 26,,, | Performed by: PSYCHIATRY & NEUROLOGY

## 2018-12-31 PROCEDURE — 20000000 HC ICU ROOM

## 2018-12-31 RX ORDER — LOSARTAN POTASSIUM 100 MG/1
100 TABLET ORAL DAILY
Status: ON HOLD | COMMUNITY
End: 2019-01-18 | Stop reason: HOSPADM

## 2018-12-31 RX ORDER — ASPIRIN 325 MG
50000 TABLET, DELAYED RELEASE (ENTERIC COATED) ORAL WEEKLY
Status: ON HOLD | COMMUNITY
End: 2019-01-18 | Stop reason: HOSPADM

## 2018-12-31 RX ORDER — SODIUM CHLORIDE 9 MG/ML
INJECTION, SOLUTION INTRAVENOUS CONTINUOUS
Status: ACTIVE | OUTPATIENT
Start: 2018-12-31 | End: 2019-01-01

## 2018-12-31 RX ORDER — NIFEDIPINE 60 MG/1
120 TABLET, EXTENDED RELEASE ORAL DAILY
Status: ON HOLD | COMMUNITY
End: 2019-01-18 | Stop reason: HOSPADM

## 2018-12-31 RX ORDER — METOPROLOL TARTRATE 25 MG/1
25 TABLET, FILM COATED ORAL 3 TIMES DAILY
Status: DISCONTINUED | OUTPATIENT
Start: 2018-12-31 | End: 2019-01-03

## 2018-12-31 RX ADMIN — STANDARDIZED SENNA CONCENTRATE AND DOCUSATE SODIUM 1 TABLET: 8.6; 5 TABLET, FILM COATED ORAL at 09:12

## 2018-12-31 RX ADMIN — CHLORHEXIDINE GLUCONATE 0.12% ORAL RINSE 15 ML: 1.2 LIQUID ORAL at 01:12

## 2018-12-31 RX ADMIN — Medication 3 ML: at 02:12

## 2018-12-31 RX ADMIN — FAMOTIDINE 20 MG: 20 TABLET ORAL at 09:12

## 2018-12-31 RX ADMIN — IOHEXOL 15 ML: 350 INJECTION, SOLUTION INTRAVENOUS at 01:12

## 2018-12-31 RX ADMIN — HEPARIN SODIUM 5000 UNITS: 5000 INJECTION, SOLUTION INTRAVENOUS; SUBCUTANEOUS at 02:12

## 2018-12-31 RX ADMIN — STANDARDIZED SENNA CONCENTRATE AND DOCUSATE SODIUM 1 TABLET: 8.6; 5 TABLET, FILM COATED ORAL at 08:12

## 2018-12-31 RX ADMIN — PRAVASTATIN SODIUM 40 MG: 40 TABLET ORAL at 09:12

## 2018-12-31 RX ADMIN — METOPROLOL TARTRATE 25 MG: 25 TABLET ORAL at 08:12

## 2018-12-31 RX ADMIN — IOHEXOL 100 ML: 350 INJECTION, SOLUTION INTRAVENOUS at 02:12

## 2018-12-31 RX ADMIN — POTASSIUM & SODIUM PHOSPHATES POWDER PACK 280-160-250 MG 2 PACKET: 280-160-250 PACK at 05:12

## 2018-12-31 RX ADMIN — CHLORHEXIDINE GLUCONATE 0.12% ORAL RINSE 15 ML: 1.2 LIQUID ORAL at 08:12

## 2018-12-31 RX ADMIN — CLONAZEPAM 1 MG: 1 TABLET ORAL at 02:12

## 2018-12-31 RX ADMIN — POTASSIUM & SODIUM PHOSPHATES POWDER PACK 280-160-250 MG 2 PACKET: 280-160-250 PACK at 01:12

## 2018-12-31 RX ADMIN — POTASSIUM & SODIUM PHOSPHATES POWDER PACK 280-160-250 MG 2 PACKET: 280-160-250 PACK at 10:12

## 2018-12-31 RX ADMIN — Medication 3 ML: at 06:12

## 2018-12-31 RX ADMIN — CHLORHEXIDINE GLUCONATE 0.12% ORAL RINSE 15 ML: 1.2 LIQUID ORAL at 05:12

## 2018-12-31 RX ADMIN — MAGNESIUM OXIDE TAB 400 MG (241.3 MG ELEMENTAL MG) 800 MG: 400 (241.3 MG) TAB at 05:12

## 2018-12-31 RX ADMIN — SODIUM CHLORIDE 200 MG: 9 INJECTION, SOLUTION INTRAVENOUS at 08:12

## 2018-12-31 RX ADMIN — CLONAZEPAM 1 MG: 1 TABLET ORAL at 09:12

## 2018-12-31 RX ADMIN — METOPROLOL TARTRATE 25 MG: 25 TABLET ORAL at 09:12

## 2018-12-31 RX ADMIN — METOPROLOL TARTRATE 25 MG: 25 TABLET ORAL at 02:12

## 2018-12-31 RX ADMIN — FAMOTIDINE 20 MG: 20 TABLET ORAL at 08:12

## 2018-12-31 RX ADMIN — IOHEXOL 15 ML: 350 INJECTION, SOLUTION INTRAVENOUS at 12:12

## 2018-12-31 RX ADMIN — HEPARIN SODIUM 5000 UNITS: 5000 INJECTION, SOLUTION INTRAVENOUS; SUBCUTANEOUS at 05:12

## 2018-12-31 RX ADMIN — HEPARIN SODIUM 5000 UNITS: 5000 INJECTION, SOLUTION INTRAVENOUS; SUBCUTANEOUS at 09:12

## 2018-12-31 RX ADMIN — CLONAZEPAM 1 MG: 1 TABLET ORAL at 08:12

## 2018-12-31 NOTE — PLAN OF CARE
Problem: Adult Inpatient Plan of Care  Goal: Plan of Care Review  Outcome: Ongoing (interventions implemented as appropriate)  POC reviewed with pt and family at 1700. Pt intubated and unable to verbalize understanding;  Family at bedside verbalized understanding, all questions and concerns addressed. TF at goal, mobley intact draining clear yellow urine, BP maintained <180 without PRN medications; cEEG in progress.  Pt progressing toward goals. Will continue to monitor. See flowsheets for full assessment and VS info.

## 2018-12-31 NOTE — ASSESSMENT & PLAN NOTE
Malnutrition in the context of Chronic Illness/Injury    Related to (etiology):  Decreased intake, appetite    Signs and Symptoms (as evidenced by):  Energy Intake: <75% of estimated energy requirement for > 1 year  Body Fat Depletion: severe depletion of orbitals, triceps and thoracic and lumbar region   Muscle Mass Depletion: severe depletion of temples, clavicle region, scapular region, interosseous muscle and lower extremities   Weight Loss: 44% x 1 year     Interventions/Recommendations (treatment strategy):  Continue enteral nutrition    Nutrition Diagnosis Status:  Continues

## 2018-12-31 NOTE — PROGRESS NOTES
[facility]  Neurosurgery  Progress Note    Subjective:     History of Present Illness: 62 yo M with PMH of CAD, CABG in 2006, DM, HTN, HLD on ASA presents to the ED for AMS. Patient fell 4 days ago with about 2 minutes of LOC. Family is unsure of what caused the fall. Full workup done including a CTH at [place] that was negative. Patient went home and stopped taking his ASA. This morning, his family noticed he was more confused. He went to [place] and CTH was done that revealed a 1.7cm left frontalpariental SDH with a 5 mm right sided midline shift and a left SAH. Patient was transferred to [facility]. History was obtained per family as patient unable to give history 2/2 confusion with expressive and receptive aphasia.    Post-Op Info:  Procedure(s) (LRB):  CRANIOTOMY, FOR SUBDURAL HEMATOMA EVACUATION (Left)   11 Days Post-Op     Interval History: NAEON.     Medications:  Continuous Infusions:  Scheduled Meds:   chlorhexidine  15 mL Mouth/Throat QID    clonazePAM  1 mg Per NG tube TID    famotidine  20 mg Per NG tube BID    heparin (porcine)  5,000 Units Subcutaneous Q8H    lacosamide (VIMPAT) in saline IVPB  200 mg Intravenous Q12H    metoprolol tartrate  25 mg Per NG tube TID    polyethylene glycol  17 g Per NG tube Daily    pravastatin  40 mg Per NG tube QHS    senna-docusate 8.6-50 mg  1 tablet Per NG tube BID    sodium chloride 0.9%  3 mL Intravenous Q8H     PRN Meds:acetaminophen, magnesium oxide, magnesium oxide, ondansetron, potassium chloride 10%, potassium chloride 10%, potassium chloride 10%, potassium, sodium phosphates, potassium, sodium phosphates, potassium, sodium phosphates     Review of Systems  Objective:     Weight: 85.7 kg (189 lb)  Body mass index is 27.12 kg/m².  Vital Signs (Most Recent):  Temp: 98.1 °F (36.7 °C) (12/31/18 0715)  Pulse: 88 (12/31/18 0913)  Resp: (!) 28 (12/31/18 0913)  BP: 129/77 (12/31/18 0903)  SpO2: 100 % (12/31/18 0913) Vital Signs (24h  Range):  Temp:  [98.1 °F (36.7 °C)-99.3 °F (37.4 °C)] 98.1 °F (36.7 °C)  Pulse:  [] 88  Resp:  [26-33] 28  SpO2:  [96 %-100 %] 100 %  BP: (119-165)/() 129/77     Date 12/31/18 0700 - 01/01/19 0659   Shift 6097-1737 8885-6109 0540-9149 24 Hour Total   INTAKE   I.V.(mL/kg) 15(0.2)   15(0.2)   NG/   165   IV Piggyback 100   100   Shift Total(mL/kg) 280(3.3)   280(3.3)   OUTPUT   Urine(mL/kg/hr) 150   150   Shift Total(mL/kg) 150(1.7)   150(1.7)   Weight (kg) 85.7 85.7 85.7 85.7              Vent Mode: A/C  Oxygen Concentration (%):  [40-41] 40  Resp Rate Total:  [24 br/min-32 br/min] 25 br/min  Vt Set:  [440 mL] 440 mL  PEEP/CPAP:  [8 cmH20] 8 cmH20  Mean Airway Pressure:  [10 ipP60-74 cmH20] 12 cmH20         NG/OG Tube 12/23/18 1500 Center mouth (Active)   Placement Check placement verified by x-ray;placement verified by distal tube length measurement 12/31/2018  7:15 AM   Tolerance no signs/symptoms of discomfort 12/31/2018  7:15 AM   Securement taped to commercial device 12/31/2018  7:15 AM   Clamp Status/Tolerance unclamped;no abdominal discomfort;no abdominal distention;no emesis;no nausea;no residual;no restlessness 12/31/2018  7:15 AM   Suction Setting/Drainage Method dependent drainage 12/26/2018  3:05 AM   Insertion Site Appearance no redness, warmth, tenderness, skin breakdown, drainage 12/31/2018  7:15 AM   Drainage None 12/31/2018  7:15 AM   Flush/Irrigation flushed w/;water;no resistance met 12/31/2018  7:15 AM   Feeding Method continuous 12/31/2018  7:15 AM   Feeding Action feeding continued 12/31/2018  3:03 AM   Current Rate (mL/hr) 55 mL/hr 12/31/2018  7:15 AM   Goal Rate (mL/hr) 55 mL/hr 12/31/2018  7:15 AM   Intake (mL) 100 mL 12/30/2018  4:00 PM   Water Bolus (mL) 250 mL 12/31/2018  6:00 AM   Rate Formula Tube Feeding (mL/hr) 55 mL/hr 12/30/2018  7:02 AM   Formula Name impact peptide 12/31/2018  3:03 AM   Intake (mL) - Formula Tube Feeding 55 12/31/2018  9:00 AM   Residual Amount  "(ml) 5 ml 12/30/2018  7:02 AM            Urethral Catheter 12/20/18 2350 Non-latex;Straight-tip 16 Fr. (Active)   Site Assessment Clean;Intact 12/31/2018  7:15 AM   Collection Container Urimeter 12/31/2018  7:15 AM   Securement Method secured to top of thigh w/ adhesive device 12/31/2018  7:15 AM   Catheter Care Performed yes 12/31/2018  7:15 AM   Reason for Continuing Urinary Catheterization Critically ill in ICU requiring intensive monitoring;Urinary retention 12/31/2018  7:15 AM   CAUTI Prevention Bundle StatLock in place w 1" slack;Intact seal between catheter & drainage tubing;Drainage bag off the floor;Green sheeting clip in use;No dependent loops or kinks;Drainage bag not overfilled (<2/3 full);Drainage bag below bladder 12/31/2018  7:15 AM   Output (mL) 50 mL 12/31/2018  9:00 AM       Neurosurgery Physical Exam   [id number]  Intubated  PERRL. + cough/gag.   Withdraws to stim x4  Reportedly AG spontaneously in BUE for nursing        Significant Labs:  Recent Labs   Lab 12/29/18  1810 12/30/18  0406 12/31/18  0220   GLU  --  120* 113*   NA  --  139 136   K  --  4.3 4.3   CL  --  111* 108   CO2  --  24 24   BUN  --  43* 41*   CREATININE  --  0.5 0.5   CALCIUM  --  7.6* 7.9*   MG 1.6 1.8 1.8     Recent Labs   Lab 12/30/18  0406 12/31/18  0220   WBC 10.95 13.57*   HGB 8.7* 9.3*   HCT 27.4* 27.9*    241     No results for input(s): LABPT, INR, APTT in the last 48 hours.  Microbiology Results (last 7 days)     Procedure Component Value Units Date/Time    Blood culture [[id number]] Collected:  12/24/18 0006    Order Status:  Completed Specimen:  Blood from Line, Arterial, Right Updated:  12/29/18 0612     Blood Culture, Routine No growth after 5 days.    Blood culture [[id number]] Collected:  12/24/18 0005    Order Status:  Completed Specimen:  Blood from Peripheral, Hand, Left Updated:  12/29/18 0612     Blood Culture, Routine No growth after 5 days.    Culture, Respiratory with Gram Stain [[id number]] Collected:  " 12/24/18 0446    Order Status:  Completed Specimen:  Respiratory from Endotracheal Aspirate Updated:  12/26/18 1007     Respiratory Culture Normal respiratory donnell     Respiratory Culture No S aureus or Pseudomonas isolated.     Gram Stain (Respiratory) <10 epithelial cells per low power field.     Gram Stain (Respiratory) Moderate WBC's     Gram Stain (Respiratory) No organisms seen        Recent Lab Results       12/31/18  0608   12/31/18  0329   12/31/18  0220   12/30/18  1804   12/30/18  1135        Immature Granulocytes     2.1         Immature Grans (Abs)     0.29  Comment:  Mild elevation in immature granulocytes is non specific and   can be seen in a variety of conditions including stress response,   acute inflammation, trauma and pregnancy. Correlation with other   laboratory and clinical findings is essential.           Albumin     1.6         Alkaline Phosphatase     272         Allens Test   Pass           ALT     45         Anion Gap     4         AST     63         Baso #     0.04         Basophil%     0.3         Total Bilirubin     0.6  Comment:  For infants and newborns, interpretation of results should be based  on gestational age, weight and in agreement with clinical  observations.  Premature Infant recommended reference ranges:  Up to 24 hours.............<8.0 mg/dL  Up to 48 hours............<12.0 mg/dL  3-5 days..................<15.0 mg/dL  6-29 days.................<15.0 mg/dL           Site   LR           BUN, Bld     41         Calcium     7.9         Chloride     108         CO2     24         Creatinine     0.5         DelSys   Adult Vent           Differential Method     Automated         eGFR if      >60.0         eGFR if non      >60.0  Comment:  Calculation used to obtain the estimated glomerular filtration  rate (eGFR) is the CKD-EPI equation.            Eos #     0.3         Eosinophil%     2.0         FiO2   40           Glucose     113          Gran # (ANC)     9.9         Gran%     72.7         Hematocrit     27.9         Hemoglobin     9.3         Lymph #     2.2         Lymph%     15.8         Magnesium     1.8         MCH     31.7         MCHC     33.3         MCV     95         Mode   AC/PRVC           Mono #     1.0         Mono%     7.1         MPV     10.1         nRBC     0         PEEP   8           Phosphorus     1.7         Platelets     241         POC BE   0           POC HCO3   23.4           POC PCO2   31.8           POC PH   7.474           POC PO2   109           POC SATURATED O2   99           POC TCO2   24           POCT Glucose 139     131 131     Potassium     4.3         Total Protein     4.7         Rate   16           RBC     2.93         RDW     15.3         Sample   ARTERIAL           Sodium     136         Vt   440           WBC     13.57                              Significant Diagnostics:  I have reviewed all pertinent imaging results/findings within the past 24 hours.    Assessment/Plan:     * SDH (subdural hematoma)    64 yo male with L aSDH now s/p craniotomy for evacuation (12/20).    Pt with persistent poor clinical exam.     -- Continue care per primary team.  -- Recommend consideration for early tracheostomy and gastrostomy placement.  -- Continue seizure treatment per neurology, f/u EEG  -- Staple removal 1/3.  -- We will continue to monitor closely, please contact us with any questions or concerns.    Discussed w/Dr. Kurtis Rasheed MD  Neurosurgery  [facility]

## 2018-12-31 NOTE — PLAN OF CARE
Problem: Adult Inpatient Plan of Care  Goal: Plan of Care Review  Outcome: Ongoing (interventions implemented as appropriate)  POC reviewed with pt and family at 1400. Pt unable to verbalize understanding dt intubation. Questions and concerns addressed. No acute events today, pt has remained stable, TF at goal, urine output 30-60/hr, cEEG monitoring in progress. Pt progressing toward goals. Will continue to monitor. See flowsheets for full assessment and VS info.

## 2018-12-31 NOTE — SUBJECTIVE & OBJECTIVE
Interval History:      Improving neuro exam  Leukocytosis, afebrile  Elevated alk phos    Objective:     Vitals:  Temp: 98.2 °F (36.8 °C)  Pulse: 100  Rhythm: atrial rhythm  BP: 127/67  MAP (mmHg): 102  Resp: (!) 24  SpO2: 100 %  Oxygen Concentration (%): 40  O2 Device (Oxygen Therapy): ventilator  Vent Mode: A/C  Set Rate: 16 bmp  Vt Set: 440 mL  PEEP/CPAP: 8 cmH20  Peak Airway Pressure: 22 cmH2O  Mean Airway Pressure: 12 cmH20  Plateau Pressure: 0 cmH20    Temp  Min: 98.1 °F (36.7 °C)  Max: 99.3 °F (37.4 °C)  Pulse  Min: 88  Max: 117  BP  Min: 111/67  Max: 161/91  MAP (mmHg)  Min: 84  Max: 125  Resp  Min: 24  Max: 33  SpO2  Min: 100 %  Max: 100 %  Oxygen Concentration (%)  Min: 40  Max: 100    12/30 0701 - 12/31 0700  In: 2710 [I.V.:90]  Out: 1165 [Urine:1165]   Unmeasured Output  Stool Occurrence: 0       Physical Exam  Vital signs, lab studies, and imaging reviewed by me  Obtunded, opens eyes to voice, no commands, more spontaneous movements   Warm and well perfused, regular rate and rhythm, no murmurs  No dependent edema  Breathing comfortably, breath sounds clear  Belly soft, nontender, no hepatosplenomegaly    Medications:  Continuous  sodium chloride 0.9% Last Rate: 100 mL/hr at 12/31/18 1100   Scheduled  chlorhexidine 15 mL QID   clonazePAM 1 mg TID   famotidine 20 mg BID   heparin (porcine) 5,000 Units Q8H   lacosamide (VIMPAT) in saline IVPB 200 mg Q12H   metoprolol tartrate 25 mg TID   polyethylene glycol 17 g Daily   pravastatin 40 mg QHS   senna-docusate 8.6-50 mg 1 tablet BID   sodium chloride 0.9% 3 mL Q8H   PRN  acetaminophen 650 mg Q6H PRN   magnesium oxide 800 mg PRN   magnesium oxide 800 mg PRN   ondansetron 4 mg Q12H PRN   potassium chloride 10% 40 mEq PRN   potassium chloride 10% 40 mEq PRN   potassium chloride 10% 60 mEq PRN   potassium, sodium phosphates 2 packet PRN   potassium, sodium phosphates 2 packet PRN   potassium, sodium phosphates 2 packet PRN     Diet  No diet orders on file  No  diet orders on file

## 2018-12-31 NOTE — PROGRESS NOTES
[facility]  Neurocritical Care  Progress Note    Admit Date: 12/20/2018  Service Date: 12/30/2018  Length of Stay: 10    Subjective:     Chief Complaint: SDH (subdural hematoma)    History of Present Illness: 63 year old male h/o CABG on ASA 81 mg (last dose 72 hours PTA) presenting as transfer from OSH for EDH with SDH. Patient reportedly had a mechanical fall from standing on Sunday and went to ED where CTH was negative for any acute intracranial pathology. Overnight, patient developed confusion and presented to OSH ED where CTH was consistent with EDH with SDH. Patient has progressively worsened throughout the day developing intermittent global aphasia. NSGY consulted in ED at [facility] [place] [place] and stat CTH has been ordered. NCC consulted for admission and medical management. Patient on weaning off of cardene at time of evaluation with sbp of 120. Reported presenting sbp in 150s.    Hospital Course: 12/22: no major events overnight. Weaned off of cardene. This morning AOx3. Drain removed by NSGY at 11 AM, and planned to step down to NSGY.  AMS noted around 1PM, patient aphasic and not following commands, STAT CTH unremarkable.   12/26: wean ketamine, follow EEG, send urine studuies-Na 133, NS bolus, advance TF  12/27: off ketamine, d/c levo, continue EEG, and AEDs, 500 NS bolus, increase enteral water flushes, continue abx for another day    Interval History:      Neuro exam slighty better w spontaneous movements and eye opening to voice  uop inproved  Had bm    Objective:     Vitals:  Temp: 99.3 °F (37.4 °C)  Pulse: 104  Rhythm: atrial rhythm  BP: (!) 151/68  MAP (mmHg): 98  Resp: (!) 29  SpO2: 100 %  Oxygen Concentration (%): 40  O2 Device (Oxygen Therapy): ventilator  Vent Mode: A/C  Set Rate: 16 bmp  Vt Set: 440 mL  PEEP/CPAP: 8 cmH20  Peak Airway Pressure: 21 cmH2O  Mean Airway Pressure: 12 cmH20  Plateau Pressure: 0 cmH20    Temp  Min: 97.8 °F (36.6 °C)  Max: 99.3 °F (37.4 °C)  Pulse  Min: 80  Max:  117  BP  Min: 119/68  Max: 166/87  MAP (mmHg)  Min: 86  Max: 125  Resp  Min: 22  Max: 34  SpO2  Min: 96 %  Max: 100 %  Oxygen Concentration (%)  Min: 40  Max: 41    12/29 0701 - 12/30 0700  In: 4342   Out: 1215 [Urine:1215]   Unmeasured Output  Stool Occurrence: 1       Physical Exam  Vital signs, lab studies, and imaging reviewed by me  Obtunded, pupils reactive, no commands, withdraws to pain in all limbs, less spontaneous movements   Warm and well perfused, regular rate and rhythm, no murmurs  No dependent edema  Breathing comfortably, breath sounds clear  Belly soft, nontender, no hepatosplenomegaly      Medications:  Continuous Scheduled  chlorhexidine 15 mL QID   clonazePAM 1 mg TID   famotidine 20 mg BID   heparin (porcine) 5,000 Units Q8H   lacosamide (VIMPAT) in saline IVPB 200 mg Q12H   metoprolol tartrate 25 mg TID   polyethylene glycol 17 g Daily   pravastatin 40 mg QHS   senna-docusate 8.6-50 mg 1 tablet BID   sodium chloride 0.9% 3 mL Q8H   PRN  acetaminophen 650 mg Q6H PRN   magnesium oxide 800 mg PRN   magnesium oxide 800 mg PRN   ondansetron 4 mg Q12H PRN   potassium chloride 10% 40 mEq PRN   potassium chloride 10% 40 mEq PRN   potassium chloride 10% 60 mEq PRN   potassium, sodium phosphates 2 packet PRN   potassium, sodium phosphates 2 packet PRN   potassium, sodium phosphates 2 packet PRN     Diet  No diet orders on file  No diet orders on file        Assessment/Plan:     Neuro   * SDH (subdural hematoma)    Epidural with subdural component s/p evac 12/20  Repeat cth stable  Mri with punctate stroke    nsgy following  Pt/ot/slp when able       Status epilepticus    No further seizures off ketamine,   Mental status with slight improvement today    Cont kp and lac  Dc ceeg, plan for spot eeg in several days     Pulmonary   Acute respiratory failure    Hypoxic, aspiration, bronch with mucous plugging in L lung  Now on minimal vent settings    Maintain today in setting of mental status  Follow CXR and  ABG daily     Endocrine   Weight loss, unintentional    Hx of 200 lb wt loss in last year  Wife reports early satiety and change in taste leading to decreased po intake    Check ct chest/abdomen/pelvis when off ceeg           The patient is being Prophylaxed for:  Venous Thromboembolism with: Mechanical or Chemical  Stress Ulcer with: H2B  Ventilator Pneumonia with: chlorhexidine oral care    Activity Orders          None        Full Code     I spent 30 min of uninterrupted critical care time caring for this critically ill patient exclusive of procedures and teaching.       Kenneth Decker MD  Neurocritical Care  [facility]

## 2018-12-31 NOTE — SUBJECTIVE & OBJECTIVE
Interval History:      Neuro exam slighty better w spontaneous movements and eye opening to voice  uop inproved  Had bm    Objective:     Vitals:  Temp: 99.3 °F (37.4 °C)  Pulse: 104  Rhythm: atrial rhythm  BP: (!) 151/68  MAP (mmHg): 98  Resp: (!) 29  SpO2: 100 %  Oxygen Concentration (%): 40  O2 Device (Oxygen Therapy): ventilator  Vent Mode: A/C  Set Rate: 16 bmp  Vt Set: 440 mL  PEEP/CPAP: 8 cmH20  Peak Airway Pressure: 21 cmH2O  Mean Airway Pressure: 12 cmH20  Plateau Pressure: 0 cmH20    Temp  Min: 97.8 °F (36.6 °C)  Max: 99.3 °F (37.4 °C)  Pulse  Min: 80  Max: 117  BP  Min: 119/68  Max: 166/87  MAP (mmHg)  Min: 86  Max: 125  Resp  Min: 22  Max: 34  SpO2  Min: 96 %  Max: 100 %  Oxygen Concentration (%)  Min: 40  Max: 41    12/29 0701 - 12/30 0700  In: 4342   Out: 1215 [Urine:1215]   Unmeasured Output  Stool Occurrence: 1       Physical Exam  Vital signs, lab studies, and imaging reviewed by me  Obtunded, pupils reactive, no commands, withdraws to pain in all limbs, less spontaneous movements   Warm and well perfused, regular rate and rhythm, no murmurs  No dependent edema  Breathing comfortably, breath sounds clear  Belly soft, nontender, no hepatosplenomegaly      Medications:  Continuous Scheduled  chlorhexidine 15 mL QID   clonazePAM 1 mg TID   famotidine 20 mg BID   heparin (porcine) 5,000 Units Q8H   lacosamide (VIMPAT) in saline IVPB 200 mg Q12H   metoprolol tartrate 25 mg TID   polyethylene glycol 17 g Daily   pravastatin 40 mg QHS   senna-docusate 8.6-50 mg 1 tablet BID   sodium chloride 0.9% 3 mL Q8H   PRN  acetaminophen 650 mg Q6H PRN   magnesium oxide 800 mg PRN   magnesium oxide 800 mg PRN   ondansetron 4 mg Q12H PRN   potassium chloride 10% 40 mEq PRN   potassium chloride 10% 40 mEq PRN   potassium chloride 10% 60 mEq PRN   potassium, sodium phosphates 2 packet PRN   potassium, sodium phosphates 2 packet PRN   potassium, sodium phosphates 2 packet PRN     Diet  No diet orders on file  No diet  orders on file

## 2018-12-31 NOTE — PROGRESS NOTES
"[facility]  Adult Nutrition  Progress Note    SUMMARY       Recommendations    Recommendation/Intervention:   1. Continue Impact Peptide @ 55mL/hr.   - Provides 1980kcals, 124g protein and 1016mL free water.   - Hold for residuals >500mL.     2. If able to extubate and advance diet, recommend Regular diet with Boost Plus TID, texture per SLP recommendations.     Pt continues to have severe malnutrition in the context of chronic illness.     RD to monitor.    Goals: Pt to receive nutrition by RD follow up  Nutrition Goal Status: goal met  Communication of RD Recs: reviewed with RN    Reason for Assessment    Reason For Assessment: RD follow-up  Diagnosis: other (see comments)(SDH)  Relevant Medical History: CAD, DM, HTN, HLD  Interdisciplinary Rounds: attended  General Information Comments: Pt remains intubated. TF goal rate increased, tolerating with minimal residuals. Pt previously diagnosed with severe malnutrition on 12/21- see note for details. Exam remains unchanged.  Nutrition Discharge Planning: unable to determine at this time    Nutrition Risk Screen    Nutrition Risk Screen: tube feeding or parenteral nutrition    Nutrition/Diet History    Spiritual, Cultural Beliefs, Buddhism Practices, Values that Affect Care: no  Factors Affecting Nutritional Intake: NPO, on mechanical ventilation    Anthropometrics    Temp: 98.1 °F (36.7 °C)  Height Method: Estimated  Height: 5' 10" (177.8 cm)  Height (inches): 70 in  Weight Method: Bed Scale  Weight: 85.7 kg (189 lb)  Weight (lb): 189 lb  Ideal Body Weight (IBW), Male: 166 lb  % Ideal Body Weight, Male (lb): 113.86 lb  BMI (Calculated): 27.2  BMI Grade: 25 - 29.9 - overweight  Weight Loss: unintentional  Usual Body Weight (UBW), kg: (!) 153.1 kg  % Usual Body Weight: 56.11  % Weight Change From Usual Weight: -44 %       Lab/Procedures/Meds    Pertinent Labs Reviewed: reviewed  Pertinent Labs Comments: noted  Pertinent Medications Reviewed: " reviewed  Pertinent Medications Comments: heparin, vimpat      Estimated/Assessed Needs    Weight Used For Calorie Calculations: 85.7 kg (188 lb 15 oz)  Energy Calorie Requirements (kcal): 2044  Energy Need Method: [facility]  Protein Requirements: 103-129g(1.2-1.5g/kg)  Weight Used For Protein Calculations: 85.7 kg (188 lb 15 oz)  Fluid Requirements (mL): 1mL/kcal or per MD     RDA Method (mL): 2044         Nutrition Prescription Ordered    Current Diet Order: NPO  Nutrition Order Comments: TF at goal  Current Nutrition Support Formula Ordered: Impact Peptide 1.5  Current Nutrition Support Rate Ordered: 55 (ml)  Current Nutrition Support Frequency Ordered: mL/hr    Evaluation of Received Nutrient/Fluid Intake    Enteral Calories (kcal): 1980  Enteral Protein (gm): 124  Enteral (Free Water) Fluid (mL): 1016  Free Water Flush Fluid (mL): 1000    % Kcal Needs: 97  % Protein Needs: 100    I/O: +I/O, good UOP, LBM 12/30    Energy Calories Required: meeting needs  Protein Required: meeting needs  Fluid Required: (per MD)    Total Fluid Intake (mL/kg): 1mL/kcal or per MD  Comments: 5mL residuals 12/30    Tolerance: tolerating    % Intake of Estimated Energy Needs: 75 - 100 %  % Meal Intake: NPO    Nutrition Risk    Level of Risk/Frequency of Follow-up: (f/u 1x/week)     Assessment and Plan    Severe malnutrition    Malnutrition in the context of Chronic Illness/Injury    Related to (etiology):  Decreased intake, appetite    Signs and Symptoms (as evidenced by):  Energy Intake: <75% of estimated energy requirement for > 1 year  Body Fat Depletion: severe depletion of orbitals, triceps and thoracic and lumbar region   Muscle Mass Depletion: severe depletion of temples, clavicle region, scapular region, interosseous muscle and lower extremities   Weight Loss: 44% x 1 year     Interventions/Recommendations (treatment strategy):  Continue enteral nutrition    Nutrition Diagnosis Status:  Continues              Monitor and  Evaluation    Food and Nutrient Intake: energy intake, food and beverage intake, enteral nutrition intake  Food and Nutrient Adminstration: enteral and parenteral nutrition administration, diet order  Anthropometric Measurements: weight, weight change, body mass index  Biochemical Data, Medical Tests and Procedures: electrolyte and renal panel, glucose/endocrine profile, gastrointestinal profile, inflammatory profile, lipid profile  Nutrition-Focused Physical Findings: overall appearance     Malnutrition Assessment  Malnutrition Type: chronic illness  Energy Intake: severe energy intake          Weight Loss (Malnutrition): greater than 20% in 1 year  Energy Intake (Malnutrition): less than 75% for greater than or equal to 3 months  Subcutaneous Fat (Malnutrition): severe depletion  Muscle Mass (Malnutrition): severe depletion   Orbital Region (Subcutaneous Fat Loss): moderate depletion  Upper Arm Region (Subcutaneous Fat Loss): severe depletion  Thoracic and Lumbar Region: severe depletion   Rastafarian Region (Muscle Loss): moderate depletion  Clavicle Bone Region (Muscle Loss): severe depletion  Clavicle and Acromion Bone Region (Muscle Loss): severe depletion  Scapular Bone Region (Muscle Loss): severe depletion  Dorsal Hand (Muscle Loss): moderate depletion  Patellar Region (Muscle Loss): severe depletion  Anterior Thigh Region (Muscle Loss): severe depletion  Posterior Calf Region (Muscle Loss): severe depletion       Subcutaneous Fat Loss (Final Summary): severe protein-calorie malnutrition  Muscle Loss Evaluation (Final Summary): severe protein-calorie malnutrition    Moderate Weight Loss (Malnutrition): other (see comments)  Severe Weight Loss (Malnutrition): greater than 20% in 1 year    Nutrition Follow-Up    RD Follow-up?: Yes

## 2018-12-31 NOTE — ASSESSMENT & PLAN NOTE
uop improved  Bun improving, hypernatremia improved  Increased insensible losses due to open mouth    Cont fwf  Follow uop

## 2018-12-31 NOTE — ASSESSMENT & PLAN NOTE
64 yo male with L aSDH now s/p craniotomy for evacuation (12/20).    Pt with persistent poor clinical exam.     -- Continue care per primary team.  -- Recommend consideration for early tracheostomy and gastrostomy placement.  -- Continue seizure treatment per neurology, f/u EEG  -- Staple removal 1/3.  -- We will continue to monitor closely, please contact us with any questions or concerns.    Discussed w/Dr. Hull

## 2018-12-31 NOTE — PLAN OF CARE
12/31/18 1321   Discharge Reassessment   Assessment Type Discharge Planning Reassessment   Provided patient/caregiver education on the expected discharge date and the discharge plan No   Do you have any problems affording any of your prescribed medications? No   Discharge Plan A Long-term acute care facility (LTAC)   Discharge Plan B Skilled Nursing Facility;Rehab   Patient choice form signed by patient/caregiver No   Anticipated Discharge Disposition LTAC   Can the patient answer the patient profile reliably? No, cognitively impaired   How does the patient rate their overall health at the present time? (mamadou)   Describe the patient's ability to walk at the present time. Does not walk or unable to take any steps at all   How often would a person be available to care for the patient? Occasionally   Number of comorbid conditions (as recorded on the chart) Four   During the past month, has the patient often been bothered by feeling down, depressed or hopeless? (mamadou)   During the past month, has the patient often been bothered by little interest or pleasure in doing things? (mamadou)   Post-Acute Status   Post-Acute Authorization Placement   Post-Acute Placement Status Discharge Plan Changed  (Patient intubated on vent.  Not medically stable for discharge)       Azeb Flores RN, CCRN-K, [facility]  Neuro-Critical Care   [phone]

## 2018-12-31 NOTE — ASSESSMENT & PLAN NOTE
Hx of 200 lb wt loss in last year  Wife reports early satiety and change in taste leading to decreased po intake    Check ct chest/abdomen/pelvis today

## 2018-12-31 NOTE — SUBJECTIVE & OBJECTIVE
Interval History: NAEON.     Medications:  Continuous Infusions:  Scheduled Meds:   chlorhexidine  15 mL Mouth/Throat QID    clonazePAM  1 mg Per NG tube TID    famotidine  20 mg Per NG tube BID    heparin (porcine)  5,000 Units Subcutaneous Q8H    lacosamide (VIMPAT) in saline IVPB  200 mg Intravenous Q12H    metoprolol tartrate  25 mg Per NG tube TID    polyethylene glycol  17 g Per NG tube Daily    pravastatin  40 mg Per NG tube QHS    senna-docusate 8.6-50 mg  1 tablet Per NG tube BID    sodium chloride 0.9%  3 mL Intravenous Q8H     PRN Meds:acetaminophen, magnesium oxide, magnesium oxide, ondansetron, potassium chloride 10%, potassium chloride 10%, potassium chloride 10%, potassium, sodium phosphates, potassium, sodium phosphates, potassium, sodium phosphates     Review of Systems  Objective:     Weight: 85.7 kg (189 lb)  Body mass index is 27.12 kg/m².  Vital Signs (Most Recent):  Temp: 98.1 °F (36.7 °C) (12/31/18 0715)  Pulse: 88 (12/31/18 0913)  Resp: (!) 28 (12/31/18 0913)  BP: 129/77 (12/31/18 0903)  SpO2: 100 % (12/31/18 0913) Vital Signs (24h Range):  Temp:  [98.1 °F (36.7 °C)-99.3 °F (37.4 °C)] 98.1 °F (36.7 °C)  Pulse:  [] 88  Resp:  [26-33] 28  SpO2:  [96 %-100 %] 100 %  BP: (119-165)/() 129/77     Date 12/31/18 0700 - 01/01/19 0659   Shift 6693-3531 7622-2271 7741-9922 24 Hour Total   INTAKE   I.V.(mL/kg) 15(0.2)   15(0.2)   NG/   165   IV Piggyback 100   100   Shift Total(mL/kg) 280(3.3)   280(3.3)   OUTPUT   Urine(mL/kg/hr) 150   150   Shift Total(mL/kg) 150(1.7)   150(1.7)   Weight (kg) 85.7 85.7 85.7 85.7              Vent Mode: A/C  Oxygen Concentration (%):  [40-41] 40  Resp Rate Total:  [24 br/min-32 br/min] 25 br/min  Vt Set:  [440 mL] 440 mL  PEEP/CPAP:  [8 cmH20] 8 cmH20  Mean Airway Pressure:  [10 hiH13-75 cmH20] 12 cmH20         NG/OG Tube 12/23/18 1500 Center mouth (Active)   Placement Check placement verified by x-ray;placement verified by distal tube length  "measurement 12/31/2018  7:15 AM   Tolerance no signs/symptoms of discomfort 12/31/2018  7:15 AM   Securement taped to commercial device 12/31/2018  7:15 AM   Clamp Status/Tolerance unclamped;no abdominal discomfort;no abdominal distention;no emesis;no nausea;no residual;no restlessness 12/31/2018  7:15 AM   Suction Setting/Drainage Method dependent drainage 12/26/2018  3:05 AM   Insertion Site Appearance no redness, warmth, tenderness, skin breakdown, drainage 12/31/2018  7:15 AM   Drainage None 12/31/2018  7:15 AM   Flush/Irrigation flushed w/;water;no resistance met 12/31/2018  7:15 AM   Feeding Method continuous 12/31/2018  7:15 AM   Feeding Action feeding continued 12/31/2018  3:03 AM   Current Rate (mL/hr) 55 mL/hr 12/31/2018  7:15 AM   Goal Rate (mL/hr) 55 mL/hr 12/31/2018  7:15 AM   Intake (mL) 100 mL 12/30/2018  4:00 PM   Water Bolus (mL) 250 mL 12/31/2018  6:00 AM   Rate Formula Tube Feeding (mL/hr) 55 mL/hr 12/30/2018  7:02 AM   Formula Name impact peptide 12/31/2018  3:03 AM   Intake (mL) - Formula Tube Feeding 55 12/31/2018  9:00 AM   Residual Amount (ml) 5 ml 12/30/2018  7:02 AM            Urethral Catheter 12/20/18 2350 Non-latex;Straight-tip 16 Fr. (Active)   Site Assessment Clean;Intact 12/31/2018  7:15 AM   Collection Container Urimeter 12/31/2018  7:15 AM   Securement Method secured to top of thigh w/ adhesive device 12/31/2018  7:15 AM   Catheter Care Performed yes 12/31/2018  7:15 AM   Reason for Continuing Urinary Catheterization Critically ill in ICU requiring intensive monitoring;Urinary retention 12/31/2018  7:15 AM   CAUTI Prevention Bundle StatLock in place w 1" slack;Intact seal between catheter & drainage tubing;Drainage bag off the floor;Green sheeting clip in use;No dependent loops or kinks;Drainage bag not overfilled (<2/3 full);Drainage bag below bladder 12/31/2018  7:15 AM   Output (mL) 50 mL 12/31/2018  9:00 AM       Neurosurgery Physical Exam   [id number]  Intubated  PERRL. + " cough/gag.   Withdraws to stim x4  Reportedly AG spontaneously in BUE for nursing        Significant Labs:  Recent Labs   Lab 12/29/18  1810 12/30/18  0406 12/31/18  0220   GLU  --  120* 113*   NA  --  139 136   K  --  4.3 4.3   CL  --  111* 108   CO2  --  24 24   BUN  --  43* 41*   CREATININE  --  0.5 0.5   CALCIUM  --  7.6* 7.9*   MG 1.6 1.8 1.8     Recent Labs   Lab 12/30/18  0406 12/31/18  0220   WBC 10.95 13.57*   HGB 8.7* 9.3*   HCT 27.4* 27.9*    241     No results for input(s): LABPT, INR, APTT in the last 48 hours.  Microbiology Results (last 7 days)     Procedure Component Value Units Date/Time    Blood culture [[id number]] Collected:  12/24/18 0006    Order Status:  Completed Specimen:  Blood from Line, Arterial, Right Updated:  12/29/18 0612     Blood Culture, Routine No growth after 5 days.    Blood culture [[id number]] Collected:  12/24/18 0005    Order Status:  Completed Specimen:  Blood from Peripheral, Hand, Left Updated:  12/29/18 0612     Blood Culture, Routine No growth after 5 days.    Culture, Respiratory with Gram Stain [[id number]] Collected:  12/24/18 0446    Order Status:  Completed Specimen:  Respiratory from Endotracheal Aspirate Updated:  12/26/18 1007     Respiratory Culture Normal respiratory donnell     Respiratory Culture No S aureus or Pseudomonas isolated.     Gram Stain (Respiratory) <10 epithelial cells per low power field.     Gram Stain (Respiratory) Moderate WBC's     Gram Stain (Respiratory) No organisms seen        Recent Lab Results       12/31/18  0608   12/31/18  0329   12/31/18  0220   12/30/18  1804   12/30/18  1135        Immature Granulocytes     2.1         Immature Grans (Abs)     0.29  Comment:  Mild elevation in immature granulocytes is non specific and   can be seen in a variety of conditions including stress response,   acute inflammation, trauma and pregnancy. Correlation with other   laboratory and clinical findings is essential.           Albumin     1.6          Alkaline Phosphatase     272         Allens Test   Pass           ALT     45         Anion Gap     4         AST     63         Baso #     0.04         Basophil%     0.3         Total Bilirubin     0.6  Comment:  For infants and newborns, interpretation of results should be based  on gestational age, weight and in agreement with clinical  observations.  Premature Infant recommended reference ranges:  Up to 24 hours.............<8.0 mg/dL  Up to 48 hours............<12.0 mg/dL  3-5 days..................<15.0 mg/dL  6-29 days.................<15.0 mg/dL           Site   LR           BUN, Bld     41         Calcium     7.9         Chloride     108         CO2     24         Creatinine     0.5         [employer]   Adult Vent           Differential Method     Automated         eGFR if      >60.0         eGFR if non      >60.0  Comment:  Calculation used to obtain the estimated glomerular filtration  rate (eGFR) is the CKD-EPI equation.            Eos #     0.3         Eosinophil%     2.0         FiO2   40           Glucose     113         Gran # (ANC)     9.9         Gran%     72.7         Hematocrit     27.9         Hemoglobin     9.3         Lymph #     2.2         Lymph%     15.8         Magnesium     1.8         MCH     31.7         MCHC     33.3         MCV     95         Mode   AC/PRVC           Mono #     1.0         Mono%     7.1         MPV     10.1         nRBC     0         PEEP   8           Phosphorus     1.7         Platelets     241         POC BE   0           POC HCO3   23.4           POC PCO2   31.8           POC PH   7.474           POC PO2   109           POC SATURATED O2   99           POC TCO2   24           POCT Glucose 139     131 131     Potassium     4.3         Total Protein     4.7         Rate   16           RBC     2.93         RDW     15.3         Sample   ARTERIAL           Sodium     136         Vt   440           WBC     13.57                               Significant Diagnostics:  I have reviewed all pertinent imaging results/findings within the past 24 hours.

## 2018-12-31 NOTE — ASSESSMENT & PLAN NOTE
Hx of 200 lb wt loss in last year  Wife reports early satiety and change in taste leading to decreased po intake    Check ct chest/abdomen/pelvis when off ceeg

## 2018-12-31 NOTE — PROGRESS NOTES
[facility]  Neurocritical Care  Progress Note    Admit Date: 12/20/2018  Service Date: 12/31/2018  Length of Stay: 11    Subjective:     Chief Complaint: SDH (subdural hematoma)    History of Present Illness: 63 year old male h/o CABG on ASA 81 mg (last dose 72 hours PTA) presenting as transfer from OSH for EDH with SDH. Patient reportedly had a mechanical fall from standing on Sunday and went to ED where CTH was negative for any acute intracranial pathology. Overnight, patient developed confusion and presented to OSH ED where CTH was consistent with EDH with SDH. Patient has progressively worsened throughout the day developing intermittent global aphasia. NSGY consulted in ED at [facility] and stat CTH has been ordered. NCC consulted for admission and medical management. Patient on weaning off of cardene at time of evaluation with sbp of 120. Reported presenting sbp in 150s.    Hospital Course: 12/22: no major events overnight. Weaned off of cardene. This morning AOx3. Drain removed by NSGY at 11 AM, and planned to step down to NSGY.  AMS noted around 1PM, patient aphasic and not following commands, STAT CTH unremarkable.   12/26: wean ketamine, follow EEG, send urine studuies-Na 133, NS bolus, advance TF  12/27: off ketamine, d/c levo, continue EEG, and AEDs, 500 NS bolus, increase enteral water flushes, continue abx for another day    Interval History:      Improving neuro exam  Leukocytosis, afebrile  Elevated alk phos    Objective:     Vitals:  Temp: 98.2 °F (36.8 °C)  Pulse: 100  Rhythm: atrial rhythm  BP: 127/67  MAP (mmHg): 102  Resp: (!) 24  SpO2: 100 %  Oxygen Concentration (%): 40  O2 Device (Oxygen Therapy): ventilator  Vent Mode: A/C  Set Rate: 16 bmp  Vt Set: 440 mL  PEEP/CPAP: 8 cmH20  Peak Airway Pressure: 22 cmH2O  Mean Airway Pressure: 12 cmH20  Plateau Pressure: 0 cmH20    Temp  Min: 98.1 °F (36.7 °C)  Max: 99.3 °F (37.4 °C)  Pulse  Min: 88  Max: 117  BP  Min: 111/67  Max: 161/91  MAP  (mmHg)  Min: 84  Max: 125  Resp  Min: 24  Max: 33  SpO2  Min: 100 %  Max: 100 %  Oxygen Concentration (%)  Min: 40  Max: 100    12/30 0701 - 12/31 0700  In: 2710 [I.V.:90]  Out: 1165 [Urine:1165]   Unmeasured Output  Stool Occurrence: 0       Physical Exam  Vital signs, lab studies, and imaging reviewed by me  Obtunded, opens eyes to voice, no commands, more spontaneous movements   Warm and well perfused, regular rate and rhythm, no murmurs  No dependent edema  Breathing comfortably, breath sounds clear  Belly soft, nontender, no hepatosplenomegaly    Medications:  Continuous  sodium chloride 0.9% Last Rate: 100 mL/hr at 12/31/18 1100   Scheduled  chlorhexidine 15 mL QID   clonazePAM 1 mg TID   famotidine 20 mg BID   heparin (porcine) 5,000 Units Q8H   lacosamide (VIMPAT) in saline IVPB 200 mg Q12H   metoprolol tartrate 25 mg TID   polyethylene glycol 17 g Daily   pravastatin 40 mg QHS   senna-docusate 8.6-50 mg 1 tablet BID   sodium chloride 0.9% 3 mL Q8H   PRN  acetaminophen 650 mg Q6H PRN   magnesium oxide 800 mg PRN   magnesium oxide 800 mg PRN   ondansetron 4 mg Q12H PRN   potassium chloride 10% 40 mEq PRN   potassium chloride 10% 40 mEq PRN   potassium chloride 10% 60 mEq PRN   potassium, sodium phosphates 2 packet PRN   potassium, sodium phosphates 2 packet PRN   potassium, sodium phosphates 2 packet PRN     Diet  No diet orders on file  No diet orders on file        Assessment/Plan:     Neuro   * SDH (subdural hematoma)    Epidural with subdural component s/p evac 12/20  Repeat cth stable  Mri with punctate stroke    nsgy following  Pt/ot/slp when able       Status epilepticus    No further seizures off ketamine and keppra   Mental status with continued improvement today    Cont kp and lac  Spot eeg in several days     Pulmonary   Acute respiratory failure    Hypoxic, aspiration, bronch with mucous plugging in L lung  Now on minimal vent settings    Maintain today in setting of mental status  Follow CXR and  ABG daily     Cardiac/Vascular   New onset atrial fibrillation    Rate controlled on bb    Monitor off ac       Renal/   AYLIN (acute kidney injury)    uop improved  Bun improving, hypernatremia improved  Increased insensible losses due to open mouth    Cont fwf  Follow uop     Endocrine   Weight loss, unintentional    Hx of 200 lb wt loss in last year  Wife reports early satiety and change in taste leading to decreased po intake    Check ct chest/abdomen/pelvis today           The patient is being Prophylaxed for:  Venous Thromboembolism with: Mechanical or Chemical  Stress Ulcer with: PPI  Ventilator Pneumonia with: chlorhexidine oral care    Activity Orders          None        Full Code    Kenneth Decker MD  Neurocritical Care  [facility]

## 2019-01-01 LAB
ALBUMIN SERPL BCP-MCNC: 1.6 G/DL
ALLENS TEST: ABNORMAL
ALP SERPL-CCNC: 270 U/L
ALT SERPL W/O P-5'-P-CCNC: 39 U/L
ANION GAP SERPL CALC-SCNC: 5 MMOL/L
AST SERPL-CCNC: 38 U/L
BASOPHILS # BLD AUTO: 0.05 K/UL
BASOPHILS NFR BLD: 0.3 %
BILIRUB SERPL-MCNC: 0.8 MG/DL
BUN SERPL-MCNC: 31 MG/DL
CALCIUM SERPL-MCNC: 8 MG/DL
CHLORIDE SERPL-SCNC: 107 MMOL/L
CO2 SERPL-SCNC: 24 MMOL/L
CREAT SERPL-MCNC: 0.5 MG/DL
DELSYS: ABNORMAL
DIFFERENTIAL METHOD: ABNORMAL
EOSINOPHIL # BLD AUTO: 0.2 K/UL
EOSINOPHIL NFR BLD: 1.1 %
ERYTHROCYTE [DISTWIDTH] IN BLOOD BY AUTOMATED COUNT: 15.6 %
ERYTHROCYTE [SEDIMENTATION RATE] IN BLOOD BY WESTERGREN METHOD: 16 MM/H
EST. GFR  (AFRICAN AMERICAN): >60 ML/MIN/1.73 M^2
EST. GFR  (NON AFRICAN AMERICAN): >60 ML/MIN/1.73 M^2
FIO2: 40
GLUCOSE SERPL-MCNC: 114 MG/DL
HCO3 UR-SCNC: 23 MMOL/L (ref 24–28)
HCT VFR BLD AUTO: 26.8 %
HGB BLD-MCNC: 8.5 G/DL
IMM GRANULOCYTES # BLD AUTO: 0.21 K/UL
IMM GRANULOCYTES NFR BLD AUTO: 1.4 %
LYMPHOCYTES # BLD AUTO: 1.6 K/UL
LYMPHOCYTES NFR BLD: 10.6 %
MAGNESIUM SERPL-MCNC: 1.7 MG/DL
MCH RBC QN AUTO: 31.3 PG
MCHC RBC AUTO-ENTMCNC: 31.7 G/DL
MCV RBC AUTO: 99 FL
MIN VOL: 11.6
MODE: ABNORMAL
MONOCYTES # BLD AUTO: 0.8 K/UL
MONOCYTES NFR BLD: 5 %
NEUTROPHILS # BLD AUTO: 12.2 K/UL
NEUTROPHILS NFR BLD: 81.6 %
NRBC BLD-RTO: 0 /100 WBC
PCO2 BLDA: 30.8 MMHG (ref 35–45)
PEEP: 8
PH SMN: 7.48 [PH] (ref 7.35–7.45)
PHOSPHATE SERPL-MCNC: 3.3 MG/DL
PIP: 27
PLATELET # BLD AUTO: 244 K/UL
PMV BLD AUTO: 10.5 FL
PO2 BLDA: 173 MMHG (ref 80–100)
POC BE: 0 MMOL/L
POC SATURATED O2: 100 % (ref 95–100)
POC TCO2: 24 MMOL/L (ref 23–27)
POCT GLUCOSE: 127 MG/DL (ref 70–110)
POCT GLUCOSE: 131 MG/DL (ref 70–110)
POCT GLUCOSE: 145 MG/DL (ref 70–110)
POTASSIUM SERPL-SCNC: 4.2 MMOL/L
PROT SERPL-MCNC: 4.5 G/DL
RBC # BLD AUTO: 2.72 M/UL
SAMPLE: ABNORMAL
SITE: ABNORMAL
SODIUM SERPL-SCNC: 136 MMOL/L
SP02: 99
VT: 440
WBC # BLD AUTO: 14.97 K/UL

## 2019-01-01 PROCEDURE — 80053 COMPREHEN METABOLIC PANEL: CPT

## 2019-01-01 PROCEDURE — 20000000 HC ICU ROOM

## 2019-01-01 PROCEDURE — 85025 COMPLETE CBC W/AUTO DIFF WBC: CPT

## 2019-01-01 PROCEDURE — 99233 SBSQ HOSP IP/OBS HIGH 50: CPT | Mod: ,,, | Performed by: PHYSICIAN ASSISTANT

## 2019-01-01 PROCEDURE — 25000003 PHARM REV CODE 250: Performed by: NURSE PRACTITIONER

## 2019-01-01 PROCEDURE — 94761 N-INVAS EAR/PLS OXIMETRY MLT: CPT

## 2019-01-01 PROCEDURE — 99900026 HC AIRWAY MAINTENANCE (STAT)

## 2019-01-01 PROCEDURE — 27000221 HC OXYGEN, UP TO 24 HOURS

## 2019-01-01 PROCEDURE — 25000003 PHARM REV CODE 250: Performed by: PSYCHIATRY & NEUROLOGY

## 2019-01-01 PROCEDURE — 63600175 PHARM REV CODE 636 W HCPCS: Performed by: STUDENT IN AN ORGANIZED HEALTH CARE EDUCATION/TRAINING PROGRAM

## 2019-01-01 PROCEDURE — 84100 ASSAY OF PHOSPHORUS: CPT

## 2019-01-01 PROCEDURE — 63600175 PHARM REV CODE 636 W HCPCS: Performed by: NURSE PRACTITIONER

## 2019-01-01 PROCEDURE — 25000003 PHARM REV CODE 250: Performed by: STUDENT IN AN ORGANIZED HEALTH CARE EDUCATION/TRAINING PROGRAM

## 2019-01-01 PROCEDURE — A4216 STERILE WATER/SALINE, 10 ML: HCPCS | Performed by: PSYCHIATRY & NEUROLOGY

## 2019-01-01 PROCEDURE — 94003 VENT MGMT INPAT SUBQ DAY: CPT

## 2019-01-01 PROCEDURE — 99233 PR SUBSEQUENT HOSPITAL CARE,LEVL III: ICD-10-PCS | Mod: ,,, | Performed by: PHYSICIAN ASSISTANT

## 2019-01-01 PROCEDURE — 36600 WITHDRAWAL OF ARTERIAL BLOOD: CPT

## 2019-01-01 PROCEDURE — 99900035 HC TECH TIME PER 15 MIN (STAT)

## 2019-01-01 PROCEDURE — 82803 BLOOD GASES ANY COMBINATION: CPT

## 2019-01-01 PROCEDURE — C9254 INJECTION, LACOSAMIDE: HCPCS | Performed by: NURSE PRACTITIONER

## 2019-01-01 PROCEDURE — 83735 ASSAY OF MAGNESIUM: CPT

## 2019-01-01 PROCEDURE — 27200966 HC CLOSED SUCTION SYSTEM

## 2019-01-01 RX ADMIN — HEPARIN SODIUM 5000 UNITS: 5000 INJECTION, SOLUTION INTRAVENOUS; SUBCUTANEOUS at 09:01

## 2019-01-01 RX ADMIN — CLONAZEPAM 1 MG: 1 TABLET ORAL at 02:01

## 2019-01-01 RX ADMIN — METOPROLOL TARTRATE 25 MG: 25 TABLET ORAL at 09:01

## 2019-01-01 RX ADMIN — METOPROLOL TARTRATE 25 MG: 25 TABLET ORAL at 02:01

## 2019-01-01 RX ADMIN — Medication 3 ML: at 10:01

## 2019-01-01 RX ADMIN — FAMOTIDINE 20 MG: 20 TABLET ORAL at 08:01

## 2019-01-01 RX ADMIN — HEPARIN SODIUM 5000 UNITS: 5000 INJECTION, SOLUTION INTRAVENOUS; SUBCUTANEOUS at 01:01

## 2019-01-01 RX ADMIN — METOPROLOL TARTRATE 25 MG: 25 TABLET ORAL at 08:01

## 2019-01-01 RX ADMIN — SODIUM CHLORIDE 200 MG: 9 INJECTION, SOLUTION INTRAVENOUS at 08:01

## 2019-01-01 RX ADMIN — CHLORHEXIDINE GLUCONATE 0.12% ORAL RINSE 15 ML: 1.2 LIQUID ORAL at 01:01

## 2019-01-01 RX ADMIN — CHLORHEXIDINE GLUCONATE 0.12% ORAL RINSE 15 ML: 1.2 LIQUID ORAL at 08:01

## 2019-01-01 RX ADMIN — CLONAZEPAM 1 MG: 1 TABLET ORAL at 09:01

## 2019-01-01 RX ADMIN — CHLORHEXIDINE GLUCONATE 0.12% ORAL RINSE 15 ML: 1.2 LIQUID ORAL at 09:01

## 2019-01-01 RX ADMIN — FAMOTIDINE 20 MG: 20 TABLET ORAL at 09:01

## 2019-01-01 RX ADMIN — STANDARDIZED SENNA CONCENTRATE AND DOCUSATE SODIUM 1 TABLET: 8.6; 5 TABLET, FILM COATED ORAL at 09:01

## 2019-01-01 RX ADMIN — CHLORHEXIDINE GLUCONATE 0.12% ORAL RINSE 15 ML: 1.2 LIQUID ORAL at 05:01

## 2019-01-01 RX ADMIN — CLONAZEPAM 1 MG: 1 TABLET ORAL at 08:01

## 2019-01-01 RX ADMIN — Medication 3 ML: at 06:01

## 2019-01-01 RX ADMIN — Medication 3 ML: at 02:01

## 2019-01-01 RX ADMIN — STANDARDIZED SENNA CONCENTRATE AND DOCUSATE SODIUM 1 TABLET: 8.6; 5 TABLET, FILM COATED ORAL at 08:01

## 2019-01-01 RX ADMIN — SODIUM CHLORIDE 200 MG: 9 INJECTION, SOLUTION INTRAVENOUS at 09:01

## 2019-01-01 RX ADMIN — POLYETHYLENE GLYCOL 3350 17 G: 17 POWDER, FOR SOLUTION ORAL at 08:01

## 2019-01-01 RX ADMIN — MAGNESIUM OXIDE TAB 400 MG (241.3 MG ELEMENTAL MG) 800 MG: 400 (241.3 MG) TAB at 05:01

## 2019-01-01 RX ADMIN — HEPARIN SODIUM 5000 UNITS: 5000 INJECTION, SOLUTION INTRAVENOUS; SUBCUTANEOUS at 05:01

## 2019-01-01 RX ADMIN — PRAVASTATIN SODIUM 40 MG: 40 TABLET ORAL at 09:01

## 2019-01-01 NOTE — PROCEDURES
DATE OF PROCEDURE:  12/30/2018 and 12/31/2018    DURATION:  The total duration is 28 hours and 16 minutes.    ICU EEG/VIDEO MONITORING REPORT     METHODOLOGY:  Electroencephalographic (EEG) is recorded with electrodes placed   according to the International 10-20 placement system.  Thirty two (32) channels   of digital signal (sampling rate of 512/sec), including T1 and T2, were   simultaneously recorded from the scalp and may include EKG, EMG, and/or eye   monitors.  Recording band pass was 0.1 to 512 Hz.  Digital video recording of   the patient is simultaneously recorded with the EEG.  The patient is instructed   to report clinical symptoms which may occur during the recording session.  EEG   and video recording are stored and archived in digital format.  Activation   procedures, which include photic stimulation, hyperventilation and instructing   patients to perform simple tasks, are done in selected patients  The EEG is displayed on a monitor screen and can be reviewed using different   montages.  Computer-assisted analysis is employed to detect spike and   electrographic seizure activity.   The entire record is submitted for computer   analysis.  The entire recording is visually reviewed, and the times identified   by computer analysis as being spikes or seizures are reviewed again.    Compressed spectral analysis (CSA) is also performed on the activity recorded   from each individual channel.  This is displayed as a power display of   frequencies from 0 to 30 Hz over time.   The CSA is reviewed looking for   asymmetries in power between homologous areas of the scalp, then compared with   the original EEG recording.    [employer] software was also utilized in the review of this study.  This software   suite analyzes the EEG recording in multiple domains.  Coherence and rhythmicity   are computed to identify EEG sections which may contain organized seizures.    Each channel undergoes analysis to detect the  presence of spike and sharp waves   which have special and morphological characteristics of epileptic activity.  The   routine EEG recording is converted from special into frequency domain.  This is   then displayed comparing homologous areas to identify areas of significant   asymmetry.  Algorithm to identify non-cortically generated artifact is used to   separate artifact from the EEG.      RECORDING TIMES:  The study begins on 12/30/2018 at 0700 and ends on 12/31/2018   at 1123.    EEG FINDINGS:  This record consists of medium amplitude, mixed frequency   background with predominantly low amplitude delta activity with superimposed   beta activity in the 15 to 20 Hz range.  The delta is in the 1 to 2 Hz range.    There are higher voltage delta waves seen rhythmically and diffusely on and off   throughout this study.  The record is symmetrical.  There are scattered theta   frequencies seen superimposed on the delta at times as well.  During periods of   higher stimulation, EMG artifact and some diffuse alpha and beta activity is   seen, but delta activity is seen on nearly every page of the record in the   background.  No seizures were seen.  No focal discharges were seen on this   portion of the record.  No other significant findings were seen and there was no   significant change from beginning to end of this recording period.    INTERPRETATION:  Abnormal EEG due to moderate diffuse slowing of the overall   background with no seizures seen on this portion of the record.      NBB/HN  dd: 12/31/2018 14:04:22 (CST)  td: 12/31/2018 14:26:59 (CST)  Doc ID   #[id number]  Job ID #[id number]    CC:

## 2019-01-01 NOTE — ASSESSMENT & PLAN NOTE
62 yo male with L aSDH now s/p craniotomy for evacuation (12/20).    -- Continue care per primary team.  -- Recommend consideration for early tracheostomy and gastrostomy placement.  -- Continue seizure treatment per neurology.  -- Staple removal 1/3.  -- We will continue to monitor closely, please contact us with any questions or concerns.

## 2019-01-01 NOTE — PLAN OF CARE
Problem: Adult Inpatient Plan of Care  Goal: Plan of Care Review  Outcome: Ongoing (interventions implemented as appropriate)  POC reviewed with pt and family at 0500. Pt unable to verbalize understanding, pt intubated. Family verbalized understanding. Pt went for CT abdomen/chest yesterday, results to be discussed with family. NS @ 100 to run for 24hr after contrast was given for imaging. EEG removed 12/30. Questions and concerns addressed. No acute events overnight. Pt progressing toward goals. Will continue to monitor. See flowsheets for full assessment and VS info

## 2019-01-01 NOTE — PROGRESS NOTES
[facility]  Neurosurgery  Progress Note    Subjective:     History of Present Illness: 64 yo M with PMH of CAD, CABG in 2006, DM, HTN, HLD on ASA presents to the ED for AMS. Patient fell 4 days ago with about 2 minutes of LOC. Family is unsure of what caused the fall. Full workup done including a CTH at [facility] that was negative. Patient went home and stopped taking his ASA. This morning, his family noticed he was more confused. He went to [facility] and CTH was done that revealed a 1.7cm left frontalpariental SDH with a 5 mm right sided midline shift and a left SAH. Patient was transferred to [facility]. History was obtained per family as patient unable to give history 2/2 confusion with expressive and receptive aphasia.    Post-Op Info:  Procedure(s) (LRB):  CRANIOTOMY, FOR SUBDURAL HEMATOMA EVACUATION (Left)   12 Days Post-Op     Interval History: NAEON.     Medications:  Continuous Infusions:  Scheduled Meds:   chlorhexidine  15 mL Mouth/Throat QID    clonazePAM  1 mg Per NG tube TID    famotidine  20 mg Per NG tube BID    heparin (porcine)  5,000 Units Subcutaneous Q8H    lacosamide (VIMPAT) in saline IVPB  200 mg Intravenous Q12H    metoprolol tartrate  25 mg Per NG tube TID    polyethylene glycol  17 g Per NG tube Daily    pravastatin  40 mg Per NG tube QHS    senna-docusate 8.6-50 mg  1 tablet Per NG tube BID    sodium chloride 0.9%  3 mL Intravenous Q8H     PRN Meds:acetaminophen, magnesium oxide, magnesium oxide, ondansetron, potassium chloride 10%, potassium chloride 10%, potassium chloride 10%, potassium, sodium phosphates, potassium, sodium phosphates, potassium, sodium phosphates     Review of Systems    Objective:     Weight: 85.7 kg (189 lb)  Body mass index is 27.12 kg/m².  Vital Signs (Most Recent):  Temp: 98.1 °F (36.7 °C) (01/01/19 1105)  Pulse: 97 (01/01/19 1159)  Resp: (!) 26 (01/01/19 1159)  BP: (!) 156/94 (01/01/19 1105)  SpO2: 100 % (01/01/19 1159) Vital Signs (24h  Range):  Temp:  [98.1 °F (36.7 °C)-98.7 °F (37.1 °C)] 98.1 °F (36.7 °C)  Pulse:  [] 97  Resp:  [18-47] 26  SpO2:  [100 %] 100 %  BP: (107-174)/() 156/94     Date 01/01/19 0700 - 01/02/19 0659   Shift 8895-7480 9235-3740 9489-0165 24 Hour Total   INTAKE   I.V.(mL/kg) 600(7)   600(7)   NG/   570   IV Piggyback 100   100   Shift Total(mL/kg) 1270(14.8)   1270(14.8)   OUTPUT   Urine(mL/kg/hr) 235   235   Shift Total(mL/kg) 235(2.7)   235(2.7)   Weight (kg) 85.7 85.7 85.7 85.7              Vent Mode: A/C  Oxygen Concentration (%):  [] 40  Resp Rate Total:  [19 br/min-28 br/min] 25 br/min  Vt Set:  [440 mL] 440 mL  PEEP/CPAP:  [8 cmH20] 8 cmH20  Mean Airway Pressure:  [12 juW44-19 cmH20] 12 cmH20         NG/OG Tube 12/23/18 1500 Center mouth (Active)   Placement Check placement verified by x-ray;placement verified by distal tube length measurement 12/31/2018  7:15 AM   Tolerance no signs/symptoms of discomfort 12/31/2018  7:15 AM   Securement taped to commercial device 12/31/2018  7:15 AM   Clamp Status/Tolerance unclamped;no abdominal discomfort;no abdominal distention;no emesis;no nausea;no residual;no restlessness 12/31/2018  7:15 AM   Suction Setting/Drainage Method dependent drainage 12/26/2018  3:05 AM   Insertion Site Appearance no redness, warmth, tenderness, skin breakdown, drainage 12/31/2018  7:15 AM   Drainage None 12/31/2018  7:15 AM   Flush/Irrigation flushed w/;water;no resistance met 12/31/2018  7:15 AM   Feeding Method continuous 12/31/2018  7:15 AM   Feeding Action feeding continued 12/31/2018  3:03 AM   Current Rate (mL/hr) 55 mL/hr 12/31/2018  7:15 AM   Goal Rate (mL/hr) 55 mL/hr 12/31/2018  7:15 AM   Intake (mL) 100 mL 12/30/2018  4:00 PM   Water Bolus (mL) 250 mL 12/31/2018  6:00 AM   Rate Formula Tube Feeding (mL/hr) 55 mL/hr 12/30/2018  7:02 AM   Formula Name impact peptide 12/31/2018  3:03 AM   Intake (mL) - Formula Tube Feeding 55 12/31/2018  9:00 AM   Residual Amount (ml)  "5 ml 12/30/2018  7:02 AM            Urethral Catheter 12/20/18 2350 Non-latex;Straight-tip 16 Fr. (Active)   Site Assessment Clean;Intact 12/31/2018  7:15 AM   Collection Container Urimeter 12/31/2018  7:15 AM   Securement Method secured to top of thigh w/ adhesive device 12/31/2018  7:15 AM   Catheter Care Performed yes 12/31/2018  7:15 AM   Reason for Continuing Urinary Catheterization Critically ill in ICU requiring intensive monitoring;Urinary retention 12/31/2018  7:15 AM   CAUTI Prevention Bundle StatLock in place w 1" slack;Intact seal between catheter & drainage tubing;Drainage bag off the floor;Green sheeting clip in use;No dependent loops or kinks;Drainage bag not overfilled (<2/3 full);Drainage bag below bladder 12/31/2018  7:15 AM   Output (mL) 50 mL 12/31/2018  9:00 AM       Neurosurgery Physical Exam     [id number]  Intubated  PERRL. + cough/gag.   Withdraws to stim x4  Reportedly AG spontaneously in BUE for nursing      Significant Labs:  Recent Labs   Lab 12/31/18  0220 01/01/19  0300   * 114*    136   K 4.3 4.2    107   CO2 24 24   BUN 41* 31*   CREATININE 0.5 0.5   CALCIUM 7.9* 8.0*   MG 1.8 1.7     Recent Labs   Lab 12/31/18 0220 01/01/19  0300   WBC 13.57* 14.97*   HGB 9.3* 8.5*   HCT 27.9* 26.8*    244     No results for input(s): LABPT, INR, APTT in the last 48 hours.  Microbiology Results (last 7 days)     Procedure Component Value Units Date/Time    Blood culture [[id number]] Collected:  12/24/18 0006    Order Status:  Completed Specimen:  Blood from Line, Arterial, Right Updated:  12/29/18 0612     Blood Culture, Routine No growth after 5 days.    Blood culture [[id number]] Collected:  12/24/18 0005    Order Status:  Completed Specimen:  Blood from Peripheral, Hand, Left Updated:  12/29/18 0612     Blood Culture, Routine No growth after 5 days.    Culture, Respiratory with Gram Stain [[id number]] Collected:  12/24/18 0446    Order Status:  Completed Specimen:  Respiratory " from Endotracheal Aspirate Updated:  12/26/18 1007     Respiratory Culture Normal respiratory donnell     Respiratory Culture No S aureus or Pseudomonas isolated.     Gram Stain (Respiratory) <10 epithelial cells per low power field.     Gram Stain (Respiratory) Moderate WBC's     Gram Stain (Respiratory) No organisms seen        Recent Lab Results       01/01/19  1059   01/01/19  0615   01/01/19  0316   01/01/19  0300        Immature Granulocytes       1.4     Immature Grans (Abs)       0.21  Comment:  Mild elevation in immature granulocytes is non specific and   can be seen in a variety of conditions including stress response,   acute inflammation, trauma and pregnancy. Correlation with other   laboratory and clinical findings is essential.       Albumin       1.6     Alkaline Phosphatase       270     Allens Test     Pass       ALT       39     Anion Gap       5     AST       38     Baso #       0.05     Basophil%       0.3     Total Bilirubin       0.8  Comment:  For infants and newborns, interpretation of results should be based  on gestational age, weight and in agreement with clinical  observations.  Premature Infant recommended reference ranges:  Up to 24 hours.............<8.0 mg/dL  Up to 48 hours............<12.0 mg/dL  3-5 days..................<15.0 mg/dL  6-29 days.................<15.0 mg/dL       Site     RR       BUN, Bld       31     Calcium       8.0     Chloride       107     CO2       24     Creatinine       0.5     DelSys     Adult Vent       Differential Method       Automated     eGFR if        >60.0     eGFR if non        >60.0  Comment:  Calculation used to obtain the estimated glomerular filtration  rate (eGFR) is the CKD-EPI equation.        Eos #       0.2     Eosinophil%       1.1     FiO2     40       Glucose       114     Gran # (ANC)       12.2     Gran%       81.6     Hematocrit       26.8     Hemoglobin       8.5     Lymph #       1.6     Lymph%        10.6     Magnesium       1.7     MCH       31.3     MCHC       31.7     MCV       99     Min Vol     11.6       Mode     AC/PRVC       Mono #       0.8     Mono%       5.0     MPV       10.5     nRBC       0     PEEP     8       Phosphorus       3.3     PiP     27       Platelets       244     POC BE     0       POC HCO3     23.0       POC PCO2     30.8       POC PH     7.482       POC PO2     173       POC SATURATED O2     100       POC TCO2     24       POCT Glucose 127 145         Potassium       4.2     Total Protein       4.5     Rate     16       RBC       2.72     RDW       15.6     Sample     ARTERIAL       Sodium       136     Sp02     99       Vt     440       WBC       14.97           Significant Diagnostics:  I have reviewed all pertinent imaging results/findings within the past 24 hours.    Assessment/Plan:     * SDH (subdural hematoma)    62 yo male with L aSDH now s/p craniotomy for evacuation (12/20).    -- Continue care per primary team.  -- Recommend consideration for early tracheostomy and gastrostomy placement.  -- Continue seizure treatment per neurology.  -- Staple removal 1/3.  -- We will continue to monitor closely, please contact us with any questions or concerns.           Pancho Zapata MD  Neurosurgery  [facility]

## 2019-01-01 NOTE — SUBJECTIVE & OBJECTIVE
Interval History: NAEON.     Medications:  Continuous Infusions:  Scheduled Meds:   chlorhexidine  15 mL Mouth/Throat QID    clonazePAM  1 mg Per NG tube TID    famotidine  20 mg Per NG tube BID    heparin (porcine)  5,000 Units Subcutaneous Q8H    lacosamide (VIMPAT) in saline IVPB  200 mg Intravenous Q12H    metoprolol tartrate  25 mg Per NG tube TID    polyethylene glycol  17 g Per NG tube Daily    pravastatin  40 mg Per NG tube QHS    senna-docusate 8.6-50 mg  1 tablet Per NG tube BID    sodium chloride 0.9%  3 mL Intravenous Q8H     PRN Meds:acetaminophen, magnesium oxide, magnesium oxide, ondansetron, potassium chloride 10%, potassium chloride 10%, potassium chloride 10%, potassium, sodium phosphates, potassium, sodium phosphates, potassium, sodium phosphates     Review of Systems    Objective:     Weight: 85.7 kg (189 lb)  Body mass index is 27.12 kg/m².  Vital Signs (Most Recent):  Temp: 98.1 °F (36.7 °C) (01/01/19 1105)  Pulse: 97 (01/01/19 1159)  Resp: (!) 26 (01/01/19 1159)  BP: (!) 156/94 (01/01/19 1105)  SpO2: 100 % (01/01/19 1159) Vital Signs (24h Range):  Temp:  [98.1 °F (36.7 °C)-98.7 °F (37.1 °C)] 98.1 °F (36.7 °C)  Pulse:  [] 97  Resp:  [18-47] 26  SpO2:  [100 %] 100 %  BP: (107-174)/() 156/94     Date 01/01/19 0700 - 01/02/19 0659   Shift 0133-6180 3715-6195 7583-7602 24 Hour Total   INTAKE   I.V.(mL/kg) 600(7)   600(7)   NG/   570   IV Piggyback 100   100   Shift Total(mL/kg) 1270(14.8)   1270(14.8)   OUTPUT   Urine(mL/kg/hr) 235   235   Shift Total(mL/kg) 235(2.7)   235(2.7)   Weight (kg) 85.7 85.7 85.7 85.7              Vent Mode: A/C  Oxygen Concentration (%):  [] 40  Resp Rate Total:  [19 br/min-28 br/min] 25 br/min  Vt Set:  [440 mL] 440 mL  PEEP/CPAP:  [8 cmH20] 8 cmH20  Mean Airway Pressure:  [12 tsE34-71 cmH20] 12 cmH20         NG/OG Tube 12/23/18 1500 Center mouth (Active)   Placement Check placement verified by x-ray;placement verified by distal tube  "length measurement 12/31/2018  7:15 AM   Tolerance no signs/symptoms of discomfort 12/31/2018  7:15 AM   Securement taped to commercial device 12/31/2018  7:15 AM   Clamp Status/Tolerance unclamped;no abdominal discomfort;no abdominal distention;no emesis;no nausea;no residual;no restlessness 12/31/2018  7:15 AM   Suction Setting/Drainage Method dependent drainage 12/26/2018  3:05 AM   Insertion Site Appearance no redness, warmth, tenderness, skin breakdown, drainage 12/31/2018  7:15 AM   Drainage None 12/31/2018  7:15 AM   Flush/Irrigation flushed w/;water;no resistance met 12/31/2018  7:15 AM   Feeding Method continuous 12/31/2018  7:15 AM   Feeding Action feeding continued 12/31/2018  3:03 AM   Current Rate (mL/hr) 55 mL/hr 12/31/2018  7:15 AM   Goal Rate (mL/hr) 55 mL/hr 12/31/2018  7:15 AM   Intake (mL) 100 mL 12/30/2018  4:00 PM   Water Bolus (mL) 250 mL 12/31/2018  6:00 AM   Rate Formula Tube Feeding (mL/hr) 55 mL/hr 12/30/2018  7:02 AM   Formula Name impact peptide 12/31/2018  3:03 AM   Intake (mL) - Formula Tube Feeding 55 12/31/2018  9:00 AM   Residual Amount (ml) 5 ml 12/30/2018  7:02 AM            Urethral Catheter 12/20/18 2350 Non-latex;Straight-tip 16 Fr. (Active)   Site Assessment Clean;Intact 12/31/2018  7:15 AM   Collection Container Urimeter 12/31/2018  7:15 AM   Securement Method secured to top of thigh w/ adhesive device 12/31/2018  7:15 AM   Catheter Care Performed yes 12/31/2018  7:15 AM   Reason for Continuing Urinary Catheterization Critically ill in ICU requiring intensive monitoring;Urinary retention 12/31/2018  7:15 AM   CAUTI Prevention Bundle StatLock in place w 1" slack;Intact seal between catheter & drainage tubing;Drainage bag off the floor;Green sheeting clip in use;No dependent loops or kinks;Drainage bag not overfilled (<2/3 full);Drainage bag below bladder 12/31/2018  7:15 AM   Output (mL) 50 mL 12/31/2018  9:00 AM       Neurosurgery Physical Exam     [id number]  Intubated  PERRL. + " cough/gag.   Withdraws to stim x4  Reportedly AG spontaneously in BUE for nursing      Significant Labs:  Recent Labs   Lab 12/31/18  0220 01/01/19  0300   * 114*    136   K 4.3 4.2    107   CO2 24 24   BUN 41* 31*   CREATININE 0.5 0.5   CALCIUM 7.9* 8.0*   MG 1.8 1.7     Recent Labs   Lab 12/31/18  0220 01/01/19  0300   WBC 13.57* 14.97*   HGB 9.3* 8.5*   HCT 27.9* 26.8*    244     No results for input(s): LABPT, INR, APTT in the last 48 hours.  Microbiology Results (last 7 days)     Procedure Component Value Units Date/Time    Blood culture [[id number]] Collected:  12/24/18 0006    Order Status:  Completed Specimen:  Blood from Line, Arterial, Right Updated:  12/29/18 0612     Blood Culture, Routine No growth after 5 days.    Blood culture [[id number]] Collected:  12/24/18 0005    Order Status:  Completed Specimen:  Blood from Peripheral, Hand, Left Updated:  12/29/18 0612     Blood Culture, Routine No growth after 5 days.    Culture, Respiratory with Gram Stain [[id number]] Collected:  12/24/18 0446    Order Status:  Completed Specimen:  Respiratory from Endotracheal Aspirate Updated:  12/26/18 1007     Respiratory Culture Normal respiratory donnell     Respiratory Culture No S aureus or Pseudomonas isolated.     Gram Stain (Respiratory) <10 epithelial cells per low power field.     Gram Stain (Respiratory) Moderate WBC's     Gram Stain (Respiratory) No organisms seen        Recent Lab Results       01/01/19  1059   01/01/19  0615   01/01/19  0316   01/01/19  0300        Immature Granulocytes       1.4     Immature Grans (Abs)       0.21  Comment:  Mild elevation in immature granulocytes is non specific and   can be seen in a variety of conditions including stress response,   acute inflammation, trauma and pregnancy. Correlation with other   laboratory and clinical findings is essential.       Albumin       1.6     Alkaline Phosphatase       270     Allens Test     Pass       ALT       39      Anion Gap       5     AST       38     Baso #       0.05     Basophil%       0.3     Total Bilirubin       0.8  Comment:  For infants and newborns, interpretation of results should be based  on gestational age, weight and in agreement with clinical  observations.  Premature Infant recommended reference ranges:  Up to 24 hours.............<8.0 mg/dL  Up to 48 hours............<12.0 mg/dL  3-5 days..................<15.0 mg/dL  6-29 days.................<15.0 mg/dL       Site     RR       BUN, Bld       31     Calcium       8.0     Chloride       107     CO2       24     Creatinine       0.5     [email or web address]     Adult Vent       Differential Method       Automated     eGFR if        >60.0     eGFR if non        >60.0  Comment:  Calculation used to obtain the estimated glomerular filtration  rate (eGFR) is the CKD-EPI equation.        Eos #       0.2     Eosinophil%       1.1     FiO2     40       Glucose       114     Gran # (ANC)       12.2     Gran%       81.6     Hematocrit       26.8     Hemoglobin       8.5     Lymph #       1.6     Lymph%       10.6     Magnesium       1.7     MCH       31.3     MCHC       31.7     MCV       99     Min Vol     11.6       Mode     AC/PRVC       Mono #       0.8     Mono%       5.0     MPV       10.5     nRBC       0     PEEP     8       Phosphorus       3.3     PiP     27       Platelets       244     POC BE     0       POC HCO3     23.0       POC PCO2     30.8       POC PH     7.482       POC PO2     173       POC SATURATED O2     100       POC TCO2     24       POCT Glucose 127 145         Potassium       4.2     Total Protein       4.5     Rate     16       RBC       2.72     RDW       15.6     Sample     ARTERIAL       Sodium       136     Sp02     99       Vt     440       WBC       14.97           Significant Diagnostics:  I have reviewed all pertinent imaging results/findings within the past 24 hours.

## 2019-01-01 NOTE — PLAN OF CARE
Problem: Adult Inpatient Plan of Care  Goal: Plan of Care Review  Outcome: Ongoing (interventions implemented as appropriate)   12/31/18 3572   Plan of Care Review   Plan of Care Reviewed With patient;family           POC reviewed with pt and pts family at 1700. Pts family verbalized understanding. Questions and concerns addressed. No acute events during the day. EEG cap discontinued. CT of chest, abdomen, and pelvis completed. Tolerated well. Pt progressing toward goals. Will continue to monitor. See flowsheets for full assessment and VS info

## 2019-01-01 NOTE — PLAN OF CARE
Problem: Adult Inpatient Plan of Care  Goal: Plan of Care Review  Outcome: Ongoing (interventions implemented as appropriate)  POC reviewed with pt and family at 1400. Pt unable to verbalize understanding r/t intubation. Pt's family at bedside verbalized understanding. Questions and concerns addressed. No acute events today. Pt progressing toward goals. Will continue to monitor. See flowsheets for full assessment and VS info.

## 2019-01-02 LAB
ALBUMIN SERPL BCP-MCNC: 1.5 G/DL
ALLENS TEST: ABNORMAL
ALLENS TEST: ABNORMAL
ALP SERPL-CCNC: 339 U/L
ALT SERPL W/O P-5'-P-CCNC: 35 U/L
ANION GAP SERPL CALC-SCNC: 6 MMOL/L
AST SERPL-CCNC: 32 U/L
BASOPHILS # BLD AUTO: 0.03 K/UL
BASOPHILS NFR BLD: 0.3 %
BILIRUB SERPL-MCNC: 0.6 MG/DL
BUN SERPL-MCNC: 27 MG/DL
CALCIUM SERPL-MCNC: 8.1 MG/DL
CHLORIDE SERPL-SCNC: 108 MMOL/L
CO2 SERPL-SCNC: 22 MMOL/L
CREAT SERPL-MCNC: 0.5 MG/DL
DELSYS: ABNORMAL
DELSYS: ABNORMAL
DIFFERENTIAL METHOD: ABNORMAL
EOSINOPHIL # BLD AUTO: 0.2 K/UL
EOSINOPHIL NFR BLD: 1.7 %
ERYTHROCYTE [DISTWIDTH] IN BLOOD BY AUTOMATED COUNT: 15.7 %
EST. GFR  (AFRICAN AMERICAN): >60 ML/MIN/1.73 M^2
EST. GFR  (NON AFRICAN AMERICAN): >60 ML/MIN/1.73 M^2
FIO2: 40
FIO2: 50
FLOW: 15
GLUCOSE SERPL-MCNC: 115 MG/DL
HCO3 UR-SCNC: 22.5 MMOL/L (ref 24–28)
HCO3 UR-SCNC: 23.8 MMOL/L (ref 24–28)
HCT VFR BLD AUTO: 26.3 %
HGB BLD-MCNC: 8.5 G/DL
IMM GRANULOCYTES # BLD AUTO: 0.16 K/UL
IMM GRANULOCYTES NFR BLD AUTO: 1.4 %
LYMPHOCYTES # BLD AUTO: 1.7 K/UL
LYMPHOCYTES NFR BLD: 14.8 %
MAGNESIUM SERPL-MCNC: 1.5 MG/DL
MAGNESIUM SERPL-MCNC: 1.6 MG/DL
MCH RBC QN AUTO: 31.7 PG
MCHC RBC AUTO-ENTMCNC: 32.3 G/DL
MCV RBC AUTO: 98 FL
METHYLMALONATE SERPL-SCNC: 0.17 UMOL/L
MODE: ABNORMAL
MODE: ABNORMAL
MONOCYTES # BLD AUTO: 0.6 K/UL
MONOCYTES NFR BLD: 5.4 %
NEUTROPHILS # BLD AUTO: 9 K/UL
NEUTROPHILS NFR BLD: 76.4 %
NRBC BLD-RTO: 0 /100 WBC
PCO2 BLDA: 31 MMHG (ref 35–45)
PCO2 BLDA: 32.1 MMHG (ref 35–45)
PEEP: 8
PH SMN: 7.47 [PH] (ref 7.35–7.45)
PH SMN: 7.48 [PH] (ref 7.35–7.45)
PHOSPHATE SERPL-MCNC: 2.9 MG/DL
PLATELET # BLD AUTO: 262 K/UL
PMV BLD AUTO: 10.2 FL
PO2 BLDA: 177 MMHG (ref 80–100)
PO2 BLDA: 94 MMHG (ref 80–100)
POC BE: -1 MMOL/L
POC BE: 0 MMOL/L
POC SATURATED O2: 100 % (ref 95–100)
POC SATURATED O2: 98 % (ref 95–100)
POC TCO2: 23 MMOL/L (ref 23–27)
POC TCO2: 25 MMOL/L (ref 23–27)
POCT GLUCOSE: 113 MG/DL (ref 70–110)
POCT GLUCOSE: 128 MG/DL (ref 70–110)
POCT GLUCOSE: 21 MG/DL (ref 70–110)
POCT GLUCOSE: 86 MG/DL (ref 70–110)
POTASSIUM SERPL-SCNC: 4.2 MMOL/L
PROT SERPL-MCNC: 4.6 G/DL
PS: 10
RBC # BLD AUTO: 2.68 M/UL
SAMPLE: ABNORMAL
SAMPLE: ABNORMAL
SITE: ABNORMAL
SITE: ABNORMAL
SODIUM SERPL-SCNC: 136 MMOL/L
WBC # BLD AUTO: 11.79 K/UL

## 2019-01-02 PROCEDURE — 83735 ASSAY OF MAGNESIUM: CPT

## 2019-01-02 PROCEDURE — 99900026 HC AIRWAY MAINTENANCE (STAT)

## 2019-01-02 PROCEDURE — 25000003 PHARM REV CODE 250: Performed by: NURSE PRACTITIONER

## 2019-01-02 PROCEDURE — 94003 VENT MGMT INPAT SUBQ DAY: CPT

## 2019-01-02 PROCEDURE — 84100 ASSAY OF PHOSPHORUS: CPT

## 2019-01-02 PROCEDURE — A4216 STERILE WATER/SALINE, 10 ML: HCPCS | Performed by: PSYCHIATRY & NEUROLOGY

## 2019-01-02 PROCEDURE — 63600175 PHARM REV CODE 636 W HCPCS: Performed by: NURSE PRACTITIONER

## 2019-01-02 PROCEDURE — 83735 ASSAY OF MAGNESIUM: CPT | Mod: 91

## 2019-01-02 PROCEDURE — 94640 AIRWAY INHALATION TREATMENT: CPT

## 2019-01-02 PROCEDURE — 25000003 PHARM REV CODE 250: Performed by: PSYCHIATRY & NEUROLOGY

## 2019-01-02 PROCEDURE — 25000003 PHARM REV CODE 250: Performed by: STUDENT IN AN ORGANIZED HEALTH CARE EDUCATION/TRAINING PROGRAM

## 2019-01-02 PROCEDURE — 82803 BLOOD GASES ANY COMBINATION: CPT

## 2019-01-02 PROCEDURE — 27000221 HC OXYGEN, UP TO 24 HOURS

## 2019-01-02 PROCEDURE — 99900035 HC TECH TIME PER 15 MIN (STAT)

## 2019-01-02 PROCEDURE — C9254 INJECTION, LACOSAMIDE: HCPCS | Performed by: NURSE PRACTITIONER

## 2019-01-02 PROCEDURE — 36600 WITHDRAWAL OF ARTERIAL BLOOD: CPT

## 2019-01-02 PROCEDURE — 94761 N-INVAS EAR/PLS OXIMETRY MLT: CPT

## 2019-01-02 PROCEDURE — 63600175 PHARM REV CODE 636 W HCPCS: Performed by: STUDENT IN AN ORGANIZED HEALTH CARE EDUCATION/TRAINING PROGRAM

## 2019-01-02 PROCEDURE — 94010 BREATHING CAPACITY TEST: CPT

## 2019-01-02 PROCEDURE — 99233 PR SUBSEQUENT HOSPITAL CARE,LEVL III: ICD-10-PCS | Mod: GC,,, | Performed by: PSYCHIATRY & NEUROLOGY

## 2019-01-02 PROCEDURE — 80053 COMPREHEN METABOLIC PANEL: CPT

## 2019-01-02 PROCEDURE — 27200966 HC CLOSED SUCTION SYSTEM

## 2019-01-02 PROCEDURE — 94667 MNPJ CHEST WALL 1ST: CPT

## 2019-01-02 PROCEDURE — 94150 VITAL CAPACITY TEST: CPT

## 2019-01-02 PROCEDURE — 99233 SBSQ HOSP IP/OBS HIGH 50: CPT | Mod: GC,,, | Performed by: PSYCHIATRY & NEUROLOGY

## 2019-01-02 PROCEDURE — 20000000 HC ICU ROOM

## 2019-01-02 PROCEDURE — 85025 COMPLETE CBC W/AUTO DIFF WBC: CPT

## 2019-01-02 RX ORDER — ALBUTEROL SULFATE 2.5 MG/.5ML
2.5 SOLUTION RESPIRATORY (INHALATION) EVERY 4 HOURS
Status: DISCONTINUED | OUTPATIENT
Start: 2019-01-03 | End: 2019-01-22

## 2019-01-02 RX ORDER — SODIUM CHLORIDE FOR INHALATION 3 %
4 VIAL, NEBULIZER (ML) INHALATION EVERY 4 HOURS
Status: DISCONTINUED | OUTPATIENT
Start: 2019-01-03 | End: 2019-01-22

## 2019-01-02 RX ADMIN — FAMOTIDINE 20 MG: 20 TABLET ORAL at 09:01

## 2019-01-02 RX ADMIN — METOPROLOL TARTRATE 25 MG: 25 TABLET ORAL at 09:01

## 2019-01-02 RX ADMIN — MAGNESIUM OXIDE TAB 400 MG (241.3 MG ELEMENTAL MG) 800 MG: 400 (241.3 MG) TAB at 09:01

## 2019-01-02 RX ADMIN — SODIUM CHLORIDE 200 MG: 9 INJECTION, SOLUTION INTRAVENOUS at 09:01

## 2019-01-02 RX ADMIN — Medication 3 ML: at 06:01

## 2019-01-02 RX ADMIN — Medication 3 ML: at 09:01

## 2019-01-02 RX ADMIN — SODIUM CHLORIDE SOLN NEBU 3% 4 ML: 3 NEBU SOLN at 11:01

## 2019-01-02 RX ADMIN — Medication 3 ML: at 02:01

## 2019-01-02 RX ADMIN — POLYETHYLENE GLYCOL 3350 17 G: 17 POWDER, FOR SOLUTION ORAL at 09:01

## 2019-01-02 RX ADMIN — ALBUTEROL SULFATE 2.5 MG: 2.5 SOLUTION RESPIRATORY (INHALATION) at 11:01

## 2019-01-02 RX ADMIN — STANDARDIZED SENNA CONCENTRATE AND DOCUSATE SODIUM 1 TABLET: 8.6; 5 TABLET, FILM COATED ORAL at 09:01

## 2019-01-02 RX ADMIN — MAGNESIUM OXIDE TAB 400 MG (241.3 MG ELEMENTAL MG) 800 MG: 400 (241.3 MG) TAB at 05:01

## 2019-01-02 RX ADMIN — CHLORHEXIDINE GLUCONATE 0.12% ORAL RINSE 15 ML: 1.2 LIQUID ORAL at 09:01

## 2019-01-02 RX ADMIN — HEPARIN SODIUM 5000 UNITS: 5000 INJECTION, SOLUTION INTRAVENOUS; SUBCUTANEOUS at 03:01

## 2019-01-02 RX ADMIN — HEPARIN SODIUM 5000 UNITS: 5000 INJECTION, SOLUTION INTRAVENOUS; SUBCUTANEOUS at 05:01

## 2019-01-02 RX ADMIN — CLONAZEPAM 1 MG: 1 TABLET ORAL at 09:01

## 2019-01-02 RX ADMIN — HEPARIN SODIUM 5000 UNITS: 5000 INJECTION, SOLUTION INTRAVENOUS; SUBCUTANEOUS at 09:01

## 2019-01-02 RX ADMIN — PRAVASTATIN SODIUM 40 MG: 40 TABLET ORAL at 09:01

## 2019-01-02 NOTE — ASSESSMENT & PLAN NOTE
64 yo male with L aSDH now s/p craniotomy for evacuation (12/20).    No acute events overnight. Pt is neurologically stable on exam.    --Continue care per primary team.  --Recommend consideration for early tracheostomy and gastrostomy placement.  --Will discontinue incisional staples tomorrow (1/3).  --We will continue to monitor closely, please contact us with any questions or concerns.

## 2019-01-02 NOTE — PROGRESS NOTES
[facility]  Neurosurgery  Progress Note    Subjective:     History of Present Illness: 64 yo M with PMH of CAD, CABG in 2006, DM, HTN, HLD on ASA presents to the ED for AMS. Patient fell 4 days ago with about 2 minutes of LOC. Family is unsure of what caused the fall. Full workup done including a CTH at [place] that was negative. Patient went home and stopped taking his ASA. This morning, his family noticed he was more confused. He went to [place] and CTH was done that revealed a 1.7cm left frontalpariental SDH with a 5 mm right sided midline shift and a left SAH. [place] [facility] History was obtained per family as patient unable to give history 2/2 confusion with expressive and receptive aphasia.    Post-Op Info:  Procedure(s) (LRB):  CRANIOTOMY, FOR SUBDURAL HEMATOMA EVACUATION (Left)   13 Days Post-Op         Medications:  Continuous Infusions:  Scheduled Meds:   chlorhexidine  15 mL Mouth/Throat QID    clonazePAM  1 mg Per NG tube TID    famotidine  20 mg Per NG tube BID    heparin (porcine)  5,000 Units Subcutaneous Q8H    lacosamide (VIMPAT) in saline IVPB  200 mg Intravenous Q12H    metoprolol tartrate  25 mg Per NG tube TID    polyethylene glycol  17 g Per NG tube Daily    pravastatin  40 mg Per NG tube QHS    senna-docusate 8.6-50 mg  1 tablet Per NG tube BID    sodium chloride 0.9%  3 mL Intravenous Q8H     PRN Meds:acetaminophen, magnesium oxide, magnesium oxide, ondansetron, potassium chloride 10%, potassium chloride 10%, potassium chloride 10%, potassium, sodium phosphates, potassium, sodium phosphates, potassium, sodium phosphates     Review of Systems    Objective:     Weight: 85.7 kg (189 lb)  Body mass index is 27.12 kg/m².  Vital Signs (Most Recent):  Temp: 98.9 °F (37.2 °C) (01/02/19 0303)  Pulse: 100 (01/02/19 0800)  Resp: 19 (01/02/19 0800)  BP: (!) 165/92 (01/02/19 0800)  SpO2: 100 % (01/02/19 0800) Vital Signs (24h Range):  Temp:  [98.1 °F (36.7  °C)-99 °F (37.2 °C)] 98.9 °F (37.2 °C)  Pulse:  [] 100  Resp:  [11-35] 19  SpO2:  [100 %] 100 %  BP: (113-172)/() 165/92     Date 01/02/19 0700 - 01/03/19 0659   Shift 8830-4546 1403-6601 5061-9082 24 Hour Total   INTAKE   I.V.(mL/kg) 20(0.2)   20(0.2)   NG/   165   Shift Total(mL/kg) 185(2.2)   185(2.2)   OUTPUT   Shift Total(mL/kg)       Weight (kg) 85.7 85.7 85.7 85.7              Vent Mode: Spont  Oxygen Concentration (%):  [39-41] 40  Resp Rate Total:  [10 br/min-27 br/min] 11 br/min  Vt Set:  [440 mL] 440 mL  PEEP/CPAP:  [8 cmH20] 8 cmH20  Pressure Support:  [10 cmH20] 10 cmH20  Mean Airway Pressure:  [11 igG32-60 cmH20] 12 cmH20         NG/OG Tube 12/23/18 1500 Center mouth (Active)   Placement Check placement verified by x-ray;placement verified by distal tube length measurement 12/31/2018  7:15 AM   Tolerance no signs/symptoms of discomfort 12/31/2018  7:15 AM   Securement taped to commercial device 12/31/2018  7:15 AM   Clamp Status/Tolerance unclamped;no abdominal discomfort;no abdominal distention;no emesis;no nausea;no residual;no restlessness 12/31/2018  7:15 AM   Suction Setting/Drainage Method dependent drainage 12/26/2018  3:05 AM   Insertion Site Appearance no redness, warmth, tenderness, skin breakdown, drainage 12/31/2018  7:15 AM   Drainage None 12/31/2018  7:15 AM   Flush/Irrigation flushed w/;water;no resistance met 12/31/2018  7:15 AM   Feeding Method continuous 12/31/2018  7:15 AM   Feeding Action feeding continued 12/31/2018  3:03 AM   Current Rate (mL/hr) 55 mL/hr 12/31/2018  7:15 AM   Goal Rate (mL/hr) 55 mL/hr 12/31/2018  7:15 AM   Intake (mL) 100 mL 12/30/2018  4:00 PM   Water Bolus (mL) 250 mL 12/31/2018  6:00 AM   Rate Formula Tube Feeding (mL/hr) 55 mL/hr 12/30/2018  7:02 AM   Formula Name impact peptide 12/31/2018  3:03 AM   Intake (mL) - Formula Tube Feeding 55 12/31/2018  9:00 AM   Residual Amount (ml) 5 ml 12/30/2018  7:02 AM            Urethral Catheter  "12/20/18 1853 Non-latex;Straight-tip 16 Fr. (Active)   Site Assessment Clean;Intact 12/31/2018  7:15 AM   Collection Container Urimeter 12/31/2018  7:15 AM   Securement Method secured to top of thigh w/ adhesive device 12/31/2018  7:15 AM   Catheter Care Performed yes 12/31/2018  7:15 AM   Reason for Continuing Urinary Catheterization Critically ill in ICU requiring intensive monitoring;Urinary retention 12/31/2018  7:15 AM   CAUTI Prevention Bundle StatLock in place w 1" slack;Intact seal between catheter & drainage tubing;Drainage bag off the floor;Green sheeting clip in use;No dependent loops or kinks;Drainage bag not overfilled (<2/3 full);Drainage bag below bladder 12/31/2018  7:15 AM   Output (mL) 50 mL 12/31/2018  9:00 AM       Neurosurgery Physical Exam     [id number]  Intubated  PERRL. + cough/gag.   Withdraws to stim x4  Reportedly AG spontaneously in BUE for nursing      Significant Labs:  Recent Labs   Lab 01/01/19 0300 01/02/19 0226   * 115*    136   K 4.2 4.2    108   CO2 24 22*   BUN 31* 27*   CREATININE 0.5 0.5   CALCIUM 8.0* 8.1*   MG 1.7 1.6     Recent Labs   Lab 01/01/19 0300 01/02/19 0226   WBC 14.97* 11.79   HGB 8.5* 8.5*   HCT 26.8* 26.3*    262     No results for input(s): LABPT, INR, APTT in the last 48 hours.  Microbiology Results (last 7 days)     Procedure Component Value Units Date/Time    Blood culture [[id number]] Collected:  12/24/18 0006    Order Status:  Completed Specimen:  Blood from Line, Arterial, Right Updated:  12/29/18 0612     Blood Culture, Routine No growth after 5 days.    Blood culture [[id number]] Collected:  12/24/18 0005    Order Status:  Completed Specimen:  Blood from Peripheral, Hand, Left Updated:  12/29/18 0612     Blood Culture, Routine No growth after 5 days.    Culture, Respiratory with Gram Stain [[id number]] Collected:  12/24/18 0446    Order Status:  Completed Specimen:  Respiratory from Endotracheal Aspirate Updated:  12/26/18 1007    "  Respiratory Culture Normal respiratory donnell     Respiratory Culture No S aureus or Pseudomonas isolated.     Gram Stain (Respiratory) <10 epithelial cells per low power field.     Gram Stain (Respiratory) Moderate WBC's     Gram Stain (Respiratory) No organisms seen        Recent Lab Results       01/02/19  0638   01/02/19  0415   01/02/19  0226   01/01/19  1734   01/01/19  1059        Immature Granulocytes     1.4         Immature Grans (Abs)     0.16  Comment:  Mild elevation in immature granulocytes is non specific and   can be seen in a variety of conditions including stress response,   acute inflammation, trauma and pregnancy. Correlation with other   laboratory and clinical findings is essential.           Albumin     1.5         Alkaline Phosphatase     339         Allens Test   N/A           ALT     35         Anion Gap     6         AST     32         Baso #     0.03         Basophil%     0.3         Total Bilirubin     0.6  Comment:  For infants and newborns, interpretation of results should be based  on gestational age, weight and in agreement with clinical  observations.  Premature Infant recommended reference ranges:  Up to 24 hours.............<8.0 mg/dL  Up to 48 hours............<12.0 mg/dL  3-5 days..................<15.0 mg/dL  6-29 days.................<15.0 mg/dL           Site   [facility]           BUN, Bld     27         Calcium     8.1         Chloride     108         CO2     22         Creatinine     0.5         DelSys   Adult Vent           Differential Method     Automated         eGFR if      >60.0         eGFR if non      >60.0  Comment:  Calculation used to obtain the estimated glomerular filtration  rate (eGFR) is the CKD-EPI equation.            Eos #     0.2         Eosinophil%     1.7         FiO2   40           Glucose     115         Gran # (ANC)     9.0         Gran%     76.4         Hematocrit     26.3         Hemoglobin     8.5         Lymph #      1.7         Lymph%     14.8         Magnesium     1.6         MCH     31.7         MCHC     32.3         MCV     98         Mode   PSV           Mono #     0.6         Mono%     5.4         MPV     10.2         nRBC     0         PEEP   8           Phosphorus     2.9         Platelets     262         POC BE   0           POC HCO3   23.8           POC PCO2   32.1           POC PH   7.479           POC PO2   177           POC SATURATED O2   100           POC TCO2   25           POCT Glucose 128     131 127     Potassium     4.2         Total Protein     4.6         PS   10           RBC     2.68         RDW     15.7         Sample   ARTERIAL           Sodium     136         WBC     11.79                              Significant Diagnostics:  I have reviewed all pertinent imaging results/findings within the past 24 hours.    Medications:  Continuous Infusions:  Scheduled Meds:   chlorhexidine  15 mL Mouth/Throat QID    clonazePAM  1 mg Per NG tube TID    famotidine  20 mg Per NG tube BID    heparin (porcine)  5,000 Units Subcutaneous Q8H    lacosamide (VIMPAT) in saline IVPB  200 mg Intravenous Q12H    metoprolol tartrate  25 mg Per NG tube TID    polyethylene glycol  17 g Per NG tube Daily    pravastatin  40 mg Per NG tube QHS    senna-docusate 8.6-50 mg  1 tablet Per NG tube BID    sodium chloride 0.9%  3 mL Intravenous Q8H     PRN Meds:acetaminophen, magnesium oxide, magnesium oxide, ondansetron, potassium chloride 10%, potassium chloride 10%, potassium chloride 10%, potassium, sodium phosphates, potassium, sodium phosphates, potassium, sodium phosphates     Review of Systems    Objective:     Weight: 85.7 kg (189 lb)  Body mass index is 27.12 kg/m².  Vital Signs (Most Recent):  Temp: 98.9 °F (37.2 °C) (01/02/19 0303)  Pulse: 100 (01/02/19 0800)  Resp: 19 (01/02/19 0800)  BP: (!) 165/92 (01/02/19 0800)  SpO2: 100 % (01/02/19 0800) Vital Signs (24h Range):  Temp:  [98.1 °F (36.7 °C)-99 °F (37.2 °C)] 98.9  °F (37.2 °C)  Pulse:  [] 100  Resp:  [11-35] 19  SpO2:  [100 %] 100 %  BP: (113-172)/() 165/92     Date 01/02/19 0700 - 01/03/19 0659   Shift 4993-8524 7522-1586 3854-5635 24 Hour Total   INTAKE   I.V.(mL/kg) 20(0.2)   20(0.2)   NG/   165   Shift Total(mL/kg) 185(2.2)   185(2.2)   OUTPUT   Shift Total(mL/kg)       Weight (kg) 85.7 85.7 85.7 85.7              Vent Mode: Spont  Oxygen Concentration (%):  [39-41] 40  Resp Rate Total:  [10 br/min-27 br/min] 11 br/min  Vt Set:  [440 mL] 440 mL  PEEP/CPAP:  [8 cmH20] 8 cmH20  Pressure Support:  [10 cmH20] 10 cmH20  Mean Airway Pressure:  [11 aiA77-41 cmH20] 12 cmH20         NG/OG Tube 12/23/18 1500 Center mouth (Active)   Placement Check placement verified by x-ray;placement verified by distal tube length measurement 12/31/2018  7:15 AM   Tolerance no signs/symptoms of discomfort 12/31/2018  7:15 AM   Securement taped to commercial device 12/31/2018  7:15 AM   Clamp Status/Tolerance unclamped;no abdominal discomfort;no abdominal distention;no emesis;no nausea;no residual;no restlessness 12/31/2018  7:15 AM   Suction Setting/Drainage Method dependent drainage 12/26/2018  3:05 AM   Insertion Site Appearance no redness, warmth, tenderness, skin breakdown, drainage 12/31/2018  7:15 AM   Drainage None 12/31/2018  7:15 AM   Flush/Irrigation flushed w/;water;no resistance met 12/31/2018  7:15 AM   Feeding Method continuous 12/31/2018  7:15 AM   Feeding Action feeding continued 12/31/2018  3:03 AM   Current Rate (mL/hr) 55 mL/hr 12/31/2018  7:15 AM   Goal Rate (mL/hr) 55 mL/hr 12/31/2018  7:15 AM   Intake (mL) 100 mL 12/30/2018  4:00 PM   Water Bolus (mL) 250 mL 12/31/2018  6:00 AM   Rate Formula Tube Feeding (mL/hr) 55 mL/hr 12/30/2018  7:02 AM   Formula Name impact peptide 12/31/2018  3:03 AM   Intake (mL) - Formula Tube Feeding 55 12/31/2018  9:00 AM   Residual Amount (ml) 5 ml 12/30/2018  7:02 AM            Urethral Catheter 12/20/18 5119  "Non-latex;Straight-tip 16 Fr. (Active)   Site Assessment Clean;Intact 12/31/2018  7:15 AM   Collection Container Urimeter 12/31/2018  7:15 AM   Securement Method secured to top of thigh w/ adhesive device 12/31/2018  7:15 AM   Catheter Care Performed yes 12/31/2018  7:15 AM   Reason for Continuing Urinary Catheterization Critically ill in ICU requiring intensive monitoring;Urinary retention 12/31/2018  7:15 AM   CAUTI Prevention Bundle StatLock in place w 1" slack;Intact seal between catheter & drainage tubing;Drainage bag off the floor;Green sheeting clip in use;No dependent loops or kinks;Drainage bag not overfilled (<2/3 full);Drainage bag below bladder 12/31/2018  7:15 AM   Output (mL) 50 mL 12/31/2018  9:00 AM       Neurosurgery Physical Exam     [id number]  Intubated  PERRL. + cough/gag.   Withdraws to stim x4  Reportedly AG spontaneously in BUE for nursing      Significant Labs:  Recent Labs   Lab 01/01/19 0300 01/02/19 0226   * 115*    136   K 4.2 4.2    108   CO2 24 22*   BUN 31* 27*   CREATININE 0.5 0.5   CALCIUM 8.0* 8.1*   MG 1.7 1.6     Recent Labs   Lab 01/01/19 0300 01/02/19 0226   WBC 14.97* 11.79   HGB 8.5* 8.5*   HCT 26.8* 26.3*    262     No results for input(s): LABPT, INR, APTT in the last 48 hours.  Microbiology Results (last 7 days)     Procedure Component Value Units Date/Time    Blood culture [[id number]] Collected:  12/24/18 0006    Order Status:  Completed Specimen:  Blood from Line, Arterial, Right Updated:  12/29/18 0612     Blood Culture, Routine No growth after 5 days.    Blood culture [[id number]] Collected:  12/24/18 0005    Order Status:  Completed Specimen:  Blood from Peripheral, Hand, Left Updated:  12/29/18 0612     Blood Culture, Routine No growth after 5 days.    Culture, Respiratory with Gram Stain [[id number]] Collected:  12/24/18 0446    Order Status:  Completed Specimen:  Respiratory from Endotracheal Aspirate Updated:  12/26/18 1007     Respiratory " Culture Normal respiratory donnell     Respiratory Culture No S aureus or Pseudomonas isolated.     Gram Stain (Respiratory) <10 epithelial cells per low power field.     Gram Stain (Respiratory) Moderate WBC's     Gram Stain (Respiratory) No organisms seen        Recent Lab Results       01/02/19  0638   01/02/19  0415   01/02/19  0226   01/01/19  1734   01/01/19  1059        Immature Granulocytes     1.4         Immature Grans (Abs)     0.16  Comment:  Mild elevation in immature granulocytes is non specific and   can be seen in a variety of conditions including stress response,   acute inflammation, trauma and pregnancy. Correlation with other   laboratory and clinical findings is essential.           Albumin     1.5         Alkaline Phosphatase     339         Allens Test   N/A           ALT     35         Anion Gap     6         AST     32         Baso #     0.03         Basophil%     0.3         Total Bilirubin     0.6  Comment:  For infants and newborns, interpretation of results should be based  on gestational age, weight and in agreement with clinical  observations.  Premature Infant recommended reference ranges:  Up to 24 hours.............<8.0 mg/dL  Up to 48 hours............<12.0 mg/dL  3-5 days..................<15.0 mg/dL  6-29 days.................<15.0 mg/dL           Site   [place]           BUN, Bld     27         Calcium     8.1         Chloride     108         CO2     22         Creatinine     0.5         DelSys   Adult Vent           Differential Method     Automated         eGFR if      >60.0         eGFR if non      >60.0  Comment:  Calculation used to obtain the estimated glomerular filtration  rate (eGFR) is the CKD-EPI equation.            Eos #     0.2         Eosinophil%     1.7         FiO2   40           Glucose     115         Gran # (ANC)     9.0         Gran%     76.4         Hematocrit     26.3         Hemoglobin     8.5         Lymph #     1.7          Lymph%     14.8         Magnesium     1.6         MCH     31.7         MCHC     32.3         MCV     98         Mode   PSV           Mono #     0.6         Mono%     5.4         MPV     10.2         nRBC     0         PEEP   8           Phosphorus     2.9         Platelets     262         POC BE   0           POC HCO3   23.8           POC PCO2   32.1           POC PH   7.479           POC PO2   177           POC SATURATED O2   100           POC TCO2   25           POCT Glucose 128     131 127     Potassium     4.2         Total Protein     4.6         PS   10           RBC     2.68         RDW     15.7         Sample   ARTERIAL           Sodium     136         WBC     11.79                              Significant Diagnostics:  I have reviewed all pertinent imaging results/findings within the past 24 hours.    Assessment/Plan:     * SDH (subdural hematoma)    64 yo male with L aSDH now s/p craniotomy for evacuation (12/20).    No acute events overnight. Pt is neurologically stable on exam.    --Continue care per primary team.  --Recommend consideration for early tracheostomy and gastrostomy placement.  --Will discontinue incisional staples tomorrow (1/3).  --We will continue to monitor closely, please contact us with any questions or concerns.           Murray Mendes MD  Neurosurgery  [facility]

## 2019-01-02 NOTE — PROGRESS NOTES
Extubated to 4 liters as per Dr. Matthew Cerna;  balloon deflated and suctioning done prior to extubation.

## 2019-01-02 NOTE — PLAN OF CARE
NCSE - now off anesthetics  On klonopin and vimpat  CXR - tubes in place. Lung fields clear  Extubation trial  Awake and alert  Tracks. Does not follow commands. Possible component of aphasia  Monitor liver enzymes (alkaline phosphatase)

## 2019-01-02 NOTE — SUBJECTIVE & OBJECTIVE
Medications:  Continuous Infusions:  Scheduled Meds:   chlorhexidine  15 mL Mouth/Throat QID    clonazePAM  1 mg Per NG tube TID    famotidine  20 mg Per NG tube BID    heparin (porcine)  5,000 Units Subcutaneous Q8H    lacosamide (VIMPAT) in saline IVPB  200 mg Intravenous Q12H    metoprolol tartrate  25 mg Per NG tube TID    polyethylene glycol  17 g Per NG tube Daily    pravastatin  40 mg Per NG tube QHS    senna-docusate 8.6-50 mg  1 tablet Per NG tube BID    sodium chloride 0.9%  3 mL Intravenous Q8H     PRN Meds:acetaminophen, magnesium oxide, magnesium oxide, ondansetron, potassium chloride 10%, potassium chloride 10%, potassium chloride 10%, potassium, sodium phosphates, potassium, sodium phosphates, potassium, sodium phosphates     Review of Systems    Objective:     Weight: 85.7 kg (189 lb)  Body mass index is 27.12 kg/m².  Vital Signs (Most Recent):  Temp: 98.9 °F (37.2 °C) (01/02/19 0303)  Pulse: 100 (01/02/19 0800)  Resp: 19 (01/02/19 0800)  BP: (!) 165/92 (01/02/19 0800)  SpO2: 100 % (01/02/19 0800) Vital Signs (24h Range):  Temp:  [98.1 °F (36.7 °C)-99 °F (37.2 °C)] 98.9 °F (37.2 °C)  Pulse:  [] 100  Resp:  [11-35] 19  SpO2:  [100 %] 100 %  BP: (113-172)/() 165/92     Date 01/02/19 0700 - 01/03/19 0659   Shift 8660-3609 2209-3943 2545-2179 24 Hour Total   INTAKE   I.V.(mL/kg) 20(0.2)   20(0.2)   NG/   165   Shift Total(mL/kg) 185(2.2)   185(2.2)   OUTPUT   Shift Total(mL/kg)       Weight (kg) 85.7 85.7 85.7 85.7              Vent Mode: Spont  Oxygen Concentration (%):  [39-41] 40  Resp Rate Total:  [10 br/min-27 br/min] 11 br/min  Vt Set:  [440 mL] 440 mL  PEEP/CPAP:  [8 cmH20] 8 cmH20  Pressure Support:  [10 cmH20] 10 cmH20  Mean Airway Pressure:  [11 ntS06-67 cmH20] 12 cmH20         NG/OG Tube 12/23/18 1500 Center mouth (Active)   Placement Check placement verified by x-ray;placement verified by distal tube length measurement 12/31/2018  7:15 AM   Tolerance no  "signs/symptoms of discomfort 12/31/2018  7:15 AM   Securement taped to commercial device 12/31/2018  7:15 AM   Clamp Status/Tolerance unclamped;no abdominal discomfort;no abdominal distention;no emesis;no nausea;no residual;no restlessness 12/31/2018  7:15 AM   Suction Setting/Drainage Method dependent drainage 12/26/2018  3:05 AM   Insertion Site Appearance no redness, warmth, tenderness, skin breakdown, drainage 12/31/2018  7:15 AM   Drainage None 12/31/2018  7:15 AM   Flush/Irrigation flushed w/;water;no resistance met 12/31/2018  7:15 AM   Feeding Method continuous 12/31/2018  7:15 AM   Feeding Action feeding continued 12/31/2018  3:03 AM   Current Rate (mL/hr) 55 mL/hr 12/31/2018  7:15 AM   Goal Rate (mL/hr) 55 mL/hr 12/31/2018  7:15 AM   Intake (mL) 100 mL 12/30/2018  4:00 PM   Water Bolus (mL) 250 mL 12/31/2018  6:00 AM   Rate Formula Tube Feeding (mL/hr) 55 mL/hr 12/30/2018  7:02 AM   Formula Name impact peptide 12/31/2018  3:03 AM   Intake (mL) - Formula Tube Feeding 55 12/31/2018  9:00 AM   Residual Amount (ml) 5 ml 12/30/2018  7:02 AM            Urethral Catheter 12/20/18 2350 Non-latex;Straight-tip 16 Fr. (Active)   Site Assessment Clean;Intact 12/31/2018  7:15 AM   Collection Container Urimeter 12/31/2018  7:15 AM   Securement Method secured to top of thigh w/ adhesive device 12/31/2018  7:15 AM   Catheter Care Performed yes 12/31/2018  7:15 AM   Reason for Continuing Urinary Catheterization Critically ill in ICU requiring intensive monitoring;Urinary retention 12/31/2018  7:15 AM   CAUTI Prevention Bundle StatLock in place w 1" slack;Intact seal between catheter & drainage tubing;Drainage bag off the floor;Green sheeting clip in use;No dependent loops or kinks;Drainage bag not overfilled (<2/3 full);Drainage bag below bladder 12/31/2018  7:15 AM   Output (mL) 50 mL 12/31/2018  9:00 AM       Neurosurgery Physical Exam     [id number]  Intubated  PERRL. + cough/gag.   Withdraws to stim x4  Reportedly AG " spontaneously in BUE for nursing      Significant Labs:  Recent Labs   Lab 01/01/19  0300 01/02/19  0226   * 115*    136   K 4.2 4.2    108   CO2 24 22*   BUN 31* 27*   CREATININE 0.5 0.5   CALCIUM 8.0* 8.1*   MG 1.7 1.6     Recent Labs   Lab 01/01/19  0300 01/02/19  0226   WBC 14.97* 11.79   HGB 8.5* 8.5*   HCT 26.8* 26.3*    262     No results for input(s): LABPT, INR, APTT in the last 48 hours.  Microbiology Results (last 7 days)     Procedure Component Value Units Date/Time    Blood culture [[id number]] Collected:  12/24/18 0006    Order Status:  Completed Specimen:  Blood from Line, Arterial, Right Updated:  12/29/18 0612     Blood Culture, Routine No growth after 5 days.    Blood culture [[id number]] Collected:  12/24/18 0005    Order Status:  Completed Specimen:  Blood from Peripheral, Hand, Left Updated:  12/29/18 0612     Blood Culture, Routine No growth after 5 days.    Culture, Respiratory with Gram Stain [[id number]] Collected:  12/24/18 0446    Order Status:  Completed Specimen:  Respiratory from Endotracheal Aspirate Updated:  12/26/18 1007     Respiratory Culture Normal respiratory donnell     Respiratory Culture No S aureus or Pseudomonas isolated.     Gram Stain (Respiratory) <10 epithelial cells per low power field.     Gram Stain (Respiratory) Moderate WBC's     Gram Stain (Respiratory) No organisms seen        Recent Lab Results       01/02/19  0638   01/02/19  0415   01/02/19  0226   01/01/19  1734   01/01/19  1059        Immature Granulocytes     1.4         Immature Grans (Abs)     0.16  Comment:  Mild elevation in immature granulocytes is non specific and   can be seen in a variety of conditions including stress response,   acute inflammation, trauma and pregnancy. Correlation with other   laboratory and clinical findings is essential.           Albumin     1.5         Alkaline Phosphatase     339         Allens Test   N/A           ALT     35         Anion Gap     6          AST     32         Baso #     0.03         Basophil%     0.3         Total Bilirubin     0.6  Comment:  For infants and newborns, interpretation of results should be based  on gestational age, weight and in agreement with clinical  observations.  Premature Infant recommended reference ranges:  Up to 24 hours.............<8.0 mg/dL  Up to 48 hours............<12.0 mg/dL  3-5 days..................<15.0 mg/dL  6-29 days.................<15.0 mg/dL           Site   [facility]           BUN, Bld     27         Calcium     8.1         Chloride     108         CO2     22         Creatinine     0.5         Dels   Adult Vent           Differential Method     Automated         eGFR if      >60.0         eGFR if non      >60.0  Comment:  Calculation used to obtain the estimated glomerular filtration  rate (eGFR) is the CKD-EPI equation.            Eos #     0.2         Eosinophil%     1.7         FiO2   40           Glucose     115         Gran # (ANC)     9.0         Gran%     76.4         Hematocrit     26.3         Hemoglobin     8.5         Lymph #     1.7         Lymph%     14.8         Magnesium     1.6         MCH     31.7         MCHC     32.3         MCV     98         Mode   PSV           Mono #     0.6         Mono%     5.4         MPV     10.2         nRBC     0         PEEP   8           Phosphorus     2.9         Platelets     262         POC BE   0           POC HCO3   23.8           POC PCO2   32.1           POC PH   7.479           POC PO2   177           POC SATURATED O2   100           POC TCO2   25           POCT Glucose 128     131 127     Potassium     4.2         Total Protein     4.6         PS   10           RBC     2.68         RDW     15.7         Sample   ARTERIAL           Sodium     136         WBC     11.79                              Significant Diagnostics:  I have reviewed all pertinent imaging results/findings within the past 24  hours.    Medications:  Continuous Infusions:  Scheduled Meds:   chlorhexidine  15 mL Mouth/Throat QID    clonazePAM  1 mg Per NG tube TID    famotidine  20 mg Per NG tube BID    heparin (porcine)  5,000 Units Subcutaneous Q8H    lacosamide (VIMPAT) in saline IVPB  200 mg Intravenous Q12H    metoprolol tartrate  25 mg Per NG tube TID    polyethylene glycol  17 g Per NG tube Daily    pravastatin  40 mg Per NG tube QHS    senna-docusate 8.6-50 mg  1 tablet Per NG tube BID    sodium chloride 0.9%  3 mL Intravenous Q8H     PRN Meds:acetaminophen, magnesium oxide, magnesium oxide, ondansetron, potassium chloride 10%, potassium chloride 10%, potassium chloride 10%, potassium, sodium phosphates, potassium, sodium phosphates, potassium, sodium phosphates     Review of Systems    Objective:     Weight: 85.7 kg (189 lb)  Body mass index is 27.12 kg/m².  Vital Signs (Most Recent):  Temp: 98.9 °F (37.2 °C) (01/02/19 0303)  Pulse: 100 (01/02/19 0800)  Resp: 19 (01/02/19 0800)  BP: (!) 165/92 (01/02/19 0800)  SpO2: 100 % (01/02/19 0800) Vital Signs (24h Range):  Temp:  [98.1 °F (36.7 °C)-99 °F (37.2 °C)] 98.9 °F (37.2 °C)  Pulse:  [] 100  Resp:  [11-35] 19  SpO2:  [100 %] 100 %  BP: (113-172)/() 165/92     Date 01/02/19 0700 - 01/03/19 0659   Shift 9434-4270 4365-2935 2808-3435 24 Hour Total   INTAKE   I.V.(mL/kg) 20(0.2)   20(0.2)   NG/   165   Shift Total(mL/kg) 185(2.2)   185(2.2)   OUTPUT   Shift Total(mL/kg)       Weight (kg) 85.7 85.7 85.7 85.7              Vent Mode: Spont  Oxygen Concentration (%):  [39-41] 40  Resp Rate Total:  [10 br/min-27 br/min] 11 br/min  Vt Set:  [440 mL] 440 mL  PEEP/CPAP:  [8 cmH20] 8 cmH20  Pressure Support:  [10 cmH20] 10 cmH20  Mean Airway Pressure:  [11 bqK12-44 cmH20] 12 cmH20         NG/OG Tube 12/23/18 1500 Center mouth (Active)   Placement Check placement verified by x-ray;placement verified by distal tube length measurement 12/31/2018  7:15 AM   Tolerance no  spontaneously in BUE for nursing      Significant Labs:  Recent Labs   Lab 01/01/19  0300 01/02/19  0226   * 115*    136   K 4.2 4.2    108   CO2 24 22*   BUN 31* 27*   CREATININE 0.5 0.5   CALCIUM 8.0* 8.1*   MG 1.7 1.6     Recent Labs   Lab 01/01/19  0300 01/02/19  0226   WBC 14.97* 11.79   HGB 8.5* 8.5*   HCT 26.8* 26.3*    262     No results for input(s): LABPT, INR, APTT in the last 48 hours.  Microbiology Results (last 7 days)     Procedure Component Value Units Date/Time    Blood culture [[id number]] Collected:  12/24/18 0006    Order Status:  Completed Specimen:  Blood from Line, Arterial, Right Updated:  12/29/18 0612     Blood Culture, Routine No growth after 5 days.    Blood culture [[id number]] Collected:  12/24/18 0005    Order Status:  Completed Specimen:  Blood from Peripheral, Hand, Left Updated:  12/29/18 0612     Blood Culture, Routine No growth after 5 days.    Culture, Respiratory with Gram Stain [[id number]] Collected:  12/24/18 0446    Order Status:  Completed Specimen:  Respiratory from Endotracheal Aspirate Updated:  12/26/18 1007     Respiratory Culture Normal respiratory donnell     Respiratory Culture No S aureus or Pseudomonas isolated.     Gram Stain (Respiratory) <10 epithelial cells per low power field.     Gram Stain (Respiratory) Moderate WBC's     Gram Stain (Respiratory) No organisms seen        Recent Lab Results       01/02/19  0638   01/02/19  0415   01/02/19  0226   01/01/19  1734   01/01/19  1059        Immature Granulocytes     1.4         Immature Grans (Abs)     0.16  Comment:  Mild elevation in immature granulocytes is non specific and   can be seen in a variety of conditions including stress response,   acute inflammation, trauma and pregnancy. Correlation with other   laboratory and clinical findings is essential.           Albumin     1.5         Alkaline Phosphatase     339         Allens Test   N/A           ALT     35         Anion Gap     6          AST     32         Baso #     0.03         Basophil%     0.3         Total Bilirubin     0.6  Comment:  For infants and newborns, interpretation of results should be based  on gestational age, weight and in agreement with clinical  observations.  Premature Infant recommended reference ranges:  Up to 24 hours.............<8.0 mg/dL  Up to 48 hours............<12.0 mg/dL  3-5 days..................<15.0 mg/dL  6-29 days.................<15.0 mg/dL           Site   [facility]           BUN, Bld     27         Calcium     8.1         Chloride     108         CO2     22         Creatinine     0.5         Dels   Adult Vent           Differential Method     Automated         eGFR if      >60.0         eGFR if non      >60.0  Comment:  Calculation used to obtain the estimated glomerular filtration  rate (eGFR) is the CKD-EPI equation.            Eos #     0.2         Eosinophil%     1.7         FiO2   40           Glucose     115         Gran # (ANC)     9.0         Gran%     76.4         Hematocrit     26.3         Hemoglobin     8.5         Lymph #     1.7         Lymph%     14.8         Magnesium     1.6         MCH     31.7         MCHC     32.3         MCV     98         Mode   PSV           Mono #     0.6         Mono%     5.4         MPV     10.2         nRBC     0         PEEP   8           Phosphorus     2.9         Platelets     262         POC BE   0           POC HCO3   23.8           POC PCO2   32.1           POC PH   7.479           POC PO2   177           POC SATURATED O2   100           POC TCO2   25           POCT Glucose 128     131 127     Potassium     4.2         Total Protein     4.6         PS   10           RBC     2.68         RDW     15.7         Sample   ARTERIAL           Sodium     136         WBC     11.79                              Significant Diagnostics:  I have reviewed all pertinent imaging results/findings within the past 24 hours.

## 2019-01-02 NOTE — PROGRESS NOTES
[facility]  Neurocritical Care  Progress Note    Admit Date: 12/20/2018  Service Date: 01/01/2019  Length of Stay: 12    Subjective:     Chief Complaint: SDH (subdural hematoma)    History of Present Illness: 63 year old male h/o CABG on ASA 81 mg (last dose 72 hours PTA) presenting as transfer from OSH for EDH with SDH. Patient reportedly had a mechanical fall from standing on Sunday and went to ED where CTH was negative for any acute intracranial pathology. Overnight, patient developed confusion and presented to OSH ED where CTH was consistent with EDH with SDH. Patient has progressively worsened throughout the day developing intermittent global aphasia. [place] [facility] [place] ordered. NCC consulted for admission and medical management. Patient on weaning off of cardene at time of evaluation with sbp of 120. Reported presenting sbp in 150s.    Hospital Course: 12/22: no major events overnight. Weaned off of cardene. This morning AOx3. Drain removed by NSGY at 11 AM, and planned to step down to NSGY.  AMS noted around 1PM, patient aphasic and not following commands, STAT CTH unremarkable.   12/26: wean ketamine, follow EEG, send urine studuies-Na 133, NS bolus, advance TF  12/27: off ketamine, d/c levo, continue EEG, and AEDs, 500 NS bolus, increase enteral water flushes, continue abx for another day      Interval History: *naeon    Review of Systems:  Unable to obtain a complete ROS due to level of consciousness.     Vitals:   Temp: 99 °F (37.2 °C)  Pulse: 84  Rhythm: atrial rhythm  BP: 139/80  MAP (mmHg): 104  Resp: 20  SpO2: 100 %  Oxygen Concentration (%): 41  O2 Device (Oxygen Therapy): ventilator  Vent Mode: Spont  Set Rate: 16 bmp  Vt Set: 440 mL  Pressure Support: 10 cmH20  PEEP/CPAP: 8 cmH20  Peak Airway Pressure: 18 cmH2O  Mean Airway Pressure: 12 cmH20  Plateau Pressure: 0 cmH20    Temp  Min: 98.1 °F (36.7 °C)  Max: 99 °F (37.2 °C)  Pulse  Min: 76  Max:  121  BP  Min: 113/66  Max: 174/100  MAP (mmHg)  Min: 84  Max: 127  Resp  Min: 11  Max: 35  SpO2  Min: 100 %  Max: 100 %  Oxygen Concentration (%)  Min: 39  Max: 100    12/31 0701 - 01/01 0700  In: 4325 [I.V.:1875]  Out: 1035 [Urine:1035]   Unmeasured Output  Stool Occurrence: 0     Examination:   Constitutional: Well-nourished and -developed. No apparent distress.   Eyes: Conjunctiva clear, anicteric. Lids no lesions.  Head/Ears/Nose/Mouth/Throat/Neck: dry mucous membranes. External ears, nose atraumatic. surgical incision CDI  Cardiovascular: Regular rhythm. No murmurs. No leg edema.  Respiratory: Comfortable respirations. Clear to auscultation.  Gastrointestinal: No hernia. Soft, nondistended, nontender. + bowel sounds.    Neurologic:  -GCS [id number]  --cough, gag corneals intact  --perrl   --does not follow commands  --withdraws x 4      Medications:   Continuous Scheduled  chlorhexidine 15 mL QID   clonazePAM 1 mg TID   famotidine 20 mg BID   heparin (porcine) 5,000 Units Q8H   lacosamide (VIMPAT) in saline IVPB 200 mg Q12H   metoprolol tartrate 25 mg TID   polyethylene glycol 17 g Daily   pravastatin 40 mg QHS   senna-docusate 8.6-50 mg 1 tablet BID   sodium chloride 0.9% 3 mL Q8H   PRN  acetaminophen 650 mg Q6H PRN   magnesium oxide 800 mg PRN   magnesium oxide 800 mg PRN   ondansetron 4 mg Q12H PRN   potassium chloride 10% 40 mEq PRN   potassium chloride 10% 40 mEq PRN   potassium chloride 10% 60 mEq PRN   potassium, sodium phosphates 2 packet PRN   potassium, sodium phosphates 2 packet PRN   potassium, sodium phosphates 2 packet PRN      Today I independently reviewed pertinent medications, lines/drains/airways, laboratory results,     ISTAT: Recent Labs   Lab 01/01/19  0316   PH 7.482*   PCO2 30.8*   PO2 173*   POCSATURATED 100   HCO3 23.0*   BE 0   POCTCO2 24   SAMPLE ARTERIAL      Chem: Recent Labs   Lab 01/01/19  0300      K 4.2      CO2 24   *   BUN 31*   CREATININE 0.5   ESTGFRAFRICA  >60.0   EGFRNONAA >60.0   CALCIUM 8.0*   MG 1.7   PHOS 3.3   ANIONGAP 5*   PROT 4.5*   ALBUMIN 1.6*   BILITOT 0.8   ALKPHOS 270*   AST 38   ALT 39     Heme: Recent Labs   Lab 01/01/19  0300   WBC 14.97*   HGB 8.5*   HCT 26.8*        Endo:   Recent Labs   Lab 01/01/19  0615 01/01/19  1059 01/01/19  1734   POCTGLUCOSE 145* 127* 131*        Assessment/Plan:     Neuro   * SDH (subdural hematoma)    Epidural with subdural component s/p evac 12/20  Repeat cth stable  Mri with punctate stroke    nsgy following  Pt/ot/slp when able       Status epilepticus    No further seizures off ketamine and keppra   Mental status with continued improvement today    Cont kp and lac  Spot eeg in several days     Cerebral edema    See SDH     Brain compression    See SDH     Epidural hematoma    See SDH     Pulmonary   Acute respiratory failure    Hypoxic, aspiration, bronch with mucous plugging in L lung  Now on minimal vent settings    Maintain today in setting of mental status  Follow CXR and ABG daily     Cardiac/Vascular   New onset atrial fibrillation    Rate controlled on bb    Monitor off ac       Essential hypertension      Off Levo  SBP<160  If hypertenive weill resume home meds  PRN hydralazine for sBP>160       Renal/   AYLIN (acute kidney injury)    uop improved  Bun improving, hypernatremia improved  Increased insensible losses due to open mouth    Cont fwf  Follow uop     ID   Septic shock    Leukocytosis, reduced uop, hypotensive  Suspect pulmonary source  On vanc/cefepime    F/u cultures  Continue abx for one more day,      Endocrine   Weight loss, unintentional    Hx of 200 lb wt loss in last year  Wife reports early satiety and change in taste leading to decreased po intake    Check ct chest/abdomen/pelvis today     Other   Pressure injury of buttock, stage 2    Appreciate wound care reccs         The patient is being Prophylaxed for:  Venous Thromboembolism with: Mechanical or Chemical  Stress Ulcer with:  PPI  Ventilator Pneumonia with: chlorhexidine oral care    Activity Orders          None        Full Code    Fernanda Santos PA-C  Neurocritical Care  [facility]

## 2019-01-02 NOTE — PLAN OF CARE
Problem: Adult Inpatient Plan of Care  Goal: Plan of Care Review  Admit Date: 12/20/18    Admit Dx:    Past Medical History:  No date: Coronary artery disease  No date: CVD (cardiovascular disease)  No date: Diabetes mellitus type II  No date: Hyperlipidemia  No date: Hypertension  No date: Ischemic cardiomyopathy  No date: Obesity  No date: Obesity, morbid    Past Surgical History:  No date: APPENDECTOMY  No date: CORONARY ARTERY BYPASS GRAFT  12/20/2018: CRANIOTOMY, FOR SUBDURAL HEMATOMA EVACUATION; Left      Comment:  Performed by Stevan Hull MD at [facility] OR [address] [place] [address]  No date: HEMORRHOID SURGERY    Individualization:   1. Pt goes by [place]    Restraints: 1/1/19 @ 0505 L mitt for pulling lines       Outcome: Ongoing (interventions implemented as appropriate)  POC reviewed with pt at 0500. Pt unable to verbalize understanding. POC discussed with wife via telephone last night. Questions and concerns addressed. No acute events overnight. Pt progressing toward goals. Will continue to monitor. See flowsheets for full assessment and VS info

## 2019-01-02 NOTE — SUBJECTIVE & OBJECTIVE
Interval History: Extubated to 4L NC and subsequently reintubated. Sedated on low dose propofol gtt this AM. Staples removed this AM, incision well approximated, no erythema/drainage.     Medications:  Continuous Infusions:  Scheduled Meds:   chlorhexidine  15 mL Mouth/Throat QID    clonazePAM  1 mg Per NG tube TID    famotidine  20 mg Per NG tube BID    heparin (porcine)  5,000 Units Subcutaneous Q8H    lacosamide (VIMPAT) in saline IVPB  200 mg Intravenous Q12H    metoprolol tartrate  25 mg Per NG tube TID    polyethylene glycol  17 g Per NG tube Daily    pravastatin  40 mg Per NG tube QHS    senna-docusate 8.6-50 mg  1 tablet Per NG tube BID    sodium chloride 0.9%  3 mL Intravenous Q8H     PRN Meds:acetaminophen, magnesium oxide, magnesium oxide, ondansetron, potassium chloride 10%, potassium chloride 10%, potassium chloride 10%, potassium, sodium phosphates, potassium, sodium phosphates, potassium, sodium phosphates     Review of Systems  Objective:     Weight: 85.7 kg (189 lb)  Body mass index is 27.12 kg/m².  Vital Signs (Most Recent):  Temp: 98.9 °F (37.2 °C) (01/02/19 0303)  Pulse: 100 (01/02/19 0800)  Resp: 19 (01/02/19 0800)  BP: (!) 165/92 (01/02/19 0800)  SpO2: 100 % (01/02/19 0800) Vital Signs (24h Range):  Temp:  [98.1 °F (36.7 °C)-99 °F (37.2 °C)] 98.9 °F (37.2 °C)  Pulse:  [] 100  Resp:  [11-35] 19  SpO2:  [100 %] 100 %  BP: (113-172)/() 165/92     Date 01/02/19 0700 - 01/03/19 0659   Shift 5126-7882 7357-9749 2170-7071 24 Hour Total   INTAKE   I.V.(mL/kg) 20(0.2)   20(0.2)   NG/   165   Shift Total(mL/kg) 185(2.2)   185(2.2)   OUTPUT   Shift Total(mL/kg)       Weight (kg) 85.7 85.7 85.7 85.7              Vent Mode: Spont  Oxygen Concentration (%):  [39-41] 40  Resp Rate Total:  [10 br/min-27 br/min] 11 br/min  Vt Set:  [440 mL] 440 mL  PEEP/CPAP:  [8 cmH20] 8 cmH20  Pressure Support:  [10 cmH20] 10 cmH20  Mean Airway Pressure:  [11 pzW84-56 cmH20] 12 cmH20         NG/OG  "Tube 12/23/18 1500 Center mouth (Active)   Placement Check placement verified by x-ray 1/2/2019  7:01 AM   Tolerance no signs/symptoms of discomfort 1/2/2019  7:01 AM   Securement taped to commercial device 1/2/2019  7:01 AM   Clamp Status/Tolerance unclamped 1/2/2019  7:01 AM   Suction Setting/Drainage Method dependent drainage 1/2/2019  7:01 AM   Insertion Site Appearance no redness, warmth, tenderness, skin breakdown, drainage 1/2/2019  7:01 AM   Drainage None 1/2/2019  7:01 AM   Flush/Irrigation flushed w/;water 1/2/2019  7:01 AM   Feeding Method continuous 1/2/2019  7:01 AM   Feeding Action feeding continued 1/2/2019  7:01 AM   Current Rate (mL/hr) 55 mL/hr 1/2/2019  7:01 AM   Goal Rate (mL/hr) 55 mL/hr 1/2/2019  7:01 AM   Intake (mL) 55 mL 1/2/2019  7:01 AM   Water Bolus (mL) 250 mL 1/2/2019  5:03 AM   Rate Formula Tube Feeding (mL/hr) 55 mL/hr 1/2/2019  7:01 AM   Formula Name impact peptide 1/2/2019  7:01 AM   Intake (mL) - Formula Tube Feeding 55 1/2/2019  8:00 AM   Residual Amount (ml) 0 ml 1/1/2019  3:05 PM            Urethral Catheter 12/20/18 2350 Non-latex;Straight-tip 16 Fr. (Active)   Site Assessment Clean;Intact 1/2/2019  7:01 AM   Collection Container Standard drainage bag 1/2/2019  7:01 AM   Securement Method secured to top of thigh w/ adhesive device 1/2/2019  7:01 AM   Catheter Care Performed yes 1/2/2019  7:01 AM   Reason for Continuing Urinary Catheterization Urinary retention 1/2/2019  7:01 AM   CAUTI Prevention Bundle StatLock in place w 1" slack;Intact seal between catheter & drainage tubing;Drainage bag off the floor;Green sheeting clip in use;No dependent loops or kinks;Drainage bag not overfilled (<2/3 full);Drainage bag below bladder 1/2/2019  7:01 AM   Output (mL) 40 mL 1/2/2019  3:03 AM       Neurosurgery Physical Exam   Sedated on low dose propofol gtt   E3VTM4  +BSi  (-) cough, (+) gag  Intubated on 100% Fi02  Withdrawing x4  BUE/BLE w/nonpitting edema        Significant " Labs:  Recent Labs   Lab 01/01/19  0300 01/02/19  0226   * 115*    136   K 4.2 4.2    108   CO2 24 22*   BUN 31* 27*   CREATININE 0.5 0.5   CALCIUM 8.0* 8.1*   MG 1.7 1.6     Recent Labs   Lab 01/01/19  0300 01/02/19  0226   WBC 14.97* 11.79   HGB 8.5* 8.5*   HCT 26.8* 26.3*    262     No results for input(s): LABPT, INR, APTT in the last 48 hours.  Microbiology Results (last 7 days)     Procedure Component Value Units Date/Time    Blood culture [[id number]] Collected:  12/24/18 0006    Order Status:  Completed Specimen:  Blood from Line, Arterial, Right Updated:  12/29/18 0612     Blood Culture, Routine No growth after 5 days.    Blood culture [[id number]] Collected:  12/24/18 0005    Order Status:  Completed Specimen:  Blood from Peripheral, Hand, Left Updated:  12/29/18 0612     Blood Culture, Routine No growth after 5 days.    Culture, Respiratory with Gram Stain [[id number]] Collected:  12/24/18 0446    Order Status:  Completed Specimen:  Respiratory from Endotracheal Aspirate Updated:  12/26/18 1007     Respiratory Culture Normal respiratory donnell     Respiratory Culture No S aureus or Pseudomonas isolated.     Gram Stain (Respiratory) <10 epithelial cells per low power field.     Gram Stain (Respiratory) Moderate WBC's     Gram Stain (Respiratory) No organisms seen        Recent Lab Results       01/03/19  0556   01/03/19  0115   01/02/19  2355   01/02/19  2133   01/02/19  1921        Immature Granulocytes   1.2           Immature Grans (Abs)   0.16  Comment:  Mild elevation in immature granulocytes is non specific and   can be seen in a variety of conditions including stress response,   acute inflammation, trauma and pregnancy. Correlation with other   laboratory and clinical findings is essential.             Albumin   1.7           Alkaline Phosphatase   384           Allens Test       Pass       ALT   31           Anion Gap   9           AST   26           Baso #   0.05            Basophil%   0.4           Total Bilirubin   0.9  Comment:  For infants and newborns, interpretation of results should be based  on gestational age, weight and in agreement with clinical  observations.  Premature Infant recommended reference ranges:  Up to 24 hours.............<8.0 mg/dL  Up to 48 hours............<12.0 mg/dL  3-5 days..................<15.0 mg/dL  6-29 days.................<15.0 mg/dL             Site       RR       BUN, Bld   19           Calcium   8.5           Chloride   108           CO2   22           Creatinine   0.5           DelSys       Venti Mask       Differential Method   Automated           eGFR if    >60.0           eGFR if non    >60.0  Comment:  Calculation used to obtain the estimated glomerular filtration  rate (eGFR) is the CKD-EPI equation.              Eos #   0.1           Eosinophil%   0.7           FiO2       50       Flow       15       Glucose   89           Gran # (ANC)   10.1           Gran%   77.8           Hematocrit   28.1           Hemoglobin   9.3           Lymph #   2.0           Lymph%   15.3           Magnesium   1.6           MCH   32.5           MCHC   33.1           MCV   98           Mode       SPONT       Mono #   0.6           Mono%   4.6           MPV   10.3           nRBC   0           Phosphorus   3.2           Platelets   288           POC BE       -1       POC HCO3       22.5       POC PCO2       31.0       POC PH       7.469       POC PO2       94       POC SATURATED O2       98       POC TCO2       23       POCT Glucose 136   88   86     Potassium   3.8           Total Protein   5.0           RBC   2.86           RDW   15.7           Sample       ARTERIAL       Sodium   139           WBC   12.93                            01/02/19  1917   01/02/19  1731   01/02/19  1220        Immature Granulocytes           Immature Grans (Abs)           Albumin           Alkaline Phosphatase           Allens Test           ALT            Anion Gap           AST           Baso #           Basophil%           Total Bilirubin           Site           BUN, Bld           Calcium           Chloride           CO2           Creatinine           DelSys           Differential Method           eGFR if            eGFR if non            Eos #           Eosinophil%           FiO2           Flow           Glucose           Gran # (ANC)           Gran%           Hematocrit           Hemoglobin           Lymph #           Lymph%           Magnesium   1.5       MCH           MCHC           MCV           Mode           Mono #           Mono%           MPV           nRBC           Phosphorus           Platelets           POC BE           POC HCO3           POC PCO2           POC PH           POC PO2           POC SATURATED O2           POC TCO2           POCT Glucose 21   113     Potassium           Total Protein           RBC           RDW           Sample           Sodium           WBC                 Significant Diagnostics:  I have reviewed all pertinent imaging results/findings within the past 24 hours.

## 2019-01-03 ENCOUNTER — ANESTHESIA EVENT (OUTPATIENT)
Dept: NEUROLOGY | Facility: HOSPITAL | Age: 64
DRG: 003 | End: 2019-01-03
Payer: MEDICARE

## 2019-01-03 ENCOUNTER — ANESTHESIA (OUTPATIENT)
Dept: NEUROLOGY | Facility: HOSPITAL | Age: 64
DRG: 003 | End: 2019-01-03
Payer: MEDICARE

## 2019-01-03 LAB
ALBUMIN SERPL BCP-MCNC: 1.7 G/DL
ALP SERPL-CCNC: 384 U/L
ALT SERPL W/O P-5'-P-CCNC: 31 U/L
ANION GAP SERPL CALC-SCNC: 9 MMOL/L
AST SERPL-CCNC: 26 U/L
BASOPHILS # BLD AUTO: 0.05 K/UL
BASOPHILS NFR BLD: 0.4 %
BILIRUB SERPL-MCNC: 0.9 MG/DL
BUN SERPL-MCNC: 19 MG/DL
CALCIUM SERPL-MCNC: 8.5 MG/DL
CHLORIDE SERPL-SCNC: 108 MMOL/L
CO2 SERPL-SCNC: 22 MMOL/L
CREAT SERPL-MCNC: 0.5 MG/DL
DIFFERENTIAL METHOD: ABNORMAL
EOSINOPHIL # BLD AUTO: 0.1 K/UL
EOSINOPHIL NFR BLD: 0.7 %
ERYTHROCYTE [DISTWIDTH] IN BLOOD BY AUTOMATED COUNT: 15.7 %
EST. GFR  (AFRICAN AMERICAN): >60 ML/MIN/1.73 M^2
EST. GFR  (NON AFRICAN AMERICAN): >60 ML/MIN/1.73 M^2
GGT SERPL-CCNC: 503 U/L
GLUCOSE SERPL-MCNC: 89 MG/DL
HCT VFR BLD AUTO: 28.1 %
HGB BLD-MCNC: 9.3 G/DL
IMM GRANULOCYTES # BLD AUTO: 0.16 K/UL
IMM GRANULOCYTES NFR BLD AUTO: 1.2 %
LYMPHOCYTES # BLD AUTO: 2 K/UL
LYMPHOCYTES NFR BLD: 15.3 %
MAGNESIUM SERPL-MCNC: 1.6 MG/DL
MCH RBC QN AUTO: 32.5 PG
MCHC RBC AUTO-ENTMCNC: 33.1 G/DL
MCV RBC AUTO: 98 FL
MONOCYTES # BLD AUTO: 0.6 K/UL
MONOCYTES NFR BLD: 4.6 %
NEUTROPHILS # BLD AUTO: 10.1 K/UL
NEUTROPHILS NFR BLD: 77.8 %
NRBC BLD-RTO: 0 /100 WBC
PHOSPHATE SERPL-MCNC: 3.2 MG/DL
PLATELET # BLD AUTO: 288 K/UL
PMV BLD AUTO: 10.3 FL
POCT GLUCOSE: 136 MG/DL (ref 70–110)
POCT GLUCOSE: 88 MG/DL (ref 70–110)
POCT GLUCOSE: 98 MG/DL (ref 70–110)
POTASSIUM SERPL-SCNC: 3.8 MMOL/L
PROT SERPL-MCNC: 5 G/DL
RBC # BLD AUTO: 2.86 M/UL
SODIUM SERPL-SCNC: 139 MMOL/L
WBC # BLD AUTO: 12.93 K/UL

## 2019-01-03 PROCEDURE — C9254 INJECTION, LACOSAMIDE: HCPCS | Performed by: NURSE PRACTITIONER

## 2019-01-03 PROCEDURE — 94761 N-INVAS EAR/PLS OXIMETRY MLT: CPT

## 2019-01-03 PROCEDURE — 27000221 HC OXYGEN, UP TO 24 HOURS

## 2019-01-03 PROCEDURE — 31500 INSERT EMERGENCY AIRWAY: CPT

## 2019-01-03 PROCEDURE — 84100 ASSAY OF PHOSPHORUS: CPT

## 2019-01-03 PROCEDURE — 63600175 PHARM REV CODE 636 W HCPCS: Performed by: STUDENT IN AN ORGANIZED HEALTH CARE EDUCATION/TRAINING PROGRAM

## 2019-01-03 PROCEDURE — 87070 CULTURE OTHR SPECIMN AEROBIC: CPT

## 2019-01-03 PROCEDURE — 99291 PR CRITICAL CARE, E/M 30-74 MINUTES: ICD-10-PCS | Mod: GC,,, | Performed by: PSYCHIATRY & NEUROLOGY

## 2019-01-03 PROCEDURE — 25000003 PHARM REV CODE 250: Performed by: PSYCHIATRY & NEUROLOGY

## 2019-01-03 PROCEDURE — 63600175 PHARM REV CODE 636 W HCPCS: Performed by: NURSE PRACTITIONER

## 2019-01-03 PROCEDURE — 25000003 PHARM REV CODE 250: Performed by: PHYSICIAN ASSISTANT

## 2019-01-03 PROCEDURE — 31500 INSERT EMERGENCY AIRWAY: CPT | Mod: ,,, | Performed by: ANESTHESIOLOGY

## 2019-01-03 PROCEDURE — 99900035 HC TECH TIME PER 15 MIN (STAT)

## 2019-01-03 PROCEDURE — 63600175 PHARM REV CODE 636 W HCPCS: Performed by: PSYCHIATRY & NEUROLOGY

## 2019-01-03 PROCEDURE — 25000003 PHARM REV CODE 250: Performed by: STUDENT IN AN ORGANIZED HEALTH CARE EDUCATION/TRAINING PROGRAM

## 2019-01-03 PROCEDURE — 63600175 PHARM REV CODE 636 W HCPCS

## 2019-01-03 PROCEDURE — 94003 VENT MGMT INPAT SUBQ DAY: CPT

## 2019-01-03 PROCEDURE — 27200966 HC CLOSED SUCTION SYSTEM

## 2019-01-03 PROCEDURE — 80053 COMPREHEN METABOLIC PANEL: CPT

## 2019-01-03 PROCEDURE — 94668 MNPJ CHEST WALL SBSQ: CPT

## 2019-01-03 PROCEDURE — 63600175 PHARM REV CODE 636 W HCPCS: Performed by: PHYSICIAN ASSISTANT

## 2019-01-03 PROCEDURE — 25000003 PHARM REV CODE 250: Performed by: NURSE PRACTITIONER

## 2019-01-03 PROCEDURE — A4216 STERILE WATER/SALINE, 10 ML: HCPCS | Performed by: PSYCHIATRY & NEUROLOGY

## 2019-01-03 PROCEDURE — 87040 BLOOD CULTURE FOR BACTERIA: CPT

## 2019-01-03 PROCEDURE — 25000003 PHARM REV CODE 250

## 2019-01-03 PROCEDURE — 82977 ASSAY OF GGT: CPT

## 2019-01-03 PROCEDURE — 99291 CRITICAL CARE FIRST HOUR: CPT | Mod: GC,,, | Performed by: PSYCHIATRY & NEUROLOGY

## 2019-01-03 PROCEDURE — 83735 ASSAY OF MAGNESIUM: CPT

## 2019-01-03 PROCEDURE — 25000242 PHARM REV CODE 250 ALT 637 W/ HCPCS: Performed by: NURSE PRACTITIONER

## 2019-01-03 PROCEDURE — 20000000 HC ICU ROOM

## 2019-01-03 PROCEDURE — 94640 AIRWAY INHALATION TREATMENT: CPT

## 2019-01-03 PROCEDURE — 99900026 HC AIRWAY MAINTENANCE (STAT)

## 2019-01-03 PROCEDURE — 85025 COMPLETE CBC W/AUTO DIFF WBC: CPT

## 2019-01-03 PROCEDURE — 87205 SMEAR GRAM STAIN: CPT

## 2019-01-03 PROCEDURE — 31500 INTUBATION: ICD-10-PCS | Mod: ,,, | Performed by: ANESTHESIOLOGY

## 2019-01-03 RX ORDER — HYDRALAZINE HYDROCHLORIDE 20 MG/ML
10 INJECTION INTRAMUSCULAR; INTRAVENOUS EVERY 4 HOURS PRN
Status: DISCONTINUED | OUTPATIENT
Start: 2019-01-03 | End: 2019-01-28 | Stop reason: HOSPADM

## 2019-01-03 RX ORDER — SODIUM CHLORIDE 234 MG/ML
30 INJECTION, SOLUTION INTRAVENOUS ONCE
Status: DISCONTINUED | OUTPATIENT
Start: 2019-01-03 | End: 2019-01-03

## 2019-01-03 RX ORDER — PROPOFOL 10 MG/ML
5 INJECTION, EMULSION INTRAVENOUS CONTINUOUS
Status: DISCONTINUED | OUTPATIENT
Start: 2019-01-03 | End: 2019-01-07

## 2019-01-03 RX ORDER — PROPOFOL 10 MG/ML
INJECTION, EMULSION INTRAVENOUS
Status: COMPLETED
Start: 2019-01-03 | End: 2019-01-03

## 2019-01-03 RX ORDER — PHENYLEPHRINE HCL IN 0.9% NACL 1 MG/10 ML
SYRINGE (ML) INTRAVENOUS
Status: DISPENSED
Start: 2019-01-03 | End: 2019-01-03

## 2019-01-03 RX ORDER — ROCURONIUM BROMIDE 10 MG/ML
INJECTION, SOLUTION INTRAVENOUS
Status: DISPENSED
Start: 2019-01-03 | End: 2019-01-03

## 2019-01-03 RX ORDER — ETOMIDATE 2 MG/ML
INJECTION INTRAVENOUS
Status: DISPENSED
Start: 2019-01-03 | End: 2019-01-03

## 2019-01-03 RX ORDER — CLONAZEPAM 0.5 MG/1
0.5 TABLET ORAL EVERY 8 HOURS
Status: DISCONTINUED | OUTPATIENT
Start: 2019-01-03 | End: 2019-01-07

## 2019-01-03 RX ORDER — METOPROLOL TARTRATE 1 MG/ML
5 INJECTION, SOLUTION INTRAVENOUS ONCE
Status: DISCONTINUED | OUTPATIENT
Start: 2019-01-03 | End: 2019-01-03

## 2019-01-03 RX ORDER — PROPOFOL 10 MG/ML
100 VIAL (ML) INTRAVENOUS ONCE
Status: COMPLETED | OUTPATIENT
Start: 2019-01-03 | End: 2019-01-03

## 2019-01-03 RX ORDER — ROCURONIUM BROMIDE 10 MG/ML
INJECTION, SOLUTION INTRAVENOUS
Status: COMPLETED
Start: 2019-01-03 | End: 2019-01-03

## 2019-01-03 RX ORDER — METOPROLOL TARTRATE 25 MG/1
25 TABLET, FILM COATED ORAL EVERY 8 HOURS
Status: DISCONTINUED | OUTPATIENT
Start: 2019-01-03 | End: 2019-01-10

## 2019-01-03 RX ORDER — SUCCINYLCHOLINE CHLORIDE 20 MG/ML
INJECTION INTRAMUSCULAR; INTRAVENOUS
Status: DISPENSED
Start: 2019-01-03 | End: 2019-01-03

## 2019-01-03 RX ORDER — METOPROLOL TARTRATE 1 MG/ML
5 INJECTION, SOLUTION INTRAVENOUS ONCE
Status: COMPLETED | OUTPATIENT
Start: 2019-01-03 | End: 2019-01-03

## 2019-01-03 RX ORDER — ROCURONIUM BROMIDE 10 MG/ML
80 INJECTION, SOLUTION INTRAVENOUS ONCE
Status: COMPLETED | OUTPATIENT
Start: 2019-01-03 | End: 2019-01-03

## 2019-01-03 RX ADMIN — METOPROLOL TARTRATE 25 MG: 25 TABLET ORAL at 09:01

## 2019-01-03 RX ADMIN — PIPERACILLIN AND TAZOBACTAM 4.5 G: 4; .5 INJECTION, POWDER, LYOPHILIZED, FOR SOLUTION INTRAVENOUS; PARENTERAL at 01:01

## 2019-01-03 RX ADMIN — SODIUM CHLORIDE SOLN NEBU 3% 4 ML: 3 NEBU SOLN at 03:01

## 2019-01-03 RX ADMIN — MAGNESIUM OXIDE TAB 400 MG (241.3 MG ELEMENTAL MG) 800 MG: 400 (241.3 MG) TAB at 05:01

## 2019-01-03 RX ADMIN — Medication 3 ML: at 09:01

## 2019-01-03 RX ADMIN — CLONAZEPAM 1 MG: 1 TABLET ORAL at 09:01

## 2019-01-03 RX ADMIN — STANDARDIZED SENNA CONCENTRATE AND DOCUSATE SODIUM 1 TABLET: 8.6; 5 TABLET, FILM COATED ORAL at 08:01

## 2019-01-03 RX ADMIN — HEPARIN SODIUM 5000 UNITS: 5000 INJECTION, SOLUTION INTRAVENOUS; SUBCUTANEOUS at 09:01

## 2019-01-03 RX ADMIN — SODIUM CHLORIDE SOLN NEBU 3% 4 ML: 3 NEBU SOLN at 11:01

## 2019-01-03 RX ADMIN — METOPROLOL TARTRATE 25 MG: 25 TABLET ORAL at 06:01

## 2019-01-03 RX ADMIN — Medication 100 MG: at 05:01

## 2019-01-03 RX ADMIN — FAMOTIDINE 20 MG: 20 TABLET ORAL at 09:01

## 2019-01-03 RX ADMIN — ALBUTEROL SULFATE 2.5 MG: 2.5 SOLUTION RESPIRATORY (INHALATION) at 07:01

## 2019-01-03 RX ADMIN — SODIUM CHLORIDE 200 MG: 9 INJECTION, SOLUTION INTRAVENOUS at 09:01

## 2019-01-03 RX ADMIN — Medication 3 ML: at 01:01

## 2019-01-03 RX ADMIN — SODIUM CHLORIDE SOLN NEBU 3% 4 ML: 3 NEBU SOLN at 07:01

## 2019-01-03 RX ADMIN — CHLORHEXIDINE GLUCONATE 0.12% ORAL RINSE 15 ML: 1.2 LIQUID ORAL at 08:01

## 2019-01-03 RX ADMIN — STANDARDIZED SENNA CONCENTRATE AND DOCUSATE SODIUM 1 TABLET: 8.6; 5 TABLET, FILM COATED ORAL at 09:01

## 2019-01-03 RX ADMIN — POTASSIUM CHLORIDE 40 MEQ: 20 SOLUTION ORAL at 05:01

## 2019-01-03 RX ADMIN — CHLORHEXIDINE GLUCONATE 0.12% ORAL RINSE 15 ML: 1.2 LIQUID ORAL at 01:01

## 2019-01-03 RX ADMIN — Medication 3 ML: at 06:01

## 2019-01-03 RX ADMIN — CLONAZEPAM 0.5 MG: 0.5 TABLET ORAL at 09:01

## 2019-01-03 RX ADMIN — ROCURONIUM BROMIDE 80 MG: 10 INJECTION, SOLUTION INTRAVENOUS at 05:01

## 2019-01-03 RX ADMIN — PIPERACILLIN AND TAZOBACTAM 4.5 G: 4; .5 INJECTION, POWDER, LYOPHILIZED, FOR SOLUTION INTRAVENOUS; PARENTERAL at 08:01

## 2019-01-03 RX ADMIN — VANCOMYCIN HYDROCHLORIDE 1250 MG: 10 INJECTION, POWDER, LYOPHILIZED, FOR SOLUTION INTRAVENOUS at 10:01

## 2019-01-03 RX ADMIN — ALBUTEROL SULFATE 2.5 MG: 2.5 SOLUTION RESPIRATORY (INHALATION) at 11:01

## 2019-01-03 RX ADMIN — HEPARIN SODIUM 5000 UNITS: 5000 INJECTION, SOLUTION INTRAVENOUS; SUBCUTANEOUS at 05:01

## 2019-01-03 RX ADMIN — METOPROLOL TARTRATE 25 MG: 25 TABLET ORAL at 01:01

## 2019-01-03 RX ADMIN — MAGNESIUM OXIDE TAB 400 MG (241.3 MG ELEMENTAL MG) 800 MG: 400 (241.3 MG) TAB at 01:01

## 2019-01-03 RX ADMIN — METOPROLOL TARTRATE 5 MG: 5 INJECTION, SOLUTION INTRAVENOUS at 04:01

## 2019-01-03 RX ADMIN — PROPOFOL 5 MCG/KG/MIN: 10 INJECTION, EMULSION INTRAVENOUS at 06:01

## 2019-01-03 RX ADMIN — HEPARIN SODIUM 5000 UNITS: 5000 INJECTION, SOLUTION INTRAVENOUS; SUBCUTANEOUS at 01:01

## 2019-01-03 RX ADMIN — VANCOMYCIN HYDROCHLORIDE 1250 MG: 10 INJECTION, POWDER, LYOPHILIZED, FOR SOLUTION INTRAVENOUS at 11:01

## 2019-01-03 RX ADMIN — POLYETHYLENE GLYCOL 3350 17 G: 17 POWDER, FOR SOLUTION ORAL at 09:01

## 2019-01-03 RX ADMIN — ALBUTEROL SULFATE 2.5 MG: 2.5 SOLUTION RESPIRATORY (INHALATION) at 03:01

## 2019-01-03 RX ADMIN — HYDRALAZINE HYDROCHLORIDE 10 MG: 20 INJECTION INTRAMUSCULAR; INTRAVENOUS at 12:01

## 2019-01-03 RX ADMIN — PRAVASTATIN SODIUM 40 MG: 40 TABLET ORAL at 08:01

## 2019-01-03 RX ADMIN — SODIUM CHLORIDE 200 MG: 9 INJECTION, SOLUTION INTRAVENOUS at 08:01

## 2019-01-03 RX ADMIN — CHLORHEXIDINE GLUCONATE 0.12% ORAL RINSE 15 ML: 1.2 LIQUID ORAL at 09:01

## 2019-01-03 RX ADMIN — FAMOTIDINE 20 MG: 20 TABLET ORAL at 08:01

## 2019-01-03 RX ADMIN — CLONAZEPAM 0.5 MG: 0.5 TABLET ORAL at 01:01

## 2019-01-03 RX ADMIN — PROPOFOL 100 MG: 10 INJECTION, EMULSION INTRAVENOUS at 05:01

## 2019-01-03 NOTE — PLAN OF CARE
01/03/19 1556   Discharge Reassessment   Assessment Type Discharge Planning Reassessment   Provided patient/caregiver education on the expected discharge date and the discharge plan No   Do you have any problems affording any of your prescribed medications? No   Discharge Plan A Long-term acute care facility (LTAC)   Discharge Plan B Skilled Nursing Facility   Patient choice form signed by patient/caregiver No   Anticipated Discharge Disposition LTAC   Can the patient answer the patient profile reliably? No, cognitively impaired   How does the patient rate their overall health at the present time? (mamadou)   Describe the patient's ability to walk at the present time. Does not walk or unable to take any steps at all   How often would a person be available to care for the patient? Occasionally   Number of comorbid conditions (as recorded on the chart) Four   During the past month, has the patient often been bothered by feeling down, depressed or hopeless? (mamadou)   During the past month, has the patient often been bothered by little interest or pleasure in doing things? (mamadou)   Post-Acute Status   Post-Acute Authorization Placement   Post-Acute Placement Status Patient List Provided  (LTAC list )     Patient intubated on vent.  Not medically stable for discharge.     Azeb Flores RN, CCRN-K, [facility]  Neuro-Critical Care   [phone]

## 2019-01-03 NOTE — PROGRESS NOTES
[facility]  Neurocritical Care  Progress Note    Admit Date: 12/20/2018  Service Date: 01/03/2019  Length of Stay: 14    Subjective:     Chief Complaint: SDH (subdural hematoma)    History of Present Illness: 63 year old male h/o CABG on ASA 81 mg (last dose 72 hours PTA) presenting as transfer from OSH for EDH with SDH. Patient reportedly had a mechanical fall from standing on Sunday and went to ED where CTH was negative for any acute intracranial pathology. Overnight, patient developed confusion and presented to OSH ED where CTH was consistent with EDH with SDH. Patient has progressively worsened throughout the day developing intermittent global aphasia. NSGY consulted in ED at [facility] and stat CTH has been ordered. NCC consulted for admission and medical management. Patient on weaning off of cardene at time of evaluation with sbp of 120. Reported presenting sbp in 150s.    Hospital Course: 12/22: no major events overnight. Weaned off of cardene. This morning AOx3. Drain removed by NSGY at 11 AM, and planned to step down to NSGY.  AMS noted around 1PM, patient aphasic and not following commands, STAT CTH unremarkable.   12/26: wean ketamine, follow EEG, send urine studuies-Na 133, NS bolus, advance TF  12/27: off ketamine, d/c levo, continue EEG, and AEDs, 500 NS bolus, increase enteral water flushes, continue abx for another day  1/2: extubation trial.   1/3: failed extubation trial. Plan for trach and peg      Review of Symptoms: intubated, cannot participate  Constitutional: Denies fevers, weight loss, chills, or weakness.   Eyes: Denies changes in vision.   ENT: Denies dysphagia, nasal discharge, ear pain or discharge.   Cardiovascular: Denies chest pain, palpitations, orthopnea, or claudication.   Respiratory: Denies shortness of breath, cough, hemoptysis, or wheezing.   GI: Denies nausea/vomitting, hematochezia, melena, abd pain, or changes in appetite.   : Denies dysuria, incontinence, or  hematuria.   Musculoskeletal: Denies joint pain or myalgias.   Skin/breast: Denies rashes, lumps, lesions, or discharge.   Neurologic: Denies headache, dizziness, vertigo, or paresthesias.   Psychiatric: Denies changes in mood or hallucinations.   Endocrine: Denies polyuria, polydipsia, heat/cold intolerance.   Hematologic/Lymph: Denies lymphadenopathy, easy bruising or easy bleeding.   Allergic/Immunologic: Denies rash, rhinitis      Medications:  Continuous Infusions:   propofol 5 mcg/kg/min (01/03/19 0905)     Scheduled Meds:   albuterol sulfate  2.5 mg Nebulization Q4H    chlorhexidine  15 mL Mouth/Throat QID    clonazePAM  1 mg Per NG tube TID    etomidate        famotidine  20 mg Per NG tube BID    heparin (porcine)  5,000 Units Subcutaneous Q8H    lacosamide (VIMPAT) in saline IVPB  200 mg Intravenous Q12H    metoprolol tartrate  25 mg Per NG tube Q8H    phenylephrine HCl in 0.9% NaCl        polyethylene glycol  17 g Per NG tube Daily    pravastatin  40 mg Per NG tube QHS    rocuronium        senna-docusate 8.6-50 mg  1 tablet Per NG tube BID    sodium chloride 0.9%  3 mL Intravenous Q8H    sodium chloride 3%  4 mL Nebulization Q4H    succinylcholine         PRN Meds:.acetaminophen, hydrALAZINE, magnesium oxide, magnesium oxide, ondansetron, potassium chloride 10%, potassium chloride 10%, potassium chloride 10%, potassium, sodium phosphates, potassium, sodium phosphates, potassium, sodium phosphates    OBJECTIVE:   Vital Signs (Most Recent):   Temp: 97.7 °F (36.5 °C) (01/03/19 0705)  Pulse: 82 (01/03/19 0905)  Resp: 16 (01/03/19 0905)  BP: 122/73 (01/03/19 0905)  SpO2: 100 % (01/03/19 0905)    Vital Signs (24h Range):   Temp:  [97.7 °F (36.5 °C)-98.6 °F (37 °C)] 97.7 °F (36.5 °C)  Pulse:  [] 82  Resp:  [6-36] 16  SpO2:  [92 %-100 %] 100 %  BP: ()/() 122/73    ICP/CPP (Last 24h):        I & O (Last 24h):     Intake/Output Summary (Last 24 hours) at 1/3/2019 1045  Last data  filed at 1/3/2019 0931  Gross per 24 hour   Intake 997.97 ml   Output 550 ml   Net 447.97 ml     Physical Exam:  GA: seadated, comfortable, no acute distress.   HEENT: No scleral icterus or JVD. Neck supple  Pulmonary: Air entry equal to auscultation A/P/L. Wheezing no, crackles no  Cardiac: RRR, S1 & S2 w/o rubs/murmurs/gallops.   Abdominal: soft, non-tender, bowel sounds present x 4. No appreciable hepatosplenomegaly.  Skin: No jaundice, rashes, or visible lesions.  Neuro:  --Mental Status:  Awake, follows commands inconsistently (possible aphasia). Tracks visually   --Pupils 3 mm, PERRL.   --Corneal reflex not done in this awake patient, gag, cough intact.  Moves all 4 extremities spontaneously (left more than right)  MSK:  No edema in UE and LE    Vent Data:   Vent Mode: A/C  Oxygen Concentration (%):  [] 100  Resp Rate Total:  [10 br/min-34 br/min] 16 br/min  Vt Set:  [450 mL] 450 mL  PEEP/CPAP:  [5 cmH20] 5 cmH20  Pressure Support:  [5 cmH20] 5 cmH20  Mean Airway Pressure:  [5.7 cmH20-8.5 cmH20] 8.1 cmH20    Lines/Drains/Airway:        Airway - Non-Surgical 12/23/18 1301 Endotracheal Tube (Active)   Secured at 25 cm 1/2/2019 12:00 PM   Measured At Lips 1/2/2019 12:00 PM   Secured Location Center 1/2/2019 12:00 PM   Secured by Commercial tube blood 1/2/2019 12:00 PM   Bite Block none 1/2/2019 12:00 PM   Site Condition Cool;Dry 1/2/2019 12:00 PM   Status Intact;Secured;Patent 1/2/2019 12:00 PM   Site Assessment Clean;Dry;No bleeding;No drainage 1/2/2019 12:00 PM   Cuff Pressure 28 cm H2O 1/2/2019  7:39 AM           Percutaneous Central Line Insertion/Assessment - triple lumen  12/25/18 0205 left internal jugular (Active)   Dressing biopatch in place;dressing dry and intact;dressing changed;dressing reinforced 1/2/2019 11:00 AM   Securement secured w/ sutures 1/2/2019 11:00 AM   Additional Site Signs no drainage;no streak formation;no edema;no palpable cord;no warmth;no erythema 1/2/2019 11:00 AM   Distal  "Patency/Care flushed w/o difficulty 1/2/2019 11:00 AM   Medial Patency/Care flushed w/o difficulty 1/2/2019 11:00 AM   Proximal Patency/Care flushed w/o difficulty 1/2/2019 11:00 AM   Line Interventions blood specimen obtained and sent to lab 1/2/2019 11:00 AM   Dressing Change Due 01/05/19 1/2/2019 11:00 AM   Daily Line Review Performed 1/2/2019 11:00 AM             NG/OG Tube 12/23/18 1500 Center mouth (Active)   Placement Check placement verified by x-ray 1/2/2019 11:00 AM   Tolerance no signs/symptoms of discomfort 1/2/2019 11:00 AM   Securement taped to commercial device 1/2/2019 11:00 AM   Clamp Status/Tolerance unclamped 1/2/2019 11:00 AM   Suction Setting/Drainage Method dependent drainage 1/2/2019 11:00 AM   Insertion Site Appearance no redness, warmth, tenderness, skin breakdown, drainage 1/2/2019 11:00 AM   Drainage None 1/2/2019 11:00 AM   Flush/Irrigation flushed w/;water 1/2/2019 11:00 AM   Feeding Method continuous 1/2/2019  7:01 AM   Feeding Action feeding held 1/2/2019 11:00 AM   Current Rate (mL/hr) 55 mL/hr 1/2/2019  7:01 AM   Goal Rate (mL/hr) 55 mL/hr 1/2/2019  7:01 AM   Intake (mL) 55 mL 1/2/2019  7:01 AM   Water Bolus (mL) 250 mL 1/2/2019  5:03 AM   Rate Formula Tube Feeding (mL/hr) 55 mL/hr 1/2/2019  7:01 AM   Formula Name impact peptide 1/2/2019 11:00 AM   Intake (mL) - Formula Tube Feeding 55 1/2/2019  9:00 AM   Residual Amount (ml) 0 ml 1/1/2019  3:05 PM            Urethral Catheter 12/20/18 2350 Non-latex;Straight-tip 16 Fr. (Active)   Site Assessment Clean;Intact 1/2/2019 11:00 AM   Collection Container Standard drainage bag 1/2/2019 11:00 AM   Securement Method secured to top of thigh w/ adhesive device 1/2/2019 11:00 AM   Catheter Care Performed yes 1/2/2019 11:00 AM   Reason for Continuing Urinary Catheterization Urinary retention 1/2/2019 11:00 AM   CAUTI Prevention Bundle StatLock in place w 1" slack;Intact seal between catheter & drainage tubing;Green sheeting clip in use;No " dependent loops or kinks;Drainage bag off the floor;Drainage bag not overfilled (<2/3 full);Drainage bag below bladder 1/2/2019 11:00 AM   Output (mL) 50 mL 1/2/2019  9:00 AM     Nutrition/Tube Feeds (if NPO state why): tf    Labs:  ABG:   Recent Labs   Lab 01/02/19  2133   PH 7.469*   PO2 94   PCO2 31.0*   HCO3 22.5*   POCSATURATED 98   BE -1     BMP:  Recent Labs   Lab 01/03/19  0115      K 3.8      CO2 22*   BUN 19   CREATININE 0.5   GLU 89   MG 1.6   PHOS 3.2     LFT:   Lab Results   Component Value Date    AST 26 01/03/2019    ALT 31 01/03/2019    ALKPHOS 384 (H) 01/03/2019    BILITOT 0.9 01/03/2019    ALBUMIN 1.7 (L) 01/03/2019    PROT 5.0 (L) 01/03/2019     CBC:   Lab Results   Component Value Date    WBC 12.93 (H) 01/03/2019    HGB 9.3 (L) 01/03/2019    HCT 28.1 (L) 01/03/2019    MCV 98 01/03/2019     01/03/2019     Microbiology x 7d:   Microbiology Results (last 7 days)     Procedure Component Value Units Date/Time    Blood culture [[id number]] Collected:  12/24/18 0006    Order Status:  Completed Specimen:  Blood from Line, Arterial, Right Updated:  12/29/18 0612     Blood Culture, Routine No growth after 5 days.    Blood culture [[id number]] Collected:  12/24/18 0005    Order Status:  Completed Specimen:  Blood from Peripheral, Hand, Left Updated:  12/29/18 0612     Blood Culture, Routine No growth after 5 days.        Imaging:  CXR 1/3 - tube in place. Interval right mid/lower lobe opacification  I personally reviewed the above image.  Today I independently reviewed pertinent medications, lines/drains/airways, imaging, cardiology results, laboratory results, microbiology results, notably:   ASSESSMENT/PLAN:     Active Hospital Problems    Diagnosis    *SDH (subdural hematoma)    Weight loss, unintentional     History of 200lb wt loss over last year  Reports early satiety and change in taste      Seizure    Status epilepticus    Acute respiratory failure    Septic shock    AYLIN  (acute kidney injury)    New onset atrial fibrillation    Pressure injury of buttock, stage 2    Severe malnutrition    SAH (subarachnoid hemorrhage)    Subdural hematoma    Epidural hematoma    Brain compression    Cerebral edema    Essential hypertension      Neuro:   SDH post evacuation. Stable   No further seizures  Adjust AED clonazepam 0.5mg q8hrly.  Mobilize as tolerated  Propofol for vent  Sedation holidays    Pulmonary:   reintubated  [facility]  CXR/ABG daily    Cardiac:   On metoprolol for afib, rate controlled  Hemodynamically stable    Renal:    Making urine  BUN/Cr >20  Will monitor    ID:   Afebrile, increased wbc  vanc and zosyn  panculture - blood and respiratory    Hem/Onc:   Stable hh and plt count    Endocrine:    BS stable    Fluids/Electrolytes/Nutrition/GI:   Resume tf  Monitor alkaline phosphatase, trending upwards.  Adjust AED. AST and ALT normal   check GGT  Lines:  Art  CVC  ETT +  Rader  NG +  PEG    Proph:  DVT:SCD/heparin  Constipation:  Last output:bowel regimen  PUP:pepcid  VAP:peridex      Uninterrupted Critical Care/Counseling Time (not including procedures):: 30 mins    Claudy Cerna MD  Neurocritical care attending    Full Code    Claudy Cerna MD  Neurocritical Care  [facility]

## 2019-01-03 NOTE — PROGRESS NOTES
Staples removed by XAVI BAILEY. No distress noted, VSS. Wound edges well approximated. Will continue to monitor

## 2019-01-03 NOTE — PLAN OF CARE
Problem: Adult Inpatient Plan of Care  Goal: Plan of Care Review  Admit Date: 12/20/18    Admit Dx:    Past Medical History:  No date: Coronary artery disease  No date: CVD (cardiovascular disease)  No date: Diabetes mellitus type II  No date: Hyperlipidemia  No date: Hypertension  No date: Ischemic cardiomyopathy  No date: Obesity  No date: Obesity, morbid    Past Surgical History:  No date: APPENDECTOMY  No date: CORONARY ARTERY BYPASS GRAFT  12/20/2018: CRANIOTOMY, FOR SUBDURAL HEMATOMA EVACUATION; Left      Comment:  Performed by Stevan Hull MD at [facility] OR [address] [place] [address]  No date: HEMORRHOID SURGERY    Individualization:   1. Pt goes by Peewee    Restraints: 1/1/19 @ 0505 L mitt for pulling lines       Outcome: Ongoing (interventions implemented as appropriate)  Plan of care reviewed with patient and family at 1500. Patient unable to verbalize understanding. All questions and concerns addressed today. Patient remains free from injury and falls. No acute events. Patient progressing towards goal. Will continue to monitor. See flowsheet for full assessment and vitals throughout shift.

## 2019-01-03 NOTE — PROGRESS NOTES
[facility]  Neurosurgery  Progress Note    Subjective:     History of Present Illness: 62 yo M with PMH of CAD, CABG in 2006, DM, HTN, HLD on ASA presents to the ED for AMS. Patient fell 4 days ago with about 2 minutes of LOC. Family is unsure of what caused the fall. Full workup done including a CTH at [facility] that was negative. Patient went home and stopped taking his ASA. This morning, his family noticed he was more confused. He went to [facility] and CTH was done that revealed a 1.7cm left frontalpariental SDH with a 5 mm right sided midline shift and a left SAH. Patient was transferred to [facility] [place]. History was obtained per family as patient unable to give history 2/2 confusion with expressive and receptive aphasia.    Post-Op Info:  Procedure(s) (LRB):  CRANIOTOMY, FOR SUBDURAL HEMATOMA EVACUATION (Left)   14 Days Post-Op     Interval History: Extubated to 4L NC and subsequently reintubated. Sedated on low dose propofol gtt this AM. Staples removed this AM, incision well approximated, no erythema/drainage.     Medications:  Continuous Infusions:  Scheduled Meds:   chlorhexidine  15 mL Mouth/Throat QID    clonazePAM  1 mg Per NG tube TID    famotidine  20 mg Per NG tube BID    heparin (porcine)  5,000 Units Subcutaneous Q8H    lacosamide (VIMPAT) in saline IVPB  200 mg Intravenous Q12H    metoprolol tartrate  25 mg Per NG tube TID    polyethylene glycol  17 g Per NG tube Daily    pravastatin  40 mg Per NG tube QHS    senna-docusate 8.6-50 mg  1 tablet Per NG tube BID    sodium chloride 0.9%  3 mL Intravenous Q8H     PRN Meds:acetaminophen, magnesium oxide, magnesium oxide, ondansetron, potassium chloride 10%, potassium chloride 10%, potassium chloride 10%, potassium, sodium phosphates, potassium, sodium phosphates, potassium, sodium phosphates     Review of Systems  Objective:     Weight: 85.7 kg (189 lb)  Body mass index is 27.12 kg/m².  Vital Signs (Most Recent):  Temp: 98.9 °F (37.2  °C) (01/02/19 0303)  Pulse: 100 (01/02/19 0800)  Resp: 19 (01/02/19 0800)  BP: (!) 165/92 (01/02/19 0800)  SpO2: 100 % (01/02/19 0800) Vital Signs (24h Range):  Temp:  [98.1 °F (36.7 °C)-99 °F (37.2 °C)] 98.9 °F (37.2 °C)  Pulse:  [] 100  Resp:  [11-35] 19  SpO2:  [100 %] 100 %  BP: (113-172)/() 165/92     Date 01/02/19 0700 - 01/03/19 0659   Shift 0071-8277 4447-1043 4583-0396 24 Hour Total   INTAKE   I.V.(mL/kg) 20(0.2)   20(0.2)   NG/   165   Shift Total(mL/kg) 185(2.2)   185(2.2)   OUTPUT   Shift Total(mL/kg)       Weight (kg) 85.7 85.7 85.7 85.7              Vent Mode: Spont  Oxygen Concentration (%):  [39-41] 40  Resp Rate Total:  [10 br/min-27 br/min] 11 br/min  Vt Set:  [440 mL] 440 mL  PEEP/CPAP:  [8 cmH20] 8 cmH20  Pressure Support:  [10 cmH20] 10 cmH20  Mean Airway Pressure:  [11 nvL68-22 cmH20] 12 cmH20         NG/OG Tube 12/23/18 1500 Center mouth (Active)   Placement Check placement verified by x-ray 1/2/2019  7:01 AM   Tolerance no signs/symptoms of discomfort 1/2/2019  7:01 AM   Securement taped to commercial device 1/2/2019  7:01 AM   Clamp Status/Tolerance unclamped 1/2/2019  7:01 AM   Suction Setting/Drainage Method dependent drainage 1/2/2019  7:01 AM   Insertion Site Appearance no redness, warmth, tenderness, skin breakdown, drainage 1/2/2019  7:01 AM   Drainage None 1/2/2019  7:01 AM   Flush/Irrigation flushed w/;water 1/2/2019  7:01 AM   Feeding Method continuous 1/2/2019  7:01 AM   Feeding Action feeding continued 1/2/2019  7:01 AM   Current Rate (mL/hr) 55 mL/hr 1/2/2019  7:01 AM   Goal Rate (mL/hr) 55 mL/hr 1/2/2019  7:01 AM   Intake (mL) 55 mL 1/2/2019  7:01 AM   Water Bolus (mL) 250 mL 1/2/2019  5:03 AM   Rate Formula Tube Feeding (mL/hr) 55 mL/hr 1/2/2019  7:01 AM   Formula Name impact peptide 1/2/2019  7:01 AM   Intake (mL) - Formula Tube Feeding 55 1/2/2019  8:00 AM   Residual Amount (ml) 0 ml 1/1/2019  3:05 PM            Urethral Catheter 12/20/18 8682  "Non-latex;Straight-tip 16 Fr. (Active)   Site Assessment Clean;Intact 1/2/2019  7:01 AM   Collection Container Standard drainage bag 1/2/2019  7:01 AM   Securement Method secured to top of thigh w/ adhesive device 1/2/2019  7:01 AM   Catheter Care Performed yes 1/2/2019  7:01 AM   Reason for Continuing Urinary Catheterization Urinary retention 1/2/2019  7:01 AM   CAUTI Prevention Bundle StatLock in place w 1" slack;Intact seal between catheter & drainage tubing;Drainage bag off the floor;Green sheeting clip in use;No dependent loops or kinks;Drainage bag not overfilled (<2/3 full);Drainage bag below bladder 1/2/2019  7:01 AM   Output (mL) 40 mL 1/2/2019  3:03 AM       Neurosurgery Physical Exam   Sedated on low dose propofol gtt   E3VTM4  +BSi  (-) cough, (+) gag  Intubated on 100% Fi02  Withdrawing x4  BUE/BLE w/nonpitting edema        Significant Labs:  Recent Labs   Lab 01/01/19  0300 01/02/19 0226   * 115*    136   K 4.2 4.2    108   CO2 24 22*   BUN 31* 27*   CREATININE 0.5 0.5   CALCIUM 8.0* 8.1*   MG 1.7 1.6     Recent Labs   Lab 01/01/19 0300 01/02/19 0226   WBC 14.97* 11.79   HGB 8.5* 8.5*   HCT 26.8* 26.3*    262     No results for input(s): LABPT, INR, APTT in the last 48 hours.  Microbiology Results (last 7 days)     Procedure Component Value Units Date/Time    Blood culture [[id number]] Collected:  12/24/18 0006    Order Status:  Completed Specimen:  Blood from Line, Arterial, Right Updated:  12/29/18 0612     Blood Culture, Routine No growth after 5 days.    Blood culture [[id number]] Collected:  12/24/18 0005    Order Status:  Completed Specimen:  Blood from Peripheral, Hand, Left Updated:  12/29/18 0612     Blood Culture, Routine No growth after 5 days.    Culture, Respiratory with Gram Stain [[id number]] Collected:  12/24/18 0446    Order Status:  Completed Specimen:  Respiratory from Endotracheal Aspirate Updated:  12/26/18 1007     Respiratory Culture Normal respiratory " donnell     Respiratory Culture No S aureus or Pseudomonas isolated.     Gram Stain (Respiratory) <10 epithelial cells per low power field.     Gram Stain (Respiratory) Moderate WBC's     Gram Stain (Respiratory) No organisms seen        Recent Lab Results       01/03/19  0556   01/03/19  0115   01/02/19  2355   01/02/19  2133   01/02/19  1921        Immature Granulocytes   1.2           Immature Grans (Abs)   0.16  Comment:  Mild elevation in immature granulocytes is non specific and   can be seen in a variety of conditions including stress response,   acute inflammation, trauma and pregnancy. Correlation with other   laboratory and clinical findings is essential.             Albumin   1.7           Alkaline Phosphatase   384           Allens Test       Pass       ALT   31           Anion Gap   9           AST   26           Baso #   0.05           Basophil%   0.4           Total Bilirubin   0.9  Comment:  For infants and newborns, interpretation of results should be based  on gestational age, weight and in agreement with clinical  observations.  Premature Infant recommended reference ranges:  Up to 24 hours.............<8.0 mg/dL  Up to 48 hours............<12.0 mg/dL  3-5 days..................<15.0 mg/dL  6-29 days.................<15.0 mg/dL             Site       RR       BUN, Bld   19           Calcium   8.5           Chloride   108           CO2   22           Creatinine   0.5           DelSys       Venti Mask       Differential Method   Automated           eGFR if    >60.0           eGFR if non    >60.0  Comment:  Calculation used to obtain the estimated glomerular filtration  rate (eGFR) is the CKD-EPI equation.              Eos #   0.1           Eosinophil%   0.7           FiO2       50       Flow       15       Glucose   89           Gran # (ANC)   10.1           Gran%   77.8           Hematocrit   28.1           Hemoglobin   9.3           Lymph #   2.0           Lymph%    15.3           Magnesium   1.6           MCH   32.5           MCHC   33.1           MCV   98           Mode       SPONT       Mono #   0.6           Mono%   4.6           MPV   10.3           nRBC   0           Phosphorus   3.2           Platelets   288           POC BE       -1       POC HCO3       22.5       POC PCO2       31.0       POC PH       7.469       POC PO2       94       POC SATURATED O2       98       POC TCO2       23       POCT Glucose 136   88   86     Potassium   3.8           Total Protein   5.0           RBC   2.86           RDW   15.7           Sample       ARTERIAL       Sodium   139           WBC   12.93                            01/02/19  1917   01/02/19  1731   01/02/19  1220        Immature Granulocytes           Immature Grans (Abs)           Albumin           Alkaline Phosphatase           Allens Test           ALT           Anion Gap           AST           Baso #           Basophil%           Total Bilirubin           Site           BUN, Bld           Calcium           Chloride           CO2           Creatinine           DelSys           Differential Method           eGFR if            eGFR if non            Eos #           Eosinophil%           FiO2           Flow           Glucose           Gran # (ANC)           Gran%           Hematocrit           Hemoglobin           Lymph #           Lymph%           Magnesium   1.5       MCH           MCHC           MCV           Mode           Mono #           Mono%           MPV           nRBC           Phosphorus           Platelets           POC BE           POC HCO3           POC PCO2           POC PH           POC PO2           POC SATURATED O2           POC TCO2           POCT Glucose 21   113     Potassium           Total Protein           RBC           RDW           Sample           Sodium           WBC                 Significant Diagnostics:  I have reviewed all pertinent imaging results/findings within  the past 24 hours.    Assessment/Plan:     * SDH (subdural hematoma)    62 yo male with L aSDH now s/p craniotomy for evacuation (12/20).    --Continue care per primary team.  --Recommend consideration for early tracheostomy and gastrostomy placement, remains intubated on TF  --Staples removed, incision healing well, well approximated w/o erythema/drainage.   --We will continue to monitor closely, please contact us with any questions or concerns.           Christine Rasheed MD  Neurosurgery  [facility]

## 2019-01-03 NOTE — ANESTHESIA PROCEDURE NOTES
Intubation    Diagnosis: Respiratory Failure  Patient location during procedure: ICU  Procedure start time: 1/3/2019 5:01 AM  Timeout: 1/3/2019 5:00 AM  Procedure end time: 1/3/2019 5:05 AM  Staffing  Anesthesiologist: Jf Weinstein MD  Resident/CRNA: Radha Braxton MD  Performed: resident/CRNA   Anesthesiologist was present at the time of the procedure.  Preanesthetic Checklist  Completed: patient identified, site marked, surgical consent, pre-op evaluation, timeout performed, IV checked, risks and benefits discussed, monitors and equipment checked and anesthesia consent given  Intubation  Indication: respiratory failure  Pre-oxygenation. Induction: intravenous rapid sequence, mask ventilation: n/a.  Intubation: postinduction, laryngoscopy direct, Allie 3.  Endotracheal Tube: oral, 8.0 mm ID, cuffed (inflated to minimal occlusive pressure)  Attempts: 1, Grade II - cords partially seen  Complicating Factors: none  Tube secured at 24 cm at the lips.  Findings post-intubation: bilateral breath sounds, positive ETCO2, atraumatic / condition of teeth unchanged  Position Confirmation: auscultation

## 2019-01-03 NOTE — PROGRESS NOTES
Patient oxygen saturations dropped to 77%, LEVAR Dumont NP at bedside. Patient being bagged per NCC. Anesthesia called for rapid intubation. Patient intubated with 8.0 ETT located 24 @ lips. Stat CXR ordered for confirmation. See MAR for med drugs.

## 2019-01-03 NOTE — PROGRESS NOTES
0930- TF held due to possibility of extubation.     Water bolus and medications held due to possible extubation.    1100- OGT has been set to suction.     1257- RN and RT at bedside. Patient extubated and placed on NC at 4L with humidity. Patient tolerated well. Will continue to monitor. Oxygenating at 100%.       Verbal orders for NGT; NGT placed, awaiting STAT KUB. Rader discontinued.     1500- NCC called and notified 3 Xs of patient's elevated HR up to 120s,  no access to give metoprolol PO/NGT/OGT, no IV dose ordered at this time.       NCC notified of KUB completed, KUB was not clear, new xray ordered       KUB performed at 1930.

## 2019-01-03 NOTE — PLAN OF CARE
SW attempted to meet with the Pt family at bedside to give LTAC list but no one was present.     Caroline Soares LMSW  Neurocritical Care   [facility]  [id number]

## 2019-01-03 NOTE — PLAN OF CARE
POC reviewed with pt and family at 1400. Wife verbalized understanding. Questions and concerns addressed. No acute events today, blood cultures and sputum culture sent. Propofol gtt stopped, neuro exam unchanged. TF restarted. Abd US completed. Pt progressing toward goals. Will continue to monitor. See flowsheets for full assessment and VS info.

## 2019-01-03 NOTE — ASSESSMENT & PLAN NOTE
64 yo male with L aSDH now s/p craniotomy for evacuation (12/20).    --Continue care per primary team.  --Recommend consideration for early tracheostomy and gastrostomy placement, remains intubated on TF  --Staples removed, incision healing well, well approximated w/o erythema/drainage.   --We will continue to monitor closely, please contact us with any questions or concerns.

## 2019-01-04 LAB
ALBUMIN SERPL BCP-MCNC: 1.7 G/DL
ALLENS TEST: ABNORMAL
ALP SERPL-CCNC: 385 U/L
ALT SERPL W/O P-5'-P-CCNC: 25 U/L
ANION GAP SERPL CALC-SCNC: 7 MMOL/L
AST SERPL-CCNC: 20 U/L
BASOPHILS # BLD AUTO: 0.06 K/UL
BASOPHILS NFR BLD: 0.5 %
BILIRUB SERPL-MCNC: 0.7 MG/DL
BUN SERPL-MCNC: 20 MG/DL
CALCIUM SERPL-MCNC: 8.5 MG/DL
CHLORIDE SERPL-SCNC: 108 MMOL/L
CO2 SERPL-SCNC: 23 MMOL/L
CREAT SERPL-MCNC: 0.5 MG/DL
DELSYS: ABNORMAL
DIFFERENTIAL METHOD: ABNORMAL
EOSINOPHIL # BLD AUTO: 0.2 K/UL
EOSINOPHIL NFR BLD: 1.4 %
ERYTHROCYTE [DISTWIDTH] IN BLOOD BY AUTOMATED COUNT: 15.7 %
ERYTHROCYTE [SEDIMENTATION RATE] IN BLOOD BY WESTERGREN METHOD: 16 MM/H
EST. GFR  (AFRICAN AMERICAN): >60 ML/MIN/1.73 M^2
EST. GFR  (NON AFRICAN AMERICAN): >60 ML/MIN/1.73 M^2
FIO2: 60
GLUCOSE SERPL-MCNC: 102 MG/DL
HCO3 UR-SCNC: 22.6 MMOL/L (ref 24–28)
HCT VFR BLD AUTO: 27 %
HGB BLD-MCNC: 8.5 G/DL
IMM GRANULOCYTES # BLD AUTO: 0.09 K/UL
IMM GRANULOCYTES NFR BLD AUTO: 0.8 %
LYMPHOCYTES # BLD AUTO: 1.7 K/UL
LYMPHOCYTES NFR BLD: 15.5 %
MAGNESIUM SERPL-MCNC: 1.9 MG/DL
MCH RBC QN AUTO: 31.6 PG
MCHC RBC AUTO-ENTMCNC: 31.5 G/DL
MCV RBC AUTO: 100 FL
MODE: ABNORMAL
MONOCYTES # BLD AUTO: 0.7 K/UL
MONOCYTES NFR BLD: 6.1 %
NEUTROPHILS # BLD AUTO: 8.3 K/UL
NEUTROPHILS NFR BLD: 75.7 %
NRBC BLD-RTO: 0 /100 WBC
PCO2 BLDA: 32.9 MMHG (ref 35–45)
PEEP: 5
PH SMN: 7.45 [PH] (ref 7.35–7.45)
PHOSPHATE SERPL-MCNC: 3.6 MG/DL
PLATELET # BLD AUTO: 318 K/UL
PMV BLD AUTO: 10.2 FL
PO2 BLDA: 169 MMHG (ref 80–100)
POC BE: -1 MMOL/L
POC SATURATED O2: 100 % (ref 95–100)
POC TCO2: 24 MMOL/L (ref 23–27)
POCT GLUCOSE: 113 MG/DL (ref 70–110)
POCT GLUCOSE: 124 MG/DL (ref 70–110)
POCT GLUCOSE: 137 MG/DL (ref 70–110)
POTASSIUM SERPL-SCNC: 4.1 MMOL/L
PROCALCITONIN SERPL IA-MCNC: 0.63 NG/ML
PROT SERPL-MCNC: 5 G/DL
RBC # BLD AUTO: 2.69 M/UL
SAMPLE: ABNORMAL
SITE: ABNORMAL
SODIUM SERPL-SCNC: 138 MMOL/L
SP02: 100
VANCOMYCIN TROUGH SERPL-MCNC: 14.4 UG/ML
VT: 450
WBC # BLD AUTO: 10.98 K/UL

## 2019-01-04 PROCEDURE — 25000003 PHARM REV CODE 250: Performed by: NURSE PRACTITIONER

## 2019-01-04 PROCEDURE — 80202 ASSAY OF VANCOMYCIN: CPT

## 2019-01-04 PROCEDURE — 80053 COMPREHEN METABOLIC PANEL: CPT

## 2019-01-04 PROCEDURE — 20000000 HC ICU ROOM

## 2019-01-04 PROCEDURE — 25000003 PHARM REV CODE 250: Performed by: PSYCHIATRY & NEUROLOGY

## 2019-01-04 PROCEDURE — 99291 CRITICAL CARE FIRST HOUR: CPT | Mod: GC,,, | Performed by: PSYCHIATRY & NEUROLOGY

## 2019-01-04 PROCEDURE — 51798 US URINE CAPACITY MEASURE: CPT

## 2019-01-04 PROCEDURE — 63600175 PHARM REV CODE 636 W HCPCS: Performed by: PSYCHIATRY & NEUROLOGY

## 2019-01-04 PROCEDURE — 25000242 PHARM REV CODE 250 ALT 637 W/ HCPCS: Performed by: NURSE PRACTITIONER

## 2019-01-04 PROCEDURE — 25000003 PHARM REV CODE 250: Performed by: PHYSICIAN ASSISTANT

## 2019-01-04 PROCEDURE — A4216 STERILE WATER/SALINE, 10 ML: HCPCS | Performed by: PSYCHIATRY & NEUROLOGY

## 2019-01-04 PROCEDURE — 25000003 PHARM REV CODE 250: Performed by: STUDENT IN AN ORGANIZED HEALTH CARE EDUCATION/TRAINING PROGRAM

## 2019-01-04 PROCEDURE — 83735 ASSAY OF MAGNESIUM: CPT

## 2019-01-04 PROCEDURE — 99900035 HC TECH TIME PER 15 MIN (STAT)

## 2019-01-04 PROCEDURE — 99291 PR CRITICAL CARE, E/M 30-74 MINUTES: ICD-10-PCS | Mod: GC,,, | Performed by: PSYCHIATRY & NEUROLOGY

## 2019-01-04 PROCEDURE — C9254 INJECTION, LACOSAMIDE: HCPCS | Performed by: NURSE PRACTITIONER

## 2019-01-04 PROCEDURE — 27000221 HC OXYGEN, UP TO 24 HOURS

## 2019-01-04 PROCEDURE — 84100 ASSAY OF PHOSPHORUS: CPT

## 2019-01-04 PROCEDURE — 51701 INSERT BLADDER CATHETER: CPT

## 2019-01-04 PROCEDURE — 84145 PROCALCITONIN (PCT): CPT

## 2019-01-04 PROCEDURE — 99900026 HC AIRWAY MAINTENANCE (STAT)

## 2019-01-04 PROCEDURE — 63600175 PHARM REV CODE 636 W HCPCS: Performed by: NURSE PRACTITIONER

## 2019-01-04 PROCEDURE — 27200966 HC CLOSED SUCTION SYSTEM

## 2019-01-04 PROCEDURE — 94640 AIRWAY INHALATION TREATMENT: CPT

## 2019-01-04 PROCEDURE — 94761 N-INVAS EAR/PLS OXIMETRY MLT: CPT

## 2019-01-04 PROCEDURE — 85025 COMPLETE CBC W/AUTO DIFF WBC: CPT

## 2019-01-04 PROCEDURE — 94003 VENT MGMT INPAT SUBQ DAY: CPT

## 2019-01-04 PROCEDURE — 63600175 PHARM REV CODE 636 W HCPCS: Performed by: STUDENT IN AN ORGANIZED HEALTH CARE EDUCATION/TRAINING PROGRAM

## 2019-01-04 PROCEDURE — 63600175 PHARM REV CODE 636 W HCPCS: Performed by: PHYSICIAN ASSISTANT

## 2019-01-04 RX ORDER — URSODIOL 300 MG/1
300 CAPSULE ORAL 3 TIMES DAILY
Status: DISCONTINUED | OUTPATIENT
Start: 2019-01-04 | End: 2019-01-17

## 2019-01-04 RX ADMIN — VANCOMYCIN HYDROCHLORIDE 1250 MG: 10 INJECTION, POWDER, LYOPHILIZED, FOR SOLUTION INTRAVENOUS at 12:01

## 2019-01-04 RX ADMIN — CHLORHEXIDINE GLUCONATE 0.12% ORAL RINSE 15 ML: 1.2 LIQUID ORAL at 08:01

## 2019-01-04 RX ADMIN — STANDARDIZED SENNA CONCENTRATE AND DOCUSATE SODIUM 1 TABLET: 8.6; 5 TABLET, FILM COATED ORAL at 08:01

## 2019-01-04 RX ADMIN — HEPARIN SODIUM 5000 UNITS: 5000 INJECTION, SOLUTION INTRAVENOUS; SUBCUTANEOUS at 05:01

## 2019-01-04 RX ADMIN — FAMOTIDINE 20 MG: 20 TABLET ORAL at 09:01

## 2019-01-04 RX ADMIN — METOPROLOL TARTRATE 25 MG: 25 TABLET ORAL at 09:01

## 2019-01-04 RX ADMIN — URSODIOL 300 MG: 300 CAPSULE ORAL at 10:01

## 2019-01-04 RX ADMIN — SODIUM CHLORIDE 200 MG: 9 INJECTION, SOLUTION INTRAVENOUS at 09:01

## 2019-01-04 RX ADMIN — URSODIOL 300 MG: 300 CAPSULE ORAL at 03:01

## 2019-01-04 RX ADMIN — SODIUM CHLORIDE SOLN NEBU 3% 4 ML: 3 NEBU SOLN at 11:01

## 2019-01-04 RX ADMIN — ALBUTEROL SULFATE 2.5 MG: 2.5 SOLUTION RESPIRATORY (INHALATION) at 03:01

## 2019-01-04 RX ADMIN — ALBUTEROL SULFATE 2.5 MG: 2.5 SOLUTION RESPIRATORY (INHALATION) at 11:01

## 2019-01-04 RX ADMIN — CHLORHEXIDINE GLUCONATE 0.12% ORAL RINSE 15 ML: 1.2 LIQUID ORAL at 01:01

## 2019-01-04 RX ADMIN — Medication 3 ML: at 05:01

## 2019-01-04 RX ADMIN — SODIUM CHLORIDE SOLN NEBU 3% 4 ML: 3 NEBU SOLN at 07:01

## 2019-01-04 RX ADMIN — CLONAZEPAM 0.5 MG: 0.5 TABLET ORAL at 09:01

## 2019-01-04 RX ADMIN — CHLORHEXIDINE GLUCONATE 0.12% ORAL RINSE 15 ML: 1.2 LIQUID ORAL at 05:01

## 2019-01-04 RX ADMIN — METOPROLOL TARTRATE 25 MG: 25 TABLET ORAL at 01:01

## 2019-01-04 RX ADMIN — PIPERACILLIN AND TAZOBACTAM 4.5 G: 4; .5 INJECTION, POWDER, LYOPHILIZED, FOR SOLUTION INTRAVENOUS; PARENTERAL at 01:01

## 2019-01-04 RX ADMIN — CLONAZEPAM 0.5 MG: 0.5 TABLET ORAL at 01:01

## 2019-01-04 RX ADMIN — HEPARIN SODIUM 5000 UNITS: 5000 INJECTION, SOLUTION INTRAVENOUS; SUBCUTANEOUS at 09:01

## 2019-01-04 RX ADMIN — FAMOTIDINE 20 MG: 20 TABLET ORAL at 08:01

## 2019-01-04 RX ADMIN — Medication 3 ML: at 01:01

## 2019-01-04 RX ADMIN — POLYETHYLENE GLYCOL 3350 17 G: 17 POWDER, FOR SOLUTION ORAL at 08:01

## 2019-01-04 RX ADMIN — Medication 3 ML: at 09:01

## 2019-01-04 RX ADMIN — SODIUM CHLORIDE 200 MG: 9 INJECTION, SOLUTION INTRAVENOUS at 08:01

## 2019-01-04 RX ADMIN — CHLORHEXIDINE GLUCONATE 0.12% ORAL RINSE 15 ML: 1.2 LIQUID ORAL at 09:01

## 2019-01-04 RX ADMIN — PIPERACILLIN AND TAZOBACTAM 4.5 G: 4; .5 INJECTION, POWDER, LYOPHILIZED, FOR SOLUTION INTRAVENOUS; PARENTERAL at 05:01

## 2019-01-04 RX ADMIN — STANDARDIZED SENNA CONCENTRATE AND DOCUSATE SODIUM 1 TABLET: 8.6; 5 TABLET, FILM COATED ORAL at 09:01

## 2019-01-04 RX ADMIN — ALBUTEROL SULFATE 2.5 MG: 2.5 SOLUTION RESPIRATORY (INHALATION) at 07:01

## 2019-01-04 RX ADMIN — METOPROLOL TARTRATE 25 MG: 25 TABLET ORAL at 05:01

## 2019-01-04 RX ADMIN — CLONAZEPAM 0.5 MG: 0.5 TABLET ORAL at 05:01

## 2019-01-04 RX ADMIN — PRAVASTATIN SODIUM 40 MG: 40 TABLET ORAL at 09:01

## 2019-01-04 RX ADMIN — SODIUM CHLORIDE SOLN NEBU 3% 4 ML: 3 NEBU SOLN at 03:01

## 2019-01-04 RX ADMIN — URSODIOL 300 MG: 300 CAPSULE ORAL at 09:01

## 2019-01-04 RX ADMIN — PIPERACILLIN AND TAZOBACTAM 4.5 G: 4; .5 INJECTION, POWDER, LYOPHILIZED, FOR SOLUTION INTRAVENOUS; PARENTERAL at 09:01

## 2019-01-04 RX ADMIN — HEPARIN SODIUM 5000 UNITS: 5000 INJECTION, SOLUTION INTRAVENOUS; SUBCUTANEOUS at 01:01

## 2019-01-04 NOTE — PROGRESS NOTES
[facility]  Neurosurgery  Progress Note    Subjective:     History of Present Illness: 62 yo M with PMH of CAD, CABG in 2006, DM, HTN, HLD on ASA presents to the ED for AMS. Patient fell 4 days ago with about 2 minutes of LOC. Family is unsure of what caused the fall. Full workup done including a CTH at [place] that was negative. Patient went home and stopped taking his ASA. This morning, his family noticed he was more confused. He went to [place] and CTH was done that revealed a 1.7cm left frontalpariental SDH with a 5 mm right sided midline shift and a left SAH. [place] [facility] History was obtained per family as patient unable to give history 2/2 confusion with expressive and receptive aphasia.    Post-Op Info:  Procedure(s) (LRB):  CRANIOTOMY, FOR SUBDURAL HEMATOMA EVACUATION (Left)   15 Days Post-Op     Interval History: NAEON. Off propofol gtt this AM.     Medications:  Continuous Infusions:   propofol Stopped (01/03/19 1105)     Scheduled Meds:   albuterol sulfate  2.5 mg Nebulization Q4H    chlorhexidine  15 mL Mouth/Throat QID    clonazePAM  0.5 mg Per NG tube Q8H    famotidine  20 mg Per NG tube BID    heparin (porcine)  5,000 Units Subcutaneous Q8H    lacosamide (VIMPAT) in saline IVPB  200 mg Intravenous Q12H    metoprolol tartrate  25 mg Per NG tube Q8H    piperacillin-tazobactam (ZOSYN) IVPB  4.5 g Intravenous Q8H    polyethylene glycol  17 g Per NG tube Daily    pravastatin  40 mg Per NG tube QHS    senna-docusate 8.6-50 mg  1 tablet Per NG tube BID    sodium chloride 0.9%  3 mL Intravenous Q8H    sodium chloride 3%  4 mL Nebulization Q4H    vancomycin (VANCOCIN) IVPB  15 mg/kg (Dosing Weight) Intravenous Q12H     PRN Meds:acetaminophen, hydrALAZINE, magnesium oxide, magnesium oxide, ondansetron, potassium chloride 10%, potassium chloride 10%, potassium chloride 10%, potassium, sodium phosphates, potassium, sodium phosphates, potassium, sodium  phosphates     Review of Systems  Objective:     Weight: 85.7 kg (189 lb)  Body mass index is 27.12 kg/m².  Vital Signs (Most Recent):  Temp: 98.6 °F (37 °C) (01/04/19 0705)  Pulse: 92 (01/04/19 0735)  Resp: (!) 24 (01/04/19 0735)  BP: (!) 154/97 (01/04/19 0734)  SpO2: 100 % (01/04/19 0735) Vital Signs (24h Range):  Temp:  [97.7 °F (36.5 °C)-98.6 °F (37 °C)] 98.6 °F (37 °C)  Pulse:  [] 92  Resp:  [16-26] 24  SpO2:  [100 %] 100 %  BP: (122-180)/() 154/97     Date 01/04/19 0700 - 01/05/19 0659   Shift 2774-9499 5952-9148 2864-5917 24 Hour Total   INTAKE   I.V.(mL/kg) 10(0.1)   10(0.1)   NG/GT 40   40   Shift Total(mL/kg) 50(0.6)   50(0.6)   OUTPUT   Shift Total(mL/kg)       Weight (kg) 85.7 85.7 85.7 85.7              Vent Mode: A/C  Oxygen Concentration (%):  [] 50  Resp Rate Total:  [16 br/min-25 br/min] 25 br/min  Vt Set:  [450 mL] 450 mL  PEEP/CPAP:  [5 cmH20] 5 cmH20  Mean Airway Pressure:  [6.8 cmH20-9.9 cmH20] 8.2 cmH20         NG/OG Tube 01/01/19 [place] sump Right nostril (Active)   Placement Check placement verified by x-ray;placement verified by distal tube length measurement;placement verified by aspirate characteristics 1/4/2019  3:05 AM   Distal Tube Length (cm) 75 1/4/2019  3:05 AM   Tolerance no signs/symptoms of discomfort 1/4/2019  3:05 AM   Securement anchored to nostril center w/ adhesive device 1/4/2019  3:05 AM   Clamp Status/Tolerance unclamped 1/4/2019  3:05 AM   Insertion Site Appearance no redness, warmth, tenderness, skin breakdown, drainage 1/4/2019  3:05 AM   Flush/Irrigation flushed w/;water;no resistance met 1/4/2019  3:05 AM   Feeding Method continuous 1/3/2019  3:05 PM   Feeding Action feeding restarted 1/4/2019  3:05 AM   Current Rate (mL/hr) 30 mL/hr 1/4/2019  3:05 AM   Goal Rate (mL/hr) 55 mL/hr 1/4/2019  3:05 AM   Intake (mL) 50 mL 1/3/2019 10:05 PM   Water Bolus (mL) 250 mL 1/4/2019  6:04 AM   Intake (mL) - Formula Tube Feeding 40 1/4/2019  7:00 AM   Residual  Amount (ml) 0 ml 1/3/2019  3:05 PM       Neurosurgery Physical Exam   E4VTM5  +BSi  Intubated  MOORE spontaneously  BUE/BLE w/nonpitting edema           Significant Labs:  Recent Labs   Lab 01/02/19  1731 01/03/19  0115 01/04/19  0200   GLU  --  89 102   NA  --  139 138   K  --  3.8 4.1   CL  --  108 108   CO2  --  22* 23   BUN  --  19 20   CREATININE  --  0.5 0.5   CALCIUM  --  8.5* 8.5*   MG 1.5* 1.6 1.9     Recent Labs   Lab 01/03/19  0115 01/04/19  0200   WBC 12.93* 10.98   HGB 9.3* 8.5*   HCT 28.1* 27.0*    318     No results for input(s): LABPT, INR, APTT in the last 48 hours.  Microbiology Results (last 7 days)     Procedure Component Value Units Date/Time    Blood culture [[id number]] Collected:  01/03/19 1100    Order Status:  Completed Specimen:  Blood from Peripheral, Hand, Left Updated:  01/03/19 1915     Blood Culture, Routine No Growth to date    Narrative:       Blood cultures from 2 different sites. 4 bottles total.  Please draw before starting antibiotics.    Blood culture [[id number]] Collected:  01/03/19 1107    Order Status:  Completed Specimen:  Blood from Peripheral, Foot, Left Updated:  01/03/19 1915     Blood Culture, Routine No Growth to date    Narrative:       Blood cultures x 2 different sites. 4 bottles total. Please  draw cultures before administering antibiotics.    Culture, Respiratory with Gram Stain [[id number]] Collected:  01/03/19 1108    Order Status:  Completed Specimen:  Respiratory from Endotracheal Aspirate Updated:  01/03/19 1448     Gram Stain (Respiratory) Few WBC's     Gram Stain (Respiratory) Rare Gram positive cocci    Blood culture [[id number]] Collected:  12/24/18 0006    Order Status:  Completed Specimen:  Blood from Line, Arterial, Right Updated:  12/29/18 0612     Blood Culture, Routine No growth after 5 days.    Blood culture [[id number]] Collected:  12/24/18 0005    Order Status:  Completed Specimen:  Blood from Peripheral, Hand, Left Updated:  12/29/18 0612      Blood Culture, Routine No growth after 5 days.        Recent Lab Results       01/04/19  0513   01/04/19  0311   01/04/19  0200   01/04/19  0020   01/03/19  1125        Immature Granulocytes     0.8         Immature Grans (Abs)     0.09  Comment:  Mild elevation in immature granulocytes is non specific and   can be seen in a variety of conditions including stress response,   acute inflammation, trauma and pregnancy. Correlation with other   laboratory and clinical findings is essential.           Albumin     1.7         Alkaline Phosphatase     385         Allens Test   Pass           ALT     25         Anion Gap     7         AST     20         Baso #     0.06         Basophil%     0.5         Total Bilirubin     0.7  Comment:  For infants and newborns, interpretation of results should be based  on gestational age, weight and in agreement with clinical  observations.  Premature Infant recommended reference ranges:  Up to 24 hours.............<8.0 mg/dL  Up to 48 hours............<12.0 mg/dL  3-5 days..................<15.0 mg/dL  6-29 days.................<15.0 mg/dL           Blood Culture, Routine               Site   RR           BUN, Bld     20         Calcium     8.5         Chloride     108         CO2     23         Creatinine     0.5         DelSys   Adult Vent           Differential Method     Automated         eGFR if      >60.0         eGFR if non      >60.0  Comment:  Calculation used to obtain the estimated glomerular filtration  rate (eGFR) is the CKD-EPI equation.            Eos #     0.2         Eosinophil%     1.4         FiO2   60           GGT               Glucose     102         Gram Stain (Respiratory)               Gran # (ANC)     8.3         Gran%     75.7         Hematocrit     27.0         Hemoglobin     8.5         Lymph #     1.7         Lymph%     15.5         Magnesium     1.9         MCH     31.6         MCHC     31.5         MCV     100         Mode    AC/PRVC           Mono #     0.7         Mono%     6.1         MPV     10.2         nRBC     0         PEEP   5           Phosphorus     3.6         Platelets     318         POC BE   -1           POC HCO3   22.6           POC PCO2   32.9           POC PH   7.446           POC PO2   169           POC SATURATED O2   100           POC TCO2   24           POCT Glucose 113     124 98     Potassium     4.1         Total Protein     5.0         Rate   16           RBC     2.69         RDW     15.7         Sample   ARTERIAL           Sodium     138         Sp02   100           Vt   450           WBC     10.98                          01/03/19  1108   01/03/19  1107   01/03/19  1100        Immature Granulocytes           Immature Grans (Abs)           Albumin           Alkaline Phosphatase           Allens Test           ALT           Anion Gap           AST           Baso #           Basophil%           Total Bilirubin           Blood Culture, Routine   No Growth to date[P] No Growth to date[P]     Site           BUN, Bld           Calcium           Chloride           CO2           Creatinine           DelSys           Differential Method           eGFR if            eGFR if non            Eos #           Eosinophil%           FiO2                    Glucose           Gram Stain (Respiratory) Few WBC's          Rare Gram positive cocci         Gran # (ANC)           Gran%           Hematocrit           Hemoglobin           Lymph #           Lymph%           Magnesium           MCH           MCHC           MCV           Mode           Mono #           Mono%           MPV           nRBC           PEEP           Phosphorus           Platelets           POC BE           POC HCO3           POC PCO2           POC PH           POC PO2           POC SATURATED O2           POC TCO2           POCT Glucose           Potassium           Total Protein           Rate           RBC           RDW            Sample           Sodium           Sp02           Vt           WBC                 Significant Diagnostics:  I have reviewed all pertinent imaging results/findings within the past 24 hours.    Assessment/Plan:     * SDH (subdural hematoma)    62 yo male with L aSDH now s/p craniotomy for evacuation (12/20).    --Continue care per primary team.  --Recommend consideration for early tracheostomy and gastrostomy placement, remains intubated on TF  --Resp Cx with GPC, on Vanc/Zosyn per primary  --Staples removed, incision healing well, well approximated w/o erythema/drainage.   --We will continue to monitor closely, please contact us with any questions or concerns.    Discussed w/Dr. Kurtis Rasheed MD  Neurosurgery  [facility]

## 2019-01-04 NOTE — CONSULTS
[facility]  General Surgery  Consult Note    Patient Name: Peewee Nunes  MRN: [id number]  Code Status: Full Code  Admission Date: 12/20/2018  Hospital Length of Stay: 15 days  Attending Physician: Claudy Cerna MD  Primary Care Provider: Jacob Piedra MD    Patient information was obtained from patient, past medical records and ER records.     Inpatient consult to General Surgery  Consult performed by: April Mazariegos MD  Consult ordered by: Olena Fried PA-C        Subjective:     Principal Problem: SDH (subdural hematoma)    History of Present Illness: Mr. Nunes is a 62 yo M with PMH of CABG on ASA 81 mg admitted after a fall with EDH with SDH now s/p craniotomy for evacuation (12/20) by Neurosurgery.    General surgery consulted for PEG and Trach. He was extubated on 1/2 and failed, currently intubated.        Had an appendectomy (open). No neck surgery.       No current facility-administered medications on file prior to encounter.      Current Outpatient Medications on File Prior to Encounter   Medication Sig    aspirin (ECOTRIN) 81 MG EC tablet Take 81 mg by mouth once daily.      bisoprolol (ZEBETA) 10 MG tablet Take 1 tablet (10 mg total) by mouth once daily.    cholecalciferol, vitamin D3, 50,000 unit capsule Take 50,000 Units by mouth once a week.    linagliptin (TRADJENTA) 5 mg Tab tablet Take 1 tablet (5 mg total) by mouth once daily.    losartan (COZAAR) 100 MG tablet Take 100 mg by mouth once daily.    NIFEdipine (ADALAT CC) 60 MG TbSR Take 120 mg by mouth once daily.    pravastatin (PRAVACHOL) 40 MG tablet Take 1 tablet (40 mg total) by mouth every evening.    mv-min 16/folic acid/lut/lycop (REQ49+ ORAL)        Review of patient's allergies indicates:   Allergen Reactions    Levaquin [levofloxacin] Nausea Only       Past Medical History:   Diagnosis Date    Coronary artery disease     CVD (cardiovascular disease)     Diabetes mellitus type  II     Hyperlipidemia     Hypertension     Ischemic cardiomyopathy     Obesity     Obesity, morbid      Past Surgical History:   Procedure Laterality Date    APPENDECTOMY      CORONARY ARTERY BYPASS GRAFT      CRANIOTOMY, FOR SUBDURAL HEMATOMA EVACUATION Left 2018    Performed by Stevan Hull MD at [facility] OR [address] [place] [address]    HEMORRHOID SURGERY       Family History     Problem Relation (Age of Onset)    Cancer Father    Diabetes Mother    Heart disease Mother, Father    Hyperlipidemia Mother    Hypertension Mother    Stroke Mother        Tobacco Use    Smoking status: Former Smoker     Packs/day: 1.50     Years: 25.00     Pack years: 37.50     Types: Cigarettes     Last attempt to quit: 2002     Years since quittin.1    Smokeless tobacco: Never Used   Substance and Sexual Activity    Alcohol use: No    Drug use: No    Sexual activity: Yes     Review of Systems     Negative except above    Objective:     Vital Signs (Most Recent):  Temp: 98.4 °F (36.9 °C) (19 1105)  Pulse: 83 (19 1316)  Resp: (!) 22 (19 1316)  BP: 118/72 (19 1300)  SpO2: 100 % (19 1316) Vital Signs (24h Range):  Temp:  [97.7 °F (36.5 °C)-98.6 °F (37 °C)] 98.4 °F (36.9 °C)  Pulse:  [] 83  Resp:  [1-26] 22  SpO2:  [100 %] 100 %  BP: (101-180)/() 118/72     Weight: 85.7 kg (189 lb)  Body mass index is 27.12 kg/m².    Physical Exam     General: Intubated and sedated   CV: RRR   Pulm:mechanical breath sounds  Abd: soft, non distended, non tender. Midline infraumbilical scar from open appendectomy  Ext: wwp      Significant Labs:  CBC:   Recent Labs   Lab 19  0200   WBC 10.98   RBC 2.69*   HGB 8.5*   HCT 27.0*      *   MCH 31.6*   MCHC 31.5*     CMP:   Recent Labs   Lab 19  0200      CALCIUM 8.5*   ALBUMIN 1.7*   PROT 5.0*      K 4.1   CO2 23      BUN 20   CREATININE 0.5   ALKPHOS 385*   ALT 25   AST 20   BILITOT 0.7           Assessment/Plan:      Acute respiratory failure    Mr. Nunes is a 64 yo M with PMH of CABG on ASA 81 mg admitted after a fall with EDH with SDH now s/p craniotomy for evacuation (12/20) by Neurosurgery.    - Will proceed with PEG and Trach on Tuesday.   - Please hold TF on Monday night  - Consent obtained from wife.   - Please call if any questions, thank you.        VTE Risk Mitigation (From admission, onward)        Ordered     heparin (porcine) injection 5,000 Units  Every 8 hours      12/22/18 1405     Reason for No Pharmacological VTE Prophylaxis  Once      12/20/18 1640     IP VTE HIGH RISK PATIENT  Once      12/20/18 1640     Place sequential compression device  Until discontinued      12/20/18 1640          April Villavicencio MD  General Surgery PGY V  Beeper: [phone]

## 2019-01-04 NOTE — SUBJECTIVE & OBJECTIVE
Interval History: NAEON. Off propofol gtt this AM.     Medications:  Continuous Infusions:   propofol Stopped (01/03/19 1105)     Scheduled Meds:   albuterol sulfate  2.5 mg Nebulization Q4H    chlorhexidine  15 mL Mouth/Throat QID    clonazePAM  0.5 mg Per NG tube Q8H    famotidine  20 mg Per NG tube BID    heparin (porcine)  5,000 Units Subcutaneous Q8H    lacosamide (VIMPAT) in saline IVPB  200 mg Intravenous Q12H    metoprolol tartrate  25 mg Per NG tube Q8H    piperacillin-tazobactam (ZOSYN) IVPB  4.5 g Intravenous Q8H    polyethylene glycol  17 g Per NG tube Daily    pravastatin  40 mg Per NG tube QHS    senna-docusate 8.6-50 mg  1 tablet Per NG tube BID    sodium chloride 0.9%  3 mL Intravenous Q8H    sodium chloride 3%  4 mL Nebulization Q4H    vancomycin (VANCOCIN) IVPB  15 mg/kg (Dosing Weight) Intravenous Q12H     PRN Meds:acetaminophen, hydrALAZINE, magnesium oxide, magnesium oxide, ondansetron, potassium chloride 10%, potassium chloride 10%, potassium chloride 10%, potassium, sodium phosphates, potassium, sodium phosphates, potassium, sodium phosphates     Review of Systems  Objective:     Weight: 85.7 kg (189 lb)  Body mass index is 27.12 kg/m².  Vital Signs (Most Recent):  Temp: 98.6 °F (37 °C) (01/04/19 0705)  Pulse: 92 (01/04/19 0735)  Resp: (!) 24 (01/04/19 0735)  BP: (!) 154/97 (01/04/19 0734)  SpO2: 100 % (01/04/19 0735) Vital Signs (24h Range):  Temp:  [97.7 °F (36.5 °C)-98.6 °F (37 °C)] 98.6 °F (37 °C)  Pulse:  [] 92  Resp:  [16-26] 24  SpO2:  [100 %] 100 %  BP: (122-180)/() 154/97     Date 01/04/19 0700 - 01/05/19 0659   Shift 8618-0539 3068-7535 3372-1217 24 Hour Total   INTAKE   I.V.(mL/kg) 10(0.1)   10(0.1)   NG/GT 40   40   Shift Total(mL/kg) 50(0.6)   50(0.6)   OUTPUT   Shift Total(mL/kg)       Weight (kg) 85.7 85.7 85.7 85.7              Vent Mode: A/C  Oxygen Concentration (%):  [] 50  Resp Rate Total:  [16 br/min-25 br/min] 25 br/min  Vt Set:  [450 mL]  450 mL  PEEP/CPAP:  [5 cmH20] 5 cmH20  Mean Airway Pressure:  [6.8 cmH20-9.9 cmH20] 8.2 cmH20         NG/OG Tube 01/01/19 [place] sump Right nostril (Active)   Placement Check placement verified by x-ray;placement verified by distal tube length measurement;placement verified by aspirate characteristics 1/4/2019  3:05 AM   Distal Tube Length (cm) 75 1/4/2019  3:05 AM   Tolerance no signs/symptoms of discomfort 1/4/2019  3:05 AM   Securement anchored to nostril center w/ adhesive device 1/4/2019  3:05 AM   Clamp Status/Tolerance unclamped 1/4/2019  3:05 AM   Insertion Site Appearance no redness, warmth, tenderness, skin breakdown, drainage 1/4/2019  3:05 AM   Flush/Irrigation flushed w/;water;no resistance met 1/4/2019  3:05 AM   Feeding Method continuous 1/3/2019  3:05 PM   Feeding Action feeding restarted 1/4/2019  3:05 AM   Current Rate (mL/hr) 30 mL/hr 1/4/2019  3:05 AM   Goal Rate (mL/hr) 55 mL/hr 1/4/2019  3:05 AM   Intake (mL) 50 mL 1/3/2019 10:05 PM   Water Bolus (mL) 250 mL 1/4/2019  6:04 AM   Intake (mL) - Formula Tube Feeding 40 1/4/2019  7:00 AM   Residual Amount (ml) 0 ml 1/3/2019  3:05 PM       Neurosurgery Physical Exam   [id number]  +BSi  Intubated  MOORE spontaneously  BUE/BLE w/nonpitting edema           Significant Labs:  Recent Labs   Lab 01/02/19  1731 01/03/19  0115 01/04/19  0200   GLU  --  89 102   NA  --  139 138   K  --  3.8 4.1   CL  --  108 108   CO2  --  22* 23   BUN  --  19 20   CREATININE  --  0.5 0.5   CALCIUM  --  8.5* 8.5*   MG 1.5* 1.6 1.9     Recent Labs   Lab 01/03/19  0115 01/04/19  0200   WBC 12.93* 10.98   HGB 9.3* 8.5*   HCT 28.1* 27.0*    318     No results for input(s): LABPT, INR, APTT in the last 48 hours.  Microbiology Results (last 7 days)     Procedure Component Value Units Date/Time    Blood culture [[id number]] Collected:  01/03/19 1100    Order Status:  Completed Specimen:  Blood from Peripheral, Hand, Left Updated:  01/03/19 1915     Blood Culture, Routine No Growth to  date    Narrative:       Blood cultures from 2 different sites. 4 bottles total.  Please draw before starting antibiotics.    Blood culture [[id number]] Collected:  01/03/19 1107    Order Status:  Completed Specimen:  Blood from Peripheral, Foot, Left Updated:  01/03/19 1915     Blood Culture, Routine No Growth to date    Narrative:       Blood cultures x 2 different sites. 4 bottles total. Please  draw cultures before administering antibiotics.    Culture, Respiratory with Gram Stain [[id number]] Collected:  01/03/19 1108    Order Status:  Completed Specimen:  Respiratory from Endotracheal Aspirate Updated:  01/03/19 1448     Gram Stain (Respiratory) Few WBC's     Gram Stain (Respiratory) Rare Gram positive cocci    Blood culture [[id number]] Collected:  12/24/18 0006    Order Status:  Completed Specimen:  Blood from Line, Arterial, Right Updated:  12/29/18 0612     Blood Culture, Routine No growth after 5 days.    Blood culture [[id number]] Collected:  12/24/18 0005    Order Status:  Completed Specimen:  Blood from Peripheral, Hand, Left Updated:  12/29/18 0612     Blood Culture, Routine No growth after 5 days.        Recent Lab Results       01/04/19  0513   01/04/19  0311   01/04/19  0200   01/04/19  0020   01/03/19  1125        Immature Granulocytes     0.8         Immature Grans (Abs)     0.09  Comment:  Mild elevation in immature granulocytes is non specific and   can be seen in a variety of conditions including stress response,   acute inflammation, trauma and pregnancy. Correlation with other   laboratory and clinical findings is essential.           Albumin     1.7         Alkaline Phosphatase     385         Allens Test   Pass           ALT     25         Anion Gap     7         AST     20         Baso #     0.06         Basophil%     0.5         Total Bilirubin     0.7  Comment:  For infants and newborns, interpretation of results should be based  on gestational age, weight and in agreement with  clinical  observations.  Premature Infant recommended reference ranges:  Up to 24 hours.............<8.0 mg/dL  Up to 48 hours............<12.0 mg/dL  3-5 days..................<15.0 mg/dL  6-29 days.................<15.0 mg/dL           Blood Culture, Routine               Site   RR           BUN, Bld     20         Calcium     8.5         Chloride     108         CO2     23         Creatinine     0.5         DelStretchrs   Adult Vent           Differential Method     Automated         eGFR if      >60.0         eGFR if non      >60.0  Comment:  Calculation used to obtain the estimated glomerular filtration  rate (eGFR) is the CKD-EPI equation.            Eos #     0.2         Eosinophil%     1.4         FiO2   60           GGT               Glucose     102         Gram Stain (Respiratory)               Gran # (ANC)     8.3         Gran%     75.7         Hematocrit     27.0         Hemoglobin     8.5         Lymph #     1.7         Lymph%     15.5         Magnesium     1.9         MCH     31.6         MCHC     31.5         MCV     100         Mode   AC/PRVC           Mono #     0.7         Mono%     6.1         MPV     10.2         nRBC     0         PEEP   5           Phosphorus     3.6         Platelets     318         POC BE   -1           POC HCO3   22.6           POC PCO2   32.9           POC PH   7.446           POC PO2   169           POC SATURATED O2   100           POC TCO2   24           POCT Glucose 113     124 98     Potassium     4.1         Total Protein     5.0         Rate   16           RBC     2.69         RDW     15.7         Sample   ARTERIAL           Sodium     138         Sp02   100           Vt   450           WBC     10.98                          01/03/19  1108   01/03/19  1107   01/03/19  1100        Immature Granulocytes           Immature Grans (Abs)           Albumin           Alkaline Phosphatase           Allens Test           ALT           Anion Gap            AST           Baso #           Basophil%           Total Bilirubin           Blood Culture, Routine   No Growth to date[P] No Growth to date[P]     Site           BUN, Bld           Calcium           Chloride           CO2           Creatinine           DelSys           Differential Method           eGFR if            eGFR if non            Eos #           Eosinophil%           FiO2                    Glucose           Gram Stain (Respiratory) Few WBC's          Rare Gram positive cocci         Gran # (ANC)           Gran%           Hematocrit           Hemoglobin           Lymph #           Lymph%           Magnesium           MCH           MCHC           MCV           Mode           Mono #           Mono%           MPV           nRBC           PEEP           Phosphorus           Platelets           POC BE           POC HCO3           POC PCO2           POC PH           POC PO2           POC SATURATED O2           POC TCO2           POCT Glucose           Potassium           Total Protein           Rate           RBC           RDW           Sample           Sodium           Sp02           Vt           WBC                 Significant Diagnostics:  I have reviewed all pertinent imaging results/findings within the past 24 hours.

## 2019-01-04 NOTE — ASSESSMENT & PLAN NOTE
Mr. Nunes is a 64 yo M with PMH of CABG on ASA 81 mg admitted after a fall with EDH with SDH now s/p craniotomy for evacuation (12/20) by Neurosurgery.    - Will proceed with PEG and Trach on Tuesday.   - Please hold TF on Monday night  - Consent obtained from wife.   - Please call if any questions, thank you.

## 2019-01-04 NOTE — ASSESSMENT & PLAN NOTE
64 yo male with L aSDH now s/p craniotomy for evacuation (12/20).    --Continue care per primary team.  --Recommend consideration for early tracheostomy and gastrostomy placement, remains intubated on TF  --Resp Cx with GPC, on Vanc/Zosyn per primary  --Staples removed, incision healing well, well approximated w/o erythema/drainage.   --We will continue to monitor closely, please contact us with any questions or concerns.    Discussed w/Dr. Hull

## 2019-01-04 NOTE — SUBJECTIVE & OBJECTIVE
No current facility-administered medications on file prior to encounter.      Current Outpatient Medications on File Prior to Encounter   Medication Sig    aspirin (ECOTRIN) 81 MG EC tablet Take 81 mg by mouth once daily.      bisoprolol (ZEBETA) 10 MG tablet Take 1 tablet (10 mg total) by mouth once daily.    cholecalciferol, vitamin D3, 50,000 unit capsule Take 50,000 Units by mouth once a week.    linagliptin (TRADJENTA) 5 mg Tab tablet Take 1 tablet (5 mg total) by mouth once daily.    losartan (COZAAR) 100 MG tablet Take 100 mg by mouth once daily.    NIFEdipine (ADALAT CC) 60 MG TbSR Take 120 mg by mouth once daily.    pravastatin (PRAVACHOL) 40 MG tablet Take 1 tablet (40 mg total) by mouth every evening.    mv-min 16/folic acid/lut/lycop (REQ49+ ORAL)        Review of patient's allergies indicates:   Allergen Reactions    Levaquin [levofloxacin] Nausea Only       Past Medical History:   Diagnosis Date    Coronary artery disease     CVD (cardiovascular disease)     Diabetes mellitus type II     Hyperlipidemia     Hypertension     Ischemic cardiomyopathy     Obesity     Obesity, morbid      Past Surgical History:   Procedure Laterality Date    APPENDECTOMY      CORONARY ARTERY BYPASS GRAFT      CRANIOTOMY, FOR SUBDURAL HEMATOMA EVACUATION Left 2018    Performed by Stevan [facility] at [facility] OR [address] [place] [address]    HEMORRHOID SURGERY       Family History     Problem Relation (Age of Onset)    Cancer Father    Diabetes Mother    Heart disease Mother, Father    Hyperlipidemia Mother    Hypertension Mother    Stroke Mother        Tobacco Use    Smoking status: Former Smoker     Packs/day: 1.50     Years: 25.00     Pack years: 37.50     Types: Cigarettes     Last attempt to quit: 2002     Years since quittin.1    Smokeless tobacco: Never Used   Substance and Sexual Activity    Alcohol use: No    Drug use: No    Sexual activity: Yes     Review of Systems     Negative except  above    Objective:     Vital Signs (Most Recent):  Temp: 98.4 °F (36.9 °C) (01/04/19 1105)  Pulse: 83 (01/04/19 1316)  Resp: (!) 22 (01/04/19 1316)  BP: 118/72 (01/04/19 1300)  SpO2: 100 % (01/04/19 1316) Vital Signs (24h Range):  Temp:  [97.7 °F (36.5 °C)-98.6 °F (37 °C)] 98.4 °F (36.9 °C)  Pulse:  [] 83  Resp:  [1-26] 22  SpO2:  [100 %] 100 %  BP: (101-180)/() 118/72     Weight: 85.7 kg (189 lb)  Body mass index is 27.12 kg/m².    Physical Exam     General: Intubated and sedated   CV: RRR   Pulm:mechanical breath sounds  Abd: soft, non distended, non tender. Midline infraumbilical scar from open appendectomy  Ext: wwp      Significant Labs:  CBC:   Recent Labs   Lab 01/04/19  0200   WBC 10.98   RBC 2.69*   HGB 8.5*   HCT 27.0*      *   MCH 31.6*   MCHC 31.5*     CMP:   Recent Labs   Lab 01/04/19  0200      CALCIUM 8.5*   ALBUMIN 1.7*   PROT 5.0*      K 4.1   CO2 23      BUN 20   CREATININE 0.5   ALKPHOS 385*   ALT 25   AST 20   BILITOT 0.7

## 2019-01-04 NOTE — CONSULTS
Single lumen 18G x 10CM midline placed right cephalic vein. Max dwell date 2/2/19, Lot# [id number].  Needle advanced into the vessel under real time ultrasound guidance.  Image recorded and saved.

## 2019-01-04 NOTE — HPI
Mr. Nunes is a 62 yo M with PMH of CABG on ASA 81 mg admitted after a fall with EDH with SDH now s/p craniotomy for evacuation (12/20) by Neurosurgery.    General surgery consulted for PEG and Trach. He was extubated on 1/2 and failed, currently intubated.        Had an appendectomy (open). No neck surgery.

## 2019-01-04 NOTE — PROGRESS NOTES
[facility]  Neurocritical Care  Progress Note    Admit Date: 12/20/2018  Service Date: 01/04/2019  Length of Stay: 15    Subjective:     Chief Complaint: SDH (subdural hematoma)    History of Present Illness: 63 year old male h/o CABG on ASA 81 mg (last dose 72 hours PTA) presenting as transfer from OSH for EDH with SDH. Patient reportedly had a mechanical fall from standing on Sunday and went to ED where CTH was negative for any acute intracranial pathology. Overnight, patient developed confusion and presented to OSH ED where CTH was consistent with EDH with SDH. Patient has progressively worsened throughout the day developing intermittent global aphasia. NSGY consulted in ED at [facility] [place] and stat CTH has been ordered. NCC consulted for admission and medical management. Patient on weaning off of cardene at time of evaluation with sbp of 120. Reported presenting sbp in 150s.    Hospital Course: 12/22: no major events overnight. Weaned off of cardene. This morning AOx3. Drain removed by NSGY at 11 AM, and planned to step down to NSGY.  AMS noted around 1PM, patient aphasic and not following commands, STAT CTH unremarkable.   12/26: wean ketamine, follow EEG, send urine studuies-Na 133, NS bolus, advance TF  12/27: off ketamine, d/c levo, continue EEG, and AEDs, 500 NS bolus, increase enteral water flushes, continue abx for another day  1/2: extubation trial.   1/3: failed extubation trial. Plan for trach and peg  1/4: plan for trach, general surgery consult      Review of Symptoms: intubated, cannot participate  Constitutional: Denies fevers, weight loss, chills, or weakness.   Eyes: Denies changes in vision.   ENT: Denies dysphagia, nasal discharge, ear pain or discharge.   Cardiovascular: Denies chest pain, palpitations, orthopnea, or claudication.   Respiratory: Denies shortness of breath, cough, hemoptysis, or wheezing.   GI: Denies nausea/vomitting, hematochezia, melena, abd pain, or changes in  appetite.   : Denies dysuria, incontinence, or hematuria.   Musculoskeletal: Denies joint pain or myalgias.   Skin/breast: Denies rashes, lumps, lesions, or discharge.   Neurologic: Denies headache, dizziness, vertigo, or paresthesias.   Psychiatric: Denies changes in mood or hallucinations.   Endocrine: Denies polyuria, polydipsia, heat/cold intolerance.   Hematologic/Lymph: Denies lymphadenopathy, easy bruising or easy bleeding.   Allergic/Immunologic: Denies rash, rhinitis      Medications:  Continuous Infusions:   propofol 20 mcg/kg/min (01/04/19 0845)     Scheduled Meds:   albuterol sulfate  2.5 mg Nebulization Q4H    chlorhexidine  15 mL Mouth/Throat QID    clonazePAM  0.5 mg Per NG tube Q8H    famotidine  20 mg Per NG tube BID    heparin (porcine)  5,000 Units Subcutaneous Q8H    lacosamide (VIMPAT) in saline IVPB  200 mg Intravenous Q12H    metoprolol tartrate  25 mg Per NG tube Q8H    piperacillin-tazobactam (ZOSYN) IVPB  4.5 g Intravenous Q8H    polyethylene glycol  17 g Per NG tube Daily    pravastatin  40 mg Per NG tube QHS    senna-docusate 8.6-50 mg  1 tablet Per NG tube BID    sodium chloride 0.9%  3 mL Intravenous Q8H    sodium chloride 3%  4 mL Nebulization Q4H    vancomycin (VANCOCIN) IVPB  15 mg/kg (Dosing Weight) Intravenous Q12H     PRN Meds:.acetaminophen, hydrALAZINE, magnesium oxide, magnesium oxide, ondansetron, potassium chloride 10%, potassium chloride 10%, potassium chloride 10%, potassium, sodium phosphates, potassium, sodium phosphates, potassium, sodium phosphates    OBJECTIVE:   Vital Signs (Most Recent):   Temp: 98.6 °F (37 °C) (01/04/19 0705)  Pulse: 94 (01/04/19 0800)  Resp: (!) 1 (01/04/19 0800)  BP: 137/80 (01/04/19 0800)  SpO2: 100 % (01/04/19 0800)    Vital Signs (24h Range):   Temp:  [97.7 °F (36.5 °C)-98.6 °F (37 °C)] 98.6 °F (37 °C)  Pulse:  [] 94  Resp:  [1-26] 1  SpO2:  [100 %] 100 %  BP: (137-180)/() 137/80    ICP/CPP (Last 24h):        I & O  (Last 24h):     Intake/Output Summary (Last 24 hours) at 1/4/2019 0905  Last data filed at 1/4/2019 0826  Gross per 24 hour   Intake 2725.2 ml   Output 845 ml   Net 1880.2 ml     Physical Exam:  GA: sedated, comfortable, no acute distress.   HEENT: No scleral icterus or JVD. Neck supple  Pulmonary: Air entry equal to auscultation A/P/L. Wheezing no, crackles no  Cardiac: RRR, S1 & S2 w/o rubs/murmurs/gallops.   Abdominal: soft, non-tender, bowel sounds present x 4. No appreciable hepatosplenomegaly.  Skin: No jaundice, rashes, or visible lesions.  Neuro:  --Mental Status:  sedated, follows commands inconsistently (possible aphasia). Tracks visually   --Pupils 3 mm, PERRL.   --Corneal reflex not done in this awake patient, gag, cough intact.  Moves all 4 extremities spontaneously (left more than right)  MSK:  No edema in UE and LE    Vent Data:   Vent Mode: A/C  Oxygen Concentration (%):  [] 51  Resp Rate Total:  [16 br/min-25 br/min] 20 br/min  Vt Set:  [450 mL] 450 mL  PEEP/CPAP:  [5 cmH20] 5 cmH20  Mean Airway Pressure:  [6.8 cmH20-9.9 cmH20] 8.2 cmH20    Lines/Drains/Airway:        Airway - Non-Surgical 12/23/18 1301 Endotracheal Tube (Active)   Secured at 25 cm 1/2/2019 12:00 PM   Measured At Lips 1/2/2019 12:00 PM   Secured Location Center 1/2/2019 12:00 PM   Secured by Commercial tube blood 1/2/2019 12:00 PM   Bite Block none 1/2/2019 12:00 PM   Site Condition Cool;Dry 1/2/2019 12:00 PM   Status Intact;Secured;Patent 1/2/2019 12:00 PM   Site Assessment Clean;Dry;No bleeding;No drainage 1/2/2019 12:00 PM   Cuff Pressure 28 cm H2O 1/2/2019  7:39 AM           Percutaneous Central Line Insertion/Assessment - triple lumen  12/25/18 0205 left internal jugular (Active)   Dressing biopatch in place;dressing dry and intact;dressing changed;dressing reinforced 1/2/2019 11:00 AM   Securement secured w/ sutures 1/2/2019 11:00 AM   Additional Site Signs no drainage;no streak formation;no edema;no palpable cord;no  "warmth;no erythema 1/2/2019 11:00 AM   Distal Patency/Care flushed w/o difficulty 1/2/2019 11:00 AM   Medial Patency/Care flushed w/o difficulty 1/2/2019 11:00 AM   Proximal Patency/Care flushed w/o difficulty 1/2/2019 11:00 AM   Line Interventions blood specimen obtained and sent to lab 1/2/2019 11:00 AM   Dressing Change Due 01/05/19 1/2/2019 11:00 AM   Daily Line Review Performed 1/2/2019 11:00 AM             NG/OG Tube 12/23/18 1500 Center mouth (Active)   Placement Check placement verified by x-ray 1/2/2019 11:00 AM   Tolerance no signs/symptoms of discomfort 1/2/2019 11:00 AM   Securement taped to commercial device 1/2/2019 11:00 AM   Clamp Status/Tolerance unclamped 1/2/2019 11:00 AM   Suction Setting/Drainage Method dependent drainage 1/2/2019 11:00 AM   Insertion Site Appearance no redness, warmth, tenderness, skin breakdown, drainage 1/2/2019 11:00 AM   Drainage None 1/2/2019 11:00 AM   Flush/Irrigation flushed w/;water 1/2/2019 11:00 AM   Feeding Method continuous 1/2/2019  7:01 AM   Feeding Action feeding held 1/2/2019 11:00 AM   Current Rate (mL/hr) 55 mL/hr 1/2/2019  7:01 AM   Goal Rate (mL/hr) 55 mL/hr 1/2/2019  7:01 AM   Intake (mL) 55 mL 1/2/2019  7:01 AM   Water Bolus (mL) 250 mL 1/2/2019  5:03 AM   Rate Formula Tube Feeding (mL/hr) 55 mL/hr 1/2/2019  7:01 AM   Formula Name impact peptide 1/2/2019 11:00 AM   Intake (mL) - Formula Tube Feeding 55 1/2/2019  9:00 AM   Residual Amount (ml) 0 ml 1/1/2019  3:05 PM            Urethral Catheter 12/20/18 2350 Non-latex;Straight-tip 16 Fr. (Active)   Site Assessment Clean;Intact 1/2/2019 11:00 AM   Collection Container Standard drainage bag 1/2/2019 11:00 AM   Securement Method secured to top of thigh w/ adhesive device 1/2/2019 11:00 AM   Catheter Care Performed yes 1/2/2019 11:00 AM   Reason for Continuing Urinary Catheterization Urinary retention 1/2/2019 11:00 AM   CAUTI Prevention Bundle StatLock in place w 1" slack;Intact seal between catheter & " drainage tubing;Green sheeting clip in use;No dependent loops or kinks;Drainage bag off the floor;Drainage bag not overfilled (<2/3 full);Drainage bag below bladder 1/2/2019 11:00 AM   Output (mL) 50 mL 1/2/2019  9:00 AM     Nutrition/Tube Feeds (if NPO state why): tf    Labs:  ABG:   Recent Labs   Lab 01/04/19  0311   PH 7.446   PO2 169*   PCO2 32.9*   HCO3 22.6*   POCSATURATED 100   BE -1     BMP:  Recent Labs   Lab 01/04/19  0200      K 4.1      CO2 23   BUN 20   CREATININE 0.5      MG 1.9   PHOS 3.6     LFT:   Lab Results   Component Value Date    AST 20 01/04/2019    ALT 25 01/04/2019     (H) 01/03/2019    ALKPHOS 385 (H) 01/04/2019    BILITOT 0.7 01/04/2019    ALBUMIN 1.7 (L) 01/04/2019    PROT 5.0 (L) 01/04/2019     CBC:   Lab Results   Component Value Date    WBC 10.98 01/04/2019    HGB 8.5 (L) 01/04/2019    HCT 27.0 (L) 01/04/2019     (H) 01/04/2019     01/04/2019     Microbiology x 7d:   Microbiology Results (last 7 days)     Procedure Component Value Units Date/Time    Blood culture [[id number]] Collected:  01/03/19 1100    Order Status:  Completed Specimen:  Blood from Peripheral, Hand, Left Updated:  01/03/19 1915     Blood Culture, Routine No Growth to date    Narrative:       Blood cultures from 2 different sites. 4 bottles total.  Please draw before starting antibiotics.    Blood culture [[id number]] Collected:  01/03/19 1107    Order Status:  Completed Specimen:  Blood from Peripheral, Foot, Left Updated:  01/03/19 1915     Blood Culture, Routine No Growth to date    Narrative:       Blood cultures x 2 different sites. 4 bottles total. Please  draw cultures before administering antibiotics.    Culture, Respiratory with Gram Stain [[id number]] Collected:  01/03/19 1108    Order Status:  Completed Specimen:  Respiratory from Endotracheal Aspirate Updated:  01/03/19 1448     Gram Stain (Respiratory) Few WBC's     Gram Stain (Respiratory) Rare Gram positive cocci     Blood culture [[id number]] Collected:  12/24/18 0006    Order Status:  Completed Specimen:  Blood from Line, Arterial, Right Updated:  12/29/18 0612     Blood Culture, Routine No growth after 5 days.    Blood culture [[id number]] Collected:  12/24/18 0005    Order Status:  Completed Specimen:  Blood from Peripheral, Hand, Left Updated:  12/29/18 0612     Blood Culture, Routine No growth after 5 days.        Imaging:  CXR 1/4 - tube in place. Improved aeration of right mid/lower lobe.   I personally reviewed the above image.  Today I independently reviewed pertinent medications, lines/drains/airways, imaging, cardiology results, laboratory results, microbiology results, notably:   ASSESSMENT/PLAN:     Active Hospital Problems    Diagnosis    *SDH (subdural hematoma)    Weight loss, unintentional     History of 200lb wt loss over last year  Reports early satiety and change in taste      Seizure    Status epilepticus    Acute respiratory failure    Septic shock    AYLIN (acute kidney injury)    New onset atrial fibrillation    Pressure injury of buttock, stage 2    Severe malnutrition    SAH (subarachnoid hemorrhage)    Subdural hematoma    Epidural hematoma    Brain compression    Cerebral edema    Essential hypertension      Neuro:   SDH post evacuation. Stable   No further seizures  AED clonazepam 0.5mg q8hrly. vimpat 100mg [id number]  Mobilize as tolerated  Propofol for vent  Sedation holidays     Pulmonary:   reintubated  [facility]  Plan trach and peg  CXR/ABG daily    Cardiac:   On metoprolol for afib, rate controlled  Hemodynamically stable    Renal:    Making urine  BUN/Cr >20  Will monitor    ID:   Afebrile, normal wbc  Check procalcitonin, if normal d/c antibiotics.   vanc and zosyn  panculture - blood and respiratory    Hem/Onc:   Stable hh and plt count    Endocrine:    BS stable    Fluids/Electrolytes/Nutrition/GI:   tf  Monitor alkaline phosphatase, trending upwards.  Adjust AED. AST and ALT  normal   check   USG Abd - gall stones and biliary sludge  Start ursodiol   Lines:  Art  CVC d/c central line. Place midline  ETT +  Rader  NG +  PEG    Proph:  DVT:SCD/heparin  Constipation:  Last output:bowel regimen  PUP:pepcid  VAP:peridex      Uninterrupted Critical Care/Counseling Time (not including procedures):: 30 mins    Claudy Cerna MD  Neurocritical care attending    Full Code    Claudy Cerna MD  Neurocritical Care  [facility]

## 2019-01-04 NOTE — PLAN OF CARE
Problem: Adult Inpatient Plan of Care  Goal: Plan of Care Review  Admit Date: 12/20/18    Admit Dx:    Past Medical History:  No date: Coronary artery disease  No date: CVD (cardiovascular disease)  No date: Diabetes mellitus type II  No date: Hyperlipidemia  No date: Hypertension  No date: Ischemic cardiomyopathy  No date: Obesity  No date: Obesity, morbid    Past Surgical History:  No date: APPENDECTOMY  No date: CORONARY ARTERY BYPASS GRAFT  12/20/2018: CRANIOTOMY, FOR SUBDURAL HEMATOMA EVACUATION; Left      Comment:  Performed by Stevan Hull MD at [facility] OR [address] [place] [address]  No date: HEMORRHOID SURGERY    Individualization:   1. Pt goes by Peewee    Restraints: 1/1/19 @ 0505 L mitt for pulling lines       Outcome: Ongoing (interventions implemented as appropriate)  POC reviewed with Peewee at 0500; POC reviewed with wife over the phone and verbalized understanding. Pt is intubated and cannot verbalized understanding. Questions and concerns addressed. No acute events overnight. Pt progressing toward goals. Tube feeds adjusted accordingly; no signs of intolerance noted. Propofol drip off the entire shift; pt is lethargic and doesn't require sedation. Intermittent catheterization done once for urine retention. Will continue to monitor. See flowsheets for full assessment and VS info

## 2019-01-04 NOTE — PLAN OF CARE
Problem: Adult Inpatient Plan of Care  Goal: Plan of Care Review  Admit Date: 12/20/18    Admit Dx:    Past Medical History:  No date: Coronary artery disease  No date: CVD (cardiovascular disease)  No date: Diabetes mellitus type II  No date: Hyperlipidemia  No date: Hypertension  No date: Ischemic cardiomyopathy  No date: Obesity  No date: Obesity, morbid    Past Surgical History:  No date: APPENDECTOMY  No date: CORONARY ARTERY BYPASS GRAFT  12/20/2018: CRANIOTOMY, FOR SUBDURAL HEMATOMA EVACUATION; Left      Comment:  Performed by Stevan Hull MD at [facility] OR [address] [place] [address]  No date: HEMORRHOID SURGERY    Individualization:   1. Pt goes by Peewee    Restraints: 1/1/19 @ 0505 L mitt for pulling lines       Outcome: Ongoing (interventions implemented as appropriate)  POC reviewed with pt and family at 1400. Pt's spouse verbalized understanding. Questions and concerns addressed. No acute events today. TLC removed. Pt straight cathed for urinary retention. Propofol infusing for sedation while pt is on ventilator. Procal obtained during shift. Pt progressing toward goals. Will continue to monitor. See flowsheets for full assessment and VS info.

## 2019-01-04 NOTE — PLAN OF CARE
01/04/19 1505   Post-Acute Status   Post-Acute Authorization Placement   Post-Acute Placement Status Referrals Sent     ALFONSO met with Pt wife at bedside. Discussed LTAC as the DC plan. Provided list. She reported her choice will be [facility]. ALFONSO sent referral via .    ALFONSO received call from Julia with [facility] reporting she started reviewing the referral and will come to do her eval/meet with the Pt wife on Monday.    Caroline Soares, AMBREEN  Neurocritical Care   [facility]  [phone]

## 2019-01-05 LAB
ALBUMIN SERPL BCP-MCNC: 1.5 G/DL
ALLENS TEST: ABNORMAL
ALP SERPL-CCNC: 360 U/L
ALT SERPL W/O P-5'-P-CCNC: 22 U/L
ANION GAP SERPL CALC-SCNC: 5 MMOL/L
AST SERPL-CCNC: 27 U/L
BACTERIA SPEC AEROBE CULT: NORMAL
BASOPHILS # BLD AUTO: 0.03 K/UL
BASOPHILS NFR BLD: 0.3 %
BILIRUB SERPL-MCNC: 0.5 MG/DL
BUN SERPL-MCNC: 21 MG/DL
CALCIUM SERPL-MCNC: 7.8 MG/DL
CHLORIDE SERPL-SCNC: 110 MMOL/L
CO2 SERPL-SCNC: 23 MMOL/L
CREAT SERPL-MCNC: 0.5 MG/DL
DELSYS: ABNORMAL
DIFFERENTIAL METHOD: ABNORMAL
EOSINOPHIL # BLD AUTO: 0.2 K/UL
EOSINOPHIL NFR BLD: 2.5 %
ERYTHROCYTE [DISTWIDTH] IN BLOOD BY AUTOMATED COUNT: 15.9 %
ERYTHROCYTE [SEDIMENTATION RATE] IN BLOOD BY WESTERGREN METHOD: 10 MM/H
EST. GFR  (AFRICAN AMERICAN): >60 ML/MIN/1.73 M^2
EST. GFR  (NON AFRICAN AMERICAN): >60 ML/MIN/1.73 M^2
FIO2: 50
GLUCOSE SERPL-MCNC: 123 MG/DL
GRAM STN SPEC: NORMAL
GRAM STN SPEC: NORMAL
HCO3 UR-SCNC: 24.6 MMOL/L (ref 24–28)
HCT VFR BLD AUTO: 22.7 %
HGB BLD-MCNC: 7.7 G/DL
IMM GRANULOCYTES # BLD AUTO: 0.06 K/UL
IMM GRANULOCYTES NFR BLD AUTO: 0.7 %
LYMPHOCYTES # BLD AUTO: 1.7 K/UL
LYMPHOCYTES NFR BLD: 19.9 %
MAGNESIUM SERPL-MCNC: 2.2 MG/DL
MCH RBC QN AUTO: 31.8 PG
MCHC RBC AUTO-ENTMCNC: 33.9 G/DL
MCV RBC AUTO: 94 FL
MODE: ABNORMAL
MONOCYTES # BLD AUTO: 0.6 K/UL
MONOCYTES NFR BLD: 6.7 %
NEUTROPHILS # BLD AUTO: 6.1 K/UL
NEUTROPHILS NFR BLD: 69.9 %
NRBC BLD-RTO: 0 /100 WBC
PCO2 BLDA: 33.2 MMHG (ref 35–45)
PEEP: 5
PH SMN: 7.48 [PH] (ref 7.35–7.45)
PHOSPHATE SERPL-MCNC: 3.3 MG/DL
PLATELET # BLD AUTO: 289 K/UL
PMV BLD AUTO: 10.5 FL
PO2 BLDA: 172 MMHG (ref 80–100)
POC BE: 1 MMOL/L
POC SATURATED O2: 100 % (ref 95–100)
POC TCO2: 26 MMOL/L (ref 23–27)
POCT GLUCOSE: 122 MG/DL (ref 70–110)
POCT GLUCOSE: 124 MG/DL (ref 70–110)
POCT GLUCOSE: 133 MG/DL (ref 70–110)
POTASSIUM SERPL-SCNC: 5.1 MMOL/L
PROCALCITONIN SERPL IA-MCNC: 0.49 NG/ML
PROT SERPL-MCNC: 4.7 G/DL
RBC # BLD AUTO: 2.42 M/UL
SAMPLE: ABNORMAL
SITE: ABNORMAL
SODIUM SERPL-SCNC: 138 MMOL/L
SP02: 100
VT: 450
WBC # BLD AUTO: 8.69 K/UL

## 2019-01-05 PROCEDURE — 84145 PROCALCITONIN (PCT): CPT

## 2019-01-05 PROCEDURE — 25000003 PHARM REV CODE 250: Performed by: NURSE PRACTITIONER

## 2019-01-05 PROCEDURE — 25000003 PHARM REV CODE 250: Performed by: PSYCHIATRY & NEUROLOGY

## 2019-01-05 PROCEDURE — 20000000 HC ICU ROOM

## 2019-01-05 PROCEDURE — 99233 SBSQ HOSP IP/OBS HIGH 50: CPT | Mod: GC,,, | Performed by: PSYCHIATRY & NEUROLOGY

## 2019-01-05 PROCEDURE — 25000242 PHARM REV CODE 250 ALT 637 W/ HCPCS: Performed by: NURSE PRACTITIONER

## 2019-01-05 PROCEDURE — 82800 BLOOD PH: CPT

## 2019-01-05 PROCEDURE — 25000003 PHARM REV CODE 250: Performed by: PHYSICIAN ASSISTANT

## 2019-01-05 PROCEDURE — 63600175 PHARM REV CODE 636 W HCPCS: Performed by: NURSE PRACTITIONER

## 2019-01-05 PROCEDURE — 63600175 PHARM REV CODE 636 W HCPCS: Performed by: STUDENT IN AN ORGANIZED HEALTH CARE EDUCATION/TRAINING PROGRAM

## 2019-01-05 PROCEDURE — 94761 N-INVAS EAR/PLS OXIMETRY MLT: CPT

## 2019-01-05 PROCEDURE — 82803 BLOOD GASES ANY COMBINATION: CPT

## 2019-01-05 PROCEDURE — 99233 PR SUBSEQUENT HOSPITAL CARE,LEVL III: ICD-10-PCS | Mod: GC,,, | Performed by: PSYCHIATRY & NEUROLOGY

## 2019-01-05 PROCEDURE — 63600175 PHARM REV CODE 636 W HCPCS: Performed by: PHYSICIAN ASSISTANT

## 2019-01-05 PROCEDURE — 27000221 HC OXYGEN, UP TO 24 HOURS

## 2019-01-05 PROCEDURE — C9254 INJECTION, LACOSAMIDE: HCPCS | Performed by: NURSE PRACTITIONER

## 2019-01-05 PROCEDURE — 80053 COMPREHEN METABOLIC PANEL: CPT

## 2019-01-05 PROCEDURE — 84100 ASSAY OF PHOSPHORUS: CPT

## 2019-01-05 PROCEDURE — 25000003 PHARM REV CODE 250: Performed by: STUDENT IN AN ORGANIZED HEALTH CARE EDUCATION/TRAINING PROGRAM

## 2019-01-05 PROCEDURE — 51702 INSERT TEMP BLADDER CATH: CPT

## 2019-01-05 PROCEDURE — 83735 ASSAY OF MAGNESIUM: CPT

## 2019-01-05 PROCEDURE — 94640 AIRWAY INHALATION TREATMENT: CPT

## 2019-01-05 PROCEDURE — 94003 VENT MGMT INPAT SUBQ DAY: CPT

## 2019-01-05 PROCEDURE — A4216 STERILE WATER/SALINE, 10 ML: HCPCS | Performed by: PSYCHIATRY & NEUROLOGY

## 2019-01-05 PROCEDURE — 85025 COMPLETE CBC W/AUTO DIFF WBC: CPT

## 2019-01-05 PROCEDURE — 63600175 PHARM REV CODE 636 W HCPCS: Performed by: PSYCHIATRY & NEUROLOGY

## 2019-01-05 PROCEDURE — 36600 WITHDRAWAL OF ARTERIAL BLOOD: CPT

## 2019-01-05 PROCEDURE — 99900035 HC TECH TIME PER 15 MIN (STAT)

## 2019-01-05 PROCEDURE — 99900026 HC AIRWAY MAINTENANCE (STAT)

## 2019-01-05 RX ORDER — FUROSEMIDE 10 MG/ML
40 INJECTION INTRAMUSCULAR; INTRAVENOUS 2 TIMES DAILY
Status: COMPLETED | OUTPATIENT
Start: 2019-01-05 | End: 2019-01-07

## 2019-01-05 RX ADMIN — FAMOTIDINE 20 MG: 20 TABLET ORAL at 09:01

## 2019-01-05 RX ADMIN — Medication 3 ML: at 05:01

## 2019-01-05 RX ADMIN — SODIUM CHLORIDE SOLN NEBU 3% 4 ML: 3 NEBU SOLN at 11:01

## 2019-01-05 RX ADMIN — URSODIOL 300 MG: 300 CAPSULE ORAL at 09:01

## 2019-01-05 RX ADMIN — STANDARDIZED SENNA CONCENTRATE AND DOCUSATE SODIUM 1 TABLET: 8.6; 5 TABLET, FILM COATED ORAL at 09:01

## 2019-01-05 RX ADMIN — Medication 3 ML: at 02:01

## 2019-01-05 RX ADMIN — CLONAZEPAM 0.5 MG: 0.5 TABLET ORAL at 05:01

## 2019-01-05 RX ADMIN — ALBUTEROL SULFATE 2.5 MG: 2.5 SOLUTION RESPIRATORY (INHALATION) at 04:01

## 2019-01-05 RX ADMIN — METOPROLOL TARTRATE 25 MG: 25 TABLET ORAL at 09:01

## 2019-01-05 RX ADMIN — CHLORHEXIDINE GLUCONATE 0.12% ORAL RINSE 15 ML: 1.2 LIQUID ORAL at 09:01

## 2019-01-05 RX ADMIN — VANCOMYCIN HYDROCHLORIDE 1250 MG: 10 INJECTION, POWDER, LYOPHILIZED, FOR SOLUTION INTRAVENOUS at 10:01

## 2019-01-05 RX ADMIN — CLONAZEPAM 0.5 MG: 0.5 TABLET ORAL at 02:01

## 2019-01-05 RX ADMIN — FUROSEMIDE 40 MG: 10 INJECTION, SOLUTION INTRAVENOUS at 01:01

## 2019-01-05 RX ADMIN — HEPARIN SODIUM 5000 UNITS: 5000 INJECTION, SOLUTION INTRAVENOUS; SUBCUTANEOUS at 02:01

## 2019-01-05 RX ADMIN — PRAVASTATIN SODIUM 40 MG: 40 TABLET ORAL at 09:01

## 2019-01-05 RX ADMIN — PIPERACILLIN AND TAZOBACTAM 4.5 G: 4; .5 INJECTION, POWDER, LYOPHILIZED, FOR SOLUTION INTRAVENOUS; PARENTERAL at 05:01

## 2019-01-05 RX ADMIN — CLONAZEPAM 0.5 MG: 0.5 TABLET ORAL at 09:01

## 2019-01-05 RX ADMIN — ALBUTEROL SULFATE 2.5 MG: 2.5 SOLUTION RESPIRATORY (INHALATION) at 11:01

## 2019-01-05 RX ADMIN — ALBUTEROL SULFATE 2.5 MG: 2.5 SOLUTION RESPIRATORY (INHALATION) at 07:01

## 2019-01-05 RX ADMIN — METOPROLOL TARTRATE 25 MG: 25 TABLET ORAL at 05:01

## 2019-01-05 RX ADMIN — SODIUM CHLORIDE SOLN NEBU 3% 4 ML: 3 NEBU SOLN at 07:01

## 2019-01-05 RX ADMIN — ALBUTEROL SULFATE 2.5 MG: 2.5 SOLUTION RESPIRATORY (INHALATION) at 03:01

## 2019-01-05 RX ADMIN — PIPERACILLIN AND TAZOBACTAM 4.5 G: 4; .5 INJECTION, POWDER, LYOPHILIZED, FOR SOLUTION INTRAVENOUS; PARENTERAL at 02:01

## 2019-01-05 RX ADMIN — CHLORHEXIDINE GLUCONATE 0.12% ORAL RINSE 15 ML: 1.2 LIQUID ORAL at 05:01

## 2019-01-05 RX ADMIN — SODIUM CHLORIDE 200 MG: 9 INJECTION, SOLUTION INTRAVENOUS at 08:01

## 2019-01-05 RX ADMIN — METOPROLOL TARTRATE 25 MG: 25 TABLET ORAL at 02:01

## 2019-01-05 RX ADMIN — PROPOFOL 15 MCG/KG/MIN: 10 INJECTION, EMULSION INTRAVENOUS at 01:01

## 2019-01-05 RX ADMIN — VANCOMYCIN HYDROCHLORIDE 1250 MG: 10 INJECTION, POWDER, LYOPHILIZED, FOR SOLUTION INTRAVENOUS at 12:01

## 2019-01-05 RX ADMIN — PIPERACILLIN AND TAZOBACTAM 4.5 G: 4; .5 INJECTION, POWDER, LYOPHILIZED, FOR SOLUTION INTRAVENOUS; PARENTERAL at 09:01

## 2019-01-05 RX ADMIN — CHLORHEXIDINE GLUCONATE 0.12% ORAL RINSE 15 ML: 1.2 LIQUID ORAL at 01:01

## 2019-01-05 RX ADMIN — URSODIOL 300 MG: 300 CAPSULE ORAL at 03:01

## 2019-01-05 RX ADMIN — SODIUM CHLORIDE SOLN NEBU 3% 4 ML: 3 NEBU SOLN at 03:01

## 2019-01-05 RX ADMIN — HEPARIN SODIUM 5000 UNITS: 5000 INJECTION, SOLUTION INTRAVENOUS; SUBCUTANEOUS at 05:01

## 2019-01-05 RX ADMIN — PROPOFOL 20 MCG/KG/MIN: 10 INJECTION, EMULSION INTRAVENOUS at 09:01

## 2019-01-05 RX ADMIN — POLYETHYLENE GLYCOL 3350 17 G: 17 POWDER, FOR SOLUTION ORAL at 09:01

## 2019-01-05 RX ADMIN — SODIUM CHLORIDE SOLN NEBU 3% 4 ML: 3 NEBU SOLN at 04:01

## 2019-01-05 RX ADMIN — HEPARIN SODIUM 5000 UNITS: 5000 INJECTION, SOLUTION INTRAVENOUS; SUBCUTANEOUS at 09:01

## 2019-01-05 RX ADMIN — Medication 3 ML: at 09:01

## 2019-01-05 NOTE — PROGRESS NOTES
[facility]  Neurocritical Care  Progress Note    Admit Date: 12/20/2018  Service Date: 01/05/2019  Length of Stay: 16    Subjective:     Chief Complaint: SDH (subdural hematoma)    History of Present Illness: 63 year old male h/o CABG on ASA 81 mg (last dose 72 hours PTA) presenting as transfer from OSH for EDH with SDH. Patient reportedly had a mechanical fall from standing on Sunday and went to ED where CTH was negative for any acute intracranial pathology. Overnight, patient developed confusion and presented to OSH ED where CTH was consistent with EDH with SDH. Patient has progressively worsened throughout the day developing intermittent global aphasia. NSGY consulted in ED at [facility] and stat CTH has been ordered. NCC consulted for admission and medical management. Patient on weaning off of cardene at time of evaluation with sbp of 120. Reported presenting sbp in 150s.    Hospital Course: 12/22: no major events overnight. Weaned off of cardene. This morning AOx3. Drain removed by NSGY at 11 AM, and planned to step down to NSGY.  AMS noted around 1PM, patient aphasic and not following commands, STAT CTH unremarkable.   12/26: wean ketamine, follow EEG, send urine studuies-Na 133, NS bolus, advance TF  12/27: off ketamine, d/c levo, continue EEG, and AEDs, 500 NS bolus, increase enteral water flushes, continue abx for another day  1/2: extubation trial.   1/3: failed extubation trial. Plan for trach and peg  1/4: plan for trach, general surgery consult  1/5: NAEO      Review of Symptoms: intubated, cannot participate  Constitutional: Denies fevers, weight loss, chills, or weakness.   Eyes: Denies changes in vision.   ENT: Denies dysphagia, nasal discharge, ear pain or discharge.   Cardiovascular: Denies chest pain, palpitations, orthopnea, or claudication.   Respiratory: Denies shortness of breath, cough, hemoptysis, or wheezing.   GI: Denies nausea/vomitting, hematochezia, melena, abd pain, or  changes in appetite.   : Denies dysuria, incontinence, or hematuria.   Musculoskeletal: Denies joint pain or myalgias.   Skin/breast: Denies rashes, lumps, lesions, or discharge.   Neurologic: Denies headache, dizziness, vertigo, or paresthesias.   Psychiatric: Denies changes in mood or hallucinations.   Endocrine: Denies polyuria, polydipsia, heat/cold intolerance.   Hematologic/Lymph: Denies lymphadenopathy, easy bruising or easy bleeding.   Allergic/Immunologic: Denies rash, rhinitis      Medications:  Continuous Infusions:   propofol 15 mcg/kg/min (01/05/19 1100)     Scheduled Meds:   albuterol sulfate  2.5 mg Nebulization Q4H    chlorhexidine  15 mL Mouth/Throat QID    clonazePAM  0.5 mg Per NG tube Q8H    famotidine  20 mg Per NG tube BID    heparin (porcine)  5,000 Units Subcutaneous Q8H    lacosamide (VIMPAT) in saline IVPB  200 mg Intravenous Q12H    metoprolol tartrate  25 mg Per NG tube Q8H    piperacillin-tazobactam (ZOSYN) IVPB  4.5 g Intravenous Q8H    polyethylene glycol  17 g Per NG tube Daily    pravastatin  40 mg Per NG tube QHS    senna-docusate 8.6-50 mg  1 tablet Per NG tube BID    sodium chloride 0.9%  3 mL Intravenous Q8H    sodium chloride 3%  4 mL Nebulization Q4H    ursodiol  300 mg Per NG tube TID    vancomycin (VANCOCIN) IVPB  15 mg/kg (Dosing Weight) Intravenous Q12H     PRN Meds:.acetaminophen, hydrALAZINE, magnesium oxide, magnesium oxide, ondansetron, potassium chloride 10%, potassium chloride 10%, potassium chloride 10%, potassium, sodium phosphates, potassium, sodium phosphates, potassium, sodium phosphates    OBJECTIVE:   Vital Signs (Most Recent):   Temp: 98.4 °F (36.9 °C) (01/05/19 1100)  Pulse: 82 (01/05/19 1154)  Resp: (!) 21 (01/05/19 1154)  BP: (!) 165/87 (01/05/19 1123)  SpO2: 100 % (01/05/19 1154)    Vital Signs (24h Range):   Temp:  [98 °F (36.7 °C)-98.9 °F (37.2 °C)] 98.4 °F (36.9 °C)  Pulse:  [72-92] 82  Resp:  [15-29] 21  SpO2:  [90 %-100 %] 100 %  BP:  (105-171)/() 165/87    ICP/CPP (Last 24h):        I & O (Last 24h):     Intake/Output Summary (Last 24 hours) at 1/5/2019 1238  Last data filed at 1/5/2019 1100  Gross per 24 hour   Intake 2947.1 ml   Output 1500 ml   Net 1447.1 ml     Physical Exam:  GA: sedated, comfortable, no acute distress.   HEENT: No scleral icterus or JVD. Neck supple  Pulmonary: Air entry equal to auscultation A/P/L. Wheezing no, crackles no  Cardiac: RRR, S1 & S2 w/o rubs/murmurs/gallops.   Abdominal: soft, non-tender, bowel sounds present x 4. No appreciable hepatosplenomegaly.  Skin: No jaundice, rashes, or visible lesions.  Neuro:  --Mental Status:  sedated, follows commands inconsistently (possible aphasia). Tracks visually   --Pupils 3 mm, PERRL.   --Corneal reflex not done in this awake patient, gag, cough intact.  Moves all 4 extremities spontaneously (left more than right)  MSK:  No edema in UE and LE    Vent Data:   Vent Mode: A/C  Oxygen Concentration (%):  [50-51] 50  Resp Rate Total:  [16 br/min-27 br/min] 25 br/min  Vt Set:  [450 mL] 450 mL  PEEP/CPAP:  [5 cmH20] 5 cmH20  Mean Airway Pressure:  [7.8 cmH20-10 cmH20] 9.1 cmH20    Lines/Drains/Airway:        Airway - Non-Surgical 12/23/18 1301 Endotracheal Tube (Active)   Secured at 25 cm 1/2/2019 12:00 PM   Measured At Lips 1/2/2019 12:00 PM   Secured Location Center 1/2/2019 12:00 PM   Secured by Commercial tube blood 1/2/2019 12:00 PM   Bite Block none 1/2/2019 12:00 PM   Site Condition Cool;Dry 1/2/2019 12:00 PM   Status Intact;Secured;Patent 1/2/2019 12:00 PM   Site Assessment Clean;Dry;No bleeding;No drainage 1/2/2019 12:00 PM   Cuff Pressure 28 cm H2O 1/2/2019  7:39 AM           Percutaneous Central Line Insertion/Assessment - triple lumen  12/25/18 0205 left internal jugular (Active)   Dressing biopatch in place;dressing dry and intact;dressing changed;dressing reinforced 1/2/2019 11:00 AM   Securement secured w/ sutures 1/2/2019 11:00 AM   Additional Site Signs no  drainage;no streak formation;no edema;no palpable cord;no warmth;no erythema 1/2/2019 11:00 AM   Distal Patency/Care flushed w/o difficulty 1/2/2019 11:00 AM   Medial Patency/Care flushed w/o difficulty 1/2/2019 11:00 AM   Proximal Patency/Care flushed w/o difficulty 1/2/2019 11:00 AM   Line Interventions blood specimen obtained and sent to lab 1/2/2019 11:00 AM   Dressing Change Due 01/05/19 1/2/2019 11:00 AM   Daily Line Review Performed 1/2/2019 11:00 AM             NG/OG Tube 12/23/18 1500 Center mouth (Active)   Placement Check placement verified by x-ray 1/2/2019 11:00 AM   Tolerance no signs/symptoms of discomfort 1/2/2019 11:00 AM   Securement taped to commercial device 1/2/2019 11:00 AM   Clamp Status/Tolerance unclamped 1/2/2019 11:00 AM   Suction Setting/Drainage Method dependent drainage 1/2/2019 11:00 AM   Insertion Site Appearance no redness, warmth, tenderness, skin breakdown, drainage 1/2/2019 11:00 AM   Drainage None 1/2/2019 11:00 AM   Flush/Irrigation flushed w/;water 1/2/2019 11:00 AM   Feeding Method continuous 1/2/2019  7:01 AM   Feeding Action feeding held 1/2/2019 11:00 AM   Current Rate (mL/hr) 55 mL/hr 1/2/2019  7:01 AM   Goal Rate (mL/hr) 55 mL/hr 1/2/2019  7:01 AM   Intake (mL) 55 mL 1/2/2019  7:01 AM   Water Bolus (mL) 250 mL 1/2/2019  5:03 AM   Rate Formula Tube Feeding (mL/hr) 55 mL/hr 1/2/2019  7:01 AM   Formula Name impact peptide 1/2/2019 11:00 AM   Intake (mL) - Formula Tube Feeding 55 1/2/2019  9:00 AM   Residual Amount (ml) 0 ml 1/1/2019  3:05 PM            Urethral Catheter 12/20/18 2350 Non-latex;Straight-tip 16 Fr. (Active)   Site Assessment Clean;Intact 1/2/2019 11:00 AM   Collection Container Standard drainage bag 1/2/2019 11:00 AM   Securement Method secured to top of thigh w/ adhesive device 1/2/2019 11:00 AM   Catheter Care Performed yes 1/2/2019 11:00 AM   Reason for Continuing Urinary Catheterization Urinary retention 1/2/2019 11:00 AM   CAUTI Prevention Bundle StatLock  "in place w 1" slack;Intact seal between catheter & drainage tubing;Green sheeting clip in use;No dependent loops or kinks;Drainage bag off the floor;Drainage bag not overfilled (<2/3 full);Drainage bag below bladder 1/2/2019 11:00 AM   Output (mL) 50 mL 1/2/2019  9:00 AM     Nutrition/Tube Feeds (if NPO state why): tf    Labs:  ABG:   Recent Labs   Lab 01/05/19  0338   PH 7.478*   PO2 172*   PCO2 33.2*   HCO3 24.6   POCSATURATED 100   BE 1     BMP:  Recent Labs   Lab 01/05/19  0110      K 5.1      CO2 23   BUN 21   CREATININE 0.5   *   MG 2.2   PHOS 3.3     LFT:   Lab Results   Component Value Date    AST 27 01/05/2019    ALT 22 01/05/2019     (H) 01/03/2019    ALKPHOS 360 (H) 01/05/2019    BILITOT 0.5 01/05/2019    ALBUMIN 1.5 (L) 01/05/2019    PROT 4.7 (L) 01/05/2019     CBC:   Lab Results   Component Value Date    WBC 8.69 01/05/2019    HGB 7.7 (L) 01/05/2019    HCT 22.7 (L) 01/05/2019    MCV 94 01/05/2019     01/05/2019     Microbiology x 7d:   Microbiology Results (last 7 days)     Procedure Component Value Units Date/Time    Culture, Respiratory with Gram Stain [[id number]] Collected:  01/03/19 1108    Order Status:  Completed Specimen:  Respiratory from Endotracheal Aspirate Updated:  01/05/19 1007     Respiratory Culture Normal respiratory donnell     Gram Stain (Respiratory) Few WBC's     Gram Stain (Respiratory) Rare Gram positive cocci    Blood culture [[id number]] Collected:  01/03/19 1107    Order Status:  Completed Specimen:  Blood from Peripheral, Foot, Left Updated:  01/04/19 1412     Blood Culture, Routine No Growth to date     Blood Culture, Routine No Growth to date    Narrative:       Blood cultures x 2 different sites. 4 bottles total. Please  draw cultures before administering antibiotics.    Blood culture [[id number]] Collected:  01/03/19 1100    Order Status:  Completed Specimen:  Blood from Peripheral, Hand, Left Updated:  01/04/19 1412     Blood Culture, Routine " No Growth to date     Blood Culture, Routine No Growth to date    Narrative:       Blood cultures from 2 different sites. 4 bottles total.  Please draw before starting antibiotics.        Imaging:  CXR 1/5 - tube in place. Bilateral pleural effusion.   I personally reviewed the above image.  Today I independently reviewed pertinent medications, lines/drains/airways, imaging, cardiology results, laboratory results, microbiology results, notably:   ASSESSMENT/PLAN:     Active Hospital Problems    Diagnosis    *SDH (subdural hematoma)    Weight loss, unintentional     History of 200lb wt loss over last year  Reports early satiety and change in taste      Seizure    Status epilepticus    Acute respiratory failure    Septic shock    AYLIN (acute kidney injury)    New onset atrial fibrillation    Pressure injury of buttock, stage 2    Severe malnutrition    SAH (subarachnoid hemorrhage)    Subdural hematoma    Epidural hematoma    Brain compression    Cerebral edema    Essential hypertension      Neuro:   SDH post evacuation. Stable   No further seizures  AED clonazepam 0.5mg q8hrly. vimpat 100mg q13mkve  Mobilize as tolerated  Propofol for vent  Sedation holidays     Pulmonary:   Cont mech vent  Plan trach and peg 1/8  Lasix 40mg q12 x4 doses.  Wean FiO2   CXR/ABG daily    Cardiac:   On metoprolol for afib, rate controlled  Hemodynamically stable    Renal:    Making urine  BUN/Cr >20  Will monitor    ID:   Afebrile, normal wbc  Check procalcitonin 0.63>>0.49  vanc and zosyn  panculture - blood and respiratory (neg)    Hem/Onc:   Drop in hh possible dilutional   Stable plt count    Endocrine:    BS stable    Fluids/Electrolytes/Nutrition/GI:   tf  Monitor high alkaline phosphatase - possible gallstones.    USG Abd - gall stones and biliary sludge  ursodiol 300mg q8  Lines:  Art  CVC d/c central line. Place midline  ETT +  Rader  NG +  PEG    Proph:  DVT:SCD/heparin  Constipation:  Last output:bowel  regimen  PUP:pepcid  VAP:peridex      Uninterrupted Critical Care/Counseling Time (not including procedures):: III    Claudy Cerna MD  Neurocritical care attending    Full Code    Claudy Cerna MD  Neurocritical Care  [facility]

## 2019-01-05 NOTE — PROGRESS NOTES
[facility]  Neurosurgery  Progress Note    Subjective:     History of Present Illness: 62 yo M with PMH of CAD, CABG in 2006, DM, HTN, HLD on ASA presents to the ED for AMS. Patient fell 4 days ago with about 2 minutes of LOC. Family is unsure of what caused the fall. Full workup done including a CTH at [facility] that was negative. Patient went home and stopped taking his ASA. This morning, his family noticed he was more confused. He went to [facility] and CTH was done that revealed a 1.7cm left frontalpariental SDH with a 5 mm right sided midline shift and a left SAH. [place] [facility] History was obtained per family as patient unable to give history 2/2 confusion with expressive and receptive aphasia.    Post-Op Info:  Procedure(s) (LRB):  CRANIOTOMY, FOR SUBDURAL HEMATOMA EVACUATION (Left)   16 Days Post-Op     Interval History: naeon    Medications:  Continuous Infusions:   propofol 15 mcg/kg/min (01/05/19 0600)     Scheduled Meds:   albuterol sulfate  2.5 mg Nebulization Q4H    chlorhexidine  15 mL Mouth/Throat QID    clonazePAM  0.5 mg Per NG tube Q8H    famotidine  20 mg Per NG tube BID    heparin (porcine)  5,000 Units Subcutaneous Q8H    lacosamide (VIMPAT) in saline IVPB  200 mg Intravenous Q12H    metoprolol tartrate  25 mg Per NG tube Q8H    piperacillin-tazobactam (ZOSYN) IVPB  4.5 g Intravenous Q8H    polyethylene glycol  17 g Per NG tube Daily    pravastatin  40 mg Per NG tube QHS    senna-docusate 8.6-50 mg  1 tablet Per NG tube BID    sodium chloride 0.9%  3 mL Intravenous Q8H    sodium chloride 3%  4 mL Nebulization Q4H    ursodiol  300 mg Per NG tube TID    vancomycin (VANCOCIN) IVPB  15 mg/kg (Dosing Weight) Intravenous Q12H     PRN Meds:acetaminophen, hydrALAZINE, magnesium oxide, magnesium oxide, ondansetron, potassium chloride 10%, potassium chloride 10%, potassium chloride 10%, potassium, sodium phosphates, potassium, sodium phosphates,  potassium, sodium phosphates     Review of Systems  Objective:     Weight: 85.7 kg (189 lb)  Body mass index is 27.12 kg/m².  Vital Signs (Most Recent):  Temp: 98.4 °F (36.9 °C) (01/05/19 0300)  Pulse: 81 (01/05/19 0600)  Resp: (!) 24 (01/05/19 0600)  BP: (!) 159/88 (01/05/19 0600)  SpO2: 100 % (01/05/19 0600) Vital Signs (24h Range):  Temp:  [98 °F (36.7 °C)-98.9 °F (37.2 °C)] 98.4 °F (36.9 °C)  Pulse:  [72-94] 81  Resp:  [1-27] 24  SpO2:  [90 %-100 %] 100 %  BP: (101-171)/() 159/88                 Vent Mode: A/C  Oxygen Concentration (%):  [50-99] 50  Resp Rate Total:  [16 br/min-27 br/min] 24 br/min  Vt Set:  [450 mL] 450 mL  PEEP/CPAP:  [5 cmH20] 5 cmH20  Mean Airway Pressure:  [7.8 cmH20-10 cmH20] 8.8 cmH20         NG/OG Tube 01/01/19 Saunders sump Right nostril (Active)   Placement Check placement verified by x-ray;placement verified by distal tube length measurement 1/5/2019  3:00 AM   Distal Tube Length (cm) 75 1/4/2019  7:05 PM   Tolerance no signs/symptoms of discomfort 1/5/2019  3:00 AM   Securement anchored to nostril center w/ adhesive device 1/5/2019  3:00 AM   Clamp Status/Tolerance unclamped 1/5/2019  3:00 AM   Insertion Site Appearance no redness, warmth, tenderness, skin breakdown, drainage 1/5/2019  3:00 AM   Flush/Irrigation flushed w/;water;no resistance met 1/5/2019  3:00 AM   Feeding Method continuous 1/5/2019  3:00 AM   Feeding Action feeding continued 1/5/2019  3:00 AM   Current Rate (mL/hr) 55 mL/hr 1/4/2019  7:05 PM   Goal Rate (mL/hr) 55 mL/hr 1/4/2019  7:05 PM   Intake (mL) 250 mL 1/4/2019  6:00 PM   Water Bolus (mL) 250 mL 1/5/2019 12:00 AM   Rate Formula Tube Feeding (mL/hr) 55 mL/hr 1/4/2019  7:05 PM   Formula Name Impact Peptide 1/5/2019  3:00 AM   Intake (mL) - Formula Tube Feeding 55 1/5/2019  6:00 AM   Residual Amount (ml) 0 ml 1/4/2019  7:05 AM       Male External Urinary Catheter 01/04/19 1803 Small (Active)   Collection Container Urimeter 1/5/2019  3:00 AM   Securement  Method secured to top of thigh w/ adhesive device 1/5/2019  3:00 AM   Skin no redness;no breakdown 1/5/2019  3:00 AM   Tolerance no signs/symptoms of discomfort 1/5/2019  3:00 AM   Output (mL) 700 mL 1/4/2019 11:00 PM   Catheter Change Date 01/04/19 1/5/2019  3:00 AM   Catheter Change Time 2300 1/5/2019  3:00 AM       Neurosurgery Physical Exam   [id number], grimacing to stimulation  PERRL  Localizing briskly in BUE  SILT    Significant Labs:  Recent Labs   Lab 01/04/19  0200 01/05/19  0110    123*    138   K 4.1 5.1    110   CO2 23 23   BUN 20 21   CREATININE 0.5 0.5   CALCIUM 8.5* 7.8*   MG 1.9 2.2     Recent Labs   Lab 01/04/19  0200 01/05/19  0110   WBC 10.98 8.69   HGB 8.5* 7.7*   HCT 27.0* 22.7*    289     No results for input(s): LABPT, INR, APTT in the last 48 hours.  Microbiology Results (last 7 days)     Procedure Component Value Units Date/Time    Blood culture [[id number]] Collected:  01/03/19 1107    Order Status:  Completed Specimen:  Blood from Peripheral, Foot, Left Updated:  01/04/19 1412     Blood Culture, Routine No Growth to date     Blood Culture, Routine No Growth to date    Narrative:       Blood cultures x 2 different sites. 4 bottles total. Please  draw cultures before administering antibiotics.    Blood culture [[id number]] Collected:  01/03/19 1100    Order Status:  Completed Specimen:  Blood from Peripheral, Hand, Left Updated:  01/04/19 1412     Blood Culture, Routine No Growth to date     Blood Culture, Routine No Growth to date    Narrative:       Blood cultures from 2 different sites. 4 bottles total.  Please draw before starting antibiotics.    Culture, Respiratory with Gram Stain [[id number]] Collected:  01/03/19 1108    Order Status:  Completed Specimen:  Respiratory from Endotracheal Aspirate Updated:  01/04/19 1033     Respiratory Culture Normal respiratory donnell     Gram Stain (Respiratory) Few WBC's     Gram Stain (Respiratory) Rare Gram positive cocci             Significant Diagnostics:      Assessment/Plan:     * SDH (subdural hematoma)    64 yo male with L aSDH now s/p craniotomy for evacuation (12/20).    --Continue care per primary team.  --PEG/TRACH planned for Tuesday per gen surgery.  --Resp Cx with GPC, on Vanc/Zosyn per primary  --Staples removed, incision healing well, well approximated w/o erythema/drainage.   --We will continue to monitor closely, please contact us with any questions or concerns.               Lamont Giron, DO  Neurosurgery  [facility]

## 2019-01-05 NOTE — ASSESSMENT & PLAN NOTE
62 yo male with L aSDH now s/p craniotomy for evacuation (12/20).    --Continue care per primary team.  --PEG/TRACH planned for Tuesday per gen surgery.  --Resp Cx with GPC, on Vanc/Zosyn per primary  --Staples removed, incision healing well, well approximated w/o erythema/drainage.   --We will continue to monitor closely, please contact us with any questions or concerns.

## 2019-01-05 NOTE — PLAN OF CARE
Problem: Adult Inpatient Plan of Care  Goal: Plan of Care Review  Admit Date: 12/20/18    Admit Dx:    Past Medical History:  No date: Coronary artery disease  No date: CVD (cardiovascular disease)  No date: Diabetes mellitus type II  No date: Hyperlipidemia  No date: Hypertension  No date: Ischemic cardiomyopathy  No date: Obesity  No date: Obesity, morbid    Past Surgical History:  No date: APPENDECTOMY  No date: CORONARY ARTERY BYPASS GRAFT  12/20/2018: CRANIOTOMY, FOR SUBDURAL HEMATOMA EVACUATION; Left      Comment:  Performed by Stevan Hull MD at [facility] OR [address] [place] [address]  No date: HEMORRHOID SURGERY    Individualization:   1. Pt goes by [place]    Restraints: 1/1/19 @ 0505 L mitt for pulling lines       Outcome: Ongoing (interventions implemented as appropriate)  POC reviewed with pt at 0300. Pt cannot verbalize understanding due to intubation and sedation. . No acute events overnight. VSS. Propofol gtt titrated. TF is at goal. No respiratory distress noted during shift. Pt progressing toward goals. Will continue to monitor. See flowsheets for full assessment and VS info

## 2019-01-05 NOTE — SUBJECTIVE & OBJECTIVE
Interval History: naeon    Medications:  Continuous Infusions:   propofol 15 mcg/kg/min (01/05/19 0600)     Scheduled Meds:   albuterol sulfate  2.5 mg Nebulization Q4H    chlorhexidine  15 mL Mouth/Throat QID    clonazePAM  0.5 mg Per NG tube Q8H    famotidine  20 mg Per NG tube BID    heparin (porcine)  5,000 Units Subcutaneous Q8H    lacosamide (VIMPAT) in saline IVPB  200 mg Intravenous Q12H    metoprolol tartrate  25 mg Per NG tube Q8H    piperacillin-tazobactam (ZOSYN) IVPB  4.5 g Intravenous Q8H    polyethylene glycol  17 g Per NG tube Daily    pravastatin  40 mg Per NG tube QHS    senna-docusate 8.6-50 mg  1 tablet Per NG tube BID    sodium chloride 0.9%  3 mL Intravenous Q8H    sodium chloride 3%  4 mL Nebulization Q4H    ursodiol  300 mg Per NG tube TID    vancomycin (VANCOCIN) IVPB  15 mg/kg (Dosing Weight) Intravenous Q12H     PRN Meds:acetaminophen, hydrALAZINE, magnesium oxide, magnesium oxide, ondansetron, potassium chloride 10%, potassium chloride 10%, potassium chloride 10%, potassium, sodium phosphates, potassium, sodium phosphates, potassium, sodium phosphates     Review of Systems  Objective:     Weight: 85.7 kg (189 lb)  Body mass index is 27.12 kg/m².  Vital Signs (Most Recent):  Temp: 98.4 °F (36.9 °C) (01/05/19 0300)  Pulse: 81 (01/05/19 0600)  Resp: (!) 24 (01/05/19 0600)  BP: (!) 159/88 (01/05/19 0600)  SpO2: 100 % (01/05/19 0600) Vital Signs (24h Range):  Temp:  [98 °F (36.7 °C)-98.9 °F (37.2 °C)] 98.4 °F (36.9 °C)  Pulse:  [72-94] 81  Resp:  [1-27] 24  SpO2:  [90 %-100 %] 100 %  BP: (101-171)/() 159/88                 Vent Mode: A/C  Oxygen Concentration (%):  [50-99] 50  Resp Rate Total:  [16 br/min-27 br/min] 24 br/min  Vt Set:  [450 mL] 450 mL  PEEP/CPAP:  [5 cmH20] 5 cmH20  Mean Airway Pressure:  [7.8 cmH20-10 cmH20] 8.8 cmH20         NG/OG Tube 01/01/19 [place] sump Right nostril (Active)   Placement Check placement verified by x-ray;placement verified by distal  tube length measurement 1/5/2019  3:00 AM   Distal Tube Length (cm) 75 1/4/2019  7:05 PM   Tolerance no signs/symptoms of discomfort 1/5/2019  3:00 AM   Securement anchored to nostril center w/ adhesive device 1/5/2019  3:00 AM   Clamp Status/Tolerance unclamped 1/5/2019  3:00 AM   Insertion Site Appearance no redness, warmth, tenderness, skin breakdown, drainage 1/5/2019  3:00 AM   Flush/Irrigation flushed w/;water;no resistance met 1/5/2019  3:00 AM   Feeding Method continuous 1/5/2019  3:00 AM   Feeding Action feeding continued 1/5/2019  3:00 AM   Current Rate (mL/hr) 55 mL/hr 1/4/2019  7:05 PM   Goal Rate (mL/hr) 55 mL/hr 1/4/2019  7:05 PM   Intake (mL) 250 mL 1/4/2019  6:00 PM   Water Bolus (mL) 250 mL 1/5/2019 12:00 AM   Rate Formula Tube Feeding (mL/hr) 55 mL/hr 1/4/2019  7:05 PM   Formula Name Impact Peptide 1/5/2019  3:00 AM   Intake (mL) - Formula Tube Feeding 55 1/5/2019  6:00 AM   Residual Amount (ml) 0 ml 1/4/2019  7:05 AM       Male External Urinary Catheter 01/04/19 1803 Small (Active)   Collection Container Urimeter 1/5/2019  3:00 AM   Securement Method secured to top of thigh w/ adhesive device 1/5/2019  3:00 AM   Skin no redness;no breakdown 1/5/2019  3:00 AM   Tolerance no signs/symptoms of discomfort 1/5/2019  3:00 AM   Output (mL) 700 mL 1/4/2019 11:00 PM   Catheter Change Date 01/04/19 1/5/2019  3:00 AM   Catheter Change Time 2300 1/5/2019  3:00 AM       Neurosurgery Physical Exam   [id number], grimacing to stimulation  PERRL  Localizing briskly in BUE  SILT    Significant Labs:  Recent Labs   Lab 01/04/19  0200 01/05/19  0110    123*    138   K 4.1 5.1    110   CO2 23 23   BUN 20 21   CREATININE 0.5 0.5   CALCIUM 8.5* 7.8*   MG 1.9 2.2     Recent Labs   Lab 01/04/19  0200 01/05/19  0110   WBC 10.98 8.69   HGB 8.5* 7.7*   HCT 27.0* 22.7*    289     No results for input(s): LABPT, INR, APTT in the last 48 hours.  Microbiology Results (last 7 days)     Procedure Component  Value Units Date/Time    Blood culture [[id number]] Collected:  01/03/19 1107    Order Status:  Completed Specimen:  Blood from Peripheral, Foot, Left Updated:  01/04/19 1412     Blood Culture, Routine No Growth to date     Blood Culture, Routine No Growth to date    Narrative:       Blood cultures x 2 different sites. 4 bottles total. Please  draw cultures before administering antibiotics.    Blood culture [[id number]] Collected:  01/03/19 1100    Order Status:  Completed Specimen:  Blood from Peripheral, Hand, Left Updated:  01/04/19 1412     Blood Culture, Routine No Growth to date     Blood Culture, Routine No Growth to date    Narrative:       Blood cultures from 2 different sites. 4 bottles total.  Please draw before starting antibiotics.    Culture, Respiratory with Gram Stain [[id number]] Collected:  01/03/19 1108    Order Status:  Completed Specimen:  Respiratory from Endotracheal Aspirate Updated:  01/04/19 1033     Respiratory Culture Normal respiratory donnell     Gram Stain (Respiratory) Few WBC's     Gram Stain (Respiratory) Rare Gram positive cocci            Significant Diagnostics:

## 2019-01-06 LAB
ALBUMIN SERPL BCP-MCNC: 1.9 G/DL
ALLENS TEST: ABNORMAL
ALP SERPL-CCNC: 379 U/L
ALT SERPL W/O P-5'-P-CCNC: 20 U/L
ANION GAP SERPL CALC-SCNC: 8 MMOL/L
AST SERPL-CCNC: 17 U/L
BASOPHILS # BLD AUTO: 0.05 K/UL
BASOPHILS NFR BLD: 0.6 %
BILIRUB SERPL-MCNC: 0.5 MG/DL
BUN SERPL-MCNC: 24 MG/DL
CALCIUM SERPL-MCNC: 8.4 MG/DL
CHLORIDE SERPL-SCNC: 107 MMOL/L
CO2 SERPL-SCNC: 25 MMOL/L
CREAT SERPL-MCNC: 0.6 MG/DL
DELSYS: ABNORMAL
DIFFERENTIAL METHOD: ABNORMAL
EOSINOPHIL # BLD AUTO: 0.3 K/UL
EOSINOPHIL NFR BLD: 3 %
ERYTHROCYTE [DISTWIDTH] IN BLOOD BY AUTOMATED COUNT: 15.6 %
ERYTHROCYTE [SEDIMENTATION RATE] IN BLOOD BY WESTERGREN METHOD: 16 MM/H
EST. GFR  (AFRICAN AMERICAN): >60 ML/MIN/1.73 M^2
EST. GFR  (NON AFRICAN AMERICAN): >60 ML/MIN/1.73 M^2
FIO2: 30
GLUCOSE SERPL-MCNC: 118 MG/DL
HCO3 UR-SCNC: 25.9 MMOL/L (ref 24–28)
HCT VFR BLD AUTO: 26.7 %
HGB BLD-MCNC: 8.2 G/DL
IMM GRANULOCYTES # BLD AUTO: 0.09 K/UL
IMM GRANULOCYTES NFR BLD AUTO: 1 %
LYMPHOCYTES # BLD AUTO: 1.9 K/UL
LYMPHOCYTES NFR BLD: 21.7 %
MAGNESIUM SERPL-MCNC: 1.8 MG/DL
MCH RBC QN AUTO: 30.8 PG
MCHC RBC AUTO-ENTMCNC: 30.7 G/DL
MCV RBC AUTO: 100 FL
MIN VOL: 9
MODE: ABNORMAL
MONOCYTES # BLD AUTO: 0.7 K/UL
MONOCYTES NFR BLD: 7.9 %
NEUTROPHILS # BLD AUTO: 5.7 K/UL
NEUTROPHILS NFR BLD: 65.8 %
NRBC BLD-RTO: 0 /100 WBC
PCO2 BLDA: 35.8 MMHG (ref 35–45)
PEEP: 5
PH SMN: 7.47 [PH] (ref 7.35–7.45)
PHOSPHATE SERPL-MCNC: 3 MG/DL
PIP: 20
PLATELET # BLD AUTO: 307 K/UL
PMV BLD AUTO: 9.7 FL
PO2 BLDA: 107 MMHG (ref 80–100)
POC BE: 2 MMOL/L
POC SATURATED O2: 98 % (ref 95–100)
POC TCO2: 27 MMOL/L (ref 23–27)
POCT GLUCOSE: 108 MG/DL (ref 70–110)
POCT GLUCOSE: 111 MG/DL (ref 70–110)
POTASSIUM SERPL-SCNC: 4.1 MMOL/L
PROCALCITONIN SERPL IA-MCNC: 0.38 NG/ML
PROT SERPL-MCNC: 5.5 G/DL
RBC # BLD AUTO: 2.66 M/UL
SAMPLE: ABNORMAL
SITE: ABNORMAL
SODIUM SERPL-SCNC: 140 MMOL/L
SP02: 100
VT: 450
WBC # BLD AUTO: 8.59 K/UL

## 2019-01-06 PROCEDURE — 25000003 PHARM REV CODE 250: Performed by: PSYCHIATRY & NEUROLOGY

## 2019-01-06 PROCEDURE — 99233 SBSQ HOSP IP/OBS HIGH 50: CPT | Mod: GC,,, | Performed by: PSYCHIATRY & NEUROLOGY

## 2019-01-06 PROCEDURE — 94003 VENT MGMT INPAT SUBQ DAY: CPT

## 2019-01-06 PROCEDURE — 25000003 PHARM REV CODE 250: Performed by: PHYSICIAN ASSISTANT

## 2019-01-06 PROCEDURE — 99223 1ST HOSP IP/OBS HIGH 75: CPT | Mod: ,,, | Performed by: SURGERY

## 2019-01-06 PROCEDURE — 25000003 PHARM REV CODE 250: Performed by: STUDENT IN AN ORGANIZED HEALTH CARE EDUCATION/TRAINING PROGRAM

## 2019-01-06 PROCEDURE — 63600175 PHARM REV CODE 636 W HCPCS: Performed by: PHYSICIAN ASSISTANT

## 2019-01-06 PROCEDURE — 25000003 PHARM REV CODE 250: Performed by: NURSE PRACTITIONER

## 2019-01-06 PROCEDURE — 63600175 PHARM REV CODE 636 W HCPCS: Performed by: NURSE PRACTITIONER

## 2019-01-06 PROCEDURE — 94640 AIRWAY INHALATION TREATMENT: CPT

## 2019-01-06 PROCEDURE — C9254 INJECTION, LACOSAMIDE: HCPCS | Performed by: NURSE PRACTITIONER

## 2019-01-06 PROCEDURE — 25000242 PHARM REV CODE 250 ALT 637 W/ HCPCS: Performed by: NURSE PRACTITIONER

## 2019-01-06 PROCEDURE — 63600175 PHARM REV CODE 636 W HCPCS: Performed by: STUDENT IN AN ORGANIZED HEALTH CARE EDUCATION/TRAINING PROGRAM

## 2019-01-06 PROCEDURE — 85025 COMPLETE CBC W/AUTO DIFF WBC: CPT

## 2019-01-06 PROCEDURE — 83735 ASSAY OF MAGNESIUM: CPT

## 2019-01-06 PROCEDURE — 99223 PR INITIAL HOSPITAL CARE,LEVL III: ICD-10-PCS | Mod: ,,, | Performed by: SURGERY

## 2019-01-06 PROCEDURE — 63600175 PHARM REV CODE 636 W HCPCS: Performed by: PSYCHIATRY & NEUROLOGY

## 2019-01-06 PROCEDURE — 84100 ASSAY OF PHOSPHORUS: CPT

## 2019-01-06 PROCEDURE — 84145 PROCALCITONIN (PCT): CPT

## 2019-01-06 PROCEDURE — 80053 COMPREHEN METABOLIC PANEL: CPT

## 2019-01-06 PROCEDURE — 99233 PR SUBSEQUENT HOSPITAL CARE,LEVL III: ICD-10-PCS | Mod: GC,,, | Performed by: PSYCHIATRY & NEUROLOGY

## 2019-01-06 PROCEDURE — 20000000 HC ICU ROOM

## 2019-01-06 PROCEDURE — A4216 STERILE WATER/SALINE, 10 ML: HCPCS | Performed by: PSYCHIATRY & NEUROLOGY

## 2019-01-06 PROCEDURE — 99900026 HC AIRWAY MAINTENANCE (STAT)

## 2019-01-06 RX ORDER — LACOSAMIDE 50 MG/1
200 TABLET ORAL 2 TIMES DAILY
Status: DISCONTINUED | OUTPATIENT
Start: 2019-01-06 | End: 2019-01-11

## 2019-01-06 RX ADMIN — FUROSEMIDE 40 MG: 10 INJECTION, SOLUTION INTRAVENOUS at 08:01

## 2019-01-06 RX ADMIN — SODIUM CHLORIDE SOLN NEBU 3% 4 ML: 3 NEBU SOLN at 07:01

## 2019-01-06 RX ADMIN — CLONAZEPAM 0.5 MG: 0.5 TABLET ORAL at 06:01

## 2019-01-06 RX ADMIN — CHLORHEXIDINE GLUCONATE 0.12% ORAL RINSE 15 ML: 1.2 LIQUID ORAL at 12:01

## 2019-01-06 RX ADMIN — STANDARDIZED SENNA CONCENTRATE AND DOCUSATE SODIUM 1 TABLET: 8.6; 5 TABLET, FILM COATED ORAL at 08:01

## 2019-01-06 RX ADMIN — ALBUTEROL SULFATE 2.5 MG: 2.5 SOLUTION RESPIRATORY (INHALATION) at 07:01

## 2019-01-06 RX ADMIN — URSODIOL 300 MG: 300 CAPSULE ORAL at 03:01

## 2019-01-06 RX ADMIN — HEPARIN SODIUM 5000 UNITS: 5000 INJECTION, SOLUTION INTRAVENOUS; SUBCUTANEOUS at 03:01

## 2019-01-06 RX ADMIN — CHLORHEXIDINE GLUCONATE 0.12% ORAL RINSE 15 ML: 1.2 LIQUID ORAL at 10:01

## 2019-01-06 RX ADMIN — CHLORHEXIDINE GLUCONATE 0.12% ORAL RINSE 15 ML: 1.2 LIQUID ORAL at 08:01

## 2019-01-06 RX ADMIN — ALBUTEROL SULFATE 2.5 MG: 2.5 SOLUTION RESPIRATORY (INHALATION) at 11:01

## 2019-01-06 RX ADMIN — HEPARIN SODIUM 5000 UNITS: 5000 INJECTION, SOLUTION INTRAVENOUS; SUBCUTANEOUS at 06:01

## 2019-01-06 RX ADMIN — METOPROLOL TARTRATE 25 MG: 25 TABLET ORAL at 03:01

## 2019-01-06 RX ADMIN — SODIUM CHLORIDE SOLN NEBU 3% 4 ML: 3 NEBU SOLN at 11:01

## 2019-01-06 RX ADMIN — ALBUTEROL SULFATE 2.5 MG: 2.5 SOLUTION RESPIRATORY (INHALATION) at 03:01

## 2019-01-06 RX ADMIN — METOPROLOL TARTRATE 25 MG: 25 TABLET ORAL at 06:01

## 2019-01-06 RX ADMIN — PROPOFOL 10 MCG/KG/MIN: 10 INJECTION, EMULSION INTRAVENOUS at 10:01

## 2019-01-06 RX ADMIN — HEPARIN SODIUM 5000 UNITS: 5000 INJECTION, SOLUTION INTRAVENOUS; SUBCUTANEOUS at 10:01

## 2019-01-06 RX ADMIN — CLONAZEPAM 0.5 MG: 0.5 TABLET ORAL at 03:01

## 2019-01-06 RX ADMIN — CHLOROTHIAZIDE SODIUM 500 MG: 500 INJECTION, POWDER, LYOPHILIZED, FOR SOLUTION INTRAVENOUS at 11:01

## 2019-01-06 RX ADMIN — FUROSEMIDE 40 MG: 10 INJECTION, SOLUTION INTRAVENOUS at 06:01

## 2019-01-06 RX ADMIN — POLYETHYLENE GLYCOL 3350 17 G: 17 POWDER, FOR SOLUTION ORAL at 08:01

## 2019-01-06 RX ADMIN — VANCOMYCIN HYDROCHLORIDE 1250 MG: 10 INJECTION, POWDER, LYOPHILIZED, FOR SOLUTION INTRAVENOUS at 12:01

## 2019-01-06 RX ADMIN — PROPOFOL 20 MCG/KG/MIN: 10 INJECTION, EMULSION INTRAVENOUS at 08:01

## 2019-01-06 RX ADMIN — Medication 3 ML: at 10:01

## 2019-01-06 RX ADMIN — STANDARDIZED SENNA CONCENTRATE AND DOCUSATE SODIUM 1 TABLET: 8.6; 5 TABLET, FILM COATED ORAL at 10:01

## 2019-01-06 RX ADMIN — URSODIOL 300 MG: 300 CAPSULE ORAL at 10:01

## 2019-01-06 RX ADMIN — SODIUM CHLORIDE 200 MG: 9 INJECTION, SOLUTION INTRAVENOUS at 08:01

## 2019-01-06 RX ADMIN — LACOSAMIDE 200 MG: 50 TABLET, FILM COATED ORAL at 10:01

## 2019-01-06 RX ADMIN — CLONAZEPAM 0.5 MG: 0.5 TABLET ORAL at 10:01

## 2019-01-06 RX ADMIN — METOPROLOL TARTRATE 25 MG: 25 TABLET ORAL at 10:01

## 2019-01-06 RX ADMIN — PRAVASTATIN SODIUM 40 MG: 40 TABLET ORAL at 10:01

## 2019-01-06 RX ADMIN — Medication 3 ML: at 06:01

## 2019-01-06 RX ADMIN — FAMOTIDINE 20 MG: 20 TABLET ORAL at 08:01

## 2019-01-06 RX ADMIN — PIPERACILLIN AND TAZOBACTAM 4.5 G: 4; .5 INJECTION, POWDER, LYOPHILIZED, FOR SOLUTION INTRAVENOUS; PARENTERAL at 06:01

## 2019-01-06 RX ADMIN — URSODIOL 300 MG: 300 CAPSULE ORAL at 08:01

## 2019-01-06 RX ADMIN — SODIUM CHLORIDE SOLN NEBU 3% 4 ML: 3 NEBU SOLN at 03:01

## 2019-01-06 RX ADMIN — Medication 3 ML: at 03:01

## 2019-01-06 RX ADMIN — PIPERACILLIN AND TAZOBACTAM 4.5 G: 4; .5 INJECTION, POWDER, LYOPHILIZED, FOR SOLUTION INTRAVENOUS; PARENTERAL at 12:01

## 2019-01-06 RX ADMIN — CHLORHEXIDINE GLUCONATE 0.12% ORAL RINSE 15 ML: 1.2 LIQUID ORAL at 04:01

## 2019-01-06 RX ADMIN — FAMOTIDINE 20 MG: 20 TABLET ORAL at 10:01

## 2019-01-06 NOTE — PROGRESS NOTES
[facility]  Neurocritical Care  Progress Note    Admit Date: 12/20/2018  Service Date: 01/06/2019  Length of Stay: 17    Subjective:     Chief Complaint: SDH (subdural hematoma)    History of Present Illness: 63 year old male h/o CABG on ASA 81 mg (last dose 72 hours PTA) presenting as transfer from OSH for EDH with SDH. Patient reportedly had a mechanical fall from standing on Sunday and went to ED where CTH was negative for any acute intracranial pathology. Overnight, patient developed confusion and presented to OSH ED where CTH was consistent with EDH with SDH. Patient has progressively worsened throughout the day developing intermittent global aphasia. [place] [facility] [place]. NCC consulted for admission and medical management. Patient on weaning off of cardene at time of evaluation with sbp of 120. Reported presenting sbp in 150s.    Hospital Course: 12/22: no major events overnight. Weaned off of cardene. This morning AOx3. Drain removed by NSGY at 11 AM, and planned to step down to NSGY.  AMS noted around 1PM, patient aphasic and not following commands, STAT CTH unremarkable.   12/26: wean ketamine, follow EEG, send urine studuies-Na 133, NS bolus, advance TF  12/27: off ketamine, d/c levo, continue EEG, and AEDs, 500 NS bolus, increase enteral water flushes, continue abx for another day  1/2: extubation trial.   1/3: failed extubation trial. Plan for trach and peg  1/4: plan for trach, general surgery consult  1/5: NAEO  1/6: NAEO. Awaiting trach and peg      Review of Symptoms: intubated, cannot participate  Constitutional: Denies fevers, weight loss, chills, or weakness.   Eyes: Denies changes in vision.   ENT: Denies dysphagia, nasal discharge, ear pain or discharge.   Cardiovascular: Denies chest pain, palpitations, orthopnea, or claudication.   Respiratory: Denies shortness of breath, cough, hemoptysis, or wheezing.   GI: Denies nausea/vomitting,  hematochezia, melena, abd pain, or changes in appetite.   : Denies dysuria, incontinence, or hematuria.   Musculoskeletal: Denies joint pain or myalgias.   Skin/breast: Denies rashes, lumps, lesions, or discharge.   Neurologic: Denies headache, dizziness, vertigo, or paresthesias.   Psychiatric: Denies changes in mood or hallucinations.   Endocrine: Denies polyuria, polydipsia, heat/cold intolerance.   Hematologic/Lymph: Denies lymphadenopathy, easy bruising or easy bleeding.   Allergic/Immunologic: Denies rash, rhinitis      Medications:  Continuous Infusions:   propofol 20 mcg/kg/min (01/06/19 0837)     Scheduled Meds:   albuterol sulfate  2.5 mg Nebulization Q4H    chlorhexidine  15 mL Mouth/Throat QID    clonazePAM  0.5 mg Per NG tube Q8H    famotidine  20 mg Per NG tube BID    furosemide  40 mg Intravenous BID    heparin (porcine)  5,000 Units Subcutaneous Q8H    lacosamide (VIMPAT) in saline IVPB  200 mg Intravenous Q12H    metoprolol tartrate  25 mg Per NG tube Q8H    piperacillin-tazobactam (ZOSYN) IVPB  4.5 g Intravenous Q8H    polyethylene glycol  17 g Per NG tube Daily    pravastatin  40 mg Per NG tube QHS    senna-docusate 8.6-50 mg  1 tablet Per NG tube BID    sodium chloride 0.9%  3 mL Intravenous Q8H    sodium chloride 3%  4 mL Nebulization Q4H    ursodiol  300 mg Per NG tube TID    vancomycin (VANCOCIN) IVPB  15 mg/kg (Dosing Weight) Intravenous Q12H     PRN Meds:.acetaminophen, hydrALAZINE, magnesium oxide, magnesium oxide, ondansetron, potassium chloride 10%, potassium chloride 10%, potassium chloride 10%, potassium, sodium phosphates, potassium, sodium phosphates, potassium, sodium phosphates    OBJECTIVE:   Vital Signs (Most Recent):   Temp: 98.4 °F (36.9 °C) (01/06/19 0701)  Pulse: 88 (01/06/19 0905)  Resp: 18 (01/06/19 0905)  BP: 137/81 (01/06/19 0905)  SpO2: 99 % (01/06/19 0905)    Vital Signs (24h Range):   Temp:  [97.8 °F (36.6 °C)-98.7 °F (37.1 °C)] 98.4 °F (36.9  °C)  Pulse:  [] 88  Resp:  [16-37] 18  SpO2:  [99 %-100 %] 99 %  BP: (108-176)/() 137/81    ICP/CPP (Last 24h):        I & O (Last 24h):     Intake/Output Summary (Last 24 hours) at 1/6/2019 0918  Last data filed at 1/6/2019 0856  Gross per 24 hour   Intake 3216.41 ml   Output 4940 ml   Net -1723.59 ml     Physical Exam:  GA: sedated, comfortable, no acute distress.   HEENT: No scleral icterus or JVD. Neck supple  Pulmonary: Air entry equal to auscultation A/P/L. Wheezing no, crackles no  Cardiac: RRR, S1 & S2 w/o rubs/murmurs/gallops.   Abdominal: soft, non-tender, bowel sounds present x 4. No appreciable hepatosplenomegaly.  Skin: No jaundice, rashes, or visible lesions.  Neuro:  --Mental Status:  sedated, follows commands inconsistently (possible aphasia). Tracks visually   --Pupils 3 mm, PERRL.   --Corneal reflex not done in this awake patient, gag, cough intact.  Moves all 4 extremities spontaneously (left more than right)  MSK:  No edema in UE and LE    Vent Data:   Vent Mode: A/C  Oxygen Concentration (%):  [30-77] 31  Resp Rate Total:  [16 br/min-25 br/min] 18 br/min  Vt Set:  [450 mL] 450 mL  PEEP/CPAP:  [5 cmH20] 5 cmH20  Mean Airway Pressure:  [8.2 cmH20-10 cmH20] 9.1 cmH20    Lines/Drains/Airway:        Airway - Non-Surgical 12/23/18 1301 Endotracheal Tube (Active)   Secured at 25 cm 1/2/2019 12:00 PM   Measured At Lips 1/2/2019 12:00 PM   Secured Location Center 1/2/2019 12:00 PM   Secured by Commercial tube blood 1/2/2019 12:00 PM   Bite Block none 1/2/2019 12:00 PM   Site Condition Cool;Dry 1/2/2019 12:00 PM   Status Intact;Secured;Patent 1/2/2019 12:00 PM   Site Assessment Clean;Dry;No bleeding;No drainage 1/2/2019 12:00 PM   Cuff Pressure 28 cm H2O 1/2/2019  7:39 AM           Percutaneous Central Line Insertion/Assessment - triple lumen  12/25/18 0205 left internal jugular (Active)   Dressing biopatch in place;dressing dry and intact;dressing changed;dressing reinforced 1/2/2019 11:00  AM   Securement secured w/ sutures 1/2/2019 11:00 AM   Additional Site Signs no drainage;no streak formation;no edema;no palpable cord;no warmth;no erythema 1/2/2019 11:00 AM   Distal Patency/Care flushed w/o difficulty 1/2/2019 11:00 AM   Medial Patency/Care flushed w/o difficulty 1/2/2019 11:00 AM   Proximal Patency/Care flushed w/o difficulty 1/2/2019 11:00 AM   Line Interventions blood specimen obtained and sent to lab 1/2/2019 11:00 AM   Dressing Change Due 01/05/19 1/2/2019 11:00 AM   Daily Line Review Performed 1/2/2019 11:00 AM             NG/OG Tube 12/23/18 1500 Center mouth (Active)   Placement Check placement verified by x-ray 1/2/2019 11:00 AM   Tolerance no signs/symptoms of discomfort 1/2/2019 11:00 AM   Securement taped to commercial device 1/2/2019 11:00 AM   Clamp Status/Tolerance unclamped 1/2/2019 11:00 AM   Suction Setting/Drainage Method dependent drainage 1/2/2019 11:00 AM   Insertion Site Appearance no redness, warmth, tenderness, skin breakdown, drainage 1/2/2019 11:00 AM   Drainage None 1/2/2019 11:00 AM   Flush/Irrigation flushed w/;water 1/2/2019 11:00 AM   Feeding Method continuous 1/2/2019  7:01 AM   Feeding Action feeding held 1/2/2019 11:00 AM   Current Rate (mL/hr) 55 mL/hr 1/2/2019  7:01 AM   Goal Rate (mL/hr) 55 mL/hr 1/2/2019  7:01 AM   Intake (mL) 55 mL 1/2/2019  7:01 AM   Water Bolus (mL) 250 mL 1/2/2019  5:03 AM   Rate Formula Tube Feeding (mL/hr) 55 mL/hr 1/2/2019  7:01 AM   Formula Name impact peptide 1/2/2019 11:00 AM   Intake (mL) - Formula Tube Feeding 55 1/2/2019  9:00 AM   Residual Amount (ml) 0 ml 1/1/2019  3:05 PM            Urethral Catheter 12/20/18 2350 Non-latex;Straight-tip 16 Fr. (Active)   Site Assessment Clean;Intact 1/2/2019 11:00 AM   Collection Container Standard drainage bag 1/2/2019 11:00 AM   Securement Method secured to top of thigh w/ adhesive device 1/2/2019 11:00 AM   Catheter Care Performed yes 1/2/2019 11:00 AM   Reason for Continuing Urinary  "Catheterization Urinary retention 1/2/2019 11:00 AM   CAUTI Prevention Bundle StatLock in place w 1" slack;Intact seal between catheter & drainage tubing;Green sheeting clip in use;No dependent loops or kinks;Drainage bag off the floor;Drainage bag not overfilled (<2/3 full);Drainage bag below bladder 1/2/2019 11:00 AM   Output (mL) 50 mL 1/2/2019  9:00 AM     Nutrition/Tube Feeds (if NPO state why): tf    Labs:  ABG:   Recent Labs   Lab 01/06/19  0506   PH 7.468*   PO2 107*   PCO2 35.8   HCO3 25.9   POCSATURATED 98   BE 2     BMP:  Recent Labs   Lab 01/06/19  0157      K 4.1      CO2 25   BUN 24*   CREATININE 0.6   *   MG 1.8   PHOS 3.0     LFT:   Lab Results   Component Value Date    AST 17 01/06/2019    ALT 20 01/06/2019     (H) 01/03/2019    ALKPHOS 379 (H) 01/06/2019    BILITOT 0.5 01/06/2019    ALBUMIN 1.9 (L) 01/06/2019    PROT 5.5 (L) 01/06/2019     CBC:   Lab Results   Component Value Date    WBC 8.59 01/06/2019    HGB 8.2 (L) 01/06/2019    HCT 26.7 (L) 01/06/2019     (H) 01/06/2019     01/06/2019     Microbiology x 7d:   Microbiology Results (last 7 days)     Procedure Component Value Units Date/Time    Blood culture [[id number]] Collected:  01/03/19 1100    Order Status:  Completed Specimen:  Blood from Peripheral, Hand, Left Updated:  01/05/19 1412     Blood Culture, Routine No Growth to date     Blood Culture, Routine No Growth to date     Blood Culture, Routine No Growth to date    Narrative:       Blood cultures from 2 different sites. 4 bottles total.  Please draw before starting antibiotics.    Blood culture [[id number]] Collected:  01/03/19 1107    Order Status:  Completed Specimen:  Blood from Peripheral, Foot, Left Updated:  01/05/19 1412     Blood Culture, Routine No Growth to date     Blood Culture, Routine No Growth to date     Blood Culture, Routine No Growth to date    Narrative:       Blood cultures x 2 different sites. 4 bottles total. Please  draw " cultures before administering antibiotics.    Culture, Respiratory with Gram Stain [[id number]] Collected:  01/03/19 1108    Order Status:  Completed Specimen:  Respiratory from Endotracheal Aspirate Updated:  01/05/19 1007     Respiratory Culture Normal respiratory donnell     Gram Stain (Respiratory) Few WBC's     Gram Stain (Respiratory) Rare Gram positive cocci        Imaging:  CXR 1/5 - tube in place. Bilateral pleural effusion.   I personally reviewed the above image.  Today I independently reviewed pertinent medications, lines/drains/airways, imaging, cardiology results, laboratory results, microbiology results, notably:   ASSESSMENT/PLAN:     Active Hospital Problems    Diagnosis    *SDH (subdural hematoma)    Weight loss, unintentional     History of 200lb wt loss over last year  Reports early satiety and change in taste      Seizure    Status epilepticus    Acute respiratory failure    Septic shock    AYLIN (acute kidney injury)    New onset atrial fibrillation    Pressure injury of buttock, stage 2    Severe malnutrition    SAH (subarachnoid hemorrhage)    Subdural hematoma    Epidural hematoma    Brain compression    Cerebral edema    Essential hypertension      Neuro:   SDH post evacuation. Stable   No further seizures  AED clonazepam 0.5mg q8hrly. vimpat 100mg [id number]  Mobilize as tolerated  Propofol for vent  Sedation holidays     Pulmonary:   Cont mech vent  Plan trach and peg 1/8  Lasix 40mg q12 x4 doses.  Add diuril x1  Wean FiO2   CXR/ABG daily    Cardiac:   On metoprolol for afib, rate controlled  Hemodynamically stable    Renal:    Making urine  BUN/Cr >20  Will monitor    ID:   Afebrile, normal wbc  Check procalcitonin 0.63>>0.49>>0.38  vanc and zosyn  panculture - blood and respiratory (neg). Will d/c antibiotics if cultures neg today.     Hem/Onc:   Drop in hh possible dilutional   Stable plt count    Endocrine:    BS stable    Fluids/Electrolytes/Nutrition/GI:   tf  Monitor high  alkaline phosphatase - possible gallstones.    USG Abd - gall stones and biliary sludge  ursodiol 300mg q8    Lines:  Art  CVC d/c central line. Place midline  ETT +  Rader  NG +  PEG    Proph:  DVT:SCD/heparin  Constipation:  Last output:bowel regimen  PUP:pepcid  VAP:peridex      Uninterrupted Critical Care/Counseling Time (not including procedures):: III    Claudy Cerna MD  Neurocritical care attending    Full Code    Claudy Cerna MD  Neurocritical Care  [facility]

## 2019-01-06 NOTE — PLAN OF CARE
Problem: Adult Inpatient Plan of Care  Goal: Plan of Care Review  Plan of care reviewed with patient and family at 1400. Patient's family verbalized understanding; no evidence of learning from patient due to intubated/sedated status. All questions and concerns addressed today. Patient remains free from injury and falls. No acute events today. Patient is progressing towards goals. Will continue to monitor. See flowsheets for full assessments and vitals throughout shift.    -Patient diuresed today with Lasix and Diuril  -Rader remains  -Patient remains intubated, plans to trach/peg Tuesday 1/8  -Propofol weaned to 10mcg/kg/min  -Stroke education provided at bedside to spouse today

## 2019-01-06 NOTE — PROGRESS NOTES
[facility]  Neurosurgery  Progress Note    Subjective:     History of Present Illness: 62 yo M with PMH of CAD, CABG in 2006, DM, HTN, HLD on ASA presents to the ED for AMS. Patient fell 4 days ago with about 2 minutes of LOC. Family is unsure of what caused the fall. Full workup done including a CTH at [place] that was negative. Patient went home and stopped taking his ASA. This morning, his family noticed he was more confused. He went to [place] and CTH was done that revealed a 1.7cm left frontalpariental SDH with a 5 mm right sided midline shift and a left SAH. Patient was transferred to [facility]. History was obtained per family as patient unable to give history 2/2 confusion with expressive and receptive aphasia.    Post-Op Info:  Procedure(s) (LRB):  CRANIOTOMY, FOR SUBDURAL HEMATOMA EVACUATION (Left)   17 Days Post-Op     Interval History: naeon    Medications:  Continuous Infusions:   propofol 15 mcg/kg/min (01/06/19 0600)     Scheduled Meds:   albuterol sulfate  2.5 mg Nebulization Q4H    chlorhexidine  15 mL Mouth/Throat QID    clonazePAM  0.5 mg Per NG tube Q8H    famotidine  20 mg Per NG tube BID    furosemide  40 mg Intravenous BID    heparin (porcine)  5,000 Units Subcutaneous Q8H    lacosamide (VIMPAT) in saline IVPB  200 mg Intravenous Q12H    metoprolol tartrate  25 mg Per NG tube Q8H    piperacillin-tazobactam (ZOSYN) IVPB  4.5 g Intravenous Q8H    polyethylene glycol  17 g Per NG tube Daily    pravastatin  40 mg Per NG tube QHS    senna-docusate 8.6-50 mg  1 tablet Per NG tube BID    sodium chloride 0.9%  3 mL Intravenous Q8H    sodium chloride 3%  4 mL Nebulization Q4H    ursodiol  300 mg Per NG tube TID    vancomycin (VANCOCIN) IVPB  15 mg/kg (Dosing Weight) Intravenous Q12H     PRN Meds:acetaminophen, hydrALAZINE, magnesium oxide, magnesium oxide, ondansetron, potassium chloride 10%, potassium chloride 10%, potassium chloride 10%, potassium, sodium phosphates,  potassium, sodium phosphates, potassium, sodium phosphates     Review of Systems  Objective:     Weight: 86.2 kg (190 lb 0.6 oz)  Body mass index is 26.5 kg/m².  Vital Signs (Most Recent):  Temp: 98 °F (36.7 °C) (01/06/19 0300)  Pulse: 94 (01/06/19 0600)  Resp: (!) 22 (01/06/19 0600)  BP: (!) 166/89 (01/06/19 0600)  SpO2: 99 % (01/06/19 0600) Vital Signs (24h Range):  Temp:  [97.8 °F (36.6 °C)-98.7 °F (37.1 °C)] 98 °F (36.7 °C)  Pulse:  [] 94  Resp:  [16-37] 22  SpO2:  [99 %-100 %] 99 %  BP: (108-176)/() 166/89                 Vent Mode: A/C  Oxygen Concentration (%):  [30-77] 30  Resp Rate Total:  [16 br/min-26 br/min] 23 br/min  Vt Set:  [450 mL] 450 mL  PEEP/CPAP:  [5 cmH20] 5 cmH20  Mean Airway Pressure:  [8.2 cmH20-10 cmH20] 10 cmH20         NG/OG Tube 01/01/19 [place] sump Right nostril (Active)   Placement Check placement verified by x-ray;placement verified by distal tube length measurement 1/6/2019  3:00 AM   Distal Tube Length (cm) 75 1/5/2019 11:00 PM   Tolerance no signs/symptoms of discomfort 1/6/2019  3:00 AM   Securement anchored to nostril center w/ adhesive device 1/6/2019  3:00 AM   Clamp Status/Tolerance unclamped 1/6/2019  3:00 AM   Insertion Site Appearance no redness, warmth, tenderness, skin breakdown, drainage 1/6/2019  3:00 AM   Flush/Irrigation flushed w/;water;no resistance met 1/6/2019  3:00 AM   Feeding Method continuous 1/6/2019  3:00 AM   Feeding Action feeding continued 1/6/2019  3:00 AM   Current Rate (mL/hr) 55 mL/hr 1/5/2019  7:05 PM   Goal Rate (mL/hr) 55 mL/hr 1/5/2019  7:05 PM   Intake (mL) 60 mL 1/5/2019  9:29 AM   Water Bolus (mL) 250 mL 1/6/2019  6:00 AM   Rate Formula Tube Feeding (mL/hr) 55 mL/hr 1/5/2019  7:05 PM   Formula Name Impact Peptide 1/6/2019  3:00 AM   Intake (mL) - Formula Tube Feeding 55 1/6/2019  6:00 AM   Residual Amount (ml) 0 ml 1/5/2019  7:01 AM            Urethral Catheter 01/05/19 1329 (Active)   Site Assessment Clean;Intact 1/6/2019  3:00 AM  "  Collection Container Urimeter 1/6/2019  3:00 AM   Securement Method secured to top of thigh w/ adhesive device 1/6/2019  3:00 AM   Catheter Care Performed yes 1/6/2019  3:00 AM   Reason for Continuing Urinary Catheterization Urinary retention;Critically ill in ICU requiring intensive monitoring 1/6/2019  3:00 AM   CAUTI Prevention Bundle StatLock in place w 1" slack;Intact seal between catheter & drainage tubing;Drainage bag off the floor;Green sheeting clip in use;No dependent loops or kinks;Drainage bag not overfilled (<2/3 full);Drainage bag below bladder 1/5/2019  7:05 PM   Output (mL) 100 mL 1/6/2019  6:00 AM   Bladder/Intra-Abdominal Pressure (mmHg) 900 mmHg 1/5/2019  1:29 PM       Neurosurgery Physical Exam   E4VTM5, grimacing to stimulation  PERRL  Localizing briskly in BUE  SILT        Significant Labs:  Recent Labs   Lab 01/05/19  0110 01/06/19  0157   * 118*    140   K 5.1 4.1    107   CO2 23 25   BUN 21 24*   CREATININE 0.5 0.6   CALCIUM 7.8* 8.4*   MG 2.2 1.8     Recent Labs   Lab 01/05/19  0110 01/06/19  0236   WBC 8.69 8.59   HGB 7.7* 8.2*   HCT 22.7* 26.7*    307     No results for input(s): LABPT, INR, APTT in the last 48 hours.  Microbiology Results (last 7 days)     Procedure Component Value Units Date/Time    Blood culture [[id number]] Collected:  01/03/19 1100    Order Status:  Completed Specimen:  Blood from Peripheral, Hand, Left Updated:  01/05/19 1412     Blood Culture, Routine No Growth to date     Blood Culture, Routine No Growth to date     Blood Culture, Routine No Growth to date    Narrative:       Blood cultures from 2 different sites. 4 bottles total.  Please draw before starting antibiotics.    Blood culture [[id number]] Collected:  01/03/19 1107    Order Status:  Completed Specimen:  Blood from Peripheral, Foot, Left Updated:  01/05/19 1412     Blood Culture, Routine No Growth to date     Blood Culture, Routine No Growth to date     Blood Culture, Routine " No Growth to date    Narrative:       Blood cultures x 2 different sites. 4 bottles total. Please  draw cultures before administering antibiotics.    Culture, Respiratory with Gram Stain [[id number]] Collected:  01/03/19 1108    Order Status:  Completed Specimen:  Respiratory from Endotracheal Aspirate Updated:  01/05/19 1007     Respiratory Culture Normal respiratory donnell     Gram Stain (Respiratory) Few WBC's     Gram Stain (Respiratory) Rare Gram positive cocci            Significant Diagnostics:      Assessment/Plan:     * SDH (subdural hematoma)    64 yo male with L aSDH now s/p craniotomy for evacuation (12/20).    --Continue care per primary team.  --PEG/TRACH planned for Tuesday per gen surgery.  --Resp Cx with GPC, on Vanc/Zosyn per primary  --Staples removed, incision healing well, well approximated w/o erythema/drainage.   --We will continue to monitor closely, please contact us with any questions or concerns.               Lamont Giron, DO  Neurosurgery  [facility]

## 2019-01-06 NOTE — SUBJECTIVE & OBJECTIVE
Interval History: naeon    Medications:  Continuous Infusions:   propofol 15 mcg/kg/min (01/06/19 0600)     Scheduled Meds:   albuterol sulfate  2.5 mg Nebulization Q4H    chlorhexidine  15 mL Mouth/Throat QID    clonazePAM  0.5 mg Per NG tube Q8H    famotidine  20 mg Per NG tube BID    furosemide  40 mg Intravenous BID    heparin (porcine)  5,000 Units Subcutaneous Q8H    lacosamide (VIMPAT) in saline IVPB  200 mg Intravenous Q12H    metoprolol tartrate  25 mg Per NG tube Q8H    piperacillin-tazobactam (ZOSYN) IVPB  4.5 g Intravenous Q8H    polyethylene glycol  17 g Per NG tube Daily    pravastatin  40 mg Per NG tube QHS    senna-docusate 8.6-50 mg  1 tablet Per NG tube BID    sodium chloride 0.9%  3 mL Intravenous Q8H    sodium chloride 3%  4 mL Nebulization Q4H    ursodiol  300 mg Per NG tube TID    vancomycin (VANCOCIN) IVPB  15 mg/kg (Dosing Weight) Intravenous Q12H     PRN Meds:acetaminophen, hydrALAZINE, magnesium oxide, magnesium oxide, ondansetron, potassium chloride 10%, potassium chloride 10%, potassium chloride 10%, potassium, sodium phosphates, potassium, sodium phosphates, potassium, sodium phosphates     Review of Systems  Objective:     Weight: 86.2 kg (190 lb 0.6 oz)  Body mass index is 26.5 kg/m².  Vital Signs (Most Recent):  Temp: 98 °F (36.7 °C) (01/06/19 0300)  Pulse: 94 (01/06/19 0600)  Resp: (!) 22 (01/06/19 0600)  BP: (!) 166/89 (01/06/19 0600)  SpO2: 99 % (01/06/19 0600) Vital Signs (24h Range):  Temp:  [97.8 °F (36.6 °C)-98.7 °F (37.1 °C)] 98 °F (36.7 °C)  Pulse:  [] 94  Resp:  [16-37] 22  SpO2:  [99 %-100 %] 99 %  BP: (108-176)/() 166/89                 Vent Mode: A/C  Oxygen Concentration (%):  [30-77] 30  Resp Rate Total:  [16 br/min-26 br/min] 23 br/min  Vt Set:  [450 mL] 450 mL  PEEP/CPAP:  [5 cmH20] 5 cmH20  Mean Airway Pressure:  [8.2 cmH20-10 cmH20] 10 cmH20         NG/OG Tube 01/01/19 [place] sump Right nostril (Active)   Placement Check placement  "verified by x-ray;placement verified by distal tube length measurement 1/6/2019  3:00 AM   Distal Tube Length (cm) 75 1/5/2019 11:00 PM   Tolerance no signs/symptoms of discomfort 1/6/2019  3:00 AM   Securement anchored to nostril center w/ adhesive device 1/6/2019  3:00 AM   Clamp Status/Tolerance unclamped 1/6/2019  3:00 AM   Insertion Site Appearance no redness, warmth, tenderness, skin breakdown, drainage 1/6/2019  3:00 AM   Flush/Irrigation flushed w/;water;no resistance met 1/6/2019  3:00 AM   Feeding Method continuous 1/6/2019  3:00 AM   Feeding Action feeding continued 1/6/2019  3:00 AM   Current Rate (mL/hr) 55 mL/hr 1/5/2019  7:05 PM   Goal Rate (mL/hr) 55 mL/hr 1/5/2019  7:05 PM   Intake (mL) 60 mL 1/5/2019  9:29 AM   Water Bolus (mL) 250 mL 1/6/2019  6:00 AM   Rate Formula Tube Feeding (mL/hr) 55 mL/hr 1/5/2019  7:05 PM   Formula Name Impact Peptide 1/6/2019  3:00 AM   Intake (mL) - Formula Tube Feeding 55 1/6/2019  6:00 AM   Residual Amount (ml) 0 ml 1/5/2019  7:01 AM            Urethral Catheter 01/05/19 1329 (Active)   Site Assessment Clean;Intact 1/6/2019  3:00 AM   Collection Container Urimeter 1/6/2019  3:00 AM   Securement Method secured to top of thigh w/ adhesive device 1/6/2019  3:00 AM   Catheter Care Performed yes 1/6/2019  3:00 AM   Reason for Continuing Urinary Catheterization Urinary retention;Critically ill in ICU requiring intensive monitoring 1/6/2019  3:00 AM   CAUTI Prevention Bundle StatLock in place w 1" slack;Intact seal between catheter & drainage tubing;Drainage bag off the floor;Green sheeting clip in use;No dependent loops or kinks;Drainage bag not overfilled (<2/3 full);Drainage bag below bladder 1/5/2019  7:05 PM   Output (mL) 100 mL 1/6/2019  6:00 AM   Bladder/Intra-Abdominal Pressure (mmHg) 900 mmHg 1/5/2019  1:29 PM       Neurosurgery Physical Exam   E4VTM5, grimacing to stimulation  PERRL  Localizing briskly in BUE  SILT        Significant Labs:  Recent Labs   Lab " 01/05/19  0110 01/06/19  0157   * 118*    140   K 5.1 4.1    107   CO2 23 25   BUN 21 24*   CREATININE 0.5 0.6   CALCIUM 7.8* 8.4*   MG 2.2 1.8     Recent Labs   Lab 01/05/19  0110 01/06/19  0236   WBC 8.69 8.59   HGB 7.7* 8.2*   HCT 22.7* 26.7*    307     No results for input(s): LABPT, INR, APTT in the last 48 hours.  Microbiology Results (last 7 days)     Procedure Component Value Units Date/Time    Blood culture [[id number]] Collected:  01/03/19 1100    Order Status:  Completed Specimen:  Blood from Peripheral, Hand, Left Updated:  01/05/19 1412     Blood Culture, Routine No Growth to date     Blood Culture, Routine No Growth to date     Blood Culture, Routine No Growth to date    Narrative:       Blood cultures from 2 different sites. 4 bottles total.  Please draw before starting antibiotics.    Blood culture [[id number]] Collected:  01/03/19 1107    Order Status:  Completed Specimen:  Blood from Peripheral, Foot, Left Updated:  01/05/19 1412     Blood Culture, Routine No Growth to date     Blood Culture, Routine No Growth to date     Blood Culture, Routine No Growth to date    Narrative:       Blood cultures x 2 different sites. 4 bottles total. Please  draw cultures before administering antibiotics.    Culture, Respiratory with Gram Stain [[id number]] Collected:  01/03/19 1108    Order Status:  Completed Specimen:  Respiratory from Endotracheal Aspirate Updated:  01/05/19 1007     Respiratory Culture Normal respiratory donnell     Gram Stain (Respiratory) Few WBC's     Gram Stain (Respiratory) Rare Gram positive cocci            Significant Diagnostics:

## 2019-01-06 NOTE — PLAN OF CARE
Problem: Adult Inpatient Plan of Care  Goal: Plan of Care Review  Outcome: Ongoing (interventions implemented as appropriate)  POC reviewed with pt at 0500. Pt unable to verbalize understanding due to current intubation/sedation status. No acute events overnight. Pt progressing toward goals. Will continue to monitor. See flowsheets for full assessment and VS info.    -Pt tolerated vent settings overnight well. No respiratory distress noted.   -TF running at goal. No residuals.  -Propofol titrated to keep patient comfortable.   -Rader still in place, good urine output  -Patient tolerated night bath with no issues.

## 2019-01-07 ENCOUNTER — ANESTHESIA EVENT (OUTPATIENT)
Dept: SURGERY | Facility: HOSPITAL | Age: 64
DRG: 003 | End: 2019-01-07
Payer: MEDICARE

## 2019-01-07 LAB
ALBUMIN SERPL BCP-MCNC: 1.7 G/DL
ALLENS TEST: ABNORMAL
ALP SERPL-CCNC: 284 U/L
ALT SERPL W/O P-5'-P-CCNC: 12 U/L
ANION GAP SERPL CALC-SCNC: 9 MMOL/L
AST SERPL-CCNC: 9 U/L
BASOPHILS # BLD AUTO: 0.1 K/UL
BASOPHILS NFR BLD: 0.9 %
BILIRUB SERPL-MCNC: 0.4 MG/DL
BUN SERPL-MCNC: 30 MG/DL
CALCIUM SERPL-MCNC: 8.3 MG/DL
CHLORIDE SERPL-SCNC: 105 MMOL/L
CK SERPL-CCNC: 10 U/L
CO2 SERPL-SCNC: 26 MMOL/L
CREAT SERPL-MCNC: 0.6 MG/DL
DELSYS: ABNORMAL
DIFFERENTIAL METHOD: ABNORMAL
EOSINOPHIL # BLD AUTO: 0.2 K/UL
EOSINOPHIL NFR BLD: 1.9 %
ERYTHROCYTE [DISTWIDTH] IN BLOOD BY AUTOMATED COUNT: 15.9 %
ERYTHROCYTE [SEDIMENTATION RATE] IN BLOOD BY WESTERGREN METHOD: 16 MM/H
EST. GFR  (AFRICAN AMERICAN): >60 ML/MIN/1.73 M^2
EST. GFR  (NON AFRICAN AMERICAN): >60 ML/MIN/1.73 M^2
FIO2: 30
GLUCOSE SERPL-MCNC: 104 MG/DL
HCO3 UR-SCNC: 28.4 MMOL/L (ref 24–28)
HCT VFR BLD AUTO: 27 %
HGB BLD-MCNC: 8.8 G/DL
IMM GRANULOCYTES # BLD AUTO: 0.17 K/UL
IMM GRANULOCYTES NFR BLD AUTO: 1.5 %
LYMPHOCYTES # BLD AUTO: 2.7 K/UL
LYMPHOCYTES NFR BLD: 23.6 %
MAGNESIUM SERPL-MCNC: 1.8 MG/DL
MCH RBC QN AUTO: 32 PG
MCHC RBC AUTO-ENTMCNC: 32.6 G/DL
MCV RBC AUTO: 98 FL
MIN VOL: 9
MODE: ABNORMAL
MONOCYTES # BLD AUTO: 0.9 K/UL
MONOCYTES NFR BLD: 7.4 %
NEUTROPHILS # BLD AUTO: 7.4 K/UL
NEUTROPHILS NFR BLD: 64.7 %
NRBC BLD-RTO: 0 /100 WBC
PCO2 BLDA: 37.7 MMHG (ref 35–45)
PEEP: 5
PH SMN: 7.49 [PH] (ref 7.35–7.45)
PHOSPHATE SERPL-MCNC: 3.1 MG/DL
PIP: 23
PLATELET # BLD AUTO: 334 K/UL
PMV BLD AUTO: 9.6 FL
PO2 BLDA: 114 MMHG (ref 80–100)
POC BE: 5 MMOL/L
POC SATURATED O2: 99 % (ref 95–100)
POC TCO2: 30 MMOL/L (ref 23–27)
POCT GLUCOSE: 112 MG/DL (ref 70–110)
POCT GLUCOSE: 118 MG/DL (ref 70–110)
POTASSIUM SERPL-SCNC: 3.7 MMOL/L
PROT SERPL-MCNC: 5.1 G/DL
RBC # BLD AUTO: 2.75 M/UL
SAMPLE: ABNORMAL
SITE: ABNORMAL
SODIUM SERPL-SCNC: 140 MMOL/L
SP02: 100
VT: 450
WBC # BLD AUTO: 11.45 K/UL

## 2019-01-07 PROCEDURE — 85025 COMPLETE CBC W/AUTO DIFF WBC: CPT

## 2019-01-07 PROCEDURE — 99291 CRITICAL CARE FIRST HOUR: CPT | Mod: GC,,, | Performed by: PSYCHIATRY & NEUROLOGY

## 2019-01-07 PROCEDURE — 84100 ASSAY OF PHOSPHORUS: CPT

## 2019-01-07 PROCEDURE — 63600175 PHARM REV CODE 636 W HCPCS: Performed by: STUDENT IN AN ORGANIZED HEALTH CARE EDUCATION/TRAINING PROGRAM

## 2019-01-07 PROCEDURE — 25000003 PHARM REV CODE 250: Performed by: PHYSICIAN ASSISTANT

## 2019-01-07 PROCEDURE — 82550 ASSAY OF CK (CPK): CPT

## 2019-01-07 PROCEDURE — 83735 ASSAY OF MAGNESIUM: CPT

## 2019-01-07 PROCEDURE — 99900026 HC AIRWAY MAINTENANCE (STAT)

## 2019-01-07 PROCEDURE — 25000003 PHARM REV CODE 250: Performed by: NURSE PRACTITIONER

## 2019-01-07 PROCEDURE — 99291 PR CRITICAL CARE, E/M 30-74 MINUTES: ICD-10-PCS | Mod: GC,,, | Performed by: PSYCHIATRY & NEUROLOGY

## 2019-01-07 PROCEDURE — 94668 MNPJ CHEST WALL SBSQ: CPT

## 2019-01-07 PROCEDURE — 27200966 HC CLOSED SUCTION SYSTEM

## 2019-01-07 PROCEDURE — 63600175 PHARM REV CODE 636 W HCPCS: Performed by: NURSE PRACTITIONER

## 2019-01-07 PROCEDURE — 27000221 HC OXYGEN, UP TO 24 HOURS

## 2019-01-07 PROCEDURE — 80053 COMPREHEN METABOLIC PANEL: CPT

## 2019-01-07 PROCEDURE — 94761 N-INVAS EAR/PLS OXIMETRY MLT: CPT

## 2019-01-07 PROCEDURE — 25000242 PHARM REV CODE 250 ALT 637 W/ HCPCS: Performed by: NURSE PRACTITIONER

## 2019-01-07 PROCEDURE — 94640 AIRWAY INHALATION TREATMENT: CPT

## 2019-01-07 PROCEDURE — A4216 STERILE WATER/SALINE, 10 ML: HCPCS | Performed by: PSYCHIATRY & NEUROLOGY

## 2019-01-07 PROCEDURE — 94003 VENT MGMT INPAT SUBQ DAY: CPT

## 2019-01-07 PROCEDURE — 99900035 HC TECH TIME PER 15 MIN (STAT)

## 2019-01-07 PROCEDURE — 25000003 PHARM REV CODE 250: Performed by: PSYCHIATRY & NEUROLOGY

## 2019-01-07 PROCEDURE — 25000003 PHARM REV CODE 250: Performed by: STUDENT IN AN ORGANIZED HEALTH CARE EDUCATION/TRAINING PROGRAM

## 2019-01-07 PROCEDURE — 20000000 HC ICU ROOM

## 2019-01-07 RX ORDER — POLYETHYLENE GLYCOL 3350 17 G/17G
17 POWDER, FOR SOLUTION ORAL 2 TIMES DAILY
Status: DISCONTINUED | OUTPATIENT
Start: 2019-01-07 | End: 2019-01-17

## 2019-01-07 RX ORDER — FENTANYL CITRATE 50 UG/ML
25 INJECTION, SOLUTION INTRAMUSCULAR; INTRAVENOUS
Status: DISCONTINUED | OUTPATIENT
Start: 2019-01-07 | End: 2019-01-28 | Stop reason: HOSPADM

## 2019-01-07 RX ADMIN — ALBUTEROL SULFATE 2.5 MG: 2.5 SOLUTION RESPIRATORY (INHALATION) at 11:01

## 2019-01-07 RX ADMIN — CLONAZEPAM 0.5 MG: 0.5 TABLET ORAL at 05:01

## 2019-01-07 RX ADMIN — CHLORHEXIDINE GLUCONATE 0.12% ORAL RINSE 15 ML: 1.2 LIQUID ORAL at 09:01

## 2019-01-07 RX ADMIN — SODIUM CHLORIDE SOLN NEBU 3% 4 ML: 3 NEBU SOLN at 07:01

## 2019-01-07 RX ADMIN — POLYETHYLENE GLYCOL 3350 17 G: 17 POWDER, FOR SOLUTION ORAL at 09:01

## 2019-01-07 RX ADMIN — CHLORHEXIDINE GLUCONATE 0.12% ORAL RINSE 15 ML: 1.2 LIQUID ORAL at 01:01

## 2019-01-07 RX ADMIN — Medication 3 ML: at 09:01

## 2019-01-07 RX ADMIN — METOPROLOL TARTRATE 25 MG: 25 TABLET ORAL at 02:01

## 2019-01-07 RX ADMIN — SODIUM CHLORIDE SOLN NEBU 3% 4 ML: 3 NEBU SOLN at 08:01

## 2019-01-07 RX ADMIN — HEPARIN SODIUM 5000 UNITS: 5000 INJECTION, SOLUTION INTRAVENOUS; SUBCUTANEOUS at 05:01

## 2019-01-07 RX ADMIN — LACOSAMIDE 200 MG: 50 TABLET, FILM COATED ORAL at 08:01

## 2019-01-07 RX ADMIN — CHLORHEXIDINE GLUCONATE 0.12% ORAL RINSE 15 ML: 1.2 LIQUID ORAL at 08:01

## 2019-01-07 RX ADMIN — POLYETHYLENE GLYCOL 3350 17 G: 17 POWDER, FOR SOLUTION ORAL at 08:01

## 2019-01-07 RX ADMIN — URSODIOL 300 MG: 300 CAPSULE ORAL at 08:01

## 2019-01-07 RX ADMIN — STANDARDIZED SENNA CONCENTRATE AND DOCUSATE SODIUM 1 TABLET: 8.6; 5 TABLET, FILM COATED ORAL at 08:01

## 2019-01-07 RX ADMIN — Medication 3 ML: at 02:01

## 2019-01-07 RX ADMIN — ALBUTEROL SULFATE 2.5 MG: 2.5 SOLUTION RESPIRATORY (INHALATION) at 03:01

## 2019-01-07 RX ADMIN — SODIUM CHLORIDE SOLN NEBU 3% 4 ML: 3 NEBU SOLN at 11:01

## 2019-01-07 RX ADMIN — ALBUTEROL SULFATE 2.5 MG: 2.5 SOLUTION RESPIRATORY (INHALATION) at 12:01

## 2019-01-07 RX ADMIN — URSODIOL 300 MG: 300 CAPSULE ORAL at 09:01

## 2019-01-07 RX ADMIN — SODIUM CHLORIDE SOLN NEBU 3% 4 ML: 3 NEBU SOLN at 12:01

## 2019-01-07 RX ADMIN — FENTANYL CITRATE 25 MCG: 50 INJECTION INTRAMUSCULAR; INTRAVENOUS at 05:01

## 2019-01-07 RX ADMIN — FAMOTIDINE 20 MG: 20 TABLET ORAL at 09:01

## 2019-01-07 RX ADMIN — Medication 3 ML: at 05:01

## 2019-01-07 RX ADMIN — FUROSEMIDE 40 MG: 10 INJECTION, SOLUTION INTRAVENOUS at 08:01

## 2019-01-07 RX ADMIN — POTASSIUM CHLORIDE 40 MEQ: 20 SOLUTION ORAL at 05:01

## 2019-01-07 RX ADMIN — SODIUM CHLORIDE SOLN NEBU 3% 4 ML: 3 NEBU SOLN at 03:01

## 2019-01-07 RX ADMIN — SODIUM CHLORIDE SOLN NEBU 3% 4 ML: 3 NEBU SOLN at 04:01

## 2019-01-07 RX ADMIN — MAGNESIUM OXIDE TAB 400 MG (241.3 MG ELEMENTAL MG) 800 MG: 400 (241.3 MG) TAB at 10:01

## 2019-01-07 RX ADMIN — ALBUTEROL SULFATE 2.5 MG: 2.5 SOLUTION RESPIRATORY (INHALATION) at 04:01

## 2019-01-07 RX ADMIN — STANDARDIZED SENNA CONCENTRATE AND DOCUSATE SODIUM 1 TABLET: 8.6; 5 TABLET, FILM COATED ORAL at 09:01

## 2019-01-07 RX ADMIN — ALBUTEROL SULFATE 2.5 MG: 2.5 SOLUTION RESPIRATORY (INHALATION) at 08:01

## 2019-01-07 RX ADMIN — PRAVASTATIN SODIUM 40 MG: 40 TABLET ORAL at 09:01

## 2019-01-07 RX ADMIN — HEPARIN SODIUM 5000 UNITS: 5000 INJECTION, SOLUTION INTRAVENOUS; SUBCUTANEOUS at 02:01

## 2019-01-07 RX ADMIN — ALBUTEROL SULFATE 2.5 MG: 2.5 SOLUTION RESPIRATORY (INHALATION) at 07:01

## 2019-01-07 RX ADMIN — MAGNESIUM OXIDE TAB 400 MG (241.3 MG ELEMENTAL MG) 800 MG: 400 (241.3 MG) TAB at 05:01

## 2019-01-07 RX ADMIN — FAMOTIDINE 20 MG: 20 TABLET ORAL at 08:01

## 2019-01-07 RX ADMIN — URSODIOL 300 MG: 300 CAPSULE ORAL at 03:01

## 2019-01-07 RX ADMIN — METOPROLOL TARTRATE 25 MG: 25 TABLET ORAL at 09:01

## 2019-01-07 RX ADMIN — METOPROLOL TARTRATE 25 MG: 25 TABLET ORAL at 05:01

## 2019-01-07 RX ADMIN — LACOSAMIDE 200 MG: 50 TABLET, FILM COATED ORAL at 09:01

## 2019-01-07 NOTE — SUBJECTIVE & OBJECTIVE
Interval History:      Mental status slowly improving  Low vent settings, cxr with pulmonary edema  Hemodynamics ok     Objective:     Vitals:  Temp: 98.8 °F (37.1 °C)  Pulse: 97  Rhythm: atrial rhythm  BP: (!) 154/87  MAP (mmHg): 115  Resp: (!) 22  SpO2: 98 %  Oxygen Concentration (%): 30  O2 Device (Oxygen Therapy): ventilator  Vent Mode: A/C  Set Rate: 16 bmp  Vt Set: 450 mL  PEEP/CPAP: 5 cmH20  Peak Airway Pressure: 22 cmH2O  Mean Airway Pressure: 9.3 cmH20  Plateau Pressure: 0 cmH20    Temp  Min: 98.5 °F (36.9 °C)  Max: 98.9 °F (37.2 °C)  Pulse  Min: 74  Max: 106  BP  Min: 100/60  Max: 154/87  MAP (mmHg)  Min: 75  Max: 115  Resp  Min: 9  Max: 54  SpO2  Min: 97 %  Max: 100 %  Oxygen Concentration (%)  Min: 30  Max: 100    01/06 0701 - 01/07 0700  In: 3193.8 [I.V.:283.8]  Out: 5475 [Urine:5475]   Unmeasured Output  Stool Occurrence: 0       Physical Exam  Vital signs, lab studies, and imaging reviewed by me  lethargic, opens eyes to voice, no commands  Warm and well perfused, regular rate and rhythm, no murmurs  No dependent edema  Breathing comfortably on ac, breath sounds diminished at bases  Belly soft, nontender, no hepatosplenomegaly    Medications:  Continuous Scheduled  albuterol sulfate 2.5 mg Q4H   chlorhexidine 15 mL QID   famotidine 20 mg BID   heparin (porcine) 5,000 Units Q8H   lacosamide 200 mg BID   metoprolol tartrate 25 mg Q8H   polyethylene glycol 17 g BID   pravastatin 40 mg QHS   senna-docusate 8.6-50 mg 1 tablet BID   sodium chloride 0.9% 3 mL Q8H   sodium chloride 3% 4 mL Q4H   ursodiol 300 mg TID   PRN  acetaminophen 650 mg Q6H PRN   fentaNYL 25 mcg Q2H PRN   hydrALAZINE 10 mg Q4H PRN   magnesium oxide 800 mg PRN   magnesium oxide 800 mg PRN   ondansetron 4 mg Q12H PRN   potassium chloride 10% 40 mEq PRN   potassium chloride 10% 40 mEq PRN   potassium chloride 10% 60 mEq PRN   potassium, sodium phosphates 2 packet PRN   potassium, sodium phosphates 2 packet PRN   potassium, sodium  phosphates 2 packet PRN     Diet  No diet orders on file  No diet orders on file

## 2019-01-07 NOTE — PLAN OF CARE
ALFONSO met with Julia with [facility]. She is meeting with the family today and requested help with the insurance as they were told they cannot do a letter of agreement to take the Pt. She asked [facility] to check.     Caroline Soares LMSW  Neurocritical Care   [facility]  [id number]

## 2019-01-07 NOTE — ANESTHESIA PREPROCEDURE EVALUATION
[facility]  Anesthesia Pre-Operative Evaluation         Patient Name: Peewee Nunes  YOB: 1955  MRN: [id number]    SUBJECTIVE:     Pre-operative evaluation for Procedure(s) (LRB):  INSERTION, PEG TUBE (N/A)  CREATION, TRACHEOSTOMY (N/A)     01/07/2019    Peewee Nunes is a 63 y.o. male w/ a significant PMHx of CAD s/p CABG, T2DM, and HTN  admitted after a fall with epidural and subdural hematomas now s/p craniotomy (12/20). Patient has failed extubation on 1/2 and needs more permanent access for nutrition. General surgery consulted for trach/PEG.     Hospital course has been otherwise uneventful. Tolerating tube feeds at goal through NGT. AF and HDS on A/C. Sedated on propofol 10.    Patient now presents for the above procedure(s).      LDA:       Peripheral IV 20G - Single Lumen 01/04/19 0300 Left;Posterior Hand (Active)   Number of days: 3            Midline Catheter Insertion/Assessment  - Single Lumen 01/04/19 1025 Right cephalic vein (lateral side of arm) 18g x 10cm (Active)   Number of days: 2           Prev airway:   Intubated  Vent Mode: A/C  Oxygen Concentration (%):  [] 30  Resp Rate Total:  [16 br/min-27 br/min] 17 br/min  Vt Set:  [450 mL] 450 mL  PEEP/CPAP:  [5 cmH20] 5 cmH20  Mean Airway Pressure:  [8.8 cmH20-10 cmH20] 8.8 cmH20      Drips: None documented      Patient Active Problem List   Diagnosis    Diabetes mellitus, type 2    Essential hypertension    Obesity    Impingement syndrome of right shoulder    Stasis edema, bilateral    Pain in left knee    Otalgia of left ear    Vitamin D insufficiency    Bilateral impacted cerumen    Screening for prostate cancer    Hypothyroidism    Elevated PSA    Lipidemia    Unilateral osteoarthritis of knee    Bilateral lower leg cellulitis    Cellulitis due to methicillin-resistant Staphylococcus aureus (MRSA)    Stasis edema    Impaired mobility and activities of daily living    Pernicious anemia     Cervical strain, acute    Primary osteoarthritis of both knees    SDH (subdural hematoma)    SAH (subarachnoid hemorrhage)    Subdural hematoma    Epidural hematoma    Brain compression    Cerebral edema    Pressure injury of buttock, stage 2    Severe malnutrition    Status epilepticus    Acute respiratory failure    Septic shock    AYLIN (acute kidney injury)    New onset atrial fibrillation    Seizure    Weight loss, unintentional       Review of patient's allergies indicates:   Allergen Reactions    Levaquin [levofloxacin] Nausea Only       Current Inpatient Medications:   albuterol sulfate  2.5 mg Nebulization Q4H    chlorhexidine  15 mL Mouth/Throat QID    clonazePAM  0.5 mg Per NG tube Q8H    famotidine  20 mg Per NG tube BID    furosemide  40 mg Intravenous BID    heparin (porcine)  5,000 Units Subcutaneous Q8H    lacosamide  200 mg Per NG tube BID    metoprolol tartrate  25 mg Per NG tube Q8H    polyethylene glycol  17 g Per NG tube Daily    pravastatin  40 mg Per NG tube QHS    senna-docusate 8.6-50 mg  1 tablet Per NG tube BID    sodium chloride 0.9%  3 mL Intravenous Q8H    sodium chloride 3%  4 mL Nebulization Q4H    ursodiol  300 mg Per NG tube TID       No current facility-administered medications on file prior to encounter.      Current Outpatient Medications on File Prior to Encounter   Medication Sig Dispense Refill    aspirin (ECOTRIN) 81 MG EC tablet Take 81 mg by mouth once daily.        bisoprolol (ZEBETA) 10 MG tablet Take 1 tablet (10 mg total) by mouth once daily. 90 tablet 3    cholecalciferol, vitamin D3, 50,000 unit capsule Take 50,000 Units by mouth once a week.      linagliptin (TRADJENTA) 5 mg Tab tablet Take 1 tablet (5 mg total) by mouth once daily. 90 tablet 1    losartan (COZAAR) 100 MG tablet Take 100 mg by mouth once daily.      NIFEdipine (ADALAT CC) 60 MG TbSR Take 120 mg by mouth once daily.      pravastatin (PRAVACHOL) 40 MG tablet Take  1 tablet (40 mg total) by mouth every evening. 90 tablet 1    mv-min 16/folic acid/lut/lycop (REQ49+ ORAL)          Past Surgical History:   Procedure Laterality Date    APPENDECTOMY      CORONARY ARTERY BYPASS GRAFT      CRANIOTOMY, FOR SUBDURAL HEMATOMA EVACUATION Left 2018    Performed by Stevan Hull [facility] at [facility] OR [address] [place] [address]    HEMORRHOID SURGERY         Social History     Socioeconomic History    Marital status:      Spouse name: Not on file    Number of children: Not on file    Years of education: Not on file    Highest education level: Not on file   Social Needs    Financial resource strain: Not on file    Food insecurity - worry: Not on file    Food insecurity - inability: Not on file    Transportation needs - medical: Not on file    Transportation needs - non-medical: Not on file   Occupational History    Not on file   Tobacco Use    Smoking status: Former Smoker     Packs/day: 1.50     Years: 25.00     Pack years: 37.50     Types: Cigarettes     Last attempt to quit: 2002     Years since quittin.1    Smokeless tobacco: Never Used   Substance and Sexual Activity    Alcohol use: No    Drug use: No    Sexual activity: Yes   Other Topics Concern    Not on file   Social History Narrative    Not on file       OBJECTIVE:     Vital Signs Range (Last 24H):  Temp:  [36.9 °C (98.5 °F)-37.2 °C (98.9 °F)]   Pulse:  []   Resp:  [16-54]   BP: (100-140)/(60-83)   SpO2:  [97 %-100 %]       Significant Labs:  Lab Results   Component Value Date    WBC 11.45 2019    HGB 8.8 (L) 2019    HCT 27.0 (L) 2019     2019    CHOL 169 2018    TRIG 102 2018    HDL 56 2018    ALT 12 2019    AST 9 (L) 2019     2019    K 3.7 2019     2019    CREATININE 0.6 2019    BUN 30 (H) 2019    CO2 26 2019    TSH 0.673 2018    PSA 1.0 2018    INR 1.1 2018    HGBA1C 4.8  12/20/2018       Diagnostic Studies:   CXR (01/06/2018)  Bilateral atelectatic/consolidative changes in lower lung zones. Stable.    EKG (12/20/2018):   Vent. Rate : 089 BPM     Atrial Rate : 089 BPM     P-R Int : 150 ms          QRS Dur : 110 ms      QT Int : 416 ms       P-R-T Axes : 070 -24 141 degrees     QTc Int : 506 ms    Normal sinus rhythm  Left atrial abnormality/enlargement  Possible Septal infarct (cited on or before 13-OCT-2010)  Prolonged QT  ST and/or T wave abnormalities suggesting myocardial ischemia  Abnormal ECG  When compared with ECG of 16-DEC-2018 23:08,  Premature atrial complexes are no longer Present  Incomplete right bundle branch block is no longer Present  Confirmed by KALEB MONK MD ([id number]) on 12/20/2018 10:52:00 AM    2D ECHO (12/21/2018):  · Concentric left ventricular remodeling.  · Increased (hyperdynamic) left ventricular systolic function. The estimated ejection fraction is 73%  · Normal right ventricular systolic function.  · Indeterminate left ventricular diastolic function.  · Severe left atrial enlargement.  · Mild mitral regurgitation.  · Mid-cavity left ventricular obstruction from hyperdynamic LV systolic function (underfilled LV cavity by appearance) with a peak gradient of 50+mm Hg.        ASSESSMENT/PLAN:         Anesthesia Evaluation    I have reviewed the Patient Summary Reports.    I have reviewed the Nursing Notes.   I have reviewed the Medications.     Review of Systems  Anesthesia Hx:  No problems with previous Anesthesia  History of prior surgery of interest to airway management or planning:  Denies Personal Hx of Anesthesia complications.   Social:  Former Smoker    Cardiovascular:   Hypertension CAD  CABG/stent     Pulmonary:   Denies COPD.  Denies Asthma.  Denies Recent URI.    Renal/:   Denies Chronic Renal Disease.     Hepatic/GI:   Denies GERD.    Musculoskeletal:  Musculoskeletal Normal    Neurological:  Seizure Disorder  CNS Hemorrhage  (Central Nervous  System), Intracranial Hemorrhage , IVH Grade Subdural Hematoma, Epidural Hematoma    Endocrine:   Diabetes        Physical Exam  General:  Well nourished    Airway/Jaw/Neck:  Airway Findings: Pre-Existing Airway Tube(s): Oral Endotracheal tube TM Distance: Normal, at least 6 cm       Chest/Lungs:  Chest/Lungs Findings: Clear to auscultation, Normal Respiratory Rate     Heart/Vascular:  Heart Findings: Rate: Normal  Rhythm: Regular Rhythm  Sounds: Normal     Abdomen:  Abdomen Findings:  Normal, Soft, Nontender     Musculoskeletal:  Musculoskeletal Findings: Normal   Skin:  Skin Findings: Normal    Mental Status:  Mental Status Findings:  Somnolent         Anesthesia Plan  Type of Anesthesia, risks & benefits discussed:  Anesthesia Type:  general  Patient's Preference:   Intra-op Monitoring Plan: standard ASA monitors  Intra-op Monitoring Plan Comments:   Post Op Pain Control Plan: multimodal analgesia, per primary service following discharge from PACU and IV/PO Opioids PRN  Post Op Pain Control Plan Comments:   Induction:   IV  Beta Blocker:  Patient is on a Beta-Blocker and has received one dose within the past 24 hours (No further documentation required).       Informed Consent: Patient representative understands risks and agrees with Anesthesia plan.  Questions answered. Anesthesia consent signed with patient representative.  ASA Score: 3     Day of Surgery Review of History & Physical:            Ready For Surgery From Anesthesia Perspective.

## 2019-01-07 NOTE — PLAN OF CARE
01/07/19 1254   Discharge Reassessment   Assessment Type Discharge Planning Reassessment   Provided patient/caregiver education on the expected discharge date and the discharge plan No   Do you have any problems affording any of your prescribed medications? No   Discharge Plan A Long-term acute care facility (LTAC)   Discharge Plan B Skilled Nursing Facility   Patient choice form signed by patient/caregiver No   Anticipated Discharge Disposition LTAC   Can the patient answer the patient profile reliably? No, cognitively impaired   How does the patient rate their overall health at the present time? (mamadou)   Describe the patient's ability to walk at the present time. Does not walk or unable to take any steps at all   How often would a person be available to care for the patient? Occasionally   Number of comorbid conditions (as recorded on the chart) Four   During the past month, has the patient often been bothered by feeling down, depressed or hopeless? (mamadou)   During the past month, has the patient often been bothered by little interest or pleasure in doing things? (mamadou)   Post-Acute Status   Post-Acute Authorization Placement   Post-Acute Placement Status Patient Evaluation by Facility       Per MD, patient will have trach and peg placed tomorrow.    Azeb Flores RN, CCRN-K, [facility]  Neuro-Critical Care   [phone]

## 2019-01-07 NOTE — ASSESSMENT & PLAN NOTE
62 yo male with L aSDH now s/p craniotomy for evacuation (12/20).    --Continue care per primary team.  --PEG/TRACH planned for tomorrow per gen surgery.  --Staples removed, incision healing well, well approximated w/o erythema/drainage.   --We will continue to monitor closely, please contact us with any questions or concerns.

## 2019-01-07 NOTE — PROGRESS NOTES
"[facility]  Adult Nutrition  Progress Note    SUMMARY       Recommendations    Recommendation/Intervention:   Continue Impact Peptide @ 55mL/hr.   - Provides 1980kcals, 124g protein and 1016mL free water.   - Hold for residuals >500mL.     RD to monitor.    Goals: Pt to receive nutrition by RD follow up  Nutrition Goal Status: goal met  Communication of RD Recs: reviewed with RN    Reason for Assessment    Reason For Assessment: RD follow-up  Diagnosis: other (see comments)(SDH)  Relevant Medical History: CAD, DM, HTN, HLD  Interdisciplinary Rounds: attended  General Information Comments: Pt remains intubated and sedated. Trach/PEG planning. TF at goal, tolerating without residuals. Pt previously diagnosed with severe malnutrition on 12/21- see note for details. Exam remains unchanged.  Nutrition Discharge Planning: unable to determine at this time    Nutrition Risk Screen    Nutrition Risk Screen: tube feeding or parenteral nutrition    Nutrition/Diet History    Spiritual, Cultural Beliefs, Evangelical Practices, Values that Affect Care: no  Factors Affecting Nutritional Intake: NPO, on mechanical ventilation    Anthropometrics    Temp: 98.5 °F (36.9 °C)  Height Method: Measured  Height: 5' 11" (180.3 cm)  Height (inches): 71 in  Weight Method: Bed Scale  Weight: 110.9 kg (244 lb 7.8 oz)  Weight (lb): 244.49 lb  Ideal Body Weight (IBW), Male: 172 lb  % Ideal Body Weight, Male (lb): 110.49 lb  BMI (Calculated): 26.6  BMI Grade: 25 - 29.9 - overweight  Weight Loss: unintentional  Usual Body Weight (UBW), kg: (!) 153.1 kg  % Usual Body Weight: 56.11  % Weight Change From Usual Weight: -44 %       Lab/Procedures/Meds    Pertinent Labs Reviewed: reviewed  Pertinent Labs Comments: noted  Pertinent Medications Reviewed: reviewed  Pertinent Medications Comments: lasix, heparin, propofol       Estimated/Assessed Needs    Weight Used For Calorie Calculations: 85.7 kg (188 lb 15 oz)  Energy Calorie " Requirements (kcal): 1864  Energy Need Method: [facility]  Protein Requirements: 103-129g(1.2-1.5g/kg)  Weight Used For Protein Calculations: 85.7 kg (188 lb 15 oz)  Fluid Requirements (mL): 1mL/kcal or per MD     RDA Method (mL): 1864         Nutrition Prescription Ordered    Current Diet Order: NPO  Nutrition Order Comments: TF at goal  Current Nutrition Support Formula Ordered: Impact Peptide 1.5  Current Nutrition Support Rate Ordered: 55 (ml)  Current Nutrition Support Frequency Ordered: mL/hr    Evaluation of Received Nutrient/Fluid Intake    Enteral Calories (kcal): 1980  Enteral Protein (gm): 124  Enteral (Free Water) Fluid (mL): 1016  Free Water Flush Fluid (mL): 1000  Lipid Calories (kcals): 134 kcals(propofol @ 5.1mL/hr)  Total Calories (kcal): 2114    % Kcal Needs: 115  % Protein Needs: 100    I/O: +I/O, good UOP, LBM 1/2    Energy Calories Required: meeting needs  Protein Required: meeting needs  Fluid Required: (per MD)    Comments: 7mL residuals 1/6  Tolerance: tolerating    % Intake of Estimated Energy Needs: 75 - 100 %  % Meal Intake: NPO    Nutrition Risk    Level of Risk/Frequency of Follow-up: (f/u 1x/week)     Assessment and Plan    Severe malnutrition    Malnutrition in the context of Chronic Illness/Injury    Related to (etiology):  Decreased intake, appetite    Signs and Symptoms (as evidenced by):  Energy Intake: <75% of estimated energy requirement for > 1 year  Body Fat Depletion: severe depletion of orbitals, triceps and thoracic and lumbar region   Muscle Mass Depletion: severe depletion of temples, clavicle region, scapular region, interosseous muscle and lower extremities   Weight Loss: 44% x 1 year     Interventions/Recommendations (treatment strategy):  Continue enteral nutrition    Nutrition Diagnosis Status:  Continues              Monitor and Evaluation    Food and Nutrient Intake: energy intake, food and beverage intake, enteral nutrition intake  Food and Nutrient Adminstration:  enteral and parenteral nutrition administration, diet order  Anthropometric Measurements: weight, weight change, body mass index  Biochemical Data, Medical Tests and Procedures: electrolyte and renal panel, glucose/endocrine profile, gastrointestinal profile, inflammatory profile, lipid profile  Nutrition-Focused Physical Findings: overall appearance     Malnutrition Assessment  Malnutrition Type: chronic illness  Energy Intake: severe energy intake          Weight Loss (Malnutrition): greater than 20% in 1 year  Energy Intake (Malnutrition): less than 75% for greater than or equal to 3 months  Subcutaneous Fat (Malnutrition): severe depletion  Muscle Mass (Malnutrition): severe depletion   Orbital Region (Subcutaneous Fat Loss): moderate depletion  Upper Arm Region (Subcutaneous Fat Loss): severe depletion  Thoracic and Lumbar Region: severe depletion   Episcopalian Region (Muscle Loss): moderate depletion  Clavicle Bone Region (Muscle Loss): severe depletion  Clavicle and Acromion Bone Region (Muscle Loss): severe depletion  Scapular Bone Region (Muscle Loss): severe depletion  Dorsal Hand (Muscle Loss): moderate depletion  Patellar Region (Muscle Loss): severe depletion  Anterior Thigh Region (Muscle Loss): severe depletion  Posterior Calf Region (Muscle Loss): severe depletion       Subcutaneous Fat Loss (Final Summary): severe protein-calorie malnutrition  Muscle Loss Evaluation (Final Summary): severe protein-calorie malnutrition    Moderate Weight Loss (Malnutrition): other (see comments)  Severe Weight Loss (Malnutrition): greater than 20% in 1 year    Nutrition Follow-Up    RD Follow-up?: Yes

## 2019-01-07 NOTE — PROGRESS NOTES
[facility]  Neurosurgery  Progress Note    Subjective:     History of Present Illness: 62 yo M with PMH of CAD, CABG in 2006, DM, HTN, HLD on ASA presents to the ED for AMS. Patient fell 4 days ago with about 2 minutes of LOC. Family is unsure of what caused the fall. Full workup done including a CTH at [facility] that was negative. Patient went home and stopped taking his ASA. This morning, his family noticed he was more confused. He went to [facility] and CTH was done that revealed a 1.7cm left frontalpariental SDH with a 5 mm right sided midline shift and a left SAH. Patient was transferred to [facility]. History was obtained per family as patient unable to give history 2/2 confusion with expressive and receptive aphasia.    Post-Op Info:  Procedure(s) (LRB):  CRANIOTOMY, FOR SUBDURAL HEMATOMA EVACUATION (Left)   18 Days Post-Op     Interval History: 1/7: AFVSS, NAEON, labs stable, exam stable    Medications:  Continuous Infusions:  Scheduled Meds:   albuterol sulfate  2.5 mg Nebulization Q4H    chlorhexidine  15 mL Mouth/Throat QID    famotidine  20 mg Per NG tube BID    heparin (porcine)  5,000 Units Subcutaneous Q8H    lacosamide  200 mg Per NG tube BID    metoprolol tartrate  25 mg Per NG tube Q8H    polyethylene glycol  17 g Per NG tube BID    pravastatin  40 mg Per NG tube QHS    senna-docusate 8.6-50 mg  1 tablet Per NG tube BID    sodium chloride 0.9%  3 mL Intravenous Q8H    sodium chloride 3%  4 mL Nebulization Q4H    ursodiol  300 mg Per NG tube TID     PRN Meds:acetaminophen, hydrALAZINE, magnesium oxide, magnesium oxide, ondansetron, potassium chloride 10%, potassium chloride 10%, potassium chloride 10%, potassium, sodium phosphates, potassium, sodium phosphates, potassium, sodium phosphates       Objective:     Weight: 110.9 kg (244 lb 7.8 oz)  Body mass index is 34.1 kg/m².  Vital Signs (Most Recent):  Temp: 98.8 °F (37.1 °C) (01/07/19 1100)  Pulse: 95 (01/07/19 1100)  Resp:  (!) 21 (01/07/19 1100)  BP: 135/76 (01/07/19 1100)  SpO2: 100 % (01/07/19 1100) Vital Signs (24h Range):  Temp:  [98.5 °F (36.9 °C)-98.9 °F (37.2 °C)] 98.8 °F (37.1 °C)  Pulse:  [] 95  Resp:  [9-54] 21  SpO2:  [97 %-100 %] 100 %  BP: (100-143)/(60-82) 135/76     Date 01/07/19 0700 - 01/08/19 0659   Shift 1641-0148 9614-9614 0338-1943 24 Hour Total   INTAKE   I.V.(mL/kg) 35.3(0.3)   35.3(0.3)   NG/   580   Shift Total(mL/kg) 615.3(5.5)   615.3(5.5)   OUTPUT   Urine(mL/kg/hr) 2075   2075   Drains 120   120   Shift Total(mL/kg) 2195(19.8)   2195(19.8)   Weight (kg) 110.9 110.9 110.9 110.9              Vent Mode: A/C  Oxygen Concentration (%):  [] 30  Resp Rate Total:  [16 br/min-27 br/min] 20 br/min  Vt Set:  [450 mL] 450 mL  PEEP/CPAP:  [5 cmH20] 5 cmH20  Mean Airway Pressure:  [8.8 cmH20-10 cmH20] 8.9 cmH20         NG/OG Tube 01/01/19 [place] sump Right nostril (Active)   Placement Check placement verified by aspirate characteristics;placement verified by distal tube length measurement;placement verified by x-ray 1/7/2019 11:00 AM   Distal Tube Length (cm) 75 1/7/2019  7:01 AM   Tolerance signs/symptoms of discomfort 1/7/2019 11:00 AM   Securement anchored to nostril center w/ adhesive device 1/7/2019 11:00 AM   Clamp Status/Tolerance unclamped 1/7/2019 11:00 AM   Insertion Site Appearance no redness, warmth, tenderness, skin breakdown, drainage 1/7/2019  3:00 AM   Flush/Irrigation flushed w/;water;no resistance met 1/7/2019 11:00 AM   Feeding Method continuous 1/7/2019 11:00 AM   Feeding Action feeding continued 1/7/2019 11:00 AM   Current Rate (mL/hr) 55 mL/hr 1/7/2019 11:00 AM   Goal Rate (mL/hr) 55 mL/hr 1/7/2019 11:00 AM   Intake (mL) 60 mL 1/6/2019 10:00 PM   Water Bolus (mL) 250 mL 1/7/2019 11:19 AM   Tube Output(mL)(Include Discarded Residual) 120 mL 1/7/2019  8:47 AM   Rate Formula Tube Feeding (mL/hr) 55 mL/hr 1/6/2019  7:05 PM   Formula Name Impact Peptide 1/7/2019 11:00 AM   Intake (mL) -  "Formula Tube Feeding 55 1/7/2019 12:00 PM   Residual Amount (ml) 7 ml 1/6/2019  3:00 PM            Urethral Catheter 01/05/19 1329 (Active)   Site Assessment Clean;Intact 1/7/2019 11:00 AM   Collection Container Urimeter 1/7/2019 11:00 AM   Securement Method secured to top of thigh w/ adhesive device 1/7/2019 11:00 AM   Catheter Care Performed yes 1/7/2019 11:00 AM   Reason for Continuing Urinary Catheterization Urinary retention;Critically ill in ICU requiring intensive monitoring 1/7/2019 11:00 AM   CAUTI Prevention Bundle StatLock in place w 1" slack;Intact seal between catheter & drainage tubing;Drainage bag off the floor;Green sheeting clip in use;No dependent loops or kinks;Drainage bag not overfilled (<2/3 full);Drainage bag below bladder 1/7/2019 11:00 AM   Output (mL) 650 mL 1/7/2019 12:00 PM   Bladder/Intra-Abdominal Pressure (mmHg) 900 mmHg 1/5/2019  1:29 PM       Neurosurgery Physical Exam     [id number], grimacing to stimulation  PERRL  MOORE spontaneously    Significant Labs:  Recent Labs   Lab 01/06/19  0157 01/07/19  0210   * 104    140   K 4.1 3.7    105   CO2 25 26   BUN 24* 30*   CREATININE 0.6 0.6   CALCIUM 8.4* 8.3*   MG 1.8 1.8     Recent Labs   Lab 01/06/19  0236 01/07/19  0210   WBC 8.59 11.45   HGB 8.2* 8.8*   HCT 26.7* 27.0*    334     No results for input(s): LABPT, INR, APTT in the last 48 hours.  Microbiology Results (last 7 days)     Procedure Component Value Units Date/Time    Blood culture [[id number]] Collected:  01/03/19 1100    Order Status:  Completed Specimen:  Blood from Peripheral, Hand, Left Updated:  01/06/19 1412     Blood Culture, Routine No Growth to date     Blood Culture, Routine No Growth to date     Blood Culture, Routine No Growth to date     Blood Culture, Routine No Growth to date    Narrative:       Blood cultures from 2 different sites. 4 bottles total.  Please draw before starting antibiotics.    Blood culture [[id number]] Collected:  01/03/19 " 1107    Order Status:  Completed Specimen:  Blood from Peripheral, Foot, Left Updated:  01/06/19 1412     Blood Culture, Routine No Growth to date     Blood Culture, Routine No Growth to date     Blood Culture, Routine No Growth to date     Blood Culture, Routine No Growth to date    Narrative:       Blood cultures x 2 different sites. 4 bottles total. Please  draw cultures before administering antibiotics.    Culture, Respiratory with Gram Stain [[id number]] Collected:  01/03/19 1108    Order Status:  Completed Specimen:  Respiratory from Endotracheal Aspirate Updated:  01/05/19 1007     Respiratory Culture Normal respiratory donnell     Gram Stain (Respiratory) Few WBC's     Gram Stain (Respiratory) Rare Gram positive cocci        Assessment/Plan:     * SDH (subdural hematoma)    62 yo male with L aSDH now s/p craniotomy for evacuation (12/20).    --Continue care per primary team.  --PEG/TRACH planned for tomorrow per gen surgery.  --Staples removed, incision healing well, well approximated w/o erythema/drainage.   --We will continue to monitor closely, please contact us with any questions or concerns.               Gagandeep Giang MD  Neurosurgery  [facility]

## 2019-01-07 NOTE — PROGRESS NOTES
[facility]  Neurocritical Care  Progress Note    Admit Date: 12/20/2018  Service Date: 01/07/2019  Length of Stay: 18    Subjective:     Chief Complaint: SDH (subdural hematoma)    History of Present Illness: 63 year old male h/o CABG on ASA 81 mg (last dose 72 hours PTA) presenting as transfer from OSH for EDH with SDH. Patient reportedly had a mechanical fall from standing on Sunday and went to ED where CTH was negative for any acute intracranial pathology. Overnight, patient developed confusion and presented to OSH ED where CTH was consistent with EDH with SDH. Patient has progressively worsened throughout the day developing intermittent global aphasia. NSGY consulted in ED at [facility] and stat CTH has been ordered. NCC consulted for admission and medical management. Patient on weaning off of cardene at time of evaluation with sbp of 120. Reported presenting sbp in 150s.    Hospital Course: 12/22: no major events overnight. Weaned off of cardene. This morning AOx3. Drain removed by NSGY at 11 AM, and planned to step down to NSGY.  AMS noted around 1PM, patient aphasic and not following commands, STAT CTH unremarkable.   12/26: wean ketamine, follow EEG, send urine studuies-Na 133, NS bolus, advance TF  12/27: off ketamine, d/c levo, continue EEG, and AEDs, 500 NS bolus, increase enteral water flushes, continue abx for another day  1/2: extubation trial.   1/3: failed extubation trial. Plan for trach and peg  1/4: plan for trach, general surgery consult  1/5: NAEO  1/6: NAEO. Awaiting trach and peg  1/7: plan for trach/peg today    Interval History:      Mental status slowly improving  Low vent settings, cxr with pulmonary edema  Hemodynamics ok     Objective:     Vitals:  Temp: 98.8 °F (37.1 °C)  Pulse: 97  Rhythm: atrial rhythm  BP: (!) 154/87  MAP (mmHg): 115  Resp: (!) 22  SpO2: 98 %  Oxygen Concentration (%): 30  O2 Device (Oxygen Therapy): ventilator  Vent Mode: A/C  Set Rate: 16 bmp  Vt Set:  450 mL  PEEP/CPAP: 5 cmH20  Peak Airway Pressure: 22 cmH2O  Mean Airway Pressure: 9.3 cmH20  Plateau Pressure: 0 cmH20    Temp  Min: 98.5 °F (36.9 °C)  Max: 98.9 °F (37.2 °C)  Pulse  Min: 74  Max: 106  BP  Min: 100/60  Max: 154/87  MAP (mmHg)  Min: 75  Max: 115  Resp  Min: 9  Max: 54  SpO2  Min: 97 %  Max: 100 %  Oxygen Concentration (%)  Min: 30  Max: 100    01/06 0701 - 01/07 0700  In: 3193.8 [I.V.:283.8]  Out: 5475 [Urine:5475]   Unmeasured Output  Stool Occurrence: 0       Physical Exam  Vital signs, lab studies, and imaging reviewed by me  lethargic, opens eyes to voice, no commands  Warm and well perfused, regular rate and rhythm, no murmurs  No dependent edema  Breathing comfortably on ac, breath sounds diminished at bases  Belly soft, nontender, no hepatosplenomegaly    Medications:  Continuous Scheduled  albuterol sulfate 2.5 mg Q4H   chlorhexidine 15 mL QID   famotidine 20 mg BID   heparin (porcine) 5,000 Units Q8H   lacosamide 200 mg BID   metoprolol tartrate 25 mg Q8H   polyethylene glycol 17 g BID   pravastatin 40 mg QHS   senna-docusate 8.6-50 mg 1 tablet BID   sodium chloride 0.9% 3 mL Q8H   sodium chloride 3% 4 mL Q4H   ursodiol 300 mg TID   PRN  acetaminophen 650 mg Q6H PRN   fentaNYL 25 mcg Q2H PRN   hydrALAZINE 10 mg Q4H PRN   magnesium oxide 800 mg PRN   magnesium oxide 800 mg PRN   ondansetron 4 mg Q12H PRN   potassium chloride 10% 40 mEq PRN   potassium chloride 10% 40 mEq PRN   potassium chloride 10% 60 mEq PRN   potassium, sodium phosphates 2 packet PRN   potassium, sodium phosphates 2 packet PRN   potassium, sodium phosphates 2 packet PRN     Diet  No diet orders on file  No diet orders on file        Assessment/Plan:     Neuro   * SDH (subdural hematoma)    Epidural with subdural component s/p evac 12/20  Repeat cth stable  Mri with punctate stroke    nsgy following  Pt/ot/slp when able       Status epilepticus    Mental status with some improvement     Dc klonopin today, cont  lacosamide  Spot eeg      Pulmonary   Acute respiratory failure    Hypoxic, aspiration, bronch with mucous plugging in L lung  cxr with pulm edema, fio2 down after diuresis    Maintain today in setting of mental status  Minimize sedation today, plan for prn fentanyl and dc propofol  Plan for trach/peg today  Holding further diuresis today     Cardiac/Vascular   New onset atrial fibrillation    Rate controlled on bb    Monitor off ac       Endocrine   Severe malnutrition    On goal tf     Other   Pressure injury of buttock, stage 2    Appreciate wound care reccs           The patient is being Prophylaxed for:  Venous Thromboembolism with: Mechanical or Chemical  Stress Ulcer with: H2B  Ventilator Pneumonia with: chlorhexidine oral care    Activity Orders          None        Full Code     I spent 30 min of uninterrupted critical care time caring for this critically ill patient exclusive of procedures and teaching.       Kenneth Decker MD  Neurocritical Care  [facility]

## 2019-01-07 NOTE — SUBJECTIVE & OBJECTIVE
Interval History: 1/7: AFVSS, NAEON, labs stable, exam stable    Medications:  Continuous Infusions:  Scheduled Meds:   albuterol sulfate  2.5 mg Nebulization Q4H    chlorhexidine  15 mL Mouth/Throat QID    famotidine  20 mg Per NG tube BID    heparin (porcine)  5,000 Units Subcutaneous Q8H    lacosamide  200 mg Per NG tube BID    metoprolol tartrate  25 mg Per NG tube Q8H    polyethylene glycol  17 g Per NG tube BID    pravastatin  40 mg Per NG tube QHS    senna-docusate 8.6-50 mg  1 tablet Per NG tube BID    sodium chloride 0.9%  3 mL Intravenous Q8H    sodium chloride 3%  4 mL Nebulization Q4H    ursodiol  300 mg Per NG tube TID     PRN Meds:acetaminophen, hydrALAZINE, magnesium oxide, magnesium oxide, ondansetron, potassium chloride 10%, potassium chloride 10%, potassium chloride 10%, potassium, sodium phosphates, potassium, sodium phosphates, potassium, sodium phosphates       Objective:     Weight: 110.9 kg (244 lb 7.8 oz)  Body mass index is 34.1 kg/m².  Vital Signs (Most Recent):  Temp: 98.8 °F (37.1 °C) (01/07/19 1100)  Pulse: 95 (01/07/19 1100)  Resp: (!) 21 (01/07/19 1100)  BP: 135/76 (01/07/19 1100)  SpO2: 100 % (01/07/19 1100) Vital Signs (24h Range):  Temp:  [98.5 °F (36.9 °C)-98.9 °F (37.2 °C)] 98.8 °F (37.1 °C)  Pulse:  [] 95  Resp:  [9-54] 21  SpO2:  [97 %-100 %] 100 %  BP: (100-143)/(60-82) 135/76     Date 01/07/19 0700 - 01/08/19 0659   Shift 9423-7796 0979-5209 8859-4011 24 Hour Total   INTAKE   I.V.(mL/kg) 35.3(0.3)   35.3(0.3)   NG/   580   Shift Total(mL/kg) 615.3(5.5)   615.3(5.5)   OUTPUT   Urine(mL/kg/hr) 2075 2075   Drains 120   120   Shift Total(mL/kg) 2195(19.8)   2195(19.8)   Weight (kg) 110.9 110.9 110.9 110.9              Vent Mode: A/C  Oxygen Concentration (%):  [] 30  Resp Rate Total:  [16 br/min-27 br/min] 20 br/min  Vt Set:  [450 mL] 450 mL  PEEP/CPAP:  [5 cmH20] 5 cmH20  Mean Airway Pressure:  [8.8 cmH20-10 cmH20] 8.9 cmH20         NG/OG Tube  "01/01/19 [place] Right nostril (Active)   Placement Check placement verified by aspirate characteristics;placement verified by distal tube length measurement;placement verified by x-ray 1/7/2019 11:00 AM   Distal Tube Length (cm) 75 1/7/2019  7:01 AM   Tolerance signs/symptoms of discomfort 1/7/2019 11:00 AM   Securement anchored to nostril center w/ adhesive device 1/7/2019 11:00 AM   Clamp Status/Tolerance unclamped 1/7/2019 11:00 AM   Insertion Site Appearance no redness, warmth, tenderness, skin breakdown, drainage 1/7/2019  3:00 AM   Flush/Irrigation flushed w/;water;no resistance met 1/7/2019 11:00 AM   Feeding Method continuous 1/7/2019 11:00 AM   Feeding Action feeding continued 1/7/2019 11:00 AM   Current Rate (mL/hr) 55 mL/hr 1/7/2019 11:00 AM   Goal Rate (mL/hr) 55 mL/hr 1/7/2019 11:00 AM   Intake (mL) 60 mL 1/6/2019 10:00 PM   Water Bolus (mL) 250 mL 1/7/2019 11:19 AM   Tube Output(mL)(Include Discarded Residual) 120 mL 1/7/2019  8:47 AM   Rate Formula Tube Feeding (mL/hr) 55 mL/hr 1/6/2019  7:05 PM   Formula Name Impact Peptide 1/7/2019 11:00 AM   Intake (mL) - Formula Tube Feeding 55 1/7/2019 12:00 PM   Residual Amount (ml) 7 ml 1/6/2019  3:00 PM            Urethral Catheter 01/05/19 1329 (Active)   Site Assessment Clean;Intact 1/7/2019 11:00 AM   Collection Container Urimeter 1/7/2019 11:00 AM   Securement Method secured to top of thigh w/ adhesive device 1/7/2019 11:00 AM   Catheter Care Performed yes 1/7/2019 11:00 AM   Reason for Continuing Urinary Catheterization Urinary retention;Critically ill in ICU requiring intensive monitoring 1/7/2019 11:00 AM   CAUTI Prevention Bundle StatLock in place w 1" slack;Intact seal between catheter & drainage tubing;Drainage bag off the floor;Green sheeting clip in use;No dependent loops or kinks;Drainage bag not overfilled (<2/3 full);Drainage bag below bladder 1/7/2019 11:00 AM   Output (mL) 650 mL 1/7/2019 12:00 PM   Bladder/Intra-Abdominal Pressure (mmHg) " 900 mmHg 1/5/2019  1:29 PM       Neurosurgery Physical Exam     [id number], grimacing to stimulation  PERRL  MOORE spontaneously    Significant Labs:  Recent Labs   Lab 01/06/19  0157 01/07/19  0210   * 104    140   K 4.1 3.7    105   CO2 25 26   BUN 24* 30*   CREATININE 0.6 0.6   CALCIUM 8.4* 8.3*   MG 1.8 1.8     Recent Labs   Lab 01/06/19  0236 01/07/19  0210   WBC 8.59 11.45   HGB 8.2* 8.8*   HCT 26.7* 27.0*    334     No results for input(s): LABPT, INR, APTT in the last 48 hours.  Microbiology Results (last 7 days)     Procedure Component Value Units Date/Time    Blood culture [[id number]] Collected:  01/03/19 1100    Order Status:  Completed Specimen:  Blood from Peripheral, Hand, Left Updated:  01/06/19 1412     Blood Culture, Routine No Growth to date     Blood Culture, Routine No Growth to date     Blood Culture, Routine No Growth to date     Blood Culture, Routine No Growth to date    Narrative:       Blood cultures from 2 different sites. 4 bottles total.  Please draw before starting antibiotics.    Blood culture [[id number]] Collected:  01/03/19 1107    Order Status:  Completed Specimen:  Blood from Peripheral, Foot, Left Updated:  01/06/19 1412     Blood Culture, Routine No Growth to date     Blood Culture, Routine No Growth to date     Blood Culture, Routine No Growth to date     Blood Culture, Routine No Growth to date    Narrative:       Blood cultures x 2 different sites. 4 bottles total. Please  draw cultures before administering antibiotics.    Culture, Respiratory with Gram Stain [[id number]] Collected:  01/03/19 1108    Order Status:  Completed Specimen:  Respiratory from Endotracheal Aspirate Updated:  01/05/19 1007     Respiratory Culture Normal respiratory donnell     Gram Stain (Respiratory) Few WBC's     Gram Stain (Respiratory) Rare Gram positive cocci

## 2019-01-07 NOTE — PLAN OF CARE
Problem: Adult Inpatient Plan of Care  Goal: Plan of Care Review  Outcome: Ongoing (interventions implemented as appropriate)  POC reviewed with pt at 000. Pt currently sedated and intubated.  No acute events overnight. VSS. Pt progressing toward goals. Will continue to monitor. See flowsheets for full assessment and VS info     -Propofol titrated to keep patient comfortable while intubated.   -Vent settings remained the same during shift. No respiratory distress noted.   -TFs are at goal. No residuals. Tolerating feeds well.

## 2019-01-07 NOTE — ASSESSMENT & PLAN NOTE
Hypoxic, aspiration, bronch with mucous plugging in L lung  cxr with pulm edema, fio2 down after diuresis    Maintain today in setting of mental status  Minimize sedation today, plan for prn fentanyl and dc propofol  Plan for trach/peg today  Holding further diuresis today

## 2019-01-08 ENCOUNTER — ANESTHESIA (OUTPATIENT)
Dept: SURGERY | Facility: HOSPITAL | Age: 64
DRG: 003 | End: 2019-01-08
Payer: MEDICARE

## 2019-01-08 LAB
ALBUMIN SERPL BCP-MCNC: 1.8 G/DL
ALP SERPL-CCNC: 246 U/L
ALT SERPL W/O P-5'-P-CCNC: 11 U/L
ANION GAP SERPL CALC-SCNC: 9 MMOL/L
AST SERPL-CCNC: 13 U/L
BACTERIA BLD CULT: NORMAL
BACTERIA BLD CULT: NORMAL
BASOPHILS # BLD AUTO: 0.13 K/UL
BASOPHILS NFR BLD: 1.1 %
BILIRUB SERPL-MCNC: 0.7 MG/DL
BUN SERPL-MCNC: 26 MG/DL
CALCIUM SERPL-MCNC: 8.5 MG/DL
CHLORIDE SERPL-SCNC: 107 MMOL/L
CO2 SERPL-SCNC: 24 MMOL/L
CREAT SERPL-MCNC: 0.5 MG/DL
DIFFERENTIAL METHOD: ABNORMAL
EOSINOPHIL # BLD AUTO: 0.3 K/UL
EOSINOPHIL NFR BLD: 2.3 %
ERYTHROCYTE [DISTWIDTH] IN BLOOD BY AUTOMATED COUNT: 15.6 %
EST. GFR  (AFRICAN AMERICAN): >60 ML/MIN/1.73 M^2
EST. GFR  (NON AFRICAN AMERICAN): >60 ML/MIN/1.73 M^2
GLUCOSE SERPL-MCNC: 91 MG/DL
HCT VFR BLD AUTO: 30 %
HGB BLD-MCNC: 9.3 G/DL
IMM GRANULOCYTES # BLD AUTO: 0.11 K/UL
IMM GRANULOCYTES NFR BLD AUTO: 0.9 %
LYMPHOCYTES # BLD AUTO: 2.6 K/UL
LYMPHOCYTES NFR BLD: 22.2 %
MAGNESIUM SERPL-MCNC: 1.7 MG/DL
MCH RBC QN AUTO: 30.8 PG
MCHC RBC AUTO-ENTMCNC: 31 G/DL
MCV RBC AUTO: 99 FL
MONOCYTES # BLD AUTO: 0.9 K/UL
MONOCYTES NFR BLD: 7.4 %
NEUTROPHILS # BLD AUTO: 7.7 K/UL
NEUTROPHILS NFR BLD: 66.1 %
NRBC BLD-RTO: 0 /100 WBC
PHOSPHATE SERPL-MCNC: 3.3 MG/DL
PLATELET # BLD AUTO: 322 K/UL
PMV BLD AUTO: 9.6 FL
POCT GLUCOSE: 118 MG/DL (ref 70–110)
POTASSIUM SERPL-SCNC: 4 MMOL/L
PROT SERPL-MCNC: 5.3 G/DL
RBC # BLD AUTO: 3.02 M/UL
SODIUM SERPL-SCNC: 140 MMOL/L
WBC # BLD AUTO: 11.68 K/UL

## 2019-01-08 PROCEDURE — 63600175 PHARM REV CODE 636 W HCPCS: Performed by: NURSE PRACTITIONER

## 2019-01-08 PROCEDURE — 27800903 OPTIME MED/SURG SUP & DEVICES OTHER IMPLANTS: Performed by: SURGERY

## 2019-01-08 PROCEDURE — 27201423 OPTIME MED/SURG SUP & DEVICES STERILE SUPPLY: Performed by: SURGERY

## 2019-01-08 PROCEDURE — 94003 VENT MGMT INPAT SUBQ DAY: CPT

## 2019-01-08 PROCEDURE — D9220A PRA ANESTHESIA: ICD-10-PCS | Mod: CRNA,,, | Performed by: NURSE ANESTHETIST, CERTIFIED REGISTERED

## 2019-01-08 PROCEDURE — 20000000 HC ICU ROOM

## 2019-01-08 PROCEDURE — 85025 COMPLETE CBC W/AUTO DIFF WBC: CPT

## 2019-01-08 PROCEDURE — D9220A PRA ANESTHESIA: Mod: ANES,,, | Performed by: ANESTHESIOLOGY

## 2019-01-08 PROCEDURE — A4216 STERILE WATER/SALINE, 10 ML: HCPCS | Performed by: PSYCHIATRY & NEUROLOGY

## 2019-01-08 PROCEDURE — 43246 PR EGD, FLEX, W/PLCMT, GASTROSTOMY TUBE: ICD-10-PCS | Mod: 51,,, | Performed by: SURGERY

## 2019-01-08 PROCEDURE — 83735 ASSAY OF MAGNESIUM: CPT

## 2019-01-08 PROCEDURE — 37000009 HC ANESTHESIA EA ADD 15 MINS: Performed by: SURGERY

## 2019-01-08 PROCEDURE — 80053 COMPREHEN METABOLIC PANEL: CPT

## 2019-01-08 PROCEDURE — 93010 ELECTROCARDIOGRAM REPORT: CPT | Mod: ,,, | Performed by: INTERNAL MEDICINE

## 2019-01-08 PROCEDURE — 93005 ELECTROCARDIOGRAM TRACING: CPT

## 2019-01-08 PROCEDURE — 99900035 HC TECH TIME PER 15 MIN (STAT)

## 2019-01-08 PROCEDURE — 27200966 HC CLOSED SUCTION SYSTEM

## 2019-01-08 PROCEDURE — 36000707: Performed by: SURGERY

## 2019-01-08 PROCEDURE — 25000003 PHARM REV CODE 250: Performed by: PHYSICIAN ASSISTANT

## 2019-01-08 PROCEDURE — 25000003 PHARM REV CODE 250: Performed by: NURSE PRACTITIONER

## 2019-01-08 PROCEDURE — S0028 INJECTION, FAMOTIDINE, 20 MG: HCPCS | Performed by: NURSE ANESTHETIST, CERTIFIED REGISTERED

## 2019-01-08 PROCEDURE — 36000706: Performed by: SURGERY

## 2019-01-08 PROCEDURE — 25000003 PHARM REV CODE 250: Performed by: STUDENT IN AN ORGANIZED HEALTH CARE EDUCATION/TRAINING PROGRAM

## 2019-01-08 PROCEDURE — 25000003 PHARM REV CODE 250: Performed by: NURSE ANESTHETIST, CERTIFIED REGISTERED

## 2019-01-08 PROCEDURE — 43246 EGD PLACE GASTROSTOMY TUBE: CPT | Mod: 51,,, | Performed by: SURGERY

## 2019-01-08 PROCEDURE — 84100 ASSAY OF PHOSPHORUS: CPT

## 2019-01-08 PROCEDURE — D9220A PRA ANESTHESIA: ICD-10-PCS | Mod: ANES,,, | Performed by: ANESTHESIOLOGY

## 2019-01-08 PROCEDURE — 31600 PLANNED TRACHEOSTOMY: CPT | Mod: ,,, | Performed by: SURGERY

## 2019-01-08 PROCEDURE — 25000003 PHARM REV CODE 250: Performed by: PSYCHIATRY & NEUROLOGY

## 2019-01-08 PROCEDURE — 93010 EKG 12-LEAD: ICD-10-PCS | Mod: ,,, | Performed by: INTERNAL MEDICINE

## 2019-01-08 PROCEDURE — 94640 AIRWAY INHALATION TREATMENT: CPT

## 2019-01-08 PROCEDURE — 99291 PR CRITICAL CARE, E/M 30-74 MINUTES: ICD-10-PCS | Mod: GC,,, | Performed by: PSYCHIATRY & NEUROLOGY

## 2019-01-08 PROCEDURE — 99291 CRITICAL CARE FIRST HOUR: CPT | Mod: GC,,, | Performed by: PSYCHIATRY & NEUROLOGY

## 2019-01-08 PROCEDURE — D9220A PRA ANESTHESIA: Mod: CRNA,,, | Performed by: NURSE ANESTHETIST, CERTIFIED REGISTERED

## 2019-01-08 PROCEDURE — 94668 MNPJ CHEST WALL SBSQ: CPT

## 2019-01-08 PROCEDURE — 94761 N-INVAS EAR/PLS OXIMETRY MLT: CPT

## 2019-01-08 PROCEDURE — 63600175 PHARM REV CODE 636 W HCPCS: Performed by: STUDENT IN AN ORGANIZED HEALTH CARE EDUCATION/TRAINING PROGRAM

## 2019-01-08 PROCEDURE — 31600 PR TRACHEOSTOMY, PLANNED: ICD-10-PCS | Mod: ,,, | Performed by: SURGERY

## 2019-01-08 PROCEDURE — 37000008 HC ANESTHESIA 1ST 15 MINUTES: Performed by: SURGERY

## 2019-01-08 PROCEDURE — 27000221 HC OXYGEN, UP TO 24 HOURS

## 2019-01-08 PROCEDURE — 99900026 HC AIRWAY MAINTENANCE (STAT)

## 2019-01-08 PROCEDURE — 25000242 PHARM REV CODE 250 ALT 637 W/ HCPCS: Performed by: NURSE PRACTITIONER

## 2019-01-08 PROCEDURE — 63600175 PHARM REV CODE 636 W HCPCS: Performed by: NURSE ANESTHETIST, CERTIFIED REGISTERED

## 2019-01-08 RX ORDER — MIDAZOLAM HYDROCHLORIDE 1 MG/ML
INJECTION, SOLUTION INTRAMUSCULAR; INTRAVENOUS
Status: DISCONTINUED | OUTPATIENT
Start: 2019-01-08 | End: 2019-01-08

## 2019-01-08 RX ORDER — FENTANYL CITRATE 50 UG/ML
INJECTION, SOLUTION INTRAMUSCULAR; INTRAVENOUS
Status: DISCONTINUED | OUTPATIENT
Start: 2019-01-08 | End: 2019-01-08

## 2019-01-08 RX ORDER — FAMOTIDINE 10 MG/ML
INJECTION INTRAVENOUS
Status: DISCONTINUED | OUTPATIENT
Start: 2019-01-08 | End: 2019-01-08

## 2019-01-08 RX ORDER — ONDANSETRON 2 MG/ML
INJECTION INTRAMUSCULAR; INTRAVENOUS
Status: DISCONTINUED | OUTPATIENT
Start: 2019-01-08 | End: 2019-01-08

## 2019-01-08 RX ORDER — PROPOFOL 10 MG/ML
VIAL (ML) INTRAVENOUS
Status: DISCONTINUED | OUTPATIENT
Start: 2019-01-08 | End: 2019-01-08

## 2019-01-08 RX ORDER — PHENYLEPHRINE HYDROCHLORIDE 10 MG/ML
INJECTION INTRAVENOUS
Status: DISCONTINUED | OUTPATIENT
Start: 2019-01-08 | End: 2019-01-08

## 2019-01-08 RX ADMIN — PHENYLEPHRINE HYDROCHLORIDE 100 MCG: 10 INJECTION INTRAVENOUS at 01:01

## 2019-01-08 RX ADMIN — METOPROLOL TARTRATE 25 MG: 25 TABLET ORAL at 09:01

## 2019-01-08 RX ADMIN — ALBUTEROL SULFATE 2.5 MG: 2.5 SOLUTION RESPIRATORY (INHALATION) at 11:01

## 2019-01-08 RX ADMIN — HEPARIN SODIUM 5000 UNITS: 5000 INJECTION, SOLUTION INTRAVENOUS; SUBCUTANEOUS at 09:01

## 2019-01-08 RX ADMIN — SODIUM CHLORIDE, SODIUM GLUCONATE, SODIUM ACETATE, POTASSIUM CHLORIDE, MAGNESIUM CHLORIDE, SODIUM PHOSPHATE, DIBASIC, AND POTASSIUM PHOSPHATE: .53; .5; .37; .037; .03; .012; .00082 INJECTION, SOLUTION INTRAVENOUS at 02:01

## 2019-01-08 RX ADMIN — FENTANYL CITRATE 25 MCG: 50 INJECTION INTRAMUSCULAR; INTRAVENOUS at 04:01

## 2019-01-08 RX ADMIN — HYDRALAZINE HYDROCHLORIDE 10 MG: 20 INJECTION INTRAMUSCULAR; INTRAVENOUS at 10:01

## 2019-01-08 RX ADMIN — STANDARDIZED SENNA CONCENTRATE AND DOCUSATE SODIUM 1 TABLET: 8.6; 5 TABLET, FILM COATED ORAL at 09:01

## 2019-01-08 RX ADMIN — CHLORHEXIDINE GLUCONATE 0.12% ORAL RINSE 15 ML: 1.2 LIQUID ORAL at 12:01

## 2019-01-08 RX ADMIN — ALBUTEROL SULFATE 2.5 MG: 2.5 SOLUTION RESPIRATORY (INHALATION) at 08:01

## 2019-01-08 RX ADMIN — CHLORHEXIDINE GLUCONATE 0.12% ORAL RINSE 15 ML: 1.2 LIQUID ORAL at 04:01

## 2019-01-08 RX ADMIN — PHENYLEPHRINE HYDROCHLORIDE 0.3 MCG/KG/MIN: 10 INJECTION INTRAVENOUS at 02:01

## 2019-01-08 RX ADMIN — SODIUM CHLORIDE, SODIUM GLUCONATE, SODIUM ACETATE, POTASSIUM CHLORIDE, MAGNESIUM CHLORIDE, SODIUM PHOSPHATE, DIBASIC, AND POTASSIUM PHOSPHATE: .53; .5; .37; .037; .03; .012; .00082 INJECTION, SOLUTION INTRAVENOUS at 01:01

## 2019-01-08 RX ADMIN — LACOSAMIDE 200 MG: 50 TABLET, FILM COATED ORAL at 08:01

## 2019-01-08 RX ADMIN — ALBUTEROL SULFATE 2.5 MG: 2.5 SOLUTION RESPIRATORY (INHALATION) at 03:01

## 2019-01-08 RX ADMIN — Medication 3 ML: at 07:01

## 2019-01-08 RX ADMIN — MIDAZOLAM HYDROCHLORIDE 2 MG: 1 INJECTION, SOLUTION INTRAMUSCULAR; INTRAVENOUS at 01:01

## 2019-01-08 RX ADMIN — HYDRALAZINE HYDROCHLORIDE 10 MG: 20 INJECTION INTRAMUSCULAR; INTRAVENOUS at 12:01

## 2019-01-08 RX ADMIN — FENTANYL CITRATE 25 MCG: 50 INJECTION INTRAMUSCULAR; INTRAVENOUS at 07:01

## 2019-01-08 RX ADMIN — FENTANYL CITRATE 25 MCG: 50 INJECTION INTRAMUSCULAR; INTRAVENOUS at 10:01

## 2019-01-08 RX ADMIN — URSODIOL 300 MG: 300 CAPSULE ORAL at 09:01

## 2019-01-08 RX ADMIN — CHLORHEXIDINE GLUCONATE 0.12% ORAL RINSE 15 ML: 1.2 LIQUID ORAL at 09:01

## 2019-01-08 RX ADMIN — Medication 3 ML: at 03:01

## 2019-01-08 RX ADMIN — SODIUM CHLORIDE SOLN NEBU 3% 4 ML: 3 NEBU SOLN at 03:01

## 2019-01-08 RX ADMIN — SODIUM CHLORIDE SOLN NEBU 3% 4 ML: 3 NEBU SOLN at 11:01

## 2019-01-08 RX ADMIN — POLYETHYLENE GLYCOL 3350 17 G: 17 POWDER, FOR SOLUTION ORAL at 09:01

## 2019-01-08 RX ADMIN — FAMOTIDINE 20 MG: 10 INJECTION, SOLUTION INTRAVENOUS at 01:01

## 2019-01-08 RX ADMIN — HYDRALAZINE HYDROCHLORIDE 10 MG: 20 INJECTION INTRAMUSCULAR; INTRAVENOUS at 01:01

## 2019-01-08 RX ADMIN — SODIUM CHLORIDE SOLN NEBU 3% 4 ML: 3 NEBU SOLN at 07:01

## 2019-01-08 RX ADMIN — Medication 3 ML: at 09:01

## 2019-01-08 RX ADMIN — LACOSAMIDE 200 MG: 50 TABLET, FILM COATED ORAL at 09:01

## 2019-01-08 RX ADMIN — ALBUTEROL SULFATE 2.5 MG: 2.5 SOLUTION RESPIRATORY (INHALATION) at 07:01

## 2019-01-08 RX ADMIN — ALBUTEROL SULFATE 2.5 MG: 2.5 SOLUTION RESPIRATORY (INHALATION) at 04:01

## 2019-01-08 RX ADMIN — METOPROLOL TARTRATE 25 MG: 25 TABLET ORAL at 07:01

## 2019-01-08 RX ADMIN — SODIUM CHLORIDE SOLN NEBU 3% 4 ML: 3 NEBU SOLN at 04:01

## 2019-01-08 RX ADMIN — FENTANYL CITRATE 25 MCG: 50 INJECTION INTRAMUSCULAR; INTRAVENOUS at 12:01

## 2019-01-08 RX ADMIN — ONDANSETRON 4 MG: 2 INJECTION INTRAMUSCULAR; INTRAVENOUS at 01:01

## 2019-01-08 RX ADMIN — PRAVASTATIN SODIUM 40 MG: 40 TABLET ORAL at 09:01

## 2019-01-08 RX ADMIN — PHENYLEPHRINE HYDROCHLORIDE 200 MCG: 10 INJECTION INTRAVENOUS at 01:01

## 2019-01-08 RX ADMIN — FENTANYL CITRATE 100 MCG: 50 INJECTION, SOLUTION INTRAMUSCULAR; INTRAVENOUS at 01:01

## 2019-01-08 RX ADMIN — SODIUM CHLORIDE SOLN NEBU 3% 4 ML: 3 NEBU SOLN at 08:01

## 2019-01-08 RX ADMIN — PROPOFOL 30 MG: 10 INJECTION, EMULSION INTRAVENOUS at 01:01

## 2019-01-08 RX ADMIN — FAMOTIDINE 20 MG: 20 TABLET ORAL at 09:01

## 2019-01-08 RX ADMIN — HYDRALAZINE HYDROCHLORIDE 10 MG: 20 INJECTION INTRAMUSCULAR; INTRAVENOUS at 08:01

## 2019-01-08 NOTE — PLAN OF CARE
Problem: Adult Inpatient Plan of Care  Goal: Patient-Specific Goal (Individualization)  Admit Date: 12/20/2018  2:15 PM    Admit Diagnosis: Subarachnoid hemorrhage [I60.9]  Subdural hematoma [S06.5X9A]  SDH (subdural hematoma) [S06.5X9A]  Subdural hematoma [S06.5X9A]  Subdural hematoma [S06.5X9A]    Past Medical History: Past Medical History:  No date: Coronary artery disease  No date: CVD (cardiovascular disease)  No date: Diabetes mellitus type II  No date: Hyperlipidemia  No date: Hypertension  No date: Ischemic cardiomyopathy  No date: Obesity  No date: Obesity, morbid    Past Surgical History: Past Surgical History:  No date: APPENDECTOMY  No date: CORONARY ARTERY BYPASS GRAFT  12/20/2018: CRANIOTOMY, FOR SUBDURAL HEMATOMA EVACUATION; Left      Comment:  Performed by Stevan Hull MD at [facility] OR [address] [place] [address]  No date: HEMORRHOID SURGERY    Individualization:  1. Patient's likes old western movies on T  2. LUE soft wrist restraint expires 1/8/19 at 0123 for removing medical devices.  Fidelia Paredes RN 01/06/2019     Outcome: Ongoing (interventions implemented as appropriate)  POC reviewed with pt at 0500. Pt unable to verbalize understanding. Questions and concerns addressed. No acute events overnight. Pt progressing toward goals. Will continue to monitor. See flowsheets for full assessment and VS info     Trach/Peg procedure today 1/8/2019  Tube Feeds have been off since 1/7 1700  Pt required 1 x PRN dose of hydralazine to maintain SBP<180

## 2019-01-08 NOTE — SUBJECTIVE & OBJECTIVE
Interval History: No issues overnight except for tachycardia. No hypotension. Tube feeds held since 1/7/19 morning.    Medications:  Continuous Infusions:  Scheduled Meds:   albuterol sulfate  2.5 mg Nebulization Q4H    chlorhexidine  15 mL Mouth/Throat QID    famotidine  20 mg Per NG tube BID    heparin (porcine)  5,000 Units Subcutaneous Q8H    lacosamide  200 mg Per NG tube BID    metoprolol tartrate  25 mg Per NG tube Q8H    polyethylene glycol  17 g Per NG tube BID    pravastatin  40 mg Per NG tube QHS    senna-docusate 8.6-50 mg  1 tablet Per NG tube BID    sodium chloride 0.9%  3 mL Intravenous Q8H    sodium chloride 3%  4 mL Nebulization Q4H    ursodiol  300 mg Per NG tube TID     PRN Meds:acetaminophen, fentaNYL, hydrALAZINE, magnesium oxide, magnesium oxide, ondansetron, potassium chloride 10%, potassium chloride 10%, potassium chloride 10%, potassium, sodium phosphates, potassium, sodium phosphates, potassium, sodium phosphates     Review of patient's allergies indicates:   Allergen Reactions    Levaquin [levofloxacin] Nausea Only     Objective:     Vital Signs (Most Recent):  Temp: 98.7 °F (37.1 °C) (01/08/19 0701)  Pulse: (!) 120 (01/08/19 0800)  Resp: 17 (01/08/19 0800)  BP: 108/72 (01/08/19 0800)  SpO2: 99 % (01/08/19 0800) Vital Signs (24h Range):  Temp:  [98.5 °F (36.9 °C)-98.9 °F (37.2 °C)] 98.7 °F (37.1 °C)  Pulse:  [] 120  Resp:  [5-28] 17  SpO2:  [96 %-100 %] 99 %  BP: (108-186)/() 108/72     Weight: 110.9 kg (244 lb 7.8 oz)  Body mass index is 34.1 kg/m².    Intake/Output - Last 3 Shifts       01/06 0700 - 01/07 0659 01/07 0700 - 01/08 0659 01/08 0700 - 01/09 0659    I.V. (mL/kg) 283.8 (2.6) 120.3 (1.1) 10 (0.1)    NG/GT 2360 1080     IV Piggyback 550      Total Intake(mL/kg) 3193.8 (28.8) 1200.3 (10.8) 10 (0.1)    Urine (mL/kg/hr) 5475 (2.1) 3650 (1.4)     Drains  120     Stool 0 0     Total Output 5475 3770     Net -2281.2 -2569.7 +10           Stool Occurrence 0 x 0  x 0 x          Physical Exam   Constitutional: He appears well-developed and well-nourished.   HENT:   Head: Normocephalic and atraumatic.   Cardiovascular: Regular rhythm. Tachycardia present.   Pulmonary/Chest:   Mechanically ventilated on minimal vent settings   Abdominal: Soft. He exhibits no distension. There is no tenderness.   Musculoskeletal: Normal range of motion.   Neurological: He is alert.   Skin: Skin is warm and dry.       Significant Labs:  CBC:   Recent Labs   Lab 01/08/19  0158   WBC 11.68   RBC 3.02*   HGB 9.3*   HCT 30.0*      MCV 99*   MCH 30.8   MCHC 31.0*     BMP:   Recent Labs   Lab 01/08/19  0158   GLU 91      K 4.0      CO2 24   BUN 26*   CREATININE 0.5   CALCIUM 8.5*   MG 1.7       Significant Diagnostics:  CXR 1/8/19:     FINDINGS:  Endotracheal tube tip lies immediately above the level of the abby.  Allowing for differences in patient positioning, there has been no significant interval change in the appearance of the chest since 01/07/2019.  No pneumothorax.      Impression       As above

## 2019-01-08 NOTE — SUBJECTIVE & OBJECTIVE
Interval History: NAEON    Medications:  Continuous Infusions:  Scheduled Meds:   albuterol sulfate  2.5 mg Nebulization Q4H    chlorhexidine  15 mL Mouth/Throat QID    famotidine  20 mg Per NG tube BID    heparin (porcine)  5,000 Units Subcutaneous Q8H    lacosamide  200 mg Per NG tube BID    metoprolol tartrate  25 mg Per NG tube Q8H    polyethylene glycol  17 g Per NG tube BID    pravastatin  40 mg Per NG tube QHS    senna-docusate 8.6-50 mg  1 tablet Per NG tube BID    sodium chloride 0.9%  3 mL Intravenous Q8H    sodium chloride 3%  4 mL Nebulization Q4H    ursodiol  300 mg Per NG tube TID     PRN Meds:acetaminophen, fentaNYL, hydrALAZINE, magnesium oxide, magnesium oxide, ondansetron, potassium chloride 10%, potassium chloride 10%, potassium chloride 10%, potassium, sodium phosphates, potassium, sodium phosphates, potassium, sodium phosphates       Objective:     Weight: 110.9 kg (244 lb 7.8 oz)  Body mass index is 34.1 kg/m².  Vital Signs (Most Recent):  Temp: 97.2 °F (36.2 °C)(Patient arrived from OR (trach/peg)) (01/08/19 1530)  Pulse: (!) 116 (01/08/19 1600)  Resp: (!) 22 (01/08/19 1600)  BP: (!) 191/99 (01/08/19 1600)  SpO2: 96 % (01/08/19 1600) Vital Signs (24h Range):  Temp:  [95.9 °F (35.5 °C)-98.7 °F (37.1 °C)] 97.2 °F (36.2 °C)  Pulse:  [101-122] 116  Resp:  [5-32] 22  SpO2:  [95 %-100 %] 96 %  BP: (108-201)/() 191/99     Date 01/08/19 0700 - 01/09/19 0659   Shift 5051-0774 0099-0717 8152-0916 24 Hour Total   INTAKE   I.V.(mL/kg) 1035(9.3)   1035(9.3)   Shift Total(mL/kg) 1035(9.3)   1035(9.3)   OUTPUT   Urine(mL/kg/hr) 525(0.6)   525   Shift Total(mL/kg) 525(4.7)   525(4.7)   Weight (kg) 110.9 110.9 110.9 110.9              Vent Mode: A/C  Oxygen Concentration (%):  [30-40] 30  Resp Rate Total:  [16 br/min-28 br/min] 28 br/min  Vt Set:  [450 mL] 450 mL  PEEP/CPAP:  [5 cmH20] 5 cmH20  Mean Airway Pressure:  [8 bqW69-52 cmH20] 8.4 cmH20         NG/OG Tube 01/01/19 [place] Right  "nostril (Active)   Placement Check placement verified by aspirate characteristics;placement verified by distal tube length measurement;placement verified by x-ray 1/7/2019 11:00 AM   Distal Tube Length (cm) 75 1/7/2019  7:01 AM   Tolerance signs/symptoms of discomfort 1/7/2019 11:00 AM   Securement anchored to nostril center w/ adhesive device 1/7/2019 11:00 AM   Clamp Status/Tolerance unclamped 1/7/2019 11:00 AM   Insertion Site Appearance no redness, warmth, tenderness, skin breakdown, drainage 1/7/2019  3:00 AM   Flush/Irrigation flushed w/;water;no resistance met 1/7/2019 11:00 AM   Feeding Method continuous 1/7/2019 11:00 AM   Feeding Action feeding continued 1/7/2019 11:00 AM   Current Rate (mL/hr) 55 mL/hr 1/7/2019 11:00 AM   Goal Rate (mL/hr) 55 mL/hr 1/7/2019 11:00 AM   Intake (mL) 60 mL 1/6/2019 10:00 PM   Water Bolus (mL) 250 mL 1/7/2019 11:19 AM   Tube Output(mL)(Include Discarded Residual) 120 mL 1/7/2019  8:47 AM   Rate Formula Tube Feeding (mL/hr) 55 mL/hr 1/6/2019  7:05 PM   Formula Name Impact Peptide 1/7/2019 11:00 AM   Intake (mL) - Formula Tube Feeding 55 1/7/2019 12:00 PM   Residual Amount (ml) 7 ml 1/6/2019  3:00 PM            Urethral Catheter 01/05/19 1329 (Active)   Site Assessment Clean;Intact 1/7/2019 11:00 AM   Collection Container Urimeter 1/7/2019 11:00 AM   Securement Method secured to top of thigh w/ adhesive device 1/7/2019 11:00 AM   Catheter Care Performed yes 1/7/2019 11:00 AM   Reason for Continuing Urinary Catheterization Urinary retention;Critically ill in ICU requiring intensive monitoring 1/7/2019 11:00 AM   CAUTI Prevention Bundle StatLock in place w 1" slack;Intact seal between catheter & drainage tubing;Drainage bag off the floor;Green sheeting clip in use;No dependent loops or kinks;Drainage bag not overfilled (<2/3 full);Drainage bag below bladder 1/7/2019 11:00 AM   Output (mL) 650 mL 1/7/2019 12:00 PM   Bladder/Intra-Abdominal Pressure (mmHg) 900 mmHg 1/5/2019  1:29 PM "       Neurosurgery Physical Exam       E4VTM5, grimacing to stimulation  PERRL  MOORE spontaneously    Significant Labs:  Recent Labs   Lab 01/07/19  0210 01/08/19  0158    91    140   K 3.7 4.0    107   CO2 26 24   BUN 30* 26*   CREATININE 0.6 0.5   CALCIUM 8.3* 8.5*   MG 1.8 1.7     Recent Labs   Lab 01/07/19  0210 01/08/19  0158   WBC 11.45 11.68   HGB 8.8* 9.3*   HCT 27.0* 30.0*    322     No results for input(s): LABPT, INR, APTT in the last 48 hours.  Microbiology Results (last 7 days)     Procedure Component Value Units Date/Time    Blood culture [[id number]] Collected:  01/03/19 1107    Order Status:  Completed Specimen:  Blood from Peripheral, Foot, Left Updated:  01/08/19 1412     Blood Culture, Routine No growth after 5 days.    Narrative:       Blood cultures x 2 different sites. 4 bottles total. Please  draw cultures before administering antibiotics.    Blood culture [[id number]] Collected:  01/03/19 1100    Order Status:  Completed Specimen:  Blood from Peripheral, Hand, Left Updated:  01/08/19 1412     Blood Culture, Routine No growth after 5 days.    Narrative:       Blood cultures from 2 different sites. 4 bottles total.  Please draw before starting antibiotics.    Culture, Respiratory with Gram Stain [[id number]] Collected:  01/03/19 1108    Order Status:  Completed Specimen:  Respiratory from Endotracheal Aspirate Updated:  01/05/19 1007     Respiratory Culture Normal respiratory donnell     Gram Stain (Respiratory) Few WBC's     Gram Stain (Respiratory) Rare Gram positive cocci

## 2019-01-08 NOTE — PROGRESS NOTES
[facility]  General Surgery  Progress Note    Subjective:     History of Present Illness:  Mr. Nunes is a 62 yo M with PMH of CABG on ASA 81 mg admitted after a fall with EDH with SDH now s/p craniotomy for evacuation (12/20) by Neurosurgery.    General surgery consulted for PEG and Trach. He was extubated on 1/2 and failed, currently intubated.        Had an appendectomy (open). No neck surgery.       Post-Op Info:  Procedure(s) (LRB):  CRANIOTOMY, FOR SUBDURAL HEMATOMA EVACUATION (Left)   19 Days Post-Op     Interval History: No issues overnight except for tachycardia. No hypotension. Tube feeds held since 1/7/19 morning.    Medications:  Continuous Infusions:  Scheduled Meds:   albuterol sulfate  2.5 mg Nebulization Q4H    chlorhexidine  15 mL Mouth/Throat QID    famotidine  20 mg Per NG tube BID    heparin (porcine)  5,000 Units Subcutaneous Q8H    lacosamide  200 mg Per NG tube BID    metoprolol tartrate  25 mg Per NG tube Q8H    polyethylene glycol  17 g Per NG tube BID    pravastatin  40 mg Per NG tube QHS    senna-docusate 8.6-50 mg  1 tablet Per NG tube BID    sodium chloride 0.9%  3 mL Intravenous Q8H    sodium chloride 3%  4 mL Nebulization Q4H    ursodiol  300 mg Per NG tube TID     PRN Meds:acetaminophen, fentaNYL, hydrALAZINE, magnesium oxide, magnesium oxide, ondansetron, potassium chloride 10%, potassium chloride 10%, potassium chloride 10%, potassium, sodium phosphates, potassium, sodium phosphates, potassium, sodium phosphates     Review of patient's allergies indicates:   Allergen Reactions    Levaquin [levofloxacin] Nausea Only     Objective:     Vital Signs (Most Recent):  Temp: 98.7 °F (37.1 °C) (01/08/19 0701)  Pulse: (!) 120 (01/08/19 0800)  Resp: 17 (01/08/19 0800)  BP: 108/72 (01/08/19 0800)  SpO2: 99 % (01/08/19 0800) Vital Signs (24h Range):  Temp:  [98.5 °F (36.9 °C)-98.9 °F (37.2 °C)] 98.7 °F (37.1 °C)  Pulse:  [] 120  Resp:  [5-28] 17  SpO2:   [96 %-100 %] 99 %  BP: (108-186)/() 108/72     Weight: 110.9 kg (244 lb 7.8 oz)  Body mass index is 34.1 kg/m².    Intake/Output - Last 3 Shifts       01/06 0700 - 01/07 0659 01/07 0700 - 01/08 0659 01/08 0700 - 01/09 0659    I.V. (mL/kg) 283.8 (2.6) 120.3 (1.1) 10 (0.1)    NG/GT 2360 1080     IV Piggyback 550      Total Intake(mL/kg) 3193.8 (28.8) 1200.3 (10.8) 10 (0.1)    Urine (mL/kg/hr) 5475 (2.1) 3650 (1.4)     Drains  120     Stool 0 0     Total Output 5475 3770     Net -2281.2 -2569.7 +10           Stool Occurrence 0 x 0 x 0 x          Physical Exam   Constitutional: He appears well-developed and well-nourished.   HENT:   Head: Normocephalic and atraumatic.   Cardiovascular: Regular rhythm. Tachycardia present.   Pulmonary/Chest:   Mechanically ventilated on minimal vent settings   Abdominal: Soft. He exhibits no distension. There is no tenderness.   Musculoskeletal: Normal range of motion.   Neurological: He is alert.   Skin: Skin is warm and dry.       Significant Labs:  CBC:   Recent Labs   Lab 01/08/19  0158   WBC 11.68   RBC 3.02*   HGB 9.3*   HCT 30.0*      MCV 99*   MCH 30.8   MCHC 31.0*     BMP:   Recent Labs   Lab 01/08/19  0158   GLU 91      K 4.0      CO2 24   BUN 26*   CREATININE 0.5   CALCIUM 8.5*   MG 1.7       Significant Diagnostics:  CXR 1/8/19:     FINDINGS:  Endotracheal tube tip lies immediately above the level of the abby.  Allowing for differences in patient positioning, there has been no significant interval change in the appearance of the chest since 01/07/2019.  No pneumothorax.      Impression       As above     Assessment/Plan:     Acute respiratory failure    62 yo M with history of CABG on ASA 81 mg admitted after a fall with EDH with SDH now s/p craniotomy for evacuation (12/20) by Neurosurgery.    - Will proceed with PEG and Trach today.  - Continue to hold tube feeds  - Consent obtained from wife.   - Medical management per primary team  - Will  follow         Ramon Ordonez MD  General Surgery  [facility]

## 2019-01-08 NOTE — ASSESSMENT & PLAN NOTE
64 yo M with history of CABG on ASA 81 mg admitted after a fall with EDH with SDH now s/p craniotomy for evacuation (12/20) by Neurosurgery.    - Will proceed with PEG and Trach today.  - Continue to hold tube feeds  - Consent obtained from wife.   - Medical management per primary team  - Will follow

## 2019-01-08 NOTE — PROGRESS NOTES
Pt arrived from OR trached with a 8.0 shiley and placed back on documented settings. Pt ambu bag and mask at bedside will continue to monitor

## 2019-01-08 NOTE — TRANSFER OF CARE
"Anesthesia Transfer of Care Note    Patient: Peweee Nunes    Procedure(s) Performed: Procedure(s) (LRB):  INSERTION, PEG TUBE (N/A)  CREATION, TRACHEOSTOMY (N/A)    Patient location: ICU    Anesthesia Type: general    Transport from OR: Transported from OR intubated on 100% O2 by AMBU with adequate controlled ventilation. Continuous SpO2 monitoring in transport. Continuous ECG monitoring in transport    Post pain: adequate analgesia    Post assessment: no apparent anesthetic complications and tolerated procedure well    Post vital signs: stable    Level of consciousness: sedated    Nausea/Vomiting: no nausea/vomiting    Complications: none          Last vitals:   Visit Vitals  /80   Pulse 110   Temp 36.9 °C (98.4 °F) (Oral)   Resp (!) 31   Ht 5' 11" (1.803 m)   Wt 110.9 kg (244 lb 7.8 oz)   SpO2 99%   BMI 34.10 kg/m²     "

## 2019-01-08 NOTE — PROGRESS NOTES
[facility]  Neurosurgery  Progress Note    Subjective:     History of Present Illness: 62 yo M with PMH of CAD, CABG in 2006, DM, HTN, HLD on ASA presents to the ED for AMS. Patient fell 4 days ago with about 2 minutes of LOC. Family is unsure of what caused the fall. Full workup done including a CTH at [facility] that was negative. Patient went home and stopped taking his ASA. This morning, his family noticed he was more confused. He went to [facility] and CTH was done that revealed a 1.7cm left frontalpariental SDH with a 5 mm right sided midline shift and a left SAH. Patient was transferred to [facility]. History was obtained per family as patient unable to give history 2/2 confusion with expressive and receptive aphasia.    Post-Op Info:  Procedure(s) (LRB):  INSERTION, PEG TUBE (N/A)  CREATION, TRACHEOSTOMY (N/A)   Day of Surgery     Interval History: NAEON    Medications:  Continuous Infusions:  Scheduled Meds:   albuterol sulfate  2.5 mg Nebulization Q4H    chlorhexidine  15 mL Mouth/Throat QID    famotidine  20 mg Per NG tube BID    heparin (porcine)  5,000 Units Subcutaneous Q8H    lacosamide  200 mg Per NG tube BID    metoprolol tartrate  25 mg Per NG tube Q8H    polyethylene glycol  17 g Per NG tube BID    pravastatin  40 mg Per NG tube QHS    senna-docusate 8.6-50 mg  1 tablet Per NG tube BID    sodium chloride 0.9%  3 mL Intravenous Q8H    sodium chloride 3%  4 mL Nebulization Q4H    ursodiol  300 mg Per NG tube TID     PRN Meds:acetaminophen, fentaNYL, hydrALAZINE, magnesium oxide, magnesium oxide, ondansetron, potassium chloride 10%, potassium chloride 10%, potassium chloride 10%, potassium, sodium phosphates, potassium, sodium phosphates, potassium, sodium phosphates       Objective:     Weight: 110.9 kg (244 lb 7.8 oz)  Body mass index is 34.1 kg/m².  Vital Signs (Most Recent):  Temp: 97.2 °F (36.2 °C)(Patient arrived from OR (trach/peg)) (01/08/19 1530)  Pulse: (!) 116  (01/08/19 1600)  Resp: (!) 22 (01/08/19 1600)  BP: (!) 191/99 (01/08/19 1600)  SpO2: 96 % (01/08/19 1600) Vital Signs (24h Range):  Temp:  [95.9 °F (35.5 °C)-98.7 °F (37.1 °C)] 97.2 °F (36.2 °C)  Pulse:  [101-122] 116  Resp:  [5-32] 22  SpO2:  [95 %-100 %] 96 %  BP: (108-201)/() 191/99     Date 01/08/19 0700 - 01/09/19 0659   Shift 0422-2079 0400-5240 7990-2918 24 Hour Total   INTAKE   I.V.(mL/kg) 1035(9.3)   1035(9.3)   Shift Total(mL/kg) 1035(9.3)   1035(9.3)   OUTPUT   Urine(mL/kg/hr) 525(0.6)   525   Shift Total(mL/kg) 525(4.7)   525(4.7)   Weight (kg) 110.9 110.9 110.9 110.9              Vent Mode: A/C  Oxygen Concentration (%):  [30-40] 30  Resp Rate Total:  [16 br/min-28 br/min] 28 br/min  Vt Set:  [450 mL] 450 mL  PEEP/CPAP:  [5 cmH20] 5 cmH20  Mean Airway Pressure:  [8 bsQ57-46 cmH20] 8.4 cmH20         NG/OG Tube 01/01/19 Robinson sump Right nostril (Active)   Placement Check placement verified by aspirate characteristics;placement verified by distal tube length measurement;placement verified by x-ray 1/7/2019 11:00 AM   Distal Tube Length (cm) 75 1/7/2019  7:01 AM   Tolerance signs/symptoms of discomfort 1/7/2019 11:00 AM   Securement anchored to nostril center w/ adhesive device 1/7/2019 11:00 AM   Clamp Status/Tolerance unclamped 1/7/2019 11:00 AM   Insertion Site Appearance no redness, warmth, tenderness, skin breakdown, drainage 1/7/2019  3:00 AM   Flush/Irrigation flushed w/;water;no resistance met 1/7/2019 11:00 AM   Feeding Method continuous 1/7/2019 11:00 AM   Feeding Action feeding continued 1/7/2019 11:00 AM   Current Rate (mL/hr) 55 mL/hr 1/7/2019 11:00 AM   Goal Rate (mL/hr) 55 mL/hr 1/7/2019 11:00 AM   Intake (mL) 60 mL 1/6/2019 10:00 PM   Water Bolus (mL) 250 mL 1/7/2019 11:19 AM   Tube Output(mL)(Include Discarded Residual) 120 mL 1/7/2019  8:47 AM   Rate Formula Tube Feeding (mL/hr) 55 mL/hr 1/6/2019  7:05 PM   Formula Name Impact Peptide 1/7/2019 11:00 AM   Intake (mL) - Formula Tube  "Feeding 55 1/7/2019 12:00 PM   Residual Amount (ml) 7 ml 1/6/2019  3:00 PM            Urethral Catheter 01/05/19 1329 (Active)   Site Assessment Clean;Intact 1/7/2019 11:00 AM   Collection Container Urimeter 1/7/2019 11:00 AM   Securement Method secured to top of thigh w/ adhesive device 1/7/2019 11:00 AM   Catheter Care Performed yes 1/7/2019 11:00 AM   Reason for Continuing Urinary Catheterization Urinary retention;Critically ill in ICU requiring intensive monitoring 1/7/2019 11:00 AM   CAUTI Prevention Bundle StatLock in place w 1" slack;Intact seal between catheter & drainage tubing;Drainage bag off the floor;Green sheeting clip in use;No dependent loops or kinks;Drainage bag not overfilled (<2/3 full);Drainage bag below bladder 1/7/2019 11:00 AM   Output (mL) 650 mL 1/7/2019 12:00 PM   Bladder/Intra-Abdominal Pressure (mmHg) 900 mmHg 1/5/2019  1:29 PM       Neurosurgery Physical Exam       [id number], grimacing to stimulation  PERRL  MOORE spontaneously    Significant Labs:  Recent Labs   Lab 01/07/19  0210 01/08/19  0158    91    140   K 3.7 4.0    107   CO2 26 24   BUN 30* 26*   CREATININE 0.6 0.5   CALCIUM 8.3* 8.5*   MG 1.8 1.7     Recent Labs   Lab 01/07/19  0210 01/08/19  0158   WBC 11.45 11.68   HGB 8.8* 9.3*   HCT 27.0* 30.0*    322     No results for input(s): LABPT, INR, APTT in the last 48 hours.  Microbiology Results (last 7 days)     Procedure Component Value Units Date/Time    Blood culture [[id number]] Collected:  01/03/19 1107    Order Status:  Completed Specimen:  Blood from Peripheral, Foot, Left Updated:  01/08/19 1412     Blood Culture, Routine No growth after 5 days.    Narrative:       Blood cultures x 2 different sites. 4 bottles total. Please  draw cultures before administering antibiotics.    Blood culture [[id number]] Collected:  01/03/19 1100    Order Status:  Completed Specimen:  Blood from Peripheral, Hand, Left Updated:  01/08/19 1412     Blood Culture, Routine No " growth after 5 days.    Narrative:       Blood cultures from 2 different sites. 4 bottles total.  Please draw before starting antibiotics.    Culture, Respiratory with Gram Stain [[id number]] Collected:  01/03/19 1108    Order Status:  Completed Specimen:  Respiratory from Endotracheal Aspirate Updated:  01/05/19 1007     Respiratory Culture Normal respiratory donnell     Gram Stain (Respiratory) Few WBC's     Gram Stain (Respiratory) Rare Gram positive cocci            Assessment/Plan:     * SDH (subdural hematoma)    64 yo male with L aSDH now s/p craniotomy for evacuation (12/20).    --Continue neurocheck: stable  --Continue care per primary team.  --PEG/TRACH planned for today per gen surgery.  --Staples removed, incision healing well, well approximated w/o erythema/drainage.   --We will continue to monitor closely, please contact us with any questions or concerns       Dustin Chandra MD  Neurosurgery  [facility]

## 2019-01-08 NOTE — TREATMENT PLAN
Treatment Plan  General Surgery    Plan for PEG tube placement and tracheostomy tomorrow in OR. Consent obtained from wife on 1/4/19. Please hold tube feeds at midnight.    Ramon Ordonez MD  General Surgery  PGY-5  [phone] (pager)

## 2019-01-08 NOTE — PROGRESS NOTES
[facility]  Neurocritical Care  Progress Note    Admit Date: 12/20/2018  Service Date: 01/08/2019  Length of Stay: 19    Subjective:     Chief Complaint: SDH (subdural hematoma)    History of Present Illness: 63 year old male h/o CABG on ASA 81 mg (last dose 72 hours PTA) presenting as transfer from OSH for EDH with SDH. Patient reportedly had a mechanical fall from standing on Sunday and went to ED where CTH was negative for any acute intracranial pathology. Overnight, patient developed confusion and presented to OSH ED where CTH was consistent with EDH with SDH. Patient has progressively worsened throughout the day developing intermittent global aphasia. [place] [facility] [place] been ordered. NCC consulted for admission and medical management. Patient on weaning off of cardene at time of evaluation with sbp of 120. Reported presenting sbp in 150s.    Hospital Course: 12/22: no major events overnight. Weaned off of cardene. This morning AOx3. Drain removed by NSGY at 11 AM, and planned to step down to NSGY.  AMS noted around 1PM, patient aphasic and not following commands, STAT CTH unremarkable.   12/26: wean ketamine, follow EEG, send urine studuies-Na 133, NS bolus, advance TF  12/27: off ketamine, d/c levo, continue EEG, and AEDs, 500 NS bolus, increase enteral water flushes, continue abx for another day  1/2: extubation trial.   1/3: failed extubation trial. Plan for trach and peg  1/4: plan for trach, general surgery consult  1/5: NAEO  1/6: NAEO. Awaiting trach and peg  1/7: plan for trach/peg, opens eyes to voice no commands  1/8: s/p trach/peg today    Interval History:      No events overnight  Mental status slowly improving    Objective:     Vitals:  Temp: (!) 95.9 °F (35.5 °C)  Pulse: (!) 117  Rhythm: atrial rhythm  BP: (!) 175/108  MAP (mmHg): 95  Resp: (!) 29  SpO2: 96 %  Oxygen Concentration (%): 30  O2 Device (Oxygen Therapy): ventilator  Vent Mode: A/C  Set  Rate: 16 bmp  Vt Set: 450 mL  PEEP/CPAP: 5 cmH20  Peak Airway Pressure: 20 cmH2O  Mean Airway Pressure: 8.4 cmH20  Plateau Pressure: 0 cmH20    Temp  Min: 95.9 °F (35.5 °C)  Max: 98.7 °F (37.1 °C)  Pulse  Min: 101  Max: 122  BP  Min: 108/72  Max: 201/104  MAP (mmHg)  Min: 84  Max: 149  Resp  Min: 5  Max: 32  SpO2  Min: 96 %  Max: 100 %  Oxygen Concentration (%)  Min: 30  Max: 40    01/07 0701 - 01/08 0700  In: 1200.3 [I.V.:120.3]  Out: 3770 [Urine:3650; Drains:120]   Unmeasured Output  Stool Occurrence: 0       Physical Exam  Vital signs, lab studies, and imaging reviewed by me  lethargic, opens eyes to voice, no commands, spontaneously moves all ext  Warm and well perfused  No dependent edema  Breathing comfortably on ac, breath sounds diminished at bases  Belly soft, nontender, no hepatosplenomegaly    Medications:  Continuous Scheduled  albuterol sulfate 2.5 mg Q4H   chlorhexidine 15 mL QID   famotidine 20 mg BID   heparin (porcine) 5,000 Units Q8H   lacosamide 200 mg BID   metoprolol tartrate 25 mg Q8H   polyethylene glycol 17 g BID   pravastatin 40 mg QHS   senna-docusate 8.6-50 mg 1 tablet BID   sodium chloride 0.9% 3 mL Q8H   sodium chloride 3% 4 mL Q4H   ursodiol 300 mg TID   PRN  acetaminophen 650 mg Q6H PRN   fentaNYL 25 mcg Q2H PRN   hydrALAZINE 10 mg Q4H PRN   magnesium oxide 800 mg PRN   magnesium oxide 800 mg PRN   ondansetron 4 mg Q12H PRN   potassium chloride 10% 40 mEq PRN   potassium chloride 10% 40 mEq PRN   potassium chloride 10% 60 mEq PRN   potassium, sodium phosphates 2 packet PRN   potassium, sodium phosphates 2 packet PRN   potassium, sodium phosphates 2 packet PRN         Diet  No diet orders on file  No diet orders on file        Assessment/Plan:     Neuro   * SDH (subdural hematoma)    Epidural with subdural component s/p evac 12/20  Repeat cth stable  Mri with punctate stroke    nsgy following  Pt/ot/slp when able       Status epilepticus    Mental status with some improvement     Cont  lacosamide  Spot eeg      Pulmonary   Acute respiratory failure    Now on minimal vent setting    trach/peg today       Cardiac/Vascular   New onset atrial fibrillation    Rate controlled on bb    Monitor off ac           Full Code    I spent 30 min of uninterrupted critical care time caring for this critically ill patient exclusive of procedures and teaching.       Kenneth Decker MD  Neurocritical Care  [facility]

## 2019-01-08 NOTE — SUBJECTIVE & OBJECTIVE
Interval History:      No events overnight  Mental status slowly improving    Objective:     Vitals:  Temp: (!) 95.9 °F (35.5 °C)  Pulse: (!) 117  Rhythm: atrial rhythm  BP: (!) 175/108  MAP (mmHg): 95  Resp: (!) 29  SpO2: 96 %  Oxygen Concentration (%): 30  O2 Device (Oxygen Therapy): ventilator  Vent Mode: A/C  Set Rate: 16 bmp  Vt Set: 450 mL  PEEP/CPAP: 5 cmH20  Peak Airway Pressure: 20 cmH2O  Mean Airway Pressure: 8.4 cmH20  Plateau Pressure: 0 cmH20    Temp  Min: 95.9 °F (35.5 °C)  Max: 98.7 °F (37.1 °C)  Pulse  Min: 101  Max: 122  BP  Min: 108/72  Max: 201/104  MAP (mmHg)  Min: 84  Max: 149  Resp  Min: 5  Max: 32  SpO2  Min: 96 %  Max: 100 %  Oxygen Concentration (%)  Min: 30  Max: 40    01/07 0701 - 01/08 0700  In: 1200.3 [I.V.:120.3]  Out: 3770 [Urine:3650; Drains:120]   Unmeasured Output  Stool Occurrence: 0       Physical Exam  Vital signs, lab studies, and imaging reviewed by me  lethargic, opens eyes to voice, no commands, spontaneously moves all ext  Warm and well perfused  No dependent edema  Breathing comfortably on ac, breath sounds diminished at bases  Belly soft, nontender, no hepatosplenomegaly    Medications:  Continuous Scheduled  albuterol sulfate 2.5 mg Q4H   chlorhexidine 15 mL QID   famotidine 20 mg BID   heparin (porcine) 5,000 Units Q8H   lacosamide 200 mg BID   metoprolol tartrate 25 mg Q8H   polyethylene glycol 17 g BID   pravastatin 40 mg QHS   senna-docusate 8.6-50 mg 1 tablet BID   sodium chloride 0.9% 3 mL Q8H   sodium chloride 3% 4 mL Q4H   ursodiol 300 mg TID   PRN  acetaminophen 650 mg Q6H PRN   fentaNYL 25 mcg Q2H PRN   hydrALAZINE 10 mg Q4H PRN   magnesium oxide 800 mg PRN   magnesium oxide 800 mg PRN   ondansetron 4 mg Q12H PRN   potassium chloride 10% 40 mEq PRN   potassium chloride 10% 40 mEq PRN   potassium chloride 10% 60 mEq PRN   potassium, sodium phosphates 2 packet PRN   potassium, sodium phosphates 2 packet PRN   potassium, sodium phosphates 2 packet PRN          Diet  No diet orders on file  No diet orders on file

## 2019-01-08 NOTE — OP NOTE
DATE OF PROCEDURE: 01/08/2019    PREOPERATIVE DIAGNOSES:   1. Respiratory failure.   2. Dysphagia.    POSTOPERATIVE DIAGNOSES:   1. Respiratory failure.   2. Dysphagia.     PROCEDURES PERFORMED:   1. tracheostomy.   2. Esophagogastroduodenoscopy with percutaneous endoscopic gastrostomy.    ATTENDING SURGEON: Andrew Durbin MD     HOUSESTAFF SURGEON: None    : None    ANESTHESIA: General endotracheal.     ESTIMATED BLOOD LOSS: 22 mL     FINDINGS: A #8 Shiley tracheostomy and 20-Bulgarian percutaneous gastrostomy   placed without apparent complication.     SPECIMEN: None.     DRAINS: None.     COMPLICATIONS: None.     INDICATIONS: Peewee Nunes is a 63 y.o.male admitted to [facility]. The patient has failed attempts to wean from the ventilator. We have recommended to the family that the patient would benefit from placement of a  tracheostomy tube for the anticipated prolonged need for   mechanical ventilation. We also recommend a percutaneous gastrostomy tube for the patient's dysphagia. We did obtain informed consent from the patient's family who expressed understanding of the risks and benefits and gave consent to proceed.     OPERATIVE PROCEDURE:  The patient was identified and monitored throughout. Appropriate position with neck in extension; anterior neck was prepped and draped. We identified our tracheal landmarks, made a 2 cm midline cervical incision. Subcutaneous tissue was dissected with electrocautery down to the level of the trachea.  A tracheotomy was made with an 11 blade scalpel, a superior 2-0 prolene stay suture was then placed.  The endotracheal tube removed under direct visualization.  An 8 cuffed Shiley tracheostomy tube placed and the cuff inflated.  There was good end tidal CO2 return and the patient maintained excellent O2 saturations throughout.  The flange of the tracheostomy tube was secured in place with 2-0 silk suture in all 4 quadrants.    We then directed  our attention to the left upper quadrant for gastrostomy tube placement. The patient's abdomen was prepped and draped. An upper endoscope was introduced into the oropharynx and guided down into the esophagus and stomach. The stomach was insufflated with air. We intubated the duodenum and examined the first and second portions; no abnormalities were noted. We withdrew the endoscope into the stomach and identified an appropriate position for gastrostomy tube placement 2 finger-breadths below the left subcostal margin. Palpation of the anterior abdominal wall at this point was visualized endoscopically and transillumination from the endoscope was visualized through the anterior abdominal wall. We made a 1.5 cm transverse skin incision. At this point, the stomach was cannulated with a catheter loaded on a needle. This was grasped by a snare which had been passed through the endoscope. The needle was removed, and a guidewire was placed, and the snare was used to grasp the guidewire. The endoscope, snare, and guidewire were all withdrawn from the patient's mouth. A 20-Japanese gastrostomy tube was loaded onto the guidewire and pulled through the anterior abdominal wall via Seldinger technique. Repeat endoscopy was performed with the gastrostomy tube at the 2.5 cm miesha at the skin. There was no blanching of the gastric mucosa, and when the tube was twisted, the button did not grab the mucosa. The insufflation in the stomach was evacuated, and the endoscope was removed. The gastrostomy tube was secured in place using the supplied devices and connected to a bag for gravity drainage.    The patient remained in critical but stable condition in the ICU at the end of the case. I was present and either scrubbed for or directed the entire procedure.

## 2019-01-08 NOTE — PROGRESS NOTES
Patient returned from OR for trach/peg procedure at 1530. PEG drainage bag clamped to gravity. Medications to be administered after 2130 and tube feeds to be administered after 01/09/2019 at 1530. Trach ties switched out to velcro ties. Patient appears comfortable. Bed locked, wheels in lowest position, call light in reach, spouse at bedside, will continue to monitor.

## 2019-01-08 NOTE — PLAN OF CARE
SW advised by CM that the Pt has questions about [facility] as well.     ALFONSO met with Pt wife at bedside. Her first choice will still be [facility] if auth can be obtained.     Caroline Soares LMSW  Neurocritical Care   [facility]  [phone]

## 2019-01-08 NOTE — ASSESSMENT & PLAN NOTE
64 yo male with L aSDH now s/p craniotomy for evacuation (12/20).    --Continue care per primary team.  --PEG/TRACH planned for today per gen surgery.  --Staples removed, incision healing well, well approximated w/o erythema/drainage.   --Continue neurocheck.  --We will continue to monitor closely, please contact us with any questions or concerns.

## 2019-01-08 NOTE — PROGRESS NOTES
Anesthesia at bedside to escort patient to OR for Trach/PEG. Portable monitor, O2 tank, ambubag, and chart at bedside.

## 2019-01-09 LAB
ALBUMIN SERPL BCP-MCNC: 2.2 G/DL
ALLENS TEST: ABNORMAL
ALP SERPL-CCNC: 267 U/L
ALT SERPL W/O P-5'-P-CCNC: 9 U/L
ANION GAP SERPL CALC-SCNC: 11 MMOL/L
AST SERPL-CCNC: 15 U/L
BASOPHILS # BLD AUTO: 0.08 K/UL
BASOPHILS NFR BLD: 0.5 %
BILIRUB SERPL-MCNC: 0.9 MG/DL
BUN SERPL-MCNC: 19 MG/DL
CALCIUM SERPL-MCNC: 8.6 MG/DL
CHLORIDE SERPL-SCNC: 106 MMOL/L
CO2 SERPL-SCNC: 21 MMOL/L
CREAT SERPL-MCNC: 0.5 MG/DL
DELSYS: ABNORMAL
DIFFERENTIAL METHOD: ABNORMAL
EOSINOPHIL # BLD AUTO: 0 K/UL
EOSINOPHIL NFR BLD: 0.2 %
ERYTHROCYTE [DISTWIDTH] IN BLOOD BY AUTOMATED COUNT: 16 %
ERYTHROCYTE [SEDIMENTATION RATE] IN BLOOD BY WESTERGREN METHOD: 16 MM/H
EST. GFR  (AFRICAN AMERICAN): >60 ML/MIN/1.73 M^2
EST. GFR  (NON AFRICAN AMERICAN): >60 ML/MIN/1.73 M^2
FIO2: 30
GLUCOSE SERPL-MCNC: 116 MG/DL
HCO3 UR-SCNC: 25.9 MMOL/L (ref 24–28)
HCT VFR BLD AUTO: 33.4 %
HGB BLD-MCNC: 10.9 G/DL
IMM GRANULOCYTES # BLD AUTO: 0.17 K/UL
IMM GRANULOCYTES NFR BLD AUTO: 1 %
LYMPHOCYTES # BLD AUTO: 1.5 K/UL
LYMPHOCYTES NFR BLD: 8.7 %
MAGNESIUM SERPL-MCNC: 1.9 MG/DL
MCH RBC QN AUTO: 31.1 PG
MCHC RBC AUTO-ENTMCNC: 32.6 G/DL
MCV RBC AUTO: 95 FL
MIN VOL: 8.04
MODE: ABNORMAL
MONOCYTES # BLD AUTO: 1.1 K/UL
MONOCYTES NFR BLD: 5.9 %
NEUTROPHILS # BLD AUTO: 14.8 K/UL
NEUTROPHILS NFR BLD: 83.7 %
NRBC BLD-RTO: 0 /100 WBC
PCO2 BLDA: 31.9 MMHG (ref 35–45)
PEEP: 5
PH SMN: 7.52 [PH] (ref 7.35–7.45)
PHOSPHATE SERPL-MCNC: 3.2 MG/DL
PLATELET # BLD AUTO: 332 K/UL
PMV BLD AUTO: 10.2 FL
PO2 BLDA: 107 MMHG (ref 80–100)
POC BE: 3 MMOL/L
POC SATURATED O2: 99 % (ref 95–100)
POC TCO2: 27 MMOL/L (ref 23–27)
POCT GLUCOSE: 102 MG/DL (ref 70–110)
POCT GLUCOSE: 103 MG/DL (ref 70–110)
POCT GLUCOSE: 112 MG/DL (ref 70–110)
POCT GLUCOSE: 140 MG/DL (ref 70–110)
POTASSIUM SERPL-SCNC: 4 MMOL/L
PROT SERPL-MCNC: 6 G/DL
RBC # BLD AUTO: 3.51 M/UL
SAMPLE: ABNORMAL
SITE: ABNORMAL
SODIUM SERPL-SCNC: 138 MMOL/L
SP02: 100
VT: 450
WBC # BLD AUTO: 17.65 K/UL

## 2019-01-09 PROCEDURE — 25000003 PHARM REV CODE 250: Performed by: STUDENT IN AN ORGANIZED HEALTH CARE EDUCATION/TRAINING PROGRAM

## 2019-01-09 PROCEDURE — 25000003 PHARM REV CODE 250: Performed by: NURSE PRACTITIONER

## 2019-01-09 PROCEDURE — 82800 BLOOD PH: CPT

## 2019-01-09 PROCEDURE — 95813 PR EEG, EXTENDED, 61-119 MINS: ICD-10-PCS | Mod: 26,,, | Performed by: PSYCHIATRY & NEUROLOGY

## 2019-01-09 PROCEDURE — 99291 PR CRITICAL CARE, E/M 30-74 MINUTES: ICD-10-PCS | Mod: GC,,, | Performed by: PSYCHIATRY & NEUROLOGY

## 2019-01-09 PROCEDURE — 25000003 PHARM REV CODE 250: Performed by: PSYCHIATRY & NEUROLOGY

## 2019-01-09 PROCEDURE — 82803 BLOOD GASES ANY COMBINATION: CPT

## 2019-01-09 PROCEDURE — 99900026 HC AIRWAY MAINTENANCE (STAT)

## 2019-01-09 PROCEDURE — 95813 EEG EXTND MNTR 61-119 MIN: CPT

## 2019-01-09 PROCEDURE — 36600 WITHDRAWAL OF ARTERIAL BLOOD: CPT

## 2019-01-09 PROCEDURE — 84100 ASSAY OF PHOSPHORUS: CPT

## 2019-01-09 PROCEDURE — 27200966 HC CLOSED SUCTION SYSTEM

## 2019-01-09 PROCEDURE — 80053 COMPREHEN METABOLIC PANEL: CPT

## 2019-01-09 PROCEDURE — A4216 STERILE WATER/SALINE, 10 ML: HCPCS | Performed by: PSYCHIATRY & NEUROLOGY

## 2019-01-09 PROCEDURE — 63600175 PHARM REV CODE 636 W HCPCS: Performed by: NURSE PRACTITIONER

## 2019-01-09 PROCEDURE — 25000242 PHARM REV CODE 250 ALT 637 W/ HCPCS: Performed by: NURSE PRACTITIONER

## 2019-01-09 PROCEDURE — 63600175 PHARM REV CODE 636 W HCPCS: Performed by: STUDENT IN AN ORGANIZED HEALTH CARE EDUCATION/TRAINING PROGRAM

## 2019-01-09 PROCEDURE — 27000221 HC OXYGEN, UP TO 24 HOURS

## 2019-01-09 PROCEDURE — 94761 N-INVAS EAR/PLS OXIMETRY MLT: CPT

## 2019-01-09 PROCEDURE — 20000000 HC ICU ROOM

## 2019-01-09 PROCEDURE — 94640 AIRWAY INHALATION TREATMENT: CPT

## 2019-01-09 PROCEDURE — 95813 EEG EXTND MNTR 61-119 MIN: CPT | Mod: 26,,, | Performed by: PSYCHIATRY & NEUROLOGY

## 2019-01-09 PROCEDURE — 25000003 PHARM REV CODE 250: Performed by: PHYSICIAN ASSISTANT

## 2019-01-09 PROCEDURE — 94003 VENT MGMT INPAT SUBQ DAY: CPT

## 2019-01-09 PROCEDURE — 99291 CRITICAL CARE FIRST HOUR: CPT | Mod: GC,,, | Performed by: PSYCHIATRY & NEUROLOGY

## 2019-01-09 PROCEDURE — 83735 ASSAY OF MAGNESIUM: CPT

## 2019-01-09 PROCEDURE — 85025 COMPLETE CBC W/AUTO DIFF WBC: CPT

## 2019-01-09 PROCEDURE — 99900035 HC TECH TIME PER 15 MIN (STAT)

## 2019-01-09 PROCEDURE — 94668 MNPJ CHEST WALL SBSQ: CPT

## 2019-01-09 RX ORDER — AMLODIPINE BESYLATE 10 MG/1
10 TABLET ORAL DAILY
Status: DISCONTINUED | OUTPATIENT
Start: 2019-01-09 | End: 2019-01-09

## 2019-01-09 RX ORDER — AMLODIPINE BESYLATE 5 MG/1
5 TABLET ORAL DAILY
Status: DISCONTINUED | OUTPATIENT
Start: 2019-01-09 | End: 2019-01-13

## 2019-01-09 RX ORDER — SILODOSIN 4 MG/1
4 CAPSULE ORAL DAILY
Status: DISCONTINUED | OUTPATIENT
Start: 2019-01-09 | End: 2019-01-09

## 2019-01-09 RX ORDER — LABETALOL HCL 20 MG/4 ML
10 SYRINGE (ML) INTRAVENOUS ONCE
Status: COMPLETED | OUTPATIENT
Start: 2019-01-09 | End: 2019-01-09

## 2019-01-09 RX ADMIN — SODIUM CHLORIDE SOLN NEBU 3% 4 ML: 3 NEBU SOLN at 04:01

## 2019-01-09 RX ADMIN — CHLORHEXIDINE GLUCONATE 0.12% ORAL RINSE 15 ML: 1.2 LIQUID ORAL at 04:01

## 2019-01-09 RX ADMIN — URSODIOL 300 MG: 300 CAPSULE ORAL at 02:01

## 2019-01-09 RX ADMIN — HYDRALAZINE HYDROCHLORIDE 10 MG: 20 INJECTION INTRAMUSCULAR; INTRAVENOUS at 11:01

## 2019-01-09 RX ADMIN — SODIUM CHLORIDE SOLN NEBU 3% 4 ML: 3 NEBU SOLN at 11:01

## 2019-01-09 RX ADMIN — ALBUTEROL SULFATE 2.5 MG: 2.5 SOLUTION RESPIRATORY (INHALATION) at 04:01

## 2019-01-09 RX ADMIN — URSODIOL 300 MG: 300 CAPSULE ORAL at 08:01

## 2019-01-09 RX ADMIN — ALBUTEROL SULFATE 2.5 MG: 2.5 SOLUTION RESPIRATORY (INHALATION) at 11:01

## 2019-01-09 RX ADMIN — STANDARDIZED SENNA CONCENTRATE AND DOCUSATE SODIUM 1 TABLET: 8.6; 5 TABLET, FILM COATED ORAL at 08:01

## 2019-01-09 RX ADMIN — HEPARIN SODIUM 5000 UNITS: 5000 INJECTION, SOLUTION INTRAVENOUS; SUBCUTANEOUS at 01:01

## 2019-01-09 RX ADMIN — ALBUTEROL SULFATE 2.5 MG: 2.5 SOLUTION RESPIRATORY (INHALATION) at 03:01

## 2019-01-09 RX ADMIN — METOPROLOL TARTRATE 25 MG: 25 TABLET ORAL at 05:01

## 2019-01-09 RX ADMIN — LABETALOL HYDROCHLORIDE 10 MG: 5 INJECTION, SOLUTION INTRAVENOUS at 10:01

## 2019-01-09 RX ADMIN — FENTANYL CITRATE 25 MCG: 50 INJECTION INTRAMUSCULAR; INTRAVENOUS at 08:01

## 2019-01-09 RX ADMIN — ALBUTEROL SULFATE 2.5 MG: 2.5 SOLUTION RESPIRATORY (INHALATION) at 07:01

## 2019-01-09 RX ADMIN — CHLORHEXIDINE GLUCONATE 0.12% ORAL RINSE 15 ML: 1.2 LIQUID ORAL at 08:01

## 2019-01-09 RX ADMIN — URSODIOL 300 MG: 300 CAPSULE ORAL at 09:01

## 2019-01-09 RX ADMIN — METOPROLOL TARTRATE 25 MG: 25 TABLET ORAL at 01:01

## 2019-01-09 RX ADMIN — HYDRALAZINE HYDROCHLORIDE 10 MG: 20 INJECTION INTRAMUSCULAR; INTRAVENOUS at 04:01

## 2019-01-09 RX ADMIN — SODIUM CHLORIDE, SODIUM LACTATE, POTASSIUM CHLORIDE, AND CALCIUM CHLORIDE 250 ML: .6; .31; .03; .02 INJECTION, SOLUTION INTRAVENOUS at 10:01

## 2019-01-09 RX ADMIN — Medication 3 ML: at 01:01

## 2019-01-09 RX ADMIN — AMLODIPINE BESYLATE 5 MG: 5 TABLET ORAL at 10:01

## 2019-01-09 RX ADMIN — SODIUM CHLORIDE SOLN NEBU 3% 4 ML: 3 NEBU SOLN at 07:01

## 2019-01-09 RX ADMIN — PRAVASTATIN SODIUM 40 MG: 40 TABLET ORAL at 09:01

## 2019-01-09 RX ADMIN — LACOSAMIDE 200 MG: 50 TABLET, FILM COATED ORAL at 08:01

## 2019-01-09 RX ADMIN — LACOSAMIDE 200 MG: 50 TABLET, FILM COATED ORAL at 09:01

## 2019-01-09 RX ADMIN — CHLORHEXIDINE GLUCONATE 0.12% ORAL RINSE 15 ML: 1.2 LIQUID ORAL at 12:01

## 2019-01-09 RX ADMIN — POLYETHYLENE GLYCOL 3350 17 G: 17 POWDER, FOR SOLUTION ORAL at 08:01

## 2019-01-09 RX ADMIN — HEPARIN SODIUM 5000 UNITS: 5000 INJECTION, SOLUTION INTRAVENOUS; SUBCUTANEOUS at 05:01

## 2019-01-09 RX ADMIN — FAMOTIDINE 20 MG: 20 TABLET ORAL at 08:01

## 2019-01-09 RX ADMIN — Medication 3 ML: at 05:01

## 2019-01-09 RX ADMIN — FAMOTIDINE 20 MG: 20 TABLET ORAL at 09:01

## 2019-01-09 RX ADMIN — HYDRALAZINE HYDROCHLORIDE 10 MG: 20 INJECTION INTRAMUSCULAR; INTRAVENOUS at 01:01

## 2019-01-09 RX ADMIN — METOPROLOL TARTRATE 25 MG: 25 TABLET ORAL at 08:01

## 2019-01-09 RX ADMIN — STANDARDIZED SENNA CONCENTRATE AND DOCUSATE SODIUM 1 TABLET: 8.6; 5 TABLET, FILM COATED ORAL at 09:01

## 2019-01-09 RX ADMIN — HYDRALAZINE HYDROCHLORIDE 10 MG: 20 INJECTION INTRAMUSCULAR; INTRAVENOUS at 07:01

## 2019-01-09 RX ADMIN — FENTANYL CITRATE 25 MCG: 50 INJECTION INTRAMUSCULAR; INTRAVENOUS at 03:01

## 2019-01-09 RX ADMIN — CHLORHEXIDINE GLUCONATE 0.12% ORAL RINSE 15 ML: 1.2 LIQUID ORAL at 10:01

## 2019-01-09 RX ADMIN — POLYETHYLENE GLYCOL 3350 17 G: 17 POWDER, FOR SOLUTION ORAL at 09:01

## 2019-01-09 RX ADMIN — Medication 3 ML: at 10:01

## 2019-01-09 RX ADMIN — HEPARIN SODIUM 5000 UNITS: 5000 INJECTION, SOLUTION INTRAVENOUS; SUBCUTANEOUS at 09:01

## 2019-01-09 RX ADMIN — FENTANYL CITRATE 25 MCG: 50 INJECTION INTRAMUSCULAR; INTRAVENOUS at 12:01

## 2019-01-09 RX ADMIN — SODIUM CHLORIDE SOLN NEBU 3% 4 ML: 3 NEBU SOLN at 03:01

## 2019-01-09 NOTE — ASSESSMENT & PLAN NOTE
Epidural with subdural component s/p evac 12/20    nsgy following, staples removed today  Pt/ot/slp when able

## 2019-01-09 NOTE — PLAN OF CARE
Problem: Adult Inpatient Plan of Care  Goal: Plan of Care Review  POC reviewed with pt at 0500. Pt verbalized understanding. Questions and concerns addressed. No acute events overnight. Pt progressing toward goals. Will continue to monitor. See flowsheets for full assessment and VS infO.  Pt moves upper extremities spont, withdraws on the lower extremities.  No drips.

## 2019-01-09 NOTE — PROGRESS NOTES
[facility]  General Surgery  Progress Note    Subjective:     History of Present Illness:  Mr. Nunes is a 62 yo M with PMH of CABG on ASA 81 mg admitted after a fall with EDH with SDH now s/p craniotomy for evacuation (12/20) by Neurosurgery.    General surgery consulted for PEG and Trach. He was extubated on 1/2 and failed, currently intubated.        Had an appendectomy (open). No neck surgery.       Post-Op Info:  Procedure(s) (LRB):  INSERTION, PEG TUBE (N/A)  CREATION, TRACHEOSTOMY (N/A)   1 Day Post-Op     Interval History:   Patient seen and examined, no acute events overnight  S/p PEG/trach placement POD 1  PEG put out minimal drainage overnight  Vent settings stable    Medications:  Continuous Infusions:  Scheduled Meds:   albuterol sulfate  2.5 mg Nebulization Q4H    chlorhexidine  15 mL Mouth/Throat QID    famotidine  20 mg Per NG tube BID    heparin (porcine)  5,000 Units Subcutaneous Q8H    lacosamide  200 mg Per NG tube BID    metoprolol tartrate  25 mg Per NG tube Q8H    polyethylene glycol  17 g Per NG tube BID    pravastatin  40 mg Per NG tube QHS    senna-docusate 8.6-50 mg  1 tablet Per NG tube BID    sodium chloride 0.9%  3 mL Intravenous Q8H    sodium chloride 3%  4 mL Nebulization Q4H    ursodiol  300 mg Per NG tube TID     PRN Meds:acetaminophen, fentaNYL, hydrALAZINE, magnesium oxide, magnesium oxide, ondansetron, potassium chloride 10%, potassium chloride 10%, potassium chloride 10%, potassium, sodium phosphates, potassium, sodium phosphates, potassium, sodium phosphates     Review of patient's allergies indicates:   Allergen Reactions    Levaquin [levofloxacin] Nausea Only     Objective:     Vital Signs (Most Recent):  Temp: 98.8 °F (37.1 °C) (01/09/19 0702)  Pulse: (!) 118 (01/09/19 0702)  Resp: 20 (01/09/19 0702)  BP: (!) 172/99 (01/09/19 0702)  SpO2: 99 % (01/09/19 0702) Vital Signs (24h Range):  Temp:  [95.9 °F (35.5 °C)-99 °F (37.2 °C)] 98.8 °F  (37.1 °C)  Pulse:  [106-122] 118  Resp:  [12-32] 20  SpO2:  [95 %-100 %] 99 %  BP: (108-211)/() 172/99     Weight: 110.9 kg (244 lb 7.8 oz)  Body mass index is 34.1 kg/m².    Intake/Output - Last 3 Shifts       01/07 0700 - 01/08 0659 01/08 0700 - 01/09 0659 01/09 0700 - 01/10 0659    I.V. (mL/kg) 120.3 (1.1) 1035 (9.3)     NG/GT 1080      IV Piggyback       Total Intake(mL/kg) 1200.3 (10.8) 1035 (9.3)     Urine (mL/kg/hr) 3650 (1.4) 1140 (0.4) 35 (0.9)    Drains 120      Stool 0 0     Total Output 3770 1140 35    Net -2569.7 -105 -35           Stool Occurrence 0 x 0 x           Physical Exam   Constitutional: He appears well-developed and well-nourished. No distress.   HENT:   Head: Normocephalic and atraumatic.   Neck:   Trach in place - clean, dry and intact   Cardiovascular: Normal rate and regular rhythm.   Pulmonary/Chest: Effort normal. No respiratory distress.   Abdominal:   Soft, appropriate TTP  PEG in place - clean, dry and intact        Significant Labs:  CBC:   Recent Labs   Lab 01/09/19 0248   WBC 17.65*   RBC 3.51*   HGB 10.9*   HCT 33.4*      MCV 95   MCH 31.1*   MCHC 32.6     BMP:   Recent Labs   Lab 01/09/19  0248   *      K 4.0      CO2 21*   BUN 19   CREATININE 0.5   CALCIUM 8.6*   MG 1.9     CMP:   Recent Labs   Lab 01/09/19  0248   *   CALCIUM 8.6*   ALBUMIN 2.2*   PROT 6.0      K 4.0   CO2 21*      BUN 19   CREATININE 0.5   ALKPHOS 267*   ALT 9*   AST 15   BILITOT 0.9     LFTs:   Recent Labs   Lab 01/09/19  0248   ALT 9*   AST 15   ALKPHOS 267*   BILITOT 0.9   PROT 6.0   ALBUMIN 2.2*     ABGs:   Recent Labs   Lab 01/09/19  0330   PH 7.518*   PCO2 31.9*   PO2 107*   HCO3 25.9   POCSATURATED 99   BE 3     Assessment/Plan:     Acute respiratory failure    64 yo M with history of CABG on ASA 81 mg admitted after a fall with EDH with SDH now s/p craniotomy for evacuation (12/20) by Neurosurgery. S/p PEG/trach 1/8/19    - patient doing well after  procedures  - ok to begin using PEG for tube feeds  - surgery will sign off at this time, please call for questions, thank you for allowing us to participate in the care of Mr. Nunes         Christine Buitrago PA-C   [id number]  General Surgery  [facility]

## 2019-01-09 NOTE — PROGRESS NOTES
[facility]  Neurosurgery  Progress Note    Subjective:     History of Present Illness: 64 yo M with PMH of CAD, CABG in 2006, DM, HTN, HLD on ASA presents to the ED for AMS. Patient fell 4 days ago with about 2 minutes of LOC. Family is unsure of what caused the fall. Full workup done including a CTH at [place] that was negative. Patient went home and stopped taking his ASA. This morning, his family noticed he was more confused. He went to [place] and CTH was done that revealed a 1.7cm left frontalpariental SDH with a 5 mm right sided midline shift and a left SAH. Patient was transferred to [facility]. History was obtained per family as patient unable to give history 2/2 confusion with expressive and receptive aphasia.    Post-Op Info:  Procedure(s) (LRB):  INSERTION, PEG TUBE (N/A)  CREATION, TRACHEOSTOMY (N/A)   1 Day Post-Op     Interval History: 1/9:  -122,  -210, ow AFVSS, trach and PEG yesterday, ow NAEON, leukocytosis likely reactive, ow labs stable, exam stable     Medications:  Continuous Infusions:  Scheduled Meds:   albuterol sulfate  2.5 mg Nebulization Q4H    chlorhexidine  15 mL Mouth/Throat QID    famotidine  20 mg Per NG tube BID    heparin (porcine)  5,000 Units Subcutaneous Q8H    lacosamide  200 mg Per NG tube BID    metoprolol tartrate  25 mg Per NG tube Q8H    polyethylene glycol  17 g Per NG tube BID    pravastatin  40 mg Per NG tube QHS    senna-docusate 8.6-50 mg  1 tablet Per NG tube BID    sodium chloride 0.9%  3 mL Intravenous Q8H    sodium chloride 3%  4 mL Nebulization Q4H    ursodiol  300 mg Per NG tube TID     PRN Meds:acetaminophen, fentaNYL, hydrALAZINE, magnesium oxide, magnesium oxide, ondansetron, potassium chloride 10%, potassium chloride 10%, potassium chloride 10%, potassium, sodium phosphates, potassium, sodium phosphates, potassium, sodium phosphates       Objective:     Weight: 110.9 kg (244 lb 7.8 oz)  Body mass index is 34.1  "kg/m².  Vital Signs (Most Recent):  Temp: 98.8 °F (37.1 °C) (01/09/19 0702)  Pulse: (!) 118 (01/09/19 0800)  Resp: 10 (01/09/19 0800)  BP: (!) 158/91 (01/09/19 0800)  SpO2: 99 % (01/09/19 0800) Vital Signs (24h Range):  Temp:  [95.9 °F (35.5 °C)-99 °F (37.2 °C)] 98.8 °F (37.1 °C)  Pulse:  [106-122] 118  Resp:  [10-32] 10  SpO2:  [95 %-100 %] 99 %  BP: (113-211)/() 158/91     Date 01/09/19 0700 - 01/10/19 0659   Shift 3788-7531 7305-9661 5113-4815 24 Hour Total   INTAKE   NG/GT 60   60   Shift Total(mL/kg) 60(0.5)   60(0.5)   OUTPUT   Urine(mL/kg/hr) 55   55   Shift Total(mL/kg) 55(0.5)   55(0.5)   Weight (kg) 110.9 110.9 110.9 110.9              Vent Mode: A/C  Oxygen Concentration (%):  [] 30  Resp Rate Total:  [16 br/min-28 br/min] 21 br/min  Vt Set:  [450 mL] 450 mL  PEEP/CPAP:  [5 cmH20] 5 cmH20  Mean Airway Pressure:  [7.1 cmH20-10 cmH20] 8.2 cmH20         Gastrostomy/Enterostomy 01/08/19 1441 Percutaneous endoscopic gastrostomy (PEG) midline (Active)   Securement anchored to abdomen w/ adhesive device 1/9/2019  7:02 AM   Interventions Prior to Feeding patency checked 1/9/2019  3:05 AM   Clamp Status/Tolerance unclamped 1/9/2019  7:02 AM   Dressing dry and intact 1/9/2019  7:02 AM   Insertion Site no redness;no warmth;no drainage;no tenderness;no swelling 1/9/2019  7:02 AM   Site Care device rotatated 1/9/2019  7:02 AM   Intake (mL) 60 mL 1/9/2019  8:00 AM            Urethral Catheter 01/05/19 1329 (Active)   Site Assessment Clean;Intact 1/9/2019  7:02 AM   Collection Container Urimeter 1/9/2019  7:02 AM   Securement Method secured to top of thigh w/ adhesive device 1/9/2019  7:02 AM   Catheter Care Performed yes 1/9/2019  7:02 AM   Reason for Continuing Urinary Catheterization Urinary retention;Critically ill in ICU requiring intensive monitoring 1/9/2019  7:02 AM   CAUTI Prevention Bundle StatLock in place w 1" slack;Intact seal between catheter & drainage tubing;Drainage bag off the floor;Green " sheeting clip in use;No dependent loops or kinks;Drainage bag not overfilled (<2/3 full);Drainage bag below bladder 1/9/2019  7:02 AM   Output (mL) 20 mL 1/9/2019  8:00 AM   Bladder/Intra-Abdominal Pressure (mmHg) 900 mmHg 1/5/2019  1:29 PM       Neurosurgery Physical Exam     [id number], grimacing to stimulation, tracking  PERRL  MOORE spontaneously    Significant Labs:  Recent Labs   Lab 01/08/19  0158 01/09/19  0248   GLU 91 116*    138   K 4.0 4.0    106   CO2 24 21*   BUN 26* 19   CREATININE 0.5 0.5   CALCIUM 8.5* 8.6*   MG 1.7 1.9     Recent Labs   Lab 01/08/19  0158 01/09/19  0248   WBC 11.68 17.65*   HGB 9.3* 10.9*   HCT 30.0* 33.4*    332     No results for input(s): LABPT, INR, APTT in the last 48 hours.  Microbiology Results (last 7 days)     Procedure Component Value Units Date/Time    Blood culture [[id number]] Collected:  01/03/19 1107    Order Status:  Completed Specimen:  Blood from Peripheral, Foot, Left Updated:  01/08/19 1412     Blood Culture, Routine No growth after 5 days.    Narrative:       Blood cultures x 2 different sites. 4 bottles total. Please  draw cultures before administering antibiotics.    Blood culture [[id number]] Collected:  01/03/19 1100    Order Status:  Completed Specimen:  Blood from Peripheral, Hand, Left Updated:  01/08/19 1412     Blood Culture, Routine No growth after 5 days.    Narrative:       Blood cultures from 2 different sites. 4 bottles total.  Please draw before starting antibiotics.    Culture, Respiratory with Gram Stain [[id number]] Collected:  01/03/19 1108    Order Status:  Completed Specimen:  Respiratory from Endotracheal Aspirate Updated:  01/05/19 1007     Respiratory Culture Normal respiratory donnell     Gram Stain (Respiratory) Few WBC's     Gram Stain (Respiratory) Rare Gram positive cocci        Assessment/Plan:     * SDH (subdural hematoma)    62 yo male with L aSDH now s/p craniotomy for evacuation (12/20).    --Continue care per primary  team.  --PEG/TRACH yesterday  --Staples removed, incision healing well, well approximated w/o erythema/drainage.   --Continue neurochecks while in house  --patient is stable for discharge from a neurosurgical perspective               Gagandeep Giang MD  Neurosurgery  [facility]

## 2019-01-09 NOTE — ANESTHESIA POSTPROCEDURE EVALUATION
"Anesthesia Post Evaluation    Patient: Peewee Nunes    Procedure(s) Performed: Procedure(s) (LRB):  INSERTION, PEG TUBE (N/A)  CREATION, TRACHEOSTOMY (N/A)    Final Anesthesia Type: general  Patient location during evaluation: ICU  Patient participation: No - Unable to Participate, Intubation  Level of consciousness: sedated  Post-procedure vital signs: reviewed and stable  Pain management: adequate  Airway patency: patent  PONV status at discharge: No PONV  Anesthetic complications: no      Cardiovascular status: blood pressure returned to baseline  Respiratory status: unassisted  Hydration status: euvolemic  Follow-up not needed.        Visit Vitals  BP (!) 171/106 (BP Location: Left arm, Patient Position: Lying)   Pulse (!) 116   Temp 37.2 °C (99 °F) (Oral)   Resp (!) 22   Ht 5' 11" (1.803 m)   Wt 110.9 kg (244 lb 7.8 oz)   SpO2 99%   BMI 34.10 kg/m²       Pain/Enrrique Score: Pain Rating Prior to Med Admin: 5 (1/9/2019  3:22 AM)  Pain Rating Post Med Admin: 0 (1/8/2019  4:55 PM)        "

## 2019-01-09 NOTE — PLAN OF CARE
ALFONSO received call from Julia with [facility] regarding they received a denial due to the trache having to be 7 days old before leaving the hospital for placement. She reported they offered peer to peer (Lauren [phone] ext [phone]) and it would have to be completed by 3pm tomorrow.    ALFONSO advised CM and MD team. Pt not ready so advised to have [facility] obtain auth for the 7th day (January 15th). ALFONSO advised Julia with [facility].    Caroline Soares, AMBREEN  Neurocritical Care   [facility]  [phone]

## 2019-01-09 NOTE — SUBJECTIVE & OBJECTIVE
Interval History:      Staples removed today  S/p trach/peg yday    Objective:     Vitals:  Temp: 98.7 °F (37.1 °C)  Pulse: (!) 116  Rhythm: sinus tachycardia  BP: (!) 173/105  MAP (mmHg): 133  Resp: 13  SpO2: 99 %  Oxygen Concentration (%): 31  O2 Device (Oxygen Therapy): ventilator  Vent Mode: Spont  Set Rate: 16 bmp  Vt Set: 450 mL  Pressure Support: 5 cmH20  PEEP/CPAP: 5 cmH20  Peak Airway Pressure: 11 cmH2O  Mean Airway Pressure: 7 cmH20  Plateau Pressure: 0 cmH20    Temp  Min: 95.9 °F (35.5 °C)  Max: 99 °F (37.2 °C)  Pulse  Min: 112  Max: 122  BP  Min: 158/91  Max: 211/126  MAP (mmHg)  Min: 113  Max: 161  Resp  Min: 10  Max: 29  SpO2  Min: 95 %  Max: 100 %  Oxygen Concentration (%)  Min: 30  Max: 100    01/08 0701 - 01/09 0700  In: 1035 [I.V.:1035]  Out: 1175 [Urine:1175]   Unmeasured Output  Stool Occurrence: 0       Physical Exam  Vital signs, lab studies, and imaging reviewed by me  opens eyes to voice, no commands, spontaneously moves all ext  Warm and well perfused  No dependent edema  Breathing comfortably on ac, breath sounds diminished at bases  Belly soft, nontender, no hepatosplenomegaly    Medications:  Continuous Scheduled  albuterol sulfate 2.5 mg Q4H   chlorhexidine 15 mL QID   famotidine 20 mg BID   heparin (porcine) 5,000 Units Q8H   lacosamide 200 mg BID   metoprolol tartrate 25 mg Q8H   polyethylene glycol 17 g BID   pravastatin 40 mg QHS   senna-docusate 8.6-50 mg 1 tablet BID   sodium chloride 0.9% 3 mL Q8H   sodium chloride 3% 4 mL Q4H   ursodiol 300 mg TID   PRN  acetaminophen 650 mg Q6H PRN   fentaNYL 25 mcg Q2H PRN   hydrALAZINE 10 mg Q4H PRN   magnesium oxide 800 mg PRN   magnesium oxide 800 mg PRN   ondansetron 4 mg Q12H PRN   potassium chloride 10% 40 mEq PRN   potassium chloride 10% 40 mEq PRN   potassium chloride 10% 60 mEq PRN   potassium, sodium phosphates 2 packet PRN   potassium, sodium phosphates 2 packet PRN   potassium, sodium phosphates 2 packet PRN     Diet  No diet orders  on file  No diet orders on file

## 2019-01-09 NOTE — PROGRESS NOTES
[facility]  Neurocritical Care  Progress Note    Admit Date: 12/20/2018  Service Date: 01/09/2019  Length of Stay: 20    Subjective:     Chief Complaint: SDH (subdural hematoma)    History of Present Illness: 63 year old male h/o CABG on ASA 81 mg (last dose 72 hours PTA) presenting as transfer from OSH for EDH with SDH. Patient reportedly had a mechanical fall from standing on Sunday and went to ED where CTH was negative for any acute intracranial pathology. Overnight, patient developed confusion and presented to OSH ED where CTH was consistent with EDH with SDH. Patient has progressively worsened throughout the day developing intermittent global aphasia. NSGY consulted in ED at [facility] and stat CTH has been ordered. NCC consulted for admission and medical management. Patient on weaning off of cardene at time of evaluation with sbp of 120. Reported presenting sbp in 150s.    Hospital Course: 12/22: no major events overnight. Weaned off of cardene. This morning AOx3. Drain removed by NSGY at 11 AM, and planned to step down to NSGY.  AMS noted around 1PM, patient aphasic and not following commands, STAT CTH unremarkable.   12/26: wean ketamine, follow EEG, send urine studuies-Na 133, NS bolus, advance TF  12/27: off ketamine, d/c levo, continue EEG, and AEDs, 500 NS bolus, increase enteral water flushes, continue abx for another day  1/2: extubation trial.   1/3: failed extubation trial. Plan for trach and peg  1/4: plan for trach, general surgery consult  1/5: NAEO  1/6: NAEO. Awaiting trach and peg  1/7: plan for trach/peg, opens eyes to voice no commands  1/8: s/p trach/peg today  1/9: restarting tf this afternoon, ltac planning, eeg today to help guide aed weaning    Interval History:      Staples removed today  S/p trach/peg yday    Objective:     Vitals:  Temp: 98.7 °F (37.1 °C)  Pulse: (!) 116  Rhythm: sinus tachycardia  BP: (!) 173/105  MAP (mmHg): 133  Resp: 13  SpO2: 99 %  Oxygen  Concentration (%): 31  O2 Device (Oxygen Therapy): ventilator  Vent Mode: Spont  Set Rate: 16 bmp  Vt Set: 450 mL  Pressure Support: 5 cmH20  PEEP/CPAP: 5 cmH20  Peak Airway Pressure: 11 cmH2O  Mean Airway Pressure: 7 cmH20  Plateau Pressure: 0 cmH20    Temp  Min: 95.9 °F (35.5 °C)  Max: 99 °F (37.2 °C)  Pulse  Min: 112  Max: 122  BP  Min: 158/91  Max: 211/126  MAP (mmHg)  Min: 113  Max: 161  Resp  Min: 10  Max: 29  SpO2  Min: 95 %  Max: 100 %  Oxygen Concentration (%)  Min: 30  Max: 100    01/08 0701 - 01/09 0700  In: 1035 [I.V.:1035]  Out: 1175 [Urine:1175]   Unmeasured Output  Stool Occurrence: 0       Physical Exam  Vital signs, lab studies, and imaging reviewed by me  opens eyes to voice, no commands, spontaneously moves all ext  Warm and well perfused  No dependent edema  Breathing comfortably on ac, breath sounds diminished at bases  Belly soft, nontender, no hepatosplenomegaly    Medications:  Continuous Scheduled  albuterol sulfate 2.5 mg Q4H   chlorhexidine 15 mL QID   famotidine 20 mg BID   heparin (porcine) 5,000 Units Q8H   lacosamide 200 mg BID   metoprolol tartrate 25 mg Q8H   polyethylene glycol 17 g BID   pravastatin 40 mg QHS   senna-docusate 8.6-50 mg 1 tablet BID   sodium chloride 0.9% 3 mL Q8H   sodium chloride 3% 4 mL Q4H   ursodiol 300 mg TID   PRN  acetaminophen 650 mg Q6H PRN   fentaNYL 25 mcg Q2H PRN   hydrALAZINE 10 mg Q4H PRN   magnesium oxide 800 mg PRN   magnesium oxide 800 mg PRN   ondansetron 4 mg Q12H PRN   potassium chloride 10% 40 mEq PRN   potassium chloride 10% 40 mEq PRN   potassium chloride 10% 60 mEq PRN   potassium, sodium phosphates 2 packet PRN   potassium, sodium phosphates 2 packet PRN   potassium, sodium phosphates 2 packet PRN     Diet  No diet orders on file  No diet orders on file        Assessment/Plan:     Neuro   * SDH (subdural hematoma)    Epidural with subdural component s/p evac 12/20    nsgy following, staples removed today  Pt/ot/slp when able       Status  epilepticus    Mental status continuing to improve    Cont lacosamide  Spot eeg to guide aed wean     Pulmonary   Acute respiratory failure    Now on minimal vent setting    cpap trial today, wean to trach mask as able       Endocrine   Severe malnutrition    S/p trach/peg    Restart tf today 24 hrs post peg     Other   Pressure injury of buttock, stage 2    Appreciate wound care reccs       Dispo: LTAC planning      Full Code    I spent 30 min of uninterrupted critical care time caring for this critically ill patient exclusive of procedures and teaching.     Kenneth Decker MD  Neurocritical Care  [facility]

## 2019-01-09 NOTE — ASSESSMENT & PLAN NOTE
62 yo male with L aSDH now s/p craniotomy for evacuation (12/20).    --Continue care per primary team.  --PEG/TRACH yesterday  --Staples removed, incision healing well, well approximated w/o erythema/drainage.   --Continue neurochecks while in house  --patient is stable for discharge from a neurosurgical perspective

## 2019-01-09 NOTE — SUBJECTIVE & OBJECTIVE
Interval History: 1/9:  -122,  -210, ow AFVSS, trach and PEG yesterday, ow NAEON, leukocytosis likely reactive, ow labs stable, exam stable     Medications:  Continuous Infusions:  Scheduled Meds:   albuterol sulfate  2.5 mg Nebulization Q4H    chlorhexidine  15 mL Mouth/Throat QID    famotidine  20 mg Per NG tube BID    heparin (porcine)  5,000 Units Subcutaneous Q8H    lacosamide  200 mg Per NG tube BID    metoprolol tartrate  25 mg Per NG tube Q8H    polyethylene glycol  17 g Per NG tube BID    pravastatin  40 mg Per NG tube QHS    senna-docusate 8.6-50 mg  1 tablet Per NG tube BID    sodium chloride 0.9%  3 mL Intravenous Q8H    sodium chloride 3%  4 mL Nebulization Q4H    ursodiol  300 mg Per NG tube TID     PRN Meds:acetaminophen, fentaNYL, hydrALAZINE, magnesium oxide, magnesium oxide, ondansetron, potassium chloride 10%, potassium chloride 10%, potassium chloride 10%, potassium, sodium phosphates, potassium, sodium phosphates, potassium, sodium phosphates       Objective:     Weight: 110.9 kg (244 lb 7.8 oz)  Body mass index is 34.1 kg/m².  Vital Signs (Most Recent):  Temp: 98.8 °F (37.1 °C) (01/09/19 0702)  Pulse: (!) 118 (01/09/19 0800)  Resp: 10 (01/09/19 0800)  BP: (!) 158/91 (01/09/19 0800)  SpO2: 99 % (01/09/19 0800) Vital Signs (24h Range):  Temp:  [95.9 °F (35.5 °C)-99 °F (37.2 °C)] 98.8 °F (37.1 °C)  Pulse:  [106-122] 118  Resp:  [10-32] 10  SpO2:  [95 %-100 %] 99 %  BP: (113-211)/() 158/91     Date 01/09/19 0700 - 01/10/19 0659   Shift 3420-2333 9735-5768 3144-0871 24 Hour Total   INTAKE   NG/GT 60   60   Shift Total(mL/kg) 60(0.5)   60(0.5)   OUTPUT   Urine(mL/kg/hr) 55   55   Shift Total(mL/kg) 55(0.5)   55(0.5)   Weight (kg) 110.9 110.9 110.9 110.9              Vent Mode: A/C  Oxygen Concentration (%):  [] 30  Resp Rate Total:  [16 br/min-28 br/min] 21 br/min  Vt Set:  [450 mL] 450 mL  PEEP/CPAP:  [5 cmH20] 5 cmH20  Mean Airway Pressure:  [7.1 cmH20-10 cmH20]  "8.2 cmH20         Gastrostomy/Enterostomy 01/08/19 1441 Percutaneous endoscopic gastrostomy (PEG) midline (Active)   Securement anchored to abdomen w/ adhesive device 1/9/2019  7:02 AM   Interventions Prior to Feeding patency checked 1/9/2019  3:05 AM   Clamp Status/Tolerance unclamped 1/9/2019  7:02 AM   Dressing dry and intact 1/9/2019  7:02 AM   Insertion Site no redness;no warmth;no drainage;no tenderness;no swelling 1/9/2019  7:02 AM   Site Care device rotatated 1/9/2019  7:02 AM   Intake (mL) 60 mL 1/9/2019  8:00 AM            Urethral Catheter 01/05/19 1329 (Active)   Site Assessment Clean;Intact 1/9/2019  7:02 AM   Collection Container Urimeter 1/9/2019  7:02 AM   Securement Method secured to top of thigh w/ adhesive device 1/9/2019  7:02 AM   Catheter Care Performed yes 1/9/2019  7:02 AM   Reason for Continuing Urinary Catheterization Urinary retention;Critically ill in ICU requiring intensive monitoring 1/9/2019  7:02 AM   CAUTI Prevention Bundle StatLock in place w 1" slack;Intact seal between catheter & drainage tubing;Drainage bag off the floor;Green sheeting clip in use;No dependent loops or kinks;Drainage bag not overfilled (<2/3 full);Drainage bag below bladder 1/9/2019  7:02 AM   Output (mL) 20 mL 1/9/2019  8:00 AM   Bladder/Intra-Abdominal Pressure (mmHg) 900 mmHg 1/5/2019  1:29 PM       Neurosurgery Physical Exam     E4VTM5, grimacing to stimulation, tracking  PERRL  MOORE spontaneously    Significant Labs:  Recent Labs   Lab 01/08/19  0158 01/09/19  0248   GLU 91 116*    138   K 4.0 4.0    106   CO2 24 21*   BUN 26* 19   CREATININE 0.5 0.5   CALCIUM 8.5* 8.6*   MG 1.7 1.9     Recent Labs   Lab 01/08/19  0158 01/09/19  0248   WBC 11.68 17.65*   HGB 9.3* 10.9*   HCT 30.0* 33.4*    332     No results for input(s): LABPT, INR, APTT in the last 48 hours.  Microbiology Results (last 7 days)     Procedure Component Value Units Date/Time    Blood culture [[id number]] Collected:  01/03/19 " 1107    Order Status:  Completed Specimen:  Blood from Peripheral, Foot, Left Updated:  01/08/19 1412     Blood Culture, Routine No growth after 5 days.    Narrative:       Blood cultures x 2 different sites. 4 bottles total. Please  draw cultures before administering antibiotics.    Blood culture [[id number]] Collected:  01/03/19 1100    Order Status:  Completed Specimen:  Blood from Peripheral, Hand, Left Updated:  01/08/19 1412     Blood Culture, Routine No growth after 5 days.    Narrative:       Blood cultures from 2 different sites. 4 bottles total.  Please draw before starting antibiotics.    Culture, Respiratory with Gram Stain [[id number]] Collected:  01/03/19 1108    Order Status:  Completed Specimen:  Respiratory from Endotracheal Aspirate Updated:  01/05/19 1007     Respiratory Culture Normal respiratory donnell     Gram Stain (Respiratory) Few WBC's     Gram Stain (Respiratory) Rare Gram positive cocci

## 2019-01-09 NOTE — ASSESSMENT & PLAN NOTE
64 yo M with history of CABG on ASA 81 mg admitted after a fall with EDH with SDH now s/p craniotomy for evacuation (12/20) by Neurosurgery. S/p PEG/trach 1/8/19    - patient doing well after procedures  - ok to begin using PEG for tube feeds  - surgery will sign off at this time, please call for questions, thank you for allowing us to participate in the care of Mr. Nunes

## 2019-01-09 NOTE — PLAN OF CARE
Problem: Adult Inpatient Plan of Care  Goal: Plan of Care Review  POC reviewed with pt and family at 1400. Familyverbalized understanding. Questions and concerns addressed. No acute events today. Pt switched to spontaneous on the vent and tolerating it well. EEG placed per MD order. TF restarted at 10 with a goal of 55. Pt progressing toward goals. Will continue to monitor. See flowsheets for full assessment and VS info.

## 2019-01-09 NOTE — SUBJECTIVE & OBJECTIVE
Interval History:   Patient seen and examined, no acute events overnight  S/p PEG/trach placement POD 1  PEG put out minimal drainage overnight  Vent settings stable    Medications:  Continuous Infusions:  Scheduled Meds:   albuterol sulfate  2.5 mg Nebulization Q4H    chlorhexidine  15 mL Mouth/Throat QID    famotidine  20 mg Per NG tube BID    heparin (porcine)  5,000 Units Subcutaneous Q8H    lacosamide  200 mg Per NG tube BID    metoprolol tartrate  25 mg Per NG tube Q8H    polyethylene glycol  17 g Per NG tube BID    pravastatin  40 mg Per NG tube QHS    senna-docusate 8.6-50 mg  1 tablet Per NG tube BID    sodium chloride 0.9%  3 mL Intravenous Q8H    sodium chloride 3%  4 mL Nebulization Q4H    ursodiol  300 mg Per NG tube TID     PRN Meds:acetaminophen, fentaNYL, hydrALAZINE, magnesium oxide, magnesium oxide, ondansetron, potassium chloride 10%, potassium chloride 10%, potassium chloride 10%, potassium, sodium phosphates, potassium, sodium phosphates, potassium, sodium phosphates     Review of patient's allergies indicates:   Allergen Reactions    Levaquin [levofloxacin] Nausea Only     Objective:     Vital Signs (Most Recent):  Temp: 98.8 °F (37.1 °C) (01/09/19 0702)  Pulse: (!) 118 (01/09/19 0702)  Resp: 20 (01/09/19 0702)  BP: (!) 172/99 (01/09/19 0702)  SpO2: 99 % (01/09/19 0702) Vital Signs (24h Range):  Temp:  [95.9 °F (35.5 °C)-99 °F (37.2 °C)] 98.8 °F (37.1 °C)  Pulse:  [106-122] 118  Resp:  [12-32] 20  SpO2:  [95 %-100 %] 99 %  BP: (108-211)/() 172/99     Weight: 110.9 kg (244 lb 7.8 oz)  Body mass index is 34.1 kg/m².    Intake/Output - Last 3 Shifts       01/07 0700 - 01/08 0659 01/08 0700 - 01/09 0659 01/09 0700 - 01/10 0659    I.V. (mL/kg) 120.3 (1.1) 1035 (9.3)     NG/GT 1080      IV Piggyback       Total Intake(mL/kg) 1200.3 (10.8) 1035 (9.3)     Urine (mL/kg/hr) 3650 (1.4) 1140 (0.4) 35 (0.9)    Drains 120      Stool 0 0     Total Output 3770 1140 35    Net -2569.7 -105 -35            Stool Occurrence 0 x 0 x           Physical Exam   Constitutional: He appears well-developed and well-nourished. No distress.   HENT:   Head: Normocephalic and atraumatic.   Neck:   Trach in place - clean, dry and intact   Cardiovascular: Normal rate and regular rhythm.   Pulmonary/Chest: Effort normal. No respiratory distress.   Abdominal:   Soft, appropriate TTP  PEG in place - clean, dry and intact        Significant Labs:  CBC:   Recent Labs   Lab 01/09/19  0248   WBC 17.65*   RBC 3.51*   HGB 10.9*   HCT 33.4*      MCV 95   MCH 31.1*   MCHC 32.6     BMP:   Recent Labs   Lab 01/09/19  0248   *      K 4.0      CO2 21*   BUN 19   CREATININE 0.5   CALCIUM 8.6*   MG 1.9     CMP:   Recent Labs   Lab 01/09/19  0248   *   CALCIUM 8.6*   ALBUMIN 2.2*   PROT 6.0      K 4.0   CO2 21*      BUN 19   CREATININE 0.5   ALKPHOS 267*   ALT 9*   AST 15   BILITOT 0.9     LFTs:   Recent Labs   Lab 01/09/19  0248   ALT 9*   AST 15   ALKPHOS 267*   BILITOT 0.9   PROT 6.0   ALBUMIN 2.2*     ABGs:   Recent Labs   Lab 01/09/19  0330   PH 7.518*   PCO2 31.9*   PO2 107*   HCO3 25.9   POCSATURATED 99   BE 3

## 2019-01-10 LAB
ALBUMIN SERPL BCP-MCNC: 2.1 G/DL
ALP SERPL-CCNC: 243 U/L
ALT SERPL W/O P-5'-P-CCNC: 7 U/L
ANION GAP SERPL CALC-SCNC: 11 MMOL/L
AST SERPL-CCNC: 17 U/L
BASOPHILS # BLD AUTO: 0.07 K/UL
BASOPHILS NFR BLD: 0.5 %
BILIRUB SERPL-MCNC: 0.8 MG/DL
BUN SERPL-MCNC: 18 MG/DL
CALCIUM SERPL-MCNC: 8.7 MG/DL
CHLORIDE SERPL-SCNC: 103 MMOL/L
CO2 SERPL-SCNC: 24 MMOL/L
CREAT SERPL-MCNC: 0.5 MG/DL
DIFFERENTIAL METHOD: ABNORMAL
EOSINOPHIL # BLD AUTO: 0.1 K/UL
EOSINOPHIL NFR BLD: 0.4 %
ERYTHROCYTE [DISTWIDTH] IN BLOOD BY AUTOMATED COUNT: 15.9 %
EST. GFR  (AFRICAN AMERICAN): >60 ML/MIN/1.73 M^2
EST. GFR  (NON AFRICAN AMERICAN): >60 ML/MIN/1.73 M^2
GLUCOSE SERPL-MCNC: 114 MG/DL
HCT VFR BLD AUTO: 30.6 %
HGB BLD-MCNC: 9.8 G/DL
IMM GRANULOCYTES # BLD AUTO: 0.12 K/UL
IMM GRANULOCYTES NFR BLD AUTO: 0.8 %
LYMPHOCYTES # BLD AUTO: 1.5 K/UL
LYMPHOCYTES NFR BLD: 10.4 %
MAGNESIUM SERPL-MCNC: 1.9 MG/DL
MCH RBC QN AUTO: 31.9 PG
MCHC RBC AUTO-ENTMCNC: 32 G/DL
MCV RBC AUTO: 100 FL
MONOCYTES # BLD AUTO: 1.1 K/UL
MONOCYTES NFR BLD: 7.3 %
NEUTROPHILS # BLD AUTO: 11.7 K/UL
NEUTROPHILS NFR BLD: 80.6 %
NRBC BLD-RTO: 0 /100 WBC
PHOSPHATE SERPL-MCNC: 3.3 MG/DL
PLATELET # BLD AUTO: 322 K/UL
PMV BLD AUTO: 9.7 FL
POCT GLUCOSE: 110 MG/DL (ref 70–110)
POCT GLUCOSE: 122 MG/DL (ref 70–110)
POCT GLUCOSE: 130 MG/DL (ref 70–110)
POTASSIUM SERPL-SCNC: 3.9 MMOL/L
PROT SERPL-MCNC: 5.7 G/DL
RBC # BLD AUTO: 3.07 M/UL
SODIUM SERPL-SCNC: 138 MMOL/L
WBC # BLD AUTO: 14.51 K/UL

## 2019-01-10 PROCEDURE — 94003 VENT MGMT INPAT SUBQ DAY: CPT

## 2019-01-10 PROCEDURE — 25000003 PHARM REV CODE 250: Performed by: NURSE PRACTITIONER

## 2019-01-10 PROCEDURE — 80053 COMPREHEN METABOLIC PANEL: CPT

## 2019-01-10 PROCEDURE — 94640 AIRWAY INHALATION TREATMENT: CPT

## 2019-01-10 PROCEDURE — 27000221 HC OXYGEN, UP TO 24 HOURS

## 2019-01-10 PROCEDURE — 99900026 HC AIRWAY MAINTENANCE (STAT)

## 2019-01-10 PROCEDURE — 25000003 PHARM REV CODE 250: Performed by: PHYSICIAN ASSISTANT

## 2019-01-10 PROCEDURE — 63600175 PHARM REV CODE 636 W HCPCS: Performed by: STUDENT IN AN ORGANIZED HEALTH CARE EDUCATION/TRAINING PROGRAM

## 2019-01-10 PROCEDURE — 63600175 PHARM REV CODE 636 W HCPCS: Performed by: NURSE PRACTITIONER

## 2019-01-10 PROCEDURE — 94761 N-INVAS EAR/PLS OXIMETRY MLT: CPT

## 2019-01-10 PROCEDURE — A4216 STERILE WATER/SALINE, 10 ML: HCPCS | Performed by: PSYCHIATRY & NEUROLOGY

## 2019-01-10 PROCEDURE — 99291 PR CRITICAL CARE, E/M 30-74 MINUTES: ICD-10-PCS | Mod: GC,,, | Performed by: ANESTHESIOLOGY

## 2019-01-10 PROCEDURE — 83735 ASSAY OF MAGNESIUM: CPT

## 2019-01-10 PROCEDURE — 25000003 PHARM REV CODE 250: Performed by: PSYCHIATRY & NEUROLOGY

## 2019-01-10 PROCEDURE — 84100 ASSAY OF PHOSPHORUS: CPT

## 2019-01-10 PROCEDURE — 63600175 PHARM REV CODE 636 W HCPCS: Performed by: ANESTHESIOLOGY

## 2019-01-10 PROCEDURE — 25000003 PHARM REV CODE 250: Performed by: STUDENT IN AN ORGANIZED HEALTH CARE EDUCATION/TRAINING PROGRAM

## 2019-01-10 PROCEDURE — 85025 COMPLETE CBC W/AUTO DIFF WBC: CPT

## 2019-01-10 PROCEDURE — 99291 CRITICAL CARE FIRST HOUR: CPT | Mod: GC,,, | Performed by: ANESTHESIOLOGY

## 2019-01-10 PROCEDURE — 25000242 PHARM REV CODE 250 ALT 637 W/ HCPCS: Performed by: NURSE PRACTITIONER

## 2019-01-10 PROCEDURE — 20000000 HC ICU ROOM

## 2019-01-10 PROCEDURE — 94668 MNPJ CHEST WALL SBSQ: CPT

## 2019-01-10 RX ORDER — MORPHINE SULFATE 2 MG/ML
2 INJECTION, SOLUTION INTRAMUSCULAR; INTRAVENOUS ONCE
Status: COMPLETED | OUTPATIENT
Start: 2019-01-10 | End: 2019-01-10

## 2019-01-10 RX ORDER — SYRING-NEEDL,DISP,INSUL,0.3 ML 29 G X1/2"
296 SYRINGE, EMPTY DISPOSABLE MISCELLANEOUS ONCE
Status: COMPLETED | OUTPATIENT
Start: 2019-01-10 | End: 2019-01-10

## 2019-01-10 RX ORDER — METOPROLOL TARTRATE 50 MG/1
50 TABLET ORAL EVERY 8 HOURS
Status: DISCONTINUED | OUTPATIENT
Start: 2019-01-10 | End: 2019-01-13

## 2019-01-10 RX ADMIN — ALBUTEROL SULFATE 2.5 MG: 2.5 SOLUTION RESPIRATORY (INHALATION) at 07:01

## 2019-01-10 RX ADMIN — CHLORHEXIDINE GLUCONATE 0.12% ORAL RINSE 15 ML: 1.2 LIQUID ORAL at 09:01

## 2019-01-10 RX ADMIN — HEPARIN SODIUM 5000 UNITS: 5000 INJECTION, SOLUTION INTRAVENOUS; SUBCUTANEOUS at 09:01

## 2019-01-10 RX ADMIN — URSODIOL 300 MG: 300 CAPSULE ORAL at 09:01

## 2019-01-10 RX ADMIN — Medication 2 MG: at 04:01

## 2019-01-10 RX ADMIN — AMLODIPINE BESYLATE 5 MG: 5 TABLET ORAL at 10:01

## 2019-01-10 RX ADMIN — ALBUTEROL SULFATE 2.5 MG: 2.5 SOLUTION RESPIRATORY (INHALATION) at 03:01

## 2019-01-10 RX ADMIN — CHLORHEXIDINE GLUCONATE 0.12% ORAL RINSE 15 ML: 1.2 LIQUID ORAL at 01:01

## 2019-01-10 RX ADMIN — METOPROLOL TARTRATE 25 MG: 25 TABLET ORAL at 05:01

## 2019-01-10 RX ADMIN — URSODIOL 300 MG: 300 CAPSULE ORAL at 04:01

## 2019-01-10 RX ADMIN — SODIUM CHLORIDE SOLN NEBU 3% 4 ML: 3 NEBU SOLN at 11:01

## 2019-01-10 RX ADMIN — MAGESIUM CITRATE 296 ML: 1.75 LIQUID ORAL at 06:01

## 2019-01-10 RX ADMIN — LACOSAMIDE 200 MG: 50 TABLET, FILM COATED ORAL at 09:01

## 2019-01-10 RX ADMIN — Medication 3 ML: at 05:01

## 2019-01-10 RX ADMIN — FAMOTIDINE 20 MG: 20 TABLET ORAL at 09:01

## 2019-01-10 RX ADMIN — METOPROLOL TARTRATE 50 MG: 50 TABLET ORAL at 09:01

## 2019-01-10 RX ADMIN — METOPROLOL TARTRATE 25 MG: 25 TABLET ORAL at 03:01

## 2019-01-10 RX ADMIN — SODIUM CHLORIDE 1000 ML: 0.9 INJECTION, SOLUTION INTRAVENOUS at 01:01

## 2019-01-10 RX ADMIN — SODIUM CHLORIDE SOLN NEBU 3% 4 ML: 3 NEBU SOLN at 04:01

## 2019-01-10 RX ADMIN — SODIUM CHLORIDE SOLN NEBU 3% 4 ML: 3 NEBU SOLN at 07:01

## 2019-01-10 RX ADMIN — CHLORHEXIDINE GLUCONATE 0.12% ORAL RINSE 15 ML: 1.2 LIQUID ORAL at 04:01

## 2019-01-10 RX ADMIN — Medication 3 ML: at 09:01

## 2019-01-10 RX ADMIN — ALBUTEROL SULFATE 2.5 MG: 2.5 SOLUTION RESPIRATORY (INHALATION) at 04:01

## 2019-01-10 RX ADMIN — PRAVASTATIN SODIUM 40 MG: 40 TABLET ORAL at 09:01

## 2019-01-10 RX ADMIN — URSODIOL 300 MG: 300 CAPSULE ORAL at 10:01

## 2019-01-10 RX ADMIN — HEPARIN SODIUM 5000 UNITS: 5000 INJECTION, SOLUTION INTRAVENOUS; SUBCUTANEOUS at 03:01

## 2019-01-10 RX ADMIN — HEPARIN SODIUM 5000 UNITS: 5000 INJECTION, SOLUTION INTRAVENOUS; SUBCUTANEOUS at 05:01

## 2019-01-10 RX ADMIN — STANDARDIZED SENNA CONCENTRATE AND DOCUSATE SODIUM 1 TABLET: 8.6; 5 TABLET, FILM COATED ORAL at 09:01

## 2019-01-10 RX ADMIN — LACOSAMIDE 200 MG: 50 TABLET, FILM COATED ORAL at 10:01

## 2019-01-10 RX ADMIN — POLYETHYLENE GLYCOL 3350 17 G: 17 POWDER, FOR SOLUTION ORAL at 10:01

## 2019-01-10 RX ADMIN — Medication 3 ML: at 02:01

## 2019-01-10 RX ADMIN — ALBUTEROL SULFATE 2.5 MG: 2.5 SOLUTION RESPIRATORY (INHALATION) at 11:01

## 2019-01-10 RX ADMIN — SODIUM CHLORIDE SOLN NEBU 3% 4 ML: 3 NEBU SOLN at 03:01

## 2019-01-10 RX ADMIN — FENTANYL CITRATE 25 MCG: 50 INJECTION INTRAMUSCULAR; INTRAVENOUS at 03:01

## 2019-01-10 RX ADMIN — FAMOTIDINE 20 MG: 20 TABLET ORAL at 10:01

## 2019-01-10 RX ADMIN — POLYETHYLENE GLYCOL 3350 17 G: 17 POWDER, FOR SOLUTION ORAL at 09:01

## 2019-01-10 RX ADMIN — STANDARDIZED SENNA CONCENTRATE AND DOCUSATE SODIUM 1 TABLET: 8.6; 5 TABLET, FILM COATED ORAL at 10:01

## 2019-01-10 NOTE — PLAN OF CARE
SW met with Pt wife to give update on the insurance issue with the 7 day rule. She reported she has no issues with this.    Caroline Soares LMSW  Neurocritical Care   [facility]  [id number]

## 2019-01-10 NOTE — PHYSICIAN QUERY
PT Name: Peewee Nunes  MR #: [id number]    Physician Query Form - Respiratory Condition Clarification      CDS/: Bean Olvera               Contact information: [phone]    This form is a permanent document in the medical record.    Query Date: January 10, 2019    By submitting this query, we are merely seeking further clarification of documentation. Please utilize your independent clinical judgment when addressing the question(s) below.    The Medical record contains the following:   Indicators   Supporting Clinical Findings Location in Medical Record   x Pulmonary Edema documented Low vent settings, cxr with pulmonary edema     Progress note Neuro CC 1/7 (Brianne)    Wheezing, Productive cough, SOB, PORTER, Use of accessory muscles documented     x Radiology Findings FINDINGS:  Appears to be an ET tube above the abby.  There may be a 2nd drainage catheter at the base of the neck.  Patient is rotated.  Heart size and pulmonary vascularity is similar.  Some increased ground-glass opacity at the bases unchanged.  No pneumothorax. Chest Xray 1/7   x Hypoxia , Hypoxemia Hypoxic documented Pulmonary   Acute respiratory failure     Hypoxic, aspiration, bronch with mucous plugging in L lung  cxr with pulm edema, fio2 down after diuresis     Maintain today in setting of mental status  Minimize sedation today, plan for prn fentanyl and dc propofol  Plan for trach/peg today  Holding further diuresis today        Progress note Neuro CC 1/7 (Brianne)    Respiratory Distress documented      RR                  PaO2                  PaCO2                     O2 sat      Treatment:      Supplemental O2:      Oxygen dependence      Other:         Provider, please specify diagnosis or diagnoses associated with above clinical findings.    [  X  ] Acute pulmonary edema, cardiac cause (specify cardiac condition): ___________________   [    ] Acute pulmonary edema, non-cardiac cause (specify  causative condition): ___________________   [    ] Acute and/on chronic pulmonary edema, non-cardiac cause (specify causative condition): ___________________   [    ] Chronic pulmonary edema, non-cardiac cause (specify causative condition): ___________________   [    ] Other respiratory diagnosis (please specify): _________________________________    [ [   ] ] Clinically Undetermined         Please document in your progress notes daily for the duration of treatment, until resolved, and include in your discharge summary.

## 2019-01-10 NOTE — PLAN OF CARE
01/10/19 1406   Discharge Reassessment   Assessment Type Discharge Planning Reassessment   Provided patient/caregiver education on the expected discharge date and the discharge plan No   Do you have any problems affording any of your prescribed medications? No   Discharge Plan A Long-term acute care facility (LTAC)   Discharge Plan B Skilled Nursing Facility   DME Needed Upon Discharge  (mamadou)   Patient choice form signed by patient/caregiver No   Anticipated Discharge Disposition LTAC   Can the patient answer the patient profile reliably? No, cognitively impaired   How does the patient rate their overall health at the present time? (mamadou)   Describe the patient's ability to walk at the present time. Does not walk or unable to take any steps at all   How often would a person be available to care for the patient? Occasionally   Number of comorbid conditions (as recorded on the chart) Four   During the past month, has the patient often been bothered by feeling down, depressed or hopeless? (mamadou)   During the past month, has the patient often been bothered by little interest or pleasure in doing things? (mamadou)   Post-Acute Status   Post-Acute Authorization Placement   Post-Acute Placement Status Insurance Denial  (per [facility], the patient must be 7 days post peg and trach to transfer to LTAC (01/15/19))         Azeb Flores RN, CCRN-K, [facility]  Neuro-Critical Care   X [phone]

## 2019-01-10 NOTE — ASSESSMENT & PLAN NOTE
64 yo male with L aSDH now s/p craniotomy for evacuation (12/20).    --Continue care per primary team.  --PEG/TRACH completed  --Staples removed, incision healing well, well approximated w/o erythema/drainage.   --Continue neurochecks while in house  --patient is stable for discharge from a neurosurgical perspective  --dispo pending LTAC placement

## 2019-01-10 NOTE — PROGRESS NOTES
[facility]  Neurosurgery  Progress Note    Subjective:     History of Present Illness: 64 yo M with PMH of CAD, CABG in 2006, DM, HTN, HLD on ASA presents to the ED for AMS. Patient fell 4 days ago with about 2 minutes of LOC. Family is unsure of what caused the fall. Full workup done including a CTH at [facility] that was negative. Patient went home and stopped taking his ASA. This morning, his family noticed he was more confused. He went to [facility] and CTH was done that revealed a 1.7cm left frontalpariental SDH with a 5 mm right sided midline shift and a left SAH. [place] [facility] [place] was obtained per family as patient unable to give history 2/2 confusion with expressive and receptive aphasia.    Post-Op Info:  Procedure(s) (LRB):  INSERTION, PEG TUBE (N/A)  CREATION, TRACHEOSTOMY (N/A)   2 Days Post-Op     Interval History: 1/10:  -130,  -190, ow AFVSS, NAEON, leukocytosis to 14, ow labs stable, exam stable    Medications:  Continuous Infusions:  Scheduled Meds:   albuterol sulfate  2.5 mg Nebulization Q4H    amLODIPine  5 mg Per G Tube Daily    chlorhexidine  15 mL Mouth/Throat QID    famotidine  20 mg Per NG tube BID    heparin (porcine)  5,000 Units Subcutaneous Q8H    lacosamide  200 mg Per NG tube BID    metoprolol tartrate  25 mg Per NG tube Q8H    polyethylene glycol  17 g Per NG tube BID    pravastatin  40 mg Per NG tube QHS    senna-docusate 8.6-50 mg  1 tablet Per NG tube BID    sodium chloride 0.9%  3 mL Intravenous Q8H    sodium chloride 3%  4 mL Nebulization Q4H    ursodiol  300 mg Per NG tube TID     PRN Meds:acetaminophen, fentaNYL, hydrALAZINE, magnesium oxide, magnesium oxide, ondansetron, potassium chloride 10%, potassium chloride 10%, potassium chloride 10%, potassium, sodium phosphates, potassium, sodium phosphates, potassium, sodium phosphates     Objective:     Weight: 110.9 kg (244 lb 7.8 oz)  Body mass index is 34.1  kg/m².  Vital Signs (Most Recent):  Temp: 99.2 °F (37.3 °C) (01/10/19 1110)  Pulse: (!) 117 (01/10/19 1301)  Resp: (!) 25 (01/10/19 1301)  BP: (!) 166/96 (01/10/19 1301)  SpO2: 96 % (01/10/19 1301) Vital Signs (24h Range):  Temp:  [97.9 °F (36.6 °C)-99.2 °F (37.3 °C)] 99.2 °F (37.3 °C)  Pulse:  [111-130] 117  Resp:  [10-30] 25  SpO2:  [95 %-100 %] 96 %  BP: (138-190)/() 166/96     Date 01/10/19 0700 - 01/11/19 0659   Shift 2133-2138 0774-0885 0585-2714 24 Hour Total   INTAKE   NG/   535   Shift Total(mL/kg) 535(4.8)   535(4.8)   OUTPUT   Urine(mL/kg/hr) 300(0.3)   300   Shift Total(mL/kg) 300(2.7)   300(2.7)   Weight (kg) 110.9 110.9 110.9 110.9              Vent Mode: Spont  Oxygen Concentration (%):  [29-31] 30  Resp Rate Total:  [19 br/min-29 br/min] 26 br/min  PEEP/CPAP:  [5 cmH20] 5 cmH20  Pressure Support:  [5 cmH20] 5 cmH20  Mean Airway Pressure:  [6.8 cmH20-7.3 cmH20] 7.1 cmH20         Gastrostomy/Enterostomy 01/08/19 1441 Percutaneous endoscopic gastrostomy (PEG) midline (Active)   Securement anchored to abdomen w/ adhesive device 1/10/2019  7:00 AM   Interventions Prior to Feeding patency checked;residual checked;residual returned 1/10/2019  7:00 AM   Drainage serosanguineous 1/10/2019  7:00 AM   Feeding Type continuous;by pump 1/10/2019  7:00 AM   Clamp Status/Tolerance unclamped;no abdominal discomfort;no abdominal distention;no emesis;no residual;no restlessness 1/10/2019  7:00 AM   Dressing dried drainage 1/10/2019  7:00 AM   Insertion Site serosanguineous drainage;no redness;no warmth;no swelling 1/10/2019  7:00 AM   Site Care device rotatated;site cleansed w/ sterile normal saline;sterile precut T-slit dressing applied;sterile 4 x 4 gauze dressing applied;outer bumper rotated 1/10/2019  7:00 AM   Flush/Irrigation flushed w/;water;no resistance met 1/10/2019  7:00 AM   Dressing Change Due 01/10/19 1/10/2019  7:00 AM   Current Rate (mL/hr) 30 mL/hr 1/10/2019  7:00 AM   Goal Rate (mL/hr) 55  mL/hr 1/10/2019  7:00 AM   Intake (mL) 35 mL 1/10/2019 10:00 AM   Water Bolus (mL) 250 mL 1/10/2019 12:00 PM   Formula Name impact peptide 1/10/2019  7:00 AM   Tube Feeding Intake (mL) 40 1/10/2019 12:00 PM   Residual Amount (ml) 5 ml 1/10/2019  7:00 AM       Male External Urinary Catheter 01/09/19 1853 (Active)   Collection Container Urimeter 1/10/2019  7:00 AM   Securement Method secured to top of thigh w/ adhesive device 1/10/2019  3:05 AM   Skin no redness;no breakdown 1/10/2019  7:00 AM   Tolerance no signs/symptoms of discomfort 1/10/2019  7:00 AM   Output (mL) 0 mL 1/10/2019  7:00 AM       Neurosurgery Physical Exam     E4VTM5, grimacing to stimulation, tracking  PERRL  MOORE spontaneously  Trach and PEG in place      Significant Labs:  Recent Labs   Lab 01/09/19  0248 01/10/19  0256   * 114*    138   K 4.0 3.9    103   CO2 21* 24   BUN 19 18   CREATININE 0.5 0.5   CALCIUM 8.6* 8.7   MG 1.9 1.9     Recent Labs   Lab 01/09/19  0248 01/10/19  0256   WBC 17.65* 14.51*   HGB 10.9* 9.8*   HCT 33.4* 30.6*    322     No results for input(s): LABPT, INR, APTT in the last 48 hours.  Microbiology Results (last 7 days)     Procedure Component Value Units Date/Time    Blood culture [[id number]] Collected:  01/03/19 1107    Order Status:  Completed Specimen:  Blood from Peripheral, Foot, Left Updated:  01/08/19 1412     Blood Culture, Routine No growth after 5 days.    Narrative:       Blood cultures x 2 different sites. 4 bottles total. Please  draw cultures before administering antibiotics.    Blood culture [[id number]] Collected:  01/03/19 1100    Order Status:  Completed Specimen:  Blood from Peripheral, Hand, Left Updated:  01/08/19 1412     Blood Culture, Routine No growth after 5 days.    Narrative:       Blood cultures from 2 different sites. 4 bottles total.  Please draw before starting antibiotics.    Culture, Respiratory with Gram Stain [[id number]] Collected:  01/03/19 1108    Order  Status:  Completed Specimen:  Respiratory from Endotracheal Aspirate Updated:  01/05/19 1007     Respiratory Culture Normal respiratory donnell     Gram Stain (Respiratory) Few WBC's     Gram Stain (Respiratory) Rare Gram positive cocci        Assessment/Plan:     * SDH (subdural hematoma)    64 yo male with L aSDH now s/p craniotomy for evacuation (12/20).    --Continue care per primary team.  --PEG/TRACH completed  --Staples removed, incision healing well, well approximated w/o erythema/drainage.   --Continue neurochecks while in house  --patient is stable for discharge from a neurosurgical perspective  --dispo pending LTAC placement               Gagandeep Giang MD  Neurosurgery  [facility]

## 2019-01-10 NOTE — PLAN OF CARE
Problem: Adult Inpatient Plan of Care  Goal: Plan of Care Review  POC reviewed with pt at 0500.  No acute events overnight. Pt progressing toward goals. Will continue to monitor. See flowsheets for full assessment and VS info. Pt moves all extremities (not to command) no drips.

## 2019-01-10 NOTE — PHYSICIAN QUERY
PT Name: Peewee Nunes  MR #: [id number]     Physician Query Form - Documentation Clarification      CDS/: Bean Olvera               Contact information: [phone]    This form is a permanent document in the medical record.     Query Date: January 10, 2019    By submitting this query, we are merely seeking further clarification of documentation. Please utilize your independent clinical judgment when addressing the question(s) below.    The Medical record reflects the following:    Supporting Clinical Findings Location in Medical Record   PREOPERATIVE DIAGNOSES:   1. Respiratory failure.   2. Dysphagia.    PROCEDURES PERFORMED:   1. tracheostomy.   2. Esophagogastroduodenoscopy with percutaneous endoscopic gastrostomy    We also recommend a percutaneous gastrostomy tube for the patient's dysphagia.      OP note 1/8 ([place])   ENT: Denies dysphagia, nasal discharge, ear pain or discharge      General Recommendations:  Dysphagia therapy and Speech/language therapy    Oral Musculature Evaluation  · Oral Musculature: unable to assess due to poor participation/comprehension  Volitional Swallow: delayed    Peewee Nunes is a 63 y.o. male with an SLP diagnosis of Aphasia and Dysphagia             Progress note 1/6 ([facility]) Neuro CC    ST 12/21                                                                            Doctor, Do you agree with the consults Diagnoses of Dysphagia? If so please specify    Provider Use Only    ( X )  Dysphagia Ruled in, Specify___________________    (     )  Dysphagia Ruled out    (     )  Unable to determine                                                                                                             [  ] Clinically Undetermined

## 2019-01-10 NOTE — SUBJECTIVE & OBJECTIVE
Interval History: 1/10:  -130,  -190, ow AFVSS, NAEON, leukocytosis to 14, ow labs stable, exam stable    Medications:  Continuous Infusions:  Scheduled Meds:   albuterol sulfate  2.5 mg Nebulization Q4H    amLODIPine  5 mg Per G Tube Daily    chlorhexidine  15 mL Mouth/Throat QID    famotidine  20 mg Per NG tube BID    heparin (porcine)  5,000 Units Subcutaneous Q8H    lacosamide  200 mg Per NG tube BID    metoprolol tartrate  25 mg Per NG tube Q8H    polyethylene glycol  17 g Per NG tube BID    pravastatin  40 mg Per NG tube QHS    senna-docusate 8.6-50 mg  1 tablet Per NG tube BID    sodium chloride 0.9%  3 mL Intravenous Q8H    sodium chloride 3%  4 mL Nebulization Q4H    ursodiol  300 mg Per NG tube TID     PRN Meds:acetaminophen, fentaNYL, hydrALAZINE, magnesium oxide, magnesium oxide, ondansetron, potassium chloride 10%, potassium chloride 10%, potassium chloride 10%, potassium, sodium phosphates, potassium, sodium phosphates, potassium, sodium phosphates     Objective:     Weight: 110.9 kg (244 lb 7.8 oz)  Body mass index is 34.1 kg/m².  Vital Signs (Most Recent):  Temp: 99.2 °F (37.3 °C) (01/10/19 1110)  Pulse: (!) 117 (01/10/19 1301)  Resp: (!) 25 (01/10/19 1301)  BP: (!) 166/96 (01/10/19 1301)  SpO2: 96 % (01/10/19 1301) Vital Signs (24h Range):  Temp:  [97.9 °F (36.6 °C)-99.2 °F (37.3 °C)] 99.2 °F (37.3 °C)  Pulse:  [111-130] 117  Resp:  [10-30] 25  SpO2:  [95 %-100 %] 96 %  BP: (138-190)/() 166/96     Date 01/10/19 0700 - 01/11/19 0659   Shift 6840-9718 0881-8445 5883-2455 24 Hour Total   INTAKE   NG/   535   Shift Total(mL/kg) 535(4.8)   535(4.8)   OUTPUT   Urine(mL/kg/hr) 300(0.3)   300   Shift Total(mL/kg) 300(2.7)   300(2.7)   Weight (kg) 110.9 110.9 110.9 110.9              Vent Mode: Spont  Oxygen Concentration (%):  [29-31] 30  Resp Rate Total:  [19 br/min-29 br/min] 26 br/min  PEEP/CPAP:  [5 cmH20] 5 cmH20  Pressure Support:  [5 cmH20] 5 cmH20  Mean Airway  Pressure:  [6.8 cmH20-7.3 cmH20] 7.1 cmH20         Gastrostomy/Enterostomy 01/08/19 1441 Percutaneous endoscopic gastrostomy (PEG) midline (Active)   Securement anchored to abdomen w/ adhesive device 1/10/2019  7:00 AM   Interventions Prior to Feeding patency checked;residual checked;residual returned 1/10/2019  7:00 AM   Drainage serosanguineous 1/10/2019  7:00 AM   Feeding Type continuous;by pump 1/10/2019  7:00 AM   Clamp Status/Tolerance unclamped;no abdominal discomfort;no abdominal distention;no emesis;no residual;no restlessness 1/10/2019  7:00 AM   Dressing dried drainage 1/10/2019  7:00 AM   Insertion Site serosanguineous drainage;no redness;no warmth;no swelling 1/10/2019  7:00 AM   Site Care device rotatated;site cleansed w/ sterile normal saline;sterile precut T-slit dressing applied;sterile 4 x 4 gauze dressing applied;outer bumper rotated 1/10/2019  7:00 AM   Flush/Irrigation flushed w/;water;no resistance met 1/10/2019  7:00 AM   Dressing Change Due 01/10/19 1/10/2019  7:00 AM   Current Rate (mL/hr) 30 mL/hr 1/10/2019  7:00 AM   Goal Rate (mL/hr) 55 mL/hr 1/10/2019  7:00 AM   Intake (mL) 35 mL 1/10/2019 10:00 AM   Water Bolus (mL) 250 mL 1/10/2019 12:00 PM   Formula Name impact peptide 1/10/2019  7:00 AM   Tube Feeding Intake (mL) 40 1/10/2019 12:00 PM   Residual Amount (ml) 5 ml 1/10/2019  7:00 AM       Male External Urinary Catheter 01/09/19 9713 (Active)   Collection Container Urimeter 1/10/2019  7:00 AM   Securement Method secured to top of thigh w/ adhesive device 1/10/2019  3:05 AM   Skin no redness;no breakdown 1/10/2019  7:00 AM   Tolerance no signs/symptoms of discomfort 1/10/2019  7:00 AM   Output (mL) 0 mL 1/10/2019  7:00 AM       Neurosurgery Physical Exam     [id number], grimacing to stimulation, tracking  PERRL  MOORE spontaneously  Trach and PEG in place      Significant Labs:  Recent Labs   Lab 01/09/19  0248 01/10/19  0256   * 114*    138   K 4.0 3.9    103   CO2 21* 24    BUN 19 18   CREATININE 0.5 0.5   CALCIUM 8.6* 8.7   MG 1.9 1.9     Recent Labs   Lab 01/09/19  0248 01/10/19  0256   WBC 17.65* 14.51*   HGB 10.9* 9.8*   HCT 33.4* 30.6*    322     No results for input(s): LABPT, INR, APTT in the last 48 hours.  Microbiology Results (last 7 days)     Procedure Component Value Units Date/Time    Blood culture [[id number]] Collected:  01/03/19 1107    Order Status:  Completed Specimen:  Blood from Peripheral, Foot, Left Updated:  01/08/19 1412     Blood Culture, Routine No growth after 5 days.    Narrative:       Blood cultures x 2 different sites. 4 bottles total. Please  draw cultures before administering antibiotics.    Blood culture [[id number]] Collected:  01/03/19 1100    Order Status:  Completed Specimen:  Blood from Peripheral, Hand, Left Updated:  01/08/19 1412     Blood Culture, Routine No growth after 5 days.    Narrative:       Blood cultures from 2 different sites. 4 bottles total.  Please draw before starting antibiotics.    Culture, Respiratory with Gram Stain [[id number]] Collected:  01/03/19 1108    Order Status:  Completed Specimen:  Respiratory from Endotracheal Aspirate Updated:  01/05/19 1007     Respiratory Culture Normal respiratory donnell     Gram Stain (Respiratory) Few WBC's     Gram Stain (Respiratory) Rare Gram positive cocci

## 2019-01-10 NOTE — PROGRESS NOTES
Notified [facility] team of pt heart rate sustaining in 130's.  Ordered to give lopressor early.  Will continue to monitor.

## 2019-01-10 NOTE — PROCEDURES
DATE OF SERVICE:  01/09/2019    ELECTROENCEPHALOGRAM REPORT    Extended Recording    METHODOLOGY:  Electroencephalographic (EEG) is recorded with electrodes placed   according to the International 10-20 placement system.  Thirty two (32) channels   of digital signal (sampling rate of 512/sec), including T1 and T2, were   simultaneously recorded from the scalp and may include EKG, EMG, and/or eye   monitors.  Recording band pass was 0.1 to 512 Hz.  Digital video recording of   the patient is simultaneously recorded with the EEG.  The patient is instructed   to report clinical symptoms which may occur during the recording session.  EEG   and video recording are stored and archived in digital format.  Activation   procedures, which include photic stimulation, hyperventilation and instructing   patients to perform simple tasks, are done in selected patients.    The EEG is displayed on a monitor screen and can be reviewed using different   montages.  Computer-assisted analysis is employed to detect spike and   electrographic seizure activity.  The entire record is submitted for computer   analysis.  The entire recording is visually reviewed, and the times identified   by computer analysis as being spikes or seizures are reviewed again.    Compressed spectral analysis (CSA) is also performed on the activity recorded   from each individual channel.  This is displayed as a power display of   frequencies from 0 to 30 Hz over time.  The CSA is reviewed looking for   asymmetries in power between homologous areas of the scalp, then compared with   the original EEG recording.    [employer] software was also utilized in the review of this study.  This software   suite analyzes the EEG recording in multiple domains.  Coherence and rhythmicity   are computed to identify EEG sections which may contain organized seizures.    Each channel undergoes analysis to detect the presence of spike and sharp waves   which have special and  morphological characteristics of epileptic activity.  The   routine EEG recording is converted from special into frequency domain.  This is   then displayed comparing homologous areas to identify areas of significant   asymmetry.  Algorithm to identify non-cortically generated artifact is used to   separate artifact from the EEG.    RECORDING TIMES:  Duration is 1 hour and 24 minutes.    EEG FINDINGS:  This is a medium amplitude study with a mixed frequency   background present with primarily theta frequencies in the 4 to 6 Hz range seen   with also frequent delta frequencies scattered throughout.  There are   superimposed faster frequencies in the alpha range and the beta range at times   as well, but the dominant frequency component is theta followed by delta.    There is a hemispheric asymmetry with relative slowing seen in the left   hemisphere compared to the right evidenced by higher delta power and pure   superimposed faster frequencies.  There is page to page variability, but no   significant evolution in this record.  There are no clear epileptiform   disturbances.  There are no seizures.  There are no other significant findings.    INTERPRETATION:  Abnormal EEG due to:  1.  Moderate diffuse slowing of the overall background.  2.  Superimposed left hemispheric slowing.  Results suggest a diffuse   encephalopathy and focal cerebral dysfunction of the left hemisphere.  No   evidence of seizures.      NBB/IN  dd: 01/10/2019 14:02:23 (CST)  td: 01/10/2019 14:22:58 (CST)  Doc ID   #[id number]  Job ID #[id number]    CC:

## 2019-01-10 NOTE — PROGRESS NOTES
ICU Progress Note  Neurocritical Care    Admit Date: 12/20/2018  LOS: 21    CC: SDH (subdural hematoma)    Code Status: Full Code     SUBJECTIVE:     Interval History/Significant Events:   Tachycardia continues  arf  Interactive  Wbc decreasing  Hypokalemia  Hypokalemia  Respiratory alkalosis      Medications:  Continuous Infusions:  Scheduled Meds:   albuterol sulfate  2.5 mg Nebulization Q4H    amLODIPine  5 mg Per G Tube Daily    chlorhexidine  15 mL Mouth/Throat QID    famotidine  20 mg Per NG tube BID    heparin (porcine)  5,000 Units Subcutaneous Q8H    lacosamide  200 mg Per NG tube BID    metoprolol tartrate  25 mg Per NG tube Q8H    polyethylene glycol  17 g Per NG tube BID    pravastatin  40 mg Per NG tube QHS    senna-docusate 8.6-50 mg  1 tablet Per NG tube BID    sodium chloride 0.9%  1,000 mL Intravenous Once    sodium chloride 0.9%  3 mL Intravenous Q8H    sodium chloride 3%  4 mL Nebulization Q4H    ursodiol  300 mg Per NG tube TID     PRN Meds:.acetaminophen, fentaNYL, hydrALAZINE, magnesium oxide, magnesium oxide, ondansetron, potassium chloride 10%, potassium chloride 10%, potassium chloride 10%, potassium, sodium phosphates, potassium, sodium phosphates, potassium, sodium phosphates    OBJECTIVE:   Vital Signs (Most Recent):   Temp: 99.2 °F (37.3 °C) (01/10/19 1110)  Pulse: (!) 117 (01/10/19 1301)  Resp: (!) 25 (01/10/19 1301)  BP: (!) 166/96 (01/10/19 1301)  SpO2: 96 % (01/10/19 1301)    Vital Signs (24h Range):   Temp:  [97.9 °F (36.6 °C)-99.2 °F (37.3 °C)] 99.2 °F (37.3 °C)  Pulse:  [111-130] 117  Resp:  [10-30] 25  SpO2:  [95 %-100 %] 96 %  BP: (138-190)/() 166/96    ICP/CPP (Last 24h):        I & O (Last 24h):     Intake/Output Summary (Last 24 hours) at 1/10/2019 1333  Last data filed at 1/10/2019 1200  Gross per 24 hour   Intake 1315 ml   Output 520 ml   Net 795 ml     Physical Exam:  GA: Alert, comfortable, no acute distress.   HEENT: No scleral icterus or JVD.    Pulmonary: Clear to auscultation A/P/L. No wheezing, crackles, or rhonchi.  Cardiac: RRR S1 & S2 w/o rubs/murmurs/gallops.   Abdominal: Bowel sounds present x 4. No appreciable hepatosplenomegaly.  Skin: No jaundice, rashes, or visible lesions.  Neuro:    Awake  Alert  Interactive  Moves all 4 exts    Vent Data:   Vent Mode: Spont  Oxygen Concentration (%):  [29-31] 30  Resp Rate Total:  [19 br/min-29 br/min] 26 br/min  PEEP/CPAP:  [5 cmH20] 5 cmH20  Pressure Support:  [5 cmH20] 5 cmH20  Mean Airway Pressure:  [6.8 cmH20-7.3 cmH20] 7.1 cmH20    Lines/Drains/Airway: ml-6       Surgical Airway 01/08/19 1401 Shiley Cuffed (Active)   Cuff Pressure 29 cm H2O 1/10/2019  7:39 AM   Cuff Inflation? Inflated 1/10/2019 11:10 AM   Speaking Valve Usage Not wearing 1/10/2019  7:00 AM   Status Secured 1/10/2019 11:10 AM   Site Assessment Clean;Dry;No bleeding 1/10/2019 11:10 AM   Site Care Cleansed;Dried;Dressing applied 1/10/2019  7:39 AM   Inner Cannula Care Changed/new 1/10/2019  7:39 AM   Ties Assessment Clean;Dry;Intact 1/10/2019 11:10 AM               Gastrostomy/Enterostomy 01/08/19 1441 Percutaneous endoscopic gastrostomy (PEG) midline (Active)   Securement anchored to abdomen w/ adhesive device 1/10/2019  7:00 AM   Interventions Prior to Feeding patency checked;residual checked;residual returned 1/10/2019  7:00 AM   Drainage serosanguineous 1/10/2019  7:00 AM   Feeding Type continuous;by pump 1/10/2019  7:00 AM   Clamp Status/Tolerance unclamped;no abdominal discomfort;no abdominal distention;no emesis;no residual;no restlessness 1/10/2019  7:00 AM   Dressing dried drainage 1/10/2019  7:00 AM   Insertion Site serosanguineous drainage;no redness;no warmth;no swelling 1/10/2019  7:00 AM   Site Care device rotatated;site cleansed w/ sterile normal saline;sterile precut T-slit dressing applied;sterile 4 x 4 gauze dressing applied;outer bumper rotated 1/10/2019  7:00 AM   Flush/Irrigation flushed w/;water;no resistance met  1/10/2019  7:00 AM   Dressing Change Due 01/10/19 1/10/2019  7:00 AM   Current Rate (mL/hr) 30 mL/hr 1/10/2019  7:00 AM   Goal Rate (mL/hr) 55 mL/hr 1/10/2019  7:00 AM   Intake (mL) 35 mL 1/10/2019 10:00 AM   Water Bolus (mL) 250 mL 1/10/2019 12:00 PM   Formula Name impact peptide 1/10/2019  7:00 AM   Tube Feeding Intake (mL) 40 1/10/2019 12:00 PM   Residual Amount (ml) 5 ml 1/10/2019  7:00 AM       Male External Urinary Catheter 01/09/19 7894 (Active)   Collection Container Urimeter 1/10/2019  7:00 AM   Securement Method secured to top of thigh w/ adhesive device 1/10/2019  3:05 AM   Skin no redness;no breakdown 1/10/2019  7:00 AM   Tolerance no signs/symptoms of discomfort 1/10/2019  7:00 AM   Output (mL) 0 mL 1/10/2019  7:00 AM     Nutrition/Tube Feeds (if NPO state why): t feeds    Labs:  ABG: No results for input(s): PH, PO2, PCO2, HCO3, POCSATURATED, BE in the last 24 hours.  BMP:  Recent Labs   Lab 01/10/19  0256      K 3.9      CO2 24   BUN 18   CREATININE 0.5   *   MG 1.9   PHOS 3.3     LFT:   Lab Results   Component Value Date    AST 17 01/10/2019    ALT 7 (L) 01/10/2019     (H) 01/03/2019    ALKPHOS 243 (H) 01/10/2019    BILITOT 0.8 01/10/2019    ALBUMIN 2.1 (L) 01/10/2019    PROT 5.7 (L) 01/10/2019     CBC:   Lab Results   Component Value Date    WBC 14.51 (H) 01/10/2019    HGB 9.8 (L) 01/10/2019    HCT 30.6 (L) 01/10/2019     (H) 01/10/2019     01/10/2019     Microbiology x 7d:   Microbiology Results (last 7 days)     Procedure Component Value Units Date/Time    Blood culture [[id number]] Collected:  01/03/19 1107    Order Status:  Completed Specimen:  Blood from Peripheral, Foot, Left Updated:  01/08/19 1412     Blood Culture, Routine No growth after 5 days.    Narrative:       Blood cultures x 2 different sites. 4 bottles total. Please  draw cultures before administering antibiotics.    Blood culture [[id number]] Collected:  01/03/19 1100    Order Status:   Completed Specimen:  Blood from Peripheral, Hand, Left Updated:  01/08/19 1412     Blood Culture, Routine No growth after 5 days.    Narrative:       Blood cultures from 2 different sites. 4 bottles total.  Please draw before starting antibiotics.    Culture, Respiratory with Gram Stain [[id number]] Collected:  01/03/19 1108    Order Status:  Completed Specimen:  Respiratory from Endotracheal Aspirate Updated:  01/05/19 1007     Respiratory Culture Normal respiratory donnell     Gram Stain (Respiratory) Few WBC's     Gram Stain (Respiratory) Rare Gram positive cocci        Imaging:   cxr clear  I personally reviewed the above image.    ASSESSMENT/PLAN:     Active Hospital Problems    Diagnosis    *SDH (subdural hematoma)    Weight loss, unintentional     History of 200lb wt loss over last year  Reports early satiety and change in taste      Seizure    Status epilepticus    Acute respiratory failure    Septic shock    AYLIN (acute kidney injury)    New onset atrial fibrillation    Pressure injury of buttock, stage 2    Severe malnutrition    SAH (subarachnoid hemorrhage)    Subdural hematoma    Epidural hematoma    Brain compression    Cerebral edema    Essential hypertension      Plan:  Follow HR  Fluids  Follow UO  Duplex le's  Follow exam  Discussed with wife    Critical secondary to Patient has a condition that poses threat to life and bodily function: very concerned about the tachycardia- follow closely      Total cc time 32 mins     Critical care was time spent personally by me on the following activities: development of treatment plan with patient or surrogate and bedside caregivers, discussions with consultants, evaluation of patient's response to treatment, examination of patient, ordering and performing treatments and interventions, ordering and review of laboratory studies, ordering and review of radiographic studies, pulse oximetry, re-evaluation of patient's condition. This critical care time  did not overlap with that of any other provider or involve time for any procedures.

## 2019-01-11 LAB
ALBUMIN SERPL BCP-MCNC: 2.1 G/DL
ALBUMIN SERPL BCP-MCNC: 2.1 G/DL
ALP SERPL-CCNC: 217 U/L
ALP SERPL-CCNC: 217 U/L
ALT SERPL W/O P-5'-P-CCNC: 7 U/L
ALT SERPL W/O P-5'-P-CCNC: 7 U/L
ANION GAP SERPL CALC-SCNC: 10 MMOL/L
ANION GAP SERPL CALC-SCNC: 10 MMOL/L
AST SERPL-CCNC: 15 U/L
AST SERPL-CCNC: 15 U/L
BASOPHILS # BLD AUTO: 0.06 K/UL
BASOPHILS # BLD AUTO: 0.06 K/UL
BASOPHILS NFR BLD: 0.4 %
BASOPHILS NFR BLD: 0.4 %
BILIRUB SERPL-MCNC: 0.8 MG/DL
BILIRUB SERPL-MCNC: 0.8 MG/DL
BUN SERPL-MCNC: 21 MG/DL
BUN SERPL-MCNC: 21 MG/DL
CALCIUM SERPL-MCNC: 8.9 MG/DL
CALCIUM SERPL-MCNC: 8.9 MG/DL
CHLORIDE SERPL-SCNC: 103 MMOL/L
CHLORIDE SERPL-SCNC: 103 MMOL/L
CO2 SERPL-SCNC: 26 MMOL/L
CO2 SERPL-SCNC: 26 MMOL/L
CREAT SERPL-MCNC: 0.6 MG/DL
CREAT SERPL-MCNC: 0.6 MG/DL
DIFFERENTIAL METHOD: ABNORMAL
DIFFERENTIAL METHOD: ABNORMAL
EOSINOPHIL # BLD AUTO: 0.3 K/UL
EOSINOPHIL # BLD AUTO: 0.3 K/UL
EOSINOPHIL NFR BLD: 1.8 %
EOSINOPHIL NFR BLD: 1.8 %
ERYTHROCYTE [DISTWIDTH] IN BLOOD BY AUTOMATED COUNT: 16 %
ERYTHROCYTE [DISTWIDTH] IN BLOOD BY AUTOMATED COUNT: 16 %
EST. GFR  (AFRICAN AMERICAN): >60 ML/MIN/1.73 M^2
EST. GFR  (AFRICAN AMERICAN): >60 ML/MIN/1.73 M^2
EST. GFR  (NON AFRICAN AMERICAN): >60 ML/MIN/1.73 M^2
EST. GFR  (NON AFRICAN AMERICAN): >60 ML/MIN/1.73 M^2
GLUCOSE SERPL-MCNC: 123 MG/DL
GLUCOSE SERPL-MCNC: 123 MG/DL
HCT VFR BLD AUTO: 30.4 %
HCT VFR BLD AUTO: 30.4 %
HGB BLD-MCNC: 9.8 G/DL
HGB BLD-MCNC: 9.8 G/DL
IMM GRANULOCYTES # BLD AUTO: 0.08 K/UL
IMM GRANULOCYTES # BLD AUTO: 0.08 K/UL
IMM GRANULOCYTES NFR BLD AUTO: 0.5 %
IMM GRANULOCYTES NFR BLD AUTO: 0.5 %
LYMPHOCYTES # BLD AUTO: 2 K/UL
LYMPHOCYTES # BLD AUTO: 2 K/UL
LYMPHOCYTES NFR BLD: 13.2 %
LYMPHOCYTES NFR BLD: 13.2 %
MAGNESIUM SERPL-MCNC: 2 MG/DL
MAGNESIUM SERPL-MCNC: 2 MG/DL
MCH RBC QN AUTO: 32.3 PG
MCH RBC QN AUTO: 32.3 PG
MCHC RBC AUTO-ENTMCNC: 32.2 G/DL
MCHC RBC AUTO-ENTMCNC: 32.2 G/DL
MCV RBC AUTO: 100 FL
MCV RBC AUTO: 100 FL
MONOCYTES # BLD AUTO: 1.1 K/UL
MONOCYTES # BLD AUTO: 1.1 K/UL
MONOCYTES NFR BLD: 7.3 %
MONOCYTES NFR BLD: 7.3 %
NEUTROPHILS # BLD AUTO: 11.4 K/UL
NEUTROPHILS # BLD AUTO: 11.4 K/UL
NEUTROPHILS NFR BLD: 76.8 %
NEUTROPHILS NFR BLD: 76.8 %
NRBC BLD-RTO: 0 /100 WBC
NRBC BLD-RTO: 0 /100 WBC
PHOSPHATE SERPL-MCNC: 3 MG/DL
PHOSPHATE SERPL-MCNC: 3 MG/DL
PLATELET # BLD AUTO: 314 K/UL
PLATELET # BLD AUTO: 314 K/UL
PMV BLD AUTO: 9.4 FL
PMV BLD AUTO: 9.4 FL
POCT GLUCOSE: 112 MG/DL (ref 70–110)
POCT GLUCOSE: 127 MG/DL (ref 70–110)
POTASSIUM SERPL-SCNC: 3.8 MMOL/L
POTASSIUM SERPL-SCNC: 3.8 MMOL/L
PROT SERPL-MCNC: 5.6 G/DL
PROT SERPL-MCNC: 5.6 G/DL
RBC # BLD AUTO: 3.03 M/UL
RBC # BLD AUTO: 3.03 M/UL
SODIUM SERPL-SCNC: 139 MMOL/L
SODIUM SERPL-SCNC: 139 MMOL/L
WBC # BLD AUTO: 14.89 K/UL
WBC # BLD AUTO: 14.89 K/UL

## 2019-01-11 PROCEDURE — 25000003 PHARM REV CODE 250: Performed by: NURSE PRACTITIONER

## 2019-01-11 PROCEDURE — 25000003 PHARM REV CODE 250: Performed by: PHYSICIAN ASSISTANT

## 2019-01-11 PROCEDURE — 63600175 PHARM REV CODE 636 W HCPCS: Performed by: NURSE PRACTITIONER

## 2019-01-11 PROCEDURE — 25000003 PHARM REV CODE 250: Performed by: PSYCHIATRY & NEUROLOGY

## 2019-01-11 PROCEDURE — 99900035 HC TECH TIME PER 15 MIN (STAT)

## 2019-01-11 PROCEDURE — 80053 COMPREHEN METABOLIC PANEL: CPT

## 2019-01-11 PROCEDURE — 27000221 HC OXYGEN, UP TO 24 HOURS

## 2019-01-11 PROCEDURE — 94003 VENT MGMT INPAT SUBQ DAY: CPT

## 2019-01-11 PROCEDURE — 27200966 HC CLOSED SUCTION SYSTEM

## 2019-01-11 PROCEDURE — 94668 MNPJ CHEST WALL SBSQ: CPT

## 2019-01-11 PROCEDURE — 63600175 PHARM REV CODE 636 W HCPCS: Performed by: STUDENT IN AN ORGANIZED HEALTH CARE EDUCATION/TRAINING PROGRAM

## 2019-01-11 PROCEDURE — 83735 ASSAY OF MAGNESIUM: CPT

## 2019-01-11 PROCEDURE — 99291 CRITICAL CARE FIRST HOUR: CPT | Mod: GC,,, | Performed by: PSYCHIATRY & NEUROLOGY

## 2019-01-11 PROCEDURE — 99900026 HC AIRWAY MAINTENANCE (STAT)

## 2019-01-11 PROCEDURE — 94640 AIRWAY INHALATION TREATMENT: CPT

## 2019-01-11 PROCEDURE — 85025 COMPLETE CBC W/AUTO DIFF WBC: CPT

## 2019-01-11 PROCEDURE — 94761 N-INVAS EAR/PLS OXIMETRY MLT: CPT

## 2019-01-11 PROCEDURE — 25000003 PHARM REV CODE 250: Performed by: STUDENT IN AN ORGANIZED HEALTH CARE EDUCATION/TRAINING PROGRAM

## 2019-01-11 PROCEDURE — 25000242 PHARM REV CODE 250 ALT 637 W/ HCPCS: Performed by: NURSE PRACTITIONER

## 2019-01-11 PROCEDURE — 84100 ASSAY OF PHOSPHORUS: CPT

## 2019-01-11 PROCEDURE — A4216 STERILE WATER/SALINE, 10 ML: HCPCS | Performed by: PSYCHIATRY & NEUROLOGY

## 2019-01-11 PROCEDURE — 20000000 HC ICU ROOM

## 2019-01-11 PROCEDURE — 99291 PR CRITICAL CARE, E/M 30-74 MINUTES: ICD-10-PCS | Mod: GC,,, | Performed by: PSYCHIATRY & NEUROLOGY

## 2019-01-11 RX ORDER — METOPROLOL TARTRATE 1 MG/ML
5 INJECTION, SOLUTION INTRAVENOUS ONCE
Status: COMPLETED | OUTPATIENT
Start: 2019-01-11 | End: 2019-01-11

## 2019-01-11 RX ORDER — LACOSAMIDE 50 MG/1
100 TABLET ORAL 2 TIMES DAILY
Status: DISCONTINUED | OUTPATIENT
Start: 2019-01-11 | End: 2019-01-17

## 2019-01-11 RX ADMIN — FENTANYL CITRATE 25 MCG: 50 INJECTION INTRAMUSCULAR; INTRAVENOUS at 10:01

## 2019-01-11 RX ADMIN — SODIUM CHLORIDE SOLN NEBU 3% 4 ML: 3 NEBU SOLN at 07:01

## 2019-01-11 RX ADMIN — POLYETHYLENE GLYCOL 3350 17 G: 17 POWDER, FOR SOLUTION ORAL at 08:01

## 2019-01-11 RX ADMIN — ALBUTEROL SULFATE 2.5 MG: 2.5 SOLUTION RESPIRATORY (INHALATION) at 03:01

## 2019-01-11 RX ADMIN — HEPARIN SODIUM 5000 UNITS: 5000 INJECTION, SOLUTION INTRAVENOUS; SUBCUTANEOUS at 09:01

## 2019-01-11 RX ADMIN — SODIUM CHLORIDE SOLN NEBU 3% 4 ML: 3 NEBU SOLN at 03:01

## 2019-01-11 RX ADMIN — METOPROLOL TARTRATE 50 MG: 50 TABLET ORAL at 09:01

## 2019-01-11 RX ADMIN — FAMOTIDINE 20 MG: 20 TABLET ORAL at 08:01

## 2019-01-11 RX ADMIN — ALBUTEROL SULFATE 2.5 MG: 2.5 SOLUTION RESPIRATORY (INHALATION) at 12:01

## 2019-01-11 RX ADMIN — Medication 3 ML: at 05:01

## 2019-01-11 RX ADMIN — POTASSIUM CHLORIDE 40 MEQ: 20 SOLUTION ORAL at 06:01

## 2019-01-11 RX ADMIN — METOPROLOL TARTRATE 50 MG: 50 TABLET ORAL at 02:01

## 2019-01-11 RX ADMIN — SODIUM CHLORIDE SOLN NEBU 3% 4 ML: 3 NEBU SOLN at 08:01

## 2019-01-11 RX ADMIN — URSODIOL 300 MG: 300 CAPSULE ORAL at 02:01

## 2019-01-11 RX ADMIN — HEPARIN SODIUM 5000 UNITS: 5000 INJECTION, SOLUTION INTRAVENOUS; SUBCUTANEOUS at 05:01

## 2019-01-11 RX ADMIN — LACOSAMIDE 200 MG: 50 TABLET, FILM COATED ORAL at 08:01

## 2019-01-11 RX ADMIN — METOPROLOL TARTRATE 50 MG: 50 TABLET ORAL at 05:01

## 2019-01-11 RX ADMIN — SODIUM CHLORIDE SOLN NEBU 3% 4 ML: 3 NEBU SOLN at 12:01

## 2019-01-11 RX ADMIN — ALBUTEROL SULFATE 2.5 MG: 2.5 SOLUTION RESPIRATORY (INHALATION) at 08:01

## 2019-01-11 RX ADMIN — LACOSAMIDE 100 MG: 50 TABLET, FILM COATED ORAL at 09:01

## 2019-01-11 RX ADMIN — URSODIOL 300 MG: 300 CAPSULE ORAL at 09:01

## 2019-01-11 RX ADMIN — ALBUTEROL SULFATE 2.5 MG: 2.5 SOLUTION RESPIRATORY (INHALATION) at 11:01

## 2019-01-11 RX ADMIN — AMLODIPINE BESYLATE 5 MG: 5 TABLET ORAL at 08:01

## 2019-01-11 RX ADMIN — Medication 3 ML: at 09:01

## 2019-01-11 RX ADMIN — CHLORHEXIDINE GLUCONATE 0.12% ORAL RINSE 15 ML: 1.2 LIQUID ORAL at 01:01

## 2019-01-11 RX ADMIN — SODIUM CHLORIDE SOLN NEBU 3% 4 ML: 3 NEBU SOLN at 11:01

## 2019-01-11 RX ADMIN — PRAVASTATIN SODIUM 40 MG: 40 TABLET ORAL at 09:01

## 2019-01-11 RX ADMIN — CHLORHEXIDINE GLUCONATE 0.12% ORAL RINSE 15 ML: 1.2 LIQUID ORAL at 08:01

## 2019-01-11 RX ADMIN — FAMOTIDINE 20 MG: 20 TABLET ORAL at 09:01

## 2019-01-11 RX ADMIN — CHLORHEXIDINE GLUCONATE 0.12% ORAL RINSE 15 ML: 1.2 LIQUID ORAL at 05:01

## 2019-01-11 RX ADMIN — CHLORHEXIDINE GLUCONATE 0.12% ORAL RINSE 15 ML: 1.2 LIQUID ORAL at 09:01

## 2019-01-11 RX ADMIN — STANDARDIZED SENNA CONCENTRATE AND DOCUSATE SODIUM 1 TABLET: 8.6; 5 TABLET, FILM COATED ORAL at 08:01

## 2019-01-11 RX ADMIN — HEPARIN SODIUM 5000 UNITS: 5000 INJECTION, SOLUTION INTRAVENOUS; SUBCUTANEOUS at 02:01

## 2019-01-11 RX ADMIN — URSODIOL 300 MG: 300 CAPSULE ORAL at 08:01

## 2019-01-11 RX ADMIN — ALBUTEROL SULFATE 2.5 MG: 2.5 SOLUTION RESPIRATORY (INHALATION) at 07:01

## 2019-01-11 RX ADMIN — METOPROLOL TARTRATE 5 MG: 5 INJECTION, SOLUTION INTRAVENOUS at 10:01

## 2019-01-11 NOTE — PLAN OF CARE
Problem: Adult Inpatient Plan of Care  Goal: Plan of Care Review  Outcome: Ongoing (interventions implemented as appropriate)  POC reviewed with pt at 0400. Pt displayed no evidence of learning due to having a tracheostomy and being cognitively impaired. No acute events today. Pt progressing toward goals. Will continue to monitor. See flowsheets for full assessment and VS info.

## 2019-01-11 NOTE — ASSESSMENT & PLAN NOTE
62 yo male with L aSDH now s/p craniotomy for evacuation (12/20).    --Continue care per primary team.  --patient is stable for discharge from a neurosurgical perspective  --dispo pending LTAC placement  --Continue neurochecks while in house

## 2019-01-11 NOTE — ASSESSMENT & PLAN NOTE
Mental status continuing to improve  Spot eeg without any epileptiform activity    Decrease lacosamide today

## 2019-01-11 NOTE — PROGRESS NOTES
Pt taken off vent and put on trach collar per written and verbal communication. Pt on trach collar 5L 28%, sat 100%,no resp distress noted. Will continue to monitor on documented settings.

## 2019-01-11 NOTE — PROGRESS NOTES
NCC team made aware of pt continued elevated HR in the 120 range. Per Altagracia Dugan NP with NCC, she is to change metoprolol order according to address tachycardia and continued hypertension. Will continue to monitor pt.

## 2019-01-11 NOTE — ASSESSMENT & PLAN NOTE
Epidural with subdural component s/p evac 12/20  Complicated by status epilepticus    nsgy following  Pt/ot/slp when able

## 2019-01-11 NOTE — PROGRESS NOTES
[facility]  Neurosurgery  Progress Note    Subjective:     History of Present Illness: 62 yo M with PMH of CAD, CABG in 2006, DM, HTN, HLD on ASA presents to the ED for AMS. Patient fell 4 days ago with about 2 minutes of LOC. Family is unsure of what caused the fall. Full workup done including a CTH at [facility] that was negative. Patient went home and stopped taking his ASA. This morning, his family noticed he was more confused. He went to [facility] and CTH was done that revealed a 1.7cm left frontalpariental SDH with a 5 mm right sided midline shift and a left SAH. [place] [facility] [place] obtained per family as patient unable to give history 2/2 confusion with expressive and receptive aphasia.    Post-Op Info:  Procedure(s) (LRB):  INSERTION, PEG TUBE (N/A)  CREATION, TRACHEOSTOMY (N/A)   3 Days Post-Op     Interval History: 1/11: HR 88 -122,  -190, ow AFVSS, NAEON, labs stable, exam stable    Medications:  Continuous Infusions:  Scheduled Meds:   albuterol sulfate  2.5 mg Nebulization Q4H    amLODIPine  5 mg Per G Tube Daily    chlorhexidine  15 mL Mouth/Throat QID    famotidine  20 mg Per NG tube BID    heparin (porcine)  5,000 Units Subcutaneous Q8H    lacosamide  100 mg Per NG tube BID    metoprolol tartrate  50 mg Per NG tube Q8H    polyethylene glycol  17 g Per NG tube BID    pravastatin  40 mg Per NG tube QHS    senna-docusate 8.6-50 mg  1 tablet Per NG tube BID    sodium chloride 0.9%  3 mL Intravenous Q8H    sodium chloride 3%  4 mL Nebulization Q4H    ursodiol  300 mg Per NG tube TID     PRN Meds:acetaminophen, fentaNYL, hydrALAZINE, magnesium oxide, magnesium oxide, ondansetron, potassium chloride 10%, potassium chloride 10%, potassium chloride 10%, potassium, sodium phosphates, potassium, sodium phosphates, potassium, sodium phosphates       Objective:     Weight: 112.5 kg (248 lb 0.3 oz)  Body mass index is 34.59 kg/m².  Vital Signs (Most  Recent):  Temp: 98.4 °F (36.9 °C) (01/11/19 1100)  Pulse: (!) 112 (01/11/19 1218)  Resp: (!) 23 (01/11/19 1218)  BP: (!) 156/100 (01/11/19 1200)  SpO2: 99 % (01/11/19 1218) Vital Signs (24h Range):  Temp:  [98.3 °F (36.8 °C)-99.2 °F (37.3 °C)] 98.4 °F (36.9 °C)  Pulse:  [] 112  Resp:  [6-38] 23  SpO2:  [96 %-100 %] 99 %  BP: (137-193)/() 156/100     Date 01/11/19 0700 - 01/12/19 0659   Shift 6315-0561 2208-6209 8262-3885 24 Hour Total   INTAKE   NG/   635   Shift Total(mL/kg) 635(5.6)   635(5.6)   OUTPUT   Shift Total(mL/kg)       Weight (kg) 112.5 112.5 112.5 112.5              Vent Mode: Spont  Oxygen Concentration (%):  [28-31] 28  Resp Rate Total:  [15 br/min-26 br/min] 18 br/min  PEEP/CPAP:  [5 cmH20] 5 cmH20  Pressure Support:  [5 cmH20] 5 cmH20  Mean Airway Pressure:  [6.6 cmH20-7.6 cmH20] 6.8 cmH20         Gastrostomy/Enterostomy 01/08/19 1441 Percutaneous endoscopic gastrostomy (PEG) midline (Active)   Securement anchored to abdomen w/ adhesive device 1/11/2019 11:00 AM   Interventions Prior to Feeding patency checked;residual checked;residual returned 1/11/2019 11:00 AM   Drainage serosanguineous 1/10/2019  3:00 PM   Feeding Type continuous 1/11/2019 11:00 AM   Clamp Status/Tolerance unclamped 1/11/2019 11:00 AM   Feeding Action feeding continued 1/11/2019 11:00 AM   Dressing dried drainage 1/11/2019 11:00 AM   Insertion Site dry;no redness;no warmth;no drainage;no tenderness;no swelling 1/11/2019 11:00 AM   Site Care site cleansed w/ soap and water;sterile precut T-slit dressing applied 1/11/2019 11:00 AM   Flush/Irrigation flushed w/;water;no resistance met 1/11/2019 11:00 AM   Dressing Change Due 01/10/19 1/10/2019  3:00 PM   Current Rate (mL/hr) 55 mL/hr 1/11/2019  7:00 AM   Goal Rate (mL/hr) 55 mL/hr 1/11/2019  7:00 AM   Intake (mL) 40 mL 1/10/2019  9:05 PM   Water Bolus (mL) 250 mL 1/11/2019  8:00 AM   Formula Name impact peptide 1/10/2019  3:00 PM   Tube Feeding Intake (mL) 55  1/11/2019  1:00 PM   Residual Amount (ml) 0 ml 1/11/2019  6:05 AM       Neurosurgery Physical Exam     E4VTM5, grimacing to stimulation, tracking  PERRL  MOORE spontaneously  Trach and PEG in place    Significant Labs:  Recent Labs   Lab 01/10/19  0256 01/11/19  0441   * 123*  123*    139  139   K 3.9 3.8  3.8    103  103   CO2 24 26  26   BUN 18 21  21   CREATININE 0.5 0.6  0.6   CALCIUM 8.7 8.9  8.9   MG 1.9 2.0  2.0     Recent Labs   Lab 01/10/19  0256 01/11/19  0441   WBC 14.51* 14.89*  14.89*   HGB 9.8* 9.8*  9.8*   HCT 30.6* 30.4*  30.4*    314  314     No results for input(s): LABPT, INR, APTT in the last 48 hours.  Microbiology Results (last 7 days)     Procedure Component Value Units Date/Time    Blood culture [[id number]] Collected:  01/03/19 1107    Order Status:  Completed Specimen:  Blood from Peripheral, Foot, Left Updated:  01/08/19 1412     Blood Culture, Routine No growth after 5 days.    Narrative:       Blood cultures x 2 different sites. 4 bottles total. Please  draw cultures before administering antibiotics.    Blood culture [[id number]] Collected:  01/03/19 1100    Order Status:  Completed Specimen:  Blood from Peripheral, Hand, Left Updated:  01/08/19 1412     Blood Culture, Routine No growth after 5 days.    Narrative:       Blood cultures from 2 different sites. 4 bottles total.  Please draw before starting antibiotics.    Culture, Respiratory with Gram Stain [[id number]] Collected:  01/03/19 1108    Order Status:  Completed Specimen:  Respiratory from Endotracheal Aspirate Updated:  01/05/19 1007     Respiratory Culture Normal respiratory donnell     Gram Stain (Respiratory) Few WBC's     Gram Stain (Respiratory) Rare Gram positive cocci        Assessment/Plan:     * SDH (subdural hematoma)    62 yo male with L aSDH now s/p craniotomy for evacuation (12/20).    --Continue care per primary team.  --patient is stable for discharge from a neurosurgical  perspective  --dispo pending LTAC placement  --Continue neurochecks while in house               Gagandeep Giang MD  Neurosurgery  [facility]

## 2019-01-11 NOTE — SUBJECTIVE & OBJECTIVE
Interval History:      Tachycardic for last several days, unresponsive to fluid and pain meds  Alk phos improving    Objective:     Vitals:  Temp: 98.6 °F (37 °C)  Pulse: (!) 118  Rhythm: sinus tachycardia  BP: (!) 182/106  MAP (mmHg): 134  Resp: (!) 26  SpO2: 100 %  Oxygen Concentration (%): 28  O2 Device (Oxygen Therapy): Trach Collar  Vent Mode: Spont  Pressure Support: 5 cmH20  PEEP/CPAP: 5 cmH20  Peak Airway Pressure: 11 cmH2O  Mean Airway Pressure: 6.8 cmH20  Plateau Pressure: 0 cmH20    Temp  Min: 98.3 °F (36.8 °C)  Max: 99.2 °F (37.3 °C)  Pulse  Min: 88  Max: 122  BP  Min: 137/86  Max: 193/118  MAP (mmHg)  Min: 104  Max: 149  Resp  Min: 6  Max: 38  SpO2  Min: 96 %  Max: 100 %  Oxygen Concentration (%)  Min: 28  Max: 31    01/10 0701 - 01/11 0700  In: 2365   Out: 1175 [Urine:975]   Unmeasured Output  Stool Occurrence: 1       Physical Exam  Vital signs, lab studies, and imaging reviewed by me  opens eyes to voice, no commands, spontaneously moves all ext  Warm and well perfused  No dependent edema  Breathing comfortably on cpap, breath sounds diminished at bases  Belly soft, nontender, no hepatosplenomegaly    Medications:  Continuous Scheduled  albuterol sulfate 2.5 mg Q4H   amLODIPine 5 mg Daily   chlorhexidine 15 mL QID   famotidine 20 mg BID   heparin (porcine) 5,000 Units Q8H   lacosamide 100 mg BID   metoprolol tartrate 50 mg Q8H   polyethylene glycol 17 g BID   pravastatin 40 mg QHS   senna-docusate 8.6-50 mg 1 tablet BID   sodium chloride 0.9% 3 mL Q8H   sodium chloride 3% 4 mL Q4H   ursodiol 300 mg TID   PRN  acetaminophen 650 mg Q6H PRN   fentaNYL 25 mcg Q2H PRN   hydrALAZINE 10 mg Q4H PRN   magnesium oxide 800 mg PRN   magnesium oxide 800 mg PRN   ondansetron 4 mg Q12H PRN   potassium chloride 10% 40 mEq PRN   potassium chloride 10% 40 mEq PRN   potassium chloride 10% 60 mEq PRN   potassium, sodium phosphates 2 packet PRN   potassium, sodium phosphates 2 packet PRN   potassium, sodium phosphates 2  packet PRN     Diet  No diet orders on file  No diet orders on file

## 2019-01-11 NOTE — SUBJECTIVE & OBJECTIVE
Interval History: 1/11: HR 88 -122,  -190, ow AFVSS, NAEON, labs stable, exam stable    Medications:  Continuous Infusions:  Scheduled Meds:   albuterol sulfate  2.5 mg Nebulization Q4H    amLODIPine  5 mg Per G Tube Daily    chlorhexidine  15 mL Mouth/Throat QID    famotidine  20 mg Per NG tube BID    heparin (porcine)  5,000 Units Subcutaneous Q8H    lacosamide  100 mg Per NG tube BID    metoprolol tartrate  50 mg Per NG tube Q8H    polyethylene glycol  17 g Per NG tube BID    pravastatin  40 mg Per NG tube QHS    senna-docusate 8.6-50 mg  1 tablet Per NG tube BID    sodium chloride 0.9%  3 mL Intravenous Q8H    sodium chloride 3%  4 mL Nebulization Q4H    ursodiol  300 mg Per NG tube TID     PRN Meds:acetaminophen, fentaNYL, hydrALAZINE, magnesium oxide, magnesium oxide, ondansetron, potassium chloride 10%, potassium chloride 10%, potassium chloride 10%, potassium, sodium phosphates, potassium, sodium phosphates, potassium, sodium phosphates       Objective:     Weight: 112.5 kg (248 lb 0.3 oz)  Body mass index is 34.59 kg/m².  Vital Signs (Most Recent):  Temp: 98.4 °F (36.9 °C) (01/11/19 1100)  Pulse: (!) 112 (01/11/19 1218)  Resp: (!) 23 (01/11/19 1218)  BP: (!) 156/100 (01/11/19 1200)  SpO2: 99 % (01/11/19 1218) Vital Signs (24h Range):  Temp:  [98.3 °F (36.8 °C)-99.2 °F (37.3 °C)] 98.4 °F (36.9 °C)  Pulse:  [] 112  Resp:  [6-38] 23  SpO2:  [96 %-100 %] 99 %  BP: (137-193)/() 156/100     Date 01/11/19 0700 - 01/12/19 0659   Shift 0378-2486 7880-6027 8886-2239 24 Hour Total   INTAKE   NG/   635   Shift Total(mL/kg) 635(5.6)   635(5.6)   OUTPUT   Shift Total(mL/kg)       Weight (kg) 112.5 112.5 112.5 112.5              Vent Mode: Spont  Oxygen Concentration (%):  [28-31] 28  Resp Rate Total:  [15 br/min-26 br/min] 18 br/min  PEEP/CPAP:  [5 cmH20] 5 cmH20  Pressure Support:  [5 cmH20] 5 cmH20  Mean Airway Pressure:  [6.6 cmH20-7.6 cmH20] 6.8 cmH20          Gastrostomy/Enterostomy 01/08/19 1441 Percutaneous endoscopic gastrostomy (PEG) midline (Active)   Securement anchored to abdomen w/ adhesive device 1/11/2019 11:00 AM   Interventions Prior to Feeding patency checked;residual checked;residual returned 1/11/2019 11:00 AM   Drainage serosanguineous 1/10/2019  3:00 PM   Feeding Type continuous 1/11/2019 11:00 AM   Clamp Status/Tolerance unclamped 1/11/2019 11:00 AM   Feeding Action feeding continued 1/11/2019 11:00 AM   Dressing dried drainage 1/11/2019 11:00 AM   Insertion Site dry;no redness;no warmth;no drainage;no tenderness;no swelling 1/11/2019 11:00 AM   Site Care site cleansed w/ soap and water;sterile precut T-slit dressing applied 1/11/2019 11:00 AM   Flush/Irrigation flushed w/;water;no resistance met 1/11/2019 11:00 AM   Dressing Change Due 01/10/19 1/10/2019  3:00 PM   Current Rate (mL/hr) 55 mL/hr 1/11/2019  7:00 AM   Goal Rate (mL/hr) 55 mL/hr 1/11/2019  7:00 AM   Intake (mL) 40 mL 1/10/2019  9:05 PM   Water Bolus (mL) 250 mL 1/11/2019  8:00 AM   Formula Name impact peptide 1/10/2019  3:00 PM   Tube Feeding Intake (mL) 55 1/11/2019  1:00 PM   Residual Amount (ml) 0 ml 1/11/2019  6:05 AM       Neurosurgery Physical Exam     E4VTM5, grimacing to stimulation, tracking  PERRL  MOORE spontaneously  Trach and PEG in place    Significant Labs:  Recent Labs   Lab 01/10/19  0256 01/11/19  0441   * 123*  123*    139  139   K 3.9 3.8  3.8    103  103   CO2 24 26  26   BUN 18 21  21   CREATININE 0.5 0.6  0.6   CALCIUM 8.7 8.9  8.9   MG 1.9 2.0  2.0     Recent Labs   Lab 01/10/19  0256 01/11/19  0441   WBC 14.51* 14.89*  14.89*   HGB 9.8* 9.8*  9.8*   HCT 30.6* 30.4*  30.4*    314  314     No results for input(s): LABPT, INR, APTT in the last 48 hours.  Microbiology Results (last 7 days)     Procedure Component Value Units Date/Time    Blood culture [[id number]] Collected:  01/03/19 1107    Order Status:  Completed Specimen:  Blood  from Peripheral, Foot, Left Updated:  01/08/19 1412     Blood Culture, Routine No growth after 5 days.    Narrative:       Blood cultures x 2 different sites. 4 bottles total. Please  draw cultures before administering antibiotics.    Blood culture [[id number]] Collected:  01/03/19 1100    Order Status:  Completed Specimen:  Blood from Peripheral, Hand, Left Updated:  01/08/19 1412     Blood Culture, Routine No growth after 5 days.    Narrative:       Blood cultures from 2 different sites. 4 bottles total.  Please draw before starting antibiotics.    Culture, Respiratory with Gram Stain [[id number]] Collected:  01/03/19 1108    Order Status:  Completed Specimen:  Respiratory from Endotracheal Aspirate Updated:  01/05/19 1007     Respiratory Culture Normal respiratory donnell     Gram Stain (Respiratory) Few WBC's     Gram Stain (Respiratory) Rare Gram positive cocci

## 2019-01-11 NOTE — PLAN OF CARE
Problem: Adult Inpatient Plan of Care  Goal: Plan of Care Review  Outcome: Ongoing (interventions implemented as appropriate)  No acute events throughout the day, VS and assessment per flow sheet, patient progressing towards goal as tolerated. Plan of care reviewed with Peewee Nunes and family, all concerns addressed. Will continue to monitor.     See progress notes for events of day

## 2019-01-11 NOTE — PROGRESS NOTES
[facility]  Neurocritical Care  Progress Note    Admit Date: 12/20/2018  Service Date: 01/11/2019  Length of Stay: 22    Subjective:     Chief Complaint: SDH (subdural hematoma)    History of Present Illness: 63 year old male h/o CABG on ASA 81 mg (last dose 72 hours PTA) presenting as transfer from OSH for EDH with SDH. Patient reportedly had a mechanical fall from standing on Sunday and went to ED where CTH was negative for any acute intracranial pathology. Overnight, patient developed confusion and presented to OSH ED where CTH was consistent with EDH with SDH. Patient has progressively worsened throughout the day developing intermittent global aphasia. [place] [facility] [place]. NCC consulted for admission and medical management. Patient on weaning off of cardene at time of evaluation with sbp of 120. Reported presenting sbp in 150s.    Hospital Course: 12/22: no major events overnight. Weaned off of cardene. This morning AOx3. Drain removed by NSGY at 11 AM, and planned to step down to NSGY.  AMS noted around 1PM, patient aphasic and not following commands, STAT CTH unremarkable.   12/26: wean ketamine, follow EEG, send urine studuies-Na 133, NS bolus, advance TF  12/27: off ketamine, d/c levo, continue EEG, and AEDs, 500 NS bolus, increase enteral water flushes, continue abx for another day  1/2: extubation trial.   1/3: failed extubation trial. Plan for trach and peg  1/4: plan for trach, general surgery consult  1/5: NAEO  1/6: NAEO. Awaiting trach and peg  1/7: plan for trach/peg, opens eyes to voice no commands  1/8: s/p trach/peg today  1/9: restarting tf this afternoon, ltac planning, eeg today to help guide aed weaning    Interval History:      Tachycardic for last several days, unresponsive to fluid and pain meds  Alk phos improving    Objective:     Vitals:  Temp: 98.6 °F (37 °C)  Pulse: (!) 118  Rhythm: sinus tachycardia  BP: (!) 182/106  MAP  (mmHg): 134  Resp: (!) 26  SpO2: 100 %  Oxygen Concentration (%): 28  O2 Device (Oxygen Therapy): Trach Collar  Vent Mode: Spont  Pressure Support: 5 cmH20  PEEP/CPAP: 5 cmH20  Peak Airway Pressure: 11 cmH2O  Mean Airway Pressure: 6.8 cmH20  Plateau Pressure: 0 cmH20    Temp  Min: 98.3 °F (36.8 °C)  Max: 99.2 °F (37.3 °C)  Pulse  Min: 88  Max: 122  BP  Min: 137/86  Max: 193/118  MAP (mmHg)  Min: 104  Max: 149  Resp  Min: 6  Max: 38  SpO2  Min: 96 %  Max: 100 %  Oxygen Concentration (%)  Min: 28  Max: 31    01/10 0701 - 01/11 0700  In: 2365   Out: 1175 [Urine:975]   Unmeasured Output  Stool Occurrence: 1       Physical Exam  Vital signs, lab studies, and imaging reviewed by me  opens eyes to voice, no commands, spontaneously moves all ext  Warm and well perfused  No dependent edema  Breathing comfortably on cpap, breath sounds diminished at bases  Belly soft, nontender, no hepatosplenomegaly    Medications:  Continuous Scheduled  albuterol sulfate 2.5 mg Q4H   amLODIPine 5 mg Daily   chlorhexidine 15 mL QID   famotidine 20 mg BID   heparin (porcine) 5,000 Units Q8H   lacosamide 100 mg BID   metoprolol tartrate 50 mg Q8H   polyethylene glycol 17 g BID   pravastatin 40 mg QHS   senna-docusate 8.6-50 mg 1 tablet BID   sodium chloride 0.9% 3 mL Q8H   sodium chloride 3% 4 mL Q4H   ursodiol 300 mg TID   PRN  acetaminophen 650 mg Q6H PRN   fentaNYL 25 mcg Q2H PRN   hydrALAZINE 10 mg Q4H PRN   magnesium oxide 800 mg PRN   magnesium oxide 800 mg PRN   ondansetron 4 mg Q12H PRN   potassium chloride 10% 40 mEq PRN   potassium chloride 10% 40 mEq PRN   potassium chloride 10% 60 mEq PRN   potassium, sodium phosphates 2 packet PRN   potassium, sodium phosphates 2 packet PRN   potassium, sodium phosphates 2 packet PRN     Diet  No diet orders on file  No diet orders on file        Assessment/Plan:     Neuro   * SDH (subdural hematoma)    Epidural with subdural component s/p evac 12/20  Complicated by status epilepticus    nsgy  following  Pt/ot/slp when able       Status epilepticus    Mental status continuing to improve  Spot eeg without any epileptiform activity    Decrease lacosamide today     Pulmonary   Acute respiratory failure    S/p trach    Trach collar trial today  Rest at night on vent     Cardiac/Vascular   New onset atrial fibrillation    With rrvr    Increase metoprolol today  Monitor off ac       Other   Pressure injury of buttock, stage 2    improving  Appreciate wound care reccs         Full Code    I spent 30 min of uninterrupted critical care time caring for this critically ill patient exclusive of procedures and teaching.       Kenneth Decker MD  Neurocritical Care  [facility]

## 2019-01-12 LAB
ALBUMIN SERPL BCP-MCNC: 2.1 G/DL
ALLENS TEST: ABNORMAL
ALP SERPL-CCNC: 192 U/L
ALT SERPL W/O P-5'-P-CCNC: 7 U/L
ANION GAP SERPL CALC-SCNC: 7 MMOL/L
AST SERPL-CCNC: 13 U/L
BASOPHILS # BLD AUTO: 0.08 K/UL
BASOPHILS NFR BLD: 0.6 %
BILIRUB SERPL-MCNC: 0.7 MG/DL
BUN SERPL-MCNC: 23 MG/DL
CALCIUM SERPL-MCNC: 8.5 MG/DL
CHLORIDE SERPL-SCNC: 106 MMOL/L
CO2 SERPL-SCNC: 24 MMOL/L
CREAT SERPL-MCNC: 0.5 MG/DL
DIFFERENTIAL METHOD: ABNORMAL
EOSINOPHIL # BLD AUTO: 0.4 K/UL
EOSINOPHIL NFR BLD: 3 %
ERYTHROCYTE [DISTWIDTH] IN BLOOD BY AUTOMATED COUNT: 15.6 %
EST. GFR  (AFRICAN AMERICAN): >60 ML/MIN/1.73 M^2
EST. GFR  (NON AFRICAN AMERICAN): >60 ML/MIN/1.73 M^2
GLUCOSE SERPL-MCNC: 112 MG/DL
HCO3 UR-SCNC: 26.3 MMOL/L (ref 24–28)
HCT VFR BLD AUTO: 30.7 %
HGB BLD-MCNC: 9.6 G/DL
IMM GRANULOCYTES # BLD AUTO: 0.05 K/UL
IMM GRANULOCYTES NFR BLD AUTO: 0.3 %
INR PPP: 1
LYMPHOCYTES # BLD AUTO: 2.3 K/UL
LYMPHOCYTES NFR BLD: 15.9 %
MAGNESIUM SERPL-MCNC: 1.8 MG/DL
MCH RBC QN AUTO: 31.7 PG
MCHC RBC AUTO-ENTMCNC: 31.3 G/DL
MCV RBC AUTO: 101 FL
MONOCYTES # BLD AUTO: 1.1 K/UL
MONOCYTES NFR BLD: 7.2 %
NEUTROPHILS # BLD AUTO: 10.6 K/UL
NEUTROPHILS NFR BLD: 73 %
NRBC BLD-RTO: 0 /100 WBC
PCO2 BLDA: 31.9 MMHG (ref 35–45)
PH SMN: 7.52 [PH] (ref 7.35–7.45)
PHOSPHATE SERPL-MCNC: 2.5 MG/DL
PLATELET # BLD AUTO: 297 K/UL
PMV BLD AUTO: 9.7 FL
PO2 BLDA: 97 MMHG (ref 80–100)
POC BE: 4 MMOL/L
POC SATURATED O2: 98 % (ref 95–100)
POC TCO2: 27 MMOL/L (ref 23–27)
POCT GLUCOSE: 122 MG/DL (ref 70–110)
POCT GLUCOSE: 127 MG/DL (ref 70–110)
POCT GLUCOSE: 133 MG/DL (ref 70–110)
POCT GLUCOSE: 142 MG/DL (ref 70–110)
POTASSIUM SERPL-SCNC: 4.4 MMOL/L
PROT SERPL-MCNC: 5.5 G/DL
PROTHROMBIN TIME: 10.4 SEC
RBC # BLD AUTO: 3.03 M/UL
SAMPLE: ABNORMAL
SITE: ABNORMAL
SODIUM SERPL-SCNC: 137 MMOL/L
WBC # BLD AUTO: 14.49 K/UL

## 2019-01-12 PROCEDURE — 94640 AIRWAY INHALATION TREATMENT: CPT

## 2019-01-12 PROCEDURE — 99900035 HC TECH TIME PER 15 MIN (STAT)

## 2019-01-12 PROCEDURE — 25000003 PHARM REV CODE 250: Performed by: PSYCHIATRY & NEUROLOGY

## 2019-01-12 PROCEDURE — G8988 SELF CARE GOAL STATUS: HCPCS | Mod: CM

## 2019-01-12 PROCEDURE — 97535 SELF CARE MNGMENT TRAINING: CPT

## 2019-01-12 PROCEDURE — 99024 POSTOP FOLLOW-UP VISIT: CPT | Mod: GC,,, | Performed by: NEUROLOGICAL SURGERY

## 2019-01-12 PROCEDURE — 63600175 PHARM REV CODE 636 W HCPCS: Performed by: STUDENT IN AN ORGANIZED HEALTH CARE EDUCATION/TRAINING PROGRAM

## 2019-01-12 PROCEDURE — 25000003 PHARM REV CODE 250

## 2019-01-12 PROCEDURE — 36600 WITHDRAWAL OF ARTERIAL BLOOD: CPT

## 2019-01-12 PROCEDURE — 84100 ASSAY OF PHOSPHORUS: CPT

## 2019-01-12 PROCEDURE — 99900026 HC AIRWAY MAINTENANCE (STAT)

## 2019-01-12 PROCEDURE — 99233 PR SUBSEQUENT HOSPITAL CARE,LEVL III: ICD-10-PCS | Mod: ,,, | Performed by: NURSE PRACTITIONER

## 2019-01-12 PROCEDURE — 82803 BLOOD GASES ANY COMBINATION: CPT

## 2019-01-12 PROCEDURE — 80053 COMPREHEN METABOLIC PANEL: CPT

## 2019-01-12 PROCEDURE — 63600175 PHARM REV CODE 636 W HCPCS: Performed by: NURSE PRACTITIONER

## 2019-01-12 PROCEDURE — 25000003 PHARM REV CODE 250: Performed by: NURSE PRACTITIONER

## 2019-01-12 PROCEDURE — 25000003 PHARM REV CODE 250: Performed by: STUDENT IN AN ORGANIZED HEALTH CARE EDUCATION/TRAINING PROGRAM

## 2019-01-12 PROCEDURE — 27200966 HC CLOSED SUCTION SYSTEM

## 2019-01-12 PROCEDURE — 97168 OT RE-EVAL EST PLAN CARE: CPT

## 2019-01-12 PROCEDURE — 83735 ASSAY OF MAGNESIUM: CPT

## 2019-01-12 PROCEDURE — 25000003 PHARM REV CODE 250: Performed by: PHYSICIAN ASSISTANT

## 2019-01-12 PROCEDURE — 85025 COMPLETE CBC W/AUTO DIFF WBC: CPT

## 2019-01-12 PROCEDURE — A4216 STERILE WATER/SALINE, 10 ML: HCPCS | Performed by: PSYCHIATRY & NEUROLOGY

## 2019-01-12 PROCEDURE — 94668 MNPJ CHEST WALL SBSQ: CPT

## 2019-01-12 PROCEDURE — G8987 SELF CARE CURRENT STATUS: HCPCS | Mod: CN

## 2019-01-12 PROCEDURE — 20000000 HC ICU ROOM

## 2019-01-12 PROCEDURE — 99024 PR POST-OP FOLLOW-UP VISIT: ICD-10-PCS | Mod: GC,,, | Performed by: NEUROLOGICAL SURGERY

## 2019-01-12 PROCEDURE — 85610 PROTHROMBIN TIME: CPT

## 2019-01-12 PROCEDURE — 99233 SBSQ HOSP IP/OBS HIGH 50: CPT | Mod: ,,, | Performed by: NURSE PRACTITIONER

## 2019-01-12 PROCEDURE — 97110 THERAPEUTIC EXERCISES: CPT

## 2019-01-12 PROCEDURE — 27000221 HC OXYGEN, UP TO 24 HOURS

## 2019-01-12 PROCEDURE — 25000242 PHARM REV CODE 250 ALT 637 W/ HCPCS: Performed by: NURSE PRACTITIONER

## 2019-01-12 RX ORDER — SILODOSIN 4 MG/1
4 CAPSULE ORAL DAILY
Status: DISCONTINUED | OUTPATIENT
Start: 2019-01-12 | End: 2019-01-28 | Stop reason: HOSPADM

## 2019-01-12 RX ORDER — DICLOFENAC SODIUM 10 MG/G
GEL TOPICAL 3 TIMES DAILY
Status: DISCONTINUED | OUTPATIENT
Start: 2019-01-12 | End: 2019-01-28 | Stop reason: HOSPADM

## 2019-01-12 RX ORDER — METOPROLOL TARTRATE 1 MG/ML
INJECTION, SOLUTION INTRAVENOUS
Status: COMPLETED
Start: 2019-01-12 | End: 2019-01-12

## 2019-01-12 RX ADMIN — AMLODIPINE BESYLATE 5 MG: 5 TABLET ORAL at 08:01

## 2019-01-12 RX ADMIN — URSODIOL 300 MG: 300 CAPSULE ORAL at 08:01

## 2019-01-12 RX ADMIN — FAMOTIDINE 20 MG: 20 TABLET ORAL at 08:01

## 2019-01-12 RX ADMIN — LACOSAMIDE 100 MG: 50 TABLET, FILM COATED ORAL at 09:01

## 2019-01-12 RX ADMIN — Medication 3 ML: at 09:01

## 2019-01-12 RX ADMIN — LACOSAMIDE 100 MG: 50 TABLET, FILM COATED ORAL at 08:01

## 2019-01-12 RX ADMIN — METOPROLOL TARTRATE 50 MG: 50 TABLET ORAL at 02:01

## 2019-01-12 RX ADMIN — PRAVASTATIN SODIUM 40 MG: 40 TABLET ORAL at 09:01

## 2019-01-12 RX ADMIN — URSODIOL 300 MG: 300 CAPSULE ORAL at 02:01

## 2019-01-12 RX ADMIN — HEPARIN SODIUM 5000 UNITS: 5000 INJECTION, SOLUTION INTRAVENOUS; SUBCUTANEOUS at 05:01

## 2019-01-12 RX ADMIN — METOPROLOL TARTRATE 5 MG: 5 INJECTION, SOLUTION INTRAVENOUS at 06:01

## 2019-01-12 RX ADMIN — SODIUM CHLORIDE SOLN NEBU 3% 4 ML: 3 NEBU SOLN at 03:01

## 2019-01-12 RX ADMIN — ALBUTEROL SULFATE 2.5 MG: 2.5 SOLUTION RESPIRATORY (INHALATION) at 08:01

## 2019-01-12 RX ADMIN — DICLOFENAC: 10 GEL TOPICAL at 02:01

## 2019-01-12 RX ADMIN — STANDARDIZED SENNA CONCENTRATE AND DOCUSATE SODIUM 1 TABLET: 8.6; 5 TABLET, FILM COATED ORAL at 09:01

## 2019-01-12 RX ADMIN — HEPARIN SODIUM 5000 UNITS: 5000 INJECTION, SOLUTION INTRAVENOUS; SUBCUTANEOUS at 09:01

## 2019-01-12 RX ADMIN — SODIUM CHLORIDE SOLN NEBU 3% 4 ML: 3 NEBU SOLN at 07:01

## 2019-01-12 RX ADMIN — Medication 3 ML: at 05:01

## 2019-01-12 RX ADMIN — POTASSIUM & SODIUM PHOSPHATES POWDER PACK 280-160-250 MG 2 PACKET: 280-160-250 PACK at 11:01

## 2019-01-12 RX ADMIN — SODIUM CHLORIDE SOLN NEBU 3% 4 ML: 3 NEBU SOLN at 11:01

## 2019-01-12 RX ADMIN — METOPROLOL TARTRATE 50 MG: 50 TABLET ORAL at 09:01

## 2019-01-12 RX ADMIN — ALBUTEROL SULFATE 2.5 MG: 2.5 SOLUTION RESPIRATORY (INHALATION) at 07:01

## 2019-01-12 RX ADMIN — DICLOFENAC: 10 GEL TOPICAL at 09:01

## 2019-01-12 RX ADMIN — METOPROLOL TARTRATE 50 MG: 50 TABLET ORAL at 05:01

## 2019-01-12 RX ADMIN — FENTANYL CITRATE 25 MCG: 50 INJECTION INTRAMUSCULAR; INTRAVENOUS at 05:01

## 2019-01-12 RX ADMIN — ALBUTEROL SULFATE 2.5 MG: 2.5 SOLUTION RESPIRATORY (INHALATION) at 03:01

## 2019-01-12 RX ADMIN — URSODIOL 300 MG: 300 CAPSULE ORAL at 09:01

## 2019-01-12 RX ADMIN — MAGNESIUM OXIDE TAB 400 MG (241.3 MG ELEMENTAL MG) 800 MG: 400 (241.3 MG) TAB at 07:01

## 2019-01-12 RX ADMIN — CHLORHEXIDINE GLUCONATE 0.12% ORAL RINSE 15 ML: 1.2 LIQUID ORAL at 08:01

## 2019-01-12 RX ADMIN — CHLORHEXIDINE GLUCONATE 0.12% ORAL RINSE 15 ML: 1.2 LIQUID ORAL at 05:01

## 2019-01-12 RX ADMIN — CHLORHEXIDINE GLUCONATE 0.12% ORAL RINSE 15 ML: 1.2 LIQUID ORAL at 01:01

## 2019-01-12 RX ADMIN — CHLORHEXIDINE GLUCONATE 0.12% ORAL RINSE 15 ML: 1.2 LIQUID ORAL at 09:01

## 2019-01-12 RX ADMIN — ALBUTEROL SULFATE 2.5 MG: 2.5 SOLUTION RESPIRATORY (INHALATION) at 11:01

## 2019-01-12 RX ADMIN — POTASSIUM & SODIUM PHOSPHATES POWDER PACK 280-160-250 MG 2 PACKET: 280-160-250 PACK at 07:01

## 2019-01-12 RX ADMIN — HEPARIN SODIUM 5000 UNITS: 5000 INJECTION, SOLUTION INTRAVENOUS; SUBCUTANEOUS at 02:01

## 2019-01-12 RX ADMIN — SODIUM CHLORIDE SOLN NEBU 3% 4 ML: 3 NEBU SOLN at 08:01

## 2019-01-12 RX ADMIN — FAMOTIDINE 20 MG: 20 TABLET ORAL at 09:01

## 2019-01-12 RX ADMIN — SILODOSIN 4 MG: 4 CAPSULE ORAL at 12:01

## 2019-01-12 RX ADMIN — ACETAMINOPHEN 650 MG: 325 TABLET, FILM COATED ORAL at 11:01

## 2019-01-12 RX ADMIN — MAGNESIUM OXIDE TAB 400 MG (241.3 MG ELEMENTAL MG) 800 MG: 400 (241.3 MG) TAB at 11:01

## 2019-01-12 NOTE — PLAN OF CARE
Problem: Adult Inpatient Plan of Care  Goal: Plan of Care Review  Outcome: Ongoing (interventions implemented as appropriate)  POC reviewed with pt at 0500. Pt displayed no evidence of learning due to having a tracheostomy and being cognitively impaired. No acute events today. Pt progressing toward goals. Will continue to monitor. See flowsheets for full assessment and VS info.

## 2019-01-12 NOTE — ASSESSMENT & PLAN NOTE
64 yo male with L aSDH now s/p craniotomy for evacuation (12/20).    --Continue care per primary team.  --Patient is stable for discharge from a neurosurgical perspective  --Continue  neurochecks while in house  -- Further care per NCC     --Dispo pending LTAC placement

## 2019-01-12 NOTE — PROGRESS NOTES
[facility]  Neurosurgery  Progress Note    Subjective:     History of Present Illness: 64 yo M with PMH of CAD, CABG in 2006, DM, HTN, HLD on ASA presents to the ED for AMS. Patient fell 4 days ago with about 2 minutes of LOC. Family is unsure of what caused the fall. Full workup done including a CTH at [facility] that was negative. Patient went home and stopped taking his ASA. This morning, his family noticed he was more confused. He went to [facility] and CTH was done that revealed a 1.7cm left frontalpariental SDH with a 5 mm right sided midline shift and a left SAH. Patient was transferred to [facility]. History was obtained per family as patient unable to give history 2/2 confusion with expressive and receptive aphasia.    Post-Op Info:  Procedure(s) (LRB):  INSERTION, PEG TUBE (N/A)  CREATION, TRACHEOSTOMY (N/A)   4 Days Post-Op     Interval History: NAEON, labs stable, exam stable    Medications:  Continuous Infusions:  Scheduled Meds:   albuterol sulfate  2.5 mg Nebulization Q4H    amLODIPine  5 mg Per G Tube Daily    chlorhexidine  15 mL Mouth/Throat QID    diclofenac sodium   Topical (Top) TID    famotidine  20 mg Per NG tube BID    heparin (porcine)  5,000 Units Subcutaneous Q8H    lacosamide  100 mg Per NG tube BID    metoprolol tartrate  50 mg Per NG tube Q8H    polyethylene glycol  17 g Per NG tube BID    pravastatin  40 mg Per NG tube QHS    senna-docusate 8.6-50 mg  1 tablet Per NG tube BID    silodosin  4 mg Per G Tube Daily    sodium chloride 0.9%  3 mL Intravenous Q8H    sodium chloride 3%  4 mL Nebulization Q4H    ursodiol  300 mg Per NG tube TID     PRN Meds:acetaminophen, fentaNYL, hydrALAZINE, magnesium oxide, magnesium oxide, ondansetron, potassium chloride 10%, potassium chloride 10%, potassium chloride 10%, potassium, sodium phosphates, potassium, sodium phosphates, potassium, sodium phosphates       Objective:     Weight: 112.5 kg (248 lb 0.3 oz)  Body mass index is  34.59 kg/m².  Vital Signs (Most Recent):  Temp: 98.6 °F (37 °C) (01/12/19 1500)  Pulse: 98 (01/12/19 1500)  Resp: (!) 23 (01/12/19 1500)  BP: (!) 159/115 (01/12/19 1500)  SpO2: 100 % (01/12/19 1500) Vital Signs (24h Range):  Temp:  [98.2 °F (36.8 °C)-98.7 °F (37.1 °C)] 98.6 °F (37 °C)  Pulse:  [] 98  Resp:  [19-31] 23  SpO2:  [97 %-100 %] 100 %  BP: (143-189)/() 159/115     Date 01/12/19 0700 - 01/13/19 0659   Shift 6105-8214 2083-7838 1193-7948 24 Hour Total   INTAKE   NG/ 115  920   Shift Total(mL/kg) 805(7.2) 115(1)  920(8.2)   OUTPUT   Urine(mL/kg/hr) 1200(1.3)   1200   Shift Total(mL/kg) 1200(10.7)   1200(10.7)   Weight (kg) 112.5 112.5 112.5 112.5              Vent Mode: Spont  Oxygen Concentration (%):  [] 28  Resp Rate Total:  [17 br/min-22 br/min] 22 br/min  PEEP/CPAP:  [5 cmH20] 5 cmH20  Pressure Support:  [5 cmH20] 5 cmH20  Mean Airway Pressure:  [7.1 cmH20-8.7 cmH20] 8.7 cmH20         Gastrostomy/Enterostomy 01/08/19 1441 Percutaneous endoscopic gastrostomy (PEG) midline (Active)   Securement anchored to abdomen w/ adhesive device 1/11/2019 11:00 AM   Interventions Prior to Feeding patency checked;residual checked;residual returned 1/11/2019 11:00 AM   Drainage serosanguineous 1/10/2019  3:00 PM   Feeding Type continuous 1/11/2019 11:00 AM   Clamp Status/Tolerance unclamped 1/11/2019 11:00 AM   Feeding Action feeding continued 1/11/2019 11:00 AM   Dressing dried drainage 1/11/2019 11:00 AM   Insertion Site dry;no redness;no warmth;no drainage;no tenderness;no swelling 1/11/2019 11:00 AM   Site Care site cleansed w/ soap and water;sterile precut T-slit dressing applied 1/11/2019 11:00 AM   Flush/Irrigation flushed w/;water;no resistance met 1/11/2019 11:00 AM   Dressing Change Due 01/10/19 1/10/2019  3:00 PM   Current Rate (mL/hr) 55 mL/hr 1/11/2019  7:00 AM   Goal Rate (mL/hr) 55 mL/hr 1/11/2019  7:00 AM   Intake (mL) 40 mL 1/10/2019  9:05 PM   Water Bolus (mL) 250 mL 1/11/2019   8:00 AM   Formula Name impact peptide 1/10/2019  3:00 PM   Tube Feeding Intake (mL) 55 1/11/2019  1:00 PM   Residual Amount (ml) 0 ml 1/11/2019  6:05 AM       Neurosurgery Physical Exam       E4VTM5, grimacing to stimulation, tracking  PERRL  MOORE spontaneously  Trach and PEG in place    Significant Labs:  Recent Labs   Lab 01/11/19  0441 01/12/19  0422   *  123* 112*  112*  112*     139 137  137  137   K 3.8  3.8 4.4  4.4  4.4     103 106  106  106   CO2 26  26 24  24  24   BUN 21  21 23  23  23   CREATININE 0.6  0.6 0.5  0.5  0.5   CALCIUM 8.9  8.9 8.5*  8.5*  8.5*   MG 2.0  2.0 1.8  1.8  1.8     Recent Labs   Lab 01/11/19  0441 01/12/19  0422   WBC 14.89*  14.89* 14.49*  14.49*  14.49*   HGB 9.8*  9.8* 9.6*  9.6*  9.6*   HCT 30.4*  30.4* 30.7*  30.7*  30.7*     314 297  297  297     Recent Labs   Lab 01/12/19  1213   INR 1.0     Microbiology Results (last 7 days)     Procedure Component Value Units Date/Time    Blood culture [[id number]] Collected:  01/03/19 1107    Order Status:  Completed Specimen:  Blood from Peripheral, Foot, Left Updated:  01/08/19 1412     Blood Culture, Routine No growth after 5 days.    Narrative:       Blood cultures x 2 different sites. 4 bottles total. Please  draw cultures before administering antibiotics.    Blood culture [[id number]] Collected:  01/03/19 1100    Order Status:  Completed Specimen:  Blood from Peripheral, Hand, Left Updated:  01/08/19 1412     Blood Culture, Routine No growth after 5 days.    Narrative:       Blood cultures from 2 different sites. 4 bottles total.  Please draw before starting antibiotics.        Review of Systems        Assessment/Plan:     * SDH (subdural hematoma)    62 yo male with L aSDH now s/p craniotomy for evacuation (12/20).    --Continue care per primary team.  --Patient is stable for discharge from a neurosurgical perspective  --Continue  neurochecks while in house      --Dispo  pending LTAC placement        Dustin Chandra MD  Neurosurgery  [facility]

## 2019-01-12 NOTE — PLAN OF CARE
Problem: Occupational Therapy Goal  Goal: Occupational Therapy Goal  Goals to be met by: 7 days (12/28/18)     Patient will increase functional independence with ADLs by performing:    UE Dressing with Stand-by Assistance.  LE Dressing with Minimal Assistance.  Grooming while EOB with Contact Guard Assistance.  Toileting from bedside commode with Moderate Assistance for hygiene and clothing management.   Supine to sit with Minimal Assistance.  Toilet transfer to bedside commode with Moderate Assistance.  Increased functional strength to WFL for ADLs.     Reassessment performed 1/12.  ASHLEY Oliva  1/12/2019

## 2019-01-12 NOTE — PT/OT/SLP RE-EVAL
Occupational Therapy   Re-evaluation    Name: Peewee Nunes  MRN: [id number]  Admitting Diagnosis:  SDH (subdural hematoma) 4 Days Post-Op    Recommendations:     Discharge Recommendations: LTACH (long-term acute care hospital)  Discharge Equipment Recommendations:  hospital bed, lift device  Barriers to discharge:  Inaccessible home environment, Decreased caregiver support    Assessment:     Peewee Nunes is a 63 y.o. male with a medical diagnosis of SDH (subdural hematoma).  He presents with performance deficits affecting function are weakness, impaired self care skills, impaired balance, decreased coordination, decreased safety awareness, decreased ROM, impaired endurance, impaired functional mobilty, impaired sensation, gait instability, decreased upper extremity function, decreased lower extremity function, impaired cognition, impaired cardiopulmonary response to activity, impaired coordination.      Rehab Prognosis:  Fair; patient would benefit from acute skilled OT services to address these deficits and reach maximum level of function.       Plan:     Patient to be seen 3 x/week to address the above listed problems via self-care/home management, neuromuscular re-education, cognitive retraining, therapeutic activities, therapeutic exercises, sensory integration  · Plan of Care Expires: 02/09/19  · Plan of Care Reviewed with: patient    Subjective     Patient:  Nonverbal  Communicated with: Nurse prior to session.  Pain/Comfort:  · Pain Rating 1: 0/10  · Pain Rating Post-Intervention 1: 0/10    Objective:     Communicated with: Nurse prior to session.  Patient found supine with: telemetry, tracheostomy, pulse ox (continuous), PEG Tube, PICC line, peripheral IV, pressure relief boots, SCD, blood pressure cuff upon OT entry to room.    General Precautions: Standard, aphasia, aspiration, NPO, fall   Orthopedic Precautions:N/A   Braces: N/A     Occupational Performance:    Bed Mobility:    · Patient completed  "Rolling/Turning to Left with  total assistance  · Patient completed Rolling/Turning to Right with total assistance    Functional Mobility/Transfers:  · Dependent drawsheet transfers    Activities of Daily Living:  · Feeding:  NPO    · Grooming: total assistance while upright in bed  · Bathing: dependence while in bed    Cognitive/Visual Perceptual:  Cognitive/Psychosocial Skills:     -       Oriented to: Daily orientation provided   -       Follows Commands/attention:Unable to follow one step commands; unable to model commands  -       Communication: nonverbal; communicated with all "no" head nods.  -       Tracking to the right and left in response to visual stimulation    Physical Exam:  Postural examination/scapula alignment:    -       Rounded shoulders  Skin integrity: stage II sacral wound  Edema:  Mild bilateral UEs  Sensation: responds with eye opening to auditory stimulation  Upper Extremity Range of Motion: PROM WNL; active movement noted left elbow flexion/extension and bilateral LE hip internal and external rotation.    AMPAC 6 Click:  AMPAC Total Score: 6    Treatment & Education:  Patient education provided on role of OT, daily orientation, ROM, positioning and need for continued OT upon discharge.  Continued education, patient/ family training recommended. Daily orientation provided.  PROM performed bilateral UE/LEs one set x 10 rep in all planes of motion with stretches provided at end range; sustained stretch provided for right UE external rotation, supination and shoulder flexion as well as bilateral LE ankle dorsiflexion.  Assistance and facilitation provided for upward rotation of the scapula during shoulder flexion and abduction.   Patient actively moving left UE: elbow flexion and extension and bilateral hip internal/external rotation.  Patient alert 100% of the session and tracking bilaterally.  Patient did not follow or model one step commands during the session.   Positioning provided for " midline orientation with bilateral UEs elevated and heels lifted off mattress.  Gentle cervical rotation provided.   Family not present during the session.  Patient's functional status and disposition recommendation discussed with patient's nurse.  White board updated in patient's room.      Patient left supine with all lines intact, call button in reach and bed alarm on    GOALS:   Multidisciplinary Problems     Occupational Therapy Goals        Problem: Occupational Therapy Goal    Goal Priority Disciplines Outcome Interventions   Occupational Therapy Goal     OT, PT/OT     Description:  Goals set 1/12 to be addressed for 14 days with expiration date, 1/26:  Patient will increase functional independence with ADLs by performing:    Patient will demonstrate rolling to the right with max assist.  Not met   Patient will demonstrate rolling to the left with max assist.   Not met  Patient will demonstrate grooming while upright in bed with max assist.   Not met  Patient will demonstrate ability to follow 1/3 one step commands.   Not met  Patient's family / caregiver will demonstrate independence with providing ROM and changes in bed positioning.   Not met                            History:     Per chart review, patient resides in [place] with wife and son in one story home with ramp access.  PTA patient ambulated with standard walker short distances and occasionally used wheelchair.  Patient was having a progressive decline, lost 260# since August 2016.  Currently owns:  Standard walker, bedside commode and wheelchair.  Patient's family not present during eval and patient unable to communicate the following:  Level of assitance required with ADLs PTA, hobbies, roles and responsibilities.  Past Medical History:   Diagnosis Date    Coronary artery disease     CVD (cardiovascular disease)     Diabetes mellitus type II     Hyperlipidemia     Hypertension     Ischemic cardiomyopathy     Obesity     Obesity, morbid   "      Past Surgical History:   Procedure Laterality Date    APPENDECTOMY      CORONARY ARTERY BYPASS GRAFT      CRANIOTOMY, FOR SUBDURAL HEMATOMA EVACUATION Left 12/20/2018    [facility]    CREATION, TRACHEOSTOMY N/A 1/8/2019    Performed by Andrew Durbin MD at [facility] OR [address] [place] [address]    HEMORRHOID SURGERY      INSERTION, PEG TUBE N/A 1/8/2019    Performed by Andrew Durbin MD at [facility] OR [address] [place] [address]       Clinical Decision Making:     3.  OT High:  "Pt evaluation falls under high complexity for evaluation category due to 5+ performance deficits identified with comprehensive assessments and significant modifications/assistance required. An expanded review of history and occupational profile completed in addition to expanded review of physical, cognitive and psychosocial history. Several treatment options considered in care."     Time Tracking:     OT Date of Treatment: 01/12/19  OT Start Time: 0435  OT Stop Time: 0509  OT Total Time (min): 34 min    Billable Minutes:Re-eval 10  Self Care/Home Management 14  Therapeutic Exercise 10    ASHLEY Oliva  1/12/2019      "

## 2019-01-12 NOTE — SUBJECTIVE & OBJECTIVE
Interval History: NAEON, labs stable, exam stable    Medications:  Continuous Infusions:  Scheduled Meds:   albuterol sulfate  2.5 mg Nebulization Q4H    amLODIPine  5 mg Per G Tube Daily    chlorhexidine  15 mL Mouth/Throat QID    diclofenac sodium   Topical (Top) TID    famotidine  20 mg Per NG tube BID    heparin (porcine)  5,000 Units Subcutaneous Q8H    lacosamide  100 mg Per NG tube BID    metoprolol tartrate  50 mg Per NG tube Q8H    polyethylene glycol  17 g Per NG tube BID    pravastatin  40 mg Per NG tube QHS    senna-docusate 8.6-50 mg  1 tablet Per NG tube BID    silodosin  4 mg Per G Tube Daily    sodium chloride 0.9%  3 mL Intravenous Q8H    sodium chloride 3%  4 mL Nebulization Q4H    ursodiol  300 mg Per NG tube TID     PRN Meds:acetaminophen, fentaNYL, hydrALAZINE, magnesium oxide, magnesium oxide, ondansetron, potassium chloride 10%, potassium chloride 10%, potassium chloride 10%, potassium, sodium phosphates, potassium, sodium phosphates, potassium, sodium phosphates       Objective:     Weight: 112.5 kg (248 lb 0.3 oz)  Body mass index is 34.59 kg/m².  Vital Signs (Most Recent):  Temp: 98.6 °F (37 °C) (01/12/19 1500)  Pulse: 98 (01/12/19 1500)  Resp: (!) 23 (01/12/19 1500)  BP: (!) 159/115 (01/12/19 1500)  SpO2: 100 % (01/12/19 1500) Vital Signs (24h Range):  Temp:  [98.2 °F (36.8 °C)-98.7 °F (37.1 °C)] 98.6 °F (37 °C)  Pulse:  [] 98  Resp:  [19-31] 23  SpO2:  [97 %-100 %] 100 %  BP: (143-189)/() 159/115     Date 01/12/19 0700 - 01/13/19 0659   Shift 6287-9126 2259-1630 3393-1502 24 Hour Total   INTAKE   NG/ 115  920   Shift Total(mL/kg) 805(7.2) 115(1)  920(8.2)   OUTPUT   Urine(mL/kg/hr) 1200(1.3)   1200   Shift Total(mL/kg) 1200(10.7)   1200(10.7)   Weight (kg) 112.5 112.5 112.5 112.5              Vent Mode: Spont  Oxygen Concentration (%):  [] 28  Resp Rate Total:  [17 br/min-22 br/min] 22 br/min  PEEP/CPAP:  [5 cmH20] 5 cmH20  Pressure Support:  [5  cmH20] 5 cmH20  Mean Airway Pressure:  [7.1 cmH20-8.7 cmH20] 8.7 cmH20         Gastrostomy/Enterostomy 01/08/19 1441 Percutaneous endoscopic gastrostomy (PEG) midline (Active)   Securement anchored to abdomen w/ adhesive device 1/11/2019 11:00 AM   Interventions Prior to Feeding patency checked;residual checked;residual returned 1/11/2019 11:00 AM   Drainage serosanguineous 1/10/2019  3:00 PM   Feeding Type continuous 1/11/2019 11:00 AM   Clamp Status/Tolerance unclamped 1/11/2019 11:00 AM   Feeding Action feeding continued 1/11/2019 11:00 AM   Dressing dried drainage 1/11/2019 11:00 AM   Insertion Site dry;no redness;no warmth;no drainage;no tenderness;no swelling 1/11/2019 11:00 AM   Site Care site cleansed w/ soap and water;sterile precut T-slit dressing applied 1/11/2019 11:00 AM   Flush/Irrigation flushed w/;water;no resistance met 1/11/2019 11:00 AM   Dressing Change Due 01/10/19 1/10/2019  3:00 PM   Current Rate (mL/hr) 55 mL/hr 1/11/2019  7:00 AM   Goal Rate (mL/hr) 55 mL/hr 1/11/2019  7:00 AM   Intake (mL) 40 mL 1/10/2019  9:05 PM   Water Bolus (mL) 250 mL 1/11/2019  8:00 AM   Formula Name impact peptide 1/10/2019  3:00 PM   Tube Feeding Intake (mL) 55 1/11/2019  1:00 PM   Residual Amount (ml) 0 ml 1/11/2019  6:05 AM       Neurosurgery Physical Exam       E4VTM5, grimacing to stimulation, tracking  PERRL  MOORE spontaneously  Trach and PEG in place    Significant Labs:  Recent Labs   Lab 01/11/19  0441 01/12/19  0422   *  123* 112*  112*  112*     139 137  137  137   K 3.8  3.8 4.4  4.4  4.4     103 106  106  106   CO2 26  26 24  24  24   BUN 21  21 23  23  23   CREATININE 0.6  0.6 0.5  0.5  0.5   CALCIUM 8.9  8.9 8.5*  8.5*  8.5*   MG 2.0  2.0 1.8  1.8  1.8     Recent Labs   Lab 01/11/19  0441 01/12/19  0422   WBC 14.89*  14.89* 14.49*  14.49*  14.49*   HGB 9.8*  9.8* 9.6*  9.6*  9.6*   HCT 30.4*  30.4* 30.7*  30.7*  30.7*     314 297  297  297      Recent Labs   Lab 01/12/19  1213   INR 1.0     Microbiology Results (last 7 days)     Procedure Component Value Units Date/Time    Blood culture [[id number]] Collected:  01/03/19 1107    Order Status:  Completed Specimen:  Blood from Peripheral, Foot, Left Updated:  01/08/19 1412     Blood Culture, Routine No growth after 5 days.    Narrative:       Blood cultures x 2 different sites. 4 bottles total. Please  draw cultures before administering antibiotics.    Blood culture [[id number]] Collected:  01/03/19 1100    Order Status:  Completed Specimen:  Blood from Peripheral, Hand, Left Updated:  01/08/19 1412     Blood Culture, Routine No growth after 5 days.    Narrative:       Blood cultures from 2 different sites. 4 bottles total.  Please draw before starting antibiotics.        Review of Systems

## 2019-01-13 LAB
ALBUMIN SERPL BCP-MCNC: 2.2 G/DL
ALP SERPL-CCNC: 191 U/L
ALT SERPL W/O P-5'-P-CCNC: 6 U/L
ANION GAP SERPL CALC-SCNC: 6 MMOL/L
AST SERPL-CCNC: 12 U/L
BASOPHILS # BLD AUTO: 0.1 K/UL
BASOPHILS NFR BLD: 0.7 %
BILIRUB SERPL-MCNC: 0.7 MG/DL
BUN SERPL-MCNC: 22 MG/DL
CALCIUM SERPL-MCNC: 8.8 MG/DL
CHLORIDE SERPL-SCNC: 103 MMOL/L
CO2 SERPL-SCNC: 26 MMOL/L
CREAT SERPL-MCNC: 0.5 MG/DL
DIFFERENTIAL METHOD: ABNORMAL
EOSINOPHIL # BLD AUTO: 0.3 K/UL
EOSINOPHIL NFR BLD: 2.1 %
ERYTHROCYTE [DISTWIDTH] IN BLOOD BY AUTOMATED COUNT: 15.4 %
EST. GFR  (AFRICAN AMERICAN): >60 ML/MIN/1.73 M^2
EST. GFR  (NON AFRICAN AMERICAN): >60 ML/MIN/1.73 M^2
GLUCOSE SERPL-MCNC: 123 MG/DL
HCT VFR BLD AUTO: 29.4 %
HGB BLD-MCNC: 9.3 G/DL
IMM GRANULOCYTES # BLD AUTO: 0.09 K/UL
IMM GRANULOCYTES NFR BLD AUTO: 0.6 %
LYMPHOCYTES # BLD AUTO: 2.1 K/UL
LYMPHOCYTES NFR BLD: 14.1 %
MAGNESIUM SERPL-MCNC: 1.9 MG/DL
MCH RBC QN AUTO: 31.7 PG
MCHC RBC AUTO-ENTMCNC: 31.6 G/DL
MCV RBC AUTO: 100 FL
MONOCYTES # BLD AUTO: 0.9 K/UL
MONOCYTES NFR BLD: 6.4 %
NEUTROPHILS # BLD AUTO: 11.1 K/UL
NEUTROPHILS NFR BLD: 76.1 %
NRBC BLD-RTO: 0 /100 WBC
PHOSPHATE SERPL-MCNC: 2.9 MG/DL
PLATELET # BLD AUTO: 278 K/UL
PMV BLD AUTO: 9.8 FL
POCT GLUCOSE: 133 MG/DL (ref 70–110)
POTASSIUM SERPL-SCNC: 4 MMOL/L
PROT SERPL-MCNC: 5.7 G/DL
RBC # BLD AUTO: 2.93 M/UL
SODIUM SERPL-SCNC: 135 MMOL/L
TSH SERPL DL<=0.005 MIU/L-ACNC: 3.38 UIU/ML
WBC # BLD AUTO: 14.57 K/UL

## 2019-01-13 PROCEDURE — 94640 AIRWAY INHALATION TREATMENT: CPT

## 2019-01-13 PROCEDURE — 99900035 HC TECH TIME PER 15 MIN (STAT)

## 2019-01-13 PROCEDURE — 94003 VENT MGMT INPAT SUBQ DAY: CPT

## 2019-01-13 PROCEDURE — 99024 PR POST-OP FOLLOW-UP VISIT: ICD-10-PCS | Mod: GC,,, | Performed by: NEUROLOGICAL SURGERY

## 2019-01-13 PROCEDURE — 27200966 HC CLOSED SUCTION SYSTEM

## 2019-01-13 PROCEDURE — 25000003 PHARM REV CODE 250: Performed by: STUDENT IN AN ORGANIZED HEALTH CARE EDUCATION/TRAINING PROGRAM

## 2019-01-13 PROCEDURE — 25000003 PHARM REV CODE 250: Performed by: PSYCHIATRY & NEUROLOGY

## 2019-01-13 PROCEDURE — 99233 PR SUBSEQUENT HOSPITAL CARE,LEVL III: ICD-10-PCS | Mod: ,,, | Performed by: PSYCHIATRY & NEUROLOGY

## 2019-01-13 PROCEDURE — 99024 POSTOP FOLLOW-UP VISIT: CPT | Mod: GC,,, | Performed by: NEUROLOGICAL SURGERY

## 2019-01-13 PROCEDURE — 85025 COMPLETE CBC W/AUTO DIFF WBC: CPT

## 2019-01-13 PROCEDURE — 99900026 HC AIRWAY MAINTENANCE (STAT)

## 2019-01-13 PROCEDURE — 94668 MNPJ CHEST WALL SBSQ: CPT

## 2019-01-13 PROCEDURE — 84100 ASSAY OF PHOSPHORUS: CPT

## 2019-01-13 PROCEDURE — 84443 ASSAY THYROID STIM HORMONE: CPT

## 2019-01-13 PROCEDURE — 25000003 PHARM REV CODE 250: Performed by: PHYSICIAN ASSISTANT

## 2019-01-13 PROCEDURE — 80053 COMPREHEN METABOLIC PANEL: CPT

## 2019-01-13 PROCEDURE — 25000003 PHARM REV CODE 250: Performed by: NURSE PRACTITIONER

## 2019-01-13 PROCEDURE — 25000242 PHARM REV CODE 250 ALT 637 W/ HCPCS: Performed by: NURSE PRACTITIONER

## 2019-01-13 PROCEDURE — A4216 STERILE WATER/SALINE, 10 ML: HCPCS | Performed by: PSYCHIATRY & NEUROLOGY

## 2019-01-13 PROCEDURE — 94761 N-INVAS EAR/PLS OXIMETRY MLT: CPT

## 2019-01-13 PROCEDURE — 20000000 HC ICU ROOM

## 2019-01-13 PROCEDURE — 99233 SBSQ HOSP IP/OBS HIGH 50: CPT | Mod: ,,, | Performed by: PSYCHIATRY & NEUROLOGY

## 2019-01-13 PROCEDURE — 83735 ASSAY OF MAGNESIUM: CPT

## 2019-01-13 PROCEDURE — 63600175 PHARM REV CODE 636 W HCPCS: Performed by: STUDENT IN AN ORGANIZED HEALTH CARE EDUCATION/TRAINING PROGRAM

## 2019-01-13 PROCEDURE — 27000221 HC OXYGEN, UP TO 24 HOURS

## 2019-01-13 RX ORDER — METOPROLOL TARTRATE 100 MG/1
100 TABLET ORAL EVERY 8 HOURS
Status: DISCONTINUED | OUTPATIENT
Start: 2019-01-13 | End: 2019-01-17

## 2019-01-13 RX ORDER — AMLODIPINE BESYLATE 10 MG/1
10 TABLET ORAL DAILY
Status: DISCONTINUED | OUTPATIENT
Start: 2019-01-14 | End: 2019-01-28 | Stop reason: HOSPADM

## 2019-01-13 RX ADMIN — ALBUTEROL SULFATE 2.5 MG: 2.5 SOLUTION RESPIRATORY (INHALATION) at 11:01

## 2019-01-13 RX ADMIN — METOPROLOL TARTRATE 100 MG: 100 TABLET ORAL at 09:01

## 2019-01-13 RX ADMIN — CHLORHEXIDINE GLUCONATE 0.12% ORAL RINSE 15 ML: 1.2 LIQUID ORAL at 01:01

## 2019-01-13 RX ADMIN — HEPARIN SODIUM 5000 UNITS: 5000 INJECTION, SOLUTION INTRAVENOUS; SUBCUTANEOUS at 06:01

## 2019-01-13 RX ADMIN — LACOSAMIDE 100 MG: 50 TABLET, FILM COATED ORAL at 08:01

## 2019-01-13 RX ADMIN — ALBUTEROL SULFATE 2.5 MG: 2.5 SOLUTION RESPIRATORY (INHALATION) at 04:01

## 2019-01-13 RX ADMIN — ALBUTEROL SULFATE 2.5 MG: 2.5 SOLUTION RESPIRATORY (INHALATION) at 07:01

## 2019-01-13 RX ADMIN — FAMOTIDINE 20 MG: 20 TABLET ORAL at 08:01

## 2019-01-13 RX ADMIN — DICLOFENAC: 10 GEL TOPICAL at 09:01

## 2019-01-13 RX ADMIN — POLYETHYLENE GLYCOL 3350 17 G: 17 POWDER, FOR SOLUTION ORAL at 09:01

## 2019-01-13 RX ADMIN — URSODIOL 300 MG: 300 CAPSULE ORAL at 03:01

## 2019-01-13 RX ADMIN — Medication 3 ML: at 09:01

## 2019-01-13 RX ADMIN — SODIUM CHLORIDE SOLN NEBU 3% 4 ML: 3 NEBU SOLN at 04:01

## 2019-01-13 RX ADMIN — SODIUM CHLORIDE SOLN NEBU 3% 4 ML: 3 NEBU SOLN at 08:01

## 2019-01-13 RX ADMIN — PRAVASTATIN SODIUM 40 MG: 40 TABLET ORAL at 09:01

## 2019-01-13 RX ADMIN — METOPROLOL TARTRATE 50 MG: 50 TABLET ORAL at 06:01

## 2019-01-13 RX ADMIN — DICLOFENAC: 10 GEL TOPICAL at 03:01

## 2019-01-13 RX ADMIN — Medication 3 ML: at 06:01

## 2019-01-13 RX ADMIN — SODIUM CHLORIDE SOLN NEBU 3% 4 ML: 3 NEBU SOLN at 12:01

## 2019-01-13 RX ADMIN — DICLOFENAC: 10 GEL TOPICAL at 08:01

## 2019-01-13 RX ADMIN — LACOSAMIDE 100 MG: 50 TABLET, FILM COATED ORAL at 09:01

## 2019-01-13 RX ADMIN — ALBUTEROL SULFATE 2.5 MG: 2.5 SOLUTION RESPIRATORY (INHALATION) at 08:01

## 2019-01-13 RX ADMIN — SILODOSIN 4 MG: 4 CAPSULE ORAL at 08:01

## 2019-01-13 RX ADMIN — SODIUM CHLORIDE SOLN NEBU 3% 4 ML: 3 NEBU SOLN at 07:01

## 2019-01-13 RX ADMIN — CHLORHEXIDINE GLUCONATE 0.12% ORAL RINSE 15 ML: 1.2 LIQUID ORAL at 04:01

## 2019-01-13 RX ADMIN — CHLORHEXIDINE GLUCONATE 0.12% ORAL RINSE 15 ML: 1.2 LIQUID ORAL at 08:01

## 2019-01-13 RX ADMIN — ALBUTEROL SULFATE 2.5 MG: 2.5 SOLUTION RESPIRATORY (INHALATION) at 12:01

## 2019-01-13 RX ADMIN — HEPARIN SODIUM 5000 UNITS: 5000 INJECTION, SOLUTION INTRAVENOUS; SUBCUTANEOUS at 02:01

## 2019-01-13 RX ADMIN — METOPROLOL TARTRATE 100 MG: 100 TABLET ORAL at 02:01

## 2019-01-13 RX ADMIN — URSODIOL 300 MG: 300 CAPSULE ORAL at 09:01

## 2019-01-13 RX ADMIN — CHLORHEXIDINE GLUCONATE 0.12% ORAL RINSE 15 ML: 1.2 LIQUID ORAL at 09:01

## 2019-01-13 RX ADMIN — AMLODIPINE BESYLATE 5 MG: 5 TABLET ORAL at 08:01

## 2019-01-13 RX ADMIN — SODIUM CHLORIDE 500 ML: 0.9 INJECTION, SOLUTION INTRAVENOUS at 12:01

## 2019-01-13 RX ADMIN — FAMOTIDINE 20 MG: 20 TABLET ORAL at 09:01

## 2019-01-13 RX ADMIN — HEPARIN SODIUM 5000 UNITS: 5000 INJECTION, SOLUTION INTRAVENOUS; SUBCUTANEOUS at 09:01

## 2019-01-13 RX ADMIN — URSODIOL 300 MG: 300 CAPSULE ORAL at 08:01

## 2019-01-13 RX ADMIN — STANDARDIZED SENNA CONCENTRATE AND DOCUSATE SODIUM 1 TABLET: 8.6; 5 TABLET, FILM COATED ORAL at 09:01

## 2019-01-13 NOTE — SUBJECTIVE & OBJECTIVE
Interval History:  Bolus  mL, TSH lab to access thyroid function, increased Amlodopine 5mg - 10 mg, LTAC planning, d/c Enteral H20 flush.         Review of Systems   Unable to perform ROS: Patient nonverbal       Objective:     Vitals:  Temp: 98.4 °F (36.9 °C)  Pulse: 98  Rhythm: sinus tachycardia  BP: (!) 160/111  MAP (mmHg): 132  Resp: (!) 26  SpO2: 98 %  Oxygen Concentration (%): 28  O2 Device (Oxygen Therapy): Trach Collar  Vent Mode: Spont  Pressure Support: 5 cmH20  PEEP/CPAP: 5 cmH20  Peak Airway Pressure: 11 cmH2O  Mean Airway Pressure: 7.3 cmH20  Plateau Pressure: 0 cmH20    Temp  Min: 98.2 °F (36.8 °C)  Max: 99 °F (37.2 °C)  Pulse  Min: 98  Max: 126  BP  Min: 135/80  Max: 185/118  MAP (mmHg)  Min: 101  Max: 147  Resp  Min: 18  Max: 28  SpO2  Min: 96 %  Max: 100 %  Oxygen Concentration (%)  Min: 28  Max: 99    01/12 0701 - 01/13 0700  In: 1930   Out: 2525 [Urine:2525]   Unmeasured Output  Stool Occurrence: 0       Physical Exam   Constitutional: He appears well-developed and well-nourished.   HENT:   Head: Normocephalic and atraumatic.   Neck: Normal range of motion.   Cardiovascular: Normal rate.   Pulmonary/Chest: Effort normal.   Abdominal: Soft. Bowel sounds are normal.   Neurological:   Alert.Tolerates Trach. Does not follow commands.Tracks with eyes   E3V(T1)M1 GCS (5)   PERRLA   Corneals, Cough, and Gag intact   Moves BUE and BLE spontaneously   Withdraws to pain BUE/BLE      Medications:  Continuous Scheduled  albuterol sulfate 2.5 mg Q4H   [START ON 1/14/2019] amLODIPine 10 mg Daily   chlorhexidine 15 mL QID   diclofenac sodium  TID   famotidine 20 mg BID   heparin (porcine) 5,000 Units Q8H   lacosamide 100 mg BID   metoprolol tartrate 100 mg Q8H   polyethylene glycol 17 g BID   pravastatin 40 mg QHS   senna-docusate 8.6-50 mg 1 tablet BID   silodosin 4 mg Daily   sodium chloride 0.9% 3 mL Q8H   sodium chloride 3% 4 mL Q4H   ursodiol 300 mg TID   PRN  acetaminophen 650 mg Q6H PRN   fentaNYL  25 mcg Q2H PRN   hydrALAZINE 10 mg Q4H PRN   magnesium oxide 800 mg PRN   magnesium oxide 800 mg PRN   ondansetron 4 mg Q12H PRN   potassium chloride 10% 40 mEq PRN   potassium chloride 10% 40 mEq PRN   potassium chloride 10% 60 mEq PRN   potassium, sodium phosphates 2 packet PRN   potassium, sodium phosphates 2 packet PRN   potassium, sodium phosphates 2 packet PRN     Today I personally reviewed pertinent medications, lines/drains/airways, imaging, cardiology results, laboratory results, microbiology results, notably:    Diet  No diet orders on file  No diet orders on file

## 2019-01-13 NOTE — ASSESSMENT & PLAN NOTE
Epidural with subdural component s/p evac 12/20  Complicated by status epilepticus  Neuro checks Q2Hr in am / Q4Hr in pm  NSGY following  SBP goal < 180  Pt/OT/SLP   LTAC planning

## 2019-01-13 NOTE — PROGRESS NOTES
[facility]  Neurocritical Care  Progress Note    Admit Date: 12/20/2018  Service Date: 01/12/2019  Length of Stay: 23    Subjective:     Chief Complaint: SDH (subdural hematoma)    History of Present Illness: 63 year old male h/o CABG on ASA 81 mg (last dose 72 hours PTA) presenting as transfer from OSH for EDH with SDH. Patient reportedly had a mechanical fall from standing on Sunday and went to ED where CTH was negative for any acute intracranial pathology. Overnight, patient developed confusion and presented to OSH ED where CTH was consistent with EDH with SDH. Patient has progressively worsened throughout the day developing intermittent global aphasia. NSGY consulted in ED at [facility] and stat CTH has been ordered. NCC consulted for admission and medical management. Patient on weaning off of cardene at time of evaluation with sbp of 120. Reported presenting sbp in 150s.    Hospital Course: 12/22: no major events overnight. Weaned off of cardene. This morning AOx3. Drain removed by NSGY at 11 AM, and planned to step down to NSGY.  AMS noted around 1PM, patient aphasic and not following commands, STAT CTH unremarkable.   12/26: wean ketamine, follow EEG, send urine studuies-Na 133, NS bolus, advance TF  12/27: off ketamine, d/c levo, continue EEG, and AEDs, 500 NS bolus, increase enteral water flushes, continue abx for another day  1/2: extubation trial.   1/3: failed extubation trial. Plan for trach and peg  1/4: plan for trach, general surgery consult  1/5: NAEO  1/6: NAEO. Awaiting trach and peg  1/7: plan for trach/peg, opens eyes to voice no commands  1/8: s/p trach/peg today  1/9: restarting tf this afternoon, ltac planning, eeg today to help guide aed weaning  1/12 Trach trials tolerating well. Neuro checks q2 in am/ q4in pm metoprolol prn HR.120    Interval History: no significant events over night. Remains tachycardic on metoprolol scheduled and requiring prn doses.  Trach trials tolerating  well. Neuro checks q2 in am/ q4in pm metoprolol prn HR.120      Review of Systems:  Unable to obtain a complete ROS due to level of consciousness.     Vitals:   Temp: 98.6 °F (37 °C)  Pulse: (!) 122  Rhythm: sinus tachycardia  BP: (!) 176/125  MAP (mmHg): 144  Resp: (!) 28  SpO2: 100 %  Oxygen Concentration (%): 28  O2 Device (Oxygen Therapy): Trach Collar  Vent Mode: Spont  Pressure Support: 5 cmH20  PEEP/CPAP: 5 cmH20  Peak Airway Pressure: 12 cmH2O  Mean Airway Pressure: 8.7 cmH20  Plateau Pressure: 0 cmH20    Temp  Min: 98.2 °F (36.8 °C)  Max: 98.7 °F (37.1 °C)  Pulse  Min: 98  Max: 122  BP  Min: 143/88  Max: 189/103  MAP (mmHg)  Min: 110  Max: 149  Resp  Min: 19  Max: 31  SpO2  Min: 97 %  Max: 100 %  Oxygen Concentration (%)  Min: 28  Max: 100    01/11 0701 - 01/12 0700  In: 2415   Out: 1375 [Urine:1375]   Unmeasured Output  Stool Occurrence: 0     Examination:   Constitutional: Well-nourished and -developed. No apparent distress.   Eyes: Conjunctiva clear, anicteric. Lids no lesions.  Head/Ears/Nose/Mouth/Throat/Neck: Moist mucous membranes. External ears, nose atraumatic.   Cardiovascular: Regular rhythm. No murmurs. No leg edema.  Respiratory: Trach. Tolerating trach collar. diminished .bibasilar coarse breath sounds.  Gastrointestinal: No hernia. Soft, nondistended, nontender. + bowel sounds.    Neurologic:  -GCS E3VTM1  -Alert. Trach unable to test orientation. Does not follow commands. Tracks with eyes  -Cranial nerves PERRL 4+. + cough gag corneals  -Motor moves LUE spontaneously with draws on RUE/RLE/ LLE  -Sensation bilat intact  Unable to test orientation, language, memory, judgment, insight, fund of knowledge, hearing, shoulder shrug, tongue protrusion, coordination, gait due to level of consciousness.    Medications:   Continuous Scheduled  albuterol sulfate 2.5 mg Q4H   amLODIPine 5 mg Daily   chlorhexidine 15 mL QID   diclofenac sodium  TID   famotidine 20 mg BID   heparin (porcine) 5,000 Units  Q8H   lacosamide 100 mg BID   metoprolol tartrate 50 mg Q8H   polyethylene glycol 17 g BID   pravastatin 40 mg QHS   senna-docusate 8.6-50 mg 1 tablet BID   silodosin 4 mg Daily   sodium chloride 0.9% 3 mL Q8H   sodium chloride 3% 4 mL Q4H   ursodiol 300 mg TID   PRN  acetaminophen 650 mg Q6H PRN   fentaNYL 25 mcg Q2H PRN   hydrALAZINE 10 mg Q4H PRN   magnesium oxide 800 mg PRN   magnesium oxide 800 mg PRN   ondansetron 4 mg Q12H PRN   potassium chloride 10% 40 mEq PRN   potassium chloride 10% 40 mEq PRN   potassium chloride 10% 60 mEq PRN   potassium, sodium phosphates 2 packet PRN   potassium, sodium phosphates 2 packet PRN   potassium, sodium phosphates 2 packet PRN      Today I independently reviewed pertinent medications, lines/drains/airways, imaging, laboratory results, microbiology results, notably:     ISTAT: Recent Labs   Lab 01/12/19  1147   PH 7.524*   PCO2 31.9*   PO2 97   POCSATURATED 98   HCO3 26.3   BE 4   POCTCO2 27   SAMPLE ARTERIAL      Chem: Recent Labs   Lab 01/12/19 0422     137  137   K 4.4  4.4  4.4     106  106   CO2 24  24  24   *  112*  112*   BUN 23  23  23   CREATININE 0.5  0.5  0.5   ESTGFRAFRICA >60.0  >60.0  >60.0   EGFRNONAA >60.0  >60.0  >60.0   CALCIUM 8.5*  8.5*  8.5*   MG 1.8  1.8  1.8   PHOS 2.5*  2.5*  2.5*   ANIONGAP 7*  7*  7*   PROT 5.5*  5.5*  5.5*   ALBUMIN 2.1*  2.1*  2.1*   BILITOT 0.7  0.7  0.7   ALKPHOS 192*  192*  192*   AST 13  13  13   ALT 7*  7*  7*     Heme: Recent Labs   Lab 01/12/19  0422 01/12/19  1213   WBC 14.49*  14.49*  14.49*  --    HGB 9.6*  9.6*  9.6*  --    HCT 30.7*  30.7*  30.7*  --      297  297  --    INR  --  1.0     Endo:   Recent Labs   Lab 01/12/19  0016 01/12/19  0623 01/12/19  1212   POCTGLUCOSE 142* 127* 122*      Assessment/Plan:     Neuro   * SDH (subdural hematoma)    Epidural with subdural component s/p evac 12/20  Complicated by status epilepticus  Neuro checks q2  in am / q4 in pm  nsgy following  Pt/ot/slp when able       Status epilepticus    Mental status continuing to improve  Spot eeg without any epileptiform activity    Decrease lacosamide 100mg bid     Pulmonary   Acute respiratory failure    S/p trach  Trach collar trial daily  Rest at night on vent     Cardiac/Vascular   New onset atrial fibrillation    With rrvr   metoprolol 50mg q8h, still requiring prn IV metoprolol HR>120  Monitor off ac       Endocrine   Weight loss, unintentional    Hx of 200 lb wt loss in last year  Wife reports early satiety and change in taste leading to decreased po intake    12/31 ct chest/abdomen/pelvis  Showed Centrilobular pulmonary nodules with tree-in-bud configuration involving the apicoposterior segment of the left upper lobe.  Right nonobstructing nephrolithiasis     Severe malnutrition    S/p trach/peg  Impact peptide 55cc/h  Enteral water 100cc/q6h     Other   Pressure injury of buttock, stage 2    improving  Appreciate wound care reccs           The patient is being Prophylaxed for:  Venous Thromboembolism with: Mechanical or Chemical  Stress Ulcer with: H2B  Ventilator Pneumonia with: chlorhexidine oral care    Activity Orders          None        Full Code     I have spent 35 min with this patient, with over 50% of this time spent coordinating care and speaking with the family    Marie Maher NP  Neurocritical Care  [facility]

## 2019-01-13 NOTE — ASSESSMENT & PLAN NOTE
Hx of 200 lb wt loss in last year  12/31 ct chest/abdomen/pelvis  Showed Centrilobular pulmonary nodules with tree-in-bud configuration involving the apicoposterior segment of the left upper lobe.  Right nonobstructing nephrolithiasis

## 2019-01-13 NOTE — ASSESSMENT & PLAN NOTE
Mental status continuing to improve  Spot eeg without any epileptiform activity    Decrease lacosamide 100mg bid

## 2019-01-13 NOTE — ASSESSMENT & PLAN NOTE
Epidural with subdural component s/p evac 12/20  Complicated by status epilepticus  Neuro checks q2 in am / q4 in pm  nsgy following  Pt/ot/slp when able

## 2019-01-13 NOTE — PT/OT/SLP PROGRESS
Physical Therapy      Patient Name:  Peewee Nunes   MRN:  [id number]    PT orders received and pt chart review performed. Pt with decreased alertness and decreased command-following at this time. Discussed pt case with OT. Pt likely only appropriate for one discipline at this time. Will continue to follow and initiate treatment as pt appropriate.     Marta Torres, PT, DPT   1/13/2019  [phone]

## 2019-01-13 NOTE — PROGRESS NOTES
[facility]  Neurocritical Care  Progress Note    Admit Date: 12/20/2018  Service Date: 01/13/2019  Length of Stay: 24    Subjective:     Chief Complaint: SDH (subdural hematoma)    History of Present Illness: 63 year old male h/o CABG on ASA 81 mg (last dose 72 hours PTA) presenting as transfer from OSH for EDH with SDH. Patient reportedly had a mechanical fall from standing on Sunday and went to ED where CTH was negative for any acute intracranial pathology. Overnight, patient developed confusion and presented to OSH ED where CTH was consistent with EDH with SDH. Patient has progressively worsened throughout the day developing intermittent global aphasia. NSGY consulted in ED at [facility] and stat CTH has been ordered. NCC consulted for admission and medical management. Patient on weaning off of cardene at time of evaluation with sbp of 120. Reported presenting sbp in 150s.    Hospital Course: 12/22: no major events overnight. Weaned off of cardene. This morning AOx3. Drain removed by NSGY at 11 AM, and planned to step down to NSGY.  AMS noted around 1PM, patient aphasic and not following commands, STAT CTH unremarkable.   12/26: wean ketamine, follow EEG, send urine studuies-Na 133, NS bolus, advance TF  12/27: off ketamine, d/c levo, continue EEG, and AEDs, 500 NS bolus, increase enteral water flushes, continue abx for another day  1/2: extubation trial.   1/3: failed extubation trial. Plan for trach and peg  1/4: plan for trach, general surgery consult  1/5: NAEO  1/6: NAEO. Awaiting trach and peg  1/7: plan for trach/peg, opens eyes to voice no commands  1/8: s/p trach/peg today  1/9: restarting tf this afternoon, ltac planning, eeg today to help guide aed weaning  1/12 Trach trials tolerating well. Neuro checks q2 in am/ q4in pm metoprolol prn HR.120  1/13 Continues to tolerate Trach. Start Norvasc. eval thyroid function baseline. LTAC planning     Interval History:  Bolus  mL, TSH lab to  access thyroid function, increased Amlodopine 5mg - 10 mg, LTAC planning, d/c Enteral H20 flush.         Review of Systems   Unable to perform ROS: Patient nonverbal       Objective:     Vitals:  Temp: 98.4 °F (36.9 °C)  Pulse: 98  Rhythm: sinus tachycardia  BP: (!) 160/111  MAP (mmHg): 132  Resp: (!) 26  SpO2: 98 %  Oxygen Concentration (%): 28  O2 Device (Oxygen Therapy): Trach Collar  Vent Mode: Spont  Pressure Support: 5 cmH20  PEEP/CPAP: 5 cmH20  Peak Airway Pressure: 11 cmH2O  Mean Airway Pressure: 7.3 cmH20  Plateau Pressure: 0 cmH20    Temp  Min: 98.2 °F (36.8 °C)  Max: 99 °F (37.2 °C)  Pulse  Min: 98  Max: 126  BP  Min: 135/80  Max: 185/118  MAP (mmHg)  Min: 101  Max: 147  Resp  Min: 18  Max: 28  SpO2  Min: 96 %  Max: 100 %  Oxygen Concentration (%)  Min: 28  Max: 99    01/12 0701 - 01/13 0700  In: 1930   Out: 2525 [Urine:2525]   Unmeasured Output  Stool Occurrence: 0       Physical Exam   Constitutional: He appears well-developed and well-nourished.   HENT:   Head: Normocephalic and atraumatic.   Neck: Normal range of motion.   Cardiovascular: Normal rate.   Pulmonary/Chest: Effort normal.   Abdominal: Soft. Bowel sounds are normal.   Neurological:   Alert.Tolerates Trach collar. Does not follow commands.Tracks with eyes   E3V(T1)M1 GCS (5)   PERRLA +3mm    Corneals, Cough, and Gag intact   Moves BUE and BLE spontaneously   Withdraws to pain BUE/BLE      Medications:  Continuous Scheduled  albuterol sulfate 2.5 mg Q4H   [START ON 1/14/2019] amLODIPine 10 mg Daily   chlorhexidine 15 mL QID   diclofenac sodium  TID   famotidine 20 mg BID   heparin (porcine) 5,000 Units Q8H   lacosamide 100 mg BID   metoprolol tartrate 100 mg Q8H   polyethylene glycol 17 g BID   pravastatin 40 mg QHS   senna-docusate 8.6-50 mg 1 tablet BID   silodosin 4 mg Daily   sodium chloride 0.9% 3 mL Q8H   sodium chloride 3% 4 mL Q4H   ursodiol 300 mg TID   PRN  acetaminophen 650 mg Q6H PRN   fentaNYL 25 mcg Q2H PRN   hydrALAZINE 10 mg  Q4H PRN   magnesium oxide 800 mg PRN   magnesium oxide 800 mg PRN   ondansetron 4 mg Q12H PRN   potassium chloride 10% 40 mEq PRN   potassium chloride 10% 40 mEq PRN   potassium chloride 10% 60 mEq PRN   potassium, sodium phosphates 2 packet PRN   potassium, sodium phosphates 2 packet PRN   potassium, sodium phosphates 2 packet PRN     Today I personally reviewed pertinent medications, lines/drains/airways, imaging, cardiology results, laboratory results, microbiology results, notably:    Diet  No diet orders on file  No diet orders on file        Assessment/Plan:     Neuro   * SDH (subdural hematoma)    Epidural with subdural component s/p evac 12/20  Complicated by status epilepticus  Neuro checks Q2Hr in am / Q4Hr in pm  NSGY following  SBP goal < 180  Pt/OT/SLP   LTAC planning        Epidural hematoma    See SDH     Cardiac/Vascular   New onset atrial fibrillation    -With RVR  -Metoprolol 100 mg Q8H  -Monitor off AC       Essential hypertension    -SBP<180  -PRN hydralazine  -Increased Amlodopine to 5mg - 10mg        Endocrine   Weight loss, unintentional    Hx of 200 lb wt loss in last year  12/31 ct chest/abdomen/pelvis  Showed Centrilobular pulmonary nodules with tree-in-bud configuration involving the apicoposterior segment of the left upper lobe.  Right nonobstructing nephrolithiasis           The patient is being Prophylaxed for:  Venous Thromboembolism with: Mechanical  Stress Ulcer with: H2B  Ventilator Pneumonia with: chlorhexidine oral care    Activity Orders          None        Full Code   I have spent 35 min with this patient, with over 50% of this time spent coordinating care and speaking with the family      Dhaval Duncan PA-C  Neurocritical Care  [facility]

## 2019-01-13 NOTE — ASSESSMENT & PLAN NOTE
Hx of 200 lb wt loss in last year  Wife reports early satiety and change in taste leading to decreased po intake    12/31 ct chest/abdomen/pelvis  Showed Centrilobular pulmonary nodules with tree-in-bud configuration involving the apicoposterior segment of the left upper lobe.  Right nonobstructing nephrolithiasis

## 2019-01-14 PROBLEM — E87.1 HYPONATREMIA: Status: ACTIVE | Noted: 2019-01-14

## 2019-01-14 LAB
ALBUMIN SERPL BCP-MCNC: 2.3 G/DL
ALP SERPL-CCNC: 185 U/L
ALT SERPL W/O P-5'-P-CCNC: 7 U/L
ANION GAP SERPL CALC-SCNC: 6 MMOL/L
ANION GAP SERPL CALC-SCNC: 9 MMOL/L
ANISOCYTOSIS BLD QL SMEAR: SLIGHT
AST SERPL-CCNC: 16 U/L
BACTERIA #/AREA URNS AUTO: ABNORMAL /HPF
BASOPHILS # BLD AUTO: 0.16 K/UL
BASOPHILS NFR BLD: 1 %
BILIRUB SERPL-MCNC: 0.7 MG/DL
BILIRUB UR QL STRIP: NEGATIVE
BUN SERPL-MCNC: 20 MG/DL
BUN SERPL-MCNC: 21 MG/DL
CALCIUM SERPL-MCNC: 8.8 MG/DL
CALCIUM SERPL-MCNC: 8.8 MG/DL
CHLORIDE SERPL-SCNC: 102 MMOL/L
CHLORIDE SERPL-SCNC: 104 MMOL/L
CLARITY UR REFRACT.AUTO: CLEAR
CO2 SERPL-SCNC: 21 MMOL/L
CO2 SERPL-SCNC: 23 MMOL/L
COLOR UR AUTO: YELLOW
CREAT SERPL-MCNC: 0.5 MG/DL
CREAT SERPL-MCNC: 0.5 MG/DL
DIFFERENTIAL METHOD: ABNORMAL
EOSINOPHIL # BLD AUTO: 0.4 K/UL
EOSINOPHIL NFR BLD: 2.7 %
ERYTHROCYTE [DISTWIDTH] IN BLOOD BY AUTOMATED COUNT: 15.4 %
EST. GFR  (AFRICAN AMERICAN): >60 ML/MIN/1.73 M^2
EST. GFR  (AFRICAN AMERICAN): >60 ML/MIN/1.73 M^2
EST. GFR  (NON AFRICAN AMERICAN): >60 ML/MIN/1.73 M^2
EST. GFR  (NON AFRICAN AMERICAN): >60 ML/MIN/1.73 M^2
GLUCOSE SERPL-MCNC: 119 MG/DL
GLUCOSE SERPL-MCNC: 126 MG/DL
GLUCOSE UR QL STRIP: NEGATIVE
HCT VFR BLD AUTO: 30.7 %
HGB BLD-MCNC: 9.6 G/DL
HGB UR QL STRIP: NEGATIVE
HYPOCHROMIA BLD QL SMEAR: ABNORMAL
IMM GRANULOCYTES # BLD AUTO: 0.1 K/UL
IMM GRANULOCYTES NFR BLD AUTO: 0.6 %
KETONES UR QL STRIP: NEGATIVE
LEUKOCYTE ESTERASE UR QL STRIP: NEGATIVE
LYMPHOCYTES # BLD AUTO: 2.2 K/UL
LYMPHOCYTES NFR BLD: 13.5 %
MAGNESIUM SERPL-MCNC: 1.9 MG/DL
MCH RBC QN AUTO: 32.4 PG
MCHC RBC AUTO-ENTMCNC: 31.3 G/DL
MCV RBC AUTO: 104 FL
MICROSCOPIC COMMENT: ABNORMAL
MONOCYTES # BLD AUTO: 1.1 K/UL
MONOCYTES NFR BLD: 6.8 %
NEUTROPHILS # BLD AUTO: 12.5 K/UL
NEUTROPHILS NFR BLD: 75.4 %
NITRITE UR QL STRIP: NEGATIVE
NRBC BLD-RTO: 0 /100 WBC
OSMOLALITY SERPL: 286 MOSM/KG
OSMOLALITY UR: 500 MOSM/KG
OVALOCYTES BLD QL SMEAR: ABNORMAL
PH UR STRIP: 7 [PH] (ref 5–8)
PHOSPHATE SERPL-MCNC: 3.2 MG/DL
PLATELET # BLD AUTO: 213 K/UL
PMV BLD AUTO: ABNORMAL FL
POCT GLUCOSE: 121 MG/DL (ref 70–110)
POCT GLUCOSE: 121 MG/DL (ref 70–110)
POCT GLUCOSE: 124 MG/DL (ref 70–110)
POCT GLUCOSE: 132 MG/DL (ref 70–110)
POCT GLUCOSE: 133 MG/DL (ref 70–110)
POIKILOCYTOSIS BLD QL SMEAR: SLIGHT
POLYCHROMASIA BLD QL SMEAR: ABNORMAL
POTASSIUM SERPL-SCNC: 4.2 MMOL/L
POTASSIUM SERPL-SCNC: 4.9 MMOL/L
PROT SERPL-MCNC: 5.9 G/DL
PROT UR QL STRIP: NEGATIVE
RBC # BLD AUTO: 2.96 M/UL
RBC #/AREA URNS AUTO: 5 /HPF (ref 0–4)
SODIUM SERPL-SCNC: 132 MMOL/L
SODIUM SERPL-SCNC: 133 MMOL/L
SODIUM UR-SCNC: 80 MMOL/L
SP GR UR STRIP: 1.01 (ref 1–1.03)
SQUAMOUS #/AREA URNS AUTO: 0 /HPF
URN SPEC COLLECT METH UR: NORMAL
WBC # BLD AUTO: 16.51 K/UL
WBC #/AREA URNS AUTO: 3 /HPF (ref 0–5)

## 2019-01-14 PROCEDURE — 25000003 PHARM REV CODE 250: Performed by: NURSE PRACTITIONER

## 2019-01-14 PROCEDURE — 94761 N-INVAS EAR/PLS OXIMETRY MLT: CPT

## 2019-01-14 PROCEDURE — 63600175 PHARM REV CODE 636 W HCPCS: Performed by: STUDENT IN AN ORGANIZED HEALTH CARE EDUCATION/TRAINING PROGRAM

## 2019-01-14 PROCEDURE — 25000003 PHARM REV CODE 250: Performed by: PSYCHIATRY & NEUROLOGY

## 2019-01-14 PROCEDURE — 83935 ASSAY OF URINE OSMOLALITY: CPT

## 2019-01-14 PROCEDURE — 84300 ASSAY OF URINE SODIUM: CPT

## 2019-01-14 PROCEDURE — 99900035 HC TECH TIME PER 15 MIN (STAT)

## 2019-01-14 PROCEDURE — 27000221 HC OXYGEN, UP TO 24 HOURS

## 2019-01-14 PROCEDURE — 83930 ASSAY OF BLOOD OSMOLALITY: CPT

## 2019-01-14 PROCEDURE — 99900026 HC AIRWAY MAINTENANCE (STAT)

## 2019-01-14 PROCEDURE — 25000242 PHARM REV CODE 250 ALT 637 W/ HCPCS: Performed by: NURSE PRACTITIONER

## 2019-01-14 PROCEDURE — 84100 ASSAY OF PHOSPHORUS: CPT

## 2019-01-14 PROCEDURE — 99233 PR SUBSEQUENT HOSPITAL CARE,LEVL III: ICD-10-PCS | Mod: ,,, | Performed by: PHYSICIAN ASSISTANT

## 2019-01-14 PROCEDURE — 80053 COMPREHEN METABOLIC PANEL: CPT

## 2019-01-14 PROCEDURE — 94003 VENT MGMT INPAT SUBQ DAY: CPT

## 2019-01-14 PROCEDURE — 94640 AIRWAY INHALATION TREATMENT: CPT

## 2019-01-14 PROCEDURE — 99233 SBSQ HOSP IP/OBS HIGH 50: CPT | Mod: ,,, | Performed by: PHYSICIAN ASSISTANT

## 2019-01-14 PROCEDURE — 99900022

## 2019-01-14 PROCEDURE — A4216 STERILE WATER/SALINE, 10 ML: HCPCS | Performed by: PSYCHIATRY & NEUROLOGY

## 2019-01-14 PROCEDURE — 27200966 HC CLOSED SUCTION SYSTEM

## 2019-01-14 PROCEDURE — 25000003 PHARM REV CODE 250: Performed by: STUDENT IN AN ORGANIZED HEALTH CARE EDUCATION/TRAINING PROGRAM

## 2019-01-14 PROCEDURE — 80048 BASIC METABOLIC PNL TOTAL CA: CPT

## 2019-01-14 PROCEDURE — 81001 URINALYSIS AUTO W/SCOPE: CPT

## 2019-01-14 PROCEDURE — 85025 COMPLETE CBC W/AUTO DIFF WBC: CPT

## 2019-01-14 PROCEDURE — 94668 MNPJ CHEST WALL SBSQ: CPT

## 2019-01-14 PROCEDURE — 97110 THERAPEUTIC EXERCISES: CPT

## 2019-01-14 PROCEDURE — 20000000 HC ICU ROOM

## 2019-01-14 PROCEDURE — 83735 ASSAY OF MAGNESIUM: CPT

## 2019-01-14 PROCEDURE — 25000003 PHARM REV CODE 250: Performed by: PHYSICIAN ASSISTANT

## 2019-01-14 RX ORDER — FINASTERIDE 5 MG/1
5 TABLET, FILM COATED ORAL DAILY
Status: DISCONTINUED | OUTPATIENT
Start: 2019-01-14 | End: 2019-01-28 | Stop reason: HOSPADM

## 2019-01-14 RX ORDER — SODIUM CHLORIDE 9 MG/ML
INJECTION, SOLUTION INTRAVENOUS CONTINUOUS
Status: DISCONTINUED | OUTPATIENT
Start: 2019-01-14 | End: 2019-01-16

## 2019-01-14 RX ADMIN — Medication 3 ML: at 02:01

## 2019-01-14 RX ADMIN — SODIUM CHLORIDE: 0.9 INJECTION, SOLUTION INTRAVENOUS at 06:01

## 2019-01-14 RX ADMIN — AMLODIPINE BESYLATE 10 MG: 10 TABLET ORAL at 08:01

## 2019-01-14 RX ADMIN — STANDARDIZED SENNA CONCENTRATE AND DOCUSATE SODIUM 1 TABLET: 8.6; 5 TABLET, FILM COATED ORAL at 09:01

## 2019-01-14 RX ADMIN — DICLOFENAC: 10 GEL TOPICAL at 02:01

## 2019-01-14 RX ADMIN — SODIUM CHLORIDE SOLN NEBU 3% 4 ML: 3 NEBU SOLN at 11:01

## 2019-01-14 RX ADMIN — CHLORHEXIDINE GLUCONATE 0.12% ORAL RINSE 15 ML: 1.2 LIQUID ORAL at 09:01

## 2019-01-14 RX ADMIN — METOPROLOL TARTRATE 100 MG: 100 TABLET ORAL at 02:01

## 2019-01-14 RX ADMIN — FINASTERIDE 5 MG: 5 TABLET, FILM COATED ORAL at 11:01

## 2019-01-14 RX ADMIN — URSODIOL 300 MG: 300 CAPSULE ORAL at 02:01

## 2019-01-14 RX ADMIN — CHLORHEXIDINE GLUCONATE 0.12% ORAL RINSE 15 ML: 1.2 LIQUID ORAL at 02:01

## 2019-01-14 RX ADMIN — SODIUM CHLORIDE SOLN NEBU 3% 4 ML: 3 NEBU SOLN at 07:01

## 2019-01-14 RX ADMIN — METOPROLOL TARTRATE 100 MG: 100 TABLET ORAL at 06:01

## 2019-01-14 RX ADMIN — URSODIOL 300 MG: 300 CAPSULE ORAL at 08:01

## 2019-01-14 RX ADMIN — SODIUM CHLORIDE TAB 1 GM 1 G: 1 TAB at 11:01

## 2019-01-14 RX ADMIN — Medication 3 ML: at 09:01

## 2019-01-14 RX ADMIN — HEPARIN SODIUM 5000 UNITS: 5000 INJECTION, SOLUTION INTRAVENOUS; SUBCUTANEOUS at 09:01

## 2019-01-14 RX ADMIN — SODIUM CHLORIDE SOLN NEBU 3% 4 ML: 3 NEBU SOLN at 04:01

## 2019-01-14 RX ADMIN — Medication 3 ML: at 06:01

## 2019-01-14 RX ADMIN — ALBUTEROL SULFATE 2.5 MG: 2.5 SOLUTION RESPIRATORY (INHALATION) at 03:01

## 2019-01-14 RX ADMIN — FAMOTIDINE 20 MG: 20 TABLET ORAL at 09:01

## 2019-01-14 RX ADMIN — SILODOSIN 4 MG: 4 CAPSULE ORAL at 08:01

## 2019-01-14 RX ADMIN — HEPARIN SODIUM 5000 UNITS: 5000 INJECTION, SOLUTION INTRAVENOUS; SUBCUTANEOUS at 02:01

## 2019-01-14 RX ADMIN — SODIUM CHLORIDE 250 ML: 0.9 INJECTION, SOLUTION INTRAVENOUS at 06:01

## 2019-01-14 RX ADMIN — PRAVASTATIN SODIUM 40 MG: 40 TABLET ORAL at 09:01

## 2019-01-14 RX ADMIN — CHLORHEXIDINE GLUCONATE 0.12% ORAL RINSE 15 ML: 1.2 LIQUID ORAL at 08:01

## 2019-01-14 RX ADMIN — ALBUTEROL SULFATE 2.5 MG: 2.5 SOLUTION RESPIRATORY (INHALATION) at 07:01

## 2019-01-14 RX ADMIN — POLYETHYLENE GLYCOL 3350 17 G: 17 POWDER, FOR SOLUTION ORAL at 08:01

## 2019-01-14 RX ADMIN — SODIUM CHLORIDE SOLN NEBU 3% 4 ML: 3 NEBU SOLN at 03:01

## 2019-01-14 RX ADMIN — DICLOFENAC: 10 GEL TOPICAL at 08:01

## 2019-01-14 RX ADMIN — DICLOFENAC: 10 GEL TOPICAL at 09:01

## 2019-01-14 RX ADMIN — STANDARDIZED SENNA CONCENTRATE AND DOCUSATE SODIUM 1 TABLET: 8.6; 5 TABLET, FILM COATED ORAL at 08:01

## 2019-01-14 RX ADMIN — METOPROLOL TARTRATE 100 MG: 100 TABLET ORAL at 09:01

## 2019-01-14 RX ADMIN — ALBUTEROL SULFATE 2.5 MG: 2.5 SOLUTION RESPIRATORY (INHALATION) at 11:01

## 2019-01-14 RX ADMIN — CHLORHEXIDINE GLUCONATE 0.12% ORAL RINSE 15 ML: 1.2 LIQUID ORAL at 04:01

## 2019-01-14 RX ADMIN — LACOSAMIDE 100 MG: 50 TABLET, FILM COATED ORAL at 08:01

## 2019-01-14 RX ADMIN — URSODIOL 300 MG: 300 CAPSULE ORAL at 09:01

## 2019-01-14 RX ADMIN — LACOSAMIDE 100 MG: 50 TABLET, FILM COATED ORAL at 09:01

## 2019-01-14 RX ADMIN — ALBUTEROL SULFATE 2.5 MG: 2.5 SOLUTION RESPIRATORY (INHALATION) at 08:01

## 2019-01-14 RX ADMIN — HEPARIN SODIUM 5000 UNITS: 5000 INJECTION, SOLUTION INTRAVENOUS; SUBCUTANEOUS at 06:01

## 2019-01-14 RX ADMIN — SODIUM CHLORIDE SOLN NEBU 3% 4 ML: 3 NEBU SOLN at 08:01

## 2019-01-14 RX ADMIN — ALBUTEROL SULFATE 2.5 MG: 2.5 SOLUTION RESPIRATORY (INHALATION) at 04:01

## 2019-01-14 RX ADMIN — POLYETHYLENE GLYCOL 3350 17 G: 17 POWDER, FOR SOLUTION ORAL at 09:01

## 2019-01-14 RX ADMIN — FAMOTIDINE 20 MG: 20 TABLET ORAL at 08:01

## 2019-01-14 NOTE — SUBJECTIVE & OBJECTIVE
Interval History: 1/14: HR range 83 -126, SBP range 138 -196, leukocytosis 17 from 15, Na 132, ow labs stable, exam stable    Medications:  Continuous Infusions:   sodium chloride 0.9% 75 mL/hr at 01/14/19 0800     Scheduled Meds:   albuterol sulfate  2.5 mg Nebulization Q4H    amLODIPine  10 mg Per G Tube Daily    chlorhexidine  15 mL Mouth/Throat QID    diclofenac sodium   Topical (Top) TID    famotidine  20 mg Per NG tube BID    heparin (porcine)  5,000 Units Subcutaneous Q8H    lacosamide  100 mg Per NG tube BID    metoprolol tartrate  100 mg Per NG tube Q8H    polyethylene glycol  17 g Per NG tube BID    pravastatin  40 mg Per NG tube QHS    senna-docusate 8.6-50 mg  1 tablet Per NG tube BID    silodosin  4 mg Per G Tube Daily    sodium chloride 0.9%  3 mL Intravenous Q8H    sodium chloride 3%  4 mL Nebulization Q4H    ursodiol  300 mg Per NG tube TID     PRN Meds:acetaminophen, fentaNYL, hydrALAZINE, magnesium oxide, magnesium oxide, ondansetron, potassium chloride 10%, potassium chloride 10%, potassium chloride 10%, potassium, sodium phosphates, potassium, sodium phosphates, potassium, sodium phosphates       Objective:     Weight: 112.5 kg (248 lb 0.3 oz)  Body mass index is 34.59 kg/m².  Vital Signs (Most Recent):  Temp: 97.5 °F (36.4 °C) (01/14/19 0701)  Pulse: 92 (01/14/19 0807)  Resp: (!) 33 (01/14/19 0807)  BP: (!) 154/108 (01/14/19 0801)  SpO2: 100 % (01/14/19 0807) Vital Signs (24h Range):  Temp:  [97.5 °F (36.4 °C)-99.1 °F (37.3 °C)] 97.5 °F (36.4 °C)  Pulse:  [] 92  Resp:  [14-33] 33  SpO2:  [96 %-100 %] 100 %  BP: (136-196)/() 154/108     Date 01/14/19 0700 - 01/15/19 0659   Shift 5338-6160 0589-7341 0327-6794 24 Hour Total   INTAKE   I.V.(mL/kg) 108.8(1)   108.8(1)   NG/   110   Shift Total(mL/kg) 218.8(1.9)   218.8(1.9)   OUTPUT   Shift Total(mL/kg)       Weight (kg) 112.5 112.5 112.5 112.5              Vent Mode: Spont  Oxygen Concentration (%):  [28-99]  28  Resp Rate Total:  [20 br/min-32 br/min] 20 br/min  PEEP/CPAP:  [5 cmH20] 5 cmH20  Pressure Support:  [5 cmH20] 5 cmH20  Mean Airway Pressure:  [7.1 cmH20-8.8 cmH20] 8.8 cmH20         Gastrostomy/Enterostomy 01/08/19 1441 Percutaneous endoscopic gastrostomy (PEG) midline (Active)   Securement anchored to abdomen w/ adhesive device 1/14/2019  7:01 AM   Interventions Prior to Feeding patency checked;residual checked 1/14/2019  7:01 AM   Drainage serosanguineous 1/10/2019  3:00 PM   Feeding Type continuous 1/14/2019  7:01 AM   Clamp Status/Tolerance unclamped 1/14/2019  7:01 AM   Feeding Action feeding continued 1/14/2019  7:01 AM   Dressing dried drainage 1/14/2019  3:05 AM   Insertion Site no redness;no warmth;no drainage;no tenderness;no swelling 1/14/2019  7:01 AM   Site Care site cleansed w/ sterile normal saline 1/14/2019  7:01 AM   Flush/Irrigation flushed w/;water;no resistance met 1/14/2019  7:01 AM   Dressing Change Due 01/10/19 1/10/2019  3:00 PM   Current Rate (mL/hr) 55 mL/hr 1/14/2019  7:01 AM   Goal Rate (mL/hr) 55 mL/hr 1/14/2019  7:01 AM   Intake (mL) 40 mL 1/14/2019  6:05 AM   Water Bolus (mL) 100 mL 1/13/2019  6:05 AM   Tube Output(mL)(Include Discarded Residual) 60 mL 1/13/2019  2:00 PM   Formula Name impact peptide 1/10/2019  3:00 PM   Tube Feeding Intake (mL) 55 1/14/2019  8:00 AM   Residual Amount (ml) 0 ml 1/11/2019  6:05 AM       Neurosurgery Physical Exam     E4VTM5, grimacing to stimulation, tracking  PERRL  MOORE spontaneously  Trach and PEG in place      Significant Labs:  Recent Labs   Lab 01/13/19  0326 01/14/19  0313   * 119*   * 132*   K 4.0 4.9    102   CO2 26 21*   BUN 22 20   CREATININE 0.5 0.5   CALCIUM 8.8 8.8   MG 1.9 1.9     Recent Labs   Lab 01/13/19  0326 01/14/19  0313   WBC 14.57* 16.51*   HGB 9.3* 9.6*   HCT 29.4* 30.7*    213     Recent Labs   Lab 01/12/19  1213   INR 1.0     Microbiology Results (last 7 days)     Procedure Component Value Units  Date/Time    Blood culture [[id number]] Collected:  01/03/19 1107    Order Status:  Completed Specimen:  Blood from Peripheral, Foot, Left Updated:  01/08/19 1412     Blood Culture, Routine No growth after 5 days.    Narrative:       Blood cultures x 2 different sites. 4 bottles total. Please  draw cultures before administering antibiotics.    Blood culture [[id number]] Collected:  01/03/19 1100    Order Status:  Completed Specimen:  Blood from Peripheral, Hand, Left Updated:  01/08/19 1412     Blood Culture, Routine No growth after 5 days.    Narrative:       Blood cultures from 2 different sites. 4 bottles total.  Please draw before starting antibiotics.

## 2019-01-14 NOTE — PLAN OF CARE
ALFONSO spoke with Julia with [facility]. She reported she is submitting to the insurance again this AM for hopes of getting auth today vs early tomorrow. They have a bed for when they get it.    Caroline Soares LMSW  Neurocritical Care   [facility]  [id number]

## 2019-01-14 NOTE — PROGRESS NOTES
[facility]  Neurosurgery  Progress Note    Subjective:     History of Present Illness: 62 yo M with PMH of CAD, CABG in 2006, DM, HTN, HLD on ASA presents to the ED for AMS. Patient fell 4 days ago with about 2 minutes of LOC. Family is unsure of what caused the fall. Full workup done including a CTH at [facility] that was negative. Patient went home and stopped taking his ASA. This morning, his family noticed he was more confused. He went to [facility] and CTH was done that revealed a 1.7cm left frontalpariental SDH with a 5 mm right sided midline shift and a left SAH. Patient was transferred to [facility] [place]. History was obtained per family as patient unable to give history 2/2 confusion with expressive and receptive aphasia.    Post-Op Info:  Procedure(s) (LRB):  INSERTION, PEG TUBE (N/A)  CREATION, TRACHEOSTOMY (N/A)   5 Days Post-Op     Interval History: NAEON, labs stable, exam stable    Medications:  Continuous Infusions:  Scheduled Meds:   albuterol sulfate  2.5 mg Nebulization Q4H    [START ON 1/14/2019] amLODIPine  10 mg Per G Tube Daily    chlorhexidine  15 mL Mouth/Throat QID    diclofenac sodium   Topical (Top) TID    famotidine  20 mg Per NG tube BID    heparin (porcine)  5,000 Units Subcutaneous Q8H    lacosamide  100 mg Per NG tube BID    metoprolol tartrate  100 mg Per NG tube Q8H    polyethylene glycol  17 g Per NG tube BID    pravastatin  40 mg Per NG tube QHS    senna-docusate 8.6-50 mg  1 tablet Per NG tube BID    silodosin  4 mg Per G Tube Daily    sodium chloride 0.9%  3 mL Intravenous Q8H    sodium chloride 3%  4 mL Nebulization Q4H    ursodiol  300 mg Per NG tube TID     PRN Meds:acetaminophen, fentaNYL, hydrALAZINE, magnesium oxide, magnesium oxide, ondansetron, potassium chloride 10%, potassium chloride 10%, potassium chloride 10%, potassium, sodium phosphates, potassium, sodium phosphates, potassium, sodium phosphates       Objective:     Weight: 112.5 kg (248 lb 0.3  oz)  Body mass index is 34.59 kg/m².  Vital Signs (Most Recent):  Temp: 98.1 °F (36.7 °C) (01/13/19 1905)  Pulse: 83 (01/13/19 2205)  Resp: (!) 27 (01/13/19 2205)  BP: (!) 146/104 (01/13/19 2205)  SpO2: 98 % (01/13/19 2205) Vital Signs (24h Range):  Temp:  [98.1 °F (36.7 °C)-98.6 °F (37 °C)] 98.1 °F (36.7 °C)  Pulse:  [] 83  Resp:  [14-31] 27  SpO2:  [96 %-100 %] 98 %  BP: (146-196)/() 146/104     Date 01/13/19 0700 - 01/14/19 0659   Shift 2604-6862 9440-7196 2199-5130 24 Hour Total   INTAKE   NG/ 220  660   IV Piggyback 500   500   Shift Total(mL/kg) 940(8.4) 220(2)  1160(10.3)   OUTPUT   Urine(mL/kg/hr) 1100(1.2) 600  1700   Drains 60   60   Shift Total(mL/kg) 1160(10.3) 600(5.3)  1760(15.6)   Weight (kg) 112.5 112.5 112.5 112.5              Vent Mode: Spont  Oxygen Concentration (%):  [28-99] 28  Resp Rate Total:  [18 br/min-50 br/min] 24 br/min  PEEP/CPAP:  [5 cmH20] 5 cmH20  Pressure Support:  [5 cmH20] 5 cmH20  Mean Airway Pressure:  [7 cmH20-7.3 cmH20] 7.3 cmH20         Gastrostomy/Enterostomy 01/08/19 1441 Percutaneous endoscopic gastrostomy (PEG) midline (Active)   Securement anchored to abdomen w/ adhesive device 1/11/2019 11:00 AM   Interventions Prior to Feeding patency checked;residual checked;residual returned 1/11/2019 11:00 AM   Drainage serosanguineous 1/10/2019  3:00 PM   Feeding Type continuous 1/11/2019 11:00 AM   Clamp Status/Tolerance unclamped 1/11/2019 11:00 AM   Feeding Action feeding continued 1/11/2019 11:00 AM   Dressing dried drainage 1/11/2019 11:00 AM   Insertion Site dry;no redness;no warmth;no drainage;no tenderness;no swelling 1/11/2019 11:00 AM   Site Care site cleansed w/ soap and water;sterile precut T-slit dressing applied 1/11/2019 11:00 AM   Flush/Irrigation flushed w/;water;no resistance met 1/11/2019 11:00 AM   Dressing Change Due 01/10/19 1/10/2019  3:00 PM   Current Rate (mL/hr) 55 mL/hr 1/11/2019  7:00 AM   Goal Rate (mL/hr) 55 mL/hr 1/11/2019  7:00 AM    Intake (mL) 40 mL 1/10/2019  9:05 PM   Water Bolus (mL) 250 mL 1/11/2019  8:00 AM   Formula Name impact peptide 1/10/2019  3:00 PM   Tube Feeding Intake (mL) 55 1/11/2019  1:00 PM   Residual Amount (ml) 0 ml 1/11/2019  6:05 AM       Neurosurgery Physical Exam       E4VTM5, grimacing to stimulation, tracking  PERRL  MOORE spontaneously  Trach and PEG in place    Significant Labs:  Recent Labs   Lab 01/12/19  0422 01/13/19  0326   *  112*  112* 123*     137  137 135*   K 4.4  4.4  4.4 4.0     106  106 103   CO2 24  24  24 26   BUN 23  23  23 22   CREATININE 0.5  0.5  0.5 0.5   CALCIUM 8.5*  8.5*  8.5* 8.8   MG 1.8  1.8  1.8 1.9     Recent Labs   Lab 01/12/19  0422 01/13/19  0326   WBC 14.49*  14.49*  14.49* 14.57*   HGB 9.6*  9.6*  9.6* 9.3*   HCT 30.7*  30.7*  30.7* 29.4*     297  297 278     Recent Labs   Lab 01/12/19  1213   INR 1.0     Microbiology Results (last 7 days)     Procedure Component Value Units Date/Time    Blood culture [[id number]] Collected:  01/03/19 1107    Order Status:  Completed Specimen:  Blood from Peripheral, Foot, Left Updated:  01/08/19 1412     Blood Culture, Routine No growth after 5 days.    Narrative:       Blood cultures x 2 different sites. 4 bottles total. Please  draw cultures before administering antibiotics.    Blood culture [[id number]] Collected:  01/03/19 1100    Order Status:  Completed Specimen:  Blood from Peripheral, Hand, Left Updated:  01/08/19 1412     Blood Culture, Routine No growth after 5 days.    Narrative:       Blood cultures from 2 different sites. 4 bottles total.  Please draw before starting antibiotics.        Review of Systems        Assessment/Plan:     * SDH (subdural hematoma)    64 yo male with L aSDH now s/p craniotomy for evacuation (12/20).    --Continue care per primary team.  --Patient is stable for discharge from a neurosurgical perspective  --Continue  neurochecks while in house  --Further care per  NCC     --Dispo pending LTAC placement              Dustin Chandra MD  Neurosurgery  [facility]

## 2019-01-14 NOTE — PLAN OF CARE
01/14/19 1524   Discharge Reassessment   Assessment Type Discharge Planning Reassessment   Provided patient/caregiver education on the expected discharge date and the discharge plan No   Do you have any problems affording any of your prescribed medications? No   Discharge Plan A Long-term acute care facility (LTAC)   Discharge Plan B Skilled Nursing Facility   DME Needed Upon Discharge  (mamadou)   Patient choice form signed by patient/caregiver No   Anticipated Discharge Disposition LTAC   Can the patient answer the patient profile reliably? No, cognitively impaired   How does the patient rate their overall health at the present time? (mamadou)   Describe the patient's ability to walk at the present time. Does not walk or unable to take any steps at all   How often would a person be available to care for the patient? Occasionally   Number of comorbid conditions (as recorded on the chart) Four   During the past month, has the patient often been bothered by feeling down, depressed or hopeless? (mamadou)   During the past month, has the patient often been bothered by little interest or pleasure in doing things? (mamadou)   Post-Acute Status   Post-Acute Authorization Placement   Post-Acute Placement Status Delay between Facility and Payor  ([facility] will resubmit today for auth for LTAC)       Azeb Flores RN, CCRN-K, [facility]  Neuro-Critical Care   [phone]

## 2019-01-14 NOTE — SUBJECTIVE & OBJECTIVE
Review of Systems  Unable to obtain a complete ROS due to trach, nonverbal  Objective:     Vitals:  Temp: 97.7 °F (36.5 °C)  Pulse: 80  Rhythm: atrial rhythm  BP: (!) 153/83  MAP (mmHg): 112  Resp: (!) 25  SpO2: 98 %  Oxygen Concentration (%): 100  O2 Device (Oxygen Therapy): Trach Collar  Vent Mode: Spont  Pressure Support: 5 cmH20  PEEP/CPAP: 5 cmH20  Peak Airway Pressure: 13 cmH2O  Mean Airway Pressure: 8.8 cmH20  Plateau Pressure: 0 cmH20    Temp  Min: 97.5 °F (36.4 °C)  Max: 99.1 °F (37.3 °C)  Pulse  Min: 75  Max: 126  BP  Min: 136/105  Max: 196/111  MAP (mmHg)  Min: 106  Max: 149  Resp  Min: 20  Max: 33  SpO2  Min: 96 %  Max: 100 %  Oxygen Concentration (%)  Min: 28  Max: 100    01/13 0701 - 01/14 0700  In: 2128.8 [I.V.:33.8]  Out: 3440 [Urine:3380; Drains:60]   Unmeasured Output  Stool Occurrence: 0       Physical Exam  Physical Exam:  GA: Awake, comfortable, no acute distress.   HEENT: No scleral icterus or JVD.   Pulmonary: Clear to auscultation Anteriorly. No wheezing, crackles, or rhonchi.  Cardiac: RRR S1 & S2 w/o rubs/murmurs/gallops.   Abdominal: Bowel sounds present x 4. No appreciable hepatosplenomegaly.  Skin: No jaundice, rashes, or visible lesions.  Neuro:  --GCS: E4 V1t M5  --Mental Status:  Awake, alert, trach, doesn't follow commands  --Pupils 4mm, PERRL.   --cough, gag, corneal reflexes intact  --moves all extremities spontaneously  Unable to test orientation, language, memory, judgment, insight, fund of knowledge, shoulder shrug, tongue protrusion, coordination, gait    Medications:  Continuous  sodium chloride 0.9% Last Rate: 75 mL/hr at 01/14/19 1600   Scheduled  albuterol sulfate 2.5 mg Q4H   amLODIPine 10 mg Daily   chlorhexidine 15 mL QID   diclofenac sodium  TID   famotidine 20 mg BID   finasteride 5 mg Daily   heparin (porcine) 5,000 Units Q8H   lacosamide 100 mg BID   metoprolol tartrate 100 mg Q8H   polyethylene glycol 17 g BID   pravastatin 40 mg QHS   senna-docusate 8.6-50 mg 1  tablet BID   silodosin 4 mg Daily   sodium chloride 0.9% 3 mL Q8H   sodium chloride 3% 4 mL Q4H   ursodiol 300 mg TID   PRN  acetaminophen 650 mg Q6H PRN   fentaNYL 25 mcg Q2H PRN   hydrALAZINE 10 mg Q4H PRN   magnesium oxide 800 mg PRN   magnesium oxide 800 mg PRN   ondansetron 4 mg Q12H PRN   potassium chloride 10% 40 mEq PRN   potassium chloride 10% 40 mEq PRN   potassium chloride 10% 60 mEq PRN   potassium, sodium phosphates 2 packet PRN   potassium, sodium phosphates 2 packet PRN   potassium, sodium phosphates 2 packet PRN     Today I personally reviewed pertinent medications, lines/drains/airways, imaging, cardiology results, laboratory results, microbiology results,     Diet  No diet orders on file  No diet orders on file

## 2019-01-14 NOTE — PLAN OF CARE
Problem: Adult Inpatient Plan of Care  Goal: Plan of Care Review  Outcome: Ongoing (interventions implemented as appropriate)  POC reviewed with pt and wife. Pt unable to verbalize understanding d/t trach. Wife verbalized understanding. Questions and concerns addressed. Pt tolerating tach collar during the day. No acute events. Will continue to monitor.

## 2019-01-14 NOTE — SUBJECTIVE & OBJECTIVE
Interval History: NAEON, labs stable, exam stable    Medications:  Continuous Infusions:  Scheduled Meds:   albuterol sulfate  2.5 mg Nebulization Q4H    [START ON 1/14/2019] amLODIPine  10 mg Per G Tube Daily    chlorhexidine  15 mL Mouth/Throat QID    diclofenac sodium   Topical (Top) TID    famotidine  20 mg Per NG tube BID    heparin (porcine)  5,000 Units Subcutaneous Q8H    lacosamide  100 mg Per NG tube BID    metoprolol tartrate  100 mg Per NG tube Q8H    polyethylene glycol  17 g Per NG tube BID    pravastatin  40 mg Per NG tube QHS    senna-docusate 8.6-50 mg  1 tablet Per NG tube BID    silodosin  4 mg Per G Tube Daily    sodium chloride 0.9%  3 mL Intravenous Q8H    sodium chloride 3%  4 mL Nebulization Q4H    ursodiol  300 mg Per NG tube TID     PRN Meds:acetaminophen, fentaNYL, hydrALAZINE, magnesium oxide, magnesium oxide, ondansetron, potassium chloride 10%, potassium chloride 10%, potassium chloride 10%, potassium, sodium phosphates, potassium, sodium phosphates, potassium, sodium phosphates       Objective:     Weight: 112.5 kg (248 lb 0.3 oz)  Body mass index is 34.59 kg/m².  Vital Signs (Most Recent):  Temp: 98.1 °F (36.7 °C) (01/13/19 1905)  Pulse: 83 (01/13/19 2205)  Resp: (!) 27 (01/13/19 2205)  BP: (!) 146/104 (01/13/19 2205)  SpO2: 98 % (01/13/19 2205) Vital Signs (24h Range):  Temp:  [98.1 °F (36.7 °C)-98.6 °F (37 °C)] 98.1 °F (36.7 °C)  Pulse:  [] 83  Resp:  [14-31] 27  SpO2:  [96 %-100 %] 98 %  BP: (146-196)/() 146/104     Date 01/13/19 0700 - 01/14/19 0659   Shift 8329-5787 4026-5001 5134-7496 24 Hour Total   INTAKE   NG/ 220  660   IV Piggyback 500   500   Shift Total(mL/kg) 940(8.4) 220(2)  1160(10.3)   OUTPUT   Urine(mL/kg/hr) 1100(1.2) 600  1700   Drains 60   60   Shift Total(mL/kg) 1160(10.3) 600(5.3)  1760(15.6)   Weight (kg) 112.5 112.5 112.5 112.5              Vent Mode: Spont  Oxygen Concentration (%):  [28-99] 28  Resp Rate Total:  [18 br/min-50  br/min] 24 br/min  PEEP/CPAP:  [5 cmH20] 5 cmH20  Pressure Support:  [5 cmH20] 5 cmH20  Mean Airway Pressure:  [7 cmH20-7.3 cmH20] 7.3 cmH20         Gastrostomy/Enterostomy 01/08/19 1441 Percutaneous endoscopic gastrostomy (PEG) midline (Active)   Securement anchored to abdomen w/ adhesive device 1/11/2019 11:00 AM   Interventions Prior to Feeding patency checked;residual checked;residual returned 1/11/2019 11:00 AM   Drainage serosanguineous 1/10/2019  3:00 PM   Feeding Type continuous 1/11/2019 11:00 AM   Clamp Status/Tolerance unclamped 1/11/2019 11:00 AM   Feeding Action feeding continued 1/11/2019 11:00 AM   Dressing dried drainage 1/11/2019 11:00 AM   Insertion Site dry;no redness;no warmth;no drainage;no tenderness;no swelling 1/11/2019 11:00 AM   Site Care site cleansed w/ soap and water;sterile precut T-slit dressing applied 1/11/2019 11:00 AM   Flush/Irrigation flushed w/;water;no resistance met 1/11/2019 11:00 AM   Dressing Change Due 01/10/19 1/10/2019  3:00 PM   Current Rate (mL/hr) 55 mL/hr 1/11/2019  7:00 AM   Goal Rate (mL/hr) 55 mL/hr 1/11/2019  7:00 AM   Intake (mL) 40 mL 1/10/2019  9:05 PM   Water Bolus (mL) 250 mL 1/11/2019  8:00 AM   Formula Name impact peptide 1/10/2019  3:00 PM   Tube Feeding Intake (mL) 55 1/11/2019  1:00 PM   Residual Amount (ml) 0 ml 1/11/2019  6:05 AM       Neurosurgery Physical Exam       E4VTM5, grimacing to stimulation, tracking  PERRL  MOORE spontaneously  Trach and PEG in place    Significant Labs:  Recent Labs   Lab 01/12/19  0422 01/13/19  0326   *  112*  112* 123*     137  137 135*   K 4.4  4.4  4.4 4.0     106  106 103   CO2 24  24  24 26   BUN 23  23  23 22   CREATININE 0.5  0.5  0.5 0.5   CALCIUM 8.5*  8.5*  8.5* 8.8   MG 1.8  1.8  1.8 1.9     Recent Labs   Lab 01/12/19  0422 01/13/19  0326   WBC 14.49*  14.49*  14.49* 14.57*   HGB 9.6*  9.6*  9.6* 9.3*   HCT 30.7*  30.7*  30.7* 29.4*     297  297 278     Recent  Labs   Lab 01/12/19  1213   INR 1.0     Microbiology Results (last 7 days)     Procedure Component Value Units Date/Time    Blood culture [[id number]] Collected:  01/03/19 1107    Order Status:  Completed Specimen:  Blood from Peripheral, Foot, Left Updated:  01/08/19 1412     Blood Culture, Routine No growth after 5 days.    Narrative:       Blood cultures x 2 different sites. 4 bottles total. Please  draw cultures before administering antibiotics.    Blood culture [[id number]] Collected:  01/03/19 1100    Order Status:  Completed Specimen:  Blood from Peripheral, Hand, Left Updated:  01/08/19 1412     Blood Culture, Routine No growth after 5 days.    Narrative:       Blood cultures from 2 different sites. 4 bottles total.  Please draw before starting antibiotics.        Review of Systems

## 2019-01-14 NOTE — PLAN OF CARE
Problem: Adult Inpatient Plan of Care  Goal: Plan of Care Review  Outcome: Ongoing (interventions implemented as appropriate)  No acute events throughout the day, VS and assessment per flow sheet, patient progressing towards goal as tolerated. Plan of care reviewed with Peewee Nunes and family, all concerns addressed. Will continue to monitor.

## 2019-01-14 NOTE — PT/OT/SLP PROGRESS
Physical Therapy      Patient Name:  Peewee Nunes   MRN:  [id number]    Discussed pt case with OT Meliza. Pt remains with decreased command-following, limiting skilled interventions and progression of mobility. Pt appropriate for one skilled discipline only at this time. PT orders discontinued. OT to continue to follow and will alert PT when pt appropriate for skilled acute PT services.     Marta Torres, PT, DPT   1/14/2019  [phone]

## 2019-01-14 NOTE — ASSESSMENT & PLAN NOTE
Ref. Range 1/12/2019 04:22 1/12/2019 04:22 1/13/2019 03:26 1/14/2019 03:13 1/14/2019 10:41   Sodium Latest Ref Range: 136 - 145 mmol/L 137 137 135 (L) 132 (L) 133 (L)     Urine Na, Urine osm, serum osm, UA pending  On NS at 75

## 2019-01-14 NOTE — PROGRESS NOTES
"[facility]  Adult Nutrition  Progress Note    SUMMARY       Recommendations    Recommendation/Intervention:   Continue Impact Peptide @ 55mL/hr.   - Provides 1980kcals, 124g protein and 1016mL free water.   - Hold for residuals >500mL.     RD to monitor.    Goals: Pt to receive nutrition by RD follow up  Nutrition Goal Status: goal met  Communication of RD Recs: reviewed with RN    Reason for Assessment    Reason For Assessment: RD follow-up  Diagnosis: other (see comments)(SDH)  Relevant Medical History: CAD, DM, HTN, HLD  Interdisciplinary Rounds: attended  General Information Comments: Pt s/p trach/PEG. Tolerating trach collar daily with vent at night. TF restarted, tolerating at goal rate without residuals. NFPE completed on 12/21- updated 1/14- pt continues to show signs of malnutrition with severe muscle and fat depletion.   Nutrition Discharge Planning: optimal nutrition via PEG with tolerance    Nutrition Risk Screen    Nutrition Risk Screen: tube feeding or parenteral nutrition    Nutrition/Diet History    Spiritual, Cultural Beliefs, Shinto Practices, Values that Affect Care: no  Factors Affecting Nutritional Intake: NPO    Anthropometrics    Temp: 97.5 °F (36.4 °C)  Height Method: Measured  Height: 5' 11" (180.3 cm)  Height (inches): 71 in  Weight Method: Bed Scale  Weight: 112.5 kg (248 lb 0.3 oz)  Weight (lb): 248.02 lb  Ideal Body Weight (IBW), Male: 172 lb  % Ideal Body Weight, Male (lb): 110.49 lb  BMI (Calculated): 26.6  BMI Grade: 25 - 29.9 - overweight  Weight Loss: unintentional  Usual Body Weight (UBW), kg: (!) 153.1 kg  % Usual Body Weight: 56.11  % Weight Change From Usual Weight: -44 %       Lab/Procedures/Meds    Pertinent Labs Reviewed: reviewed  Pertinent Labs Comments: noted  Pertinent Medications Reviewed: reviewed  Pertinent Medications Comments: IVF, heparin    Estimated/Assessed Needs    Weight Used For Calorie Calculations: 85.7 kg (188 lb 15 oz)  Energy " Calorie Requirements (kcal): 2080  Energy Need Method: [place]-St Jeor(PAL 1.25)  Protein Requirements: 103-129g(1.2-1.5g/kg)  Weight Used For Protein Calculations: 85.7 kg (188 lb 15 oz)  Fluid Requirements (mL): 1mL/kcal or per MD     RDA Method (mL): 2080         Nutrition Prescription Ordered    Current Diet Order: NPO  Nutrition Order Comments: TF at goal  Current Nutrition Support Formula Ordered: Impact Peptide 1.5  Current Nutrition Support Rate Ordered: 55 (ml)  Current Nutrition Support Frequency Ordered: mL/hr    Evaluation of Received Nutrient/Fluid Intake    Enteral Calories (kcal): 1980  Enteral Protein (gm): 124  Enteral (Free Water) Fluid (mL): 1016    % Kcal Needs: 95  % Protein Needs: 100    IV Fluid (mL): 1800  I/O: +I/O, good UOP, LBM 1/14    Energy Calories Required: meeting needs  Protein Required: meeting needs  Fluid Required: (per MD)    Comments: 0mL residuals 1/11  Tolerance: tolerating    % Intake of Estimated Energy Needs: 75 - 100 %  % Meal Intake: NPO    Nutrition Risk    Level of Risk/Frequency of Follow-up: (f/u 1x/week)     Assessment and Plan    Severe malnutrition    Malnutrition in the context of Chronic Illness/Injury    Related to (etiology):  Decreased intake, appetite    Signs and Symptoms (as evidenced by):  Energy Intake: <75% of estimated energy requirement for > 1 year  Body Fat Depletion: severe depletion of orbitals, triceps and thoracic and lumbar region   Muscle Mass Depletion: severe depletion of temples, clavicle region, scapular region, interosseous muscle and lower extremities   Weight Loss: 44% x 1 year     Interventions/Recommendations (treatment strategy):  Continue enteral nutrition    Nutrition Diagnosis Status:  Continues              Monitor and Evaluation    Food and Nutrient Intake: energy intake, food and beverage intake, enteral nutrition intake  Food and Nutrient Adminstration: enteral and parenteral nutrition administration, diet order  Anthropometric  Measurements: weight, weight change, body mass index  Biochemical Data, Medical Tests and Procedures: electrolyte and renal panel, glucose/endocrine profile, gastrointestinal profile, inflammatory profile, lipid profile  Nutrition-Focused Physical Findings: overall appearance     Malnutrition Assessment  Malnutrition Type: chronic illness  Energy Intake: severe energy intake          Weight Loss (Malnutrition): greater than 20% in 1 year  Energy Intake (Malnutrition): less than 75% for greater than or equal to 3 months  Subcutaneous Fat (Malnutrition): severe depletion  Muscle Mass (Malnutrition): severe depletion   Orbital Region (Subcutaneous Fat Loss): moderate depletion  Upper Arm Region (Subcutaneous Fat Loss): severe depletion  Thoracic and Lumbar Region: severe depletion   [place] Region (Muscle Loss): moderate depletion  Clavicle Bone Region (Muscle Loss): severe depletion  Clavicle and Acromion Bone Region (Muscle Loss): severe depletion  Scapular Bone Region (Muscle Loss): severe depletion  Dorsal Hand (Muscle Loss): moderate depletion  Patellar Region (Muscle Loss): severe depletion  Anterior Thigh Region (Muscle Loss): severe depletion  Posterior Calf Region (Muscle Loss): severe depletion       Subcutaneous Fat Loss (Final Summary): severe protein-calorie malnutrition  Muscle Loss Evaluation (Final Summary): severe protein-calorie malnutrition    Moderate Weight Loss (Malnutrition): other (see comments)  Severe Weight Loss (Malnutrition): greater than 20% in 1 year    Nutrition Follow-Up    RD Follow-up?: Yes

## 2019-01-14 NOTE — PLAN OF CARE
Problem: Occupational Therapy Goal  Goal: Occupational Therapy Goal  Goals set 1/12 to be addressed for 14 days with expiration date, 1/26:  Patient will increase functional independence with ADLs by performing:    Patient will demonstrate rolling to the right with max assist.  Not met   Patient will demonstrate rolling to the left with max assist.   Not met  Patient will demonstrate grooming while upright in bed with max assist.   Not met  Patient will demonstrate ability to follow 1/3 one step commands.   Not met  Patient's family / caregiver will demonstrate independence with providing ROM and changes in bed positioning.   Not met           Goals remain appropriate.  ASHLEY Oliva  1/14/2019

## 2019-01-14 NOTE — ASSESSMENT & PLAN NOTE
64 yo male with L aSDH now s/p craniotomy for evacuation (12/20).    --Continue care per primary team.  --Patient is stable for discharge from a neurosurgical perspective  --Continue neurochecks while in house  --Further care per NCC   --Dispo pending LTAC placement

## 2019-01-14 NOTE — PT/OT/SLP PROGRESS
Occupational Therapy   Treatment    Name: Peewee Nunes  MRN: [id number]  Admitting Diagnosis:  SDH (subdural hematoma)  6 Days Post-Op    Recommendations:     Discharge Recommendations: LTACH (long-term acute care hospital)  Discharge Equipment Recommendations:  hospital bed, lift device  Barriers to discharge:  Inaccessible home environment, Decreased caregiver support    Assessment:     Peewee Nunes is a 63 y.o. male with a medical diagnosis of SDH (subdural hematoma).  He presents with performance deficits affecting function are weakness, impaired self care skills, impaired balance, decreased coordination, decreased safety awareness, decreased ROM, impaired endurance, impaired functional mobilty, decreased upper extremity function, impaired sensation, gait instability, impaired cognition, decreased lower extremity function, abnormal tone, impaired fine motor, impaired cardiopulmonary response to activity, impaired coordination.     Rehab Prognosis:  Fair; patient would benefit from acute skilled OT services to address these deficits and reach maximum level of function.       Plan:     Patient to be seen 3 x/week to address the above listed problems via self-care/home management, neuromuscular re-education, therapeutic activities, therapeutic exercises, cognitive retraining  · Plan of Care Expires: 02/09/19  · Plan of Care Reviewed with: patient    Subjective     Pain/Comfort:  · Pain Rating 1: 0/10  · Pain Rating Post-Intervention 1: 0/10    Objective:     Communicated with: Nurse prior to session.  Patient found supine with bed alarm, blood pressure cuff, telemetry, SCD, tracheostomy, PEG Tube, pressure relief boots, peripheral IV, PICC line, pulse ox (continuous) upon OT entry to room.    General Precautions: Standard, aphasia, NPO, fall, aspiration   Orthopedic Precautions:N/A   Braces: N/A     Occupational Performance:     Bed Mobility:    · Patient completed Rolling/Turning to Left with  total  assistance  · Patient completed Rolling/Turning to Right with total assistance     Functional Mobility/Transfers:  · Dependent drawsheet transfers    Activities of Daily Living:  · Feeding:  NPO    · Grooming: total assistance while supine      [facility] 6 Click ADL: 6    Treatment & Education:  Patient education provided on role of OT, daily orientation, ROM, positioning and need for continued OT upon discharge.  Continued education, patient/ family training recommended. Daily orientation provided.  PROM performed bilateral UE/LEs one set x 10 rep in all planes of motion with stretches provided at end range; sustained stretch provided for right UE external rotation, supination and shoulder flexion as well as bilateral LE ankle dorsiflexion.  Assistance and facilitation provided for upward rotation of the scapula during shoulder flexion and abduction.   Patient actively moving bilateral hip internal/external rotation.  Patient alert 100% of the session and tracking bilaterally.  Patient did not follow or model one step commands during the session.   Positioning provided for midline orientation with bilateral UEs elevated and heels lifted off mattress.  Gentle cervical rotation provided.   Family not present during the session.  Patient's functional status and disposition recommendation discussed with patient's nurse.  White board updated in patient's room.      Patient left supine with all lines intact and call button in reachEducation:      GOALS:   Multidisciplinary Problems     Occupational Therapy Goals        Problem: Occupational Therapy Goal    Goal Priority Disciplines Outcome Interventions   Occupational Therapy Goal     OT, PT/OT     Description:  Goals set 1/12 to be addressed for 14 days with expiration date, 1/26:  Patient will increase functional independence with ADLs by performing:    Patient will demonstrate rolling to the right with max assist.  Not met   Patient will demonstrate rolling to the left  with max assist.   Not met  Patient will demonstrate grooming while upright in bed with max assist.   Not met  Patient will demonstrate ability to follow 1/3 one step commands.   Not met  Patient's family / caregiver will demonstrate independence with providing ROM and changes in bed positioning.   Not met                            Time Tracking:     OT Date of Treatment: 01/14/19  OT Start Time: 0502  OT Stop Time: 0516  OT Total Time (min): 14 min    Billable Minutes:Therapeutic Exercise 14    ASHLEY Oliva  1/14/2019

## 2019-01-14 NOTE — PROGRESS NOTES
[facility]  Neurosurgery  Progress Note    Subjective:     History of Present Illness: 62 yo M with PMH of CAD, CABG in 2006, DM, HTN, HLD on ASA presents to the ED for AMS. Patient fell 4 days ago with about 2 minutes of LOC. Family is unsure of what caused the fall. Full workup done including a CTH at [facility] that was negative. Patient went home and stopped taking his ASA. This morning, his family noticed he was more confused. He went to [facility] and CTH was done that revealed a 1.7cm left frontalpariental SDH with a 5 mm right sided midline shift and a left SAH. [place] [facility] [place] was obtained per family as patient unable to give history 2/2 confusion with expressive and receptive aphasia.    Post-Op Info:  Procedure(s) (LRB):  INSERTION, PEG TUBE (N/A)  CREATION, TRACHEOSTOMY (N/A)   6 Days Post-Op     Interval History: 1/14: HR range 83 -126, SBP range 138 -196, leukocytosis 17 from 15, Na 132, ow labs stable, exam stable    Medications:  Continuous Infusions:   sodium chloride 0.9% 75 mL/hr at 01/14/19 0800     Scheduled Meds:   albuterol sulfate  2.5 mg Nebulization Q4H    amLODIPine  10 mg Per G Tube Daily    chlorhexidine  15 mL Mouth/Throat QID    diclofenac sodium   Topical (Top) TID    famotidine  20 mg Per NG tube BID    heparin (porcine)  5,000 Units Subcutaneous Q8H    lacosamide  100 mg Per NG tube BID    metoprolol tartrate  100 mg Per NG tube Q8H    polyethylene glycol  17 g Per NG tube BID    pravastatin  40 mg Per NG tube QHS    senna-docusate 8.6-50 mg  1 tablet Per NG tube BID    silodosin  4 mg Per G Tube Daily    sodium chloride 0.9%  3 mL Intravenous Q8H    sodium chloride 3%  4 mL Nebulization Q4H    ursodiol  300 mg Per NG tube TID     PRN Meds:acetaminophen, fentaNYL, hydrALAZINE, magnesium oxide, magnesium oxide, ondansetron, potassium chloride 10%, potassium chloride 10%, potassium chloride 10%, potassium, sodium phosphates,  potassium, sodium phosphates, potassium, sodium phosphates       Objective:     Weight: 112.5 kg (248 lb 0.3 oz)  Body mass index is 34.59 kg/m².  Vital Signs (Most Recent):  Temp: 97.5 °F (36.4 °C) (01/14/19 0701)  Pulse: 92 (01/14/19 0807)  Resp: (!) 33 (01/14/19 0807)  BP: (!) 154/108 (01/14/19 0801)  SpO2: 100 % (01/14/19 0807) Vital Signs (24h Range):  Temp:  [97.5 °F (36.4 °C)-99.1 °F (37.3 °C)] 97.5 °F (36.4 °C)  Pulse:  [] 92  Resp:  [14-33] 33  SpO2:  [96 %-100 %] 100 %  BP: (136-196)/() 154/108     Date 01/14/19 0700 - 01/15/19 0659   Shift 2698-5461 9028-3520 0153-6957 24 Hour Total   INTAKE   I.V.(mL/kg) 108.8(1)   108.8(1)   NG/   110   Shift Total(mL/kg) 218.8(1.9)   218.8(1.9)   OUTPUT   Shift Total(mL/kg)       Weight (kg) 112.5 112.5 112.5 112.5              Vent Mode: Spont  Oxygen Concentration (%):  [28-99] 28  Resp Rate Total:  [20 br/min-32 br/min] 20 br/min  PEEP/CPAP:  [5 cmH20] 5 cmH20  Pressure Support:  [5 cmH20] 5 cmH20  Mean Airway Pressure:  [7.1 cmH20-8.8 cmH20] 8.8 cmH20         Gastrostomy/Enterostomy 01/08/19 1441 Percutaneous endoscopic gastrostomy (PEG) midline (Active)   Securement anchored to abdomen w/ adhesive device 1/14/2019  7:01 AM   Interventions Prior to Feeding patency checked;residual checked 1/14/2019  7:01 AM   Drainage serosanguineous 1/10/2019  3:00 PM   Feeding Type continuous 1/14/2019  7:01 AM   Clamp Status/Tolerance unclamped 1/14/2019  7:01 AM   Feeding Action feeding continued 1/14/2019  7:01 AM   Dressing dried drainage 1/14/2019  3:05 AM   Insertion Site no redness;no warmth;no drainage;no tenderness;no swelling 1/14/2019  7:01 AM   Site Care site cleansed w/ sterile normal saline 1/14/2019  7:01 AM   Flush/Irrigation flushed w/;water;no resistance met 1/14/2019  7:01 AM   Dressing Change Due 01/10/19 1/10/2019  3:00 PM   Current Rate (mL/hr) 55 mL/hr 1/14/2019  7:01 AM   Goal Rate (mL/hr) 55 mL/hr 1/14/2019  7:01 AM   Intake (mL) 40 mL  1/14/2019  6:05 AM   Water Bolus (mL) 100 mL 1/13/2019  6:05 AM   Tube Output(mL)(Include Discarded Residual) 60 mL 1/13/2019  2:00 PM   Formula Name impact peptide 1/10/2019  3:00 PM   Tube Feeding Intake (mL) 55 1/14/2019  8:00 AM   Residual Amount (ml) 0 ml 1/11/2019  6:05 AM       Neurosurgery Physical Exam     [id number], grimacing to stimulation, tracking  PERRL  MOORE spontaneously  Trach and PEG in place      Significant Labs:  Recent Labs   Lab 01/13/19  0326 01/14/19  0313   * 119*   * 132*   K 4.0 4.9    102   CO2 26 21*   BUN 22 20   CREATININE 0.5 0.5   CALCIUM 8.8 8.8   MG 1.9 1.9     Recent Labs   Lab 01/13/19  0326 01/14/19  0313   WBC 14.57* 16.51*   HGB 9.3* 9.6*   HCT 29.4* 30.7*    213     Recent Labs   Lab 01/12/19  1213   INR 1.0     Microbiology Results (last 7 days)     Procedure Component Value Units Date/Time    Blood culture [[id number]] Collected:  01/03/19 1107    Order Status:  Completed Specimen:  Blood from Peripheral, Foot, Left Updated:  01/08/19 1412     Blood Culture, Routine No growth after 5 days.    Narrative:       Blood cultures x 2 different sites. 4 bottles total. Please  draw cultures before administering antibiotics.    Blood culture [[id number]] Collected:  01/03/19 1100    Order Status:  Completed Specimen:  Blood from Peripheral, Hand, Left Updated:  01/08/19 1412     Blood Culture, Routine No growth after 5 days.    Narrative:       Blood cultures from 2 different sites. 4 bottles total.  Please draw before starting antibiotics.        Assessment/Plan:     * SDH (subdural hematoma)    62 yo male with L aSDH now s/p craniotomy for evacuation (12/20).    --Continue care per primary team.  --Patient is stable for discharge from a neurosurgical perspective  --Continue neurochecks while in house  --Further care per NCC   --Dispo pending LTAC placement              Gagandeep Giang MD  Neurosurgery  [facility]

## 2019-01-14 NOTE — ASSESSMENT & PLAN NOTE
62 yo male with L aSDH now s/p craniotomy for evacuation (12/20).    --Continue care per primary team.  --Patient is stable for discharge from a neurosurgical perspective  --Continue  neurochecks while in house  --Further care per NCC     --Dispo pending LTAC placement

## 2019-01-14 NOTE — PLAN OF CARE
Problem: Adult Inpatient Plan of Care  Goal: Plan of Care Review  Outcome: Ongoing (interventions implemented as appropriate)  POC reviewed with patient at 0400. Pt needs reinforcement due to having a tracheostomy. No acute events today. Pt progressing toward goals. Will continue to monitor. See flowsheets for full assessment and VS info.

## 2019-01-14 NOTE — PROGRESS NOTES
[facility]  Neurocritical Care  Progress Note    Admit Date: 12/20/2018  Service Date: 01/14/2019  Length of Stay: 25    Subjective:     Chief Complaint: SDH (subdural hematoma)    History of Present Illness: 63 year old male h/o CABG on ASA 81 mg (last dose 72 hours PTA) presenting as transfer from OSH for EDH with SDH. Patient reportedly had a mechanical fall from standing on Sunday and went to ED where CTH was negative for any acute intracranial pathology. Overnight, patient developed confusion and presented to OSH ED where CTH was consistent with EDH with SDH. Patient has progressively worsened throughout the day developing intermittent global aphasia. [place] [facility] [place]. NCC consulted for admission and medical management. Patient on weaning off of cardene at time of evaluation with sbp of 120. Reported presenting sbp in 150s.    Hospital Course: 12/22: no major events overnight. Weaned off of cardene. This morning AOx3. Drain removed by NSGY at 11 AM, and planned to step down to NSGY.  AMS noted around 1PM, patient aphasic and not following commands, STAT CTH unremarkable.   12/26: wean ketamine, follow EEG, send urine studuies-Na 133, NS bolus, advance TF  12/27: off ketamine, d/c levo, continue EEG, and AEDs, 500 NS bolus, increase enteral water flushes, continue abx for another day  1/2: extubation trial.   1/3: failed extubation trial. Plan for trach and peg  1/4: plan for trach, general surgery consult  1/5: NAEO  1/6: NAEO. Awaiting trach and peg  1/7: plan for trach/peg, opens eyes to voice no commands  1/8: s/p trach/peg today  1/9: restarting tf this afternoon, ltac planning, eeg today to help guide aed weaning  1/12 Trach trials tolerating well. Neuro checks q2 in am/ q4in pm metoprolol prn HR.120  1/13 Continues to tolerate Trach. Start Norvasc. eval thyroid function baseline.   1/14 hyponatremic, pending urine studies, finasteride, increased  secretions, CPT, pending LTAC      Review of Systems  Unable to obtain a complete ROS due to trach, nonverbal  Objective:     Vitals:  Temp: 97.7 °F (36.5 °C)  Pulse: 80  Rhythm: atrial rhythm  BP: (!) 153/83  MAP (mmHg): 112  Resp: (!) 25  SpO2: 98 %  Oxygen Concentration (%): 100  O2 Device (Oxygen Therapy): Trach Collar  Vent Mode: Spont  Pressure Support: 5 cmH20  PEEP/CPAP: 5 cmH20  Peak Airway Pressure: 13 cmH2O  Mean Airway Pressure: 8.8 cmH20  Plateau Pressure: 0 cmH20    Temp  Min: 97.5 °F (36.4 °C)  Max: 99.1 °F (37.3 °C)  Pulse  Min: 75  Max: 126  BP  Min: 136/105  Max: 196/111  MAP (mmHg)  Min: 106  Max: 149  Resp  Min: 20  Max: 33  SpO2  Min: 96 %  Max: 100 %  Oxygen Concentration (%)  Min: 28  Max: 100    01/13 0701 - 01/14 0700  In: 2128.8 [I.V.:33.8]  Out: 3440 [Urine:3380; Drains:60]   Unmeasured Output  Stool Occurrence: 0       Physical Exam  Physical Exam:  GA: Awake, comfortable, no acute distress.   HEENT: No scleral icterus or JVD.   Pulmonary: Clear to auscultation Anteriorly. No wheezing, crackles, or rhonchi.  Cardiac: RRR S1 & S2 w/o rubs/murmurs/gallops.   Abdominal: Bowel sounds present x 4. No appreciable hepatosplenomegaly.  Skin: No jaundice, rashes, or visible lesions.  Neuro:  --GCS: E4 V1t M5  --Mental Status:  Awake, alert, trach, doesn't follow commands  --Pupils 4mm, PERRL.   --cough, gag, corneal reflexes intact  --moves all extremities spontaneously  Unable to test orientation, language, memory, judgment, insight, fund of knowledge, shoulder shrug, tongue protrusion, coordination, gait    Medications:  Continuous  sodium chloride 0.9% Last Rate: 75 mL/hr at 01/14/19 1600   Scheduled  albuterol sulfate 2.5 mg Q4H   amLODIPine 10 mg Daily   chlorhexidine 15 mL QID   diclofenac sodium  TID   famotidine 20 mg BID   finasteride 5 mg Daily   heparin (porcine) 5,000 Units Q8H   lacosamide 100 mg BID   metoprolol tartrate 100 mg Q8H   polyethylene glycol 17 g BID   pravastatin 40 mg  QHS   senna-docusate 8.6-50 mg 1 tablet BID   silodosin 4 mg Daily   sodium chloride 0.9% 3 mL Q8H   sodium chloride 3% 4 mL Q4H   ursodiol 300 mg TID   PRN  acetaminophen 650 mg Q6H PRN   fentaNYL 25 mcg Q2H PRN   hydrALAZINE 10 mg Q4H PRN   magnesium oxide 800 mg PRN   magnesium oxide 800 mg PRN   ondansetron 4 mg Q12H PRN   potassium chloride 10% 40 mEq PRN   potassium chloride 10% 40 mEq PRN   potassium chloride 10% 60 mEq PRN   potassium, sodium phosphates 2 packet PRN   potassium, sodium phosphates 2 packet PRN   potassium, sodium phosphates 2 packet PRN     Today I personally reviewed pertinent medications, lines/drains/airways, imaging, cardiology results, laboratory results, microbiology results,     Diet  No diet orders on file  No diet orders on file        Assessment/Plan:     Neuro   * SDH (subdural hematoma)    Epidural with subdural component s/p evac 12/20  Complicated by status epilepticus  Neuro checks Q2Hr in am / Q4Hr in pm  NSGY following  SBP goal < 180  Pt/OT/SLP   LTAC planning        Status epilepticus    Mental status continuing to improve  Spot eeg without any epileptiform activity    Decrease lacosamide 100mg bid 1/12     Epidural hematoma    See SDH     Pulmonary   Acute respiratory failure    S/p trach  Trach collar trial daily  Rest at night on vent     Cardiac/Vascular   New onset atrial fibrillation    -With RVR  -Metoprolol 100 mg Q8H  -Monitor off AC       Essential hypertension    -SBP<180  -PRN hydralazine  -Increased Amlodopine to 5mg - 10mg 1/13       Renal/   Hyponatremia       Ref. Range 1/12/2019 04:22 1/12/2019 04:22 1/13/2019 03:26 1/14/2019 03:13 1/14/2019 10:41   Sodium Latest Ref Range: 136 - 145 mmol/L 137 137 135 (L) 132 (L) 133 (L)     Urine Na, Urine osm, serum osm, UA pending  On NS at 75     Endocrine   Severe malnutrition    S/p trach/peg  Impact peptide 55cc/h  Enteral water 100cc/q6h           The patient is being Prophylaxed for:  Venous Thromboembolism  with: Mechanical or Chemical  Stress Ulcer with: H2B  Ventilator Pneumonia with: chlorhexidine oral care    Activity Orders          None        Full Code    Olena Fried PA-C  Neurocritical Care  [facility]

## 2019-01-15 LAB
ALBUMIN SERPL BCP-MCNC: 2.4 G/DL
ALP SERPL-CCNC: 186 U/L
ALT SERPL W/O P-5'-P-CCNC: 7 U/L
ANION GAP SERPL CALC-SCNC: 8 MMOL/L
AST SERPL-CCNC: 17 U/L
BASOPHILS # BLD AUTO: 0.12 K/UL
BASOPHILS NFR BLD: 0.9 %
BILIRUB SERPL-MCNC: 0.6 MG/DL
BUN SERPL-MCNC: 23 MG/DL
CALCIUM SERPL-MCNC: 8.9 MG/DL
CHLORIDE SERPL-SCNC: 103 MMOL/L
CO2 SERPL-SCNC: 22 MMOL/L
CREAT SERPL-MCNC: 0.5 MG/DL
DIFFERENTIAL METHOD: ABNORMAL
EOSINOPHIL # BLD AUTO: 0.5 K/UL
EOSINOPHIL NFR BLD: 3.4 %
ERYTHROCYTE [DISTWIDTH] IN BLOOD BY AUTOMATED COUNT: 15.5 %
EST. GFR  (AFRICAN AMERICAN): >60 ML/MIN/1.73 M^2
EST. GFR  (NON AFRICAN AMERICAN): >60 ML/MIN/1.73 M^2
GLUCOSE SERPL-MCNC: 121 MG/DL
HCT VFR BLD AUTO: 31.4 %
HGB BLD-MCNC: 9.8 G/DL
IMM GRANULOCYTES # BLD AUTO: 0.08 K/UL
IMM GRANULOCYTES NFR BLD AUTO: 0.6 %
LYMPHOCYTES # BLD AUTO: 2.5 K/UL
LYMPHOCYTES NFR BLD: 18.1 %
MAGNESIUM SERPL-MCNC: 2 MG/DL
MCH RBC QN AUTO: 30.8 PG
MCHC RBC AUTO-ENTMCNC: 31.2 G/DL
MCV RBC AUTO: 99 FL
MONOCYTES # BLD AUTO: 1 K/UL
MONOCYTES NFR BLD: 7.4 %
NEUTROPHILS # BLD AUTO: 9.6 K/UL
NEUTROPHILS NFR BLD: 69.6 %
NRBC BLD-RTO: 0 /100 WBC
PHOSPHATE SERPL-MCNC: 3.2 MG/DL
PLATELET # BLD AUTO: 279 K/UL
PMV BLD AUTO: 9.9 FL
POCT GLUCOSE: 120 MG/DL (ref 70–110)
POCT GLUCOSE: 130 MG/DL (ref 70–110)
POTASSIUM SERPL-SCNC: 4.3 MMOL/L
PROT SERPL-MCNC: 6.3 G/DL
RBC # BLD AUTO: 3.18 M/UL
SODIUM SERPL-SCNC: 133 MMOL/L
WBC # BLD AUTO: 13.84 K/UL

## 2019-01-15 PROCEDURE — 63600175 PHARM REV CODE 636 W HCPCS: Performed by: NURSE PRACTITIONER

## 2019-01-15 PROCEDURE — 85025 COMPLETE CBC W/AUTO DIFF WBC: CPT

## 2019-01-15 PROCEDURE — A4216 STERILE WATER/SALINE, 10 ML: HCPCS | Performed by: PSYCHIATRY & NEUROLOGY

## 2019-01-15 PROCEDURE — 99232 SBSQ HOSP IP/OBS MODERATE 35: CPT | Mod: ,,, | Performed by: NURSE PRACTITIONER

## 2019-01-15 PROCEDURE — 94668 MNPJ CHEST WALL SBSQ: CPT

## 2019-01-15 PROCEDURE — 25000003 PHARM REV CODE 250: Performed by: NURSE PRACTITIONER

## 2019-01-15 PROCEDURE — 25000003 PHARM REV CODE 250: Performed by: STUDENT IN AN ORGANIZED HEALTH CARE EDUCATION/TRAINING PROGRAM

## 2019-01-15 PROCEDURE — 27000221 HC OXYGEN, UP TO 24 HOURS

## 2019-01-15 PROCEDURE — 99900035 HC TECH TIME PER 15 MIN (STAT)

## 2019-01-15 PROCEDURE — 25000242 PHARM REV CODE 250 ALT 637 W/ HCPCS: Performed by: NURSE PRACTITIONER

## 2019-01-15 PROCEDURE — 99232 PR SUBSEQUENT HOSPITAL CARE,LEVL II: ICD-10-PCS | Mod: ,,, | Performed by: NURSE PRACTITIONER

## 2019-01-15 PROCEDURE — 25000003 PHARM REV CODE 250: Performed by: PSYCHIATRY & NEUROLOGY

## 2019-01-15 PROCEDURE — 94640 AIRWAY INHALATION TREATMENT: CPT

## 2019-01-15 PROCEDURE — 63600175 PHARM REV CODE 636 W HCPCS: Performed by: STUDENT IN AN ORGANIZED HEALTH CARE EDUCATION/TRAINING PROGRAM

## 2019-01-15 PROCEDURE — 25000003 PHARM REV CODE 250: Performed by: PHYSICIAN ASSISTANT

## 2019-01-15 PROCEDURE — 99900026 HC AIRWAY MAINTENANCE (STAT)

## 2019-01-15 PROCEDURE — 94761 N-INVAS EAR/PLS OXIMETRY MLT: CPT

## 2019-01-15 PROCEDURE — 20000000 HC ICU ROOM

## 2019-01-15 PROCEDURE — 83735 ASSAY OF MAGNESIUM: CPT

## 2019-01-15 PROCEDURE — 80053 COMPREHEN METABOLIC PANEL: CPT

## 2019-01-15 PROCEDURE — 84100 ASSAY OF PHOSPHORUS: CPT

## 2019-01-15 PROCEDURE — 94003 VENT MGMT INPAT SUBQ DAY: CPT

## 2019-01-15 RX ADMIN — DICLOFENAC: 10 GEL TOPICAL at 02:01

## 2019-01-15 RX ADMIN — ALBUTEROL SULFATE 2.5 MG: 2.5 SOLUTION RESPIRATORY (INHALATION) at 11:01

## 2019-01-15 RX ADMIN — Medication 3 ML: at 05:01

## 2019-01-15 RX ADMIN — Medication 3 ML: at 10:01

## 2019-01-15 RX ADMIN — AMLODIPINE BESYLATE 10 MG: 10 TABLET ORAL at 08:01

## 2019-01-15 RX ADMIN — ALBUTEROL SULFATE 2.5 MG: 2.5 SOLUTION RESPIRATORY (INHALATION) at 07:01

## 2019-01-15 RX ADMIN — CHLORHEXIDINE GLUCONATE 0.12% ORAL RINSE 15 ML: 1.2 LIQUID ORAL at 08:01

## 2019-01-15 RX ADMIN — LACOSAMIDE 100 MG: 50 TABLET, FILM COATED ORAL at 08:01

## 2019-01-15 RX ADMIN — SODIUM CHLORIDE SOLN NEBU 3% 4 ML: 3 NEBU SOLN at 11:01

## 2019-01-15 RX ADMIN — METOPROLOL TARTRATE 100 MG: 100 TABLET ORAL at 02:01

## 2019-01-15 RX ADMIN — ACETAMINOPHEN 650 MG: 325 TABLET, FILM COATED ORAL at 09:01

## 2019-01-15 RX ADMIN — SODIUM CHLORIDE TAB 1 GM 1 G: 1 TAB at 02:01

## 2019-01-15 RX ADMIN — FENTANYL CITRATE 25 MCG: 50 INJECTION INTRAMUSCULAR; INTRAVENOUS at 03:01

## 2019-01-15 RX ADMIN — ALBUTEROL SULFATE 2.5 MG: 2.5 SOLUTION RESPIRATORY (INHALATION) at 04:01

## 2019-01-15 RX ADMIN — HEPARIN SODIUM 5000 UNITS: 5000 INJECTION, SOLUTION INTRAVENOUS; SUBCUTANEOUS at 02:01

## 2019-01-15 RX ADMIN — SODIUM CHLORIDE SOLN NEBU 3% 4 ML: 3 NEBU SOLN at 07:01

## 2019-01-15 RX ADMIN — SILODOSIN 4 MG: 4 CAPSULE ORAL at 08:01

## 2019-01-15 RX ADMIN — HEPARIN SODIUM 5000 UNITS: 5000 INJECTION, SOLUTION INTRAVENOUS; SUBCUTANEOUS at 05:01

## 2019-01-15 RX ADMIN — DICLOFENAC: 10 GEL TOPICAL at 08:01

## 2019-01-15 RX ADMIN — PRAVASTATIN SODIUM 40 MG: 40 TABLET ORAL at 08:01

## 2019-01-15 RX ADMIN — SODIUM CHLORIDE SOLN NEBU 3% 4 ML: 3 NEBU SOLN at 01:01

## 2019-01-15 RX ADMIN — Medication 3 ML: at 02:01

## 2019-01-15 RX ADMIN — FAMOTIDINE 20 MG: 20 TABLET ORAL at 08:01

## 2019-01-15 RX ADMIN — POLYETHYLENE GLYCOL 3350 17 G: 17 POWDER, FOR SOLUTION ORAL at 08:01

## 2019-01-15 RX ADMIN — METOPROLOL TARTRATE 100 MG: 100 TABLET ORAL at 10:01

## 2019-01-15 RX ADMIN — CHLORHEXIDINE GLUCONATE 0.12% ORAL RINSE 15 ML: 1.2 LIQUID ORAL at 02:01

## 2019-01-15 RX ADMIN — URSODIOL 300 MG: 300 CAPSULE ORAL at 08:01

## 2019-01-15 RX ADMIN — URSODIOL 300 MG: 300 CAPSULE ORAL at 02:01

## 2019-01-15 RX ADMIN — SODIUM CHLORIDE SOLN NEBU 3% 4 ML: 3 NEBU SOLN at 04:01

## 2019-01-15 RX ADMIN — STANDARDIZED SENNA CONCENTRATE AND DOCUSATE SODIUM 1 TABLET: 8.6; 5 TABLET, FILM COATED ORAL at 08:01

## 2019-01-15 RX ADMIN — SODIUM CHLORIDE TAB 1 GM 2 G: 1 TAB at 08:01

## 2019-01-15 RX ADMIN — HEPARIN SODIUM 5000 UNITS: 5000 INJECTION, SOLUTION INTRAVENOUS; SUBCUTANEOUS at 10:01

## 2019-01-15 RX ADMIN — ALBUTEROL SULFATE 2.5 MG: 2.5 SOLUTION RESPIRATORY (INHALATION) at 01:01

## 2019-01-15 RX ADMIN — SODIUM CHLORIDE SOLN NEBU 3% 4 ML: 3 NEBU SOLN at 03:01

## 2019-01-15 RX ADMIN — FINASTERIDE 5 MG: 5 TABLET, FILM COATED ORAL at 08:01

## 2019-01-15 RX ADMIN — METOPROLOL TARTRATE 100 MG: 100 TABLET ORAL at 05:01

## 2019-01-15 RX ADMIN — HYDRALAZINE HYDROCHLORIDE 10 MG: 20 INJECTION INTRAMUSCULAR; INTRAVENOUS at 07:01

## 2019-01-15 RX ADMIN — ALBUTEROL SULFATE 2.5 MG: 2.5 SOLUTION RESPIRATORY (INHALATION) at 03:01

## 2019-01-15 RX ADMIN — SODIUM CHLORIDE TAB 1 GM 1 G: 1 TAB at 08:01

## 2019-01-15 NOTE — PLAN OF CARE
NICHOLAS informed by SW that [facility] stated that an appeal must be done for LTAC placement.  CM phoned the expedited appeal line at [facility] and spoke to Angie.  Per Angie, the deadline for a peer to peer was 24 hrs after the denial was received.  Per Angie, the denial was called to [facility] on 01/09/19 so the peer to peer had to be completed by 01/10/19.  CM informed Angie that [facility] informed  that the patient had to wait 7 days post trach for admit and that [facility] did not tell CM or SW that a peer to peer had to be done in 24 hrs.   Angie also informed that  received a denial email on 01/11/19 which was a Friday.  Angie stated that since [facility] was informed, no peer to peer can be done and that an appeal would have to be done.  CM informed patient's wife.  Wife phoned the expedited appeals line and spoke to Emil.  Family appeal done per wife and reference number is [id number].  Wife stated that she is ok with patient going to a SNF.  Wife gave  approval to send information to [facility].  SW informed.       Azeb Flores RN, CCRN-K, [facility]  Neuro-Critical Care   [phone]

## 2019-01-15 NOTE — PROGRESS NOTES
[facility]  Neurocritical Care  Progress Note    Admit Date: 12/20/2018  Service Date: 01/15/2019  Length of Stay: 26    Subjective:     Chief Complaint: SDH (subdural hematoma)    History of Present Illness: 63 year old male h/o CABG on ASA 81 mg (last dose 72 hours PTA) presenting as transfer from OSH for EDH with SDH. Patient reportedly had a mechanical fall from standing on Sunday and went to ED where CTH was negative for any acute intracranial pathology. Overnight, patient developed confusion and presented to OSH ED where CTH was consistent with EDH with SDH. Patient has progressively worsened throughout the day developing intermittent global aphasia. NSGY consulted in ED at [facility] and stat CTH has been ordered. NCC consulted for admission and medical management. Patient on weaning off of cardene at time of evaluation with sbp of 120. Reported presenting sbp in 150s.    Hospital Course: 12/22: no major events overnight. Weaned off of cardene. This morning AOx3. Drain removed by NSGY at 11 AM, and planned to step down to NSGY.  AMS noted around 1PM, patient aphasic and not following commands, STAT CTH unremarkable.   12/26: wean ketamine, follow EEG, send urine studuies-Na 133, NS bolus, advance TF  12/27: off ketamine, d/c levo, continue EEG, and AEDs, 500 NS bolus, increase enteral water flushes, continue abx for another day  1/2: extubation trial.   1/3: failed extubation trial. Plan for trach and peg  1/4: plan for trach, general surgery consult  1/5: NAEO  1/6: NAEO. Awaiting trach and peg  1/7: plan for trach/peg, opens eyes to voice no commands  1/8: s/p trach/peg today  1/9: restarting tf this afternoon, ltac planning, eeg today to help guide aed weaning  1/12 Trach trials tolerating well. Neuro checks q2 in am/ q4in pm metoprolol prn HR.120  1/13 Continues to tolerate Trach. Start Norvasc. eval thyroid function baseline.   1/14 hyponatremic, pending urine studies, finasteride, increased  secretions, CPT, pending LTAC  01/15: Awaiting approval for transfer to [facility]      Review of Systems    Unable to obtain a complete ROS due to trach, nonverbal  Objective:     Vitals:  Temp: 98.2 °F (36.8 °C)  Pulse: 108  BP: (!) 156/95  MAP (mmHg): 123  Resp: (!) 22  SpO2: 98 %  Oxygen Concentration (%): 100  O2 Device (Oxygen Therapy): Trach Collar  Vent Mode: Spont  Pressure Support: 5 cmH20  PEEP/CPAP: 5 cmH20  Peak Airway Pressure: 12 cmH2O  Mean Airway Pressure: 8.7 cmH20  Plateau Pressure: 0 cmH20    Temp  Min: 98.2 °F (36.8 °C)  Max: 99 °F (37.2 °C)  Pulse  Min: 74  Max: 126  BP  Min: 138/87  Max: 170/101  MAP (mmHg)  Min: 100  Max: 136  Resp  Min: 18  Max: 35  SpO2  Min: 96 %  Max: 100 %  Oxygen Concentration (%)  Min: 28  Max: 100    01/14 0701 - 01/15 0700  In: 3066.3 [I.V.:1731.3]  Out: 2075 [Urine:2075]   Unmeasured Output  Stool Occurrence: 1       Physical Exam    Physical Exam:  GA: Awake, comfortable, no acute distress.   HEENT: No scleral icterus or JVD.   Pulmonary: Clear to auscultation A/L. No wheezing, crackles, or rhonchi.  Cardiac: RRR S1 & S2 w/o rubs/murmurs/gallops.   Abdominal: Bowel sounds present x 4. No appreciable hepatosplenomegaly.  Skin: No jaundice, rashes, or visible lesions.  Neuro:  --GCS: E4 V1t M5  --Mental Status:  Awake, alert, trach, doesn't follow commands  --Pupils 4mm, PERRL.   --cough, gag, corneal reflexes intact  --moves all extremities spontaneously  Unable to test orientation, language, memory, judgment, insight, fund of knowledge, shoulder shrug, tongue protrusion, coordination, gait    Medications:  Continuous    sodium chloride 0.9% Last Rate: 75 mL/hr at 01/15/19 1605   Scheduled    albuterol sulfate 2.5 mg Q4H   amLODIPine 10 mg Daily   chlorhexidine 15 mL QID   diclofenac sodium  TID   famotidine 20 mg BID   finasteride 5 mg Daily   heparin (porcine) 5,000 Units Q8H   lacosamide 100 mg BID   metoprolol tartrate 100 mg Q8H   polyethylene glycol 17 g BID    pravastatin 40 mg QHS   senna-docusate 8.6-50 mg 1 tablet BID   silodosin 4 mg Daily   sodium chloride 0.9% 3 mL Q8H   sodium chloride 3% 4 mL Q4H   sodium chloride 1 g TID   ursodiol 300 mg TID   PRN    acetaminophen 650 mg Q6H PRN   fentaNYL 25 mcg Q2H PRN   hydrALAZINE 10 mg Q4H PRN   magnesium oxide 800 mg PRN   magnesium oxide 800 mg PRN   ondansetron 4 mg Q12H PRN   potassium chloride 10% 40 mEq PRN   potassium chloride 10% 40 mEq PRN   potassium chloride 10% 60 mEq PRN   potassium, sodium phosphates 2 packet PRN   potassium, sodium phosphates 2 packet PRN   potassium, sodium phosphates 2 packet PRN     Today I personally reviewed pertinent medications, lines/drains/airways, imaging, cardiology results, laboratory results, microbiology results,     Diet  No diet orders on file  No diet orders on file        Assessment/Plan:     Neuro   * SDH (subdural hematoma)    Epidural with subdural component s/p evac 12/20  Complicated by status epilepticus  Neuro checks Q2Hr in am / Q4Hr in pm  NSGY following  SBP goal < 180  Pt/OT/SLP   LTAC planning continued        Status epilepticus    Mental status continuing to improve  Spot eeg without any epileptiform activity    Decrease lacosamide 100mg bid 1/12     Pulmonary   Acute respiratory failure    S/p trach  Trach collar trial daily  Rest at night on vent as needed     Cardiac/Vascular   New onset atrial fibrillation    -With RVR  -Metoprolol 100 mg Q8H  -Monitor off AC       Essential hypertension    -SBP<180  -PRN hydralazine  -Increased Amlodopine to 5mg - 10mg 1/13       Renal/   Hyponatremia      Increase sodium tabs to 2g tid  Urine Na, Urine osm, serum osm, UA pending  On NS at 75     Endocrine   Severe malnutrition    S/p trach/peg  Impact peptide 55cc/h             Activity Orders          None        Full Code    Blake Mcginnis NP  Neurocritical Care  [facility]

## 2019-01-15 NOTE — PLAN OF CARE
Problem: Adult Inpatient Plan of Care  Goal: Plan of Care Review  Outcome: Ongoing (interventions implemented as appropriate)  POC reviewed with pt at 0400. Pt needs reinforcement due to having a tracheostomy and being cognitively impaired. No acute events today. Pt progressing toward goals. Will continue to monitor. See flowsheets for full assessment and VS info.

## 2019-01-15 NOTE — ASSESSMENT & PLAN NOTE
Mental status continuing to improve  Spot eeg without any epileptiform activity    Decrease lacosamide 100mg bid 1/12

## 2019-01-15 NOTE — ASSESSMENT & PLAN NOTE
Epidural with subdural component s/p evac 12/20  Complicated by status epilepticus  Neuro checks Q2Hr in am / Q4Hr in pm  NSGY following  SBP goal < 180  Pt/OT/SLP   LTAC planning continued

## 2019-01-15 NOTE — PLAN OF CARE
ALFONSO spoke with Mera with [facility] (covering clinical navigator) regarding obtaining auth for LTAC DC today. She reported they have resubmitted and are expecting the auth today. They still have a letter of agreement to complete, so she is unsure if this part will happen today. If it does, they have the bed. She will let this SW know asap.    Caroline Soares, AMBREEN  Neurocritical Care   [facility]  [id number]

## 2019-01-15 NOTE — PROGRESS NOTES
Consult received on patient's wound beneath trach, unable to fully assess area due to sutures still in place, able to visualize portion of ulceration beneath trach with white slough and surrounding redness. Unable to determine whether due to pressure or erosion around trach site. Unable to apply dressing to site due to sutures still in place. Once sutures removed recommend dressing with hydrofera blue ready foam,change every 5days or as needed. Discussed recommendations with Blake Mcginnis NP with NCC. Extra supplies ordered to room. Nursing to continue care. Wound care to follow prn.     01/15/19 1029       Incision/Site 01/08/19 1408 Neck   Date First Assessed/Time First Assessed: 01/08/19 1408   Location: Neck   Wound Image    Incision WDL ex   Drainage Amount None   Drainage Characteristics/Odor No odor   Appearance White;Slough   Periwound Area Redness   Wound Edges Open   Care (recommend hydrofera blue ready foam beneath trach)

## 2019-01-15 NOTE — PLAN OF CARE
POC reviewed with pt and family at 1400. Wife verbalized understanding. Questions and concerns addressed. No acute events today, pending LTAC placement . Pt progressing toward goals. Will continue to monitor. See flowsheets for full assessment and VS info.

## 2019-01-15 NOTE — PROGRESS NOTES
[facility]  Neurosurgery  Progress Note    Subjective:     History of Present Illness: 62 yo M with PMH of CAD, CABG in 2006, DM, HTN, HLD on ASA presents to the ED for AMS. Patient fell 4 days ago with about 2 minutes of LOC. Family is unsure of what caused the fall. Full workup done including a CTH at [facility] that was negative. Patient went home and stopped taking his ASA. This morning, his family noticed he was more confused. He went to [facility] and CTH was done that revealed a 1.7cm left frontalpariental SDH with a 5 mm right sided midline shift and a left SAH. Patient was transferred to [facility]. History was obtained per family as patient unable to give history 2/2 confusion with expressive and receptive aphasia.    Post-Op Info:  Procedure(s) (LRB):  INSERTION, PEG TUBE (N/A)  CREATION, TRACHEOSTOMY (N/A)   7 Days Post-Op     Interval History: 1/15: HR 75 -136,  -172, NAEON, labs stable, exam stable    Medications:  Continuous Infusions:   sodium chloride 0.9% 75 mL/hr at 01/15/19 1605     Scheduled Meds:   albuterol sulfate  2.5 mg Nebulization Q4H    amLODIPine  10 mg Per G Tube Daily    chlorhexidine  15 mL Mouth/Throat QID    diclofenac sodium   Topical (Top) TID    famotidine  20 mg Per NG tube BID    finasteride  5 mg Per G Tube Daily    heparin (porcine)  5,000 Units Subcutaneous Q8H    lacosamide  100 mg Per NG tube BID    metoprolol tartrate  100 mg Per NG tube Q8H    polyethylene glycol  17 g Per NG tube BID    pravastatin  40 mg Per NG tube QHS    senna-docusate 8.6-50 mg  1 tablet Per NG tube BID    silodosin  4 mg Per G Tube Daily    sodium chloride 0.9%  3 mL Intravenous Q8H    sodium chloride 3%  4 mL Nebulization Q4H    sodium chloride  2 g Per NG tube TID    ursodiol  300 mg Per NG tube TID     PRN Meds:acetaminophen, fentaNYL, hydrALAZINE, magnesium oxide, magnesium oxide, ondansetron, potassium chloride 10%, potassium chloride 10%, potassium chloride  10%, potassium, sodium phosphates, potassium, sodium phosphates, potassium, sodium phosphates         Objective:     Weight: 112.5 kg (248 lb 0.3 oz)  Body mass index is 34.59 kg/m².  Vital Signs (Most Recent):  Temp: 98.2 °F (36.8 °C) (01/15/19 1505)  Pulse: 108 (01/15/19 1605)  Resp: (!) 22 (01/15/19 1605)  BP: (!) 156/95 (01/15/19 1605)  SpO2: 98 % (01/15/19 1605) Vital Signs (24h Range):  Temp:  [98.2 °F (36.8 °C)-99 °F (37.2 °C)] 98.2 °F (36.8 °C)  Pulse:  [] 108  Resp:  [18-35] 22  SpO2:  [96 %-100 %] 98 %  BP: (138-170)/() 156/95     Date 01/15/19 0700 - 01/16/19 0659   Shift 2835-4355 4307-3729 9677-7961 24 Hour Total   INTAKE   I.V.(mL/kg) 600(5.3) 150(1.3)  750(6.7)   NG/ 110  730   Shift Total(mL/kg) 1220(10.8) 260(2.3)  1480(13.2)   OUTPUT   Urine(mL/kg/hr) 400(0.4)   400   Shift Total(mL/kg) 400(3.6)   400(3.6)   Weight (kg) 112.5 112.5 112.5 112.5              Vent Mode: Spont  Oxygen Concentration (%):  [] 100  Resp Rate Total:  [17 br/min-24 br/min] 24 br/min  PEEP/CPAP:  [5 cmH20] 5 cmH20  Pressure Support:  [5 cmH20] 5 cmH20  Mean Airway Pressure:  [6.9 cmH20-8.7 cmH20] 8.7 cmH20         Gastrostomy/Enterostomy 01/08/19 1441 Percutaneous endoscopic gastrostomy (PEG) midline (Active)   Securement sutured to abdomen 1/15/2019  3:05 PM   Interventions Prior to Feeding patency checked;residual checked 1/15/2019  3:05 PM   Drainage serosanguineous 1/10/2019  3:00 PM   Feeding Type continuous 1/15/2019  3:05 PM   Clamp Status/Tolerance unclamped 1/15/2019  3:05 PM   Feeding Action feeding continued 1/15/2019  3:05 PM   Dressing dry and intact 1/15/2019  3:05 PM   Insertion Site no redness;no warmth;no drainage;no tenderness;dry;no swelling 1/15/2019  3:05 AM   Site Care device rotatated 1/15/2019  3:05 PM   Flush/Irrigation flushed w/;water;no resistance met 1/15/2019  3:05 PM   Dressing Change Due 01/10/19 1/10/2019  3:00 PM   Current Rate (mL/hr) 55 mL/hr 1/15/2019  3:05 PM    Goal Rate (mL/hr) 55 mL/hr 1/15/2019  3:05 PM   Intake (mL) 90 mL 1/15/2019  2:05 PM   Water Bolus (mL) 100 mL 1/13/2019  6:05 AM   Tube Output(mL)(Include Discarded Residual) 60 mL 1/13/2019  2:00 PM   Formula Name impact peptide 1/10/2019  3:00 PM   Tube Feeding Intake (mL) 55 1/15/2019  4:05 PM   Residual Amount (ml) 0 ml 1/15/2019  3:05 PM       Neurosurgery Physical Exam     E4VTM5, grimacing to stimulation, tracking  PERRL  MOORE spontaneously  Trach and PEG in place    Significant Labs:  Recent Labs   Lab 01/14/19  0313 01/14/19  1041 01/15/19  0233   * 126* 121*   * 133* 133*   K 4.9 4.2 4.3    104 103   CO2 21* 23 22*   BUN 20 21 23   CREATININE 0.5 0.5 0.5   CALCIUM 8.8 8.8 8.9   MG 1.9  --  2.0     Recent Labs   Lab 01/14/19 0313 01/15/19  0233   WBC 16.51* 13.84*   HGB 9.6* 9.8*   HCT 30.7* 31.4*    279     No results for input(s): LABPT, INR, APTT in the last 48 hours.  Microbiology Results (last 7 days)     ** No results found for the last 168 hours. **            Assessment/Plan:     * SDH (subdural hematoma)    64 yo male with L aSDH now s/p craniotomy for evacuation (12/20).    --Continue care per primary team.  --Patient is stable for discharge from a neurosurgical perspective  --Continue neurochecks while in house  --Further care per NCC   --Dispo pending LTAC placement              Gagandeep Giang MD  Neurosurgery  [facility]

## 2019-01-15 NOTE — SUBJECTIVE & OBJECTIVE
Interval History: 1/15: HR 75 -136,  -172, NAEON, labs stable, exam stable    Medications:  Continuous Infusions:   sodium chloride 0.9% 75 mL/hr at 01/15/19 1605     Scheduled Meds:   albuterol sulfate  2.5 mg Nebulization Q4H    amLODIPine  10 mg Per G Tube Daily    chlorhexidine  15 mL Mouth/Throat QID    diclofenac sodium   Topical (Top) TID    famotidine  20 mg Per NG tube BID    finasteride  5 mg Per G Tube Daily    heparin (porcine)  5,000 Units Subcutaneous Q8H    lacosamide  100 mg Per NG tube BID    metoprolol tartrate  100 mg Per NG tube Q8H    polyethylene glycol  17 g Per NG tube BID    pravastatin  40 mg Per NG tube QHS    senna-docusate 8.6-50 mg  1 tablet Per NG tube BID    silodosin  4 mg Per G Tube Daily    sodium chloride 0.9%  3 mL Intravenous Q8H    sodium chloride 3%  4 mL Nebulization Q4H    sodium chloride  2 g Per NG tube TID    ursodiol  300 mg Per NG tube TID     PRN Meds:acetaminophen, fentaNYL, hydrALAZINE, magnesium oxide, magnesium oxide, ondansetron, potassium chloride 10%, potassium chloride 10%, potassium chloride 10%, potassium, sodium phosphates, potassium, sodium phosphates, potassium, sodium phosphates         Objective:     Weight: 112.5 kg (248 lb 0.3 oz)  Body mass index is 34.59 kg/m².  Vital Signs (Most Recent):  Temp: 98.2 °F (36.8 °C) (01/15/19 1505)  Pulse: 108 (01/15/19 1605)  Resp: (!) 22 (01/15/19 1605)  BP: (!) 156/95 (01/15/19 1605)  SpO2: 98 % (01/15/19 1605) Vital Signs (24h Range):  Temp:  [98.2 °F (36.8 °C)-99 °F (37.2 °C)] 98.2 °F (36.8 °C)  Pulse:  [] 108  Resp:  [18-35] 22  SpO2:  [96 %-100 %] 98 %  BP: (138-170)/() 156/95     Date 01/15/19 0700 - 01/16/19 0659   Shift 7490-3857 9031-4053 3310-8340 24 Hour Total   INTAKE   I.V.(mL/kg) 600(5.3) 150(1.3)  750(6.7)   NG/ 110  730   Shift Total(mL/kg) 1220(10.8) 260(2.3)  1480(13.2)   OUTPUT   Urine(mL/kg/hr) 400(0.4)   400   Shift Total(mL/kg) 400(3.6)   400(3.6)    Weight (kg) 112.5 112.5 112.5 112.5              Vent Mode: Spont  Oxygen Concentration (%):  [] 100  Resp Rate Total:  [17 br/min-24 br/min] 24 br/min  PEEP/CPAP:  [5 cmH20] 5 cmH20  Pressure Support:  [5 cmH20] 5 cmH20  Mean Airway Pressure:  [6.9 cmH20-8.7 cmH20] 8.7 cmH20         Gastrostomy/Enterostomy 01/08/19 1441 Percutaneous endoscopic gastrostomy (PEG) midline (Active)   Securement sutured to abdomen 1/15/2019  3:05 PM   Interventions Prior to Feeding patency checked;residual checked 1/15/2019  3:05 PM   Drainage serosanguineous 1/10/2019  3:00 PM   Feeding Type continuous 1/15/2019  3:05 PM   Clamp Status/Tolerance unclamped 1/15/2019  3:05 PM   Feeding Action feeding continued 1/15/2019  3:05 PM   Dressing dry and intact 1/15/2019  3:05 PM   Insertion Site no redness;no warmth;no drainage;no tenderness;dry;no swelling 1/15/2019  3:05 AM   Site Care device rotatated 1/15/2019  3:05 PM   Flush/Irrigation flushed w/;water;no resistance met 1/15/2019  3:05 PM   Dressing Change Due 01/10/19 1/10/2019  3:00 PM   Current Rate (mL/hr) 55 mL/hr 1/15/2019  3:05 PM   Goal Rate (mL/hr) 55 mL/hr 1/15/2019  3:05 PM   Intake (mL) 90 mL 1/15/2019  2:05 PM   Water Bolus (mL) 100 mL 1/13/2019  6:05 AM   Tube Output(mL)(Include Discarded Residual) 60 mL 1/13/2019  2:00 PM   Formula Name impact peptide 1/10/2019  3:00 PM   Tube Feeding Intake (mL) 55 1/15/2019  4:05 PM   Residual Amount (ml) 0 ml 1/15/2019  3:05 PM       Neurosurgery Physical Exam     E4VTM5, grimacing to stimulation, tracking  PERRL  MOORE spontaneously  Trach and PEG in place    Significant Labs:  Recent Labs   Lab 01/14/19  0313 01/14/19  1041 01/15/19  0233   * 126* 121*   * 133* 133*   K 4.9 4.2 4.3    104 103   CO2 21* 23 22*   BUN 20 21 23   CREATININE 0.5 0.5 0.5   CALCIUM 8.8 8.8 8.9   MG 1.9  --  2.0     Recent Labs   Lab 01/14/19 0313 01/15/19  0233   WBC 16.51* 13.84*   HGB 9.6* 9.8*   HCT 30.7* 31.4*    279      No results for input(s): LABPT, INR, APTT in the last 48 hours.  Microbiology Results (last 7 days)     ** No results found for the last 168 hours. **

## 2019-01-15 NOTE — SUBJECTIVE & OBJECTIVE
Review of Systems    Unable to obtain a complete ROS due to trach, nonverbal  Objective:     Vitals:  Temp: 98.2 °F (36.8 °C)  Pulse: 108  BP: (!) 156/95  MAP (mmHg): 123  Resp: (!) 22  SpO2: 98 %  Oxygen Concentration (%): 100  O2 Device (Oxygen Therapy): Trach Collar  Vent Mode: Spont  Pressure Support: 5 cmH20  PEEP/CPAP: 5 cmH20  Peak Airway Pressure: 12 cmH2O  Mean Airway Pressure: 8.7 cmH20  Plateau Pressure: 0 cmH20    Temp  Min: 98.2 °F (36.8 °C)  Max: 99 °F (37.2 °C)  Pulse  Min: 74  Max: 126  BP  Min: 138/87  Max: 170/101  MAP (mmHg)  Min: 100  Max: 136  Resp  Min: 18  Max: 35  SpO2  Min: 96 %  Max: 100 %  Oxygen Concentration (%)  Min: 28  Max: 100    01/14 0701 - 01/15 0700  In: 3066.3 [I.V.:1731.3]  Out: 2075 [Urine:2075]   Unmeasured Output  Stool Occurrence: 1       Physical Exam    Physical Exam:  GA: Awake, comfortable, no acute distress.   HEENT: No scleral icterus or JVD.   Pulmonary: Clear to auscultation A/L. No wheezing, crackles, or rhonchi.  Cardiac: RRR S1 & S2 w/o rubs/murmurs/gallops.   Abdominal: Bowel sounds present x 4. No appreciable hepatosplenomegaly.  Skin: No jaundice, rashes, or visible lesions.  Neuro:  --GCS: E4 V1t M5  --Mental Status:  Awake, alert, trach, doesn't follow commands  --Pupils 4mm, PERRL.   --cough, gag, corneal reflexes intact  --moves all extremities spontaneously  Unable to test orientation, language, memory, judgment, insight, fund of knowledge, shoulder shrug, tongue protrusion, coordination, gait    Medications:  Continuous    sodium chloride 0.9% Last Rate: 75 mL/hr at 01/15/19 1605   Scheduled    albuterol sulfate 2.5 mg Q4H   amLODIPine 10 mg Daily   chlorhexidine 15 mL QID   diclofenac sodium  TID   famotidine 20 mg BID   finasteride 5 mg Daily   heparin (porcine) 5,000 Units Q8H   lacosamide 100 mg BID   metoprolol tartrate 100 mg Q8H   polyethylene glycol 17 g BID   pravastatin 40 mg QHS   senna-docusate 8.6-50 mg 1 tablet BID   silodosin 4 mg Daily    sodium chloride 0.9% 3 mL Q8H   sodium chloride 3% 4 mL Q4H   sodium chloride 1 g TID   ursodiol 300 mg TID   PRN    acetaminophen 650 mg Q6H PRN   fentaNYL 25 mcg Q2H PRN   hydrALAZINE 10 mg Q4H PRN   magnesium oxide 800 mg PRN   magnesium oxide 800 mg PRN   ondansetron 4 mg Q12H PRN   potassium chloride 10% 40 mEq PRN   potassium chloride 10% 40 mEq PRN   potassium chloride 10% 60 mEq PRN   potassium, sodium phosphates 2 packet PRN   potassium, sodium phosphates 2 packet PRN   potassium, sodium phosphates 2 packet PRN     Today I personally reviewed pertinent medications, lines/drains/airways, imaging, cardiology results, laboratory results, microbiology results,     Diet  No diet orders on file  No diet orders on file

## 2019-01-16 PROBLEM — N17.9 AKI (ACUTE KIDNEY INJURY): Status: RESOLVED | Noted: 2018-12-24 | Resolved: 2019-01-16

## 2019-01-16 LAB
ALBUMIN SERPL BCP-MCNC: 2.5 G/DL
ALP SERPL-CCNC: 179 U/L
ALT SERPL W/O P-5'-P-CCNC: 7 U/L
ANION GAP SERPL CALC-SCNC: 9 MMOL/L
AST SERPL-CCNC: 16 U/L
BASOPHILS # BLD AUTO: 0.09 K/UL
BASOPHILS NFR BLD: 0.7 %
BILIRUB SERPL-MCNC: 0.7 MG/DL
BUN SERPL-MCNC: 19 MG/DL
CALCIUM SERPL-MCNC: 8.8 MG/DL
CHLORIDE SERPL-SCNC: 105 MMOL/L
CO2 SERPL-SCNC: 18 MMOL/L
CREAT SERPL-MCNC: 0.5 MG/DL
DIFFERENTIAL METHOD: ABNORMAL
EOSINOPHIL # BLD AUTO: 0.4 K/UL
EOSINOPHIL NFR BLD: 3 %
ERYTHROCYTE [DISTWIDTH] IN BLOOD BY AUTOMATED COUNT: 15.9 %
EST. GFR  (AFRICAN AMERICAN): >60 ML/MIN/1.73 M^2
EST. GFR  (NON AFRICAN AMERICAN): >60 ML/MIN/1.73 M^2
GLUCOSE SERPL-MCNC: 137 MG/DL
HCT VFR BLD AUTO: 30.8 %
HGB BLD-MCNC: 10.6 G/DL
IMM GRANULOCYTES # BLD AUTO: 0.13 K/UL
IMM GRANULOCYTES NFR BLD AUTO: 0.9 %
LYMPHOCYTES # BLD AUTO: 2.2 K/UL
LYMPHOCYTES NFR BLD: 15.6 %
MAGNESIUM SERPL-MCNC: 1.7 MG/DL
MCH RBC QN AUTO: 32.2 PG
MCHC RBC AUTO-ENTMCNC: 34.4 G/DL
MCV RBC AUTO: 94 FL
MONOCYTES # BLD AUTO: 1.1 K/UL
MONOCYTES NFR BLD: 7.7 %
NEUTROPHILS # BLD AUTO: 10 K/UL
NEUTROPHILS NFR BLD: 72.1 %
NRBC BLD-RTO: 0 /100 WBC
PHOSPHATE SERPL-MCNC: 2.8 MG/DL
PLATELET # BLD AUTO: 224 K/UL
PMV BLD AUTO: 10.2 FL
POCT GLUCOSE: 105 MG/DL (ref 70–110)
POCT GLUCOSE: 115 MG/DL (ref 70–110)
POCT GLUCOSE: 128 MG/DL (ref 70–110)
POTASSIUM SERPL-SCNC: 3.7 MMOL/L
PROT SERPL-MCNC: 6.3 G/DL
RBC # BLD AUTO: 3.29 M/UL
SODIUM SERPL-SCNC: 132 MMOL/L
SODIUM SERPL-SCNC: 133 MMOL/L
WBC # BLD AUTO: 13.8 K/UL

## 2019-01-16 PROCEDURE — 94003 VENT MGMT INPAT SUBQ DAY: CPT

## 2019-01-16 PROCEDURE — 80053 COMPREHEN METABOLIC PANEL: CPT

## 2019-01-16 PROCEDURE — 27200966 HC CLOSED SUCTION SYSTEM

## 2019-01-16 PROCEDURE — 94640 AIRWAY INHALATION TREATMENT: CPT

## 2019-01-16 PROCEDURE — 25000003 PHARM REV CODE 250: Performed by: NURSE PRACTITIONER

## 2019-01-16 PROCEDURE — 84100 ASSAY OF PHOSPHORUS: CPT

## 2019-01-16 PROCEDURE — 25000003 PHARM REV CODE 250: Performed by: STUDENT IN AN ORGANIZED HEALTH CARE EDUCATION/TRAINING PROGRAM

## 2019-01-16 PROCEDURE — 31720 CLEARANCE OF AIRWAYS: CPT

## 2019-01-16 PROCEDURE — A4216 STERILE WATER/SALINE, 10 ML: HCPCS | Performed by: PSYCHIATRY & NEUROLOGY

## 2019-01-16 PROCEDURE — 99900026 HC AIRWAY MAINTENANCE (STAT)

## 2019-01-16 PROCEDURE — 25000003 PHARM REV CODE 250: Performed by: PSYCHIATRY & NEUROLOGY

## 2019-01-16 PROCEDURE — 83735 ASSAY OF MAGNESIUM: CPT

## 2019-01-16 PROCEDURE — 63600175 PHARM REV CODE 636 W HCPCS: Performed by: NURSE PRACTITIONER

## 2019-01-16 PROCEDURE — 25000242 PHARM REV CODE 250 ALT 637 W/ HCPCS: Performed by: NURSE PRACTITIONER

## 2019-01-16 PROCEDURE — 94761 N-INVAS EAR/PLS OXIMETRY MLT: CPT

## 2019-01-16 PROCEDURE — 99900035 HC TECH TIME PER 15 MIN (STAT)

## 2019-01-16 PROCEDURE — 94668 MNPJ CHEST WALL SBSQ: CPT

## 2019-01-16 PROCEDURE — 85025 COMPLETE CBC W/AUTO DIFF WBC: CPT

## 2019-01-16 PROCEDURE — 99232 SBSQ HOSP IP/OBS MODERATE 35: CPT | Mod: ,,, | Performed by: NURSE PRACTITIONER

## 2019-01-16 PROCEDURE — 84295 ASSAY OF SERUM SODIUM: CPT

## 2019-01-16 PROCEDURE — 27000221 HC OXYGEN, UP TO 24 HOURS

## 2019-01-16 PROCEDURE — 20000000 HC ICU ROOM

## 2019-01-16 PROCEDURE — 63600175 PHARM REV CODE 636 W HCPCS: Performed by: STUDENT IN AN ORGANIZED HEALTH CARE EDUCATION/TRAINING PROGRAM

## 2019-01-16 PROCEDURE — 25000003 PHARM REV CODE 250: Performed by: PHYSICIAN ASSISTANT

## 2019-01-16 PROCEDURE — 99232 PR SUBSEQUENT HOSPITAL CARE,LEVL II: ICD-10-PCS | Mod: ,,, | Performed by: NURSE PRACTITIONER

## 2019-01-16 RX ADMIN — SILODOSIN 4 MG: 4 CAPSULE ORAL at 08:01

## 2019-01-16 RX ADMIN — SODIUM CHLORIDE TAB 1 GM 2 G: 1 TAB at 08:01

## 2019-01-16 RX ADMIN — ALBUTEROL SULFATE 2.5 MG: 2.5 SOLUTION RESPIRATORY (INHALATION) at 07:01

## 2019-01-16 RX ADMIN — SODIUM CHLORIDE SOLN NEBU 3% 4 ML: 3 NEBU SOLN at 03:01

## 2019-01-16 RX ADMIN — URSODIOL 300 MG: 300 CAPSULE ORAL at 08:01

## 2019-01-16 RX ADMIN — Medication 3 ML: at 09:01

## 2019-01-16 RX ADMIN — Medication 3 ML: at 02:01

## 2019-01-16 RX ADMIN — ALBUTEROL SULFATE 2.5 MG: 2.5 SOLUTION RESPIRATORY (INHALATION) at 11:01

## 2019-01-16 RX ADMIN — SODIUM CHLORIDE SOLN NEBU 3% 4 ML: 3 NEBU SOLN at 11:01

## 2019-01-16 RX ADMIN — SODIUM CHLORIDE SOLN NEBU 3% 4 ML: 3 NEBU SOLN at 07:01

## 2019-01-16 RX ADMIN — DICLOFENAC: 10 GEL TOPICAL at 08:01

## 2019-01-16 RX ADMIN — LACOSAMIDE 100 MG: 50 TABLET, FILM COATED ORAL at 08:01

## 2019-01-16 RX ADMIN — METOPROLOL TARTRATE 100 MG: 100 TABLET ORAL at 09:01

## 2019-01-16 RX ADMIN — PRAVASTATIN SODIUM 40 MG: 40 TABLET ORAL at 08:01

## 2019-01-16 RX ADMIN — POLYETHYLENE GLYCOL 3350 17 G: 17 POWDER, FOR SOLUTION ORAL at 08:01

## 2019-01-16 RX ADMIN — FAMOTIDINE 20 MG: 20 TABLET ORAL at 08:01

## 2019-01-16 RX ADMIN — DICLOFENAC: 10 GEL TOPICAL at 02:01

## 2019-01-16 RX ADMIN — ALBUTEROL SULFATE 2.5 MG: 2.5 SOLUTION RESPIRATORY (INHALATION) at 04:01

## 2019-01-16 RX ADMIN — HEPARIN SODIUM 5000 UNITS: 5000 INJECTION, SOLUTION INTRAVENOUS; SUBCUTANEOUS at 09:01

## 2019-01-16 RX ADMIN — Medication 3 ML: at 06:01

## 2019-01-16 RX ADMIN — ACETAMINOPHEN 650 MG: 325 TABLET, FILM COATED ORAL at 08:01

## 2019-01-16 RX ADMIN — SODIUM CHLORIDE TAB 1 GM 2 G: 1 TAB at 02:01

## 2019-01-16 RX ADMIN — AMLODIPINE BESYLATE 10 MG: 10 TABLET ORAL at 08:01

## 2019-01-16 RX ADMIN — CHLORHEXIDINE GLUCONATE 0.12% ORAL RINSE 15 ML: 1.2 LIQUID ORAL at 08:01

## 2019-01-16 RX ADMIN — METOPROLOL TARTRATE 100 MG: 100 TABLET ORAL at 02:01

## 2019-01-16 RX ADMIN — STANDARDIZED SENNA CONCENTRATE AND DOCUSATE SODIUM 1 TABLET: 8.6; 5 TABLET, FILM COATED ORAL at 08:01

## 2019-01-16 RX ADMIN — URSODIOL 300 MG: 300 CAPSULE ORAL at 02:01

## 2019-01-16 RX ADMIN — HYDRALAZINE HYDROCHLORIDE 10 MG: 20 INJECTION INTRAMUSCULAR; INTRAVENOUS at 01:01

## 2019-01-16 RX ADMIN — ALBUTEROL SULFATE 2.5 MG: 2.5 SOLUTION RESPIRATORY (INHALATION) at 03:01

## 2019-01-16 RX ADMIN — FINASTERIDE 5 MG: 5 TABLET, FILM COATED ORAL at 08:01

## 2019-01-16 RX ADMIN — HEPARIN SODIUM 5000 UNITS: 5000 INJECTION, SOLUTION INTRAVENOUS; SUBCUTANEOUS at 05:01

## 2019-01-16 RX ADMIN — HEPARIN SODIUM 5000 UNITS: 5000 INJECTION, SOLUTION INTRAVENOUS; SUBCUTANEOUS at 02:01

## 2019-01-16 RX ADMIN — CHLORHEXIDINE GLUCONATE 0.12% ORAL RINSE 15 ML: 1.2 LIQUID ORAL at 02:01

## 2019-01-16 RX ADMIN — FENTANYL CITRATE 25 MCG: 50 INJECTION INTRAMUSCULAR; INTRAVENOUS at 01:01

## 2019-01-16 RX ADMIN — METOPROLOL TARTRATE 100 MG: 100 TABLET ORAL at 05:01

## 2019-01-16 RX ADMIN — SODIUM CHLORIDE SOLN NEBU 3% 4 ML: 3 NEBU SOLN at 04:01

## 2019-01-16 NOTE — PROGRESS NOTES
[facility]  Neurocritical Care  Progress Note    Admit Date: 12/20/2018  Service Date: 01/16/2019  Length of Stay: 27    Subjective:     Chief Complaint: SDH (subdural hematoma)    History of Present Illness: 63 year old male h/o CABG on ASA 81 mg (last dose 72 hours PTA) presenting as transfer from OSH for EDH with SDH. Patient reportedly had a mechanical fall from standing on Sunday and went to ED where CTH was negative for any acute intracranial pathology. Overnight, patient developed confusion and presented to OSH ED where CTH was consistent with EDH with SDH. Patient has progressively worsened throughout the day developing intermittent global aphasia. NSGY consulted in ED at [facility] and stat CTH has been ordered. NCC consulted for admission and medical management. Patient on weaning off of cardene at time of evaluation with sbp of 120. Reported presenting sbp in 150s.    Hospital Course: 12/22: no major events overnight. Weaned off of cardene. This morning AOx3. Drain removed by NSGY at 11 AM, and planned to step down to NSGY.  AMS noted around 1PM, patient aphasic and not following commands, STAT CTH unremarkable.   12/26: wean ketamine, follow EEG, send urine studuies-Na 133, NS bolus, advance TF  12/27: off ketamine, d/c levo, continue EEG, and AEDs, 500 NS bolus, increase enteral water flushes, continue abx for another day  1/2: extubation trial.   1/3: failed extubation trial. Plan for trach and peg  1/4: plan for trach, general surgery consult  1/5: NAEO  1/6: NAEO. Awaiting trach and peg  1/7: plan for trach/peg, opens eyes to voice no commands  1/8: s/p trach/peg today  1/9: restarting tf this afternoon, ltac planning, eeg today to help guide aed weaning  1/12 Trach trials tolerating well. Neuro checks q2 in am/ q4in pm metoprolol prn HR.120  1/13 Continues to tolerate Trach. Start Norvasc. eval thyroid function baseline.   1/14 hyponatremic, pending urine studies, finasteride, increased  secretions, CPT, pending LTAC  01/15: Awaiting approval for transfer to [facility]  01/16: Gen surg about sutures and trach pressure wound      Review of Systems    Unable to obtain a complete ROS due to trach, nonverbal  Objective:     Vitals:  Temp: 98 °F (36.7 °C)  Pulse: 103  Rhythm: atrial rhythm  BP: (!) 163/93  MAP (mmHg): 121  Resp: 19  SpO2: 100 %  Oxygen Concentration (%): 25  O2 Device (Oxygen Therapy): Trach Collar  Vent Mode: Spont  Pressure Support: 5 cmH20  PEEP/CPAP: 5 cmH20  Peak Airway Pressure: 11 cmH2O  Mean Airway Pressure: 7.1 cmH20  Plateau Pressure: 0 cmH20    Temp  Min: 98 °F (36.7 °C)  Max: 98.4 °F (36.9 °C)  Pulse  Min: 74  Max: 132  BP  Min: 138/87  Max: 185/113  MAP (mmHg)  Min: 102  Max: 138  Resp  Min: 18  Max: 33  SpO2  Min: 97 %  Max: 100 %  Oxygen Concentration (%)  Min: 25  Max: 100    01/15 0701 - 01/16 0700  In: 3390 [I.V.:1800]  Out: 1900 [Urine:1900]   Unmeasured Output  Urine Occurrence: 1  Stool Occurrence: 1       Physical Exam    Physical Exam:  GA: Awake, comfortable, no acute distress.   HEENT: No scleral icterus or JVD.   Pulmonary: Clear to auscultation A/L. No wheezing, crackles, or rhonchi.  Cardiac: RRR S1 & S2 w/o rubs/murmurs/gallops.   Abdominal: Bowel sounds present x 4. No appreciable hepatosplenomegaly.  Skin: No jaundice, rashes, or visible lesions.  Neuro:  --GCS: E4 V1t M5  --Mental Status:  Awake, alert, trach, doesn't follow commands  --Pupils 4mm, PERRL.   --cough, gag, corneal reflexes intact  --moves all extremities spontaneously  Unable to test orientation, language, memory, judgment, insight, fund of knowledge, shoulder shrug, tongue protrusion, coordination, gait    Medications:  Continuous   Scheduled    albuterol sulfate 2.5 mg Q4H   amLODIPine 10 mg Daily   chlorhexidine 15 mL QID   diclofenac sodium  TID   famotidine 20 mg BID   finasteride 5 mg Daily   heparin (porcine) 5,000 Units Q8H   lacosamide 100 mg BID   metoprolol tartrate 100 mg Q8H    polyethylene glycol 17 g BID   pravastatin 40 mg QHS   senna-docusate 8.6-50 mg 1 tablet BID   silodosin 4 mg Daily   sodium chloride 0.9% 3 mL Q8H   sodium chloride 3% 4 mL Q4H   sodium chloride 2 g TID   ursodiol 300 mg TID   PRN    acetaminophen 650 mg Q6H PRN   fentaNYL 25 mcg Q2H PRN   hydrALAZINE 10 mg Q4H PRN   magnesium oxide 800 mg PRN   magnesium oxide 800 mg PRN   ondansetron 4 mg Q12H PRN   potassium chloride 10% 40 mEq PRN   potassium chloride 10% 40 mEq PRN   potassium chloride 10% 60 mEq PRN   potassium, sodium phosphates 2 packet PRN   potassium, sodium phosphates 2 packet PRN   potassium, sodium phosphates 2 packet PRN     Today I personally reviewed pertinent medications, lines/drains/airways, imaging, cardiology results, laboratory results, microbiology results,     Diet  No diet orders on file  No diet orders on file        Assessment/Plan:     Neuro   * SDH (subdural hematoma)    Epidural with subdural component s/p evac 12/20  Complicated by status epilepticus  Neuro checks Q2Hr in am / Q4Hr in pm  NSGY following  SBP goal < 180  Pt/OT/SLP   LTAC planning continued   Gen surg about trach care       Status epilepticus    Mental status continuing to improve  Spot eeg without any epileptiform activity    Decrease lacosamide 100mg bid 1/12     Pulmonary   Acute respiratory failure    S/p trach  Trach collar trial daily  Rest at night on vent as needed     Cardiac/Vascular   New onset atrial fibrillation    -With RVR  -Metoprolol 100 mg Q8H  -Monitor off AC       Essential hypertension    -SBP<180  -PRN hydralazine  -Increased Amlodopine to 5mg - 10mg 1/13       Renal/   Hyponatremia      Increase sodium tabs to 2g tid  Urine Na, Urine osm, serum osm, UA pending  NS dced     Endocrine   Severe malnutrition    S/p trach/peg  Impact peptide 55cc/h               Activity Orders          None        Full Code    Blake Mcginnis NP  Neurocritical Care  [facility]

## 2019-01-16 NOTE — SUBJECTIVE & OBJECTIVE
Interval History: 1/16 -  -185, NAEON, labs stable, exam stable    Medications:  Continuous Infusions:  Scheduled Meds:   albuterol sulfate  2.5 mg Nebulization Q4H    amLODIPine  10 mg Per G Tube Daily    chlorhexidine  15 mL Mouth/Throat QID    diclofenac sodium   Topical (Top) TID    famotidine  20 mg Per NG tube BID    finasteride  5 mg Per G Tube Daily    heparin (porcine)  5,000 Units Subcutaneous Q8H    lacosamide  100 mg Per NG tube BID    metoprolol tartrate  100 mg Per NG tube Q8H    polyethylene glycol  17 g Per NG tube BID    pravastatin  40 mg Per NG tube QHS    senna-docusate 8.6-50 mg  1 tablet Per NG tube BID    silodosin  4 mg Per G Tube Daily    sodium chloride 0.9%  3 mL Intravenous Q8H    sodium chloride 3%  4 mL Nebulization Q4H    sodium chloride  2 g Per NG tube TID    ursodiol  300 mg Per NG tube TID     PRN Meds:acetaminophen, fentaNYL, hydrALAZINE, magnesium oxide, magnesium oxide, ondansetron, potassium chloride 10%, potassium chloride 10%, potassium chloride 10%, potassium, sodium phosphates, potassium, sodium phosphates, potassium, sodium phosphates       Objective:     Weight: 112.5 kg (248 lb 0.3 oz)  Body mass index is 34.59 kg/m².  Vital Signs (Most Recent):  Temp: 98 °F (36.7 °C) (01/16/19 0705)  Pulse: 103 (01/16/19 0905)  Resp: 19 (01/16/19 0905)  BP: (!) 163/93 (01/16/19 0905)  SpO2: 100 % (01/16/19 0905) Vital Signs (24h Range):  Temp:  [98 °F (36.7 °C)-98.4 °F (36.9 °C)] 98 °F (36.7 °C)  Pulse:  [] 103  Resp:  [18-33] 19  SpO2:  [97 %-100 %] 100 %  BP: (140-185)/() 163/93     Date 01/16/19 0700 - 01/17/19 0659   Shift 2214-9276 8422-0952 5049-9196 24 Hour Total   INTAKE   I.V.(mL/kg) 225(2)   225(2)   NG/   285   Shift Total(mL/kg) 510(4.5)   510(4.5)   OUTPUT   Shift Total(mL/kg)       Weight (kg) 112.5 112.5 112.5 112.5              Vent Mode: Spont  Oxygen Concentration (%):  [] 25  Resp Rate Total:  [17 br/min-39 br/min] 19  br/min  PEEP/CPAP:  [5 cmH20] 5 cmH20  Pressure Support:  [5 cmH20] 5 cmH20  Mean Airway Pressure:  [7.1 cmH20-8.7 cmH20] 7.1 cmH20         Gastrostomy/Enterostomy 01/08/19 1441 Percutaneous endoscopic gastrostomy (PEG) midline (Active)   Securement sutured to abdomen 1/16/2019  7:05 AM   Interventions Prior to Feeding patency checked;residual checked 1/16/2019  7:05 AM   Drainage serosanguineous 1/10/2019  3:00 PM   Feeding Type continuous 1/16/2019  7:05 AM   Clamp Status/Tolerance unclamped 1/16/2019  7:05 AM   Feeding Action feeding continued 1/16/2019  7:05 AM   Dressing dry and intact 1/16/2019  7:05 AM   Insertion Site no redness;no warmth;no drainage;no tenderness 1/16/2019  3:03 AM   Site Care device rotatated 1/16/2019  7:05 AM   Flush/Irrigation flushed w/;water;no resistance met 1/16/2019  7:05 AM   Dressing Change Due 01/10/19 1/10/2019  3:00 PM   Current Rate (mL/hr) 55 mL/hr 1/16/2019  7:05 AM   Goal Rate (mL/hr) 55 mL/hr 1/16/2019  7:05 AM   Intake (mL) 120 mL 1/16/2019  8:05 AM   Water Bolus (mL) 100 mL 1/13/2019  6:05 AM   Tube Output(mL)(Include Discarded Residual) 60 mL 1/13/2019  2:00 PM   Formula Name impact peptide 1/10/2019  3:00 PM   Tube Feeding Intake (mL) 55 1/16/2019  9:05 AM   Residual Amount (ml) 5 ml 1/16/2019  7:05 AM       Neurosurgery Physical Exam     E4VTM5, grimacing to stimulation, tracking  PERRL  MOORE spontaneously  Trach and PEG in place      Significant Labs:  Recent Labs   Lab 01/14/19  1041 01/15/19  0233 01/16/19  0530   * 121* 137*   * 133* 132*   K 4.2 4.3 3.7    103 105   CO2 23 22* 18*   BUN 21 23 19   CREATININE 0.5 0.5 0.5   CALCIUM 8.8 8.9 8.8   MG  --  2.0 1.7     Recent Labs   Lab 01/15/19  0233 01/16/19  0530   WBC 13.84* 13.80*   HGB 9.8* 10.6*   HCT 31.4* 30.8*    224     No results for input(s): LABPT, INR, APTT in the last 48 hours.  Microbiology Results (last 7 days)     ** No results found for the last 168 hours. **

## 2019-01-16 NOTE — PLAN OF CARE
Expedited appeal for LTAC denial faxed to [facility] [employer], [phone].    Azeb Flores RN, CCRN-K, [facility]  Neuro-Critical Care   [phone]

## 2019-01-16 NOTE — ASSESSMENT & PLAN NOTE
Epidural with subdural component s/p evac 12/20  Complicated by status epilepticus  Neuro checks Q2Hr in am / Q4Hr in pm  NSGY following  SBP goal < 180  Pt/OT/SLP   LTAC planning continued   Gen surg about trach care

## 2019-01-16 NOTE — ASSESSMENT & PLAN NOTE
uop improved  Bun improving, hypernatremia improved  Increased insensible losses due to open mouth    Follow uop

## 2019-01-16 NOTE — PROGRESS NOTES
[facility]  Neurosurgery  Progress Note    Subjective:     History of Present Illness: 64 yo M with PMH of CAD, CABG in 2006, DM, HTN, HLD on ASA presents to the ED for AMS. Patient fell 4 days ago with about 2 minutes of LOC. Family is unsure of what caused the fall. Full workup done including a CTH at [facility] that was negative. Patient went home and stopped taking his ASA. This morning, his family noticed he was more confused. He went to [facility] and CTH was done that revealed a 1.7cm left frontalpariental SDH with a 5 mm right sided midline shift and a left SAH. Patient was transferred to [facility]. History was obtained per family as patient unable to give history 2/2 confusion with expressive and receptive aphasia.    Post-Op Info:  Procedure(s) (LRB):  INSERTION, PEG TUBE (N/A)  CREATION, TRACHEOSTOMY (N/A)   8 Days Post-Op     Interval History: 1/16 -  -185, NAEON, labs stable, exam stable    Medications:  Continuous Infusions:  Scheduled Meds:   albuterol sulfate  2.5 mg Nebulization Q4H    amLODIPine  10 mg Per G Tube Daily    chlorhexidine  15 mL Mouth/Throat QID    diclofenac sodium   Topical (Top) TID    famotidine  20 mg Per NG tube BID    finasteride  5 mg Per G Tube Daily    heparin (porcine)  5,000 Units Subcutaneous Q8H    lacosamide  100 mg Per NG tube BID    metoprolol tartrate  100 mg Per NG tube Q8H    polyethylene glycol  17 g Per NG tube BID    pravastatin  40 mg Per NG tube QHS    senna-docusate 8.6-50 mg  1 tablet Per NG tube BID    silodosin  4 mg Per G Tube Daily    sodium chloride 0.9%  3 mL Intravenous Q8H    sodium chloride 3%  4 mL Nebulization Q4H    sodium chloride  2 g Per NG tube TID    ursodiol  300 mg Per NG tube TID     PRN Meds:acetaminophen, fentaNYL, hydrALAZINE, magnesium oxide, magnesium oxide, ondansetron, potassium chloride 10%, potassium chloride 10%, potassium chloride 10%, potassium, sodium phosphates, potassium, sodium phosphates,  potassium, sodium phosphates       Objective:     Weight: 112.5 kg (248 lb 0.3 oz)  Body mass index is 34.59 kg/m².  Vital Signs (Most Recent):  Temp: 98 °F (36.7 °C) (01/16/19 0705)  Pulse: 103 (01/16/19 0905)  Resp: 19 (01/16/19 0905)  BP: (!) 163/93 (01/16/19 0905)  SpO2: 100 % (01/16/19 0905) Vital Signs (24h Range):  Temp:  [98 °F (36.7 °C)-98.4 °F (36.9 °C)] 98 °F (36.7 °C)  Pulse:  [] 103  Resp:  [18-33] 19  SpO2:  [97 %-100 %] 100 %  BP: (140-185)/() 163/93     Date 01/16/19 0700 - 01/17/19 0659   Shift 5297-0267 5362-2763 9747-4995 24 Hour Total   INTAKE   I.V.(mL/kg) 225(2)   225(2)   NG/   285   Shift Total(mL/kg) 510(4.5)   510(4.5)   OUTPUT   Shift Total(mL/kg)       Weight (kg) 112.5 112.5 112.5 112.5              Vent Mode: Spont  Oxygen Concentration (%):  [] 25  Resp Rate Total:  [17 br/min-39 br/min] 19 br/min  PEEP/CPAP:  [5 cmH20] 5 cmH20  Pressure Support:  [5 cmH20] 5 cmH20  Mean Airway Pressure:  [7.1 cmH20-8.7 cmH20] 7.1 cmH20         Gastrostomy/Enterostomy 01/08/19 1441 Percutaneous endoscopic gastrostomy (PEG) midline (Active)   Securement sutured to abdomen 1/16/2019  7:05 AM   Interventions Prior to Feeding patency checked;residual checked 1/16/2019  7:05 AM   Drainage serosanguineous 1/10/2019  3:00 PM   Feeding Type continuous 1/16/2019  7:05 AM   Clamp Status/Tolerance unclamped 1/16/2019  7:05 AM   Feeding Action feeding continued 1/16/2019  7:05 AM   Dressing dry and intact 1/16/2019  7:05 AM   Insertion Site no redness;no warmth;no drainage;no tenderness 1/16/2019  3:03 AM   Site Care device rotatated 1/16/2019  7:05 AM   Flush/Irrigation flushed w/;water;no resistance met 1/16/2019  7:05 AM   Dressing Change Due 01/10/19 1/10/2019  3:00 PM   Current Rate (mL/hr) 55 mL/hr 1/16/2019  7:05 AM   Goal Rate (mL/hr) 55 mL/hr 1/16/2019  7:05 AM   Intake (mL) 120 mL 1/16/2019  8:05 AM   Water Bolus (mL) 100 mL 1/13/2019  6:05 AM   Tube Output(mL)(Include Discarded  Residual) 60 mL 1/13/2019  2:00 PM   Formula Name impact peptide 1/10/2019  3:00 PM   Tube Feeding Intake (mL) 55 1/16/2019  9:05 AM   Residual Amount (ml) 5 ml 1/16/2019  7:05 AM       Neurosurgery Physical Exam     E4VTM5, grimacing to stimulation, tracking  PERRL  MOORE spontaneously  Trach and PEG in place      Significant Labs:  Recent Labs   Lab 01/14/19  1041 01/15/19  0233 01/16/19  0530   * 121* 137*   * 133* 132*   K 4.2 4.3 3.7    103 105   CO2 23 22* 18*   BUN 21 23 19   CREATININE 0.5 0.5 0.5   CALCIUM 8.8 8.9 8.8   MG  --  2.0 1.7     Recent Labs   Lab 01/15/19  0233 01/16/19  0530   WBC 13.84* 13.80*   HGB 9.8* 10.6*   HCT 31.4* 30.8*    224     No results for input(s): LABPT, INR, APTT in the last 48 hours.  Microbiology Results (last 7 days)     ** No results found for the last 168 hours. **            Assessment/Plan:     * SDH (subdural hematoma)    64 yo male with L aSDH now s/p craniotomy for evacuation (12/20).    --Continue care per primary team.  --Patient is stable for discharge from a neurosurgical perspective  --Continue neurochecks while in house  --Further care per NCC   --Dispo pending LTAC placement              Gagandeep Giang MD  Neurosurgery  [facility]

## 2019-01-16 NOTE — PLAN OF CARE
01/16/19 1127   Post-Acute Status   Post-Acute Authorization Placement   Post-Acute Placement Status Referrals Sent     SW advised by CM that the family would go to [facility] after LTAC and if LTAC is denied, they would be okay with going there from [facility].     SW sent referral via ESTER.     Caroline Soares LMSW  Neurocritical Care   [facility]  [id number]

## 2019-01-16 NOTE — SUBJECTIVE & OBJECTIVE
Review of Systems    Unable to obtain a complete ROS due to trach, nonverbal  Objective:     Vitals:  Temp: 98 °F (36.7 °C)  Pulse: 103  Rhythm: atrial rhythm  BP: (!) 163/93  MAP (mmHg): 121  Resp: 19  SpO2: 100 %  Oxygen Concentration (%): 25  O2 Device (Oxygen Therapy): Trach Collar  Vent Mode: Spont  Pressure Support: 5 cmH20  PEEP/CPAP: 5 cmH20  Peak Airway Pressure: 11 cmH2O  Mean Airway Pressure: 7.1 cmH20  Plateau Pressure: 0 cmH20    Temp  Min: 98 °F (36.7 °C)  Max: 98.4 °F (36.9 °C)  Pulse  Min: 74  Max: 132  BP  Min: 138/87  Max: 185/113  MAP (mmHg)  Min: 102  Max: 138  Resp  Min: 18  Max: 33  SpO2  Min: 97 %  Max: 100 %  Oxygen Concentration (%)  Min: 25  Max: 100    01/15 0701 - 01/16 0700  In: 3390 [I.V.:1800]  Out: 1900 [Urine:1900]   Unmeasured Output  Urine Occurrence: 1  Stool Occurrence: 1       Physical Exam    Physical Exam:  GA: Awake, comfortable, no acute distress.   HEENT: No scleral icterus or JVD.   Pulmonary: Clear to auscultation A/L. No wheezing, crackles, or rhonchi.  Cardiac: RRR S1 & S2 w/o rubs/murmurs/gallops.   Abdominal: Bowel sounds present x 4. No appreciable hepatosplenomegaly.  Skin: No jaundice, rashes, or visible lesions.  Neuro:  --GCS: E4 V1t M5  --Mental Status:  Awake, alert, trach, doesn't follow commands  --Pupils 4mm, PERRL.   --cough, gag, corneal reflexes intact  --moves all extremities spontaneously  Unable to test orientation, language, memory, judgment, insight, fund of knowledge, shoulder shrug, tongue protrusion, coordination, gait    Medications:  Continuous   Scheduled    albuterol sulfate 2.5 mg Q4H   amLODIPine 10 mg Daily   chlorhexidine 15 mL QID   diclofenac sodium  TID   famotidine 20 mg BID   finasteride 5 mg Daily   heparin (porcine) 5,000 Units Q8H   lacosamide 100 mg BID   metoprolol tartrate 100 mg Q8H   polyethylene glycol 17 g BID   pravastatin 40 mg QHS   senna-docusate 8.6-50 mg 1 tablet BID   silodosin 4 mg Daily   sodium chloride 0.9% 3 mL  Q8H   sodium chloride 3% 4 mL Q4H   sodium chloride 2 g TID   ursodiol 300 mg TID   PRN    acetaminophen 650 mg Q6H PRN   fentaNYL 25 mcg Q2H PRN   hydrALAZINE 10 mg Q4H PRN   magnesium oxide 800 mg PRN   magnesium oxide 800 mg PRN   ondansetron 4 mg Q12H PRN   potassium chloride 10% 40 mEq PRN   potassium chloride 10% 40 mEq PRN   potassium chloride 10% 60 mEq PRN   potassium, sodium phosphates 2 packet PRN   potassium, sodium phosphates 2 packet PRN   potassium, sodium phosphates 2 packet PRN     Today I personally reviewed pertinent medications, lines/drains/airways, imaging, cardiology results, laboratory results, microbiology results,     Diet  No diet orders on file  No diet orders on file

## 2019-01-16 NOTE — PLAN OF CARE
Problem: Adult Inpatient Plan of Care  Goal: Plan of Care Review  Outcome: Ongoing (interventions implemented as appropriate)  No acute events overnight.  Awake and alert.  On vent overnight. Spontaneous 30% FiO2.  Q6H bladder scans performed and intermittent straight catheter. 1 BM last night.  Peg tube with tube feeding infusing at 55 ml/hr goal rate.  Chlorhexidine bath, gown and linen change provided. Patient progressing towards goals as tolerated, plan of care communicated and reviewed with Peewee Nunes. All concerns addressed. Will continue to monitor.   Temp:  [98.1 °F (36.7 °C)-98.4 °F (36.9 °C)]   Pulse:  []   Resp:  [18-33]   BP: (141-185)/()   SpO2:  [97 %-100 %]    I/O this shift:  In: 1650 [I.V.:900; NG/GT:750]  Out: 300 [Urine:300]

## 2019-01-17 LAB
ALBUMIN SERPL BCP-MCNC: 2.4 G/DL
ALP SERPL-CCNC: 176 U/L
ALT SERPL W/O P-5'-P-CCNC: 8 U/L
ANION GAP SERPL CALC-SCNC: 8 MMOL/L
AST SERPL-CCNC: 16 U/L
BASOPHILS # BLD AUTO: 0.11 K/UL
BASOPHILS NFR BLD: 0.8 %
BILIRUB SERPL-MCNC: 0.6 MG/DL
BUN SERPL-MCNC: 22 MG/DL
CALCIUM SERPL-MCNC: 8.8 MG/DL
CHLORIDE SERPL-SCNC: 106 MMOL/L
CO2 SERPL-SCNC: 22 MMOL/L
CREAT SERPL-MCNC: 0.5 MG/DL
DIFFERENTIAL METHOD: ABNORMAL
EOSINOPHIL # BLD AUTO: 0.5 K/UL
EOSINOPHIL NFR BLD: 3.4 %
ERYTHROCYTE [DISTWIDTH] IN BLOOD BY AUTOMATED COUNT: 16 %
EST. GFR  (AFRICAN AMERICAN): >60 ML/MIN/1.73 M^2
EST. GFR  (NON AFRICAN AMERICAN): >60 ML/MIN/1.73 M^2
GLUCOSE SERPL-MCNC: 124 MG/DL
HCT VFR BLD AUTO: 28.6 %
HGB BLD-MCNC: 9.3 G/DL
IMM GRANULOCYTES # BLD AUTO: 0.1 K/UL
IMM GRANULOCYTES NFR BLD AUTO: 0.8 %
LYMPHOCYTES # BLD AUTO: 2 K/UL
LYMPHOCYTES NFR BLD: 15.4 %
MAGNESIUM SERPL-MCNC: 1.8 MG/DL
MCH RBC QN AUTO: 32.3 PG
MCHC RBC AUTO-ENTMCNC: 32.5 G/DL
MCV RBC AUTO: 99 FL
MONOCYTES # BLD AUTO: 1.2 K/UL
MONOCYTES NFR BLD: 8.7 %
NEUTROPHILS # BLD AUTO: 9.4 K/UL
NEUTROPHILS NFR BLD: 70.9 %
NRBC BLD-RTO: 0 /100 WBC
PHOSPHATE SERPL-MCNC: 3.1 MG/DL
PLATELET # BLD AUTO: 314 K/UL
PMV BLD AUTO: 9.9 FL
POCT GLUCOSE: 117 MG/DL (ref 70–110)
POCT GLUCOSE: 131 MG/DL (ref 70–110)
POTASSIUM SERPL-SCNC: 3.9 MMOL/L
PROT SERPL-MCNC: 6 G/DL
RBC # BLD AUTO: 2.88 M/UL
SODIUM SERPL-SCNC: 136 MMOL/L
WBC # BLD AUTO: 13.21 K/UL

## 2019-01-17 PROCEDURE — 80053 COMPREHEN METABOLIC PANEL: CPT

## 2019-01-17 PROCEDURE — 99232 SBSQ HOSP IP/OBS MODERATE 35: CPT | Mod: ,,, | Performed by: NURSE PRACTITIONER

## 2019-01-17 PROCEDURE — 83735 ASSAY OF MAGNESIUM: CPT

## 2019-01-17 PROCEDURE — 94668 MNPJ CHEST WALL SBSQ: CPT

## 2019-01-17 PROCEDURE — 94640 AIRWAY INHALATION TREATMENT: CPT

## 2019-01-17 PROCEDURE — 25000003 PHARM REV CODE 250: Performed by: NURSE PRACTITIONER

## 2019-01-17 PROCEDURE — 94761 N-INVAS EAR/PLS OXIMETRY MLT: CPT

## 2019-01-17 PROCEDURE — 27000221 HC OXYGEN, UP TO 24 HOURS

## 2019-01-17 PROCEDURE — 84100 ASSAY OF PHOSPHORUS: CPT

## 2019-01-17 PROCEDURE — 85025 COMPLETE CBC W/AUTO DIFF WBC: CPT

## 2019-01-17 PROCEDURE — 25000242 PHARM REV CODE 250 ALT 637 W/ HCPCS: Performed by: NURSE PRACTITIONER

## 2019-01-17 PROCEDURE — A4216 STERILE WATER/SALINE, 10 ML: HCPCS | Performed by: PSYCHIATRY & NEUROLOGY

## 2019-01-17 PROCEDURE — 25000003 PHARM REV CODE 250: Performed by: PHYSICIAN ASSISTANT

## 2019-01-17 PROCEDURE — 63600175 PHARM REV CODE 636 W HCPCS: Performed by: STUDENT IN AN ORGANIZED HEALTH CARE EDUCATION/TRAINING PROGRAM

## 2019-01-17 PROCEDURE — 99232 PR SUBSEQUENT HOSPITAL CARE,LEVL II: ICD-10-PCS | Mod: ,,, | Performed by: NURSE PRACTITIONER

## 2019-01-17 PROCEDURE — 25000003 PHARM REV CODE 250: Performed by: PSYCHIATRY & NEUROLOGY

## 2019-01-17 PROCEDURE — 99900035 HC TECH TIME PER 15 MIN (STAT)

## 2019-01-17 PROCEDURE — 94003 VENT MGMT INPAT SUBQ DAY: CPT

## 2019-01-17 PROCEDURE — 20000000 HC ICU ROOM

## 2019-01-17 PROCEDURE — 99900026 HC AIRWAY MAINTENANCE (STAT)

## 2019-01-17 PROCEDURE — 25000003 PHARM REV CODE 250: Performed by: STUDENT IN AN ORGANIZED HEALTH CARE EDUCATION/TRAINING PROGRAM

## 2019-01-17 PROCEDURE — 27200966 HC CLOSED SUCTION SYSTEM

## 2019-01-17 RX ORDER — METOPROLOL TARTRATE 100 MG/1
100 TABLET ORAL EVERY 8 HOURS
Status: DISCONTINUED | OUTPATIENT
Start: 2019-01-17 | End: 2019-01-28 | Stop reason: HOSPADM

## 2019-01-17 RX ORDER — FAMOTIDINE 20 MG/1
20 TABLET, FILM COATED ORAL 2 TIMES DAILY
Status: DISCONTINUED | OUTPATIENT
Start: 2019-01-17 | End: 2019-01-28 | Stop reason: HOSPADM

## 2019-01-17 RX ORDER — PRAVASTATIN SODIUM 40 MG/1
40 TABLET ORAL NIGHTLY
Status: DISCONTINUED | OUTPATIENT
Start: 2019-01-17 | End: 2019-01-28 | Stop reason: HOSPADM

## 2019-01-17 RX ORDER — ACETAMINOPHEN 325 MG/1
650 TABLET ORAL EVERY 6 HOURS PRN
Status: DISCONTINUED | OUTPATIENT
Start: 2019-01-17 | End: 2019-01-24

## 2019-01-17 RX ORDER — AMOXICILLIN 250 MG
1 CAPSULE ORAL 2 TIMES DAILY
Status: DISCONTINUED | OUTPATIENT
Start: 2019-01-17 | End: 2019-01-28

## 2019-01-17 RX ORDER — URSODIOL 300 MG/1
300 CAPSULE ORAL 3 TIMES DAILY
Status: DISCONTINUED | OUTPATIENT
Start: 2019-01-17 | End: 2019-01-24

## 2019-01-17 RX ORDER — POLYETHYLENE GLYCOL 3350 17 G/17G
17 POWDER, FOR SOLUTION ORAL 2 TIMES DAILY
Status: DISCONTINUED | OUTPATIENT
Start: 2019-01-17 | End: 2019-01-28

## 2019-01-17 RX ORDER — LACOSAMIDE 50 MG/1
100 TABLET ORAL 2 TIMES DAILY
Status: DISCONTINUED | OUTPATIENT
Start: 2019-01-17 | End: 2019-01-21

## 2019-01-17 RX ADMIN — URSODIOL 300 MG: 300 CAPSULE ORAL at 08:01

## 2019-01-17 RX ADMIN — Medication 3 ML: at 03:01

## 2019-01-17 RX ADMIN — LACOSAMIDE 100 MG: 50 TABLET, FILM COATED ORAL at 08:01

## 2019-01-17 RX ADMIN — HEPARIN SODIUM 5000 UNITS: 5000 INJECTION, SOLUTION INTRAVENOUS; SUBCUTANEOUS at 05:01

## 2019-01-17 RX ADMIN — ALBUTEROL SULFATE 2.5 MG: 2.5 SOLUTION RESPIRATORY (INHALATION) at 11:01

## 2019-01-17 RX ADMIN — SODIUM CHLORIDE TAB 1 GM 2 G: 1 TAB at 02:01

## 2019-01-17 RX ADMIN — MAGNESIUM OXIDE TAB 400 MG (241.3 MG ELEMENTAL MG) 800 MG: 400 (241.3 MG) TAB at 05:01

## 2019-01-17 RX ADMIN — SODIUM CHLORIDE SOLN NEBU 3% 4 ML: 3 NEBU SOLN at 07:01

## 2019-01-17 RX ADMIN — STANDARDIZED SENNA CONCENTRATE AND DOCUSATE SODIUM 1 TABLET: 8.6; 5 TABLET, FILM COATED ORAL at 08:01

## 2019-01-17 RX ADMIN — Medication 3 ML: at 05:01

## 2019-01-17 RX ADMIN — DICLOFENAC: 10 GEL TOPICAL at 03:01

## 2019-01-17 RX ADMIN — METOPROLOL TARTRATE 100 MG: 100 TABLET ORAL at 05:01

## 2019-01-17 RX ADMIN — SODIUM CHLORIDE SOLN NEBU 3% 4 ML: 3 NEBU SOLN at 03:01

## 2019-01-17 RX ADMIN — POLYETHYLENE GLYCOL 3350 17 G: 17 POWDER, FOR SOLUTION ORAL at 08:01

## 2019-01-17 RX ADMIN — SODIUM CHLORIDE TAB 1 GM 2 G: 1 TAB at 08:01

## 2019-01-17 RX ADMIN — FINASTERIDE 5 MG: 5 TABLET, FILM COATED ORAL at 08:01

## 2019-01-17 RX ADMIN — MAGNESIUM OXIDE TAB 400 MG (241.3 MG ELEMENTAL MG) 800 MG: 400 (241.3 MG) TAB at 08:01

## 2019-01-17 RX ADMIN — HEPARIN SODIUM 5000 UNITS: 5000 INJECTION, SOLUTION INTRAVENOUS; SUBCUTANEOUS at 09:01

## 2019-01-17 RX ADMIN — CHLORHEXIDINE GLUCONATE 0.12% ORAL RINSE 15 ML: 1.2 LIQUID ORAL at 08:01

## 2019-01-17 RX ADMIN — FAMOTIDINE 20 MG: 20 TABLET ORAL at 08:01

## 2019-01-17 RX ADMIN — SODIUM CHLORIDE SOLN NEBU 3% 4 ML: 3 NEBU SOLN at 11:01

## 2019-01-17 RX ADMIN — DICLOFENAC: 10 GEL TOPICAL at 08:01

## 2019-01-17 RX ADMIN — ALBUTEROL SULFATE 2.5 MG: 2.5 SOLUTION RESPIRATORY (INHALATION) at 07:01

## 2019-01-17 RX ADMIN — Medication 3 ML: at 10:01

## 2019-01-17 RX ADMIN — HEPARIN SODIUM 5000 UNITS: 5000 INJECTION, SOLUTION INTRAVENOUS; SUBCUTANEOUS at 03:01

## 2019-01-17 RX ADMIN — AMLODIPINE BESYLATE 10 MG: 10 TABLET ORAL at 08:01

## 2019-01-17 RX ADMIN — METOPROLOL TARTRATE 100 MG: 100 TABLET, FILM COATED ORAL at 10:01

## 2019-01-17 RX ADMIN — PRAVASTATIN SODIUM 40 MG: 40 TABLET ORAL at 08:01

## 2019-01-17 RX ADMIN — ALBUTEROL SULFATE 2.5 MG: 2.5 SOLUTION RESPIRATORY (INHALATION) at 03:01

## 2019-01-17 RX ADMIN — CHLORHEXIDINE GLUCONATE 0.12% ORAL RINSE 15 ML: 1.2 LIQUID ORAL at 02:01

## 2019-01-17 RX ADMIN — SILODOSIN 4 MG: 4 CAPSULE ORAL at 08:01

## 2019-01-17 RX ADMIN — URSODIOL 300 MG: 300 CAPSULE ORAL at 03:01

## 2019-01-17 RX ADMIN — ACETAMINOPHEN 650 MG: 325 TABLET, FILM COATED ORAL at 08:01

## 2019-01-17 RX ADMIN — METOPROLOL TARTRATE 100 MG: 100 TABLET ORAL at 03:01

## 2019-01-17 NOTE — PROGRESS NOTES
Patient seen for follow up for ulceration beneath trach, lower two sutures were removed. Shallow full thickness ulceration with moist pink wound bed, scant slough noted. Wound complicated by pressure and moisture. Recommend continuing current orders/recommendations: dress trach with hydrofera blue ready foam,change every 5days or as needed. Updated NCC team of recommendations. Nursing to continue care. Wound care to follow prn.     01/17/19 1450       Incision/Site 01/08/19 1408 Neck   Date First Assessed/Time First Assessed: 01/08/19 1408   Location: Neck   Wound Image    Incision WDL ex   Drainage Amount Scant   Drainage Characteristics/Odor Serous;No odor   Appearance Moist;Pink  (scant slough)   Periwound Area Redness   Wound Edges Open   Wound Length (cm) 0.5 cm   Wound Width (cm) 1 cm   Wound Depth (cm) 0.3 cm   Wound Volume (cm^3) 0.15 cm^3   Wound Surface Area (cm^2) 0.5 cm^2   Dressing Changed  (hydrofera blue ready foam)

## 2019-01-17 NOTE — PLAN OF CARE
Problem: Adult Inpatient Plan of Care  Goal: Plan of Care Review  Outcome: Ongoing (interventions implemented as appropriate)  POC reviewed with Peewee at 0500. Pt is trach/nonverbal and cannot verbalized understanding. No acute events overnight. Still retaining urine and requires intermittent catheterization every six hours.  No changes to neuro status; afebrile; still not following commands. Pt progressing toward goals. Will continue to monitor. See flowsheets for full assessment and VS info

## 2019-01-17 NOTE — PROGRESS NOTES
[facility]  Neurocritical Care  Progress Note    Admit Date: 12/20/2018  Service Date: 01/17/2019  Length of Stay: 28    Subjective:     Chief Complaint: SDH (subdural hematoma)    History of Present Illness: 63 year old male h/o CABG on ASA 81 mg (last dose 72 hours PTA) presenting as transfer from OSH for EDH with SDH. Patient reportedly had a mechanical fall from standing on Sunday and went to ED where CTH was negative for any acute intracranial pathology. Overnight, patient developed confusion and presented to OSH ED where CTH was consistent with EDH with SDH. Patient has progressively worsened throughout the day developing intermittent global aphasia. NSGY consulted in ED at [facility] and stat CTH has been ordered. NCC consulted for admission and medical management. Patient on weaning off of cardene at time of evaluation with sbp of 120. Reported presenting sbp in 150s.    Hospital Course: 12/22: no major events overnight. Weaned off of cardene. This morning AOx3. Drain removed by NSGY at 11 AM, and planned to step down to NSGY.  AMS noted around 1PM, patient aphasic and not following commands, STAT CTH unremarkable.   12/26: wean ketamine, follow EEG, send urine studuies-Na 133, NS bolus, advance TF  12/27: off ketamine, d/c levo, continue EEG, and AEDs, 500 NS bolus, increase enteral water flushes, continue abx for another day  1/2: extubation trial.   1/3: failed extubation trial. Plan for trach and peg  1/4: plan for trach, general surgery consult  1/5: NAEO  1/6: NAEO. Awaiting trach and peg  1/7: plan for trach/peg, opens eyes to voice no commands  1/8: s/p trach/peg today  1/9: restarting tf this afternoon, ltac planning, eeg today to help guide aed weaning  1/12 Trach trials tolerating well. Neuro checks q2 in am/ q4in pm metoprolol prn HR.120  1/13 Continues to tolerate Trach. Start Norvasc. eval thyroid function baseline.   1/14 hyponatremic, pending urine studies, finasteride, increased  secretions, CPT, pending LTAC  01/15: Awaiting approval for transfer to LTAC  01/16: Gen surg about sutures and trach pressure wound  01/17: Med route adjustments and awaiting wound care recs, Pending LTAC placement      Review of Systems    Unable to obtain a complete ROS due to trach, nonverbal  Objective:     Vitals:  Temp: 98.3 °F (36.8 °C)  Pulse: (!) 114  Rhythm: atrial rhythm  BP: 120/76  MAP (mmHg): 94  Resp: (!) 25  SpO2: 100 %  Oxygen Concentration (%): 28  O2 Device (Oxygen Therapy): Trach Collar  Vent Mode: Spont  Pressure Support: 5 cmH20  PEEP/CPAP: 5 cmH20  Peak Airway Pressure: 11 cmH2O  Mean Airway Pressure: 7.2 cmH20  Plateau Pressure: 0 cmH20    Temp  Min: 98 °F (36.7 °C)  Max: 98.4 °F (36.9 °C)  Pulse  Min: 67  Max: 116  BP  Min: 120/76  Max: 180/87  MAP (mmHg)  Min: 83  Max: 153  Resp  Min: 18  Max: 29  SpO2  Min: 99 %  Max: 100 %  Oxygen Concentration (%)  Min: 28  Max: 99    01/16 0701 - 01/17 0700  In: 1805 [I.V.:280]  Out: 4100 [Urine:4100]   Unmeasured Output  Urine Occurrence: 0  Stool Occurrence: 0       Physical Exam    Physical Exam:  GA: Awake, comfortable, no acute distress.   HEENT: No scleral icterus or JVD.   Pulmonary: Clear to auscultation A/L. No wheezing, crackles, or rhonchi.  Cardiac: RRR S1 & S2 w/o rubs/murmurs/gallops.   Abdominal: Bowel sounds present x 4. No appreciable hepatosplenomegaly.  Skin: No jaundice, rashes, or visible lesions.  Neuro:  --GCS: E4 V1t M5  --Mental Status:  Awake, alert, trach, doesn't follow commands  --Pupils 4mm, PERRL.   --cough, gag, corneal reflexes intact  --moves all extremities spontaneously  Unable to test orientation, language, memory, judgment, insight, fund of knowledge, shoulder shrug, tongue protrusion, coordination, gait    Medications:  Continuous   Scheduled    albuterol sulfate 2.5 mg Q4H   amLODIPine 10 mg Daily   chlorhexidine 15 mL QID   diclofenac sodium  TID   famotidine 20 mg BID   finasteride 5 mg Daily   heparin  (porcine) 5,000 Units Q8H   lacosamide 100 mg BID   metoprolol tartrate 100 mg Q8H   polyethylene glycol 17 g BID   pravastatin 40 mg QHS   senna-docusate 8.6-50 mg 1 tablet BID   silodosin 4 mg Daily   sodium chloride 0.9% 3 mL Q8H   sodium chloride 3% 4 mL Q4H   sodium chloride 2 g TID   ursodiol 300 mg TID   PRN    acetaminophen 650 mg Q6H PRN   fentaNYL 25 mcg Q2H PRN   hydrALAZINE 10 mg Q4H PRN   magnesium oxide 800 mg PRN   magnesium oxide 800 mg PRN   ondansetron 4 mg Q12H PRN   potassium chloride 10% 40 mEq PRN   potassium chloride 10% 40 mEq PRN   potassium chloride 10% 60 mEq PRN   potassium, sodium phosphates 2 packet PRN   potassium, sodium phosphates 2 packet PRN   potassium, sodium phosphates 2 packet PRN     Today I personally reviewed pertinent medications, lines/drains/airways, imaging, cardiology results, laboratory results, microbiology results,     Diet  No diet orders on file  No diet orders on file        Assessment/Plan:     Neuro   * SDH (subdural hematoma)    Epidural with subdural component s/p evac 12/20  Complicated by status epilepticus  Neuro checks Q2Hr in am / Q4Hr in pm  NSGY following  SBP goal < 180  Pt/OT/SLP   Awaiting wound care for trach care  LTAC planning continued            Status epilepticus    Mental status continuing to improve  Spot eeg without any epileptiform activity    Decrease lacosamide 100mg bid 1/12     Pulmonary   Acute respiratory failure    S/p trach  Trach collar trial daily  Rest at night on vent as needed     Cardiac/Vascular   New onset atrial fibrillation    -With RVR  -Metoprolol 100 mg Q8H  -Monitor off AC       Essential hypertension    -SBP<180  -PRN hydralazine  -Increased Amlodopine to 5mg - 10mg 1/13       Renal/   Hyponatremia      continue sodium tabs to 2g tid         Endocrine   Severe malnutrition    S/p trach/peg  Impact peptide 55cc/h               Activity Orders          None        Full Code    Blake Mcginnis NP  Neurocritical  Care [facility]

## 2019-01-17 NOTE — PROGRESS NOTES
[facility]  Neurosurgery  Progress Note    Subjective:     History of Present Illness: 62 yo M with PMH of CAD, CABG in 2006, DM, HTN, HLD on ASA presents to the ED for AMS. Patient fell 4 days ago with about 2 minutes of LOC. Family is unsure of what caused the fall. Full workup done including a CTH at [place] that was negative. Patient went home and stopped taking his ASA. This morning, his family noticed he was more confused. He went to [place] and CTH was done that revealed a 1.7cm left frontalpariental SDH with a 5 mm right sided midline shift and a left SAH. Patient was transferred to [facility]. History was obtained per family as patient unable to give history 2/2 confusion with expressive and receptive aphasia.    Post-Op Info:  Procedure(s) (LRB):  INSERTION, PEG TUBE (N/A)  CREATION, TRACHEOSTOMY (N/A)   9 Days Post-Op     Interval History: 1/17 - continues to be intermittently tachycardic, labs stable, exam stable    Medications:  Continuous Infusions:  Scheduled Meds:   albuterol sulfate  2.5 mg Nebulization Q4H    amLODIPine  10 mg Per G Tube Daily    chlorhexidine  15 mL Mouth/Throat QID    diclofenac sodium   Topical (Top) TID    famotidine  20 mg Per NG tube BID    finasteride  5 mg Per G Tube Daily    heparin (porcine)  5,000 Units Subcutaneous Q8H    lacosamide  100 mg Per NG tube BID    metoprolol tartrate  100 mg Per NG tube Q8H    polyethylene glycol  17 g Per NG tube BID    pravastatin  40 mg Per NG tube QHS    senna-docusate 8.6-50 mg  1 tablet Per NG tube BID    silodosin  4 mg Per G Tube Daily    sodium chloride 0.9%  3 mL Intravenous Q8H    sodium chloride 3%  4 mL Nebulization Q4H    sodium chloride  2 g Per NG tube TID    ursodiol  300 mg Per NG tube TID     PRN Meds:acetaminophen, fentaNYL, hydrALAZINE, magnesium oxide, magnesium oxide, ondansetron, potassium chloride 10%, potassium chloride 10%, potassium chloride 10%, potassium, sodium phosphates,  potassium, sodium phosphates, potassium, sodium phosphates       Objective:     Weight: 112.5 kg (248 lb 0.3 oz)  Body mass index is 34.59 kg/m².  Vital Signs (Most Recent):  Temp: 98.3 °F (36.8 °C) (01/17/19 1105)  Pulse: (!) 114 (01/17/19 1108)  Resp: (!) 25 (01/17/19 1108)  BP: 120/76 (01/17/19 1105)  SpO2: 100 % (01/17/19 1108) Vital Signs (24h Range):  Temp:  [98 °F (36.7 °C)-98.4 °F (36.9 °C)] 98.3 °F (36.8 °C)  Pulse:  [] 114  Resp:  [18-29] 25  SpO2:  [99 %-100 %] 100 %  BP: (120-180)/() 120/76     Date 01/17/19 0700 - 01/18/19 0659   Shift 5390-9120 2571-1449 3085-8008 24 Hour Total   INTAKE   NG/   375   Shift Total(mL/kg) 375(3.3)   375(3.3)   OUTPUT   Shift Total(mL/kg)       Weight (kg) 112.5 112.5 112.5 112.5              Vent Mode: Spont  Oxygen Concentration (%):  [28-99] 28  Resp Rate Total:  [16 br/min-28 br/min] 21 br/min  PEEP/CPAP:  [5 cmH20] 5 cmH20  Pressure Support:  [5 cmH20] 5 cmH20  Mean Airway Pressure:  [6.9 cmH20-7.6 cmH20] 7.2 cmH20         Gastrostomy/Enterostomy 01/08/19 1441 Percutaneous endoscopic gastrostomy (PEG) midline (Active)   Securement sutured to abdomen 1/17/2019 11:05 AM   Interventions Prior to Feeding patency checked;residual checked 1/17/2019 11:05 AM   Drainage serosanguineous 1/10/2019  3:00 PM   Feeding Type continuous 1/17/2019 11:05 AM   Clamp Status/Tolerance unclamped 1/17/2019 11:05 AM   Feeding Action feeding continued 1/17/2019 11:05 AM   Dressing dry and intact 1/17/2019 11:05 AM   Insertion Site no warmth;no drainage;no tenderness 1/17/2019  3:05 AM   Site Care device rotatated 1/17/2019 11:05 AM   Flush/Irrigation flushed w/;water;no resistance met 1/17/2019 11:05 AM   Dressing Change Due 01/10/19 1/10/2019  3:00 PM   Current Rate (mL/hr) 55 mL/hr 1/17/2019 11:05 AM   Goal Rate (mL/hr) 55 mL/hr 1/17/2019 11:05 AM   Intake (mL) 100 mL 1/17/2019  8:05 AM   Water Bolus (mL) 100 mL 1/13/2019  6:05 AM   Tube Output(mL)(Include Discarded  Residual) 60 mL 1/13/2019  2:00 PM   Formula Name Impact Peptide 1/17/2019  3:05 AM   Tube Feeding Intake (mL) 55 1/17/2019 11:05 AM   Residual Amount (ml) 0 ml 1/17/2019 11:05 AM       Neurosurgery Physical Exam     E4VTM5, grimacing to stimulation, tracking  PERRL  MOORE spontaneously  Trach and PEG in place      Significant Labs:  Recent Labs   Lab 01/16/19  0530 01/16/19  1115 01/17/19  0135   *  --  124*   * 133* 136   K 3.7  --  3.9     --  106   CO2 18*  --  22*   BUN 19  --  22   CREATININE 0.5  --  0.5   CALCIUM 8.8  --  8.8   MG 1.7  --  1.8     Recent Labs   Lab 01/16/19  0530 01/17/19  0135   WBC 13.80* 13.21*   HGB 10.6* 9.3*   HCT 30.8* 28.6*    314     No results for input(s): LABPT, INR, APTT in the last 48 hours.  Microbiology Results (last 7 days)     ** No results found for the last 168 hours. **        Assessment/Plan:     * SDH (subdural hematoma)    64 yo male with L aSDH now s/p craniotomy for evacuation (12/20).    --Continue care per primary team.  --Patient is stable for discharge from a neurosurgical perspective  --Continue neurochecks while in house  --Further care per NCC   --Dispo pending LTAC placement              Gagandeep Giang MD  Neurosurgery  [facility]

## 2019-01-17 NOTE — ASSESSMENT & PLAN NOTE
Epidural with subdural component s/p evac 12/20  Complicated by status epilepticus  Neuro checks Q2Hr in am / Q4Hr in pm  NSGY following  SBP goal < 180  Pt/OT/SLP   Awaiting wound care for trach care  LTAC planning continued

## 2019-01-17 NOTE — SUBJECTIVE & OBJECTIVE
Review of Systems    Unable to obtain a complete ROS due to trach, nonverbal  Objective:     Vitals:  Temp: 98.3 °F (36.8 °C)  Pulse: (!) 114  Rhythm: atrial rhythm  BP: 120/76  MAP (mmHg): 94  Resp: (!) 25  SpO2: 100 %  Oxygen Concentration (%): 28  O2 Device (Oxygen Therapy): Trach Collar  Vent Mode: Spont  Pressure Support: 5 cmH20  PEEP/CPAP: 5 cmH20  Peak Airway Pressure: 11 cmH2O  Mean Airway Pressure: 7.2 cmH20  Plateau Pressure: 0 cmH20    Temp  Min: 98 °F (36.7 °C)  Max: 98.4 °F (36.9 °C)  Pulse  Min: 67  Max: 116  BP  Min: 120/76  Max: 180/87  MAP (mmHg)  Min: 83  Max: 153  Resp  Min: 18  Max: 29  SpO2  Min: 99 %  Max: 100 %  Oxygen Concentration (%)  Min: 28  Max: 99    01/16 0701 - 01/17 0700  In: 1805 [I.V.:280]  Out: 4100 [Urine:4100]   Unmeasured Output  Urine Occurrence: 0  Stool Occurrence: 0       Physical Exam    Physical Exam:  GA: Awake, comfortable, no acute distress.   HEENT: No scleral icterus or JVD.   Pulmonary: Clear to auscultation A/L. No wheezing, crackles, or rhonchi.  Cardiac: RRR S1 & S2 w/o rubs/murmurs/gallops.   Abdominal: Bowel sounds present x 4. No appreciable hepatosplenomegaly.  Skin: No jaundice, rashes, or visible lesions.  Neuro:  --GCS: E4 V1t M5  --Mental Status:  Awake, alert, trach, doesn't follow commands  --Pupils 4mm, PERRL.   --cough, gag, corneal reflexes intact  --moves all extremities spontaneously  Unable to test orientation, language, memory, judgment, insight, fund of knowledge, shoulder shrug, tongue protrusion, coordination, gait    Medications:  Continuous   Scheduled    albuterol sulfate 2.5 mg Q4H   amLODIPine 10 mg Daily   chlorhexidine 15 mL QID   diclofenac sodium  TID   famotidine 20 mg BID   finasteride 5 mg Daily   heparin (porcine) 5,000 Units Q8H   lacosamide 100 mg BID   metoprolol tartrate 100 mg Q8H   polyethylene glycol 17 g BID   pravastatin 40 mg QHS   senna-docusate 8.6-50 mg 1 tablet BID   silodosin 4 mg Daily   sodium chloride 0.9% 3  mL Q8H   sodium chloride 3% 4 mL Q4H   sodium chloride 2 g TID   ursodiol 300 mg TID   PRN    acetaminophen 650 mg Q6H PRN   fentaNYL 25 mcg Q2H PRN   hydrALAZINE 10 mg Q4H PRN   magnesium oxide 800 mg PRN   magnesium oxide 800 mg PRN   ondansetron 4 mg Q12H PRN   potassium chloride 10% 40 mEq PRN   potassium chloride 10% 40 mEq PRN   potassium chloride 10% 60 mEq PRN   potassium, sodium phosphates 2 packet PRN   potassium, sodium phosphates 2 packet PRN   potassium, sodium phosphates 2 packet PRN     Today I personally reviewed pertinent medications, lines/drains/airways, imaging, cardiology results, laboratory results, microbiology results,     Diet  No diet orders on file  No diet orders on file

## 2019-01-17 NOTE — SUBJECTIVE & OBJECTIVE
Interval History: 1/17 - continues to be intermittently tachycardic, labs stable, exam stable    Medications:  Continuous Infusions:  Scheduled Meds:   albuterol sulfate  2.5 mg Nebulization Q4H    amLODIPine  10 mg Per G Tube Daily    chlorhexidine  15 mL Mouth/Throat QID    diclofenac sodium   Topical (Top) TID    famotidine  20 mg Per NG tube BID    finasteride  5 mg Per G Tube Daily    heparin (porcine)  5,000 Units Subcutaneous Q8H    lacosamide  100 mg Per NG tube BID    metoprolol tartrate  100 mg Per NG tube Q8H    polyethylene glycol  17 g Per NG tube BID    pravastatin  40 mg Per NG tube QHS    senna-docusate 8.6-50 mg  1 tablet Per NG tube BID    silodosin  4 mg Per G Tube Daily    sodium chloride 0.9%  3 mL Intravenous Q8H    sodium chloride 3%  4 mL Nebulization Q4H    sodium chloride  2 g Per NG tube TID    ursodiol  300 mg Per NG tube TID     PRN Meds:acetaminophen, fentaNYL, hydrALAZINE, magnesium oxide, magnesium oxide, ondansetron, potassium chloride 10%, potassium chloride 10%, potassium chloride 10%, potassium, sodium phosphates, potassium, sodium phosphates, potassium, sodium phosphates       Objective:     Weight: 112.5 kg (248 lb 0.3 oz)  Body mass index is 34.59 kg/m².  Vital Signs (Most Recent):  Temp: 98.3 °F (36.8 °C) (01/17/19 1105)  Pulse: (!) 114 (01/17/19 1108)  Resp: (!) 25 (01/17/19 1108)  BP: 120/76 (01/17/19 1105)  SpO2: 100 % (01/17/19 1108) Vital Signs (24h Range):  Temp:  [98 °F (36.7 °C)-98.4 °F (36.9 °C)] 98.3 °F (36.8 °C)  Pulse:  [] 114  Resp:  [18-29] 25  SpO2:  [99 %-100 %] 100 %  BP: (120-180)/() 120/76     Date 01/17/19 0700 - 01/18/19 0659   Shift 5529-3289 1155-7997 8467-7452 24 Hour Total   INTAKE   NG/   375   Shift Total(mL/kg) 375(3.3)   375(3.3)   OUTPUT   Shift Total(mL/kg)       Weight (kg) 112.5 112.5 112.5 112.5              Vent Mode: Spont  Oxygen Concentration (%):  [28-99] 28  Resp Rate Total:  [16 br/min-28 br/min] 21  br/min  PEEP/CPAP:  [5 cmH20] 5 cmH20  Pressure Support:  [5 cmH20] 5 cmH20  Mean Airway Pressure:  [6.9 cmH20-7.6 cmH20] 7.2 cmH20         Gastrostomy/Enterostomy 01/08/19 1441 Percutaneous endoscopic gastrostomy (PEG) midline (Active)   Securement sutured to abdomen 1/17/2019 11:05 AM   Interventions Prior to Feeding patency checked;residual checked 1/17/2019 11:05 AM   Drainage serosanguineous 1/10/2019  3:00 PM   Feeding Type continuous 1/17/2019 11:05 AM   Clamp Status/Tolerance unclamped 1/17/2019 11:05 AM   Feeding Action feeding continued 1/17/2019 11:05 AM   Dressing dry and intact 1/17/2019 11:05 AM   Insertion Site no warmth;no drainage;no tenderness 1/17/2019  3:05 AM   Site Care device rotatated 1/17/2019 11:05 AM   Flush/Irrigation flushed w/;water;no resistance met 1/17/2019 11:05 AM   Dressing Change Due 01/10/19 1/10/2019  3:00 PM   Current Rate (mL/hr) 55 mL/hr 1/17/2019 11:05 AM   Goal Rate (mL/hr) 55 mL/hr 1/17/2019 11:05 AM   Intake (mL) 100 mL 1/17/2019  8:05 AM   Water Bolus (mL) 100 mL 1/13/2019  6:05 AM   Tube Output(mL)(Include Discarded Residual) 60 mL 1/13/2019  2:00 PM   Formula Name Impact Peptide 1/17/2019  3:05 AM   Tube Feeding Intake (mL) 55 1/17/2019 11:05 AM   Residual Amount (ml) 0 ml 1/17/2019 11:05 AM       Neurosurgery Physical Exam     E4VTM5, grimacing to stimulation, tracking  PERRL  MOORE spontaneously  Trach and PEG in place      Significant Labs:  Recent Labs   Lab 01/16/19  0530 01/16/19  1115 01/17/19  0135   *  --  124*   * 133* 136   K 3.7  --  3.9     --  106   CO2 18*  --  22*   BUN 19  --  22   CREATININE 0.5  --  0.5   CALCIUM 8.8  --  8.8   MG 1.7  --  1.8     Recent Labs   Lab 01/16/19  0530 01/17/19  0135   WBC 13.80* 13.21*   HGB 10.6* 9.3*   HCT 30.8* 28.6*    314     No results for input(s): LABPT, INR, APTT in the last 48 hours.  Microbiology Results (last 7 days)     ** No results found for the last 168 hours. **

## 2019-01-17 NOTE — PLAN OF CARE
CM received a fax from Li Tong RN requesting the following information be faxed to [facility] Medical Director:    Admit H&P  Initial Consults per Specialty  Most Current MD Progress notes  Wound care assessment and current note  IV meds given/dose/frequency  Date of Intubation/trach/weaning attempts, current vent settings.    All of the above faxed to Li at [phone]      Azeb Flores RN, CCRN-K, [facility]  Neuro-Critical Care   [phone]

## 2019-01-17 NOTE — PLAN OF CARE
POC reviewed with pt and wife at 1400. Wife verbalized understanding. Questions and concerns addressed. No acute events today. Pt progressing toward goals. Bladder scan and intermittent straight catherizations performed q6 hrs. Neuro status unchanged, awaiting LTAC placement. Will continue to monitor. See flowsheets for full assessment and VS info.

## 2019-01-17 NOTE — PLAN OF CARE
01/17/19 1544   Discharge Reassessment   Assessment Type Discharge Planning Reassessment   Provided patient/caregiver education on the expected discharge date and the discharge plan No   Do you have any problems affording any of your prescribed medications? No   Discharge Plan A Long-term acute care facility (LTAC)   Discharge Plan B Skilled Nursing Facility   DME Needed Upon Discharge  (tbd)   Patient choice form signed by patient/caregiver No   Anticipated Discharge Disposition LTAC   Can the patient answer the patient profile reliably? No, cognitively impaired   How does the patient rate their overall health at the present time? (mamadou)   Describe the patient's ability to walk at the present time. Does not walk or unable to take any steps at all   How often would a person be available to care for the patient? Occasionally   Number of comorbid conditions (as recorded on the chart) Four   During the past month, has the patient often been bothered by feeling down, depressed or hopeless? (mamadou)   During the past month, has the patient often been bothered by little interest or pleasure in doing things? (mamadou)   Post-Acute Status   Post-Acute Authorization Placement   Post-Acute Placement Status Insurance Denial  ([facility] denied LTAC- appeal in progress)       Azeb Flores RN, CCRN-K, CCM  Neuro-Critical Care   [phone]

## 2019-01-18 LAB
ALBUMIN SERPL BCP-MCNC: 2.3 G/DL
ALP SERPL-CCNC: 195 U/L
ALT SERPL W/O P-5'-P-CCNC: 10 U/L
ANION GAP SERPL CALC-SCNC: 7 MMOL/L
AST SERPL-CCNC: 20 U/L
BASOPHILS # BLD AUTO: 0.09 K/UL
BASOPHILS NFR BLD: 0.8 %
BILIRUB SERPL-MCNC: 0.6 MG/DL
BUN SERPL-MCNC: 26 MG/DL
CALCIUM SERPL-MCNC: 8.8 MG/DL
CHLORIDE SERPL-SCNC: 109 MMOL/L
CO2 SERPL-SCNC: 23 MMOL/L
CREAT SERPL-MCNC: 0.5 MG/DL
DIFFERENTIAL METHOD: ABNORMAL
EOSINOPHIL # BLD AUTO: 0.4 K/UL
EOSINOPHIL NFR BLD: 3.4 %
ERYTHROCYTE [DISTWIDTH] IN BLOOD BY AUTOMATED COUNT: 16.3 %
EST. GFR  (AFRICAN AMERICAN): >60 ML/MIN/1.73 M^2
EST. GFR  (NON AFRICAN AMERICAN): >60 ML/MIN/1.73 M^2
GLUCOSE SERPL-MCNC: 112 MG/DL
HCT VFR BLD AUTO: 27.1 %
HGB BLD-MCNC: 8.5 G/DL
IMM GRANULOCYTES # BLD AUTO: 0.08 K/UL
IMM GRANULOCYTES NFR BLD AUTO: 0.7 %
LYMPHOCYTES # BLD AUTO: 1.9 K/UL
LYMPHOCYTES NFR BLD: 15.7 %
MAGNESIUM SERPL-MCNC: 1.9 MG/DL
MCH RBC QN AUTO: 31.3 PG
MCHC RBC AUTO-ENTMCNC: 31.4 G/DL
MCV RBC AUTO: 100 FL
MONOCYTES # BLD AUTO: 1.3 K/UL
MONOCYTES NFR BLD: 10.6 %
NEUTROPHILS # BLD AUTO: 8.2 K/UL
NEUTROPHILS NFR BLD: 68.8 %
NRBC BLD-RTO: 0 /100 WBC
PHOSPHATE SERPL-MCNC: 3.2 MG/DL
PLATELET # BLD AUTO: 280 K/UL
PMV BLD AUTO: 10.3 FL
POCT GLUCOSE: 119 MG/DL (ref 70–110)
POCT GLUCOSE: 125 MG/DL (ref 70–110)
POCT GLUCOSE: 125 MG/DL (ref 70–110)
POCT GLUCOSE: 99 MG/DL (ref 70–110)
POTASSIUM SERPL-SCNC: 3.9 MMOL/L
PROT SERPL-MCNC: 5.5 G/DL
RBC # BLD AUTO: 2.72 M/UL
SODIUM SERPL-SCNC: 139 MMOL/L
WBC # BLD AUTO: 11.91 K/UL

## 2019-01-18 PROCEDURE — 63600175 PHARM REV CODE 636 W HCPCS: Performed by: STUDENT IN AN ORGANIZED HEALTH CARE EDUCATION/TRAINING PROGRAM

## 2019-01-18 PROCEDURE — 99232 PR SUBSEQUENT HOSPITAL CARE,LEVL II: ICD-10-PCS | Mod: ,,, | Performed by: NURSE PRACTITIONER

## 2019-01-18 PROCEDURE — 20000000 HC ICU ROOM

## 2019-01-18 PROCEDURE — 27000221 HC OXYGEN, UP TO 24 HOURS

## 2019-01-18 PROCEDURE — 25000003 PHARM REV CODE 250: Performed by: PHYSICIAN ASSISTANT

## 2019-01-18 PROCEDURE — 97110 THERAPEUTIC EXERCISES: CPT

## 2019-01-18 PROCEDURE — 83735 ASSAY OF MAGNESIUM: CPT

## 2019-01-18 PROCEDURE — 94761 N-INVAS EAR/PLS OXIMETRY MLT: CPT

## 2019-01-18 PROCEDURE — 85025 COMPLETE CBC W/AUTO DIFF WBC: CPT

## 2019-01-18 PROCEDURE — A4216 STERILE WATER/SALINE, 10 ML: HCPCS | Performed by: PSYCHIATRY & NEUROLOGY

## 2019-01-18 PROCEDURE — 25000003 PHARM REV CODE 250: Performed by: NURSE PRACTITIONER

## 2019-01-18 PROCEDURE — 51701 INSERT BLADDER CATHETER: CPT

## 2019-01-18 PROCEDURE — 25000003 PHARM REV CODE 250: Performed by: PSYCHIATRY & NEUROLOGY

## 2019-01-18 PROCEDURE — 80053 COMPREHEN METABOLIC PANEL: CPT

## 2019-01-18 PROCEDURE — 27200966 HC CLOSED SUCTION SYSTEM

## 2019-01-18 PROCEDURE — 94640 AIRWAY INHALATION TREATMENT: CPT

## 2019-01-18 PROCEDURE — 94003 VENT MGMT INPAT SUBQ DAY: CPT

## 2019-01-18 PROCEDURE — 94668 MNPJ CHEST WALL SBSQ: CPT

## 2019-01-18 PROCEDURE — 99900035 HC TECH TIME PER 15 MIN (STAT)

## 2019-01-18 PROCEDURE — 99232 SBSQ HOSP IP/OBS MODERATE 35: CPT | Mod: ,,, | Performed by: NURSE PRACTITIONER

## 2019-01-18 PROCEDURE — 51798 US URINE CAPACITY MEASURE: CPT

## 2019-01-18 PROCEDURE — 25000003 PHARM REV CODE 250: Performed by: STUDENT IN AN ORGANIZED HEALTH CARE EDUCATION/TRAINING PROGRAM

## 2019-01-18 PROCEDURE — 99900026 HC AIRWAY MAINTENANCE (STAT)

## 2019-01-18 PROCEDURE — 84100 ASSAY OF PHOSPHORUS: CPT

## 2019-01-18 PROCEDURE — 25000242 PHARM REV CODE 250 ALT 637 W/ HCPCS: Performed by: NURSE PRACTITIONER

## 2019-01-18 PROCEDURE — 97535 SELF CARE MNGMENT TRAINING: CPT

## 2019-01-18 RX ORDER — FINASTERIDE 5 MG/1
5 TABLET, FILM COATED ORAL DAILY
Qty: 30 TABLET
Start: 2019-01-19 | End: 2019-04-25

## 2019-01-18 RX ORDER — HEPARIN SODIUM 5000 [USP'U]/ML
5000 INJECTION, SOLUTION INTRAVENOUS; SUBCUTANEOUS EVERY 8 HOURS
Status: ON HOLD
Start: 2019-01-18 | End: 2019-02-18 | Stop reason: HOSPADM

## 2019-01-18 RX ORDER — URSODIOL 300 MG/1
300 CAPSULE ORAL 3 TIMES DAILY
Qty: 90 CAPSULE
Start: 2019-01-18 | End: 2019-01-24 | Stop reason: HOSPADM

## 2019-01-18 RX ORDER — LACOSAMIDE 50 MG/1
100 TABLET ORAL 2 TIMES DAILY
Qty: 120 TABLET | Refills: 11 | Status: ON HOLD
Start: 2019-01-18 | End: 2019-02-18 | Stop reason: HOSPADM

## 2019-01-18 RX ORDER — ONDANSETRON 2 MG/ML
4 INJECTION INTRAMUSCULAR; INTRAVENOUS EVERY 12 HOURS PRN
Status: ON HOLD
Start: 2019-01-18 | End: 2019-02-18 | Stop reason: HOSPADM

## 2019-01-18 RX ORDER — POLYETHYLENE GLYCOL 3350 17 G/17G
17 POWDER, FOR SOLUTION ORAL 2 TIMES DAILY
Refills: 0
Start: 2019-01-18 | End: 2019-04-25

## 2019-01-18 RX ORDER — METOPROLOL TARTRATE 100 MG/1
100 TABLET ORAL EVERY 8 HOURS
Qty: 90 TABLET | Status: ON HOLD
Start: 2019-01-18 | End: 2019-02-18 | Stop reason: HOSPADM

## 2019-01-18 RX ORDER — DICLOFENAC SODIUM 10 MG/G
GEL TOPICAL 3 TIMES DAILY
Start: 2019-01-18 | End: 2019-05-27

## 2019-01-18 RX ORDER — AMOXICILLIN 250 MG
1 CAPSULE ORAL 2 TIMES DAILY
Start: 2019-01-18 | End: 2019-04-25

## 2019-01-18 RX ORDER — AMLODIPINE BESYLATE 10 MG/1
10 TABLET ORAL DAILY
Qty: 30 TABLET
Start: 2019-01-19 | End: 2019-04-25

## 2019-01-18 RX ORDER — SILODOSIN 4 MG/1
4 CAPSULE ORAL DAILY
Qty: 30 CAPSULE | Status: ON HOLD
Start: 2019-01-19 | End: 2019-02-18 | Stop reason: HOSPADM

## 2019-01-18 RX ADMIN — DICLOFENAC: 10 GEL TOPICAL at 09:01

## 2019-01-18 RX ADMIN — CHLORHEXIDINE GLUCONATE 0.12% ORAL RINSE 15 ML: 1.2 LIQUID ORAL at 09:01

## 2019-01-18 RX ADMIN — METOPROLOL TARTRATE 100 MG: 100 TABLET, FILM COATED ORAL at 05:01

## 2019-01-18 RX ADMIN — Medication 3 ML: at 02:01

## 2019-01-18 RX ADMIN — ALBUTEROL SULFATE 2.5 MG: 2.5 SOLUTION RESPIRATORY (INHALATION) at 07:01

## 2019-01-18 RX ADMIN — HEPARIN SODIUM 5000 UNITS: 5000 INJECTION, SOLUTION INTRAVENOUS; SUBCUTANEOUS at 05:01

## 2019-01-18 RX ADMIN — DICLOFENAC: 10 GEL TOPICAL at 08:01

## 2019-01-18 RX ADMIN — SILODOSIN 4 MG: 4 CAPSULE ORAL at 08:01

## 2019-01-18 RX ADMIN — SODIUM CHLORIDE TAB 1 GM 2 G: 1 TAB at 08:01

## 2019-01-18 RX ADMIN — SODIUM CHLORIDE SOLN NEBU 3% 4 ML: 3 NEBU SOLN at 03:01

## 2019-01-18 RX ADMIN — PRAVASTATIN SODIUM 40 MG: 40 TABLET ORAL at 09:01

## 2019-01-18 RX ADMIN — AMLODIPINE BESYLATE 10 MG: 10 TABLET ORAL at 08:01

## 2019-01-18 RX ADMIN — CHLORHEXIDINE GLUCONATE 0.12% ORAL RINSE 15 ML: 1.2 LIQUID ORAL at 01:01

## 2019-01-18 RX ADMIN — DICLOFENAC: 10 GEL TOPICAL at 02:01

## 2019-01-18 RX ADMIN — SODIUM CHLORIDE SOLN NEBU 3% 4 ML: 3 NEBU SOLN at 07:01

## 2019-01-18 RX ADMIN — FAMOTIDINE 20 MG: 20 TABLET ORAL at 08:01

## 2019-01-18 RX ADMIN — SODIUM CHLORIDE SOLN NEBU 3% 4 ML: 3 NEBU SOLN at 11:01

## 2019-01-18 RX ADMIN — METOPROLOL TARTRATE 100 MG: 100 TABLET, FILM COATED ORAL at 02:01

## 2019-01-18 RX ADMIN — URSODIOL 300 MG: 300 CAPSULE ORAL at 08:01

## 2019-01-18 RX ADMIN — URSODIOL 300 MG: 300 CAPSULE ORAL at 02:01

## 2019-01-18 RX ADMIN — Medication 3 ML: at 09:01

## 2019-01-18 RX ADMIN — POLYETHYLENE GLYCOL 3350 17 G: 17 POWDER, FOR SOLUTION ORAL at 09:01

## 2019-01-18 RX ADMIN — LACOSAMIDE 100 MG: 50 TABLET, FILM COATED ORAL at 09:01

## 2019-01-18 RX ADMIN — HEPARIN SODIUM 5000 UNITS: 5000 INJECTION, SOLUTION INTRAVENOUS; SUBCUTANEOUS at 09:01

## 2019-01-18 RX ADMIN — LACOSAMIDE 100 MG: 50 TABLET, FILM COATED ORAL at 08:01

## 2019-01-18 RX ADMIN — URSODIOL 300 MG: 300 CAPSULE ORAL at 09:01

## 2019-01-18 RX ADMIN — SODIUM CHLORIDE TAB 1 GM 2 G: 1 TAB at 09:01

## 2019-01-18 RX ADMIN — HEPARIN SODIUM 5000 UNITS: 5000 INJECTION, SOLUTION INTRAVENOUS; SUBCUTANEOUS at 02:01

## 2019-01-18 RX ADMIN — STANDARDIZED SENNA CONCENTRATE AND DOCUSATE SODIUM 1 TABLET: 8.6; 5 TABLET, FILM COATED ORAL at 09:01

## 2019-01-18 RX ADMIN — FAMOTIDINE 20 MG: 20 TABLET ORAL at 09:01

## 2019-01-18 RX ADMIN — POLYETHYLENE GLYCOL 3350 17 G: 17 POWDER, FOR SOLUTION ORAL at 08:01

## 2019-01-18 RX ADMIN — FINASTERIDE 5 MG: 5 TABLET, FILM COATED ORAL at 08:01

## 2019-01-18 RX ADMIN — CHLORHEXIDINE GLUCONATE 0.12% ORAL RINSE 15 ML: 1.2 LIQUID ORAL at 05:01

## 2019-01-18 RX ADMIN — METOPROLOL TARTRATE 100 MG: 100 TABLET, FILM COATED ORAL at 09:01

## 2019-01-18 RX ADMIN — ALBUTEROL SULFATE 2.5 MG: 2.5 SOLUTION RESPIRATORY (INHALATION) at 03:01

## 2019-01-18 RX ADMIN — STANDARDIZED SENNA CONCENTRATE AND DOCUSATE SODIUM 1 TABLET: 8.6; 5 TABLET, FILM COATED ORAL at 08:01

## 2019-01-18 RX ADMIN — Medication 3 ML: at 05:01

## 2019-01-18 RX ADMIN — ALBUTEROL SULFATE 2.5 MG: 2.5 SOLUTION RESPIRATORY (INHALATION) at 11:01

## 2019-01-18 RX ADMIN — SODIUM CHLORIDE TAB 1 GM 2 G: 1 TAB at 02:01

## 2019-01-18 RX ADMIN — CHLORHEXIDINE GLUCONATE 0.12% ORAL RINSE 15 ML: 1.2 LIQUID ORAL at 08:01

## 2019-01-18 RX ADMIN — ACETAMINOPHEN 650 MG: 325 TABLET, FILM COATED ORAL at 08:01

## 2019-01-18 NOTE — PT/OT/SLP DISCHARGE
Occupational Therapy Discharge Summary    Peewee Nunes  MRN: [id number]   Principal Problem: SDH (subdural hematoma)      Patient Discharged from acute Occupational Therapy on 1/18.  Please refer to prior OT note dated 1/18 for functional status.    Assessment:      Patient appropriate for care in another setting.    Objective:     GOALS:   Multidisciplinary Problems     Occupational Therapy Goals        Problem: Occupational Therapy Goal    Goal Priority Disciplines Outcome Interventions   Occupational Therapy Goal     OT, PT/OT     Description:  Goals set 1/12 to be addressed for 14 days with expiration date, 1/26:  Patient will increase functional independence with ADLs by performing:    Patient will demonstrate rolling to the right with max assist.  Not met   Patient will demonstrate rolling to the left with max assist.   Not met  Patient will demonstrate grooming while upright in bed with max assist.   Not met  Patient will demonstrate ability to follow 1/3 one step commands.   Not met  Patient's family / caregiver will demonstrate independence with providing ROM and changes in bed positioning.   Not met                            Reasons for Discontinuation of Therapy Services  Transfer to alternate level of care.      Plan:     Patient Discharged to: Long Term Acute Care    ASHLEY Oliva  1/18/2019

## 2019-01-18 NOTE — PLAN OF CARE
Problem: Adult Inpatient Plan of Care  Goal: Plan of Care Review  Outcome: Ongoing (interventions implemented as appropriate)  POC reviewed with angela at 0500. Pt is trach/nonverbal and cannot verbalized understanding. No acute events overnight. Pt progressing toward goals. Tube feeds at goal rate of 55 mL/hr; no signs of intolerance; no residuals. No neuro changes; still not following commands. For transfer to LTAC today.  Will continue to monitor. See flowsheets for full assessment and VS info

## 2019-01-18 NOTE — PT/OT/SLP PROGRESS
Occupational Therapy   Treatment    Name: Peewee Nunes  MRN: [id number]  Admitting Diagnosis:  SDH (subdural hematoma)  10 Days Post-Op    Recommendations:     Discharge Recommendations: LTACH (long-term acute care hospital)  Discharge Equipment Recommendations:  lift device, hospital bed  Barriers to discharge:  Inaccessible home environment, Decreased caregiver support    Assessment:     Peewee Nunes is a 63 y.o. male with a medical diagnosis of SDH (subdural hematoma).  He presents with performance deficits affecting function are weakness, impaired endurance, impaired sensation, impaired self care skills, impaired functional mobilty, gait instability, impaired balance, impaired cognition, decreased coordination, decreased upper extremity function, decreased lower extremity function, decreased safety awareness, decreased ROM, impaired coordination, impaired fine motor, impaired cardiopulmonary response to activity.     Rehab Prognosis:  Fair; patient would benefit from acute skilled OT services to address these deficits and reach maximum level of function.       Plan:     Patient to be seen 3 x/week to address the above listed problems via self-care/home management, therapeutic activities, neuromuscular re-education, therapeutic exercises, sensory integration, cognitive retraining  · Plan of Care Expires: 02/14/19  · Plan of Care Reviewed with: patient    Subjective   Patient:  Nonverbal.  Communicating via head nods; however, not accurate.  Pain/Comfort:  · Pain Rating 1: 0/10  · Pain Rating Post-Intervention 1: 0/10    Objective:     Communicated with: Nurse prior to session.  Patient found supine with bed alarm, blood pressure cuff, telemetry, SCD, tracheostomy, restraints, peripheral IV, pulse ox (continuous), PEG Tube, PICC line, pressure relief boots upon OT entry to room.  Family not present.    General Precautions: Standard, aphasia, aspiration, NPO, fall   Orthopedic Precautions:N/A   Braces: N/A      Occupational Performance:     Bed Mobility:    · Patient completed Rolling/Turning to Left with  total assistance  · Patient completed Rolling/Turning to Right with total assistance  · Patient completed Scooting/Bridging with total assistance  · Patient completed Supine to Sit with total assistance  · Patient completed Sit to Supine with total assistance     Functional Mobility/Transfers:  · Dependent drawsheet transfers    Activities of Daily Living:  · Feeding:  NPO    · Grooming: total assistance while seated EOB      [facility] 6 Click ADL: 6    Treatment & Education:  Patient education provided on role of OT, daily orientation, ROM, positioning and need for continued OT upon discharge.  Continued education, patient/ family training recommended. Daily orientation provided.  AAROM performed bilateral UE/LEs one set x 10 rep in all planes of motion with stretches provided at end range; sustained stretch provided for right UE external rotation, supination and shoulder flexion as well as bilateral LE ankle dorsiflexion.  Assistance and facilitation provided for upward rotation of the scapula during shoulder flexion and abduction.  Patient alert 100% of the session and tracking bilaterally.  Patient did not follow or model one step commands during the session.  Patient providing facial expressions throughout the session.  Positioning provided for midline orientation with bilateral UEs elevated and heels lifted off mattress.  Gentle cervical rotation provided.   Family not present during the session.  Patient's functional status and disposition recommendation discussed with patient's nurse.  White board updated in patient's room.       Patient left supine with all lines intact, call button in reach, bed alarm on and restraints reapplied at end of sessionEducation:      GOALS:   Multidisciplinary Problems     Occupational Therapy Goals        Problem: Occupational Therapy Goal    Goal Priority Disciplines Outcome  Interventions   Occupational Therapy Goal     OT, PT/OT     Description:  Goals set 1/12 to be addressed for 14 days with expiration date, 1/26:  Patient will increase functional independence with ADLs by performing:    Patient will demonstrate rolling to the right with max assist.  Not met   Patient will demonstrate rolling to the left with max assist.   Not met  Patient will demonstrate grooming while upright in bed with max assist.   Not met  Patient will demonstrate ability to follow 1/3 one step commands.   Not met  Patient's family / caregiver will demonstrate independence with providing ROM and changes in bed positioning.   Not met                            Time Tracking:     OT Date of Treatment: 01/18/19  OT Start Time: 0436  OT Stop Time: 0500  OT Total Time (min): 24 min    Billable Minutes:Self Care/Home Management 13  Therapeutic Exercise 11    ASHLEY Oliva  1/18/2019

## 2019-01-18 NOTE — PLAN OF CARE
NICHOLAS informed by Julia at [facility] [place] that the letter of agreement for LTAC will not be processed today and may be on Monday or Tuesday of next week.  Julia suggested that NICHOLAS speak to Estelita in [facility] office.    NICHOLAS phoned Estelita at [phone] regarding the above matter.  Per Estelita, she was told by Shahnaz at [facility] that the letter of agreement will not be generated until Monday or Tuesday of next week and they cannot accept patient until the letter is received and signed.    NICHOLAS phoned Jeannette at [facility] [phone],  [phone] and left messages on VM for Jeannette to call NICHOLAS back on this matter.   Case Reference number [id number]    Azeb Flores RN, CCRN-K, [facility]  Neuro-Critical Care   X [phone]

## 2019-01-18 NOTE — PLAN OF CARE
NICHOLAS received a V/M from Rema on behalf of Li from [facility] Appeals Department.  Per Rema, the denial has been overturned and the patient is approved for LTAC Placement.  SW informed [facility] [place].  MD to write transfer orders.     Azeb Flores RN, CCRN-K, [facility]  Neuro-Critical Care   X [phone]

## 2019-01-18 NOTE — PLAN OF CARE
[facility]    FACILITY TRANSFER ORDERS      Patient Name: Peewee Nunes  YOB: 1955    PCP: Jacob Pidera MD   PCP Address: [facility] [address] [address]  PCP Phone Number: [phone]  PCP Fax: [phone]    Encounter Date: 01/18/2019    Admit to: [facility]    Vital Signs:  Routine    Diagnoses:   Active Hospital Problems    Diagnosis  POA    *SDH (subdural hematoma) [S06.5X9A]  Unknown    Hyponatremia [E87.1]  Yes    Weight loss, unintentional [R63.4]  Yes     History of 200lb wt loss over last year  Reports early satiety and change in taste      Seizure [R56.9]  No    Status epilepticus [G40.901]  No    Acute respiratory failure [J96.00]  No    Septic shock [A41.9, R65.21]  No    New onset atrial fibrillation [I48.91]  No    Pressure injury of buttock, stage 2 [L89.302]  Yes    Severe malnutrition [E43]  Unknown    SAH (subarachnoid hemorrhage) [I60.9]  Unknown    Subdural hematoma [S06.5X9A]  Yes    Epidural hematoma [S06.4X9A]  Yes    Brain compression [G93.5]  Yes    Cerebral edema [G93.6]  Yes    Essential hypertension [I10]  Unknown      Resolved Hospital Problems    Diagnosis Date Resolved POA    AYLIN (acute kidney injury) [N17.9] 01/16/2019 No       Allergies:  Review of patient's allergies indicates:   Allergen Reactions    Levaquin [levofloxacin] Nausea Only       Diet: tube feedings: Continuous impact peptide at 55 mL per hour for 24 hours per day    Activities: Activity as tolerated    Nursing: per facility protocol     Labs: CBC and CMP Once     CONSULTS:    Physical Therapy to evaluate and treat. , Occupational Therapy to evaluate and treat., Speech Therapy to evaluate and treat for Language, Swallowing and Cognition. and  to evaluate for community resources/long-range planning.    MISCELLANEOUS CARE:  PEG Care: Clean site every 24 hours.   Trach Care per facility protocol  WOUND CARE ORDERS  None   Straight cath  prn    Medications: Review discharge medications with patient and family and provide education.      Current Discharge Medication List      START taking these medications    Details   amLODIPine (NORVASC) 10 MG tablet 1 tablet (10 mg total) by Per G Tube route once daily.  Qty: 30 tablet      diclofenac sodium (VOLTAREN) 1 % Gel Apply topically 3 (three) times daily.      finasteride (PROSCAR) 5 mg tablet 1 tablet (5 mg total) by Per G Tube route once daily.  Qty: 30 tablet      heparin sodium,porcine (HEPARIN, PORCINE,) 5,000 unit/mL injection Inject 1 mL (5,000 Units total) into the skin every 8 (eight) hours.      lacosamide (VIMPAT) 50 mg Tab 2 tablets (100 mg total) by Per G Tube route 2 (two) times daily.  Qty: 120 tablet, Refills: 11      metoprolol tartrate (LOPRESSOR) 100 MG tablet 1 tablet (100 mg total) by Per G Tube route every 8 (eight) hours.  Qty: 90 tablet      ondansetron 4 mg/2 mL Soln Inject 4 mg into the vein every 12 (twelve) hours as needed.      polyethylene glycol (GLYCOLAX) 17 gram PwPk 17 g by Per G Tube route 2 (two) times daily.  Refills: 0      senna-docusate 8.6-50 mg (PERICOLACE) 8.6-50 mg per tablet 1 tablet by Per G Tube route 2 (two) times daily.      silodosin (RAPAFLO) 4 mg Cap capsule Take 1 capsule (4 mg total) by mouth once daily.  Qty: 30 capsule      sodium chloride 1 gram tablet 2 tablets (2 g total) by Per G Tube route 3 (three) times daily.      ursodiol (ACTIGALL) 300 mg capsule 1 capsule (300 mg total) by Per G Tube route 3 (three) times daily.  Qty: 90 capsule         CONTINUE these medications which have NOT CHANGED    Details   pravastatin (PRAVACHOL) 40 MG tablet Take 1 tablet (40 mg total) by mouth every evening.  Qty: 90 tablet, Refills: 1    Associated Diagnoses: Familial hypercholesterolemia         STOP taking these medications       aspirin (ECOTRIN) 81 MG EC tablet Comments:   Reason for Stopping:         bisoprolol (ZEBETA) 10 MG tablet Comments:   Reason for  Stopping:         cholecalciferol, vitamin D3, 50,000 unit capsule Comments:   Reason for Stopping:         linagliptin (TRADJENTA) 5 mg Tab tablet Comments:   Reason for Stopping:         losartan (COZAAR) 100 MG tablet Comments:   Reason for Stopping:         NIFEdipine (ADALAT CC) 60 MG TbSR Comments:   Reason for Stopping:         mv-min 16/folic acid/lut/lycop (REQ49+ ORAL) Comments:   Reason for Stopping:                    _________________________________  Blake Mcginnis, NP  01/18/2019

## 2019-01-18 NOTE — SUBJECTIVE & OBJECTIVE
Review of Systems    Unable to obtain a complete ROS due to trach, nonverbal  Objective:     Vitals:  Temp: 98.9 °F (37.2 °C)  Pulse: (!) 127  Rhythm: atrial rhythm  BP: (!) 147/86  MAP (mmHg): 110  Resp: (!) 25  SpO2: 100 %  Oxygen Concentration (%): 28  O2 Device (Oxygen Therapy): tracheostomy collar  Vent Mode: Spont  Pressure Support: 5 cmH20  PEEP/CPAP: 5 cmH20  Peak Airway Pressure: 14 cmH2O  Mean Airway Pressure: 9.1 cmH20  Plateau Pressure: 0 cmH20    Temp  Min: 97.8 °F (36.6 °C)  Max: 98.9 °F (37.2 °C)  Pulse  Min: 66  Max: 132  BP  Min: 116/78  Max: 172/105  MAP (mmHg)  Min: 81  Max: 128  Resp  Min: 19  Max: 40  SpO2  Min: 98 %  Max: 100 %  Oxygen Concentration (%)  Min: 28  Max: 100    01/17 0701 - 01/18 0700  In: 1565   Out: 1200 [Urine:1200]   Unmeasured Output  Urine Occurrence: 0  Stool Occurrence: 0       Physical Exam    Physical Exam:  GA: Awake, comfortable, no acute distress.   HEENT: No scleral icterus or JVD.   Pulmonary: Clear to auscultation A/L. No wheezing, crackles, or rhonchi.  Cardiac: RRR S1 & S2 w/o rubs/murmurs/gallops.   Abdominal: Bowel sounds present x 4. No appreciable hepatosplenomegaly.  Skin: No jaundice, rashes, or visible lesions.  Neuro:  --GCS: E4 V1t M5  --Mental Status:  Awake, alert, trach, doesn't follow commands  --Pupils 4mm, PERRL.   --cough, gag, corneal reflexes intact  --moves all extremities spontaneously  Unable to test orientation, language, memory, judgment, insight, fund of knowledge, shoulder shrug, tongue protrusion, coordination, gait    Medications:  Continuous   Scheduled    albuterol sulfate 2.5 mg Q4H   amLODIPine 10 mg Daily   chlorhexidine 15 mL QID   diclofenac sodium  TID   famotidine 20 mg BID   finasteride 5 mg Daily   heparin (porcine) 5,000 Units Q8H   lacosamide 100 mg BID   metoprolol tartrate 100 mg Q8H   polyethylene glycol 17 g BID   pravastatin 40 mg QHS   senna-docusate 8.6-50 mg 1 tablet BID   silodosin 4 mg Daily   sodium chloride  0.9% 3 mL Q8H   sodium chloride 3% 4 mL Q4H   sodium chloride 2 g TID   ursodiol 300 mg TID   PRN    acetaminophen 650 mg Q6H PRN   fentaNYL 25 mcg Q2H PRN   hydrALAZINE 10 mg Q4H PRN   magnesium oxide 800 mg PRN   magnesium oxide 800 mg PRN   ondansetron 4 mg Q12H PRN   potassium chloride 10% 40 mEq PRN   potassium chloride 10% 40 mEq PRN   potassium chloride 10% 60 mEq PRN   potassium, sodium phosphates 2 packet PRN   potassium, sodium phosphates 2 packet PRN   potassium, sodium phosphates 2 packet PRN     Today I personally reviewed pertinent medications, lines/drains/airways, imaging, cardiology results, laboratory results, microbiology results,     Diet  No diet orders on file  No diet orders on file

## 2019-01-18 NOTE — PROGRESS NOTES
[facility]  Neurocritical Care  Progress Note    Admit Date: 12/20/2018  Service Date: 01/18/2019  Length of Stay: 29    Subjective:     Chief Complaint: SDH (subdural hematoma)    History of Present Illness: 63 year old male h/o CABG on ASA 81 mg (last dose 72 hours PTA) presenting as transfer from OSH for EDH with SDH. Patient reportedly had a mechanical fall from standing on Sunday and went to ED where CTH was negative for any acute intracranial pathology. Overnight, patient developed confusion and presented to OSH ED where CTH was consistent with EDH with SDH. Patient has progressively worsened throughout the day developing intermittent global aphasia. NSGY consulted in ED at [facility] and stat CTH has been ordered. NCC consulted for admission and medical management. Patient on weaning off of cardene at time of evaluation with sbp of 120. Reported presenting sbp in 150s.    Hospital Course: 12/22: no major events overnight. Weaned off of cardene. This morning AOx3. Drain removed by NSGY at 11 AM, and planned to step down to NSGY.  AMS noted around 1PM, patient aphasic and not following commands, STAT CTH unremarkable.   12/26: wean ketamine, follow EEG, send urine studuies-Na 133, NS bolus, advance TF  12/27: off ketamine, d/c levo, continue EEG, and AEDs, 500 NS bolus, increase enteral water flushes, continue abx for another day  1/2: extubation trial.   1/3: failed extubation trial. Plan for trach and peg  1/4: plan for trach, general surgery consult  1/5: NAEO  1/6: NAEO. Awaiting trach and peg  1/7: plan for trach/peg, opens eyes to voice no commands  1/8: s/p trach/peg today  1/9: restarting tf this afternoon, ltac planning, eeg today to help guide aed weaning  1/12 Trach trials tolerating well. Neuro checks q2 in am/ q4in pm metoprolol prn HR.120  1/13 Continues to tolerate Trach. Start Norvasc. eval thyroid function baseline.   1/14 hyponatremic, pending urine studies, finasteride, increased  secretions, CPT, pending LTAC  01/15: Awaiting approval for transfer to LTAC  01/16: Gen surg about sutures and trach pressure wound  01/17: Med route adjustments and awaiting wound care recs, Pending LTAC placement  01/18: Placed orders for LTAC placement, Insurance company issues      Review of Systems    Unable to obtain a complete ROS due to trach, nonverbal  Objective:     Vitals:  Temp: 98.9 °F (37.2 °C)  Pulse: (!) 127  Rhythm: atrial rhythm  BP: (!) 147/86  MAP (mmHg): 110  Resp: (!) 25  SpO2: 100 %  Oxygen Concentration (%): 28  O2 Device (Oxygen Therapy): tracheostomy collar  Vent Mode: Spont  Pressure Support: 5 cmH20  PEEP/CPAP: 5 cmH20  Peak Airway Pressure: 14 cmH2O  Mean Airway Pressure: 9.1 cmH20  Plateau Pressure: 0 cmH20    Temp  Min: 97.8 °F (36.6 °C)  Max: 98.9 °F (37.2 °C)  Pulse  Min: 66  Max: 132  BP  Min: 116/78  Max: 172/105  MAP (mmHg)  Min: 81  Max: 128  Resp  Min: 19  Max: 40  SpO2  Min: 98 %  Max: 100 %  Oxygen Concentration (%)  Min: 28  Max: 100    01/17 0701 - 01/18 0700  In: 1565   Out: 1200 [Urine:1200]   Unmeasured Output  Urine Occurrence: 0  Stool Occurrence: 0       Physical Exam    Physical Exam:  GA: Awake, comfortable, no acute distress.   HEENT: No scleral icterus or JVD.   Pulmonary: Clear to auscultation A/L. No wheezing, crackles, or rhonchi.  Cardiac: RRR S1 & S2 w/o rubs/murmurs/gallops.   Abdominal: Bowel sounds present x 4. No appreciable hepatosplenomegaly.  Skin: No jaundice, rashes, or visible lesions.  Neuro:  --GCS: E4 V1t M5  --Mental Status:  Awake, alert, trach, doesn't follow commands  --Pupils 4mm, PERRL.   --cough, gag, corneal reflexes intact  --moves all extremities spontaneously  Unable to test orientation, language, memory, judgment, insight, fund of knowledge, shoulder shrug, tongue protrusion, coordination, gait    Medications:  Continuous   Scheduled    albuterol sulfate 2.5 mg Q4H   amLODIPine 10 mg Daily   chlorhexidine 15 mL QID   diclofenac  sodium  TID   famotidine 20 mg BID   finasteride 5 mg Daily   heparin (porcine) 5,000 Units Q8H   lacosamide 100 mg BID   metoprolol tartrate 100 mg Q8H   polyethylene glycol 17 g BID   pravastatin 40 mg QHS   senna-docusate 8.6-50 mg 1 tablet BID   silodosin 4 mg Daily   sodium chloride 0.9% 3 mL Q8H   sodium chloride 3% 4 mL Q4H   sodium chloride 2 g TID   ursodiol 300 mg TID   PRN    acetaminophen 650 mg Q6H PRN   fentaNYL 25 mcg Q2H PRN   hydrALAZINE 10 mg Q4H PRN   magnesium oxide 800 mg PRN   magnesium oxide 800 mg PRN   ondansetron 4 mg Q12H PRN   potassium chloride 10% 40 mEq PRN   potassium chloride 10% 40 mEq PRN   potassium chloride 10% 60 mEq PRN   potassium, sodium phosphates 2 packet PRN   potassium, sodium phosphates 2 packet PRN   potassium, sodium phosphates 2 packet PRN     Today I personally reviewed pertinent medications, lines/drains/airways, imaging, cardiology results, laboratory results, microbiology results,     Diet  No diet orders on file  No diet orders on file        Assessment/Plan:     Neuro   * SDH (subdural hematoma)    Epidural with subdural component s/p evac 12/20  Complicated by status epilepticus  Neuro checks Q2Hr in am / Q4Hr in pm  NSGY following  SBP goal < 180  Pt/OT/SLP   Awaiting wound care for trach care  LTAC planning continued   Placed orders for LTAC placement           Status epilepticus    Mental status stable  lacosamide 100mg bid 1/12     Pulmonary   Acute respiratory failure    S/p trach  Trach collar trial daily  Rest at night on vent as needed     Cardiac/Vascular   New onset atrial fibrillation    -With RVR  -Metoprolol 100 mg Q8H  -Monitor off AC       Essential hypertension    -SBP<180  -PRN hydralazine  -Increased Amlodopine to 5mg - 10mg 1/13       Renal/   Hyponatremia    continue sodium tabs to 2g tid         Endocrine   Severe malnutrition    S/p trach/peg  Impact peptide 55cc/h             Activity Orders          None        Full Code    Blake P  MEENAKSHI Mcginnis  Neurocritical Care  [facility]

## 2019-01-18 NOTE — ASSESSMENT & PLAN NOTE
Epidural with subdural component s/p evac 12/20  Complicated by status epilepticus  Neuro checks Q2Hr in am / Q4Hr in pm  NSGY following  SBP goal < 180  Pt/OT/SLP   Awaiting wound care for trach care  LTAC planning continued   Placed orders for LTAC placement

## 2019-01-18 NOTE — PLAN OF CARE
Problem: Occupational Therapy Goal  Goal: Occupational Therapy Goal  Goals set 1/12 to be addressed for 14 days with expiration date, 1/26:  Patient will increase functional independence with ADLs by performing:    Patient will demonstrate rolling to the right with max assist.  Not met   Patient will demonstrate rolling to the left with max assist.   Not met  Patient will demonstrate grooming while upright in bed with max assist.   Not met  Patient will demonstrate ability to follow 1/3 one step commands.   Not met  Patient's family / caregiver will demonstrate independence with providing ROM and changes in bed positioning.   Not met           Goals remain appropriate.  ASHLEY Oliva  1/18/2019

## 2019-01-18 NOTE — PLAN OF CARE
Problem: Adult Inpatient Plan of Care  Goal: Plan of Care Review  POC reviewed with patient and family at 1400. No evidence of learning from the patient. Day bath given. Oral care per protocol. LTAC transfer cancelled. Questions and concerns addressed. No acute events today. Patient progressing toward goals. RN will continue to monitor. See flowsheets for full assessment and VS info.

## 2019-01-18 NOTE — PLAN OF CARE
ALFONSO advised Pt is approved for LTAC by NICHOLAS. She has advised MD and Pt wife already. MD team working on orders.     ALFONSO spoke with Julia with [facility]. They were aware already and are working on the letter of agreement needed. It should go through soon. They are expecting to have it today. They have the bed already. She will advise when they are ready. ALFONSO reported MD orders are in Epic. She advised she will pull them and review (they have access).     Caroline Soares, AMBREEN  Neurocritical Care   [facility]  [id number]

## 2019-01-19 LAB
25(OH)D3+25(OH)D2 SERPL-MCNC: 27 NG/ML
ALBUMIN SERPL BCP-MCNC: 2.5 G/DL
ALP SERPL-CCNC: 292 U/L
ALT SERPL W/O P-5'-P-CCNC: 24 U/L
ANION GAP SERPL CALC-SCNC: 7 MMOL/L
AST SERPL-CCNC: 41 U/L
BASOPHILS # BLD AUTO: 0.08 K/UL
BASOPHILS NFR BLD: 0.7 %
BILIRUB SERPL-MCNC: 1 MG/DL
BUN SERPL-MCNC: 26 MG/DL
CALCIUM SERPL-MCNC: 9.4 MG/DL
CHLORIDE SERPL-SCNC: 108 MMOL/L
CO2 SERPL-SCNC: 25 MMOL/L
CREAT SERPL-MCNC: 0.5 MG/DL
DIFFERENTIAL METHOD: ABNORMAL
EOSINOPHIL # BLD AUTO: 0.4 K/UL
EOSINOPHIL NFR BLD: 3.4 %
ERYTHROCYTE [DISTWIDTH] IN BLOOD BY AUTOMATED COUNT: 16.7 %
EST. GFR  (AFRICAN AMERICAN): >60 ML/MIN/1.73 M^2
EST. GFR  (NON AFRICAN AMERICAN): >60 ML/MIN/1.73 M^2
GLUCOSE SERPL-MCNC: 121 MG/DL
HCT VFR BLD AUTO: 31.4 %
HGB BLD-MCNC: 9.6 G/DL
IMM GRANULOCYTES # BLD AUTO: 0.07 K/UL
IMM GRANULOCYTES NFR BLD AUTO: 0.6 %
LYMPHOCYTES # BLD AUTO: 1.6 K/UL
LYMPHOCYTES NFR BLD: 13.3 %
MAGNESIUM SERPL-MCNC: 1.9 MG/DL
MCH RBC QN AUTO: 31.1 PG
MCHC RBC AUTO-ENTMCNC: 30.6 G/DL
MCV RBC AUTO: 102 FL
MONOCYTES # BLD AUTO: 1.1 K/UL
MONOCYTES NFR BLD: 9.1 %
NEUTROPHILS # BLD AUTO: 8.6 K/UL
NEUTROPHILS NFR BLD: 72.9 %
NRBC BLD-RTO: 0 /100 WBC
PHOSPHATE SERPL-MCNC: 3.3 MG/DL
PLATELET # BLD AUTO: 294 K/UL
PMV BLD AUTO: 10 FL
POCT GLUCOSE: 124 MG/DL (ref 70–110)
POCT GLUCOSE: 127 MG/DL (ref 70–110)
POCT GLUCOSE: 137 MG/DL (ref 70–110)
POCT GLUCOSE: 137 MG/DL (ref 70–110)
POTASSIUM SERPL-SCNC: 4.1 MMOL/L
PROT SERPL-MCNC: 6.4 G/DL
RBC # BLD AUTO: 3.09 M/UL
SODIUM SERPL-SCNC: 140 MMOL/L
WBC # BLD AUTO: 11.75 K/UL

## 2019-01-19 PROCEDURE — 25000003 PHARM REV CODE 250: Performed by: STUDENT IN AN ORGANIZED HEALTH CARE EDUCATION/TRAINING PROGRAM

## 2019-01-19 PROCEDURE — 63600175 PHARM REV CODE 636 W HCPCS: Performed by: STUDENT IN AN ORGANIZED HEALTH CARE EDUCATION/TRAINING PROGRAM

## 2019-01-19 PROCEDURE — 99900035 HC TECH TIME PER 15 MIN (STAT)

## 2019-01-19 PROCEDURE — 99233 PR SUBSEQUENT HOSPITAL CARE,LEVL III: ICD-10-PCS | Mod: ,,, | Performed by: NURSE PRACTITIONER

## 2019-01-19 PROCEDURE — 25000003 PHARM REV CODE 250: Performed by: PSYCHIATRY & NEUROLOGY

## 2019-01-19 PROCEDURE — 20000000 HC ICU ROOM

## 2019-01-19 PROCEDURE — 25000003 PHARM REV CODE 250: Performed by: PHYSICIAN ASSISTANT

## 2019-01-19 PROCEDURE — 83735 ASSAY OF MAGNESIUM: CPT

## 2019-01-19 PROCEDURE — A4216 STERILE WATER/SALINE, 10 ML: HCPCS | Performed by: PSYCHIATRY & NEUROLOGY

## 2019-01-19 PROCEDURE — 94761 N-INVAS EAR/PLS OXIMETRY MLT: CPT

## 2019-01-19 PROCEDURE — 94003 VENT MGMT INPAT SUBQ DAY: CPT

## 2019-01-19 PROCEDURE — 27200966 HC CLOSED SUCTION SYSTEM

## 2019-01-19 PROCEDURE — 99233 SBSQ HOSP IP/OBS HIGH 50: CPT | Mod: ,,, | Performed by: NURSE PRACTITIONER

## 2019-01-19 PROCEDURE — 94668 MNPJ CHEST WALL SBSQ: CPT

## 2019-01-19 PROCEDURE — 85025 COMPLETE CBC W/AUTO DIFF WBC: CPT

## 2019-01-19 PROCEDURE — 25000003 PHARM REV CODE 250: Performed by: NURSE PRACTITIONER

## 2019-01-19 PROCEDURE — 80053 COMPREHEN METABOLIC PANEL: CPT

## 2019-01-19 PROCEDURE — 27000221 HC OXYGEN, UP TO 24 HOURS

## 2019-01-19 PROCEDURE — 25000242 PHARM REV CODE 250 ALT 637 W/ HCPCS: Performed by: NURSE PRACTITIONER

## 2019-01-19 PROCEDURE — 82306 VITAMIN D 25 HYDROXY: CPT

## 2019-01-19 PROCEDURE — 94640 AIRWAY INHALATION TREATMENT: CPT

## 2019-01-19 PROCEDURE — 84100 ASSAY OF PHOSPHORUS: CPT

## 2019-01-19 PROCEDURE — 99900026 HC AIRWAY MAINTENANCE (STAT)

## 2019-01-19 RX ORDER — SODIUM,POTASSIUM PHOSPHATES 280-250MG
2 POWDER IN PACKET (EA) ORAL
Status: DISCONTINUED | OUTPATIENT
Start: 2019-01-19 | End: 2019-01-28

## 2019-01-19 RX ORDER — POTASSIUM CHLORIDE 20 MEQ/15ML
40 SOLUTION ORAL
Status: DISCONTINUED | OUTPATIENT
Start: 2019-01-19 | End: 2019-01-28

## 2019-01-19 RX ORDER — POTASSIUM CHLORIDE 20 MEQ/15ML
60 SOLUTION ORAL
Status: DISCONTINUED | OUTPATIENT
Start: 2019-01-19 | End: 2019-01-28

## 2019-01-19 RX ORDER — LANOLIN ALCOHOL/MO/W.PET/CERES
800 CREAM (GRAM) TOPICAL
Status: DISCONTINUED | OUTPATIENT
Start: 2019-01-19 | End: 2019-01-28

## 2019-01-19 RX ADMIN — METOPROLOL TARTRATE 100 MG: 100 TABLET, FILM COATED ORAL at 06:01

## 2019-01-19 RX ADMIN — ACETAMINOPHEN 650 MG: 325 TABLET, FILM COATED ORAL at 11:01

## 2019-01-19 RX ADMIN — SODIUM CHLORIDE TAB 1 GM 2 G: 1 TAB at 08:01

## 2019-01-19 RX ADMIN — SODIUM CHLORIDE SOLN NEBU 3% 4 ML: 3 NEBU SOLN at 08:01

## 2019-01-19 RX ADMIN — AMLODIPINE BESYLATE 10 MG: 10 TABLET ORAL at 08:01

## 2019-01-19 RX ADMIN — Medication 3 ML: at 10:01

## 2019-01-19 RX ADMIN — METOPROLOL TARTRATE 100 MG: 100 TABLET, FILM COATED ORAL at 03:01

## 2019-01-19 RX ADMIN — SODIUM CHLORIDE SOLN NEBU 3% 4 ML: 3 NEBU SOLN at 04:01

## 2019-01-19 RX ADMIN — SODIUM CHLORIDE TAB 1 GM 2 G: 1 TAB at 03:01

## 2019-01-19 RX ADMIN — HEPARIN SODIUM 5000 UNITS: 5000 INJECTION, SOLUTION INTRAVENOUS; SUBCUTANEOUS at 03:01

## 2019-01-19 RX ADMIN — POLYETHYLENE GLYCOL 3350 17 G: 17 POWDER, FOR SOLUTION ORAL at 08:01

## 2019-01-19 RX ADMIN — POLYETHYLENE GLYCOL 3350 17 G: 17 POWDER, FOR SOLUTION ORAL at 09:01

## 2019-01-19 RX ADMIN — DICLOFENAC: 10 GEL TOPICAL at 09:01

## 2019-01-19 RX ADMIN — SILODOSIN 4 MG: 4 CAPSULE ORAL at 08:01

## 2019-01-19 RX ADMIN — CHLORHEXIDINE GLUCONATE 0.12% ORAL RINSE 15 ML: 1.2 LIQUID ORAL at 09:01

## 2019-01-19 RX ADMIN — ALBUTEROL SULFATE 2.5 MG: 2.5 SOLUTION RESPIRATORY (INHALATION) at 04:01

## 2019-01-19 RX ADMIN — LACOSAMIDE 100 MG: 50 TABLET, FILM COATED ORAL at 08:01

## 2019-01-19 RX ADMIN — Medication 3 ML: at 03:01

## 2019-01-19 RX ADMIN — FINASTERIDE 5 MG: 5 TABLET, FILM COATED ORAL at 08:01

## 2019-01-19 RX ADMIN — FAMOTIDINE 20 MG: 20 TABLET ORAL at 08:01

## 2019-01-19 RX ADMIN — PRAVASTATIN SODIUM 40 MG: 40 TABLET ORAL at 09:01

## 2019-01-19 RX ADMIN — CHLORHEXIDINE GLUCONATE 0.12% ORAL RINSE 15 ML: 1.2 LIQUID ORAL at 08:01

## 2019-01-19 RX ADMIN — Medication 3 ML: at 06:01

## 2019-01-19 RX ADMIN — SODIUM CHLORIDE TAB 1 GM 2 G: 1 TAB at 09:01

## 2019-01-19 RX ADMIN — LACOSAMIDE 100 MG: 50 TABLET, FILM COATED ORAL at 09:01

## 2019-01-19 RX ADMIN — URSODIOL 300 MG: 300 CAPSULE ORAL at 08:01

## 2019-01-19 RX ADMIN — DICLOFENAC: 10 GEL TOPICAL at 08:01

## 2019-01-19 RX ADMIN — ALBUTEROL SULFATE 2.5 MG: 2.5 SOLUTION RESPIRATORY (INHALATION) at 07:01

## 2019-01-19 RX ADMIN — SODIUM CHLORIDE SOLN NEBU 3% 4 ML: 3 NEBU SOLN at 12:01

## 2019-01-19 RX ADMIN — DICLOFENAC: 10 GEL TOPICAL at 03:01

## 2019-01-19 RX ADMIN — FAMOTIDINE 20 MG: 20 TABLET ORAL at 09:01

## 2019-01-19 RX ADMIN — HEPARIN SODIUM 5000 UNITS: 5000 INJECTION, SOLUTION INTRAVENOUS; SUBCUTANEOUS at 06:01

## 2019-01-19 RX ADMIN — SODIUM CHLORIDE SOLN NEBU 3% 4 ML: 3 NEBU SOLN at 11:01

## 2019-01-19 RX ADMIN — URSODIOL 300 MG: 300 CAPSULE ORAL at 03:01

## 2019-01-19 RX ADMIN — CHLORHEXIDINE GLUCONATE 0.12% ORAL RINSE 15 ML: 1.2 LIQUID ORAL at 02:01

## 2019-01-19 RX ADMIN — ALBUTEROL SULFATE 2.5 MG: 2.5 SOLUTION RESPIRATORY (INHALATION) at 11:01

## 2019-01-19 RX ADMIN — CHLORHEXIDINE GLUCONATE 0.12% ORAL RINSE 15 ML: 1.2 LIQUID ORAL at 05:01

## 2019-01-19 RX ADMIN — METOPROLOL TARTRATE 100 MG: 100 TABLET, FILM COATED ORAL at 09:01

## 2019-01-19 RX ADMIN — URSODIOL 300 MG: 300 CAPSULE ORAL at 09:01

## 2019-01-19 RX ADMIN — STANDARDIZED SENNA CONCENTRATE AND DOCUSATE SODIUM 1 TABLET: 8.6; 5 TABLET, FILM COATED ORAL at 08:01

## 2019-01-19 RX ADMIN — ALBUTEROL SULFATE 2.5 MG: 2.5 SOLUTION RESPIRATORY (INHALATION) at 12:01

## 2019-01-19 RX ADMIN — STANDARDIZED SENNA CONCENTRATE AND DOCUSATE SODIUM 1 TABLET: 8.6; 5 TABLET, FILM COATED ORAL at 09:01

## 2019-01-19 RX ADMIN — HEPARIN SODIUM 5000 UNITS: 5000 INJECTION, SOLUTION INTRAVENOUS; SUBCUTANEOUS at 09:01

## 2019-01-19 NOTE — PLAN OF CARE
Problem: Adult Inpatient Plan of Care  Goal: Plan of Care Review  Outcome: Ongoing (interventions implemented as appropriate)  POC reviewed with pt and family at 1400. Pt's wife verbalized understanding. Questions and concerns addressed. No acute events today. Pt progressing toward goals. Will continue to monitor. See flowsheets for full assessment and VS info.

## 2019-01-19 NOTE — PROGRESS NOTES
Nursing Transfer Note       Transfer To [facility]    Transfer via bed    Transfer with O2, cardiac monitoring    Transported by ANTONIO Hernandes and James RT     Medicines sent: yes    Chart sent with patient: Yes    Notified: spouse    Bedside Neuro assessment performed: Yes    Bedside Handoff given to: Blake ALVAREZ     Upon arrival to floor: cardiac monitor applied, patient oriented to room, call bell in reach and bed in lowest position     Add 31699 Cpt? (Important Note: In 2017 The Use Of 48422 Is Being Tracked By Cms To Determine Future Global Period Reimbursement For Global Periods): no Detail Level: Detailed

## 2019-01-19 NOTE — PLAN OF CARE
Problem: Adult Inpatient Plan of Care  Goal: Plan of Care Review  Outcome: Ongoing (interventions implemented as appropriate)  POC reviewed with pt at 0500. Pt trached; unable to verbalize understanding. Does not nod or gesture understanding when asked.     Pt was able to disconnect trach from vent overnight three times. Soft wrist restraints applied. Copious amounts of thick white secretions occur when pt coughs. Respiratory changed trach collar, gauze, and circuit overnight due to excess secretions. Neuro exam remains unchanged throughout shift. Pt able to move all extremities, but not to command. No IV gtts running. Tube Feeds are at goal; No residuals.     Pt progressing toward goals. Will continue to monitor. See flowsheets for full assessment and VS info

## 2019-01-19 NOTE — PROGRESS NOTES
[facility]  Neurosurgery  Progress Note    Subjective:     History of Present Illness: 64 yo M with PMH of CAD, CABG in 2006, DM, HTN, HLD on ASA presents to the ED for AMS. Patient fell 4 days ago with about 2 minutes of LOC. Family is unsure of what caused the fall. Full workup done including a CTH at [place] that was negative. Patient went home and stopped taking his ASA. This morning, his family noticed he was more confused. He went to [place] and CTH was done that revealed a 1.7cm left frontalpariental SDH with a 5 mm right sided midline shift and a left SAH. Patient was transferred to [facility]. History was obtained per family as patient unable to give history 2/2 confusion with expressive and receptive aphasia.    Post-Op Info:  Procedure(s) (LRB):  INSERTION, PEG TUBE (N/A)  CREATION, TRACHEOSTOMY (N/A)   10 Days Post-Op     Interval History: 1/18 - tachycardic, NAEON, labs stable, exam stable    Medications:  Continuous Infusions:  Scheduled Meds:   albuterol sulfate  2.5 mg Nebulization Q4H    amLODIPine  10 mg Per G Tube Daily    chlorhexidine  15 mL Mouth/Throat QID    diclofenac sodium   Topical (Top) TID    famotidine  20 mg Per G Tube BID    finasteride  5 mg Per G Tube Daily    heparin (porcine)  5,000 Units Subcutaneous Q8H    lacosamide  100 mg Per G Tube BID    metoprolol tartrate  100 mg Per G Tube Q8H    polyethylene glycol  17 g Per G Tube BID    pravastatin  40 mg Per G Tube QHS    senna-docusate 8.6-50 mg  1 tablet Per G Tube BID    silodosin  4 mg Per G Tube Daily    sodium chloride 0.9%  3 mL Intravenous Q8H    sodium chloride 3%  4 mL Nebulization Q4H    sodium chloride  2 g Per G Tube TID    ursodiol  300 mg Per G Tube TID     PRN Meds:acetaminophen, fentaNYL, hydrALAZINE, magnesium oxide, magnesium oxide, ondansetron, potassium chloride 10%, potassium chloride 10%, potassium chloride 10%, potassium, sodium phosphates, potassium, sodium phosphates,  potassium, sodium phosphates       Objective:     Weight: 112.5 kg (248 lb 0.3 oz)  Body mass index is 34.59 kg/m².  Vital Signs (Most Recent):  Temp: 98.3 °F (36.8 °C) (01/18/19 1900)  Pulse: 105 (01/18/19 1933)  Resp: (!) 23 (01/18/19 1933)  BP: (!) 152/79 (01/18/19 1900)  SpO2: 100 % (01/18/19 1933) Vital Signs (24h Range):  Temp:  [98.3 °F (36.8 °C)-99.1 °F (37.3 °C)] 98.3 °F (36.8 °C)  Pulse:  [] 105  Resp:  [19-40] 23  SpO2:  [95 %-100 %] 100 %  BP: (116-177)/() 152/79     Date 01/18/19 0700 - 01/19/19 0659   Shift 4791-8546 7827-6437 6886-8916 24 Hour Total   INTAKE   NG/ 275  795   Shift Total(mL/kg) 520(4.6) 275(2.4)  795(7.1)   OUTPUT   Urine(mL/kg/hr) 175(0.2) 700  875   Shift Total(mL/kg) 175(1.6) 700(6.2)  875(7.8)   Weight (kg) 112.5 112.5 112.5 112.5              Vent Mode: Spont  Oxygen Concentration (%):  [] 31  Resp Rate Total:  [17 br/min-38 br/min] 21 br/min  PEEP/CPAP:  [5 cmH20] 5 cmH20  Pressure Support:  [5 cmH20] 5 cmH20  Mean Airway Pressure:  [6.8 cmH20-9.1 cmH20] 7.5 cmH20         Gastrostomy/Enterostomy 01/08/19 1441 Percutaneous endoscopic gastrostomy (PEG) midline (Active)   Securement anchored to abdomen w/ adhesive device 1/18/2019  7:05 PM   Interventions Prior to Feeding patency checked 1/18/2019  7:05 PM   Drainage serosanguineous 1/10/2019  3:00 PM   Feeding Type continuous 1/18/2019  7:05 PM   Clamp Status/Tolerance unclamped 1/18/2019  7:05 PM   Feeding Action feeding continued 1/18/2019  7:05 PM   Dressing dry and intact 1/18/2019  7:05 PM   Insertion Site no redness;no warmth;no drainage;no tenderness;no swelling 1/18/2019  7:05 PM   Site Care device rotatated;sterile 4 x 4 gauze dressing applied 1/18/2019  7:05 PM   Flush/Irrigation flushed w/;water;no resistance met 1/18/2019  7:05 PM   Dressing Change Due 01/10/19 1/10/2019  3:00 PM   Current Rate (mL/hr) 55 mL/hr 1/18/2019  7:05 PM   Goal Rate (mL/hr) 55 mL/hr 1/18/2019  7:05 PM   Intake (mL) 40 mL  1/18/2019  2:05 PM   Water Bolus (mL) 100 mL 1/13/2019  6:05 AM   Tube Output(mL)(Include Discarded Residual) 60 mL 1/13/2019  2:00 PM   Formula Name Impact Peptide 1.5 1/18/2019  7:05 PM   Tube Feeding Intake (mL) 55 1/18/2019  7:00 PM   Residual Amount (ml) 0 ml 1/18/2019  3:05 PM       Neurosurgery Physical Exam     E4VTM5, grimacing to stimulation, tracking  PERRL  MOORE spontaneously  Trach and PEG in place    Significant Labs:  Recent Labs   Lab 01/17/19  0135 01/18/19  0210   * 112*    139   K 3.9 3.9    109   CO2 22* 23   BUN 22 26*   CREATININE 0.5 0.5   CALCIUM 8.8 8.8   MG 1.8 1.9     Recent Labs   Lab 01/17/19 0135 01/18/19  0210   WBC 13.21* 11.91   HGB 9.3* 8.5*   HCT 28.6* 27.1*    280     No results for input(s): LABPT, INR, APTT in the last 48 hours.  Microbiology Results (last 7 days)     ** No results found for the last 168 hours. **        Assessment/Plan:     * SDH (subdural hematoma)    62 yo male with L aSDH now s/p craniotomy for evacuation (12/20).    --Continue care per primary team.  --Patient is stable for discharge from a neurosurgical perspective  --Continue neurochecks while in house  --Further care per NCC   --Dispo pending LTAC placement              Gagandeep Giang MD  Neurosurgery  [facility]

## 2019-01-19 NOTE — SUBJECTIVE & OBJECTIVE
Interval History: 1/18 - tachycardic, NAEON, labs stable, exam stable    Medications:  Continuous Infusions:  Scheduled Meds:   albuterol sulfate  2.5 mg Nebulization Q4H    amLODIPine  10 mg Per G Tube Daily    chlorhexidine  15 mL Mouth/Throat QID    diclofenac sodium   Topical (Top) TID    famotidine  20 mg Per G Tube BID    finasteride  5 mg Per G Tube Daily    heparin (porcine)  5,000 Units Subcutaneous Q8H    lacosamide  100 mg Per G Tube BID    metoprolol tartrate  100 mg Per G Tube Q8H    polyethylene glycol  17 g Per G Tube BID    pravastatin  40 mg Per G Tube QHS    senna-docusate 8.6-50 mg  1 tablet Per G Tube BID    silodosin  4 mg Per G Tube Daily    sodium chloride 0.9%  3 mL Intravenous Q8H    sodium chloride 3%  4 mL Nebulization Q4H    sodium chloride  2 g Per G Tube TID    ursodiol  300 mg Per G Tube TID     PRN Meds:acetaminophen, fentaNYL, hydrALAZINE, magnesium oxide, magnesium oxide, ondansetron, potassium chloride 10%, potassium chloride 10%, potassium chloride 10%, potassium, sodium phosphates, potassium, sodium phosphates, potassium, sodium phosphates       Objective:     Weight: 112.5 kg (248 lb 0.3 oz)  Body mass index is 34.59 kg/m².  Vital Signs (Most Recent):  Temp: 98.3 °F (36.8 °C) (01/18/19 1900)  Pulse: 105 (01/18/19 1933)  Resp: (!) 23 (01/18/19 1933)  BP: (!) 152/79 (01/18/19 1900)  SpO2: 100 % (01/18/19 1933) Vital Signs (24h Range):  Temp:  [98.3 °F (36.8 °C)-99.1 °F (37.3 °C)] 98.3 °F (36.8 °C)  Pulse:  [] 105  Resp:  [19-40] 23  SpO2:  [95 %-100 %] 100 %  BP: (116-177)/() 152/79     Date 01/18/19 0700 - 01/19/19 0659   Shift 2686-7879 2559-0660 7716-5328 24 Hour Total   INTAKE   NG/ 275  795   Shift Total(mL/kg) 520(4.6) 275(2.4)  795(7.1)   OUTPUT   Urine(mL/kg/hr) 175(0.2) 700  875   Shift Total(mL/kg) 175(1.6) 700(6.2)  875(7.8)   Weight (kg) 112.5 112.5 112.5 112.5              Vent Mode: Spont  Oxygen Concentration (%):  []  31  Resp Rate Total:  [17 br/min-38 br/min] 21 br/min  PEEP/CPAP:  [5 cmH20] 5 cmH20  Pressure Support:  [5 cmH20] 5 cmH20  Mean Airway Pressure:  [6.8 cmH20-9.1 cmH20] 7.5 cmH20         Gastrostomy/Enterostomy 01/08/19 1441 Percutaneous endoscopic gastrostomy (PEG) midline (Active)   Securement anchored to abdomen w/ adhesive device 1/18/2019  7:05 PM   Interventions Prior to Feeding patency checked 1/18/2019  7:05 PM   Drainage serosanguineous 1/10/2019  3:00 PM   Feeding Type continuous 1/18/2019  7:05 PM   Clamp Status/Tolerance unclamped 1/18/2019  7:05 PM   Feeding Action feeding continued 1/18/2019  7:05 PM   Dressing dry and intact 1/18/2019  7:05 PM   Insertion Site no redness;no warmth;no drainage;no tenderness;no swelling 1/18/2019  7:05 PM   Site Care device rotatated;sterile 4 x 4 gauze dressing applied 1/18/2019  7:05 PM   Flush/Irrigation flushed w/;water;no resistance met 1/18/2019  7:05 PM   Dressing Change Due 01/10/19 1/10/2019  3:00 PM   Current Rate (mL/hr) 55 mL/hr 1/18/2019  7:05 PM   Goal Rate (mL/hr) 55 mL/hr 1/18/2019  7:05 PM   Intake (mL) 40 mL 1/18/2019  2:05 PM   Water Bolus (mL) 100 mL 1/13/2019  6:05 AM   Tube Output(mL)(Include Discarded Residual) 60 mL 1/13/2019  2:00 PM   Formula Name Impact Peptide 1.5 1/18/2019  7:05 PM   Tube Feeding Intake (mL) 55 1/18/2019  7:00 PM   Residual Amount (ml) 0 ml 1/18/2019  3:05 PM       Neurosurgery Physical Exam     E4VTM5, grimacing to stimulation, tracking  PERRL  MOORE spontaneously  Trach and PEG in place    Significant Labs:  Recent Labs   Lab 01/17/19  0135 01/18/19  0210   * 112*    139   K 3.9 3.9    109   CO2 22* 23   BUN 22 26*   CREATININE 0.5 0.5   CALCIUM 8.8 8.8   MG 1.8 1.9     Recent Labs   Lab 01/17/19  0135 01/18/19  0210   WBC 13.21* 11.91   HGB 9.3* 8.5*   HCT 28.6* 27.1*    280     No results for input(s): LABPT, INR, APTT in the last 48 hours.  Microbiology Results (last 7 days)     ** No results  found for the last 168 hours. **

## 2019-01-20 LAB
ALBUMIN SERPL BCP-MCNC: 2.5 G/DL
ALP SERPL-CCNC: 348 U/L
ALT SERPL W/O P-5'-P-CCNC: 42 U/L
ANION GAP SERPL CALC-SCNC: 9 MMOL/L
AST SERPL-CCNC: 77 U/L
BASOPHILS # BLD AUTO: 0.09 K/UL
BASOPHILS NFR BLD: 0.8 %
BILIRUB SERPL-MCNC: 1.2 MG/DL
BUN SERPL-MCNC: 23 MG/DL
CALCIUM SERPL-MCNC: 9.1 MG/DL
CHLORIDE SERPL-SCNC: 109 MMOL/L
CO2 SERPL-SCNC: 22 MMOL/L
CREAT SERPL-MCNC: 0.5 MG/DL
DIFFERENTIAL METHOD: ABNORMAL
EOSINOPHIL # BLD AUTO: 0.5 K/UL
EOSINOPHIL NFR BLD: 4.4 %
ERYTHROCYTE [DISTWIDTH] IN BLOOD BY AUTOMATED COUNT: 16.3 %
EST. GFR  (AFRICAN AMERICAN): >60 ML/MIN/1.73 M^2
EST. GFR  (NON AFRICAN AMERICAN): >60 ML/MIN/1.73 M^2
GLUCOSE SERPL-MCNC: 110 MG/DL
HCT VFR BLD AUTO: 31.9 %
HGB BLD-MCNC: 9.9 G/DL
IMM GRANULOCYTES # BLD AUTO: 0.06 K/UL
IMM GRANULOCYTES NFR BLD AUTO: 0.5 %
LYMPHOCYTES # BLD AUTO: 1.7 K/UL
LYMPHOCYTES NFR BLD: 15.4 %
MAGNESIUM SERPL-MCNC: 2.2 MG/DL
MCH RBC QN AUTO: 31 PG
MCHC RBC AUTO-ENTMCNC: 31 G/DL
MCV RBC AUTO: 100 FL
MONOCYTES # BLD AUTO: 0.9 K/UL
MONOCYTES NFR BLD: 7.8 %
NEUTROPHILS # BLD AUTO: 7.9 K/UL
NEUTROPHILS NFR BLD: 71.1 %
NRBC BLD-RTO: 0 /100 WBC
PHOSPHATE SERPL-MCNC: 3.5 MG/DL
PLATELET # BLD AUTO: 290 K/UL
PMV BLD AUTO: 10.3 FL
POCT GLUCOSE: 116 MG/DL (ref 70–110)
POCT GLUCOSE: 117 MG/DL (ref 70–110)
POCT GLUCOSE: 136 MG/DL (ref 70–110)
POCT GLUCOSE: 138 MG/DL (ref 70–110)
POTASSIUM SERPL-SCNC: 4.4 MMOL/L
PROT SERPL-MCNC: 6.8 G/DL
RBC # BLD AUTO: 3.19 M/UL
SODIUM SERPL-SCNC: 140 MMOL/L
WBC # BLD AUTO: 11.08 K/UL

## 2019-01-20 PROCEDURE — 25000003 PHARM REV CODE 250: Performed by: PSYCHIATRY & NEUROLOGY

## 2019-01-20 PROCEDURE — 94668 MNPJ CHEST WALL SBSQ: CPT

## 2019-01-20 PROCEDURE — 99900035 HC TECH TIME PER 15 MIN (STAT)

## 2019-01-20 PROCEDURE — 20000000 HC ICU ROOM

## 2019-01-20 PROCEDURE — 80053 COMPREHEN METABOLIC PANEL: CPT

## 2019-01-20 PROCEDURE — 27000221 HC OXYGEN, UP TO 24 HOURS

## 2019-01-20 PROCEDURE — 25000003 PHARM REV CODE 250: Performed by: NURSE PRACTITIONER

## 2019-01-20 PROCEDURE — 25000003 PHARM REV CODE 250: Performed by: PHYSICIAN ASSISTANT

## 2019-01-20 PROCEDURE — 99233 SBSQ HOSP IP/OBS HIGH 50: CPT | Mod: ,,, | Performed by: NURSE PRACTITIONER

## 2019-01-20 PROCEDURE — 85025 COMPLETE CBC W/AUTO DIFF WBC: CPT

## 2019-01-20 PROCEDURE — 94640 AIRWAY INHALATION TREATMENT: CPT

## 2019-01-20 PROCEDURE — 94761 N-INVAS EAR/PLS OXIMETRY MLT: CPT

## 2019-01-20 PROCEDURE — 25000003 PHARM REV CODE 250: Performed by: STUDENT IN AN ORGANIZED HEALTH CARE EDUCATION/TRAINING PROGRAM

## 2019-01-20 PROCEDURE — 83735 ASSAY OF MAGNESIUM: CPT

## 2019-01-20 PROCEDURE — A4216 STERILE WATER/SALINE, 10 ML: HCPCS | Performed by: PSYCHIATRY & NEUROLOGY

## 2019-01-20 PROCEDURE — 25000242 PHARM REV CODE 250 ALT 637 W/ HCPCS: Performed by: NURSE PRACTITIONER

## 2019-01-20 PROCEDURE — 99233 PR SUBSEQUENT HOSPITAL CARE,LEVL III: ICD-10-PCS | Mod: ,,, | Performed by: NURSE PRACTITIONER

## 2019-01-20 PROCEDURE — 99900026 HC AIRWAY MAINTENANCE (STAT)

## 2019-01-20 PROCEDURE — 84100 ASSAY OF PHOSPHORUS: CPT

## 2019-01-20 PROCEDURE — 27200966 HC CLOSED SUCTION SYSTEM

## 2019-01-20 PROCEDURE — 63600175 PHARM REV CODE 636 W HCPCS: Performed by: STUDENT IN AN ORGANIZED HEALTH CARE EDUCATION/TRAINING PROGRAM

## 2019-01-20 RX ADMIN — DICLOFENAC: 10 GEL TOPICAL at 08:01

## 2019-01-20 RX ADMIN — FAMOTIDINE 20 MG: 20 TABLET ORAL at 09:01

## 2019-01-20 RX ADMIN — POLYETHYLENE GLYCOL 3350 17 G: 17 POWDER, FOR SOLUTION ORAL at 08:01

## 2019-01-20 RX ADMIN — ALBUTEROL SULFATE 2.5 MG: 2.5 SOLUTION RESPIRATORY (INHALATION) at 03:01

## 2019-01-20 RX ADMIN — FINASTERIDE 5 MG: 5 TABLET, FILM COATED ORAL at 08:01

## 2019-01-20 RX ADMIN — Medication 3 ML: at 09:01

## 2019-01-20 RX ADMIN — CHLORHEXIDINE GLUCONATE 0.12% ORAL RINSE 15 ML: 1.2 LIQUID ORAL at 09:01

## 2019-01-20 RX ADMIN — HEPARIN SODIUM 5000 UNITS: 5000 INJECTION, SOLUTION INTRAVENOUS; SUBCUTANEOUS at 01:01

## 2019-01-20 RX ADMIN — URSODIOL 300 MG: 300 CAPSULE ORAL at 03:01

## 2019-01-20 RX ADMIN — SODIUM CHLORIDE SOLN NEBU 3% 4 ML: 3 NEBU SOLN at 11:01

## 2019-01-20 RX ADMIN — STANDARDIZED SENNA CONCENTRATE AND DOCUSATE SODIUM 1 TABLET: 8.6; 5 TABLET, FILM COATED ORAL at 09:01

## 2019-01-20 RX ADMIN — SODIUM CHLORIDE TAB 1 GM 2 G: 1 TAB at 08:01

## 2019-01-20 RX ADMIN — SODIUM CHLORIDE SOLN NEBU 3% 4 ML: 3 NEBU SOLN at 03:01

## 2019-01-20 RX ADMIN — Medication 3 ML: at 05:01

## 2019-01-20 RX ADMIN — ALBUTEROL SULFATE 2.5 MG: 2.5 SOLUTION RESPIRATORY (INHALATION) at 07:01

## 2019-01-20 RX ADMIN — ALBUTEROL SULFATE 2.5 MG: 2.5 SOLUTION RESPIRATORY (INHALATION) at 08:01

## 2019-01-20 RX ADMIN — AMLODIPINE BESYLATE 10 MG: 10 TABLET ORAL at 08:01

## 2019-01-20 RX ADMIN — DICLOFENAC: 10 GEL TOPICAL at 02:01

## 2019-01-20 RX ADMIN — STANDARDIZED SENNA CONCENTRATE AND DOCUSATE SODIUM 1 TABLET: 8.6; 5 TABLET, FILM COATED ORAL at 08:01

## 2019-01-20 RX ADMIN — Medication 3 ML: at 01:01

## 2019-01-20 RX ADMIN — SILODOSIN 4 MG: 4 CAPSULE ORAL at 08:01

## 2019-01-20 RX ADMIN — SODIUM CHLORIDE TAB 1 GM 2 G: 1 TAB at 02:01

## 2019-01-20 RX ADMIN — LACOSAMIDE 100 MG: 50 TABLET, FILM COATED ORAL at 08:01

## 2019-01-20 RX ADMIN — HEPARIN SODIUM 5000 UNITS: 5000 INJECTION, SOLUTION INTRAVENOUS; SUBCUTANEOUS at 09:01

## 2019-01-20 RX ADMIN — FAMOTIDINE 20 MG: 20 TABLET ORAL at 08:01

## 2019-01-20 RX ADMIN — SODIUM CHLORIDE SOLN NEBU 3% 4 ML: 3 NEBU SOLN at 08:01

## 2019-01-20 RX ADMIN — POLYETHYLENE GLYCOL 3350 17 G: 17 POWDER, FOR SOLUTION ORAL at 09:01

## 2019-01-20 RX ADMIN — METOPROLOL TARTRATE 100 MG: 100 TABLET, FILM COATED ORAL at 05:01

## 2019-01-20 RX ADMIN — METOPROLOL TARTRATE 100 MG: 100 TABLET, FILM COATED ORAL at 01:01

## 2019-01-20 RX ADMIN — URSODIOL 300 MG: 300 CAPSULE ORAL at 09:01

## 2019-01-20 RX ADMIN — HEPARIN SODIUM 5000 UNITS: 5000 INJECTION, SOLUTION INTRAVENOUS; SUBCUTANEOUS at 05:01

## 2019-01-20 RX ADMIN — LACOSAMIDE 100 MG: 50 TABLET, FILM COATED ORAL at 09:01

## 2019-01-20 RX ADMIN — PRAVASTATIN SODIUM 40 MG: 40 TABLET ORAL at 09:01

## 2019-01-20 RX ADMIN — METOPROLOL TARTRATE 100 MG: 100 TABLET, FILM COATED ORAL at 09:01

## 2019-01-20 RX ADMIN — ALBUTEROL SULFATE 2.5 MG: 2.5 SOLUTION RESPIRATORY (INHALATION) at 11:01

## 2019-01-20 RX ADMIN — SODIUM CHLORIDE SOLN NEBU 3% 4 ML: 3 NEBU SOLN at 07:01

## 2019-01-20 RX ADMIN — SODIUM CHLORIDE TAB 1 GM 2 G: 1 TAB at 09:01

## 2019-01-20 NOTE — PROGRESS NOTES
PEDRO Broderick NP notified of pt's 5 beat run of Vtach. 12 lead EKG obtained. AM labs- K: 4.4 and M.2. No new orders at this time. Strips placed in chart. Will continue to monitor closely.

## 2019-01-20 NOTE — PROGRESS NOTES
LIONEL Broderick NP notified of Pt in Afib RVR, HR sustaining 129. 0600 scheduled 100mg lopressor to be given. No other orders. Will continue to monitor closely.

## 2019-01-20 NOTE — PLAN OF CARE
Problem: Adult Inpatient Plan of Care  Goal: Plan of Care Review  Outcome: Ongoing (interventions implemented as appropriate)  POC reviewed with pt at 0500. Pt unable to verbalize understanding r/t trach. Questions and concerns addressed with spouse at bedside. Pt had 5 beat run of Vtach, see note. BM X1. Pt straight cathed X1.TF continue @ 55, tolerating well. Pt progressing toward goals. Will continue to monitor. See flowsheets for full assessment and VS info

## 2019-01-20 NOTE — ASSESSMENT & PLAN NOTE
Epidural with subdural component s/p evac 12/20  Complicated by status epilepticus  Neuro checks Q2Hr in am / Q4Hr in pm  NSGY following  SBP goal < 180  Pt/OT/SLP   Awaiting wound care for trach care  LTAC placement pending

## 2019-01-20 NOTE — PROGRESS NOTES
[facility]  Neurocritical Care  Progress Note    Admit Date: 12/20/2018  Service Date: 01/20/2019  Length of Stay: 31    Subjective:     Chief Complaint: SDH (subdural hematoma)    History of Present Illness: 63 year old male h/o CABG on ASA 81 mg (last dose 72 hours PTA) presenting as transfer from OSH for EDH with SDH. Patient reportedly had a mechanical fall from standing on Sunday and went to ED where CTH was negative for any acute intracranial pathology. Overnight, patient developed confusion and presented to OSH ED where CTH was consistent with EDH with SDH. Patient has progressively worsened throughout the day developing intermittent global aphasia. NSGY consulted in ED at [facility] and stat CTH has been ordered. NCC consulted for admission and medical management. Patient on weaning off of cardene at time of evaluation with sbp of 120. Reported presenting sbp in 150s.    Hospital Course: 12/22: no major events overnight. Weaned off of cardene. This morning AOx3. Drain removed by NSGY at 11 AM, and planned to step down to NSGY.  AMS noted around 1PM, patient aphasic and not following commands, STAT CTH unremarkable.   12/26: wean ketamine, follow EEG, send urine studuies-Na 133, NS bolus, advance TF  12/27: off ketamine, d/c levo, continue EEG, and AEDs, 500 NS bolus, increase enteral water flushes, continue abx for another day  1/2: extubation trial.   1/3: failed extubation trial. Plan for trach and peg  1/4: plan for trach, general surgery consult  1/5: NAEO  1/6: NAEO. Awaiting trach and peg  1/7: plan for trach/peg, opens eyes to voice no commands  1/8: s/p trach/peg today  1/9: restarting tf this afternoon, ltac planning, eeg today to help guide aed weaning  1/12 Trach trials tolerating well. Neuro checks q2 in am/ q4in pm metoprolol prn HR.120  1/13 Continues to tolerate Trach. Start Norvasc. eval thyroid function baseline.   1/14 hyponatremic, pending urine studies, finasteride, increased  secretions, CPT, pending LTAC  01/15: Awaiting approval for transfer to LTAC  01/16: Gen surg about sutures and trach pressure wound  01/17: Med route adjustments and awaiting wound care recs, Pending LTAC placement  01/18: Placed orders for LTAC placement, Insurance company issues  1/19 No  Significant events overnight. No changes in assessment. Awaiting LTAC placement  1/20 no significant changes overnight. Continue trach collar. No vent over night. Hyperventilation therapy - bag/suction q6h. CXR in am pending  LTAC pending    Interval History: no significant changes overnight. Continue trach collar. No vent over night. Hyperventilation therapy - bag/suction q6h. CXR in am pending  LTAC pending      Review of Systems:  Unable to obtain a complete ROS due to level of consciousness.     Vitals:   Temp: 98.2 °F (36.8 °C)  Pulse: (!) 124  Rhythm: atrial rhythm  BP: (!) 147/100  MAP (mmHg): 119  Resp: (!) 30  SpO2: 98 %  Oxygen Concentration (%): 28  O2 Device (Oxygen Therapy): tracheostomy collar  Vent Mode: Spont  Pressure Support: 5 cmH20  PEEP/CPAP: 5 cmH20  Peak Airway Pressure: 11 cmH2O  Mean Airway Pressure: 7.5 cmH20  Plateau Pressure: 0 cmH20    Temp  Min: 97.9 °F (36.6 °C)  Max: 98.2 °F (36.8 °C)  Pulse  Min: 68  Max: 129  BP  Min: 136/88  Max: 182/89  MAP (mmHg)  Min: 97  Max: 129  Resp  Min: 16  Max: 30  SpO2  Min: 94 %  Max: 100 %  Oxygen Concentration (%)  Min: 28  Max: 31    01/19 0701 - 01/20 0700  In: 1600 [I.V.:60]  Out: 1350 [Urine:1350]   Unmeasured Output  Urine Occurrence: 1  Stool Occurrence: 0     Examination:   GA: Awake, comfortable, no acute distress.   HEENT: No scleral icterus or JVD.   Pulmonary: Clear to auscultation A/L. No wheezing, crackles, or rhonchi.  Cardiac: RRR S1 & S2 w/o rubs/murmurs/gallops.   Abdominal: Bowel sounds present x 4. No appreciable hepatosplenomegaly.  Skin: No jaundice, rashes, or visible lesions.  Neuro:  --GCS: E4 V1t M5  --Mental Status:  Awake, alert, trach,  doesn't follow commands  --Pupils 4mm, PERRL.   --cough, gag, corneal reflexes intact  --moves all extremities spontaneously  Unable to test orientation, language, memory, judgment, insight, fund of knowledge, shoulder shrug, tongue protrusion, coordination, gait           Medications:   Continuous Scheduled  albuterol sulfate 2.5 mg Q4H   amLODIPine 10 mg Daily   chlorhexidine 15 mL QID   diclofenac sodium  TID   famotidine 20 mg BID   finasteride 5 mg Daily   heparin (porcine) 5,000 Units Q8H   lacosamide 100 mg BID   metoprolol tartrate 100 mg Q8H   polyethylene glycol 17 g BID   pravastatin 40 mg QHS   senna-docusate 8.6-50 mg 1 tablet BID   silodosin 4 mg Daily   sodium chloride 0.9% 3 mL Q8H   sodium chloride 3% 4 mL Q4H   sodium chloride 2 g TID   ursodiol 300 mg TID   PRN  acetaminophen 650 mg Q6H PRN   fentaNYL 25 mcg Q2H PRN   hydrALAZINE 10 mg Q4H PRN   magnesium oxide 800 mg PRN   magnesium oxide 800 mg PRN   ondansetron 4 mg Q12H PRN   potassium chloride 10% 40 mEq PRN   potassium chloride 10% 40 mEq PRN   potassium chloride 10% 60 mEq PRN   potassium, sodium phosphates 2 packet PRN   potassium, sodium phosphates 2 packet PRN   potassium, sodium phosphates 2 packet PRN      Today I independently reviewed pertinent medications, lines/drains/airways, imaging, laboratory results, microbiology results, notably:     ISTAT: No results for input(s): PH, PCO2, PO2, POCSATURATED, HCO3, BE, POCNA, POCK, POCTCO2, POCGLU, POCICA, POCLAC, SAMPLE in the last 24 hours.   Chem: Recent Labs   Lab 01/20/19  0249      K 4.4      CO2 22*      BUN 23   CREATININE 0.5   ESTGFRAFRICA >60.0   EGFRNONAA >60.0   CALCIUM 9.1   MG 2.2   PHOS 3.5   ANIONGAP 9   PROT 6.8   ALBUMIN 2.5*   BILITOT 1.2*   ALKPHOS 348*   AST 77*   ALT 42     Heme: Recent Labs   Lab 01/20/19  0249   WBC 11.08   HGB 9.9*   HCT 31.9*        Endo:   Recent Labs   Lab 01/20/19  0010 01/20/19  0605 01/20/19  1113   POCTGLUCOSE 117*  116* 138*      Assessment/Plan:     Neuro   * SDH (subdural hematoma)    Epidural with subdural component s/p evac 12/20  Complicated by status epilepticus  Neuro checks Q2Hr in am / Q4Hr in pm  NSGY following  SBP goal < 180  Pt/OT/SLP   Awaiting wound care for trach care  LTAC placement pending             Status epilepticus    Mental status stable  lacosamide 100mg bid 1/12     Epidural hematoma    See SDH     Pulmonary   Acute respiratory failure    S/p trach  Trach collar trial daily  Hyperinflation therapy - bag suction q6h     Cardiac/Vascular   New onset atrial fibrillation    -With RVR  -Metoprolol 100 mg Q8H  -Monitor off AC       Essential hypertension    -SBP<180  -PRN hydralazine  -Increased Amlodopine to 5mg - 10mg 1/13       Renal/   Hyponatremia    continue sodium tabs to 2g tid         Endocrine   Severe malnutrition    S/p trach/peg  Impact peptide 55cc/h       Other   Pressure injury of buttock, stage 2    improving  Appreciate wound care reccs           The patient is being Prophylaxed for:  Venous Thromboembolism with: Mechanical or Chemical  Stress Ulcer with: H2B  Ventilator Pneumonia with: chlorhexidine oral care    Activity Orders          None        Full Code     I have spent 35 min with this patient, with over 50% of this time spent coordinating care and speaking with the family    Marie Maher NP  Neurocritical Care  [facility]

## 2019-01-20 NOTE — PROGRESS NOTES
[facility]  Neurocritical Care  Progress Note    Admit Date: 12/20/2018  Service Date: 01/19/2019  Length of Stay: 30    Subjective:     Chief Complaint: SDH (subdural hematoma)    History of Present Illness: 63 year old male h/o CABG on ASA 81 mg (last dose 72 hours PTA) presenting as transfer from OSH for EDH with SDH. Patient reportedly had a mechanical fall from standing on Sunday and went to ED where CTH was negative for any acute intracranial pathology. Overnight, patient developed confusion and presented to OSH ED where CTH was consistent with EDH with SDH. Patient has progressively worsened throughout the day developing intermittent global aphasia. NSGY consulted in ED at [place] and stat CTH has been ordered. NCC consulted for admission and medical management. Patient on weaning off of cardene at time of evaluation with sbp of 120. Reported presenting sbp in 150s.    Hospital Course: 12/22: no major events overnight. Weaned off of cardene. This morning AOx3. Drain removed by NSGY at 11 AM, and planned to step down to NSGY.  AMS noted around 1PM, patient aphasic and not following commands, STAT CTH unremarkable.   12/26: wean ketamine, follow EEG, send urine studuies-Na 133, NS bolus, advance TF  12/27: off ketamine, d/c levo, continue EEG, and AEDs, 500 NS bolus, increase enteral water flushes, continue abx for another day  1/2: extubation trial.   1/3: failed extubation trial. Plan for trach and peg  1/4: plan for trach, general surgery consult  1/5: NAEO  1/6: NAEO. Awaiting trach and peg  1/7: plan for trach/peg, opens eyes to voice no commands  1/8: s/p trach/peg today  1/9: restarting tf this afternoon, ltac planning, eeg today to help guide aed weaning  1/12 Trach trials tolerating well. Neuro checks q2 in am/ q4in pm metoprolol prn HR.120  1/13 Continues to tolerate Trach. Start Norvasc. eval thyroid function baseline.   1/14 hyponatremic, pending urine studies, finasteride, increased  secretions, CPT, pending LTAC  01/15: Awaiting approval for transfer to LTAC  01/16: Gen surg about sutures and trach pressure wound  01/17: Med route adjustments and awaiting wound care recs, Pending LTAC placement  01/18: Placed orders for LTAC placement, Insurance company issues  1/19 No  Significant events overnight. No changes in assessment. Awaiting LTAC placement    Interval History: No  Significant events overnight. No changes in assessment. Awaiting LTAC placement      Review of Systems: Unable to obtain a complete ROS due to level of consciousness.     Vitals:   Temp: 98 °F (36.7 °C)  Pulse: (!) 118  Rhythm: atrial rhythm  BP: (!) 146/84  MAP (mmHg): 108  Resp: 20  SpO2: 100 %  Oxygen Concentration (%): 28  O2 Device (Oxygen Therapy): Trach Collar  Vent Mode: Spont  Pressure Support: 5 cmH20  PEEP/CPAP: 5 cmH20  Peak Airway Pressure: 11 cmH2O  Mean Airway Pressure: 7.9 cmH20  Plateau Pressure: 0 cmH20    Temp  Min: 97.2 °F (36.2 °C)  Max: 98.4 °F (36.9 °C)  Pulse  Min: 66  Max: 126  BP  Min: 137/79  Max: 180/87  MAP (mmHg)  Min: 98  Max: 141  Resp  Min: 18  Max: 35  SpO2  Min: 95 %  Max: 100 %  Oxygen Concentration (%)  Min: 28  Max: 99    01/18 0701 - 01/19 0700  In: 1485   Out: 2025 [Urine:2025]   Unmeasured Output  Urine Occurrence: 0  Stool Occurrence: 0     Examination:   GA: Awake, comfortable, no acute distress.   HEENT: No scleral icterus or JVD.   Pulmonary: Clear to auscultation A/L. No wheezing, crackles, or rhonchi.  Cardiac: RRR S1 & S2 w/o rubs/murmurs/gallops.   Abdominal: Bowel sounds present x 4. No appreciable hepatosplenomegaly.  Skin: No jaundice, rashes, or visible lesions.  Neuro:  --GCS: E4 V1t M5  --Mental Status:  Awake, alert, trach, doesn't follow commands  --Pupils 4mm, PERRL.   --cough, gag, corneal reflexes intact  --moves all extremities spontaneously  Unable to test orientation, language, memory, judgment, insight, fund of knowledge, shoulder shrug, tongue protrusion,  coordination, gait        Medications:   Continuous Scheduled  albuterol sulfate 2.5 mg Q4H   amLODIPine 10 mg Daily   chlorhexidine 15 mL QID   diclofenac sodium  TID   famotidine 20 mg BID   finasteride 5 mg Daily   heparin (porcine) 5,000 Units Q8H   lacosamide 100 mg BID   metoprolol tartrate 100 mg Q8H   polyethylene glycol 17 g BID   pravastatin 40 mg QHS   senna-docusate 8.6-50 mg 1 tablet BID   silodosin 4 mg Daily   sodium chloride 0.9% 3 mL Q8H   sodium chloride 3% 4 mL Q4H   sodium chloride 2 g TID   ursodiol 300 mg TID   PRN  acetaminophen 650 mg Q6H PRN   fentaNYL 25 mcg Q2H PRN   hydrALAZINE 10 mg Q4H PRN   magnesium oxide 800 mg PRN   magnesium oxide 800 mg PRN   ondansetron 4 mg Q12H PRN   potassium chloride 10% 40 mEq PRN   potassium chloride 10% 40 mEq PRN   potassium chloride 10% 60 mEq PRN   potassium, sodium phosphates 2 packet PRN   potassium, sodium phosphates 2 packet PRN   potassium, sodium phosphates 2 packet PRN      Today I independently reviewed pertinent medications, lines/drains/airways, imaging, laboratory results, microbiology results, notably:     ISTAT: No results for input(s): PH, PCO2, PO2, POCSATURATED, HCO3, BE, POCNA, POCK, POCTCO2, POCGLU, POCICA, POCLAC, SAMPLE in the last 24 hours.   Chem: Recent Labs   Lab 01/19/19  0213      K 4.1      CO2 25   *   BUN 26*   CREATININE 0.5   ESTGFRAFRICA >60.0   EGFRNONAA >60.0   CALCIUM 9.4   MG 1.9   PHOS 3.3   ANIONGAP 7*   PROT 6.4   ALBUMIN 2.5*   BILITOT 1.0   ALKPHOS 292*   AST 41*   ALT 24     Heme: Recent Labs   Lab 01/19/19  0213   WBC 11.75   HGB 9.6*   HCT 31.4*        Endo:   Recent Labs   Lab 01/19/19  0016 01/19/19  0600 01/19/19  1126   POCTGLUCOSE 127* 137* 137*      Assessment/Plan:     Neuro   * SDH (subdural hematoma)    Epidural with subdural component s/p evac 12/20  Complicated by status epilepticus  Neuro checks Q2Hr in am / Q4Hr in pm  NSGY following  SBP goal < 180  Pt/OT/SLP   Awaiting  wound care for trach care  LTAC planning continued   Placed orders for LTAC placement           Status epilepticus    Mental status stable  lacosamide 100mg bid 1/12     Epidural hematoma    See SDH     Pulmonary   Acute respiratory failure    S/p trach  Trach collar trial daily  Rest at night on vent as needed     Cardiac/Vascular   New onset atrial fibrillation    -With RVR  -Metoprolol 100 mg Q8H  -Monitor off AC       Essential hypertension    -SBP<180  -PRN hydralazine  -Increased Amlodopine to 5mg - 10mg 1/13       Renal/   Hyponatremia    continue sodium tabs to 2g tid         Endocrine   Severe malnutrition    S/p trach/peg  Impact peptide 55cc/h       Other   Pressure injury of buttock, stage 2    improving  Appreciate wound care reccs           The patient is being Prophylaxed for:  Venous Thromboembolism with: Mechanical or Chemical  Stress Ulcer with: H2B  Ventilator Pneumonia with: chlorhexidine oral care    Activity Orders          None        Full Code     I have spent 35 min with this patient, with over 50% of this time spent coordinating care and speaking with the family    Marie Maher NP  Neurocritical Care  [facility]

## 2019-01-21 DIAGNOSIS — S06.5XAA SDH (SUBDURAL HEMATOMA): Primary | ICD-10-CM

## 2019-01-21 LAB
ANION GAP SERPL CALC-SCNC: 6 MMOL/L
BASOPHILS # BLD AUTO: 0.08 K/UL
BASOPHILS NFR BLD: 0.6 %
BUN SERPL-MCNC: 23 MG/DL
CALCIUM SERPL-MCNC: 9.1 MG/DL
CHLORIDE SERPL-SCNC: 106 MMOL/L
CO2 SERPL-SCNC: 24 MMOL/L
CREAT SERPL-MCNC: 0.4 MG/DL
DIFFERENTIAL METHOD: ABNORMAL
EOSINOPHIL # BLD AUTO: 0.3 K/UL
EOSINOPHIL NFR BLD: 2.4 %
ERYTHROCYTE [DISTWIDTH] IN BLOOD BY AUTOMATED COUNT: 16.3 %
EST. GFR  (AFRICAN AMERICAN): >60 ML/MIN/1.73 M^2
EST. GFR  (NON AFRICAN AMERICAN): >60 ML/MIN/1.73 M^2
GGT SERPL-CCNC: 509 U/L
GLUCOSE SERPL-MCNC: 130 MG/DL
HCT VFR BLD AUTO: 30.9 %
HGB BLD-MCNC: 10 G/DL
IMM GRANULOCYTES # BLD AUTO: 0.1 K/UL
IMM GRANULOCYTES NFR BLD AUTO: 0.8 %
LYMPHOCYTES # BLD AUTO: 1.6 K/UL
LYMPHOCYTES NFR BLD: 12.3 %
MCH RBC QN AUTO: 31.7 PG
MCHC RBC AUTO-ENTMCNC: 32.4 G/DL
MCV RBC AUTO: 98 FL
MONOCYTES # BLD AUTO: 1 K/UL
MONOCYTES NFR BLD: 7.9 %
NEUTROPHILS # BLD AUTO: 10 K/UL
NEUTROPHILS NFR BLD: 76 %
NRBC BLD-RTO: 0 /100 WBC
PLATELET # BLD AUTO: 296 K/UL
PMV BLD AUTO: 10 FL
POCT GLUCOSE: 117 MG/DL (ref 70–110)
POCT GLUCOSE: 133 MG/DL (ref 70–110)
POTASSIUM SERPL-SCNC: 3.5 MMOL/L
RBC # BLD AUTO: 3.15 M/UL
SODIUM SERPL-SCNC: 136 MMOL/L
WBC # BLD AUTO: 13.1 K/UL

## 2019-01-21 PROCEDURE — 99024 PR POST-OP FOLLOW-UP VISIT: ICD-10-PCS | Mod: ,,, | Performed by: PHYSICIAN ASSISTANT

## 2019-01-21 PROCEDURE — 94761 N-INVAS EAR/PLS OXIMETRY MLT: CPT

## 2019-01-21 PROCEDURE — 80048 BASIC METABOLIC PNL TOTAL CA: CPT

## 2019-01-21 PROCEDURE — 25000242 PHARM REV CODE 250 ALT 637 W/ HCPCS: Performed by: NURSE PRACTITIONER

## 2019-01-21 PROCEDURE — 63600175 PHARM REV CODE 636 W HCPCS: Performed by: STUDENT IN AN ORGANIZED HEALTH CARE EDUCATION/TRAINING PROGRAM

## 2019-01-21 PROCEDURE — 63600175 PHARM REV CODE 636 W HCPCS: Performed by: NURSE PRACTITIONER

## 2019-01-21 PROCEDURE — 99233 PR SUBSEQUENT HOSPITAL CARE,LEVL III: ICD-10-PCS | Mod: ,,, | Performed by: NURSE PRACTITIONER

## 2019-01-21 PROCEDURE — 99233 SBSQ HOSP IP/OBS HIGH 50: CPT | Mod: ,,, | Performed by: NURSE PRACTITIONER

## 2019-01-21 PROCEDURE — 94640 AIRWAY INHALATION TREATMENT: CPT

## 2019-01-21 PROCEDURE — 25000003 PHARM REV CODE 250: Performed by: NURSE PRACTITIONER

## 2019-01-21 PROCEDURE — 27000221 HC OXYGEN, UP TO 24 HOURS

## 2019-01-21 PROCEDURE — 85025 COMPLETE CBC W/AUTO DIFF WBC: CPT

## 2019-01-21 PROCEDURE — 99900035 HC TECH TIME PER 15 MIN (STAT)

## 2019-01-21 PROCEDURE — 25000003 PHARM REV CODE 250: Performed by: STUDENT IN AN ORGANIZED HEALTH CARE EDUCATION/TRAINING PROGRAM

## 2019-01-21 PROCEDURE — 99900026 HC AIRWAY MAINTENANCE (STAT)

## 2019-01-21 PROCEDURE — 25000003 PHARM REV CODE 250: Performed by: PSYCHIATRY & NEUROLOGY

## 2019-01-21 PROCEDURE — 25000003 PHARM REV CODE 250: Performed by: PHYSICIAN ASSISTANT

## 2019-01-21 PROCEDURE — 94668 MNPJ CHEST WALL SBSQ: CPT

## 2019-01-21 PROCEDURE — 82977 ASSAY OF GGT: CPT

## 2019-01-21 PROCEDURE — A4216 STERILE WATER/SALINE, 10 ML: HCPCS | Performed by: PSYCHIATRY & NEUROLOGY

## 2019-01-21 PROCEDURE — 99024 POSTOP FOLLOW-UP VISIT: CPT | Mod: ,,, | Performed by: PHYSICIAN ASSISTANT

## 2019-01-21 PROCEDURE — 20000000 HC ICU ROOM

## 2019-01-21 RX ORDER — LACOSAMIDE 50 MG/1
50 TABLET ORAL 2 TIMES DAILY
Status: DISCONTINUED | OUTPATIENT
Start: 2019-01-21 | End: 2019-01-28 | Stop reason: HOSPADM

## 2019-01-21 RX ADMIN — FAMOTIDINE 20 MG: 20 TABLET ORAL at 09:01

## 2019-01-21 RX ADMIN — URSODIOL 300 MG: 300 CAPSULE ORAL at 02:01

## 2019-01-21 RX ADMIN — POLYETHYLENE GLYCOL 3350 17 G: 17 POWDER, FOR SOLUTION ORAL at 09:01

## 2019-01-21 RX ADMIN — ALBUTEROL SULFATE 2.5 MG: 2.5 SOLUTION RESPIRATORY (INHALATION) at 06:01

## 2019-01-21 RX ADMIN — CHLORHEXIDINE GLUCONATE 0.12% ORAL RINSE 15 ML: 1.2 LIQUID ORAL at 01:01

## 2019-01-21 RX ADMIN — AMLODIPINE BESYLATE 10 MG: 10 TABLET ORAL at 09:01

## 2019-01-21 RX ADMIN — STANDARDIZED SENNA CONCENTRATE AND DOCUSATE SODIUM 1 TABLET: 8.6; 5 TABLET, FILM COATED ORAL at 09:01

## 2019-01-21 RX ADMIN — SILODOSIN 4 MG: 4 CAPSULE ORAL at 09:01

## 2019-01-21 RX ADMIN — SODIUM CHLORIDE TAB 1 GM 2 G: 1 TAB at 02:01

## 2019-01-21 RX ADMIN — METOPROLOL TARTRATE 100 MG: 100 TABLET, FILM COATED ORAL at 02:01

## 2019-01-21 RX ADMIN — HEPARIN SODIUM 5000 UNITS: 5000 INJECTION, SOLUTION INTRAVENOUS; SUBCUTANEOUS at 05:01

## 2019-01-21 RX ADMIN — METOPROLOL TARTRATE 100 MG: 100 TABLET, FILM COATED ORAL at 05:01

## 2019-01-21 RX ADMIN — DICLOFENAC: 10 GEL TOPICAL at 02:01

## 2019-01-21 RX ADMIN — SODIUM CHLORIDE SOLN NEBU 3% 4 ML: 3 NEBU SOLN at 11:01

## 2019-01-21 RX ADMIN — HEPARIN SODIUM 5000 UNITS: 5000 INJECTION, SOLUTION INTRAVENOUS; SUBCUTANEOUS at 02:01

## 2019-01-21 RX ADMIN — CHLORHEXIDINE GLUCONATE 0.12% ORAL RINSE 15 ML: 1.2 LIQUID ORAL at 09:01

## 2019-01-21 RX ADMIN — PRAVASTATIN SODIUM 40 MG: 40 TABLET ORAL at 09:01

## 2019-01-21 RX ADMIN — SODIUM CHLORIDE SOLN NEBU 3% 4 ML: 3 NEBU SOLN at 07:01

## 2019-01-21 RX ADMIN — SODIUM CHLORIDE SOLN NEBU 3% 4 ML: 3 NEBU SOLN at 08:01

## 2019-01-21 RX ADMIN — METOPROLOL TARTRATE 100 MG: 100 TABLET, FILM COATED ORAL at 09:01

## 2019-01-21 RX ADMIN — Medication 3 ML: at 02:01

## 2019-01-21 RX ADMIN — FINASTERIDE 5 MG: 5 TABLET, FILM COATED ORAL at 09:01

## 2019-01-21 RX ADMIN — ALBUTEROL SULFATE 2.5 MG: 2.5 SOLUTION RESPIRATORY (INHALATION) at 11:01

## 2019-01-21 RX ADMIN — HYDRALAZINE HYDROCHLORIDE 10 MG: 20 INJECTION INTRAMUSCULAR; INTRAVENOUS at 05:01

## 2019-01-21 RX ADMIN — Medication 3 ML: at 05:01

## 2019-01-21 RX ADMIN — DICLOFENAC: 10 GEL TOPICAL at 09:01

## 2019-01-21 RX ADMIN — URSODIOL 300 MG: 300 CAPSULE ORAL at 09:01

## 2019-01-21 RX ADMIN — ALBUTEROL SULFATE 2.5 MG: 2.5 SOLUTION RESPIRATORY (INHALATION) at 04:01

## 2019-01-21 RX ADMIN — ALBUTEROL SULFATE 2.5 MG: 2.5 SOLUTION RESPIRATORY (INHALATION) at 07:01

## 2019-01-21 RX ADMIN — SODIUM CHLORIDE TAB 1 GM 2 G: 1 TAB at 09:01

## 2019-01-21 RX ADMIN — LACOSAMIDE 100 MG: 50 TABLET, FILM COATED ORAL at 09:01

## 2019-01-21 RX ADMIN — HEPARIN SODIUM 5000 UNITS: 5000 INJECTION, SOLUTION INTRAVENOUS; SUBCUTANEOUS at 09:01

## 2019-01-21 RX ADMIN — SODIUM CHLORIDE SOLN NEBU 3% 4 ML: 3 NEBU SOLN at 04:01

## 2019-01-21 RX ADMIN — SODIUM CHLORIDE SOLN NEBU 3% 4 ML: 3 NEBU SOLN at 06:01

## 2019-01-21 RX ADMIN — ALBUTEROL SULFATE 2.5 MG: 2.5 SOLUTION RESPIRATORY (INHALATION) at 08:01

## 2019-01-21 RX ADMIN — Medication 3 ML: at 09:01

## 2019-01-21 RX ADMIN — LACOSAMIDE 50 MG: 50 TABLET, FILM COATED ORAL at 09:01

## 2019-01-21 NOTE — ASSESSMENT & PLAN NOTE
S/p trach  Trach collar trial daily, tolorated trach collar over night  Hyperinflation therapy - bag suction q6h  CXR

## 2019-01-21 NOTE — PLAN OF CARE
ALFONSO checked in with Julia with [facility]. She reported they have not received any news from the insurance, but that she spoke with the Pt wife who said she would be calling to find out what is going on with the letter of agreement. She is off today, but there will be coverage if the letter is received they can take the Pt.     ALFONSO updated Pt wife that we have not heard from [facility] regarding the letter for LTAC.    Caroline Soares LMSW  Neurocritical Care   [facility]  [phone]

## 2019-01-21 NOTE — NURSING
Notified NCC that unable to obtain blood for labs after several attempts from primary nurse and charge nurse.  Orders given to call phlebotomy for them to come and stick patient for blood.

## 2019-01-21 NOTE — ASSESSMENT & PLAN NOTE
Epidural with subdural component s/p evac 12/20  Complicated by status epilepticus  Neuro checks Q2Hr in am / Q4Hr in pm  NSGY following  SBP goal < 180  Pt/OT/SLP   Awaiting wound care for trach care  LTAC placement pending, possibly tomorrow

## 2019-01-21 NOTE — PROGRESS NOTES
[facility]  Neurosurgery  Progress Note    Subjective:     History of Present Illness: 62 yo M with PMH of CAD, CABG in 2006, DM, HTN, HLD on ASA presents to the ED for AMS. Patient fell 4 days ago with about 2 minutes of LOC. Family is unsure of what caused the fall. Full workup done including a CTH at [place] that was negative. Patient went home and stopped taking his ASA. This morning, his family noticed he was more confused. He went to [place] and CTH was done that revealed a 1.7cm left frontalpariental SDH with a 5 mm right sided midline shift and a left SAH. Patient was transferred to [facility]. History was obtained per family as patient unable to give history 2/2 confusion with expressive and receptive aphasia.    Post-Op Info:  Procedure(s) (LRB):  INSERTION, PEG TUBE (N/A)  CREATION, TRACHEOSTOMY (N/A)   12 Days Post-Op     Interval History: 1/19 - AFVSS, NAEON, labs stable, exam stable    Medications:  Continuous Infusions:  Scheduled Meds:   albuterol sulfate  2.5 mg Nebulization Q4H    amLODIPine  10 mg Per G Tube Daily    chlorhexidine  15 mL Mouth/Throat QID    diclofenac sodium   Topical (Top) TID    famotidine  20 mg Per G Tube BID    finasteride  5 mg Per G Tube Daily    heparin (porcine)  5,000 Units Subcutaneous Q8H    lacosamide  100 mg Per G Tube BID    metoprolol tartrate  100 mg Per G Tube Q8H    polyethylene glycol  17 g Per G Tube BID    pravastatin  40 mg Per G Tube QHS    senna-docusate 8.6-50 mg  1 tablet Per G Tube BID    silodosin  4 mg Per G Tube Daily    sodium chloride 0.9%  3 mL Intravenous Q8H    sodium chloride 3%  4 mL Nebulization Q4H    sodium chloride  2 g Per G Tube TID    ursodiol  300 mg Per G Tube TID     PRN Meds:acetaminophen, fentaNYL, hydrALAZINE, magnesium oxide, magnesium oxide, ondansetron, potassium chloride 10%, potassium chloride 10%, potassium chloride 10%, potassium, sodium phosphates, potassium, sodium phosphates, potassium,  sodium phosphates       Objective:     Weight: 112.5 kg (248 lb 0.3 oz)  Body mass index is 34.59 kg/m².  Vital Signs (Most Recent):  Temp: 98.2 °F (36.8 °C) (01/20/19 1505)  Pulse: 88 (01/20/19 1805)  Resp: (!) 24 (01/20/19 1805)  BP: (!) 144/82 (01/20/19 1805)  SpO2: 100 % (01/20/19 1805) Vital Signs (24h Range):  Temp:  [97.9 °F (36.6 °C)-98.2 °F (36.8 °C)] 98.2 °F (36.8 °C)  Pulse:  [] 88  Resp:  [16-30] 24  SpO2:  [94 %-100 %] 100 %  BP: (136-182)/() 144/82     Date 01/20/19 0700 - 01/21/19 0659   Shift 1827-4871 7659-4851 3669-9293 24 Hour Total   INTAKE   I.V.(mL/kg) 40(0.4) 20(0.2)  60(0.5)   NG/ 220  660   Shift Total(mL/kg) 480(4.3) 240(2.1)  720(6.4)   OUTPUT   Urine(mL/kg/hr) 600(0.7) 650  1250   Shift Total(mL/kg) 600(5.3) 650(5.8)  1250(11.1)   Weight (kg) 112.5 112.5 112.5 112.5              Vent Mode: Spont  Oxygen Concentration (%):  [28-31] 28  Resp Rate Total:  [19 br/min-26 br/min] 22 br/min  PEEP/CPAP:  [5 cmH20] 5 cmH20  Pressure Support:  [5 cmH20] 5 cmH20  Mean Airway Pressure:  [7.2 cmH20-7.8 cmH20] 7.5 cmH20         Gastrostomy/Enterostomy 01/08/19 1441 Percutaneous endoscopic gastrostomy (PEG) midline (Active)   Securement anchored to abdomen w/ adhesive device 1/20/2019  3:05 PM   Interventions Prior to Feeding patency checked 1/20/2019  3:05 PM   Drainage serosanguineous 1/20/2019  3:05 PM   Feeding Type continuous 1/20/2019  3:05 PM   Clamp Status/Tolerance unclamped 1/20/2019  3:05 PM   Feeding Action feeding continued 1/20/2019  3:05 PM   Dressing dry and intact 1/20/2019  3:05 PM   Insertion Site no redness;no warmth;no drainage;no tenderness;no swelling 1/20/2019  3:05 PM   Site Care site cleansed w/ sterile normal saline 1/20/2019  3:05 PM   Flush/Irrigation flushed w/;water;no resistance met 1/20/2019  3:05 PM   Dressing Change Due 01/10/19 1/10/2019  3:00 PM   Current Rate (mL/hr) 55 mL/hr 1/20/2019  3:05 PM   Goal Rate (mL/hr) 55 mL/hr 1/20/2019  3:05 PM    Intake (mL) 80 mL 1/19/2019  3:05 PM   Water Bolus (mL) 100 mL 1/13/2019  6:05 AM   Tube Output(mL)(Include Discarded Residual) 60 mL 1/13/2019  2:00 PM   Formula Name Impact 1/20/2019  3:05 PM   Tube Feeding Intake (mL) 55 1/20/2019  6:05 PM   Residual Amount (ml) 5 ml 1/20/2019  3:05 PM       Neurosurgery Physical Exam     E4VTM5, grimacing to stimulation, tracking  PERRL  MOORE spontaneously  Trach and PEG in place      Significant Labs:  Recent Labs   Lab 01/19/19  0213 01/20/19  0249   * 110    140   K 4.1 4.4    109   CO2 25 22*   BUN 26* 23   CREATININE 0.5 0.5   CALCIUM 9.4 9.1   MG 1.9 2.2     Recent Labs   Lab 01/19/19  0213 01/20/19  0249   WBC 11.75 11.08   HGB 9.6* 9.9*   HCT 31.4* 31.9*    290     No results for input(s): LABPT, INR, APTT in the last 48 hours.  Microbiology Results (last 7 days)     ** No results found for the last 168 hours. **            Assessment/Plan:     * SDH (subdural hematoma)    62 yo male with L aSDH now s/p craniotomy for evacuation (12/20).    --Continue care per primary team.  --Patient is stable for discharge from a neurosurgical perspective  --Continue neurochecks while in house  --Dispo pending LTAC placement              Gagandeep Giang MD  Neurosurgery  [facility]

## 2019-01-21 NOTE — SUBJECTIVE & OBJECTIVE
Interval History: 1/19-20 - AFVSS, NAEON, labs stable, exam stable    Medications:  Continuous Infusions:  Scheduled Meds:   albuterol sulfate  2.5 mg Nebulization Q4H    amLODIPine  10 mg Per G Tube Daily    chlorhexidine  15 mL Mouth/Throat QID    diclofenac sodium   Topical (Top) TID    famotidine  20 mg Per G Tube BID    finasteride  5 mg Per G Tube Daily    heparin (porcine)  5,000 Units Subcutaneous Q8H    lacosamide  100 mg Per G Tube BID    metoprolol tartrate  100 mg Per G Tube Q8H    polyethylene glycol  17 g Per G Tube BID    pravastatin  40 mg Per G Tube QHS    senna-docusate 8.6-50 mg  1 tablet Per G Tube BID    silodosin  4 mg Per G Tube Daily    sodium chloride 0.9%  3 mL Intravenous Q8H    sodium chloride 3%  4 mL Nebulization Q4H    sodium chloride  2 g Per G Tube TID    ursodiol  300 mg Per G Tube TID     PRN Meds:acetaminophen, fentaNYL, hydrALAZINE, magnesium oxide, magnesium oxide, ondansetron, potassium chloride 10%, potassium chloride 10%, potassium chloride 10%, potassium, sodium phosphates, potassium, sodium phosphates, potassium, sodium phosphates     Review of Systems  Objective:     Weight: 112.5 kg (248 lb 0.3 oz)  Body mass index is 34.59 kg/m².  Vital Signs (Most Recent):  Temp: 98.2 °F (36.8 °C) (01/20/19 1505)  Pulse: 88 (01/20/19 1805)  Resp: (!) 24 (01/20/19 1805)  BP: (!) 144/82 (01/20/19 1805)  SpO2: 100 % (01/20/19 1805) Vital Signs (24h Range):  Temp:  [97.9 °F (36.6 °C)-98.2 °F (36.8 °C)] 98.2 °F (36.8 °C)  Pulse:  [] 88  Resp:  [16-30] 24  SpO2:  [94 %-100 %] 100 %  BP: (136-182)/() 144/82     Date 01/20/19 0700 - 01/21/19 0659   Shift 2133-7499 5624-7599 1259-0907 24 Hour Total   INTAKE   I.V.(mL/kg) 40(0.4) 20(0.2)  60(0.5)   NG/ 220  660   Shift Total(mL/kg) 480(4.3) 240(2.1)  720(6.4)   OUTPUT   Urine(mL/kg/hr) 600(0.7) 650  1250   Shift Total(mL/kg) 600(5.3) 650(5.8)  1250(11.1)   Weight (kg) 112.5 112.5 112.5 112.5              Vent  Mode: Spont  Oxygen Concentration (%):  [28-31] 28  Resp Rate Total:  [19 br/min-26 br/min] 22 br/min  PEEP/CPAP:  [5 cmH20] 5 cmH20  Pressure Support:  [5 cmH20] 5 cmH20  Mean Airway Pressure:  [7.2 cmH20-7.8 cmH20] 7.5 cmH20         Gastrostomy/Enterostomy 01/08/19 1441 Percutaneous endoscopic gastrostomy (PEG) midline (Active)   Securement anchored to abdomen w/ adhesive device 1/20/2019  3:05 PM   Interventions Prior to Feeding patency checked 1/20/2019  3:05 PM   Drainage serosanguineous 1/20/2019  3:05 PM   Feeding Type continuous 1/20/2019  3:05 PM   Clamp Status/Tolerance unclamped 1/20/2019  3:05 PM   Feeding Action feeding continued 1/20/2019  3:05 PM   Dressing dry and intact 1/20/2019  3:05 PM   Insertion Site no redness;no warmth;no drainage;no tenderness;no swelling 1/20/2019  3:05 PM   Site Care site cleansed w/ sterile normal saline 1/20/2019  3:05 PM   Flush/Irrigation flushed w/;water;no resistance met 1/20/2019  3:05 PM   Dressing Change Due 01/10/19 1/10/2019  3:00 PM   Current Rate (mL/hr) 55 mL/hr 1/20/2019  3:05 PM   Goal Rate (mL/hr) 55 mL/hr 1/20/2019  3:05 PM   Intake (mL) 80 mL 1/19/2019  3:05 PM   Water Bolus (mL) 100 mL 1/13/2019  6:05 AM   Tube Output(mL)(Include Discarded Residual) 60 mL 1/13/2019  2:00 PM   Formula Name Impact 1/20/2019  3:05 PM   Tube Feeding Intake (mL) 55 1/20/2019  6:05 PM   Residual Amount (ml) 5 ml 1/20/2019  3:05 PM       Neurosurgery Physical Exam     E4VTM5, grimacing to stimulation, tracking  PERRL  MOORE spontaneously  Trach and PEG in place      Significant Labs:  Recent Labs   Lab 01/19/19  0213 01/20/19  0249   * 110    140   K 4.1 4.4    109   CO2 25 22*   BUN 26* 23   CREATININE 0.5 0.5   CALCIUM 9.4 9.1   MG 1.9 2.2     Recent Labs   Lab 01/19/19  0213 01/20/19  0249   WBC 11.75 11.08   HGB 9.6* 9.9*   HCT 31.4* 31.9*    290     No results for input(s): LABPT, INR, APTT in the last 48 hours.  Microbiology Results (last 7 days)      ** No results found for the last 168 hours. **

## 2019-01-21 NOTE — PLAN OF CARE
Problem: Adult Inpatient Plan of Care  Goal: Plan of Care Review  POC reviewed with pt and family at 1400. Pt verbalized understanding. Questions and concerns addressed. No acute events today. Pt progressing toward goals. Will continue to monitor. See flowsheets for full assessment and VS info.

## 2019-01-21 NOTE — PLAN OF CARE
01/21/19 1010   Discharge Reassessment   Assessment Type Discharge Planning Reassessment   Provided patient/caregiver education on the expected discharge date and the discharge plan No   Do you have any problems affording any of your prescribed medications? No   Discharge Plan A Long-term acute care facility (LTAC)   Discharge Plan B Skilled Nursing Facility   DME Needed Upon Discharge  (tbd)   Anticipated Discharge Disposition LTAC   Can the patient answer the patient profile reliably? No, cognitively impaired   How does the patient rate their overall health at the present time? (mamadou)   Describe the patient's ability to walk at the present time. Does not walk or unable to take any steps at all   How often would a person be available to care for the patient? Occasionally   Number of comorbid conditions (as recorded on the chart) Four   During the past month, has the patient often been bothered by feeling down, depressed or hopeless? (mamadou)   During the past month, has the patient often been bothered by little interest or pleasure in doing things? (mamadou)   Post-Acute Status   Post-Acute Authorization Placement   Post-Acute Placement Status Insurance Denial  (Denial overturned, waiting for [facility] to issue letter of agreement to [facility])       Azeb Flores RN, CCRN-K, [facility]  Neuro-Critical Care   [phone]

## 2019-01-21 NOTE — PLAN OF CARE
Problem: Adult Inpatient Plan of Care  Goal: Plan of Care Review  POC reviewed with pt and family at 1400. Pt unable to verbalize understanding due to underlying condition.  Spouse verbalized understanding. Questions and concerns addressed. No acute events today. Pt progressing toward goals. Will continue to monitor. See flowsheets for full assessment and VS info.

## 2019-01-21 NOTE — PROGRESS NOTES
[facility]  Neurocritical Care  Progress Note    Admit Date: 12/20/2018  Service Date: 01/21/2019  Length of Stay: 32    Subjective:     Chief Complaint: SDH (subdural hematoma)    History of Present Illness: 63 year old male h/o CABG on ASA 81 mg (last dose 72 hours PTA) presenting as transfer from OSH for EDH with SDH. Patient reportedly had a mechanical fall from standing on Sunday and went to ED where CTH was negative for any acute intracranial pathology. Overnight, patient developed confusion and presented to OSH ED where CTH was consistent with EDH with SDH. Patient has progressively worsened throughout the day developing intermittent global aphasia. [place] [facility] [place]. NCC consulted for admission and medical management. Patient on weaning off of cardene at time of evaluation with sbp of 120. Reported presenting sbp in 150s.    Hospital Course: 12/22: no major events overnight. Weaned off of cardene. This morning AOx3. Drain removed by NSGY at 11 AM, and planned to step down to NSGY.  AMS noted around 1PM, patient aphasic and not following commands, STAT CTH unremarkable.   12/26: wean ketamine, follow EEG, send urine studuies-Na 133, NS bolus, advance TF  12/27: off ketamine, d/c levo, continue EEG, and AEDs, 500 NS bolus, increase enteral water flushes, continue abx for another day  1/2: extubation trial.   1/3: failed extubation trial. Plan for trach and peg  1/4: plan for trach, general surgery consult  1/5: NAEO  1/6: NAEO. Awaiting trach and peg  1/7: plan for trach/peg, opens eyes to voice no commands  1/8: s/p trach/peg today  1/9: restarting tf this afternoon, ltac planning, eeg today to help guide aed weaning  1/12 Trach trials tolerating well. Neuro checks q2 in am/ q4in pm metoprolol prn HR.120  1/13 Continues to tolerate Trach. Start Norvasc. eval thyroid function baseline.   1/14 hyponatremic, pending urine studies, finasteride, increased  secretions, CPT, pending LTAC  01/15: Awaiting approval for transfer to LTAC  01/16: Gen surg about sutures and trach pressure wound  01/17: Med route adjustments and awaiting wound care recs, Pending LTAC placement  01/18: Placed orders for LTAC placement, Insurance company issues  1/19 No  Significant events overnight. No changes in assessment. Awaiting LTAC placement  1/20 no significant changes overnight. Continue trach collar. No vent over night. Hyperventilation therapy - bag/suction q6h. CXR in am pending  LTAC pending  1/20 alk phos, AST elevated, will check ggt. cEEG stable will decrease vimpat 50mg bid. Awaiting letter from [facility] for acceptance to LTAC.    Interval History: no significant events over night.  alk phos, AST elevated, will check ggt. cEEG stable will decrease vimpat 50mg bid. Awaiting letter from [facility] for acceptance to LTAC.    Review of Systems:  Unable to obtain a complete ROS due to level of consciousness.     Vitals:   Temp: 98.2 °F (36.8 °C)  Pulse: 110  Rhythm: atrial rhythm  BP: (!) 157/89  MAP (mmHg): 118  Resp: (!) 22  SpO2: 95 %  Oxygen Concentration (%): 28  O2 Device (Oxygen Therapy): Trach Collar    Temp  Min: 96.9 °F (36.1 °C)  Max: 98.2 °F (36.8 °C)  Pulse  Min: 65  Max: 121  BP  Min: 128/73  Max: 195/118  MAP (mmHg)  Min: 95  Max: 148  Resp  Min: 11  Max: 34  SpO2  Min: 93 %  Max: 100 %  Oxygen Concentration (%)  Min: 28  Max: 28    01/20 0701 - 01/21 0700  In: 1440 [I.V.:120]  Out: 2675 [Urine:2675]   Unmeasured Output  Urine Occurrence: 1  Stool Occurrence: 0     Examination:   Constitutional: Well-nourished and -developed. No apparent distress.   Eyes: Conjunctiva clear, anicteric. Lids no lesions.  Head/Ears/Nose/Mouth/Throat/Neck: Moist mucous membranes. External ears, nose atraumatic.   Cardiovascular: Regular rhythm. No murmurs. No leg edema.  Respiratory: Trach 28%. Bibasilar crackles ausc.  Gastrointestinal: No hernia. Soft,  nondistended, nontender. + bowel sounds.    Neurologic:  -GCS E4VTM4  --Mental Status:  Awake, alert, trach, doesn't follow commands  --Pupils 4mm, PERRL.   --cough, gag, corneal reflexes intact  --moves all extremities spontaneously  Unable to test orientation, language, memory, judgment, insight, fund of knowledge, shoulder shrug, tongue protrusion, coordination, gait       Medications:   Continuous Scheduled  albuterol sulfate 2.5 mg Q4H   amLODIPine 10 mg Daily   chlorhexidine 15 mL QID   diclofenac sodium  TID   famotidine 20 mg BID   finasteride 5 mg Daily   heparin (porcine) 5,000 Units Q8H   lacosamide 50 mg BID   metoprolol tartrate 100 mg Q8H   polyethylene glycol 17 g BID   pravastatin 40 mg QHS   senna-docusate 8.6-50 mg 1 tablet BID   silodosin 4 mg Daily   sodium chloride 0.9% 3 mL Q8H   sodium chloride 3% 4 mL Q4H   sodium chloride 2 g TID   ursodiol 300 mg TID   PRN  acetaminophen 650 mg Q6H PRN   fentaNYL 25 mcg Q2H PRN   hydrALAZINE 10 mg Q4H PRN   magnesium oxide 800 mg PRN   magnesium oxide 800 mg PRN   ondansetron 4 mg Q12H PRN   potassium chloride 10% 40 mEq PRN   potassium chloride 10% 40 mEq PRN   potassium chloride 10% 60 mEq PRN   potassium, sodium phosphates 2 packet PRN   potassium, sodium phosphates 2 packet PRN   potassium, sodium phosphates 2 packet PRN      Today I independently reviewed pertinent medications, lines/drains/airways, imaging, laboratory results, microbiology results, notably:     ISTAT: No results for input(s): PH, PCO2, PO2, POCSATURATED, HCO3, BE, POCNA, POCK, POCTCO2, POCGLU, POCICA, POCLAC, SAMPLE in the last 24 hours.   Chem: No results for input(s): NA, K, CL, CO2, GLU, BUN, CREATININE, ESTGFRAFRICA, EGFRNONAA, CALCIUM, CAION, MG, PHOS, ANIONGAP, PROT, ALBUMIN, BILITOT, BILIDIR, ALKPHOS, AST, ALT, TROPONINI, BNP, NTPROBNP in the last 24 hours.  Heme: Recent Labs   Lab 01/21/19  1241   WBC 13.10*   HGB 10.0*   HCT 30.9*        Endo:   Recent Labs   Lab  01/20/19  1755 01/21/19  1137   POCTGLUCOSE 136* 117*      Assessment/Plan:     Neuro   * SDH (subdural hematoma)    Epidural with subdural component s/p evac 12/20  Complicated by status epilepticus  Neuro checks Q2Hr in am / Q4Hr in pm  NSGY following  SBP goal < 180  Pt/OT/SLP   Awaiting wound care for trach care  LTAC placement pending, possibly tomorrow             Status epilepticus    Mental status stable  Decreased lacosamide 50mg bid 1/21     Epidural hematoma    See SDH     Pulmonary   Acute respiratory failure    S/p trach  Trach collar trial daily, tolorated trach collar over night  Hyperinflation therapy - bag suction q6h  CXR     Cardiac/Vascular   New onset atrial fibrillation    -A- Fib With RVR  -1/20 restarted Metoprolol 50mg q8h PT, and 1.21 decreased to 25mg q8h PT  - 1/21A-Fib, -140, with BP decreased Metoprolol 5mg IV x1 given, HR marcus to 109 - 118, but BP remains low albumin %% 500cc given and BP stabilized 130-140  -Monitor off AC       Essential hypertension    -SBP<180  -PRN hydralazine  -Increased Amlodopine to 5mg - 10mg 1/13       Renal/   Hyponatremia    Na 140  continue sodium tabs to 2g tid         Endocrine   Severe malnutrition    S/p trach/peg  Impact peptide 55cc/h       Other   Pressure injury of buttock, stage 2    improving  Appreciate wound care reccs           The patient is being Prophylaxed for:  Venous Thromboembolism with: Mechanical or Chemical  Stress Ulcer with: H2B  Ventilator Pneumonia with: chlorhexidine oral care    Activity Orders          None        Full Code     I have spent 35 min with this patient, with over 50% of this time spent coordinating care and speaking with the family    Marie Maher NP  Neurocritical Care  [facility]

## 2019-01-21 NOTE — PROGRESS NOTES
"[facility]  Adult Nutrition  Progress Note    SUMMARY       Recommendations    Recommendation/Intervention:   Continue Impact Peptide @ 55mL/hr.   - Provides 1980kcals, 124g protein and 1016mL free water.   - Hold for residuals >500mL.     RD to monitor.    Goals: Pt to receive nutrition by RD follow up  Nutrition Goal Status: goal met  Communication of RD Recs: reviewed with RN    Reason for Assessment    Reason For Assessment: RD follow-up  Diagnosis: other (see comments)(SDH)  Relevant Medical History: CAD, DM, HTN, HLD  Interdisciplinary Rounds: attended  General Information Comments: Pt s/p trach/PEG. Tolerating trach collar. TF at goal, no residuals at this time. NFPE completed on 12/21- updated 1/14- pt continues to show signs of malnutrition with severe muscle and fat depletion.   Nutrition Discharge Planning: optimal nutrition via PEG with tolerance    Nutrition Risk Screen    Nutrition Risk Screen: tube feeding or parenteral nutrition    Nutrition/Diet History    Spiritual, Cultural Beliefs, Baptist Practices, Values that Affect Care: no  Factors Affecting Nutritional Intake: NPO    Anthropometrics    Temp: 98 °F (36.7 °C)  Height Method: Measured  Height: 5' 11" (180.3 cm)  Height (inches): 71 in  Weight Method: Bed Scale  Weight: 112.5 kg (248 lb 0.3 oz)  Weight (lb): 248.02 lb  Ideal Body Weight (IBW), Male: 172 lb  % Ideal Body Weight, Male (lb): 110.49 lb  BMI (Calculated): 26.6  BMI Grade: 25 - 29.9 - overweight  Weight Loss: unintentional  Usual Body Weight (UBW), kg: (!) 153.1 kg  % Usual Body Weight: 56.11  % Weight Change From Usual Weight: -44 %       Lab/Procedures/Meds    Pertinent Labs Reviewed: reviewed  Pertinent Labs Comments: noted  Pertinent Medications Reviewed: reviewed  Pertinent Medications Comments: heparin    Estimated/Assessed Needs    Weight Used For Calorie Calculations: 85.7 kg (188 lb 15 oz)  Energy Calorie Requirements (kcal): 2080  Energy Need Method: " Asmita Bentley(PAL 1.25)  Protein Requirements: 103-129g(1.2-1.5g/kg)  Weight Used For Protein Calculations: 85.7 kg (188 lb 15 oz)  Fluid Requirements (mL): 1mL/kcal or per MD     RDA Method (mL): 2080         Nutrition Prescription Ordered    Current Diet Order: NPO  Nutrition Order Comments: TF at goal  Current Nutrition Support Formula Ordered: Impact Peptide 1.5  Current Nutrition Support Rate Ordered: 55 (ml)  Current Nutrition Support Frequency Ordered: mL/hr    Evaluation of Received Nutrient/Fluid Intake    Enteral Calories (kcal): 1980  Enteral Protein (gm): 124  Enteral (Free Water) Fluid (mL): 1016  Free Water Flush Fluid (mL): 1000    % Kcal Needs: 95  % Protein Needs: 100    I/O: +I/O, good UOP, LBM 1/20    Energy Calories Required: meeting needs  Protein Required: meeting needs  Fluid Required: (per MD)    Comments: 5mL residuals 1/21  Tolerance: tolerating    % Intake of Estimated Energy Needs: 75 - 100 %  % Meal Intake: NPO    Nutrition Risk    Level of Risk/Frequency of Follow-up: (f/u 1x/week)     Assessment and Plan    Severe malnutrition    Malnutrition in the context of Chronic Illness/Injury    Related to (etiology):  Decreased intake, appetite    Signs and Symptoms (as evidenced by):  Energy Intake: <75% of estimated energy requirement for > 1 year  Body Fat Depletion: severe depletion of orbitals, triceps and thoracic and lumbar region   Muscle Mass Depletion: severe depletion of temples, clavicle region, scapular region, interosseous muscle and lower extremities   Weight Loss: 44% x 1 year     Interventions/Recommendations (treatment strategy):  Continue enteral nutrition    Nutrition Diagnosis Status:  Continues              Monitor and Evaluation    Food and Nutrient Intake: energy intake, food and beverage intake, enteral nutrition intake  Food and Nutrient Adminstration: enteral and parenteral nutrition administration, diet order  Anthropometric Measurements: weight, weight change,  body mass index  Biochemical Data, Medical Tests and Procedures: electrolyte and renal panel, glucose/endocrine profile, gastrointestinal profile, inflammatory profile, lipid profile  Nutrition-Focused Physical Findings: overall appearance     Malnutrition Assessment  Malnutrition Type: chronic illness  Energy Intake: severe energy intake          Weight Loss (Malnutrition): greater than 20% in 1 year  Energy Intake (Malnutrition): less than 75% for greater than or equal to 3 months  Subcutaneous Fat (Malnutrition): severe depletion  Muscle Mass (Malnutrition): severe depletion   Orbital Region (Subcutaneous Fat Loss): moderate depletion  Upper Arm Region (Subcutaneous Fat Loss): severe depletion  Thoracic and Lumbar Region: severe depletion   Restoration Region (Muscle Loss): moderate depletion  Clavicle Bone Region (Muscle Loss): severe depletion  Clavicle and Acromion Bone Region (Muscle Loss): severe depletion  Scapular Bone Region (Muscle Loss): severe depletion  Dorsal Hand (Muscle Loss): moderate depletion  Patellar Region (Muscle Loss): severe depletion  Anterior Thigh Region (Muscle Loss): severe depletion  Posterior Calf Region (Muscle Loss): severe depletion       Subcutaneous Fat Loss (Final Summary): severe protein-calorie malnutrition  Muscle Loss Evaluation (Final Summary): severe protein-calorie malnutrition    Moderate Weight Loss (Malnutrition): other (see comments)  Severe Weight Loss (Malnutrition): greater than 20% in 1 year    Nutrition Follow-Up    RD Follow-up?: Yes

## 2019-01-21 NOTE — ASSESSMENT & PLAN NOTE
-A- Fib With RVR  -1/20 restarted Metoprolol 50mg q8h PT, and 1.21 decreased to 25mg q8h PT  - 1/21A-Fib, -140, with BP decreased Metoprolol 5mg IV x1 given, HR marcus to 109 - 118, but BP remains low albumin %% 500cc given and BP stabilized 130-140  -Monitor off AC

## 2019-01-21 NOTE — SUBJECTIVE & OBJECTIVE
Interval History: 1/19 - AFVSS, NAEON, labs stable, exam stable    Medications:  Continuous Infusions:  Scheduled Meds:   albuterol sulfate  2.5 mg Nebulization Q4H    amLODIPine  10 mg Per G Tube Daily    chlorhexidine  15 mL Mouth/Throat QID    diclofenac sodium   Topical (Top) TID    famotidine  20 mg Per G Tube BID    finasteride  5 mg Per G Tube Daily    heparin (porcine)  5,000 Units Subcutaneous Q8H    lacosamide  100 mg Per G Tube BID    metoprolol tartrate  100 mg Per G Tube Q8H    polyethylene glycol  17 g Per G Tube BID    pravastatin  40 mg Per G Tube QHS    senna-docusate 8.6-50 mg  1 tablet Per G Tube BID    silodosin  4 mg Per G Tube Daily    sodium chloride 0.9%  3 mL Intravenous Q8H    sodium chloride 3%  4 mL Nebulization Q4H    sodium chloride  2 g Per G Tube TID    ursodiol  300 mg Per G Tube TID     PRN Meds:acetaminophen, fentaNYL, hydrALAZINE, magnesium oxide, magnesium oxide, ondansetron, potassium chloride 10%, potassium chloride 10%, potassium chloride 10%, potassium, sodium phosphates, potassium, sodium phosphates, potassium, sodium phosphates       Objective:     Weight: 112.5 kg (248 lb 0.3 oz)  Body mass index is 34.59 kg/m².  Vital Signs (Most Recent):  Temp: 98.2 °F (36.8 °C) (01/20/19 1505)  Pulse: 88 (01/20/19 1805)  Resp: (!) 24 (01/20/19 1805)  BP: (!) 144/82 (01/20/19 1805)  SpO2: 100 % (01/20/19 1805) Vital Signs (24h Range):  Temp:  [97.9 °F (36.6 °C)-98.2 °F (36.8 °C)] 98.2 °F (36.8 °C)  Pulse:  [] 88  Resp:  [16-30] 24  SpO2:  [94 %-100 %] 100 %  BP: (136-182)/() 144/82     Date 01/20/19 0700 - 01/21/19 0659   Shift 3357-7815 8717-9397 1225-9388 24 Hour Total   INTAKE   I.V.(mL/kg) 40(0.4) 20(0.2)  60(0.5)   NG/ 220  660   Shift Total(mL/kg) 480(4.3) 240(2.1)  720(6.4)   OUTPUT   Urine(mL/kg/hr) 600(0.7) 650  1250   Shift Total(mL/kg) 600(5.3) 650(5.8)  1250(11.1)   Weight (kg) 112.5 112.5 112.5 112.5              Vent Mode: Spont  Oxygen  Concentration (%):  [28-31] 28  Resp Rate Total:  [19 br/min-26 br/min] 22 br/min  PEEP/CPAP:  [5 cmH20] 5 cmH20  Pressure Support:  [5 cmH20] 5 cmH20  Mean Airway Pressure:  [7.2 cmH20-7.8 cmH20] 7.5 cmH20         Gastrostomy/Enterostomy 01/08/19 1441 Percutaneous endoscopic gastrostomy (PEG) midline (Active)   Securement anchored to abdomen w/ adhesive device 1/20/2019  3:05 PM   Interventions Prior to Feeding patency checked 1/20/2019  3:05 PM   Drainage serosanguineous 1/20/2019  3:05 PM   Feeding Type continuous 1/20/2019  3:05 PM   Clamp Status/Tolerance unclamped 1/20/2019  3:05 PM   Feeding Action feeding continued 1/20/2019  3:05 PM   Dressing dry and intact 1/20/2019  3:05 PM   Insertion Site no redness;no warmth;no drainage;no tenderness;no swelling 1/20/2019  3:05 PM   Site Care site cleansed w/ sterile normal saline 1/20/2019  3:05 PM   Flush/Irrigation flushed w/;water;no resistance met 1/20/2019  3:05 PM   Dressing Change Due 01/10/19 1/10/2019  3:00 PM   Current Rate (mL/hr) 55 mL/hr 1/20/2019  3:05 PM   Goal Rate (mL/hr) 55 mL/hr 1/20/2019  3:05 PM   Intake (mL) 80 mL 1/19/2019  3:05 PM   Water Bolus (mL) 100 mL 1/13/2019  6:05 AM   Tube Output(mL)(Include Discarded Residual) 60 mL 1/13/2019  2:00 PM   Formula Name Impact 1/20/2019  3:05 PM   Tube Feeding Intake (mL) 55 1/20/2019  6:05 PM   Residual Amount (ml) 5 ml 1/20/2019  3:05 PM       Neurosurgery Physical Exam     E4VTM5, grimacing to stimulation, tracking  PERRL  MOORE spontaneously  Trach and PEG in place      Significant Labs:  Recent Labs   Lab 01/19/19  0213 01/20/19  0249   * 110    140   K 4.1 4.4    109   CO2 25 22*   BUN 26* 23   CREATININE 0.5 0.5   CALCIUM 9.4 9.1   MG 1.9 2.2     Recent Labs   Lab 01/19/19  0213 01/20/19  0249   WBC 11.75 11.08   HGB 9.6* 9.9*   HCT 31.4* 31.9*    290     No results for input(s): LABPT, INR, APTT in the last 48 hours.  Microbiology Results (last 7 days)     ** No results  found for the last 168 hours. **

## 2019-01-21 NOTE — PLAN OF CARE
Problem: Adult Inpatient Plan of Care  Goal: Plan of Care Review  Outcome: Ongoing (interventions implemented as appropriate)  No acute events overnight. Wore trach collar all night 5L 28 %. Good strong productive cough.  Remains in Afib. Q 6 H bladder scans and intermittent straight catheter performed. Patient progressing towards goals as tolerated, plan of care communicated and reviewed with Peewee Nunes. All concerns addressed. Will continue to monitor.   Temp:  [96.9 °F (36.1 °C)-97.7 °F (36.5 °C)]   Pulse:  []   Resp:  [11-34]   BP: (128-195)/()   SpO2:  [93 %-99 %]    I/O this shift:  In: 720 [I.V.:60; NG/GT:660]  Out: 1425 [Urine:1425]

## 2019-01-21 NOTE — ASSESSMENT & PLAN NOTE
64 yo male with L aSDH now s/p craniotomy for evacuation (12/20).    --Continue care per primary team.  --Patient is stable for discharge from a neurosurgical perspective  --Continue neurochecks while in house  --Dispo pending LTAC placement

## 2019-01-22 LAB
ALBUMIN SERPL BCP-MCNC: 2.4 G/DL
ALLENS TEST: ABNORMAL
ALP SERPL-CCNC: 408 U/L
ALT SERPL W/O P-5'-P-CCNC: 63 U/L
ANION GAP SERPL CALC-SCNC: 10 MMOL/L
ANISOCYTOSIS BLD QL SMEAR: SLIGHT
AST SERPL-CCNC: 65 U/L
BASO STIPL BLD QL SMEAR: ABNORMAL
BASOPHILS # BLD AUTO: 0.12 K/UL
BASOPHILS NFR BLD: 0.9 %
BILIRUB SERPL-MCNC: 1.4 MG/DL
BUN SERPL-MCNC: 26 MG/DL
BURR CELLS BLD QL SMEAR: ABNORMAL
CALCIUM SERPL-MCNC: 9.2 MG/DL
CHLORIDE SERPL-SCNC: 110 MMOL/L
CO2 SERPL-SCNC: 20 MMOL/L
CREAT SERPL-MCNC: 0.5 MG/DL
DIFFERENTIAL METHOD: ABNORMAL
EOSINOPHIL # BLD AUTO: 0.4 K/UL
EOSINOPHIL NFR BLD: 2.7 %
ERYTHROCYTE [DISTWIDTH] IN BLOOD BY AUTOMATED COUNT: 16.2 %
EST. GFR  (AFRICAN AMERICAN): >60 ML/MIN/1.73 M^2
EST. GFR  (NON AFRICAN AMERICAN): >60 ML/MIN/1.73 M^2
GLUCOSE SERPL-MCNC: 118 MG/DL
HCO3 UR-SCNC: 23.5 MMOL/L (ref 24–28)
HCT VFR BLD AUTO: 32.3 %
HGB BLD-MCNC: 10.4 G/DL
IMM GRANULOCYTES # BLD AUTO: 0.12 K/UL
IMM GRANULOCYTES NFR BLD AUTO: 0.9 %
LYMPHOCYTES # BLD AUTO: 1.5 K/UL
LYMPHOCYTES NFR BLD: 11.6 %
MAGNESIUM SERPL-MCNC: 1.9 MG/DL
MCH RBC QN AUTO: 32.6 PG
MCHC RBC AUTO-ENTMCNC: 32.2 G/DL
MCV RBC AUTO: 101 FL
MONOCYTES # BLD AUTO: 1.1 K/UL
MONOCYTES NFR BLD: 8 %
NEUTROPHILS # BLD AUTO: 10 K/UL
NEUTROPHILS NFR BLD: 75.9 %
NRBC BLD-RTO: 0 /100 WBC
PCO2 BLDA: 32 MMHG (ref 35–45)
PH SMN: 7.47 [PH] (ref 7.35–7.45)
PHOSPHATE SERPL-MCNC: 3.1 MG/DL
PLATELET # BLD AUTO: 268 K/UL
PLATELET BLD QL SMEAR: ABNORMAL
PMV BLD AUTO: 10.2 FL
PO2 BLDA: 70 MMHG (ref 80–100)
POC BE: 0 MMOL/L
POC SATURATED O2: 95 % (ref 95–100)
POC TCO2: 24 MMOL/L (ref 23–27)
POCT GLUCOSE: 137 MG/DL (ref 70–110)
POCT GLUCOSE: 142 MG/DL (ref 70–110)
POIKILOCYTOSIS BLD QL SMEAR: SLIGHT
POLYCHROMASIA BLD QL SMEAR: ABNORMAL
POTASSIUM SERPL-SCNC: 4 MMOL/L
PROT SERPL-MCNC: 6.4 G/DL
RBC # BLD AUTO: 3.19 M/UL
SAMPLE: ABNORMAL
SITE: ABNORMAL
SODIUM SERPL-SCNC: 140 MMOL/L
WBC # BLD AUTO: 13.19 K/UL

## 2019-01-22 PROCEDURE — 99900035 HC TECH TIME PER 15 MIN (STAT)

## 2019-01-22 PROCEDURE — 84100 ASSAY OF PHOSPHORUS: CPT

## 2019-01-22 PROCEDURE — 27000221 HC OXYGEN, UP TO 24 HOURS

## 2019-01-22 PROCEDURE — 25000003 PHARM REV CODE 250: Performed by: STUDENT IN AN ORGANIZED HEALTH CARE EDUCATION/TRAINING PROGRAM

## 2019-01-22 PROCEDURE — 25000003 PHARM REV CODE 250: Performed by: PSYCHIATRY & NEUROLOGY

## 2019-01-22 PROCEDURE — 99222 1ST HOSP IP/OBS MODERATE 55: CPT | Mod: ,,, | Performed by: NURSE PRACTITIONER

## 2019-01-22 PROCEDURE — 25000003 PHARM REV CODE 250: Performed by: NURSE PRACTITIONER

## 2019-01-22 PROCEDURE — 99900026 HC AIRWAY MAINTENANCE (STAT)

## 2019-01-22 PROCEDURE — 94668 MNPJ CHEST WALL SBSQ: CPT

## 2019-01-22 PROCEDURE — 20000000 HC ICU ROOM

## 2019-01-22 PROCEDURE — 85025 COMPLETE CBC W/AUTO DIFF WBC: CPT

## 2019-01-22 PROCEDURE — 94761 N-INVAS EAR/PLS OXIMETRY MLT: CPT

## 2019-01-22 PROCEDURE — 63600175 PHARM REV CODE 636 W HCPCS: Performed by: STUDENT IN AN ORGANIZED HEALTH CARE EDUCATION/TRAINING PROGRAM

## 2019-01-22 PROCEDURE — 25000242 PHARM REV CODE 250 ALT 637 W/ HCPCS: Performed by: NURSE PRACTITIONER

## 2019-01-22 PROCEDURE — 82803 BLOOD GASES ANY COMBINATION: CPT

## 2019-01-22 PROCEDURE — 99233 SBSQ HOSP IP/OBS HIGH 50: CPT | Mod: GC,,, | Performed by: PSYCHIATRY & NEUROLOGY

## 2019-01-22 PROCEDURE — 99233 PR SUBSEQUENT HOSPITAL CARE,LEVL III: ICD-10-PCS | Mod: GC,,, | Performed by: PSYCHIATRY & NEUROLOGY

## 2019-01-22 PROCEDURE — 94640 AIRWAY INHALATION TREATMENT: CPT

## 2019-01-22 PROCEDURE — 36600 WITHDRAWAL OF ARTERIAL BLOOD: CPT

## 2019-01-22 PROCEDURE — 80053 COMPREHEN METABOLIC PANEL: CPT

## 2019-01-22 PROCEDURE — 99222 PR INITIAL HOSPITAL CARE,LEVL II: ICD-10-PCS | Mod: ,,, | Performed by: NURSE PRACTITIONER

## 2019-01-22 PROCEDURE — 83735 ASSAY OF MAGNESIUM: CPT

## 2019-01-22 PROCEDURE — A4216 STERILE WATER/SALINE, 10 ML: HCPCS | Performed by: PSYCHIATRY & NEUROLOGY

## 2019-01-22 RX ORDER — ALBUTEROL SULFATE 2.5 MG/.5ML
2.5 SOLUTION RESPIRATORY (INHALATION) EVERY 4 HOURS PRN
Status: DISCONTINUED | OUTPATIENT
Start: 2019-01-22 | End: 2019-01-28 | Stop reason: HOSPADM

## 2019-01-22 RX ORDER — SODIUM CHLORIDE FOR INHALATION 3 %
4 VIAL, NEBULIZER (ML) INHALATION EVERY 4 HOURS PRN
Status: DISCONTINUED | OUTPATIENT
Start: 2019-01-22 | End: 2019-01-28 | Stop reason: HOSPADM

## 2019-01-22 RX ADMIN — DICLOFENAC: 10 GEL TOPICAL at 09:01

## 2019-01-22 RX ADMIN — POLYETHYLENE GLYCOL 3350 17 G: 17 POWDER, FOR SOLUTION ORAL at 09:01

## 2019-01-22 RX ADMIN — SODIUM CHLORIDE TAB 1 GM 2 G: 1 TAB at 10:01

## 2019-01-22 RX ADMIN — SODIUM CHLORIDE SOLN NEBU 3% 4 ML: 3 NEBU SOLN at 07:01

## 2019-01-22 RX ADMIN — SODIUM CHLORIDE TAB 1 GM 2 G: 1 TAB at 09:01

## 2019-01-22 RX ADMIN — STANDARDIZED SENNA CONCENTRATE AND DOCUSATE SODIUM 1 TABLET: 8.6; 5 TABLET, FILM COATED ORAL at 09:01

## 2019-01-22 RX ADMIN — Medication 3 ML: at 02:01

## 2019-01-22 RX ADMIN — ALBUTEROL SULFATE 2.5 MG: 2.5 SOLUTION RESPIRATORY (INHALATION) at 12:01

## 2019-01-22 RX ADMIN — PRAVASTATIN SODIUM 40 MG: 40 TABLET ORAL at 09:01

## 2019-01-22 RX ADMIN — METOPROLOL TARTRATE 100 MG: 100 TABLET, FILM COATED ORAL at 10:01

## 2019-01-22 RX ADMIN — CHLORHEXIDINE GLUCONATE 0.12% ORAL RINSE 15 ML: 1.2 LIQUID ORAL at 09:01

## 2019-01-22 RX ADMIN — Medication 3 ML: at 05:01

## 2019-01-22 RX ADMIN — STANDARDIZED SENNA CONCENTRATE AND DOCUSATE SODIUM 1 TABLET: 8.6; 5 TABLET, FILM COATED ORAL at 10:01

## 2019-01-22 RX ADMIN — SODIUM CHLORIDE TAB 1 GM 2 G: 1 TAB at 03:01

## 2019-01-22 RX ADMIN — LACOSAMIDE 50 MG: 50 TABLET, FILM COATED ORAL at 10:01

## 2019-01-22 RX ADMIN — METOPROLOL TARTRATE 100 MG: 100 TABLET, FILM COATED ORAL at 02:01

## 2019-01-22 RX ADMIN — FAMOTIDINE 20 MG: 20 TABLET ORAL at 09:01

## 2019-01-22 RX ADMIN — CHLORHEXIDINE GLUCONATE 0.12% ORAL RINSE 15 ML: 1.2 LIQUID ORAL at 02:01

## 2019-01-22 RX ADMIN — CHLORHEXIDINE GLUCONATE 0.12% ORAL RINSE 15 ML: 1.2 LIQUID ORAL at 10:01

## 2019-01-22 RX ADMIN — FAMOTIDINE 20 MG: 20 TABLET ORAL at 10:01

## 2019-01-22 RX ADMIN — SODIUM CHLORIDE SOLN NEBU 3% 4 ML: 3 NEBU SOLN at 12:01

## 2019-01-22 RX ADMIN — URSODIOL 300 MG: 300 CAPSULE ORAL at 09:01

## 2019-01-22 RX ADMIN — LACOSAMIDE 50 MG: 50 TABLET, FILM COATED ORAL at 09:01

## 2019-01-22 RX ADMIN — METOPROLOL TARTRATE 100 MG: 100 TABLET, FILM COATED ORAL at 05:01

## 2019-01-22 RX ADMIN — POLYETHYLENE GLYCOL 3350 17 G: 17 POWDER, FOR SOLUTION ORAL at 10:01

## 2019-01-22 RX ADMIN — HEPARIN SODIUM 5000 UNITS: 5000 INJECTION, SOLUTION INTRAVENOUS; SUBCUTANEOUS at 10:01

## 2019-01-22 RX ADMIN — DICLOFENAC: 10 GEL TOPICAL at 02:01

## 2019-01-22 RX ADMIN — AMLODIPINE BESYLATE 10 MG: 10 TABLET ORAL at 10:01

## 2019-01-22 RX ADMIN — Medication 3 ML: at 10:01

## 2019-01-22 RX ADMIN — ALBUTEROL SULFATE 2.5 MG: 2.5 SOLUTION RESPIRATORY (INHALATION) at 07:01

## 2019-01-22 RX ADMIN — HEPARIN SODIUM 5000 UNITS: 5000 INJECTION, SOLUTION INTRAVENOUS; SUBCUTANEOUS at 05:01

## 2019-01-22 RX ADMIN — SODIUM CHLORIDE SOLN NEBU 3% 4 ML: 3 NEBU SOLN at 04:01

## 2019-01-22 RX ADMIN — ALBUTEROL SULFATE 2.5 MG: 2.5 SOLUTION RESPIRATORY (INHALATION) at 04:01

## 2019-01-22 RX ADMIN — SILODOSIN 4 MG: 4 CAPSULE ORAL at 09:01

## 2019-01-22 RX ADMIN — HEPARIN SODIUM 5000 UNITS: 5000 INJECTION, SOLUTION INTRAVENOUS; SUBCUTANEOUS at 02:01

## 2019-01-22 NOTE — HOSPITAL COURSE
12/24/18: PT discharged 2/2 decreased command following, limited skilled intervention and progression of mobility.   1/18/19: OT discharged. Bed mobility totalA Dependent drawsheet transfer. Grooming totalA.

## 2019-01-22 NOTE — CONSULTS
[facility]  Physical Medicine & Rehab  Consult Note    Patient Name: Peewee Nunes  MRN: [id number]  Admission Date: 12/20/2018  Hospital Length of Stay: 33 days  Attending Physician: Charli Guardado MD     Inpatient consult to Physical Medicine & Rehabilitation  Consult performed by: Mary Ann Schneider NP  Consult requested by:  Charli Guardado MD    Collaborating Physician: Susie Ng MD  Reason for Consult:  Assess rehabilitation needs     Consults  Subjective:     Principal Problem: SDH (subdural hematoma)    HPI: Peewee Nunes is a 63-year-old male with PMHx of CAD, CABG (2006), DM, HTN, HLD. Patient transferred to [facility] 12/20/18 from [place]. CTH was done that revealed a 1.7cm left frontalpariental SDH with a 5 mm right sided midline shift and a left SAH. S/p evac 12/20. Hospital course complicated status epilepticus (mental status change, on Lamictal), resp failure (s/p trach), NPO (PEG tube), A fib (restarted Metoprolol), HTN, Stage 2 buttock injury (wound care following).     Functional History: Patient lived with wife and son. Previously ambulating with SW short distances, occasionally requiring wheelchair.       Hospital Course: 12/24/18: PT discharged 2/2 decreased command following, limited skilled intervention and progression of mobility.   1/18/19: OT discharged. Bed mobility totalA Dependent drawsheet transfer. Grooming totalA.     Past Medical History:   Diagnosis Date    Coronary artery disease     CVD (cardiovascular disease)     Diabetes mellitus type II     Hyperlipidemia     Hypertension     Ischemic cardiomyopathy     Obesity     Obesity, morbid      Past Surgical History:   Procedure Laterality Date    APPENDECTOMY      CORONARY ARTERY BYPASS GRAFT      CRANIOTOMY, FOR SUBDURAL HEMATOMA EVACUATION Left 12/20/2018    Performed by [facility] at [facility] OR [facility]    CREATION, TRACHEOSTOMY N/A 1/8/2019    Performed by Andrew Durbin MD at [facility]  OR [facility]    HEMORRHOID SURGERY      INSERTION, PEG TUBE N/A 2019    Performed by [facility] at [facility]ND [facility]LR     Review of patient's allergies indicates:   Allergen Reactions    Levaquin [levofloxacin] Nausea Only       Scheduled Medications:    amLODIPine  10 mg Per G Tube Daily    chlorhexidine  15 mL Mouth/Throat QID    diclofenac sodium   Topical (Top) TID    famotidine  20 mg Per G Tube BID    finasteride  5 mg Per G Tube Daily    heparin (porcine)  5,000 Units Subcutaneous Q8H    lacosamide  50 mg Per G Tube BID    metoprolol tartrate  100 mg Per G Tube Q8H    polyethylene glycol  17 g Per G Tube BID    pravastatin  40 mg Per G Tube QHS    senna-docusate 8.6-50 mg  1 tablet Per G Tube BID    silodosin  4 mg Per G Tube Daily    sodium chloride 0.9%  3 mL Intravenous Q8H    sodium chloride  2 g Per G Tube TID    ursodiol  300 mg Per G Tube TID       PRN Medications: acetaminophen, albuterol sulfate, fentaNYL, hydrALAZINE, magnesium oxide, magnesium oxide, ondansetron, potassium chloride 10%, potassium chloride 10%, potassium chloride 10%, potassium, sodium phosphates, potassium, sodium phosphates, potassium, sodium phosphates, sodium chloride 3%    Family History     Problem Relation (Age of Onset)    Cancer Father    Diabetes Mother    Heart disease Mother, Father    Hyperlipidemia Mother    Hypertension Mother    Stroke Mother        Tobacco Use    Smoking status: Former Smoker     Packs/day: 1.50     Years: 25.00     Pack years: 37.50     Types: Cigarettes     Last attempt to quit: 2002     Years since quittin.1    Smokeless tobacco: Never Used   Substance and Sexual Activity    Alcohol use: No    Drug use: No    Sexual activity: Yes     Review of Systems   Reason unable to perform ROS: 2/2 level of consciousness.     Objective:     Vital Signs (Most Recent):  Temp: 98.4 °F (36.9 °C) (19 0700)  Pulse: 92 (19 1200)  Resp: (!) 29 (19  1200)  BP: (!) 157/94 (01/22/19 1200)  SpO2: 97 % (01/22/19 1200)    Vital Signs (24h Range):  Temp:  [98 °F (36.7 °C)-98.4 °F (36.9 °C)] 98.4 °F (36.9 °C)  Pulse:  [] 92  Resp:  [20-29] 29  SpO2:  [95 %-100 %] 97 %  BP: (142-195)/() 157/94     Body mass index is 34.59 kg/m².    Physical Exam   Eyes:   - opened eyes to voice   Pulmonary/Chest: Effort normal. No respiratory distress.   - trach collar in place   Abdominal:   PEG tube in place w/ feeds running    Neurological:   - Not following commands  - Nonverbal  - Muscle strength no movement noted  RUE: 0/5.  LUE: 0/5.  RLE: 0/5.  LLE: 0/5.   Skin: Skin is warm and dry.        Diagnostic Results:   Labs: Reviewed  ECG: Reviewed  CT: Reviewed    Assessment/Plan:     * SDH (subdural hematoma)    - CTH was done that revealed a 1.7cm left frontalpariental SDH with a 5 mm right sided midline shift and a left SAH.   - S/p evac 12/20.     - Related to prolonged/acute hospital course.     Recommendations  -  Pain management  -  Monitor for and prevent skin breakdown and pressure ulcers  · Early mobility, repositioning/weight shifting every 20-30 minutes when sitting, turn patient every 2 hours, proper mattress/overlay and chair cushioning, pressure relief/heel protector boots  -  DVT prophylaxis    -  Reviewed discharge options          New onset atrial fibrillation    - restarted Metoprolol     Acute respiratory failure    - s/p trach      Pressure injury of buttock, stage 2    - wound care following      SAH (subarachnoid hemorrhage)     - CTH was done that revealed a 1.7cm left frontalpariental SDH with a 5 mm right sided midline shift and a left SAH. S/p evac 12/20.        Recommend Long-term Acute Care.  LTACH preference per patient/Right Care.  Will follow for additional rehab needs or change in post-acute recommendation.    Thank you for your consult.     Mary Ann Schneider NP  Department of Physical Medicine & Rehab  [facility]

## 2019-01-22 NOTE — CONSULTS
Inpatient consult to Physical Medicine Rehab  Consult performed by: Mary Ann Schneider NP  Consult ordered by: Bhumika Gabriel MD  Reason for consult: Assess rehab needs      Reviewed patient history and current admission.  Rehab team following.  Full consult to follow.    SALVATORE Garcia, FNP-C  Physical Medicine & Rehabilitation   01/22/2019  Spectralink: [id number]

## 2019-01-22 NOTE — HPI
Peewee Nunes is a 63-year-old male with PMHx of CAD, CABG (2006), DM, HTN, HLD. Patient transferred to [facility] 12/20/18 from [place]. CTH was done that revealed a 1.7cm left frontalpariental SDH with a 5 mm right sided midline shift and a left SAH. S/p evac 12/20. Hospital course complicated status epilepticus (mental status change, on Lamictal), resp failure (s/p trach), NPO (PEG tube), A fib (restarted Metoprolol), HTN, Stage 2 buttock injury (wound care following).     Functional History: Patient lived with wife and son. Previously ambulating with SW short distances, occasionally requiring wheelchair.

## 2019-01-22 NOTE — PROGRESS NOTES
NCC notified of pt's 6 beat run of Vtach. K: 4.0 Mg 1.9. No new orders at this time. Strips placed in chart. Will continue to monitor.

## 2019-01-22 NOTE — PLAN OF CARE
ALFONSO requested an update from Julia with AM regarding the status of the letter needed for the Pt to be able to leave. She is checking with their insurance rep to find out and will let this SW know.     ALFONSO informed by NICHOLAS that AM has spoken with [employer] today. [employer] has informed [facility] they will not generate a letter of agreement since [facility] is out of network and the benefits for in vs out of network are the same. [facility] cannot take a Pt when there is uncertainty with the payor source. ALFONSO reached out to [facility] and was advised they would feel the same way but that [facility] is in network and if the Pt is at that point, it might be an option. Pt would need therapy orders. ALFONSO advised MD team of possible need for therapy orders if appropriate. Sent referral via  for [facility].    ALFONSO and NICHOLAS met with Pt wife at bedside to give update on the above.    Caroline Soares, AMBREEN  Neurocritical Care   [facility]  [id number]

## 2019-01-22 NOTE — SUBJECTIVE & OBJECTIVE
Past Medical History:   Diagnosis Date    Coronary artery disease     CVD (cardiovascular disease)     Diabetes mellitus type II     Hyperlipidemia     Hypertension     Ischemic cardiomyopathy     Obesity     Obesity, morbid      Past Surgical History:   Procedure Laterality Date    APPENDECTOMY      CORONARY ARTERY BYPASS GRAFT      CRANIOTOMY, FOR SUBDURAL HEMATOMA EVACUATION Left 12/20/2018    Performed by [facility] at [facility] OR [facility]    CREATION, TRACHEOSTOMY N/A 1/8/2019    [facility]    HEMORRHOID SURGERY      INSERTION, PEG TUBE N/A 1/8/2019    [facility]     Review of patient's allergies indicates:   Allergen Reactions    Levaquin [levofloxacin] Nausea Only       Scheduled Medications:    amLODIPine  10 mg Per G Tube Daily    chlorhexidine  15 mL Mouth/Throat QID    diclofenac sodium   Topical (Top) TID    famotidine  20 mg Per G Tube BID    finasteride  5 mg Per G Tube Daily    heparin (porcine)  5,000 Units Subcutaneous Q8H    lacosamide  50 mg Per G Tube BID    metoprolol tartrate  100 mg Per G Tube Q8H    polyethylene glycol  17 g Per G Tube BID    pravastatin  40 mg Per G Tube QHS    senna-docusate 8.6-50 mg  1 tablet Per G Tube BID    silodosin  4 mg Per G Tube Daily    sodium chloride 0.9%  3 mL Intravenous Q8H    sodium chloride  2 g Per G Tube TID    ursodiol  300 mg Per G Tube TID       PRN Medications: acetaminophen, albuterol sulfate, fentaNYL, hydrALAZINE, magnesium oxide, magnesium oxide, ondansetron, potassium chloride 10%, potassium chloride 10%, potassium chloride 10%, potassium, sodium phosphates, potassium, sodium phosphates, potassium, sodium phosphates, sodium chloride 3%    Family History     Problem Relation (Age of Onset)    Cancer Father    Diabetes Mother    Heart disease Mother, Father    Hyperlipidemia Mother    Hypertension Mother    Stroke Mother         Tobacco Use    Smoking status: Former Smoker     Packs/day: 1.50     Years: 25.00     Pack years: 37.50     Types: Cigarettes     Last attempt to quit: 2002     Years since quittin.1    Smokeless tobacco: Never Used   Substance and Sexual Activity    Alcohol use: No    Drug use: No    Sexual activity: Yes     Review of Systems   Reason unable to perform ROS: 2/2 level of consciousness.     Objective:     Vital Signs (Most Recent):  Temp: 98.4 °F (36.9 °C) (19 0700)  Pulse: 92 (19 1200)  Resp: (!) 29 (19 1200)  BP: (!) 157/94 (19 1200)  SpO2: 97 % (19 1200)    Vital Signs (24h Range):  Temp:  [98 °F (36.7 °C)-98.4 °F (36.9 °C)] 98.4 °F (36.9 °C)  Pulse:  [] 92  Resp:  [20-29] 29  SpO2:  [95 %-100 %] 97 %  BP: (142-195)/() 157/94     Body mass index is 34.59 kg/m².    Physical Exam   Eyes:   - opened eyes to voice   Pulmonary/Chest: Effort normal. No respiratory distress.   - trach collar in place   Abdominal:   PEG tube in place w/ feeds running    Neurological:   - Not following commands  - Nonverbal  - Muscle strength no movement noted  RUE: 0/5.  LUE: 0/5.  RLE: 0/5.  LLE: 0/5.   Skin: Skin is warm and dry.        Diagnostic Results:   Labs: Reviewed  ECG: Reviewed  CT: Reviewed

## 2019-01-22 NOTE — ASSESSMENT & PLAN NOTE
- CTH was done that revealed a 1.7cm left frontalpariental SDH with a 5 mm right sided midline shift and a left SAH. S/p evac 12/20.

## 2019-01-22 NOTE — ASSESSMENT & PLAN NOTE
"Epidural with subdural component s/p evac 12/20  Complicated by status epilepticus  Neuro checks Q2Hr in am / Q4Hr in pm  NSGY following  SBP goal < 180  Pt/OT/SLP   Pending LTAC approval given "out of network"          "

## 2019-01-22 NOTE — PLAN OF CARE
Problem: Adult Inpatient Plan of Care  Goal: Plan of Care Review  Outcome: Ongoing (interventions implemented as appropriate)  POC reviewed with pt at 0500. Pt unable to verbalize understanding due to trach. Pt had 6 beat run of vtach, see note. TF continue @ 55 tolerating well. Pt progressing toward goals. Will continue to monitor. See flowsheets for full assessment and VS info

## 2019-01-22 NOTE — ASSESSMENT & PLAN NOTE
- CTH was done that revealed a 1.7cm left frontalpariental SDH with a 5 mm right sided midline shift and a left SAH.   - S/p evac 12/20.     - Related to prolonged/acute hospital course.     Recommendations  -  Pain management  -  Monitor for and prevent skin breakdown and pressure ulcers  · Early mobility, repositioning/weight shifting every 20-30 minutes when sitting, turn patient every 2 hours, proper mattress/overlay and chair cushioning, pressure relief/heel protector boots  -  DVT prophylaxis    -  Reviewed discharge options

## 2019-01-22 NOTE — ASSESSMENT & PLAN NOTE
64 yo male with L aSDH now s/p craniotomy for evacuation (12/20).    -Patient neurologically stable on exam  -Incision is well healed.   -No further neurosurgical intervention currently indicated. Continue management per primary team  -FU in 4 weeks with head CT. NSGY will schedule this appt.   -Will sign off. Please reconsult if NSGY can be of any further assistance.

## 2019-01-22 NOTE — PROGRESS NOTES
[facility]  Neurosurgery  Progress Note    Subjective:     History of Present Illness: 64 yo M with PMH of CAD, CABG in 2006, DM, HTN, HLD on ASA presents to the ED for AMS. Patient fell 4 days ago with about 2 minutes of LOC. Family is unsure of what caused the fall. Full workup done including a CTH at [place] that was negative. Patient went home and stopped taking his ASA. This morning, his family noticed he was more confused. He went to [place] and CTH was done that revealed a 1.7cm left frontalpariental SDH with a 5 mm right sided midline shift and a left SAH. [place] [facility] History was obtained per family as patient unable to give history 2/2 confusion with expressive and receptive aphasia.    Post-Op Info:  Procedure(s) (LRB):  INSERTION, PEG TUBE (N/A)  CREATION, TRACHEOSTOMY (N/A)   13 Days Post-Op     Interval History:   Patient resting comfortably in bed. Multiple family members at bedside. Patient none verbal. Family deny any new symptoms or concerns.     Medications:  Continuous Infusions:  Scheduled Meds:   albuterol sulfate  2.5 mg Nebulization Q4H    amLODIPine  10 mg Per G Tube Daily    chlorhexidine  15 mL Mouth/Throat QID    diclofenac sodium   Topical (Top) TID    famotidine  20 mg Per G Tube BID    finasteride  5 mg Per G Tube Daily    heparin (porcine)  5,000 Units Subcutaneous Q8H    lacosamide  50 mg Per G Tube BID    metoprolol tartrate  100 mg Per G Tube Q8H    polyethylene glycol  17 g Per G Tube BID    pravastatin  40 mg Per G Tube QHS    senna-docusate 8.6-50 mg  1 tablet Per G Tube BID    silodosin  4 mg Per G Tube Daily    sodium chloride 0.9%  3 mL Intravenous Q8H    sodium chloride 3%  4 mL Nebulization Q4H    sodium chloride  2 g Per G Tube TID    ursodiol  300 mg Per G Tube TID     PRN Meds:acetaminophen, fentaNYL, hydrALAZINE, magnesium oxide, magnesium oxide, ondansetron, potassium chloride 10%, potassium chloride 10%,  potassium chloride 10%, potassium, sodium phosphates, potassium, sodium phosphates, potassium, sodium phosphates     Review of Systems  Objective:     Weight: 112.5 kg (248 lb 0.3 oz)  Body mass index is 34.59 kg/m².  Vital Signs (Most Recent):  Temp: 98 °F (36.7 °C) (01/21/19 1505)  Pulse: 93 (01/21/19 1807)  Resp: (!) 26 (01/21/19 1807)  BP: (!) 163/100 (01/21/19 1805)  SpO2: 96 % (01/21/19 1807) Vital Signs (24h Range):  Temp:  [96.9 °F (36.1 °C)-98.2 °F (36.8 °C)] 98 °F (36.7 °C)  Pulse:  [] 93  Resp:  [11-34] 26  SpO2:  [93 %-100 %] 96 %  BP: (128-195)/() 163/100     Date 01/21/19 0700 - 01/22/19 0659   Shift 0898-3529 9515-5667 5746-4401 24 Hour Total   INTAKE   I.V.(mL/kg) 40(0.4) 20(0.2)  60(0.5)   NG/ 220  660   Shift Total(mL/kg) 480(4.3) 240(2.1)  720(6.4)   OUTPUT   Urine(mL/kg/hr) 1200(1.3) 500  1700   Shift Total(mL/kg) 1200(10.7) 500(4.4)  1700(15.1)   Weight (kg) 112.5 112.5 112.5 112.5              Oxygen Concentration (%):  [28] 28         Gastrostomy/Enterostomy 01/08/19 1441 Percutaneous endoscopic gastrostomy (PEG) midline (Active)   Securement anchored to abdomen w/ adhesive device 1/21/2019  3:05 PM   Interventions Prior to Feeding patency checked 1/21/2019  3:05 PM   Drainage serosanguineous 1/21/2019  3:05 PM   Feeding Type continuous 1/21/2019  3:05 PM   Clamp Status/Tolerance unclamped 1/21/2019  3:05 PM   Feeding Action feeding continued 1/21/2019  3:05 PM   Dressing dry and intact 1/21/2019  3:05 PM   Insertion Site no redness;no warmth;no drainage;no tenderness;no swelling 1/21/2019  3:05 PM   Site Care site cleansed w/ sterile normal saline 1/21/2019  3:05 PM   Flush/Irrigation flushed w/;water;no resistance met 1/21/2019  3:05 PM   Dressing Change Due 01/10/19 1/10/2019  3:00 PM   Current Rate (mL/hr) 55 mL/hr 1/21/2019  3:05 PM   Goal Rate (mL/hr) 55 mL/hr 1/21/2019  3:05 PM   Intake (mL) 80 mL 1/19/2019  3:05 PM   Water Bolus (mL) 100 mL 1/13/2019  6:05 AM   Tube  Output(mL)(Include Discarded Residual) 60 mL 1/13/2019  2:00 PM   Formula Name Impact 1/21/2019  3:05 PM   Tube Feeding Intake (mL) 55 1/21/2019  6:05 PM   Residual Amount (ml) 10 ml 1/21/2019  3:05 PM       Neurosurgery Physical Exam   General: well developed, well nourished, no distress.   Head: normocephalic, craniotomy incision well healed  Neurologic: Alert and awake. Non verbal.  Cranial nerves: CN II-XII grossly intact.   Eyes: pupils equal, round, reactive to light with accomodation. Left gaze preference.   Pulmonary: s/p Trach. Thick secretions noted surrounding trach. No signs of respiratory distress  Sensory: response to light touch throughout  Motor Strength: Moves all extremities spontaneously. Generalized weakness. Intermittently follows very simple commands (Squeezes hand, thumbs up)  Pronator Drift: unable to assess 2/2 mental status  Finger-to-nose: unable to assess 2/2 mental status  Babinski: present on left      Significant Labs:  Recent Labs   Lab 01/20/19  0249 01/21/19  1241    130*    136   K 4.4 3.5    106   CO2 22* 24   BUN 23 23   CREATININE 0.5 0.4*   CALCIUM 9.1 9.1   MG 2.2  --      Recent Labs   Lab 01/20/19  0249 01/21/19  1241   WBC 11.08 13.10*   HGB 9.9* 10.0*   HCT 31.9* 30.9*    296         Assessment/Plan:     * SDH (subdural hematoma)    64 yo male with L aSDH now s/p craniotomy for evacuation (12/20).    -Patient neurologically stable on exam  -Incision is well healed.   -No further neurosurgical intervention currently indicated. Continue management per primary team  -FU in 4 weeks with head CT. NSGY will schedule this appt.   -Will sign off. Please reconsult if NSGY can be of any further assistance.            Discussed with Dr. Hull  Please call with any questions      Naomi Thurston PA-C   Neurosurgery   Pager: [phone]

## 2019-01-22 NOTE — PROGRESS NOTES
[facility]  Neurocritical Care  Progress Note    Admit Date: 12/20/2018  Service Date: 01/22/2019  Length of Stay: 33    Subjective:     Chief Complaint: SDH (subdural hematoma)    History of Present Illness: 63 year old male h/o CABG on ASA 81 mg (last dose 72 hours PTA) presenting as transfer from OSH for EDH with SDH. Patient reportedly had a mechanical fall from standing on Sunday and went to ED where CTH was negative for any acute intracranial pathology. Overnight, patient developed confusion and presented to OSH ED where CTH was consistent with EDH with SDH. Patient has progressively worsened throughout the day developing intermittent global aphasia. [place] [facility] [place]. NCC consulted for admission and medical management. Patient on weaning off of cardene at time of evaluation with sbp of 120. Reported presenting sbp in 150s.    Hospital Course: 12/22: no major events overnight. Weaned off of cardene. This morning AOx3. Drain removed by NSGY at 11 AM, and planned to step down to NSGY.  AMS noted around 1PM, patient aphasic and not following commands, STAT CTH unremarkable.   12/26: wean ketamine, follow EEG, send urine studuies-Na 133, NS bolus, advance TF  12/27: off ketamine, d/c levo, continue EEG, and AEDs, 500 NS bolus, increase enteral water flushes, continue abx for another day  1/2: extubation trial.   1/3: failed extubation trial. Plan for trach and peg  1/4: plan for trach, general surgery consult  1/5: NAEO  1/6: NAEO. Awaiting trach and peg  1/7: plan for trach/peg, opens eyes to voice no commands  1/8: s/p trach/peg today  1/9: restarting tf this afternoon, ltac planning, eeg today to help guide aed weaning  1/12 Trach trials tolerating well. Neuro checks q2 in am/ q4in pm metoprolol prn HR.120  1/13 Continues to tolerate Trach. Start Norvasc. eval thyroid function baseline.   1/14 hyponatremic, pending urine studies, finasteride, increased  secretions, CPT, pending LTAC  01/15: Awaiting approval for transfer to LTAC  01/16: Gen surg about sutures and trach pressure wound  01/17: Med route adjustments and awaiting wound care recs, Pending LTAC placement  01/18: Placed orders for LTAC placement, Insurance company issues  1/19 No  Significant events overnight. No changes in assessment. Awaiting LTAC placement  1/20 no significant changes overnight. Continue trach collar. No vent over night. Hyperventilation therapy - bag/suction q6h. CXR in am pending  LTAC pending  1/20 alk phos, AST elevated, will check ggt. cEEG stable will decrease vimpat 50mg bid. [facility].      Review of Systems    Unable to obtain a complete ROS due to trach, nonverbal  Objective:     Vitals:  Temp: 98.4 °F (36.9 °C)  Pulse: 92  Rhythm: atrial rhythm  BP: (!) 157/94  MAP (mmHg): 119  Resp: (!) 29  SpO2: 97 %  Oxygen Concentration (%): 28  O2 Device (Oxygen Therapy): Trach Collar    Temp  Min: 98 °F (36.7 °C)  Max: 98.4 °F (36.9 °C)  Pulse  Min: 66  Max: 120  BP  Min: 142/81  Max: 195/117  MAP (mmHg)  Min: 105  Max: 134  Resp  Min: 20  Max: 29  SpO2  Min: 95 %  Max: 100 %  Oxygen Concentration (%)  Min: 28  Max: 28    01/21 0701 - 01/22 0700  In: 1555 [I.V.:125]  Out: 2760 [Urine:2700; Drains:60]   Unmeasured Output  Urine Occurrence: 1  Stool Occurrence: 1       Physical Exam    Physical Exam:  GA: Awake, comfortable, no acute distress.   HEENT: No scleral icterus or JVD.   Pulmonary: On trach collar , room air  Cardiac: RRR S1 & S2 w/o rubs/murmurs/gallops.   Abdominal: Bowel sounds present x 4Skin: No jaundice, rashes, or visible lesions.  Neuro:  --GCS: E4 V1t M5  --Mental Status:  Awake, alert, trach, doesn't follow commands  --Pupils 4mm, PERRL.   --cough, gag, corneal reflexes intact  --moves all extremities spontaneously  Unable to test orientation, language, memory, judgment, insight, fund of knowledge, shoulder  "shrug, tongue protrusion, coordination, gait    Medications:  Continuous   Scheduled    amLODIPine 10 mg Daily   chlorhexidine 15 mL QID   diclofenac sodium  TID   famotidine 20 mg BID   finasteride 5 mg Daily   heparin (porcine) 5,000 Units Q8H   lacosamide 50 mg BID   metoprolol tartrate 100 mg Q8H   polyethylene glycol 17 g BID   pravastatin 40 mg QHS   senna-docusate 8.6-50 mg 1 tablet BID   silodosin 4 mg Daily   sodium chloride 0.9% 3 mL Q8H   sodium chloride 2 g TID   ursodiol 300 mg TID   PRN    acetaminophen 650 mg Q6H PRN   albuterol sulfate 2.5 mg Q4H PRN   fentaNYL 25 mcg Q2H PRN   hydrALAZINE 10 mg Q4H PRN   magnesium oxide 800 mg PRN   magnesium oxide 800 mg PRN   ondansetron 4 mg Q12H PRN   potassium chloride 10% 40 mEq PRN   potassium chloride 10% 40 mEq PRN   potassium chloride 10% 60 mEq PRN   potassium, sodium phosphates 2 packet PRN   potassium, sodium phosphates 2 packet PRN   potassium, sodium phosphates 2 packet PRN   sodium chloride 3% 4 mL Q4H PRN     Today I personally reviewed pertinent medications, lines/drains/airways, imaging, cardiology results, laboratory results, microbiology results,     Diet  Tube Feeds at 55 cc/ hr (goal)      Assessment/Plan:     Neuro   * SDH (subdural hematoma)    Epidural with subdural component s/p evac 12/20  Complicated by status epilepticus  Neuro checks Q2Hr in am / Q4Hr in pm  NSGY following  SBP goal < 180  Pt/OT/SLP   Pending LTAC approval given "out of network"             Status epilepticus    Mental status stable  Lacosamide 50mg BID     Cerebral edema    See SDH     Brain compression    See SDH     Epidural hematoma    See SDH     Pulmonary  -Check ABG today     Acute respiratory failure    S/p trach  Trach collar trial daily, tolerating well   CXR look great today     Cardiac/Vascular   New onset atrial fibrillation    -A- Fib With RVR now resolved  -In normal sinus rhythm       Essential hypertension    -SBP<180  -PRN hydralazine   "   Renal/  -Insert Rader Catheter today   Hyponatremia    Sodium stable        ID   Septic shock    Completed antibiotic course  Leukocytosis resolved  Cultures negative     Endocrine  -Glycemia stable  -d/c ISS   Weight loss, unintentional    See Malnutrition     Severe malnutrition    S/P PEG tube  Tube feeds at goal at 55 cc/ hr       Other  -Heparin Q8H  -Pepcid 20 mg BID   Pressure injury of buttock, stage 2    Continue wound care           The patient is being Prophylaxed for:  Venous Thromboembolism with: Mechanical or Chemical  Stress Ulcer with: H2B  Ventilator Pneumonia with: chlorhexidine oral care    AWAITING TRANSFER TO LTAC PENDING INSURANCE APPROVAL DUE TO OUT OF NETWORK COVERAGE ([facility])    Activity Orders          None        Full Code    Carlos Alberto East MD  Neurocritical Care  [facility]

## 2019-01-22 NOTE — SUBJECTIVE & OBJECTIVE
Interval History:   Patient resting comfortably in bed. Multiple family members at bedside. Patient none verbal. Family deny any new symptoms or concerns.     Medications:  Continuous Infusions:  Scheduled Meds:   albuterol sulfate  2.5 mg Nebulization Q4H    amLODIPine  10 mg Per G Tube Daily    chlorhexidine  15 mL Mouth/Throat QID    diclofenac sodium   Topical (Top) TID    famotidine  20 mg Per G Tube BID    finasteride  5 mg Per G Tube Daily    heparin (porcine)  5,000 Units Subcutaneous Q8H    lacosamide  50 mg Per G Tube BID    metoprolol tartrate  100 mg Per G Tube Q8H    polyethylene glycol  17 g Per G Tube BID    pravastatin  40 mg Per G Tube QHS    senna-docusate 8.6-50 mg  1 tablet Per G Tube BID    silodosin  4 mg Per G Tube Daily    sodium chloride 0.9%  3 mL Intravenous Q8H    sodium chloride 3%  4 mL Nebulization Q4H    sodium chloride  2 g Per G Tube TID    ursodiol  300 mg Per G Tube TID     PRN Meds:acetaminophen, fentaNYL, hydrALAZINE, magnesium oxide, magnesium oxide, ondansetron, potassium chloride 10%, potassium chloride 10%, potassium chloride 10%, potassium, sodium phosphates, potassium, sodium phosphates, potassium, sodium phosphates     Review of Systems  Objective:     Weight: 112.5 kg (248 lb 0.3 oz)  Body mass index is 34.59 kg/m².  Vital Signs (Most Recent):  Temp: 98 °F (36.7 °C) (01/21/19 1505)  Pulse: 93 (01/21/19 1807)  Resp: (!) 26 (01/21/19 1807)  BP: (!) 163/100 (01/21/19 1805)  SpO2: 96 % (01/21/19 1807) Vital Signs (24h Range):  Temp:  [96.9 °F (36.1 °C)-98.2 °F (36.8 °C)] 98 °F (36.7 °C)  Pulse:  [] 93  Resp:  [11-34] 26  SpO2:  [93 %-100 %] 96 %  BP: (128-195)/() 163/100     Date 01/21/19 0700 - 01/22/19 0659   Shift 6602-2766 0248-1578 5975-4086 24 Hour Total   INTAKE   I.V.(mL/kg) 40(0.4) 20(0.2)  60(0.5)   NG/ 220  660   Shift Total(mL/kg) 480(4.3) 240(2.1)  720(6.4)   OUTPUT   Urine(mL/kg/hr) 1200(1.3) 500  1700   Shift Total(mL/kg)  1200(10.7) 500(4.4)  1700(15.1)   Weight (kg) 112.5 112.5 112.5 112.5              Oxygen Concentration (%):  [28] 28         Gastrostomy/Enterostomy 01/08/19 1441 Percutaneous endoscopic gastrostomy (PEG) midline (Active)   Securement anchored to abdomen w/ adhesive device 1/21/2019  3:05 PM   Interventions Prior to Feeding patency checked 1/21/2019  3:05 PM   Drainage serosanguineous 1/21/2019  3:05 PM   Feeding Type continuous 1/21/2019  3:05 PM   Clamp Status/Tolerance unclamped 1/21/2019  3:05 PM   Feeding Action feeding continued 1/21/2019  3:05 PM   Dressing dry and intact 1/21/2019  3:05 PM   Insertion Site no redness;no warmth;no drainage;no tenderness;no swelling 1/21/2019  3:05 PM   Site Care site cleansed w/ sterile normal saline 1/21/2019  3:05 PM   Flush/Irrigation flushed w/;water;no resistance met 1/21/2019  3:05 PM   Dressing Change Due 01/10/19 1/10/2019  3:00 PM   Current Rate (mL/hr) 55 mL/hr 1/21/2019  3:05 PM   Goal Rate (mL/hr) 55 mL/hr 1/21/2019  3:05 PM   Intake (mL) 80 mL 1/19/2019  3:05 PM   Water Bolus (mL) 100 mL 1/13/2019  6:05 AM   Tube Output(mL)(Include Discarded Residual) 60 mL 1/13/2019  2:00 PM   Formula Name Impact 1/21/2019  3:05 PM   Tube Feeding Intake (mL) 55 1/21/2019  6:05 PM   Residual Amount (ml) 10 ml 1/21/2019  3:05 PM       Neurosurgery Physical Exam   General: well developed, well nourished, no distress.   Head: normocephalic, craniotomy incision well healed  Neurologic: Alert and awake. Non verbal.  Cranial nerves: CN II-XII grossly intact.   Eyes: pupils equal, round, reactive to light with accomodation. Left gaze preference.   Pulmonary: s/p Trach. Thick secretions noted surrounding trach. No signs of respiratory distress  Sensory: response to light touch throughout  Motor Strength: Moves all extremities spontaneously. Generalized weakness. Intermittently follows very simple commands (Squeezes hand, thumbs up)  Pronator Drift: unable to assess 2/2 mental  status  Finger-to-nose: unable to assess 2/2 mental status  Babinski: present on left      Significant Labs:  Recent Labs   Lab 01/20/19  0249 01/21/19  1241    130*    136   K 4.4 3.5    106   CO2 22* 24   BUN 23 23   CREATININE 0.5 0.4*   CALCIUM 9.1 9.1   MG 2.2  --      Recent Labs   Lab 01/20/19  0249 01/21/19  1241   WBC 11.08 13.10*   HGB 9.9* 10.0*   HCT 31.9* 30.9*    296

## 2019-01-22 NOTE — PLAN OF CARE
10:34 AM:  CM informed by Estelita at [facility], [phone], that she called [facility] this AM for letter of agreement and was told that the letter cannot be generated.  Per Estelita, she cannot accept the patient without a letter of agreement since they are not in network with [facility].  Per Estelita, she spoke to several different representatives at [facility] and was unable to get the letter.     10:39 AM  CM  Phoned Jeannette at [facility], [phone],  to discuss letter of agreement.  No answer. Msg left on confidential V/M for Jeannette to call CM back regarding the above.       2:45 PM:  CM met with patient's wife in room to discuss LTAC denial due to letter of agreement.  Per wife, she did not bring her paperwork with her with the phone numbers for [facility], but will call the number on the back of the patient's card.    SNF discussed.      3:08 PM:  CM phoned Jeannette at [facility], [phone],  no answer.  Msg #2 left on V/M for Jeannette to call CM back.      3:17 PM : NICHOLAS phoned [facility] and spoke to Ammy.  Per Ammy, she has a letter of approval for LTAC and can fax this letter to CM.    CM received the letter of approval.  [place] to [facility] by ALFONSO.  Per Julia at [facility] they cannot accept this letter and need a letter of agreement.  Letter forwarded to Ninfa at [facility].  Per Ninfa, she will present the letter to her .      Azeb Flores RN, CCRN-K, [facility]  Neuro-Critical Care   [phone]

## 2019-01-22 NOTE — SUBJECTIVE & OBJECTIVE
Review of Systems    Unable to obtain a complete ROS due to trach, nonverbal  Objective:     Vitals:  Temp: 98.4 °F (36.9 °C)  Pulse: 92  Rhythm: atrial rhythm  BP: (!) 157/94  MAP (mmHg): 119  Resp: (!) 29  SpO2: 97 %  Oxygen Concentration (%): 28  O2 Device (Oxygen Therapy): Trach Collar    Temp  Min: 98 °F (36.7 °C)  Max: 98.4 °F (36.9 °C)  Pulse  Min: 66  Max: 120  BP  Min: 142/81  Max: 195/117  MAP (mmHg)  Min: 105  Max: 134  Resp  Min: 20  Max: 29  SpO2  Min: 95 %  Max: 100 %  Oxygen Concentration (%)  Min: 28  Max: 28    01/21 0701 - 01/22 0700  In: 1555 [I.V.:125]  Out: 2760 [Urine:2700; Drains:60]   Unmeasured Output  Urine Occurrence: 1  Stool Occurrence: 1       Physical Exam    Physical Exam:  GA: Awake, comfortable, no acute distress.   HEENT: No scleral icterus or JVD.   Pulmonary: On trach collar , room air  Cardiac: RRR S1 & S2 w/o rubs/murmurs/gallops.   Abdominal: Bowel sounds present x 4Skin: No jaundice, rashes, or visible lesions.  Neuro:  --GCS: E4 V1t M5  --Mental Status:  Awake, alert, trach, doesn't follow commands  --Pupils 4mm, PERRL.   --cough, gag, corneal reflexes intact  --moves all extremities spontaneously  Unable to test orientation, language, memory, judgment, insight, fund of knowledge, shoulder shrug, tongue protrusion, coordination, gait    Medications:  Continuous   Scheduled    amLODIPine 10 mg Daily   chlorhexidine 15 mL QID   diclofenac sodium  TID   famotidine 20 mg BID   finasteride 5 mg Daily   heparin (porcine) 5,000 Units Q8H   lacosamide 50 mg BID   metoprolol tartrate 100 mg Q8H   polyethylene glycol 17 g BID   pravastatin 40 mg QHS   senna-docusate 8.6-50 mg 1 tablet BID   silodosin 4 mg Daily   sodium chloride 0.9% 3 mL Q8H   sodium chloride 2 g TID   ursodiol 300 mg TID   PRN    acetaminophen 650 mg Q6H PRN   albuterol sulfate 2.5 mg Q4H PRN   fentaNYL 25 mcg Q2H PRN   hydrALAZINE 10 mg Q4H PRN   magnesium oxide 800 mg PRN   magnesium oxide 800 mg PRN    ondansetron 4 mg Q12H PRN   potassium chloride 10% 40 mEq PRN   potassium chloride 10% 40 mEq PRN   potassium chloride 10% 60 mEq PRN   potassium, sodium phosphates 2 packet PRN   potassium, sodium phosphates 2 packet PRN   potassium, sodium phosphates 2 packet PRN   sodium chloride 3% 4 mL Q4H PRN     Today I personally reviewed pertinent medications, lines/drains/airways, imaging, cardiology results, laboratory results, microbiology results,     Diet  Tube Feeds at 55 cc/ hr (goal)

## 2019-01-23 LAB
ALBUMIN SERPL BCP-MCNC: 2.3 G/DL
ALP SERPL-CCNC: 372 U/L
ALT SERPL W/O P-5'-P-CCNC: 44 U/L
ANION GAP SERPL CALC-SCNC: 10 MMOL/L
AST SERPL-CCNC: 37 U/L
BASOPHILS # BLD AUTO: 0.09 K/UL
BASOPHILS NFR BLD: 1 %
BILIRUB SERPL-MCNC: 1.2 MG/DL
BUN SERPL-MCNC: 29 MG/DL
CALCIUM SERPL-MCNC: 9.2 MG/DL
CHLORIDE SERPL-SCNC: 110 MMOL/L
CO2 SERPL-SCNC: 21 MMOL/L
CREAT SERPL-MCNC: 0.5 MG/DL
DIFFERENTIAL METHOD: ABNORMAL
EOSINOPHIL # BLD AUTO: 0.3 K/UL
EOSINOPHIL NFR BLD: 3.2 %
ERYTHROCYTE [DISTWIDTH] IN BLOOD BY AUTOMATED COUNT: 16.3 %
EST. GFR  (AFRICAN AMERICAN): >60 ML/MIN/1.73 M^2
EST. GFR  (NON AFRICAN AMERICAN): >60 ML/MIN/1.73 M^2
GLUCOSE SERPL-MCNC: 116 MG/DL
HCT VFR BLD AUTO: 31 %
HGB BLD-MCNC: 9.7 G/DL
IMM GRANULOCYTES # BLD AUTO: 0.08 K/UL
IMM GRANULOCYTES NFR BLD AUTO: 0.9 %
INR PPP: 1.1
LYMPHOCYTES # BLD AUTO: 0.9 K/UL
LYMPHOCYTES NFR BLD: 9.6 %
MAGNESIUM SERPL-MCNC: 1.7 MG/DL
MCH RBC QN AUTO: 32.1 PG
MCHC RBC AUTO-ENTMCNC: 31.3 G/DL
MCV RBC AUTO: 103 FL
MONOCYTES # BLD AUTO: 0.9 K/UL
MONOCYTES NFR BLD: 9.2 %
NEUTROPHILS # BLD AUTO: 7.2 K/UL
NEUTROPHILS NFR BLD: 76.1 %
NRBC BLD-RTO: 0 /100 WBC
PHOSPHATE SERPL-MCNC: 2.9 MG/DL
PLATELET # BLD AUTO: 264 K/UL
PMV BLD AUTO: 10.4 FL
POTASSIUM SERPL-SCNC: 3.8 MMOL/L
PROT SERPL-MCNC: 6 G/DL
PROTHROMBIN TIME: 11 SEC
RBC # BLD AUTO: 3.02 M/UL
SODIUM SERPL-SCNC: 141 MMOL/L
WBC # BLD AUTO: 9.39 K/UL

## 2019-01-23 PROCEDURE — 97535 SELF CARE MNGMENT TRAINING: CPT

## 2019-01-23 PROCEDURE — 94668 MNPJ CHEST WALL SBSQ: CPT

## 2019-01-23 PROCEDURE — 63600175 PHARM REV CODE 636 W HCPCS: Performed by: STUDENT IN AN ORGANIZED HEALTH CARE EDUCATION/TRAINING PROGRAM

## 2019-01-23 PROCEDURE — 97168 OT RE-EVAL EST PLAN CARE: CPT

## 2019-01-23 PROCEDURE — 25000003 PHARM REV CODE 250: Performed by: PSYCHIATRY & NEUROLOGY

## 2019-01-23 PROCEDURE — 63600175 PHARM REV CODE 636 W HCPCS: Performed by: NURSE PRACTITIONER

## 2019-01-23 PROCEDURE — 85025 COMPLETE CBC W/AUTO DIFF WBC: CPT

## 2019-01-23 PROCEDURE — 99900035 HC TECH TIME PER 15 MIN (STAT)

## 2019-01-23 PROCEDURE — 25000003 PHARM REV CODE 250: Performed by: NURSE PRACTITIONER

## 2019-01-23 PROCEDURE — 94761 N-INVAS EAR/PLS OXIMETRY MLT: CPT

## 2019-01-23 PROCEDURE — 99233 SBSQ HOSP IP/OBS HIGH 50: CPT | Mod: GC,,, | Performed by: PSYCHIATRY & NEUROLOGY

## 2019-01-23 PROCEDURE — 84100 ASSAY OF PHOSPHORUS: CPT

## 2019-01-23 PROCEDURE — 99900026 HC AIRWAY MAINTENANCE (STAT)

## 2019-01-23 PROCEDURE — 27000221 HC OXYGEN, UP TO 24 HOURS

## 2019-01-23 PROCEDURE — 80053 COMPREHEN METABOLIC PANEL: CPT

## 2019-01-23 PROCEDURE — 83735 ASSAY OF MAGNESIUM: CPT

## 2019-01-23 PROCEDURE — 25000003 PHARM REV CODE 250

## 2019-01-23 PROCEDURE — 27200966 HC CLOSED SUCTION SYSTEM

## 2019-01-23 PROCEDURE — 25000003 PHARM REV CODE 250: Performed by: PHYSICIAN ASSISTANT

## 2019-01-23 PROCEDURE — A4216 STERILE WATER/SALINE, 10 ML: HCPCS | Performed by: PSYCHIATRY & NEUROLOGY

## 2019-01-23 PROCEDURE — 20000000 HC ICU ROOM

## 2019-01-23 PROCEDURE — 85610 PROTHROMBIN TIME: CPT

## 2019-01-23 PROCEDURE — 99233 PR SUBSEQUENT HOSPITAL CARE,LEVL III: ICD-10-PCS | Mod: GC,,, | Performed by: PSYCHIATRY & NEUROLOGY

## 2019-01-23 PROCEDURE — 25000003 PHARM REV CODE 250: Performed by: STUDENT IN AN ORGANIZED HEALTH CARE EDUCATION/TRAINING PROGRAM

## 2019-01-23 RX ORDER — METOPROLOL TARTRATE 1 MG/ML
INJECTION, SOLUTION INTRAVENOUS
Status: COMPLETED
Start: 2019-01-23 | End: 2019-01-23

## 2019-01-23 RX ORDER — METOPROLOL TARTRATE 1 MG/ML
2.5 INJECTION, SOLUTION INTRAVENOUS ONCE
Status: COMPLETED | OUTPATIENT
Start: 2019-01-23 | End: 2019-01-23

## 2019-01-23 RX ADMIN — METOPROLOL TARTRATE 100 MG: 100 TABLET, FILM COATED ORAL at 02:01

## 2019-01-23 RX ADMIN — DICLOFENAC: 10 GEL TOPICAL at 02:01

## 2019-01-23 RX ADMIN — FENTANYL CITRATE 25 MCG: 50 INJECTION INTRAMUSCULAR; INTRAVENOUS at 02:01

## 2019-01-23 RX ADMIN — DICLOFENAC: 10 GEL TOPICAL at 08:01

## 2019-01-23 RX ADMIN — HEPARIN SODIUM 5000 UNITS: 5000 INJECTION, SOLUTION INTRAVENOUS; SUBCUTANEOUS at 09:01

## 2019-01-23 RX ADMIN — FAMOTIDINE 20 MG: 20 TABLET ORAL at 08:01

## 2019-01-23 RX ADMIN — AMLODIPINE BESYLATE 10 MG: 10 TABLET ORAL at 08:01

## 2019-01-23 RX ADMIN — CHLORHEXIDINE GLUCONATE 0.12% ORAL RINSE 15 ML: 1.2 LIQUID ORAL at 01:01

## 2019-01-23 RX ADMIN — SODIUM CHLORIDE TAB 1 GM 2 G: 1 TAB at 08:01

## 2019-01-23 RX ADMIN — PRAVASTATIN SODIUM 40 MG: 40 TABLET ORAL at 09:01

## 2019-01-23 RX ADMIN — FAMOTIDINE 20 MG: 20 TABLET ORAL at 09:01

## 2019-01-23 RX ADMIN — HEPARIN SODIUM 5000 UNITS: 5000 INJECTION, SOLUTION INTRAVENOUS; SUBCUTANEOUS at 02:01

## 2019-01-23 RX ADMIN — STANDARDIZED SENNA CONCENTRATE AND DOCUSATE SODIUM 1 TABLET: 8.6; 5 TABLET, FILM COATED ORAL at 09:01

## 2019-01-23 RX ADMIN — CHLORHEXIDINE GLUCONATE 0.12% ORAL RINSE 15 ML: 1.2 LIQUID ORAL at 09:01

## 2019-01-23 RX ADMIN — LACOSAMIDE 50 MG: 50 TABLET, FILM COATED ORAL at 09:01

## 2019-01-23 RX ADMIN — METOPROLOL TARTRATE 100 MG: 100 TABLET, FILM COATED ORAL at 05:01

## 2019-01-23 RX ADMIN — SODIUM CHLORIDE TAB 1 GM 2 G: 1 TAB at 09:01

## 2019-01-23 RX ADMIN — Medication 3 ML: at 06:01

## 2019-01-23 RX ADMIN — SODIUM CHLORIDE TAB 1 GM 2 G: 1 TAB at 02:01

## 2019-01-23 RX ADMIN — SILODOSIN 4 MG: 4 CAPSULE ORAL at 08:01

## 2019-01-23 RX ADMIN — POTASSIUM CHLORIDE 40 MEQ: 20 SOLUTION ORAL at 06:01

## 2019-01-23 RX ADMIN — HEPARIN SODIUM 5000 UNITS: 5000 INJECTION, SOLUTION INTRAVENOUS; SUBCUTANEOUS at 05:01

## 2019-01-23 RX ADMIN — METOPROLOL TARTRATE 100 MG: 100 TABLET, FILM COATED ORAL at 09:01

## 2019-01-23 RX ADMIN — LACOSAMIDE 50 MG: 50 TABLET, FILM COATED ORAL at 08:01

## 2019-01-23 RX ADMIN — URSODIOL 300 MG: 300 CAPSULE ORAL at 03:01

## 2019-01-23 RX ADMIN — CHLORHEXIDINE GLUCONATE 0.12% ORAL RINSE 15 ML: 1.2 LIQUID ORAL at 08:01

## 2019-01-23 RX ADMIN — METOPROLOL TARTRATE 2.5 MG: 5 INJECTION, SOLUTION INTRAVENOUS at 02:01

## 2019-01-23 RX ADMIN — MAGNESIUM OXIDE TAB 400 MG (241.3 MG ELEMENTAL MG) 800 MG: 400 (241.3 MG) TAB at 06:01

## 2019-01-23 RX ADMIN — FINASTERIDE 5 MG: 5 TABLET, FILM COATED ORAL at 08:01

## 2019-01-23 RX ADMIN — Medication 3 ML: at 10:01

## 2019-01-23 RX ADMIN — MAGNESIUM OXIDE TAB 400 MG (241.3 MG ELEMENTAL MG) 800 MG: 400 (241.3 MG) TAB at 08:01

## 2019-01-23 RX ADMIN — URSODIOL 300 MG: 300 CAPSULE ORAL at 09:01

## 2019-01-23 RX ADMIN — DICLOFENAC: 10 GEL TOPICAL at 09:01

## 2019-01-23 RX ADMIN — URSODIOL 300 MG: 300 CAPSULE ORAL at 08:01

## 2019-01-23 RX ADMIN — METOPROLOL TARTRATE 2.5 MG: 1 INJECTION, SOLUTION INTRAVENOUS at 02:01

## 2019-01-23 RX ADMIN — CHLORHEXIDINE GLUCONATE 0.12% ORAL RINSE 15 ML: 1.2 LIQUID ORAL at 04:01

## 2019-01-23 NOTE — SUBJECTIVE & OBJECTIVE
Review of Systems  Unable to obtain a complete ROS due to trach, nonverbal  Objective:     Vitals:  Temp: 98.8 °F (37.1 °C)  Pulse: 101  Rhythm: normal sinus rhythm, atrial rhythm  BP: 138/66  MAP (mmHg): 94  Resp: (!) 30  SpO2: (!) 92 %  Oxygen Concentration (%): 35  O2 Device (Oxygen Therapy): Trach Collar    Temp  Min: 97.2 °F (36.2 °C)  Max: 98.8 °F (37.1 °C)  Pulse  Min: 68  Max: 137  BP  Min: 134/88  Max: 172/97  MAP (mmHg)  Min: 91  Max: 124  Resp  Min: 25  Max: 38  SpO2  Min: 90 %  Max: 97 %  Oxygen Concentration (%)  Min: 28  Max: 35    01/22 0701 - 01/23 0700  In: 2150 [I.V.:115]  Out: 1786 [Urine:1740; Drains:45]   Unmeasured Output  Urine Occurrence: 1  Stool Occurrence: 1       Physical Exam    Physical Exam:  GA: Awake, comfortable, no acute distress.   HEENT: No scleral icterus or JVD.   Pulmonary: On trach collar , room air  Cardiac: RRR S1 & S2 w/o rubs/murmurs/gallops.   Abdominal: Bowel sounds present x 4Skin: No jaundice, rashes, or visible lesions.  Neuro:  --GCS: E4 V1t M5  --Mental Status:  Awake, alert, trach with copious secretions; doesn't follow commands  --Pupils 4mm, PERRL.   --cough, gag, corneal reflexes intact  --moves all extremities spontaneously  Unable to test orientation, language, memory, judgment, insight, fund of knowledge, shoulder shrug, tongue protrusion, coordination, gait    Medications:  Continuous   Scheduled    amLODIPine 10 mg Daily   chlorhexidine 15 mL QID   diclofenac sodium  TID   famotidine 20 mg BID   finasteride 5 mg Daily   heparin (porcine) 5,000 Units Q8H   lacosamide 50 mg BID   metoprolol tartrate 100 mg Q8H   polyethylene glycol 17 g BID   pravastatin 40 mg QHS   senna-docusate 8.6-50 mg 1 tablet BID   silodosin 4 mg Daily   sodium chloride 0.9% 3 mL Q8H   sodium chloride 2 g TID   ursodiol 300 mg TID   PRN    acetaminophen 650 mg Q6H PRN   albuterol sulfate 2.5 mg Q4H PRN   fentaNYL 25 mcg Q2H PRN   hydrALAZINE 10 mg Q4H PRN   magnesium oxide 800 mg  PRN   magnesium oxide 800 mg PRN   ondansetron 4 mg Q12H PRN   potassium chloride 10% 40 mEq PRN   potassium chloride 10% 40 mEq PRN   potassium chloride 10% 60 mEq PRN   potassium, sodium phosphates 2 packet PRN   potassium, sodium phosphates 2 packet PRN   potassium, sodium phosphates 2 packet PRN   sodium chloride 3% 4 mL Q4H PRN     Today I personally reviewed pertinent medications, lines/drains/airways, imaging, cardiology results, laboratory results, microbiology results,     Diet  Tube Feeds at 55 cc/ hr (goal)

## 2019-01-23 NOTE — PROGRESS NOTES
[facility]  Neurocritical Care  Progress Note    Admit Date: 12/20/2018  Service Date: 01/23/2019  Length of Stay: 34    Subjective:     Chief Complaint: SDH (subdural hematoma)    History of Present Illness: 63 year old male h/o CABG on ASA 81 mg (last dose 72 hours PTA) presenting as transfer from OSH for EDH with SDH. Patient reportedly had a mechanical fall from standing on Sunday and went to ED where CTH was negative for any acute intracranial pathology. Overnight, patient developed confusion and presented to OSH ED where CTH was consistent with EDH with SDH. Patient has progressively worsened throughout the day developing intermittent global aphasia. NSGY consulted in ED at [facility] and stat CTH has been ordered. NCC consulted for admission and medical management. Patient on weaning off of cardene at time of evaluation with sbp of 120. Reported presenting sbp in 150s.    Hospital Course: 12/22: no major events overnight. Weaned off of cardene. This morning AOx3. Drain removed by NSGY at 11 AM, and planned to step down to NSGY.  AMS noted around 1PM, patient aphasic and not following commands, STAT CTH unremarkable.   12/26: wean ketamine, follow EEG, send urine studuies-Na 133, NS bolus, advance TF  12/27: off ketamine, d/c levo, continue EEG, and AEDs, 500 NS bolus, increase enteral water flushes, continue abx for another day  1/2: extubation trial.   1/3: failed extubation trial. Plan for trach and peg  1/4: plan for trach, general surgery consult  1/5: NAEO  1/6: NAEO. Awaiting trach and peg  1/7: plan for trach/peg, opens eyes to voice no commands  1/8: s/p trach/peg today  1/9: restarting tf this afternoon, ltac planning, eeg today to help guide aed weaning  1/12 Trach trials tolerating well. Neuro checks q2 in am/ q4in pm metoprolol prn HR.120  1/13 Continues to tolerate Trach. Start Norvasc. eval thyroid function baseline.   1/14 hyponatremic, pending urine studies, finasteride, increased  secretions, CPT, pending LTAC  01/15: Awaiting approval for transfer to LTAC  01/16: Gen surg about sutures and trach pressure wound  01/17: Med route adjustments and awaiting wound care recs, Pending LTAC placement  01/18: Placed orders for LTAC placement, Insurance company issues  1/19 No  Significant events overnight. No changes in assessment. Awaiting LTAC placement  1/20 no significant changes overnight. Continue trach collar. No vent over night. Hyperventilation therapy - bag/suction q6h. CXR in am pending  LTAC pending  1/20 alk phos, AST elevated, will check ggt. cEEG stable will decrease vimpat 50mg bid. [facility]  1/21: Dispo Planning  1/22: Pending Case Management Rounds      Review of Systems  Unable to obtain a complete ROS due to trach, nonverbal  Objective:     Vitals:  Temp: 98.8 °F (37.1 °C)  Pulse: 101  Rhythm: normal sinus rhythm, atrial rhythm  BP: 138/66  MAP (mmHg): 94  Resp: (!) 30  SpO2: (!) 92 %  Oxygen Concentration (%): 35  O2 Device (Oxygen Therapy): Trach Collar    Temp  Min: 97.2 °F (36.2 °C)  Max: 98.8 °F (37.1 °C)  Pulse  Min: 68  Max: 137  BP  Min: 134/88  Max: 172/97  MAP (mmHg)  Min: 91  Max: 124  Resp  Min: 25  Max: 38  SpO2  Min: 90 %  Max: 97 %  Oxygen Concentration (%)  Min: 28  Max: 35    01/22 0701 - 01/23 0700  In: 2150 [I.V.:115]  Out: 1786 [Urine:1740; Drains:45]   Unmeasured Output  Urine Occurrence: 1  Stool Occurrence: 1       Physical Exam    Physical Exam:  GA: Awake, comfortable, no acute distress.   HEENT: No scleral icterus or JVD.   Pulmonary: On trach collar , room air  Cardiac: RRR S1 & S2 w/o rubs/murmurs/gallops.   Abdominal: Bowel sounds present x 4Skin: No jaundice, rashes, or visible lesions.  Neuro:  --GCS: E4 V1t M5  --Mental Status:  Awake, alert, trach with copious secretions; doesn't follow commands  --Pupils 4mm, PERRL.   --cough, gag, corneal reflexes intact  --moves all extremities  "spontaneously  Unable to test orientation, language, memory, judgment, insight, fund of knowledge, shoulder shrug, tongue protrusion, coordination, gait    Medications:  Continuous   Scheduled    amLODIPine 10 mg Daily   chlorhexidine 15 mL QID   diclofenac sodium  TID   famotidine 20 mg BID   finasteride 5 mg Daily   heparin (porcine) 5,000 Units Q8H   lacosamide 50 mg BID   metoprolol tartrate 100 mg Q8H   polyethylene glycol 17 g BID   pravastatin 40 mg QHS   senna-docusate 8.6-50 mg 1 tablet BID   silodosin 4 mg Daily   sodium chloride 0.9% 3 mL Q8H   sodium chloride 2 g TID   ursodiol 300 mg TID   PRN    acetaminophen 650 mg Q6H PRN   albuterol sulfate 2.5 mg Q4H PRN   fentaNYL 25 mcg Q2H PRN   hydrALAZINE 10 mg Q4H PRN   magnesium oxide 800 mg PRN   magnesium oxide 800 mg PRN   ondansetron 4 mg Q12H PRN   potassium chloride 10% 40 mEq PRN   potassium chloride 10% 40 mEq PRN   potassium chloride 10% 60 mEq PRN   potassium, sodium phosphates 2 packet PRN   potassium, sodium phosphates 2 packet PRN   potassium, sodium phosphates 2 packet PRN   sodium chloride 3% 4 mL Q4H PRN     Today I personally reviewed pertinent medications, lines/drains/airways, imaging, cardiology results, laboratory results, microbiology results,     Diet  Tube Feeds at 55 cc/ hr (goal)      Assessment/Plan:     Neuro   * SDH (subdural hematoma)    Epidural with subdural component s/p evac 12/20  Complicated by status epilepticus  -Continue Vimpat 50 mg BID  Neuro checks Q2Hr in am / Q4Hr in pm    Pending LTAC vs SNF approval given "out of network"             Status epilepticus    Mental status stable  Lacosamide 50mg BID     Cerebral edema    See SDH     Brain compression    See SDH     Epidural hematoma    See SDH     Subarachnoid hemorrhage    See Subdural Hematoma     Pulmonary   Acute respiratory failure    S/p trach  Trach collar trial daily, tolerating well   CXR look great today     Cardiac/Vascular   Essential hypertension    " -SBP<180  -MAP>65  -PRN hydralazine         Renal/   Hyponatremia    Sodium stable        Endocrine   Weight loss, unintentional    See Malnutrition     Severe malnutrition    S/P PEG tube  Tube feeds at goal at 55 cc/ hr       Other   Pressure injury of buttock, stage 2    Continue wound care           The patient is being Prophylaxed for:  Venous Thromboembolism with: Chemical  Stress Ulcer with: H2B  Ventilator Pneumonia with: chlorhexidine oral care    Activity Orders          None        Full Code    Carlos Alberto East MD  Neurocritical Care  [facility]

## 2019-01-23 NOTE — PLAN OF CARE
Problem: Adult Inpatient Plan of Care  Goal: Plan of Care Review  Outcome: Ongoing (interventions implemented as appropriate)   01/23/19 0643   Plan of Care Review   Plan of Care Reviewed With patient   Progress no change     POC reviewed with pt at 0500. No evidence of learning. A.fib w/ RVR 1 time dose of lopressor given iv. Electrolyte replacement initiated. 1 bm on shift. Pt progressing toward goals. Awaiting LTAC. Will continue to monitor. See flowsheets for full assessment and VS info   Goal: Patient-Specific Goal (Individualization)  Admit Date: 12/20/2018  2:15 PM    Admit Diagnosis: Subarachnoid hemorrhage [I60.9]  Subdural hematoma [S06.5X9A]  SDH (subdural hematoma) [S06.5X9A]  Subdural hematoma [S06.5X9A]  Subdural hematoma [S06.5X9A]    Past Medical History: Past Medical History:  No date: Coronary artery disease  No date: CVD (cardiovascular disease)  No date: Diabetes mellitus type II  No date: Hyperlipidemia  No date: Hypertension  No date: Ischemic cardiomyopathy  No date: Obesity  No date: Obesity, morbid    Past Surgical History: Past Surgical History:  No date: APPENDECTOMY  No date: CORONARY ARTERY BYPASS GRAFT  12/20/2018: CRANIOTOMY, FOR SUBDURAL HEMATOMA EVACUATION; Left      Comment:  Performed by Stevan Hull MD at [facility] OR [address] [place] [address]  No date: HEMORRHOID SURGERY    Individualization:  1. Patient's likes old western movies on TV      Restraints: Bilateral upper soft wrist restraint expires 01/20/19 at 0457 for removing medical devices. SVETA Paredes RN 01/08/2019      Outcome: Ongoing (interventions implemented as appropriate)   12/20/18 2230 12/24/18 0322 12/31/18 0344   OTHER   Anxieties, Fears or Concerns --  QIANA --    Individualized Care Needs --  --  frequent mouth care   Patient-Specific Goal (Individualization)   Patient-Specific Goals (Include Timeframe) pt will not develop skin breakdown during stay --  --

## 2019-01-23 NOTE — PLAN OF CARE
NICHOLAS phoned Ninfa at [facility]  to check the status of patient's LTAC placement.  Per Ninfa, she will discuss it with her  this AM and let NICHOLAS know.       Azeb Flores RN, CCRN-K, [facility]  Neuro-Critical Care   [phone]

## 2019-01-23 NOTE — PROGRESS NOTES
Notified Lamont Dumont NP of pt's HR sustaining @132. Pt is in A.fib. Reported pt received scheduled Lopressor 100 mg @ 2200 last night and HR did come to low 100's. Next dose due @ 0600. Per orders give 2.5 of Lopressor IV now if HR still sustains >130 ok to give another 2.5 mg.

## 2019-01-23 NOTE — PLAN OF CARE
Problem: Occupational Therapy Goal  Goal: Occupational Therapy Goal  Goals set 1/12 to be addressed for 14 days with expiration date, 1/26:  Patient will increase functional independence with ADLs by performing:    Patient will demonstrate rolling to the right with max assist.  Not met   Patient will demonstrate rolling to the left with max assist.   Not met  Patient will demonstrate grooming while upright in bed with max assist.   Not met  Patient will demonstrate ability to follow 1/3 one step commands.   Not met  Patient's family / caregiver will demonstrate independence with providing ROM and changes in bed positioning.   Not met           Reassessment performed 1/23.  ASHLEY Oliva  1/23/2019

## 2019-01-23 NOTE — PLAN OF CARE
Problem: Adult Inpatient Plan of Care  Goal: Plan of Care Review  Outcome: Ongoing (interventions implemented as appropriate)  No acute events throughout the day, VS and assessment per flow sheet, patient progressing towards goal as tolerated. Plan of care reviewed with Peewee Nunes and family, all concerns addressed. Will continue to monitor.     Pt more alert, nodding at times. Pending LTAC placement.

## 2019-01-23 NOTE — PT/OT/SLP RE-EVAL
Occupational Therapy   Re-evaluation    Name: Peewee Nunes  MRN: [id number]  Admitting Diagnosis:  SDH (subdural hematoma) 15 Days Post-Op    Recommendations:     Discharge Recommendations: LTACH (long-term acute care hospital)  Discharge Equipment Recommendations:  hospital bed, lift device  Barriers to discharge:  Inaccessible home environment, Decreased caregiver support    Assessment:     Peewee Nunes is a 63 y.o. male with a medical diagnosis of SDH (subdural hematoma).  He presents with performance deficits affecting function are weakness, impaired self care skills, impaired balance, decreased coordination, decreased safety awareness, decreased ROM, impaired endurance, impaired functional mobilty, decreased upper extremity function, impaired coordination, gait instability, impaired sensation, impaired cognition, decreased lower extremity function, abnormal tone, impaired fine motor, impaired cardiopulmonary response to activity.      Rehab Prognosis:  Good; patient would benefit from acute skilled OT services to address these deficits and reach maximum level of function.       Plan:     Patient to be seen 3 x/week to address the above listed problems via self-care/home management, neuromuscular re-education, cognitive retraining, therapeutic activities, therapeutic exercises, sensory integration  · Plan of Care Expires: 02/20/19  · Plan of Care Reviewed with: patient    Subjective     Patient:  Nonverbal; communicating via head nods.  Communicated with: Nurse prior to session.  Pain/Comfort:  · Pain Rating 1: 0/10  · Pain Rating Post-Intervention 1: 0/10    Objective:     Communicated with: Nurse prior to session.  Patient found supine with: blood pressure cuff, bed alarm, telemetry, SCD, tracheostomy, pulse ox (continuous), peripheral IV, restraints, PEG Tube, pressure relief boots, PICC line upon OT entry to room.    General Precautions: Standard, aphasia, aspiration, NPO, fall   Orthopedic  Precautions:N/A   Braces: N/A     Occupational Performance:    Bed Mobility:    · Patient completed Rolling/Turning to Left with  total assistance  · Patient completed Rolling/Turning to Right with total assistance    Functional Mobility/Transfers:  · Dependent drawsheet transfers    Activities of Daily Living:  · Feeding:  NPO    · Grooming: total assistance while supine  · Bathing: total assistance (bed level)  · Toileting: total assistance (bed level)    Cognitive/Visual Perceptual:  Cognitive/Psychosocial Skills:     -       Oriented to: Daily orientation provided   -       Follows Commands/attention:followed 2/3 one step commands  -       Communication: nonverbal; communicating via head nods  -       Safety awareness/insight to disability: impaired   -       Mood/Affect/Coping skills/emotional control: Appropriate to situation and Cooperative    Physical Exam:  Postural examination/scapula alignment:    -       Rounded shoulders  Skin integrity: Thin  Edema:  None noted  Sensation:    -       Impaired  proprioception/kinesthesia  Upper Extremity Range of Motion:     -       Right Upper Extremity: AAROM WNL  -       Left Upper Extremity: AAROM WNL  Upper Extremity Strength:    -       Right Upper Extremity: 2/5  -       Left Upper Extremity: 2/5    [facility] 6 Click:  [facility] Total Score: 6    Treatment & Education:  Patient education provided on role of OT, daily orientation, ROM, positioning and need for continued OT upon discharge.  Continued education, patient/ family training recommended. Daily orientation provided.  PROM performed bilateral UE/LEs one set x 10 rep in all planes of motion with stretches provided at end range; sustained stretch provided for right UE external rotation, supination and shoulder flexion as well as bilateral LE ankle dorsiflexion.  Assistance and facilitation provided for upward rotation of the scapula during shoulder flexion and abduction.   Patient actively moving all 4 extremities.   Patient alert 100% of the session and tracking bilaterally.  Patient followed 2/3 one step commands during the session.   Positioning provided for midline orientation with bilateral UEs elevated and heels lifted off mattress.  Gentle cervical rotation provided.   Family not present during the session.  Patient's functional status and disposition recommendation discussed with patient's nurse.  White board updated in patient's room.       Patient left supine with all lines intact, call button in reach and bed alarm on    GOALS:   Multidisciplinary Problems     Occupational Therapy Goals        Problem: Occupational Therapy Goal    Goal Priority Disciplines Outcome Interventions   Occupational Therapy Goal     OT, PT/OT     Description:  Goals set 1/23 to be addressed for 14 days with expiration date, 2/6:  Patient will increase functional independence with ADLs by performing:    Patient will demonstrate rolling to the right with max assist.  Not met   Patient will demonstrate rolling to the left with max assist.   Not met  Patient will demonstrate grooming while upright in bed with max assist.   Not met  Patient will demonstrate upper body dressing with Max assist.  Not met   Patient will demonstrate ability to follow 4/5 one step commands.   Not met  Patient's family / caregiver will demonstrate independence with providing ROM and changes in bed positioning.   Not met                             History:     Past Medical History:   Diagnosis Date    Coronary artery disease     CVD (cardiovascular disease)     Diabetes mellitus type II     Hyperlipidemia     Hypertension     Ischemic cardiomyopathy     Obesity     Obesity, morbid        Past Surgical History:   Procedure Laterality Date    APPENDECTOMY      CORONARY ARTERY BYPASS GRAFT      CRANIOTOMY, FOR SUBDURAL HEMATOMA EVACUATION Left 12/20/2018    Performed by Stevan Hull MD at [facility] OR 2ND FLR    CREATION, TRACHEOSTOMY N/A 1/8/2019    Performed  "by Andrew Durbin MD at [facility] OR [address] [place] [address]    HEMORRHOID SURGERY      INSERTION, PEG TUBE N/A 2019    Performed by Andrew Durbin MD at [facility] OR [address] [place] [address]       Clinical Decision Makin.  OT Mod:  "Pt evaluation falls under moderate complexity for evaluation coding due to identification of 3-5 performance deficits noted as stated above. Eval required Min/Mod assistance to complete on this date and detailed assessment(s) were utilized. Moreover, an expanded review of history and occupational profile obtained with additional review of cognitive, physical and psychosocial hx."     Time Tracking:     OT Date of Treatment: 19  OT Start Time: 457  OT Stop Time: 531  OT Total Time (min): 34 min    Billable Minutes:Re-eval 10  Self Care/Home Management 24    ASHLEY Oliva  2019      "

## 2019-01-23 NOTE — ASSESSMENT & PLAN NOTE
"Epidural with subdural component s/p evac 12/20  Complicated by status epilepticus  -Continue Vimpat 50 mg BID  Neuro checks Q2Hr in am / Q4Hr in pm    Pending LTAC vs SNF approval given "out of network"          "

## 2019-01-24 LAB
ALBUMIN SERPL BCP-MCNC: 2.5 G/DL
ALP SERPL-CCNC: 404 U/L
ALT SERPL W/O P-5'-P-CCNC: 43 U/L
ANION GAP SERPL CALC-SCNC: 8 MMOL/L
AST SERPL-CCNC: 42 U/L
BASOPHILS # BLD AUTO: 0.07 K/UL
BASOPHILS NFR BLD: 0.8 %
BILIRUB SERPL-MCNC: 1.1 MG/DL
BUN SERPL-MCNC: 32 MG/DL
CALCIUM SERPL-MCNC: 9.5 MG/DL
CHLORIDE SERPL-SCNC: 112 MMOL/L
CO2 SERPL-SCNC: 22 MMOL/L
CREAT SERPL-MCNC: 0.5 MG/DL
DIFFERENTIAL METHOD: ABNORMAL
EOSINOPHIL # BLD AUTO: 0.2 K/UL
EOSINOPHIL NFR BLD: 2.2 %
ERYTHROCYTE [DISTWIDTH] IN BLOOD BY AUTOMATED COUNT: 16.3 %
EST. GFR  (AFRICAN AMERICAN): >60 ML/MIN/1.73 M^2
EST. GFR  (NON AFRICAN AMERICAN): >60 ML/MIN/1.73 M^2
GLUCOSE SERPL-MCNC: 119 MG/DL
HCT VFR BLD AUTO: 31.4 %
HGB BLD-MCNC: 9.4 G/DL
IMM GRANULOCYTES # BLD AUTO: 0.07 K/UL
IMM GRANULOCYTES NFR BLD AUTO: 0.8 %
LYMPHOCYTES # BLD AUTO: 1.3 K/UL
LYMPHOCYTES NFR BLD: 14.6 %
MAGNESIUM SERPL-MCNC: 2 MG/DL
MCH RBC QN AUTO: 30.7 PG
MCHC RBC AUTO-ENTMCNC: 29.9 G/DL
MCV RBC AUTO: 103 FL
MONOCYTES # BLD AUTO: 1 K/UL
MONOCYTES NFR BLD: 10.8 %
NEUTROPHILS # BLD AUTO: 6.3 K/UL
NEUTROPHILS NFR BLD: 70.8 %
NRBC BLD-RTO: 0 /100 WBC
PHOSPHATE SERPL-MCNC: 3.5 MG/DL
PLATELET # BLD AUTO: 245 K/UL
PMV BLD AUTO: 10.7 FL
POTASSIUM SERPL-SCNC: 4.1 MMOL/L
PROT SERPL-MCNC: 6.5 G/DL
RBC # BLD AUTO: 3.06 M/UL
SODIUM SERPL-SCNC: 142 MMOL/L
WBC # BLD AUTO: 8.83 K/UL

## 2019-01-24 PROCEDURE — 25000003 PHARM REV CODE 250: Performed by: NURSE PRACTITIONER

## 2019-01-24 PROCEDURE — 99900026 HC AIRWAY MAINTENANCE (STAT)

## 2019-01-24 PROCEDURE — 25000003 PHARM REV CODE 250: Performed by: PHYSICIAN ASSISTANT

## 2019-01-24 PROCEDURE — 25000003 PHARM REV CODE 250: Performed by: PSYCHIATRY & NEUROLOGY

## 2019-01-24 PROCEDURE — 97110 THERAPEUTIC EXERCISES: CPT

## 2019-01-24 PROCEDURE — 85025 COMPLETE CBC W/AUTO DIFF WBC: CPT

## 2019-01-24 PROCEDURE — 97535 SELF CARE MNGMENT TRAINING: CPT

## 2019-01-24 PROCEDURE — 97112 NEUROMUSCULAR REEDUCATION: CPT

## 2019-01-24 PROCEDURE — 20000000 HC ICU ROOM

## 2019-01-24 PROCEDURE — 94640 AIRWAY INHALATION TREATMENT: CPT

## 2019-01-24 PROCEDURE — A4216 STERILE WATER/SALINE, 10 ML: HCPCS | Performed by: PSYCHIATRY & NEUROLOGY

## 2019-01-24 PROCEDURE — 80053 COMPREHEN METABOLIC PANEL: CPT

## 2019-01-24 PROCEDURE — 99900022

## 2019-01-24 PROCEDURE — 99238 HOSP IP/OBS DSCHRG MGMT 30/<: CPT | Mod: ,,, | Performed by: PSYCHIATRY & NEUROLOGY

## 2019-01-24 PROCEDURE — 84100 ASSAY OF PHOSPHORUS: CPT

## 2019-01-24 PROCEDURE — 99900035 HC TECH TIME PER 15 MIN (STAT)

## 2019-01-24 PROCEDURE — 27200966 HC CLOSED SUCTION SYSTEM

## 2019-01-24 PROCEDURE — 27000221 HC OXYGEN, UP TO 24 HOURS

## 2019-01-24 PROCEDURE — 25000003 PHARM REV CODE 250: Performed by: STUDENT IN AN ORGANIZED HEALTH CARE EDUCATION/TRAINING PROGRAM

## 2019-01-24 PROCEDURE — 94761 N-INVAS EAR/PLS OXIMETRY MLT: CPT

## 2019-01-24 PROCEDURE — 63600175 PHARM REV CODE 636 W HCPCS: Performed by: NURSE PRACTITIONER

## 2019-01-24 PROCEDURE — 99238 PR HOSPITAL DISCHARGE DAY,<30 MIN: ICD-10-PCS | Mod: ,,, | Performed by: PSYCHIATRY & NEUROLOGY

## 2019-01-24 PROCEDURE — 83735 ASSAY OF MAGNESIUM: CPT

## 2019-01-24 PROCEDURE — 25000242 PHARM REV CODE 250 ALT 637 W/ HCPCS: Performed by: PHYSICIAN ASSISTANT

## 2019-01-24 PROCEDURE — 63600175 PHARM REV CODE 636 W HCPCS: Performed by: STUDENT IN AN ORGANIZED HEALTH CARE EDUCATION/TRAINING PROGRAM

## 2019-01-24 RX ORDER — SODIUM CHLORIDE FOR INHALATION 3 %
4 VIAL, NEBULIZER (ML) INHALATION EVERY 4 HOURS PRN
Start: 2019-01-24 | End: 2019-04-25

## 2019-01-24 RX ORDER — SODIUM,POTASSIUM PHOSPHATES 280-250MG
2 POWDER IN PACKET (EA) ORAL
Refills: 0 | Status: ON HOLD | COMMUNITY
Start: 2019-01-24 | End: 2019-02-18 | Stop reason: HOSPADM

## 2019-01-24 RX ORDER — ACETAMINOPHEN 325 MG/1
650 TABLET ORAL EVERY 8 HOURS PRN
Refills: 0 | Status: ON HOLD | COMMUNITY
Start: 2019-01-24 | End: 2019-02-18 | Stop reason: HOSPADM

## 2019-01-24 RX ORDER — POTASSIUM CHLORIDE 20 MEQ/15ML
60 SOLUTION ORAL
Refills: 0 | Status: ON HOLD
Start: 2019-01-24 | End: 2019-02-18 | Stop reason: HOSPADM

## 2019-01-24 RX ORDER — ACETAMINOPHEN 650 MG/1
650 SUPPOSITORY RECTAL EVERY 4 HOURS PRN
Status: DISCONTINUED | OUTPATIENT
Start: 2019-01-24 | End: 2019-01-28 | Stop reason: HOSPADM

## 2019-01-24 RX ORDER — FAMOTIDINE 10 MG/ML
20 INJECTION INTRAVENOUS 2 TIMES DAILY
Status: DISCONTINUED | OUTPATIENT
Start: 2019-01-24 | End: 2019-01-24

## 2019-01-24 RX ORDER — LANOLIN ALCOHOL/MO/W.PET/CERES
800 CREAM (GRAM) TOPICAL
Refills: 0 | Status: ON HOLD | COMMUNITY
Start: 2019-01-24 | End: 2019-02-18 | Stop reason: HOSPADM

## 2019-01-24 RX ORDER — ALBUTEROL SULFATE 2.5 MG/.5ML
2.5 SOLUTION RESPIRATORY (INHALATION) EVERY 4 HOURS PRN
Start: 2019-01-24 | End: 2020-01-24

## 2019-01-24 RX ORDER — POTASSIUM CHLORIDE 20 MEQ/15ML
40 SOLUTION ORAL
Refills: 0 | Status: ON HOLD
Start: 2019-01-24 | End: 2019-02-18 | Stop reason: HOSPADM

## 2019-01-24 RX ORDER — ACETAMINOPHEN 650 MG/1
650 SUPPOSITORY RECTAL EVERY 4 HOURS PRN
Refills: 0 | Status: ON HOLD | COMMUNITY
Start: 2019-01-24 | End: 2019-02-18 | Stop reason: HOSPADM

## 2019-01-24 RX ORDER — ACETAMINOPHEN 325 MG/1
650 TABLET ORAL EVERY 8 HOURS PRN
Status: DISCONTINUED | OUTPATIENT
Start: 2019-01-24 | End: 2019-01-28 | Stop reason: HOSPADM

## 2019-01-24 RX ORDER — LACOSAMIDE 50 MG/1
50 TABLET ORAL 2 TIMES DAILY
Qty: 60 TABLET | Refills: 11 | Status: ON HOLD | OUTPATIENT
Start: 2019-01-24 | End: 2019-02-18 | Stop reason: HOSPADM

## 2019-01-24 RX ORDER — MIDAZOLAM HYDROCHLORIDE 1 MG/ML
INJECTION INTRAMUSCULAR; INTRAVENOUS
Status: DISPENSED
Start: 2019-01-24 | End: 2019-01-24

## 2019-01-24 RX ORDER — ONDANSETRON 2 MG/ML
4 INJECTION INTRAMUSCULAR; INTRAVENOUS EVERY 8 HOURS PRN
Status: DISCONTINUED | OUTPATIENT
Start: 2019-01-24 | End: 2019-01-28

## 2019-01-24 RX ORDER — ONDANSETRON 2 MG/ML
4 INJECTION INTRAMUSCULAR; INTRAVENOUS EVERY 8 HOURS PRN
Status: ON HOLD
Start: 2019-01-24 | End: 2019-02-18 | Stop reason: HOSPADM

## 2019-01-24 RX ADMIN — FAMOTIDINE 20 MG: 20 TABLET ORAL at 09:01

## 2019-01-24 RX ADMIN — SODIUM CHLORIDE TAB 1 GM 2 G: 1 TAB at 03:01

## 2019-01-24 RX ADMIN — ALBUTEROL SULFATE 2.5 MG: 2.5 SOLUTION RESPIRATORY (INHALATION) at 02:01

## 2019-01-24 RX ADMIN — FENTANYL CITRATE 25 MCG: 50 INJECTION INTRAMUSCULAR; INTRAVENOUS at 01:01

## 2019-01-24 RX ADMIN — SODIUM CHLORIDE SOLN NEBU 3% 4 ML: 3 NEBU SOLN at 02:01

## 2019-01-24 RX ADMIN — LACOSAMIDE 50 MG: 50 TABLET, FILM COATED ORAL at 09:01

## 2019-01-24 RX ADMIN — DICLOFENAC: 10 GEL TOPICAL at 09:01

## 2019-01-24 RX ADMIN — AMLODIPINE BESYLATE 10 MG: 10 TABLET ORAL at 08:01

## 2019-01-24 RX ADMIN — STANDARDIZED SENNA CONCENTRATE AND DOCUSATE SODIUM 1 TABLET: 8.6; 5 TABLET, FILM COATED ORAL at 08:01

## 2019-01-24 RX ADMIN — Medication 3 ML: at 06:01

## 2019-01-24 RX ADMIN — FINASTERIDE 5 MG: 5 TABLET, FILM COATED ORAL at 08:01

## 2019-01-24 RX ADMIN — POLYETHYLENE GLYCOL 3350 17 G: 17 POWDER, FOR SOLUTION ORAL at 08:01

## 2019-01-24 RX ADMIN — METOPROLOL TARTRATE 100 MG: 100 TABLET, FILM COATED ORAL at 01:01

## 2019-01-24 RX ADMIN — SODIUM CHLORIDE TAB 1 GM 2 G: 1 TAB at 08:01

## 2019-01-24 RX ADMIN — CHLORHEXIDINE GLUCONATE 0.12% ORAL RINSE 15 ML: 1.2 LIQUID ORAL at 01:01

## 2019-01-24 RX ADMIN — SILODOSIN 4 MG: 4 CAPSULE ORAL at 08:01

## 2019-01-24 RX ADMIN — STANDARDIZED SENNA CONCENTRATE AND DOCUSATE SODIUM 1 TABLET: 8.6; 5 TABLET, FILM COATED ORAL at 09:01

## 2019-01-24 RX ADMIN — PRAVASTATIN SODIUM 40 MG: 40 TABLET ORAL at 09:01

## 2019-01-24 RX ADMIN — FAMOTIDINE 20 MG: 20 TABLET ORAL at 08:01

## 2019-01-24 RX ADMIN — METOPROLOL TARTRATE 100 MG: 100 TABLET, FILM COATED ORAL at 06:01

## 2019-01-24 RX ADMIN — CHLORHEXIDINE GLUCONATE 0.12% ORAL RINSE 15 ML: 1.2 LIQUID ORAL at 05:01

## 2019-01-24 RX ADMIN — ACETAMINOPHEN 650 MG: 325 TABLET, FILM COATED ORAL at 11:01

## 2019-01-24 RX ADMIN — SODIUM CHLORIDE SOLN NEBU 3% 4 ML: 3 NEBU SOLN at 07:01

## 2019-01-24 RX ADMIN — URSODIOL 300 MG: 300 CAPSULE ORAL at 08:01

## 2019-01-24 RX ADMIN — POLYETHYLENE GLYCOL 3350 17 G: 17 POWDER, FOR SOLUTION ORAL at 09:01

## 2019-01-24 RX ADMIN — CHLORHEXIDINE GLUCONATE 0.12% ORAL RINSE 15 ML: 1.2 LIQUID ORAL at 09:01

## 2019-01-24 RX ADMIN — LACOSAMIDE 50 MG: 50 TABLET, FILM COATED ORAL at 08:01

## 2019-01-24 RX ADMIN — Medication 3 ML: at 02:01

## 2019-01-24 RX ADMIN — DICLOFENAC: 10 GEL TOPICAL at 08:01

## 2019-01-24 RX ADMIN — HEPARIN SODIUM 5000 UNITS: 5000 INJECTION, SOLUTION INTRAVENOUS; SUBCUTANEOUS at 09:01

## 2019-01-24 RX ADMIN — SODIUM CHLORIDE TAB 1 GM 2 G: 1 TAB at 09:01

## 2019-01-24 RX ADMIN — METOPROLOL TARTRATE 100 MG: 100 TABLET, FILM COATED ORAL at 09:01

## 2019-01-24 RX ADMIN — Medication 3 ML: at 10:01

## 2019-01-24 RX ADMIN — HEPARIN SODIUM 5000 UNITS: 5000 INJECTION, SOLUTION INTRAVENOUS; SUBCUTANEOUS at 01:01

## 2019-01-24 RX ADMIN — ALBUTEROL SULFATE 2.5 MG: 2.5 SOLUTION RESPIRATORY (INHALATION) at 07:01

## 2019-01-24 RX ADMIN — DICLOFENAC: 10 GEL TOPICAL at 03:01

## 2019-01-24 RX ADMIN — HEPARIN SODIUM 5000 UNITS: 5000 INJECTION, SOLUTION INTRAVENOUS; SUBCUTANEOUS at 06:01

## 2019-01-24 RX ADMIN — CHLORHEXIDINE GLUCONATE 0.12% ORAL RINSE 15 ML: 1.2 LIQUID ORAL at 08:01

## 2019-01-24 NOTE — PT/OT/SLP PROGRESS
Occupational Therapy   Treatment    Name: Peewee Nunes  MRN: [id number]  Admitting Diagnosis:  SDH (subdural hematoma)  16 Days Post-Op    Recommendations:     Discharge Recommendations: LTACH (long-term acute care hospital)  Discharge Equipment Recommendations:  hospital bed, lift device  Barriers to discharge:  Inaccessible home environment, Decreased caregiver support    Assessment:     Peewee Nunes is a 63 y.o. male with a medical diagnosis of SDH (subdural hematoma).  He presents with performance deficits affecting function are weakness, impaired endurance, impaired self care skills, impaired functional mobilty, impaired cognition, impaired balance, decreased lower extremity function, decreased upper extremity function, impaired coordination, impaired fine motor, decreased ROM, impaired cardiopulmonary response to activity, decreased safety awareness.     Rehab Prognosis:  Fair; patient would benefit from acute skilled OT services to address these deficits and reach maximum level of function.       Plan:     Patient to be seen 3 x/week to address the above listed problems via self-care/home management, therapeutic activities, neuromuscular re-education, therapeutic exercises, cognitive retraining, sensory integration  · Plan of Care Expires: 02/20/19  · Plan of Care Reviewed with: patient    Subjective   Patient:  Nonverbal; communicating via head nods (inconsistently) and facial expressions.  Pain/Comfort:  · Pain Rating 1: 0/10  · Pain Rating Post-Intervention 1: 0/10    Objective:     Communicated with: Nurse prior to session.  Patient found supine with blood pressure cuff, bed alarm, telemetry, SCD, tracheostomy, pulse ox (continuous), peripheral IV, PEG Tube, pressure relief boots upon OT entry to room.  Family not present.    General Precautions: Standard, aspiration, fall, aphasia, NPO   Orthopedic Precautions:N/A   Braces: N/A     Occupational Performance:     Bed Mobility:    · Patient completed  Rolling/Turning to Left with  total assistance  · Patient completed Rolling/Turning to Right with total assistance   · Patient completed Supine to Sit with total assistance  · Patient completed Sit to Supine with total assistance     Functional Mobility/Transfers:  · Dependent drawsheet transfers     Activities of Daily Living:  · Feeding:  NPO    · Grooming: total assistance while seated  · Toileting: dependent (bed level)      [facility] 6 Click ADL: 6    Treatment & Education:  Patient education provided on role of OT, daily orientation, ROM, positioning and need for continued OT upon discharge.  Continued education, patient/ family training recommended. Daily orientation provided.  AAROM performed bilateral UE/LEs one set x 10 rep in all planes of motion with stretches provided at end range; sustained stretch provided for right UE external rotation, supination and shoulder flexion as well as bilateral LE ankle dorsiflexion.  Assistance and facilitation provided for upward rotation of the scapula during shoulder flexion and abduction.   Patient actively moving all 4 extremities.  Patient alert 100% of the session and tracking bilaterally.  Patient followed 1/3 one step commands during the session.   Total assist required for postural control.  Positioning provided for midline orientation with bilateral UEs elevated and heels lifted off mattress.  Gentle cervical rotation provided.   Family not present during the session.  Patient's functional status and disposition recommendation discussed with patient's nurse.  White board updated in patient's room.    Patient left supine with all lines intact and call button in reachEducation:      GOALS:   Multidisciplinary Problems     Occupational Therapy Goals        Problem: Occupational Therapy Goal    Goal Priority Disciplines Outcome Interventions   Occupational Therapy Goal     OT, PT/OT     Description:  Goals set 1/23 to be addressed for 14 days with expiration date,  2/6:  Patient will increase functional independence with ADLs by performing:    Patient will demonstrate rolling to the right with max assist.  Not met   Patient will demonstrate rolling to the left with max assist.   Not met  Patient will demonstrate grooming while upright in bed with max assist.   Not met  Patient will demonstrate upper body dressing with Max assist.  Not met   Patient will demonstrate ability to follow 4/5 one step commands.   Not met  Patient's family / caregiver will demonstrate independence with providing ROM and changes in bed positioning.   Not met                             Time Tracking:     OT Date of Treatment: 01/24/19  OT Start Time: 0535  OT Stop Time: 0615  OT Total Time (min): 40 min    Billable Minutes:Self Care/Home Management 10  Therapeutic Exercise 20  Neuromuscular Re-education 10    ASHLEY Oliva  1/24/2019

## 2019-01-24 NOTE — ASSESSMENT & PLAN NOTE
Epidural with subdural component s/p evac 12/20  Complicated by status epilepticus  -Continue Vimpat 50 mg BID  Neuro checks Q2Hr in am / Q4Hr in pm  LTAC today

## 2019-01-24 NOTE — PLAN OF CARE
Problem: Occupational Therapy Goal  Goal: Occupational Therapy Goal  Goals set 1/23 to be addressed for 14 days with expiration date, 2/6:  Patient will increase functional independence with ADLs by performing:    Patient will demonstrate rolling to the right with max assist.  Not met   Patient will demonstrate rolling to the left with max assist.   Not met  Patient will demonstrate grooming while upright in bed with max assist.   Not met  Patient will demonstrate upper body dressing with Max assist.  Not met   Patient will demonstrate ability to follow 4/5 one step commands.   Not met  Patient's family / caregiver will demonstrate independence with providing ROM and changes in bed positioning.   Not met            Goals remain appropriate.  ASHLEY Oliva  1/24/2019

## 2019-01-24 NOTE — PLAN OF CARE
Problem: Adult Inpatient Plan of Care  Goal: Plan of Care Review  Outcome: Ongoing (interventions implemented as appropriate)  POC reviewed with pt and wife. Wife verbalized understanding. Pt unable to d/t trach. Questions and concerns addressed. No acute events. Awaiting transfer to LTAC. No acute events. Will continue to monitor

## 2019-01-24 NOTE — PROGRESS NOTES
[facility]  Neurocritical Care  Progress Note    Admit Date: 12/20/2018  Service Date: 01/24/2019  Length of Stay: 35    Subjective:     Chief Complaint: SDH (subdural hematoma)    History of Present Illness: 63 year old male h/o CABG on ASA 81 mg (last dose 72 hours PTA) presenting as transfer from OSH for EDH with SDH. Patient reportedly had a mechanical fall from standing on Sunday and went to ED where CTH was negative for any acute intracranial pathology. Overnight, patient developed confusion and presented to OSH ED where CTH was consistent with EDH with SDH. Patient has progressively worsened throughout the day developing intermittent global aphasia. NSGY consulted in ED at [facility] and stat CTH has been ordered. NCC consulted for admission and medical management. Patient on weaning off of cardene at time of evaluation with sbp of 120. Reported presenting sbp in 150s.    Hospital Course: 12/22: no major events overnight. Weaned off of cardene. This morning AOx3. Drain removed by NSGY at 11 AM, and planned to step down to NSGY.  AMS noted around 1PM, patient aphasic and not following commands, STAT CTH unremarkable.   12/26: wean ketamine, follow EEG, send urine studuies-Na 133, NS bolus, advance TF  12/27: off ketamine, d/c levo, continue EEG, and AEDs, 500 NS bolus, increase enteral water flushes, continue abx for another day  1/2: extubation trial.   1/3: failed extubation trial. Plan for trach and peg  1/4: plan for trach, general surgery consult  1/5: NAEO  1/6: NAEO. Awaiting trach and peg  1/7: plan for trach/peg, opens eyes to voice no commands  1/8: s/p trach/peg today  1/9: restarting tf this afternoon, ltac planning, eeg today to help guide aed weaning  1/12 Trach trials tolerating well. Neuro checks q2 in am/ q4in pm metoprolol prn HR.120  1/13 Continues to tolerate Trach. Start Norvasc. eval thyroid function baseline.   1/14 hyponatremic, pending urine studies, finasteride, increased  secretions, CPT, pending LTAC  01/15: Awaiting approval for transfer to LTAC  01/16: Gen surg about sutures and trach pressure wound  01/17: Med route adjustments and awaiting wound care recs, Pending LTAC placement  01/18: Placed orders for LTAC placement, Insurance company issues  1/19 No  Significant events overnight. No changes in assessment. Awaiting LTAC placement  1/20 no significant changes overnight. Continue trach collar. No vent over night. Hyperventilation therapy - bag/suction q6h. CXR in am pending  LTAC pending  1/20 alk phos, AST elevated, will check ggt. cEEG stable will decrease vimpat 50mg bid. [facility]  1/21: Dispo Planning  1/22: Pending Case Management Rounds  1/23: dc to LTAC    Interval History: no acute events, discharge to LTAC today    Review of Systems   Neurological: Positive for speech difficulty.     Objective:     Vitals:  Temp: 98.5 °F (36.9 °C)  Pulse: 93  Rhythm: atrial rhythm  BP: (!) 118/57  MAP (mmHg): 82  Resp: (!) 27  SpO2: 97 %  Oxygen Concentration (%): 50  O2 Device (Oxygen Therapy): Trach Collar    Temp  Min: 97.6 °F (36.4 °C)  Max: 98.8 °F (37.1 °C)  Pulse  Min: 68  Max: 121  BP  Min: 118/57  Max: 181/90  MAP (mmHg)  Min: 82  Max: 128  Resp  Min: 21  Max: 45  SpO2  Min: 92 %  Max: 97 %  Oxygen Concentration (%)  Min: 35  Max: 50    01/23 0701 - 01/24 0700  In: 1705 [I.V.:55]  Out: 1220 [Urine:1220]   Unmeasured Output  Urine Occurrence: 1  Stool Occurrence: 0       Physical Exam   Eyes:   - opened eyes to voice   Pulmonary/Chest: Effort normal. No respiratory distress.   - trach collar in place   Abdominal:   PEG tube in place w/ feeds running    Neurological:   - Not following commands  - Nonverbal  - Muscle strength no movement noted  RUE: 0/5.  LUE: 0/5.  RLE: 0/5.  LLE: 0/5.   Skin: Skin is warm and dry.   Nursing note and vitals reviewed.        Medications:  Continuous Scheduled  amLODIPine 10 mg Daily    chlorhexidine 15 mL QID   diclofenac sodium  TID   famotidine (PF) 20 mg BID   famotidine 20 mg BID   finasteride 5 mg Daily   heparin (porcine) 5,000 Units Q8H   lacosamide 50 mg BID   metoprolol tartrate 100 mg Q8H   midazolam     polyethylene glycol 17 g BID   pravastatin 40 mg QHS   senna-docusate 8.6-50 mg 1 tablet BID   silodosin 4 mg Daily   sodium chloride 0.9% 3 mL Q8H   sodium chloride 2 g TID   PRN  acetaminophen 650 mg Q4H PRN   acetaminophen 650 mg Q6H PRN   acetaminophen 650 mg Q8H PRN   albuterol sulfate 2.5 mg Q4H PRN   fentaNYL 25 mcg Q2H PRN   hydrALAZINE 10 mg Q4H PRN   magnesium oxide 800 mg PRN   magnesium oxide 800 mg PRN   ondansetron 4 mg Q12H PRN   ondansetron 4 mg Q8H PRN   potassium chloride 10% 40 mEq PRN   potassium chloride 10% 40 mEq PRN   potassium chloride 10% 60 mEq PRN   potassium, sodium phosphates 2 packet PRN   potassium, sodium phosphates 2 packet PRN   potassium, sodium phosphates 2 packet PRN   sodium chloride 3% 4 mL Q4H PRN     Today I personally reviewed pertinent medications, lines/drains/airways, imaging, cardiology results, laboratory results, notably:    Diet  Diet NPO  Diet NPO    Alberto Hartley MD    Assessment/Plan:     Neuro   * SDH (subdural hematoma)    Epidural with subdural component s/p evac 12/20  Complicated by status epilepticus  -Continue Vimpat 50 mg BID  Neuro checks Q2Hr in am / Q4Hr in pm  LTAC today             Status epilepticus    Mental status stable  Lacosamide 50mg BID     Brain compression    See SDH     Epidural hematoma    See SDH     Subarachnoid hemorrhage    See Subdural Hematoma     Pulmonary   Acute respiratory failure    S/p trach  Trach collar trial daily, tolerating well   CXR look great today     Cardiac/Vascular   New onset atrial fibrillation    -A- Fib With RVR now resolved  -In normal sinus rhythm       Essential hypertension    -SBP<180  -MAP>65  -PRN hydralazine         Renal/   Hyponatremia    Sodium stable        Endocrine    Severe malnutrition    S/P PEG tube  Tube feeds at goal at 55 cc/ hr       Other   Pressure injury of buttock, stage 2    Continue wound care           The patient is being Prophylaxed for:  Venous Thromboembolism with: Chemical  Stress Ulcer with: H2B  Ventilator Pneumonia with: chlorhexidine oral care    Activity Orders          None        Full Code    Alberto Hartley MD  Neurocritical Care  [facility]

## 2019-01-24 NOTE — PLAN OF CARE
NICHOLAS phoned [facility] [phone] and spoke to Michel regarding LTAC auth.  Per Michel, the auth is still pending and can take 5-14 business days.  NICHOLAS informed Michel that patient cannot wait 5-14 days and that he auth was approved last Friday and only the name needs to be changed.  Per Michel, this information is not noted on the auth, but she will add it.  Michel stated that CM should call back in 2 hrs to check the status    Reference number [id number]      Azeb Flores RN, CCRN-K, [facility]  Neuro-Critical Care   [phone]

## 2019-01-24 NOTE — PLAN OF CARE
Problem: Adult Inpatient Plan of Care  Goal: Plan of Care Review  Outcome: Ongoing (interventions implemented as appropriate)   01/24/19 0544   Plan of Care Review   Plan of Care Reviewed With patient   Progress no change     POC reviewed with pt at 0500. Pt needs reinforcement. No acute events overnight. Pt progressing toward goals. Awaiting LTAC transfer. Will continue to monitor. See flowsheets for full assessment and VS info     Goal: Patient-Specific Goal (Individualization)  Admit Date: 12/20/2018  2:15 PM    Admit Diagnosis: Subarachnoid hemorrhage [I60.9]  Subdural hematoma [S06.5X9A]  SDH (subdural hematoma) [S06.5X9A]  Subdural hematoma [S06.5X9A]  Subdural hematoma [S06.5X9A]    Past Medical History: Past Medical History:  No date: Coronary artery disease  No date: CVD (cardiovascular disease)  No date: Diabetes mellitus type II  No date: Hyperlipidemia  No date: Hypertension  No date: Ischemic cardiomyopathy  No date: Obesity  No date: Obesity, morbid    Past Surgical History: Past Surgical History:  No date: APPENDECTOMY  No date: CORONARY ARTERY BYPASS GRAFT  12/20/2018: CRANIOTOMY, FOR SUBDURAL HEMATOMA EVACUATION; Left      Comment:  Performed by Stevan Hull MD at [facility] OR [address] [place] [address]  No date: HEMORRHOID SURGERY    Individualization:  1. Patient's likes old western movies on TV      Restraints: Bilateral upper soft wrist restraint expires 01/20/19 at 0457 for removing medical devices. SVETA Paredes RN 01/08/2019      Outcome: Ongoing (interventions implemented as appropriate)   12/20/18 2230 12/24/18 0322 12/31/18 0344   OTHER   Anxieties, Fears or Concerns --  QIANA --    Individualized Care Needs --  --  frequent mouth care   Patient-Specific Goal (Individualization)   Patient-Specific Goals (Include Timeframe) pt will not develop skin breakdown during stay --  --

## 2019-01-24 NOTE — DISCHARGE SUMMARY
[facility]  Neurocritical Care  Discharge Summary    Admit Date: 12/20/2018    Service Date: 01/24/2019    Discharge Date:     Length of Stay: 35    Final Active Diagnoses:    Diagnosis Date Noted POA    PRINCIPAL PROBLEM:  SDH (subdural hematoma) [S06.5X9A] 12/20/2018 Unknown    Hyponatremia [E87.1] 01/14/2019 Yes    Weight loss, unintentional [R63.4] 12/30/2018 Yes    Seizure [R56.9]  No    Status epilepticus [G40.901] 12/24/2018 No    Acute respiratory failure [J96.00] 12/24/2018 No    Septic shock [A41.9, R65.21] 12/24/2018 No    New onset atrial fibrillation [I48.91] 12/24/2018 No    Pressure injury of buttock, stage 2 [L89.302] 12/21/2018 Yes    Severe malnutrition [E43] 12/21/2018 Unknown    Subarachnoid hemorrhage [I60.9] 12/20/2018 Unknown    Epidural hematoma [S06.4X9A] 12/20/2018 Yes    Brain compression [G93.5] 12/20/2018 Yes    Cerebral edema [G93.6] 12/20/2018 Yes    Essential hypertension [I10] 07/26/2012 Unknown      Problems Resolved During this Admission:    Diagnosis Date Noted Date Resolved POA    AYLIN (acute kidney injury) [N17.9] 12/24/2018 01/16/2019 No      History of Present Illness: 63 year old male h/o CABG on ASA 81 mg (last dose 72 hours PTA) presenting as transfer from OSH for EDH with SDH. Patient reportedly had a mechanical fall from standing on Sunday and went to ED where CTH was negative for any acute intracranial pathology. Overnight, patient developed confusion and presented to OSH ED where CTH was consistent with EDH with SDH. Patient has progressively worsened throughout the day developing intermittent global aphasia. NSGY consulted in ED at [facility] and stat CTH has been ordered. NCC consulted for admission and medical management. Patient on weaning off of cardene at time of evaluation with sbp of 120. Reported presenting sbp in 150s.     Interval HPI: course complicated by S.E.; was on ketamine gtt, now controlled on vimpat. T&P, opens eyes to  voice, not following requests. Hyponatremic, on salt tabs, resolved. Insurance issues prolonged post-acute care placement. Stable, ready for LTAC.    Hospital Course by Event: 12/22: no major events overnight. Weaned off of cardene. This morning AOx3. Drain removed by NSGY at 11 AM, and planned to step down to NSGY.  AMS noted around 1PM, patient aphasic and not following commands, STAT CTH unremarkable.   12/26: wean ketamine, follow EEG, send urine studuies-Na 133, NS bolus, advance TF  12/27: off ketamine, d/c levo, continue EEG, and AEDs, 500 NS bolus, increase enteral water flushes, continue abx for another day  1/2: extubation trial.   1/3: failed extubation trial. Plan for trach and peg  1/4: plan for trach, general surgery consult  1/5: NAEO  1/6: NAEO. Awaiting trach and peg  1/7: plan for trach/peg, opens eyes to voice no commands  1/8: s/p trach/peg today  1/9: restarting tf this afternoon, ltac planning, eeg today to help guide aed weaning  1/12 Trach trials tolerating well. Neuro checks q2 in am/ q4in pm metoprolol prn HR.120  1/13 Continues to tolerate Trach. Start Norvasc. eval thyroid function baseline.   1/14 hyponatremic, pending urine studies, finasteride, increased secretions, CPT, pending LTAC  01/15: Awaiting approval for transfer to LTAC  01/16: Gen surg about sutures and trach pressure wound  01/17: Med route adjustments and awaiting wound care recs, Pending LTAC placement  01/18: Placed orders for LTAC placement, Insurance company issues  1/19 No  Significant events overnight. No changes in assessment. Awaiting LTAC placement  1/20 no significant changes overnight. Continue trach collar. No vent over night. Hyperventilation therapy - bag/suction q6h. CXR in am pending  LTAC pending  1/20 alk phos, AST elevated, will check ggt. cEEG stable will decrease vimpat 50mg bid. [facility]  1/21: Dispo Planning  1/22: Pending Case Management  Rounds  1/23: dc to LTAC    Hospital Course by Problem:   * SDH (subdural hematoma)    Epidural with subdural component s/p evac 12/20  Complicated by status epilepticus  -Continue Vimpat 50 mg BID  Neuro checks Q2Hr in am / Q4Hr in pm  LTAC today             Hyponatremia    Sodium stable        Weight loss, unintentional    See Malnutrition     Seizure    - no further seizures     New onset atrial fibrillation    -A- Fib With RVR now resolved  -In normal sinus rhythm       Septic shock    Completed antibiotic course  Leukocytosis resolved  Cultures negative     Acute respiratory failure    S/p trach  Trach collar trial daily, tolerating well   CXR look great today     Status epilepticus    Mental status stable  Lacosamide 50mg BID     Severe malnutrition    S/P PEG tube  Tube feeds at goal at 55 cc/ hr       Pressure injury of buttock, stage 2    Continue wound care     Cerebral edema    See SDH     Brain compression    See SDH     Epidural hematoma    See SDH     Subarachnoid hemorrhage    See Subdural Hematoma     Essential hypertension    -SBP<180  -MAP>65  -PRN hydralazine           Significant Results:  Imaging:  MRI brain     Cardiology:  ECHO    Microbiology:  NGTD    Laboratory:  Lab Results   Component Value Date    HGBA1C 4.8 12/20/2018    CHOL 169 12/20/2018    HDL 56 12/20/2018    LDLCALC 92.6 12/20/2018    TRIG 102 12/20/2018    TSH 3.378 01/13/2019     Pending Results: NA    Consultations:  IP CONSULT TO PHYSICAL MEDICINE REHAB  IP CONSULT TO REGISTERED DIETITIAN/NUTRITIONIST  IP CONSULT TO SOCIAL WORK/CASE MANAGEMENT  WOUND CARE CONSULT  IP CONSULT TO MIDLINE TEAM  IP CONSULT TO GENERAL SURGERY  IP CONSULT TO MIDLINE TEAM  IP CONSULT TO MIDLINE TEAM  WOUND CARE CONSULT  WOUND CARE CONSULT  IP CONSULT TO PHYSICAL MEDICINE REHAB    Procedures:   Procedure(s) (LRB):  INSERTION, PEG TUBE (N/A)  CREATION, TRACHEOSTOMY (N/A) by Andrew Durbin MD.      Medications:    Peewee Nunes   Home Medication  Instructions [facility]:[phone]    Printed on:01/24/19 7306   Medication Information                      acetaminophen (TYLENOL) 325 MG tablet  2 tablets (650 mg total) by Per G Tube route every 8 (eight) hours as needed for Temperature greater than (or equal to 101 degree F).             acetaminophen (TYLENOL) 650 MG Supp  Place 1 suppository (650 mg total) rectally every 4 (four) hours as needed.             albuterol sulfate 2.5 mg/0.5 mL Nebu  Take 2.5 mg by nebulization every 4 (four) hours as needed (SOB). Rescue             amLODIPine (NORVASC) 10 MG tablet  1 tablet (10 mg total) by Per G Tube route once daily.             diclofenac sodium (VOLTAREN) 1 % Gel  Apply topically 3 (three) times daily.             finasteride (PROSCAR) 5 mg tablet  1 tablet (5 mg total) by Per G Tube route once daily.             heparin sodium,porcine (HEPARIN, PORCINE,) 5,000 unit/mL injection  Inject 1 mL (5,000 Units total) into the skin every 8 (eight) hours.             lacosamide (VIMPAT) 50 mg Tab  2 tablets (100 mg total) by Per G Tube route 2 (two) times daily.             lacosamide (VIMPAT) 50 mg Tab  1 tablet (50 mg total) by Per G Tube route 2 (two) times daily.             magnesium oxide (MAG-OX) 400 mg (241.3 mg magnesium) tablet  2 tablets (800 mg total) by Per G Tube route as needed (if magnesium level is 1.5-1.8 mg/dL give 800 mg every 4 hours x 2 doses).             magnesium oxide (MAG-OX) 400 mg (241.3 mg magnesium) tablet  2 tablets (800 mg total) by Per G Tube route as needed (if magnesium level is 1.4 mg/dL or less give 800 mg 4 hours x 3 doses).             metoprolol tartrate (LOPRESSOR) 100 MG tablet  1 tablet (100 mg total) by Per G Tube route every 8 (eight) hours.             ondansetron 4 mg/2 mL Soln  Inject 4 mg into the vein every 12 (twelve) hours as needed.             ondansetron 4 mg/2 mL Soln  Inject 4 mg into the vein every 8 (eight) hours as needed.             polyethylene glycol  (GLYCOLAX) 17 gram PwPk  17 g by Per G Tube route 2 (two) times daily.             potassium chloride 10% (KAYCIEL) 20 mEq/15 mL oral solution  30 mLs (40 mEq total) by Per G Tube route as needed (if potassium level is 3.5-3.8 mmol/L give 40 mEq x 1 dose).             potassium chloride 10% (KAYCIEL) 20 mEq/15 mL oral solution  30 mLs (40 mEq total) by Per G Tube route as needed (if potassium level is 3.1-3.4 mmol/L give 40 mEq every 2 hours x 2 doses).             potassium chloride 10% (KAYCIEL) 20 mEq/15 mL oral solution  45 mLs (60 mEq total) by Per G Tube route as needed (if potassium level is 3 mmol/L or less give 60 mEq every 2 hours x 2 doses).             potassium, sodium phosphates (PHOS-NAK) 280-160-250 mg PwPk  2 packets by Per G Tube route as needed (if phosphorous is 2.3-2.7 mg/dL give 2 packets every 4 hours x 2 doses).             potassium, sodium phosphates (PHOS-NAK) 280-160-250 mg PwPk  2 packets by Per G Tube route as needed (if phosphorous level is 1.6-2.2 mg/dL give 2 packets every 4 hours x 3 doses).             potassium, sodium phosphates (PHOS-NAK) 280-160-250 mg PwPk  2 packets by Per G Tube route as needed (if phosphorous level is 1.5 mg/dL or less give 2 packets every 4 hours x 4 doses).             pravastatin (PRAVACHOL) 40 MG tablet  Take 1 tablet (40 mg total) by mouth every evening.             senna-docusate 8.6-50 mg (PERICOLACE) 8.6-50 mg per tablet  1 tablet by Per G Tube route 2 (two) times daily.             silodosin (RAPAFLO) 4 mg Cap capsule  Take 1 capsule (4 mg total) by mouth once daily.             sodium chloride 1 gram tablet  2 tablets (2 g total) by Per G Tube route 3 (three) times daily.             sodium chloride 3% 3 % nebulizer solution  Take 4 mLs by nebulization every 4 (four) hours as needed (Thick secretions).               Diet: Tube feeds    Activity: As tolerated.    Disposition: Discharged to AC in stable condition    Follow Up Plan:  None    This  discharge took less than 30 minutes to complete.    Alberto Hartley MD  Neurocritical Care  [facility]

## 2019-01-24 NOTE — SUBJECTIVE & OBJECTIVE
Interval History: no acute events, discharge to LTAC today    Review of Systems   Neurological: Positive for speech difficulty.     Objective:     Vitals:  Temp: 98.5 °F (36.9 °C)  Pulse: 93  Rhythm: atrial rhythm  BP: (!) 118/57  MAP (mmHg): 82  Resp: (!) 27  SpO2: 97 %  Oxygen Concentration (%): 50  O2 Device (Oxygen Therapy): Trach Collar    Temp  Min: 97.6 °F (36.4 °C)  Max: 98.8 °F (37.1 °C)  Pulse  Min: 68  Max: 121  BP  Min: 118/57  Max: 181/90  MAP (mmHg)  Min: 82  Max: 128  Resp  Min: 21  Max: 45  SpO2  Min: 92 %  Max: 97 %  Oxygen Concentration (%)  Min: 35  Max: 50    01/23 0701 - 01/24 0700  In: 1705 [I.V.:55]  Out: 1220 [Urine:1220]   Unmeasured Output  Urine Occurrence: 1  Stool Occurrence: 0       Physical Exam   Eyes:   - opened eyes to voice   Pulmonary/Chest: Effort normal. No respiratory distress.   - trach collar in place   Abdominal:   PEG tube in place w/ feeds running    Neurological:   - Not following commands  - Nonverbal  - Muscle strength no movement noted  RUE: 0/5.  LUE: 0/5.  RLE: 0/5.  LLE: 0/5.   Skin: Skin is warm and dry.   Nursing note and vitals reviewed.        Medications:  Continuous Scheduled  amLODIPine 10 mg Daily   chlorhexidine 15 mL QID   diclofenac sodium  TID   famotidine (PF) 20 mg BID   famotidine 20 mg BID   finasteride 5 mg Daily   heparin (porcine) 5,000 Units Q8H   lacosamide 50 mg BID   metoprolol tartrate 100 mg Q8H   midazolam     polyethylene glycol 17 g BID   pravastatin 40 mg QHS   senna-docusate 8.6-50 mg 1 tablet BID   silodosin 4 mg Daily   sodium chloride 0.9% 3 mL Q8H   sodium chloride 2 g TID   PRN  acetaminophen 650 mg Q4H PRN   acetaminophen 650 mg Q6H PRN   acetaminophen 650 mg Q8H PRN   albuterol sulfate 2.5 mg Q4H PRN   fentaNYL 25 mcg Q2H PRN   hydrALAZINE 10 mg Q4H PRN   magnesium oxide 800 mg PRN   magnesium oxide 800 mg PRN   ondansetron 4 mg Q12H PRN   ondansetron 4 mg Q8H PRN   potassium chloride 10% 40 mEq PRN   potassium chloride 10% 40  mEq PRN   potassium chloride 10% 60 mEq PRN   potassium, sodium phosphates 2 packet PRN   potassium, sodium phosphates 2 packet PRN   potassium, sodium phosphates 2 packet PRN   sodium chloride 3% 4 mL Q4H PRN     Today I personally reviewed pertinent medications, lines/drains/airways, imaging, cardiology results, laboratory results, notably:    Diet  Diet NPO  Diet NPO    Alberto Hartley MD

## 2019-01-25 LAB
ALBUMIN SERPL BCP-MCNC: 2.5 G/DL
ALBUMIN SERPL BCP-MCNC: 2.5 G/DL
ALP SERPL-CCNC: 455 U/L
ALP SERPL-CCNC: 455 U/L
ALT SERPL W/O P-5'-P-CCNC: 44 U/L
ALT SERPL W/O P-5'-P-CCNC: 44 U/L
ANION GAP SERPL CALC-SCNC: 9 MMOL/L
ANION GAP SERPL CALC-SCNC: 9 MMOL/L
AST SERPL-CCNC: 46 U/L
AST SERPL-CCNC: 46 U/L
BASOPHILS # BLD AUTO: 0.08 K/UL
BASOPHILS NFR BLD: 0.8 %
BILIRUB SERPL-MCNC: 1.1 MG/DL
BILIRUB SERPL-MCNC: 1.1 MG/DL
BUN SERPL-MCNC: 30 MG/DL
BUN SERPL-MCNC: 30 MG/DL
CALCIUM SERPL-MCNC: 9.4 MG/DL
CALCIUM SERPL-MCNC: 9.4 MG/DL
CHLORIDE SERPL-SCNC: 110 MMOL/L
CHLORIDE SERPL-SCNC: 110 MMOL/L
CO2 SERPL-SCNC: 25 MMOL/L
CO2 SERPL-SCNC: 25 MMOL/L
CREAT SERPL-MCNC: 0.4 MG/DL
CREAT SERPL-MCNC: 0.4 MG/DL
DIFFERENTIAL METHOD: ABNORMAL
EOSINOPHIL # BLD AUTO: 0.2 K/UL
EOSINOPHIL NFR BLD: 1.8 %
ERYTHROCYTE [DISTWIDTH] IN BLOOD BY AUTOMATED COUNT: 16.2 %
EST. GFR  (AFRICAN AMERICAN): >60 ML/MIN/1.73 M^2
EST. GFR  (AFRICAN AMERICAN): >60 ML/MIN/1.73 M^2
EST. GFR  (NON AFRICAN AMERICAN): >60 ML/MIN/1.73 M^2
EST. GFR  (NON AFRICAN AMERICAN): >60 ML/MIN/1.73 M^2
GLUCOSE SERPL-MCNC: 101 MG/DL
GLUCOSE SERPL-MCNC: 101 MG/DL
HCT VFR BLD AUTO: 35.7 %
HGB BLD-MCNC: 10.6 G/DL
IMM GRANULOCYTES # BLD AUTO: 0.05 K/UL
IMM GRANULOCYTES NFR BLD AUTO: 0.5 %
LYMPHOCYTES # BLD AUTO: 1.9 K/UL
LYMPHOCYTES NFR BLD: 18.4 %
MAGNESIUM SERPL-MCNC: 1.9 MG/DL
MCH RBC QN AUTO: 31.7 PG
MCHC RBC AUTO-ENTMCNC: 29.7 G/DL
MCV RBC AUTO: 107 FL
MONOCYTES # BLD AUTO: 0.9 K/UL
MONOCYTES NFR BLD: 8.9 %
NEUTROPHILS # BLD AUTO: 7.1 K/UL
NEUTROPHILS NFR BLD: 69.6 %
NRBC BLD-RTO: 0 /100 WBC
PHOSPHATE SERPL-MCNC: 3.3 MG/DL
PLATELET # BLD AUTO: 268 K/UL
PMV BLD AUTO: 10.2 FL
POTASSIUM SERPL-SCNC: 3.8 MMOL/L
POTASSIUM SERPL-SCNC: 3.8 MMOL/L
PROT SERPL-MCNC: 7 G/DL
PROT SERPL-MCNC: 7 G/DL
RBC # BLD AUTO: 3.34 M/UL
SODIUM SERPL-SCNC: 144 MMOL/L
SODIUM SERPL-SCNC: 144 MMOL/L
WBC # BLD AUTO: 10.13 K/UL

## 2019-01-25 PROCEDURE — 25000003 PHARM REV CODE 250: Performed by: NURSE PRACTITIONER

## 2019-01-25 PROCEDURE — 25000003 PHARM REV CODE 250: Performed by: PSYCHIATRY & NEUROLOGY

## 2019-01-25 PROCEDURE — 80053 COMPREHEN METABOLIC PANEL: CPT

## 2019-01-25 PROCEDURE — 99233 SBSQ HOSP IP/OBS HIGH 50: CPT | Mod: GC,,, | Performed by: PSYCHIATRY & NEUROLOGY

## 2019-01-25 PROCEDURE — A4216 STERILE WATER/SALINE, 10 ML: HCPCS | Performed by: PSYCHIATRY & NEUROLOGY

## 2019-01-25 PROCEDURE — 83735 ASSAY OF MAGNESIUM: CPT

## 2019-01-25 PROCEDURE — 94640 AIRWAY INHALATION TREATMENT: CPT

## 2019-01-25 PROCEDURE — 25000003 PHARM REV CODE 250: Performed by: PHYSICIAN ASSISTANT

## 2019-01-25 PROCEDURE — 25000242 PHARM REV CODE 250 ALT 637 W/ HCPCS: Performed by: PHYSICIAN ASSISTANT

## 2019-01-25 PROCEDURE — 63600175 PHARM REV CODE 636 W HCPCS: Performed by: STUDENT IN AN ORGANIZED HEALTH CARE EDUCATION/TRAINING PROGRAM

## 2019-01-25 PROCEDURE — 20000000 HC ICU ROOM

## 2019-01-25 PROCEDURE — 27000221 HC OXYGEN, UP TO 24 HOURS

## 2019-01-25 PROCEDURE — 99900026 HC AIRWAY MAINTENANCE (STAT)

## 2019-01-25 PROCEDURE — 99233 PR SUBSEQUENT HOSPITAL CARE,LEVL III: ICD-10-PCS | Mod: GC,,, | Performed by: PSYCHIATRY & NEUROLOGY

## 2019-01-25 PROCEDURE — 85025 COMPLETE CBC W/AUTO DIFF WBC: CPT

## 2019-01-25 PROCEDURE — 94761 N-INVAS EAR/PLS OXIMETRY MLT: CPT

## 2019-01-25 PROCEDURE — 25000003 PHARM REV CODE 250: Performed by: STUDENT IN AN ORGANIZED HEALTH CARE EDUCATION/TRAINING PROGRAM

## 2019-01-25 PROCEDURE — 84100 ASSAY OF PHOSPHORUS: CPT

## 2019-01-25 RX ADMIN — FINASTERIDE 5 MG: 5 TABLET, FILM COATED ORAL at 08:01

## 2019-01-25 RX ADMIN — CHLORHEXIDINE GLUCONATE 0.12% ORAL RINSE 15 ML: 1.2 LIQUID ORAL at 08:01

## 2019-01-25 RX ADMIN — LACOSAMIDE 50 MG: 50 TABLET, FILM COATED ORAL at 08:01

## 2019-01-25 RX ADMIN — HEPARIN SODIUM 5000 UNITS: 5000 INJECTION, SOLUTION INTRAVENOUS; SUBCUTANEOUS at 09:01

## 2019-01-25 RX ADMIN — STANDARDIZED SENNA CONCENTRATE AND DOCUSATE SODIUM 1 TABLET: 8.6; 5 TABLET, FILM COATED ORAL at 08:01

## 2019-01-25 RX ADMIN — LACOSAMIDE 50 MG: 50 TABLET, FILM COATED ORAL at 09:01

## 2019-01-25 RX ADMIN — HEPARIN SODIUM 5000 UNITS: 5000 INJECTION, SOLUTION INTRAVENOUS; SUBCUTANEOUS at 01:01

## 2019-01-25 RX ADMIN — FAMOTIDINE 20 MG: 20 TABLET ORAL at 08:01

## 2019-01-25 RX ADMIN — DICLOFENAC: 10 GEL TOPICAL at 08:01

## 2019-01-25 RX ADMIN — DICLOFENAC: 10 GEL TOPICAL at 03:01

## 2019-01-25 RX ADMIN — SODIUM CHLORIDE TAB 1 GM 2 G: 1 TAB at 08:01

## 2019-01-25 RX ADMIN — CHLORHEXIDINE GLUCONATE 0.12% ORAL RINSE 15 ML: 1.2 LIQUID ORAL at 01:01

## 2019-01-25 RX ADMIN — Medication 3 ML: at 06:01

## 2019-01-25 RX ADMIN — HEPARIN SODIUM 5000 UNITS: 5000 INJECTION, SOLUTION INTRAVENOUS; SUBCUTANEOUS at 05:01

## 2019-01-25 RX ADMIN — SILODOSIN 4 MG: 4 CAPSULE ORAL at 08:01

## 2019-01-25 RX ADMIN — AMLODIPINE BESYLATE 10 MG: 10 TABLET ORAL at 08:01

## 2019-01-25 RX ADMIN — SODIUM CHLORIDE TAB 1 GM 2 G: 1 TAB at 09:01

## 2019-01-25 RX ADMIN — PRAVASTATIN SODIUM 40 MG: 40 TABLET ORAL at 09:01

## 2019-01-25 RX ADMIN — CHLORHEXIDINE GLUCONATE 0.12% ORAL RINSE 15 ML: 1.2 LIQUID ORAL at 09:01

## 2019-01-25 RX ADMIN — POLYETHYLENE GLYCOL 3350 17 G: 17 POWDER, FOR SOLUTION ORAL at 08:01

## 2019-01-25 RX ADMIN — DICLOFENAC: 10 GEL TOPICAL at 09:01

## 2019-01-25 RX ADMIN — ALBUTEROL SULFATE 2.5 MG: 2.5 SOLUTION RESPIRATORY (INHALATION) at 12:01

## 2019-01-25 RX ADMIN — METOPROLOL TARTRATE 100 MG: 100 TABLET, FILM COATED ORAL at 01:01

## 2019-01-25 RX ADMIN — POTASSIUM CHLORIDE 40 MEQ: 20 SOLUTION ORAL at 05:01

## 2019-01-25 RX ADMIN — CHLORHEXIDINE GLUCONATE 0.12% ORAL RINSE 15 ML: 1.2 LIQUID ORAL at 05:01

## 2019-01-25 RX ADMIN — METOPROLOL TARTRATE 100 MG: 100 TABLET, FILM COATED ORAL at 09:01

## 2019-01-25 RX ADMIN — Medication 3 ML: at 02:01

## 2019-01-25 RX ADMIN — FAMOTIDINE 20 MG: 20 TABLET ORAL at 09:01

## 2019-01-25 RX ADMIN — METOPROLOL TARTRATE 100 MG: 100 TABLET, FILM COATED ORAL at 05:01

## 2019-01-25 RX ADMIN — SODIUM CHLORIDE TAB 1 GM 2 G: 1 TAB at 03:01

## 2019-01-25 NOTE — PLAN OF CARE
Problem: Adult Inpatient Plan of Care  Goal: Plan of Care Review  Outcome: Ongoing (interventions implemented as appropriate)  POC reviewed with pt at 0500. Pt nodded to understanding, no family at bedside to update. Questions and concerns addressed. Lots of secretions overnight but pt able to cough them up. No acute events overnight. Pt progressing toward goals. Pending LTAC. Will continue to monitor. See flowsheets for full assessment and VS info

## 2019-01-25 NOTE — PLAN OF CARE
NICHOLAS phoned Li in appeals at [facility], [phone],  regarding authorization for LTAC.  Message left on confidential V/M with details regarding switching auth to [facility].     NICHOLAS phone [facility] [phone] and spoke to Margarita.  Per Margarita, the auth should be switched by this PM, but Margarita stated that they do work on the weekend.  Per Margarita a decision will be made definitely within 24-48 hours.      Azeb Flores RN, CCRN-K, [facility]  Neuro-Critical Care   [phone]

## 2019-01-25 NOTE — PLAN OF CARE
Problem: Adult Inpatient Plan of Care  Goal: Plan of Care Review  Outcome: Ongoing (interventions implemented as appropriate)  POC reviewed with pt and wife. Pt unable to verbalize understanding d/t trach. Pt wife verbalized understanding. Questions and concerns addressed. Still awaiting LTAC placement. No acute events. Will continue to monitor.

## 2019-01-25 NOTE — PT/OT/SLP PROGRESS
Physical Therapy      Patient Name:  Peewee Nunes   MRN:  [id number]    Orders acknowledged and discontinued. OT continuing to follow pt with pt expected to discharge to LTAC in upcoming days. Skilled PT evaluation not needed at current time.     Celine Stanford, PT, DPT  1/25/2019

## 2019-01-25 NOTE — PROGRESS NOTES
[facility]  Neurocritical Care  Progress Note    Admit Date: 12/20/2018  Service Date: 01/25/2019  Length of Stay: 36    Subjective:     Chief Complaint: SDH (subdural hematoma)    History of Present Illness: 63 year old male h/o CABG on ASA 81 mg (last dose 72 hours PTA) presenting as transfer from OSH for EDH with SDH. Patient reportedly had a mechanical fall from standing on Sunday and went to ED where CTH was negative for any acute intracranial pathology. Overnight, patient developed confusion and presented to OSH ED where CTH was consistent with EDH with SDH. Patient has progressively worsened throughout the day developing intermittent global aphasia. NSGY consulted in ED [place] [facility] and stat CTH has been ordered. NCC consulted for admission and medical management. Patient on weaning off of cardene at time of evaluation with sbp of 120. Reported presenting sbp in 150s.     Interval HPI: course complicated by S.E.; was on ketamine gtt, now controlled on vimpat. T&P, opens eyes to voice, not following requests. Hyponatremic, on salt tabs, resolved. Insurance issues prolonged post-acute care placement. Stable, ready for LTAC.    Hospital Course: 12/22: no major events overnight. Weaned off of cardene. This morning AOx3. Drain removed by NSGY at 11 AM, and planned to step down to NSGY.  AMS noted around 1PM, patient aphasic and not following commands, STAT CTH unremarkable.   12/26: wean ketamine, follow EEG, send urine studuies-Na 133, NS bolus, advance TF  12/27: off ketamine, d/c levo, continue EEG, and AEDs, 500 NS bolus, increase enteral water flushes, continue abx for another day  1/2: extubation trial.   1/3: failed extubation trial. Plan for trach and peg  1/4: plan for trach, general surgery consult  1/5: NAEO  1/6: NAEO. Awaiting trach and peg  1/7: plan for trach/peg, opens eyes to voice no commands  1/8: s/p trach/peg today  1/9: restarting tf this afternoon, ltac planning, eeg today to  help guide aed weaning  1/12 Trach trials tolerating well. Neuro checks q2 in am/ q4in pm metoprolol prn HR.120  1/13 Continues to tolerate Trach. Start Norvasc. eval thyroid function baseline.   1/14 hyponatremic, pending urine studies, finasteride, increased secretions, CPT, pending LTAC  01/15: Awaiting approval for transfer to LTAC  01/16: Gen surg about sutures and trach pressure wound  01/17: Med route adjustments and awaiting wound care recs, Pending LTAC placement  01/18: Placed orders for LTAC placement, Insurance company issues  1/19 No  Significant events overnight. No changes in assessment. Awaiting LTAC placement  1/20 no significant changes overnight. Continue trach collar. No vent over night. Hyperventilation therapy - bag/suction q6h. CXR in am pending  LTAC pending  1/20 alk phos, AST elevated, will check ggt. cEEG stable will decrease vimpat 50mg bid. [facility]  1/21: Dispo Planning  1/22: Pending Case Management Rounds  1/23: dc to LTAC  1/24: discharge held due to documentation     Interval History: no acute events, discharge to LTAC today    Review of Systems   Neurological: Positive for speech difficulty.     Objective:     Vitals:  Temp: 97.1 °F (36.2 °C)  Pulse: 71  Rhythm: atrial rhythm  BP: 137/74  MAP (mmHg): 99  Resp: (!) 25  SpO2: 95 %  Oxygen Concentration (%): 40  O2 Device (Oxygen Therapy): tracheostomy collar    Temp  Min: 97.1 °F (36.2 °C)  Max: 98.7 °F (37.1 °C)  Pulse  Min: 68  Max: 126  BP  Min: 126/63  Max: 180/92  MAP (mmHg)  Min: 90  Max: 134  Resp  Min: 20  Max: 42  SpO2  Min: 92 %  Max: 99 %  Oxygen Concentration (%)  Min: 40  Max: 50    01/24 0701 - 01/25 0700  In: 1320   Out: 1685 [Urine:1685]   Unmeasured Output  Urine Occurrence: 1  Stool Occurrence: 0       Physical Exam   Eyes:   - opened eyes to voice   Pulmonary/Chest: Effort normal. No respiratory distress.   - trach collar in place   Abdominal:   PEG tube in  place w/ feeds running    Neurological:   - Not following commands  - Nonverbal  - Muscle strength no movement noted  RUE: 0/5.  LUE: 0/5.  RLE: 0/5.  LLE: 0/5.   Skin: Skin is warm and dry.   Nursing note and vitals reviewed.        Medications:  Continuous Scheduled    amLODIPine 10 mg Daily   chlorhexidine 15 mL QID   diclofenac sodium  TID   famotidine 20 mg BID   finasteride 5 mg Daily   heparin (porcine) 5,000 Units Q8H   lacosamide 50 mg BID   metoprolol tartrate 100 mg Q8H   polyethylene glycol 17 g BID   pravastatin 40 mg QHS   senna-docusate 8.6-50 mg 1 tablet BID   silodosin 4 mg Daily   sodium chloride 0.9% 3 mL Q8H   sodium chloride 2 g TID   PRN    acetaminophen 650 mg Q4H PRN   acetaminophen 650 mg Q8H PRN   albuterol sulfate 2.5 mg Q4H PRN   fentaNYL 25 mcg Q2H PRN   hydrALAZINE 10 mg Q4H PRN   magnesium oxide 800 mg PRN   magnesium oxide 800 mg PRN   ondansetron 4 mg Q8H PRN   potassium chloride 10% 40 mEq PRN   potassium chloride 10% 40 mEq PRN   potassium chloride 10% 60 mEq PRN   potassium, sodium phosphates 2 packet PRN   potassium, sodium phosphates 2 packet PRN   potassium, sodium phosphates 2 packet PRN   sodium chloride 3% 4 mL Q4H PRN     Today I personally reviewed pertinent medications, lines/drains/airways, imaging, cardiology results, laboratory results, notably:    Diet  Diet NPO  Diet NPO    Alberto Hartley MD    Assessment/Plan:     Neuro   * SDH (subdural hematoma)    Epidural with subdural component s/p evac 12/20  Complicated by status epilepticus  -Continue Vimpat 50 mg BID  Neuro checks Q2Hr in am / Q4Hr in pm  LTAC today             Seizure    - no further seizures     Status epilepticus    Mental status stable  Lacosamide 50mg BID     Cerebral edema    See SDH     Brain compression    See SDH     Epidural hematoma    See SDH     Subarachnoid hemorrhage    See Subdural Hematoma     Pulmonary   Acute respiratory failure    S/p trach  Trach collar trial daily, tolerating well         Cardiac/Vascular   New onset atrial fibrillation    -A- Fib With RVR now resolved  -In normal sinus rhythm       Essential hypertension    -SBP<180  -MAP>65  -PRN hydralazine         Renal/   Hyponatremia    Sodium stable        Endocrine   Severe malnutrition    S/P PEG tube  Tube feeds at goal at 55 cc/ hr       Other   Pressure injury of buttock, stage 2    Continue wound care           The patient is being Prophylaxed for:  Venous Thromboembolism with: Chemical  Stress Ulcer with: H2B  Ventilator Pneumonia with: chlorhexidine oral care    Activity Orders          None        Full Code    Alberto Hartley MD  Neurocritical Care  [facility]

## 2019-01-25 NOTE — SUBJECTIVE & OBJECTIVE
Interval History: no acute events, discharge to LTAC today    Review of Systems   Neurological: Positive for speech difficulty.     Objective:     Vitals:  Temp: 97.1 °F (36.2 °C)  Pulse: 71  Rhythm: atrial rhythm  BP: 137/74  MAP (mmHg): 99  Resp: (!) 25  SpO2: 95 %  Oxygen Concentration (%): 40  O2 Device (Oxygen Therapy): tracheostomy collar    Temp  Min: 97.1 °F (36.2 °C)  Max: 98.7 °F (37.1 °C)  Pulse  Min: 68  Max: 126  BP  Min: 126/63  Max: 180/92  MAP (mmHg)  Min: 90  Max: 134  Resp  Min: 20  Max: 42  SpO2  Min: 92 %  Max: 99 %  Oxygen Concentration (%)  Min: 40  Max: 50    01/24 0701 - 01/25 0700  In: 1320   Out: 1685 [Urine:1685]   Unmeasured Output  Urine Occurrence: 1  Stool Occurrence: 0       Physical Exam   Eyes:   - opened eyes to voice   Pulmonary/Chest: Effort normal. No respiratory distress.   - trach collar in place   Abdominal:   PEG tube in place w/ feeds running    Neurological:   - Not following commands  - Nonverbal  - Muscle strength no movement noted  RUE: 0/5.  LUE: 0/5.  RLE: 0/5.  LLE: 0/5.   Skin: Skin is warm and dry.   Nursing note and vitals reviewed.        Medications:  Continuous Scheduled    amLODIPine 10 mg Daily   chlorhexidine 15 mL QID   diclofenac sodium  TID   famotidine 20 mg BID   finasteride 5 mg Daily   heparin (porcine) 5,000 Units Q8H   lacosamide 50 mg BID   metoprolol tartrate 100 mg Q8H   polyethylene glycol 17 g BID   pravastatin 40 mg QHS   senna-docusate 8.6-50 mg 1 tablet BID   silodosin 4 mg Daily   sodium chloride 0.9% 3 mL Q8H   sodium chloride 2 g TID   PRN    acetaminophen 650 mg Q4H PRN   acetaminophen 650 mg Q8H PRN   albuterol sulfate 2.5 mg Q4H PRN   fentaNYL 25 mcg Q2H PRN   hydrALAZINE 10 mg Q4H PRN   magnesium oxide 800 mg PRN   magnesium oxide 800 mg PRN   ondansetron 4 mg Q8H PRN   potassium chloride 10% 40 mEq PRN   potassium chloride 10% 40 mEq PRN   potassium chloride 10% 60 mEq PRN   potassium, sodium phosphates 2 packet PRN   potassium,  sodium phosphates 2 packet PRN   potassium, sodium phosphates 2 packet PRN   sodium chloride 3% 4 mL Q4H PRN     Today I personally reviewed pertinent medications, lines/drains/airways, imaging, cardiology results, laboratory results, notably:    Diet  Diet NPO  Diet NPO    Alberto Hartley MD

## 2019-01-25 NOTE — PLAN OF CARE
NICHOLAS received informed by  that Ninfa from [facility] phoned and said that patient's LTAC was denied.    CM phoned [facility] [phone] and spoke to Martha.  Per Martha, the LTAC has been denied and CM can do a fast appeal.  NICHOLAS explained to Martha that the denial was overturned last week and that this is just a new facility, not a new auth.  Per Martha, the auth is denied in the system.  NICHOLAS asked Martha if a peer-to peer could be done.  Per Martha, no peer to peer can be done at this time and a expedited appeal must be done.  Per Martha, the fax number for expedited appeals is [phone], phone [phone].  NICHOLAS asked Martha for a copy of the denial letter. Per Martha, the letter could take 24 hrs to generate, but she will fax a letter to CM.  NICHOLAS informed Martha that if the letter takes 24 hrs, no one will be in the office to get the letter and patient's wife wants the letter so she can appeal.  Per Martha, she will try to fax the letter, but it may take 24 hrs.     NICHOLAS phoned the expedited Appeals line  [phone] and spoke to Sylvia.  NICHOLAS explained to Sylvia that the LTAC was approved last Friday on appeals.  Per Sylvia, she asked her supervisor and he stated that it has to be treated as a new auth.      Expedited appeal faxed to [phone].    CM informed patient's wife of the above.      Azeb Flores RN, CCRN-K, [facility]  Neuro-Critical Care   [phone]

## 2019-01-25 NOTE — PLAN OF CARE
ALFONSO contacted [facility] [phone] and spoke to Edil in inPt services (ref number: [id number]) regarding obtaining the auth letter. He reported there has been a new auth number (now [id number]) assigned to [facility] [place] but the letter is still processing. He will write note to the CM asking for her to respond with the letter or call this SW asap so that the Pt can DC today.     ALFONSO gave update to Pt wife outside of Pt room. Gave her ref number and auth number so she can also call [employer] to inquire about the letter needed.     ALFONSO advised by Ninfa that the [facility] auth for [facility] [place] was denied. Advised CM so she can speak with the Pt wife and move forward with an appeal if appropriate.     Caroline Soares LMSW  Neurocritical Care   [facility]  [phone]

## 2019-01-26 LAB
ALBUMIN SERPL BCP-MCNC: 2.4 G/DL
ALP SERPL-CCNC: 427 U/L
ALT SERPL W/O P-5'-P-CCNC: 36 U/L
ANION GAP SERPL CALC-SCNC: 10 MMOL/L
AST SERPL-CCNC: 38 U/L
BASOPHILS # BLD AUTO: 0.06 K/UL
BASOPHILS NFR BLD: 0.7 %
BILIRUB SERPL-MCNC: 1 MG/DL
BUN SERPL-MCNC: 37 MG/DL
CALCIUM SERPL-MCNC: 9.3 MG/DL
CHLORIDE SERPL-SCNC: 113 MMOL/L
CO2 SERPL-SCNC: 24 MMOL/L
CREAT SERPL-MCNC: 0.5 MG/DL
DIFFERENTIAL METHOD: ABNORMAL
EOSINOPHIL # BLD AUTO: 0.1 K/UL
EOSINOPHIL NFR BLD: 1.5 %
ERYTHROCYTE [DISTWIDTH] IN BLOOD BY AUTOMATED COUNT: 15.9 %
EST. GFR  (AFRICAN AMERICAN): >60 ML/MIN/1.73 M^2
EST. GFR  (NON AFRICAN AMERICAN): >60 ML/MIN/1.73 M^2
GLUCOSE SERPL-MCNC: 125 MG/DL
HCT VFR BLD AUTO: 33.6 %
HGB BLD-MCNC: 10.1 G/DL
IMM GRANULOCYTES # BLD AUTO: 0.07 K/UL
IMM GRANULOCYTES NFR BLD AUTO: 0.8 %
LYMPHOCYTES # BLD AUTO: 1.6 K/UL
LYMPHOCYTES NFR BLD: 18.2 %
MAGNESIUM SERPL-MCNC: 1.8 MG/DL
MCH RBC QN AUTO: 30.9 PG
MCHC RBC AUTO-ENTMCNC: 30.1 G/DL
MCV RBC AUTO: 103 FL
MONOCYTES # BLD AUTO: 0.7 K/UL
MONOCYTES NFR BLD: 7.6 %
NEUTROPHILS # BLD AUTO: 6.2 K/UL
NEUTROPHILS NFR BLD: 71.2 %
NRBC BLD-RTO: 0 /100 WBC
PHOSPHATE SERPL-MCNC: 3.2 MG/DL
PLATELET # BLD AUTO: 266 K/UL
PMV BLD AUTO: 10.4 FL
POTASSIUM SERPL-SCNC: 4 MMOL/L
PROT SERPL-MCNC: 6.5 G/DL
RBC # BLD AUTO: 3.27 M/UL
SODIUM SERPL-SCNC: 147 MMOL/L
WBC # BLD AUTO: 8.76 K/UL

## 2019-01-26 PROCEDURE — 85025 COMPLETE CBC W/AUTO DIFF WBC: CPT

## 2019-01-26 PROCEDURE — 83735 ASSAY OF MAGNESIUM: CPT

## 2019-01-26 PROCEDURE — 20000000 HC ICU ROOM

## 2019-01-26 PROCEDURE — 25000003 PHARM REV CODE 250: Performed by: PHYSICIAN ASSISTANT

## 2019-01-26 PROCEDURE — 84100 ASSAY OF PHOSPHORUS: CPT

## 2019-01-26 PROCEDURE — 25000003 PHARM REV CODE 250: Performed by: NURSE PRACTITIONER

## 2019-01-26 PROCEDURE — 25000003 PHARM REV CODE 250: Performed by: PSYCHIATRY & NEUROLOGY

## 2019-01-26 PROCEDURE — 25000003 PHARM REV CODE 250: Performed by: STUDENT IN AN ORGANIZED HEALTH CARE EDUCATION/TRAINING PROGRAM

## 2019-01-26 PROCEDURE — 94761 N-INVAS EAR/PLS OXIMETRY MLT: CPT

## 2019-01-26 PROCEDURE — 27000221 HC OXYGEN, UP TO 24 HOURS

## 2019-01-26 PROCEDURE — 94668 MNPJ CHEST WALL SBSQ: CPT

## 2019-01-26 PROCEDURE — 99900035 HC TECH TIME PER 15 MIN (STAT)

## 2019-01-26 PROCEDURE — 63600175 PHARM REV CODE 636 W HCPCS: Performed by: NURSE PRACTITIONER

## 2019-01-26 PROCEDURE — 99900026 HC AIRWAY MAINTENANCE (STAT)

## 2019-01-26 PROCEDURE — 80053 COMPREHEN METABOLIC PANEL: CPT

## 2019-01-26 PROCEDURE — 63600175 PHARM REV CODE 636 W HCPCS: Performed by: STUDENT IN AN ORGANIZED HEALTH CARE EDUCATION/TRAINING PROGRAM

## 2019-01-26 PROCEDURE — A4216 STERILE WATER/SALINE, 10 ML: HCPCS | Performed by: PSYCHIATRY & NEUROLOGY

## 2019-01-26 RX ADMIN — DICLOFENAC: 10 GEL TOPICAL at 02:01

## 2019-01-26 RX ADMIN — FENTANYL CITRATE 25 MCG: 50 INJECTION INTRAMUSCULAR; INTRAVENOUS at 10:01

## 2019-01-26 RX ADMIN — HEPARIN SODIUM 5000 UNITS: 5000 INJECTION, SOLUTION INTRAVENOUS; SUBCUTANEOUS at 05:01

## 2019-01-26 RX ADMIN — POLYETHYLENE GLYCOL 3350 17 G: 17 POWDER, FOR SOLUTION ORAL at 09:01

## 2019-01-26 RX ADMIN — CHLORHEXIDINE GLUCONATE 0.12% ORAL RINSE 15 ML: 1.2 LIQUID ORAL at 08:01

## 2019-01-26 RX ADMIN — METOPROLOL TARTRATE 100 MG: 100 TABLET, FILM COATED ORAL at 01:01

## 2019-01-26 RX ADMIN — Medication 3 ML: at 06:01

## 2019-01-26 RX ADMIN — CHLORHEXIDINE GLUCONATE 0.12% ORAL RINSE 15 ML: 1.2 LIQUID ORAL at 05:01

## 2019-01-26 RX ADMIN — POLYETHYLENE GLYCOL 3350 17 G: 17 POWDER, FOR SOLUTION ORAL at 08:01

## 2019-01-26 RX ADMIN — SODIUM CHLORIDE TAB 1 GM 2 G: 1 TAB at 09:01

## 2019-01-26 RX ADMIN — Medication 3 ML: at 01:01

## 2019-01-26 RX ADMIN — METOPROLOL TARTRATE 100 MG: 100 TABLET, FILM COATED ORAL at 05:01

## 2019-01-26 RX ADMIN — PRAVASTATIN SODIUM 40 MG: 40 TABLET ORAL at 09:01

## 2019-01-26 RX ADMIN — FAMOTIDINE 20 MG: 20 TABLET ORAL at 08:01

## 2019-01-26 RX ADMIN — CHLORHEXIDINE GLUCONATE 0.12% ORAL RINSE 15 ML: 1.2 LIQUID ORAL at 01:01

## 2019-01-26 RX ADMIN — HEPARIN SODIUM 5000 UNITS: 5000 INJECTION, SOLUTION INTRAVENOUS; SUBCUTANEOUS at 01:01

## 2019-01-26 RX ADMIN — CHLORHEXIDINE GLUCONATE 0.12% ORAL RINSE 15 ML: 1.2 LIQUID ORAL at 09:01

## 2019-01-26 RX ADMIN — STANDARDIZED SENNA CONCENTRATE AND DOCUSATE SODIUM 1 TABLET: 8.6; 5 TABLET, FILM COATED ORAL at 09:01

## 2019-01-26 RX ADMIN — METOPROLOL TARTRATE 100 MG: 100 TABLET, FILM COATED ORAL at 09:01

## 2019-01-26 RX ADMIN — LACOSAMIDE 50 MG: 50 TABLET, FILM COATED ORAL at 08:01

## 2019-01-26 RX ADMIN — FINASTERIDE 5 MG: 5 TABLET, FILM COATED ORAL at 08:01

## 2019-01-26 RX ADMIN — STANDARDIZED SENNA CONCENTRATE AND DOCUSATE SODIUM 1 TABLET: 8.6; 5 TABLET, FILM COATED ORAL at 08:01

## 2019-01-26 RX ADMIN — LACOSAMIDE 50 MG: 50 TABLET, FILM COATED ORAL at 09:01

## 2019-01-26 RX ADMIN — HEPARIN SODIUM 5000 UNITS: 5000 INJECTION, SOLUTION INTRAVENOUS; SUBCUTANEOUS at 09:01

## 2019-01-26 RX ADMIN — DICLOFENAC: 10 GEL TOPICAL at 08:01

## 2019-01-26 RX ADMIN — AMLODIPINE BESYLATE 10 MG: 10 TABLET ORAL at 08:01

## 2019-01-26 RX ADMIN — SODIUM CHLORIDE TAB 1 GM 2 G: 1 TAB at 02:01

## 2019-01-26 RX ADMIN — SODIUM CHLORIDE TAB 1 GM 2 G: 1 TAB at 08:01

## 2019-01-26 RX ADMIN — SILODOSIN 4 MG: 4 CAPSULE ORAL at 08:01

## 2019-01-26 RX ADMIN — DICLOFENAC: 10 GEL TOPICAL at 09:01

## 2019-01-26 RX ADMIN — FAMOTIDINE 20 MG: 20 TABLET ORAL at 09:01

## 2019-01-26 NOTE — PLAN OF CARE
Problem: Adult Inpatient Plan of Care  Goal: Plan of Care Review  POC reviewed with pt and family at 1400. Pt verbalized understanding. Questions and concerns addressed. No acute events today. Pt tolerating TF well. Pt has thick secretions which require frequent suctioning. No apparent trauma noted in trach from suctioning. Pt progressing toward goals. Will continue to monitor. See flowsheets for full assessment and VS info.

## 2019-01-26 NOTE — PROGRESS NOTES
SLP order placed for evaluation for LTAC. Per NCC team, plans have changed and pt is not going to LTAC at this time. PMSV evaluation only to be completed on Monday 1/28.     Marisela Arellano M.S., CCC-SLP  Speech Language Pathologist  [phone] - pager   1/26/2019

## 2019-01-26 NOTE — PLAN OF CARE
Problem: Adult Inpatient Plan of Care  Goal: Plan of Care Review  Outcome: Ongoing (interventions implemented as appropriate)  POC reviewed with pt at 0500. Pt unable to verbalize understanding, no family at bedside. No acute events overnight. Pt progressing toward goals. Still waiting for LTAC placement. Will continue to monitor. See flowsheets for full assessment and VS info

## 2019-01-26 NOTE — PROGRESS NOTES
[facility]  Neurocritical Care  Progress Note    Admit Date: 12/20/2018  Service Date: 01/26/2019  Length of Stay: 37    Subjective:     Chief Complaint: SDH (subdural hematoma)    History of Present Illness: 63 year old male h/o CABG on ASA 81 mg (last dose 72 hours PTA) presenting as transfer from OSH for EDH with SDH. Patient reportedly had a mechanical fall from standing on Sunday and went to ED where CTH was negative for any acute intracranial pathology. Overnight, patient developed confusion and presented to OSH ED where CTH was consistent with EDH with SDH. Patient has progressively worsened throughout the day developing intermittent global aphasia. NSGY consulted in ED at [facility] and stat CTH has been ordered. NCC consulted for admission and medical management. Patient on weaning off of cardene at time of evaluation with sbp of 120. Reported presenting sbp in 150s.     Interval HPI: course complicated by S.E.; was on ketamine gtt, now controlled on vimpat. T&P, opens eyes to voice, not following requests. Hyponatremic, on salt tabs, resolved. Insurance issues prolonged post-acute care placement. Stable, ready for LTAC.    Hospital Course: 12/22: no major events overnight. Weaned off of cardene. This morning AOx3. Drain removed by NSGY at 11 AM, and planned to step down to NSGY.  AMS noted around 1PM, patient aphasic and not following commands, STAT CTH unremarkable.   12/26: wean ketamine, follow EEG, send urine studuies-Na 133, NS bolus, advance TF  12/27: off ketamine, d/c levo, continue EEG, and AEDs, 500 NS bolus, increase enteral water flushes, continue abx for another day  1/2: extubation trial.   1/3: failed extubation trial. Plan for trach and peg  1/4: plan for trach, general surgery consult  1/5: NAEO  1/6: NAEO. Awaiting trach and peg  1/7: plan for trach/peg, opens eyes to voice no commands  1/8: s/p trach/peg today  1/9: restarting tf this afternoon, ltac planning, eeg today to  help guide aed weaning  1/12 Trach trials tolerating well. Neuro checks q2 in am/ q4in pm metoprolol prn HR.120  1/13 Continues to tolerate Trach. Start Norvasc. eval thyroid function baseline.   1/14 hyponatremic, pending urine studies, finasteride, increased secretions, CPT, pending LTAC  01/15: Awaiting approval for transfer to LTAC  01/16: Gen surg about sutures and trach pressure wound  01/17: Med route adjustments and awaiting wound care recs, Pending LTAC placement  01/18: Placed orders for LTAC placement, Insurance company issues  1/19 No  Significant events overnight. No changes in assessment. Awaiting LTAC placement  1/20 no significant changes overnight. Continue trach collar. No vent over night. Hyperventilation therapy - bag/suction q6h. CXR in am pending  LTAC pending  1/20 alk phos, AST elevated, will check ggt. cEEG stable will decrease vimpat 50mg bid. [facility]  1/21: Dispo Planning  1/22: Pending Case Management Rounds  1/23: dc to LTAC  1/24: discharge held due to documentation     Interval History: no acute events, wife states patient complaining of pain.     Review of Systems   Neurological: Positive for speech difficulty.     Objective:     Vitals:  Temp: 97.4 °F (36.3 °C)  Pulse: 72  Rhythm: atrial rhythm  BP: (!) 151/80  MAP (mmHg): 109  Resp: (!) 24  SpO2: (!) 94 %  Oxygen Concentration (%): 40  O2 Device (Oxygen Therapy): Trach Collar    Temp  Min: 97.1 °F (36.2 °C)  Max: 98 °F (36.7 °C)  Pulse  Min: 70  Max: 133  BP  Min: 133/58  Max: 169/92  MAP (mmHg)  Min: 83  Max: 141  Resp  Min: 24  Max: 52  SpO2  Min: 89 %  Max: 98 %  Oxygen Concentration (%)  Min: 40  Max: 40    01/25 0701 - 01/26 0700  In: 1265   Out: 1250 [Urine:1250]   Unmeasured Output  Urine Occurrence: 1  Stool Occurrence: 0       Physical Exam   Eyes:   - opened eyes to voice   Pulmonary/Chest: Effort normal. No respiratory distress.   - trach collar in place    Abdominal:   PEG tube in place w/ feeds running    Neurological:   - Not following commands  - Nonverbal  - Muscle strength no movement noted  RUE: 0/5.  LUE: 0/5.  RLE: 0/5.  LLE: 0/5.   Skin: Skin is warm and dry.   Nursing note and vitals reviewed.        Medications:  Continuous Scheduled    amLODIPine 10 mg Daily   chlorhexidine 15 mL QID   diclofenac sodium  TID   famotidine 20 mg BID   finasteride 5 mg Daily   heparin (porcine) 5,000 Units Q8H   lacosamide 50 mg BID   metoprolol tartrate 100 mg Q8H   polyethylene glycol 17 g BID   pravastatin 40 mg QHS   senna-docusate 8.6-50 mg 1 tablet BID   silodosin 4 mg Daily   sodium chloride 0.9% 3 mL Q8H   sodium chloride 2 g TID   PRN    acetaminophen 650 mg Q4H PRN   acetaminophen 650 mg Q8H PRN   albuterol sulfate 2.5 mg Q4H PRN   fentaNYL 25 mcg Q2H PRN   hydrALAZINE 10 mg Q4H PRN   magnesium oxide 800 mg PRN   magnesium oxide 800 mg PRN   ondansetron 4 mg Q8H PRN   potassium chloride 10% 40 mEq PRN   potassium chloride 10% 40 mEq PRN   potassium chloride 10% 60 mEq PRN   potassium, sodium phosphates 2 packet PRN   potassium, sodium phosphates 2 packet PRN   potassium, sodium phosphates 2 packet PRN   sodium chloride 3% 4 mL Q4H PRN     Today I personally reviewed pertinent medications, lines/drains/airways, imaging, cardiology results, laboratory results, notably:    Diet  Diet NPO  Diet NPO    Alberto Hartley MD    Assessment/Plan:        Neuro   * SDH (subdural hematoma)     Epidural with subdural component s/p evac 12/20  Complicated by status epilepticus  -Continue Vimpat 50 mg BID  Neuro checks Q2Hr in am / Q4Hr in pm  Pending placement to SNF                Seizure     - no further seizures      Status epilepticus     Mental status stable  Lacosamide 50mg BID      Cerebral edema     See SDH      Brain compression     See SDH      Epidural hematoma     See SDH      Subarachnoid hemorrhage     See Subdural Hematoma      Pulmonary   Acute respiratory failure      S/p trach  Trach collar trial daily, tolerating well          Cardiac/Vascular   New onset atrial fibrillation     -A- Fib With RVR now resolved  -In normal sinus rhythm         Essential hypertension     -SBP<180  -MAP>65  -PRN hydralazine            Renal/   Hyponatremia     Sodium stable          Endocrine   Severe malnutrition     S/P PEG tube  Tube feeds at goal at 55 cc/ hr         Other   Pressure injury of buttock, stage 2     Continue wound care          The patient is being Prophylaxed for:  Venous Thromboembolism with: Chemical  Stress Ulcer with: H2B  Ventilator Pneumonia with: chlorhexidine oral care    Activity Orders          None        Full Code    Gagandeep Davis MD  Neurocritical Care  [facility]  Plan to be discussed with attending Dr. Charli Guardado MD , further recommendations as per attending addendum. Please feel free to call with any questions or concerns.    Gagandeep Davis MD  Resident Physician, PGY1

## 2019-01-26 NOTE — SUBJECTIVE & OBJECTIVE
Interval History: no acute events, wife states patient complaining of pain.     Review of Systems   Neurological: Positive for speech difficulty.     Objective:     Vitals:  Temp: 97.4 °F (36.3 °C)  Pulse: 72  Rhythm: atrial rhythm  BP: (!) 151/80  MAP (mmHg): 109  Resp: (!) 24  SpO2: (!) 94 %  Oxygen Concentration (%): 40  O2 Device (Oxygen Therapy): Trach Collar    Temp  Min: 97.1 °F (36.2 °C)  Max: 98 °F (36.7 °C)  Pulse  Min: 70  Max: 133  BP  Min: 133/58  Max: 169/92  MAP (mmHg)  Min: 83  Max: 141  Resp  Min: 24  Max: 52  SpO2  Min: 89 %  Max: 98 %  Oxygen Concentration (%)  Min: 40  Max: 40    01/25 0701 - 01/26 0700  In: 1265   Out: 1250 [Urine:1250]   Unmeasured Output  Urine Occurrence: 1  Stool Occurrence: 0       Physical Exam   Eyes:   - opened eyes to voice   Pulmonary/Chest: Effort normal. No respiratory distress.   - trach collar in place   Abdominal:   PEG tube in place w/ feeds running    Neurological:   - Not following commands  - Nonverbal  - Muscle strength no movement noted  RUE: 0/5.  LUE: 0/5.  RLE: 0/5.  LLE: 0/5.   Skin: Skin is warm and dry.   Nursing note and vitals reviewed.        Medications:  Continuous Scheduled    amLODIPine 10 mg Daily   chlorhexidine 15 mL QID   diclofenac sodium  TID   famotidine 20 mg BID   finasteride 5 mg Daily   heparin (porcine) 5,000 Units Q8H   lacosamide 50 mg BID   metoprolol tartrate 100 mg Q8H   polyethylene glycol 17 g BID   pravastatin 40 mg QHS   senna-docusate 8.6-50 mg 1 tablet BID   silodosin 4 mg Daily   sodium chloride 0.9% 3 mL Q8H   sodium chloride 2 g TID   PRN    acetaminophen 650 mg Q4H PRN   acetaminophen 650 mg Q8H PRN   albuterol sulfate 2.5 mg Q4H PRN   fentaNYL 25 mcg Q2H PRN   hydrALAZINE 10 mg Q4H PRN   magnesium oxide 800 mg PRN   magnesium oxide 800 mg PRN   ondansetron 4 mg Q8H PRN   potassium chloride 10% 40 mEq PRN   potassium chloride 10% 40 mEq PRN   potassium chloride 10% 60 mEq PRN   potassium, sodium phosphates 2 packet PRN    potassium, sodium phosphates 2 packet PRN   potassium, sodium phosphates 2 packet PRN   sodium chloride 3% 4 mL Q4H PRN     Today I personally reviewed pertinent medications, lines/drains/airways, imaging, cardiology results, laboratory results, notably:    Diet  Diet NPO  Diet NPO    Alberto Hartley MD

## 2019-01-27 LAB
ALBUMIN SERPL BCP-MCNC: 2.4 G/DL
ALP SERPL-CCNC: 442 U/L
ALT SERPL W/O P-5'-P-CCNC: 35 U/L
ANION GAP SERPL CALC-SCNC: 12 MMOL/L
AST SERPL-CCNC: 42 U/L
BASOPHILS # BLD AUTO: 0.05 K/UL
BASOPHILS NFR BLD: 0.5 %
BILIRUB SERPL-MCNC: 1 MG/DL
BUN SERPL-MCNC: 37 MG/DL
CALCIUM SERPL-MCNC: 9.1 MG/DL
CHLORIDE SERPL-SCNC: 114 MMOL/L
CO2 SERPL-SCNC: 23 MMOL/L
CREAT SERPL-MCNC: 0.4 MG/DL
DIFFERENTIAL METHOD: ABNORMAL
EOSINOPHIL # BLD AUTO: 0.1 K/UL
EOSINOPHIL NFR BLD: 1.2 %
ERYTHROCYTE [DISTWIDTH] IN BLOOD BY AUTOMATED COUNT: 15.6 %
EST. GFR  (AFRICAN AMERICAN): >60 ML/MIN/1.73 M^2
EST. GFR  (NON AFRICAN AMERICAN): >60 ML/MIN/1.73 M^2
GLUCOSE SERPL-MCNC: 111 MG/DL
HCT VFR BLD AUTO: 33.7 %
HGB BLD-MCNC: 10.3 G/DL
IMM GRANULOCYTES # BLD AUTO: 0.06 K/UL
IMM GRANULOCYTES NFR BLD AUTO: 0.7 %
LYMPHOCYTES # BLD AUTO: 2.3 K/UL
LYMPHOCYTES NFR BLD: 25.1 %
MAGNESIUM SERPL-MCNC: 1.9 MG/DL
MCH RBC QN AUTO: 31.8 PG
MCHC RBC AUTO-ENTMCNC: 30.6 G/DL
MCV RBC AUTO: 104 FL
MONOCYTES # BLD AUTO: 0.7 K/UL
MONOCYTES NFR BLD: 7.7 %
NEUTROPHILS # BLD AUTO: 5.9 K/UL
NEUTROPHILS NFR BLD: 64.8 %
NRBC BLD-RTO: 0 /100 WBC
PHOSPHATE SERPL-MCNC: 3.2 MG/DL
PLATELET # BLD AUTO: 264 K/UL
PMV BLD AUTO: 10.5 FL
POTASSIUM SERPL-SCNC: 3.8 MMOL/L
PROT SERPL-MCNC: 6.5 G/DL
RBC # BLD AUTO: 3.24 M/UL
SODIUM SERPL-SCNC: 149 MMOL/L
WBC # BLD AUTO: 9.17 K/UL

## 2019-01-27 PROCEDURE — 84100 ASSAY OF PHOSPHORUS: CPT

## 2019-01-27 PROCEDURE — 25000003 PHARM REV CODE 250: Performed by: PSYCHIATRY & NEUROLOGY

## 2019-01-27 PROCEDURE — 99233 SBSQ HOSP IP/OBS HIGH 50: CPT | Mod: GC,,, | Performed by: PSYCHIATRY & NEUROLOGY

## 2019-01-27 PROCEDURE — 83735 ASSAY OF MAGNESIUM: CPT

## 2019-01-27 PROCEDURE — 99900026 HC AIRWAY MAINTENANCE (STAT)

## 2019-01-27 PROCEDURE — 25000003 PHARM REV CODE 250: Performed by: STUDENT IN AN ORGANIZED HEALTH CARE EDUCATION/TRAINING PROGRAM

## 2019-01-27 PROCEDURE — 80053 COMPREHEN METABOLIC PANEL: CPT

## 2019-01-27 PROCEDURE — 27000221 HC OXYGEN, UP TO 24 HOURS

## 2019-01-27 PROCEDURE — 20000000 HC ICU ROOM

## 2019-01-27 PROCEDURE — 99233 PR SUBSEQUENT HOSPITAL CARE,LEVL III: ICD-10-PCS | Mod: GC,,, | Performed by: PSYCHIATRY & NEUROLOGY

## 2019-01-27 PROCEDURE — 63600175 PHARM REV CODE 636 W HCPCS: Performed by: STUDENT IN AN ORGANIZED HEALTH CARE EDUCATION/TRAINING PROGRAM

## 2019-01-27 PROCEDURE — 25000003 PHARM REV CODE 250: Performed by: PHYSICIAN ASSISTANT

## 2019-01-27 PROCEDURE — 94668 MNPJ CHEST WALL SBSQ: CPT

## 2019-01-27 PROCEDURE — A4216 STERILE WATER/SALINE, 10 ML: HCPCS | Performed by: PSYCHIATRY & NEUROLOGY

## 2019-01-27 PROCEDURE — 85025 COMPLETE CBC W/AUTO DIFF WBC: CPT

## 2019-01-27 PROCEDURE — 94761 N-INVAS EAR/PLS OXIMETRY MLT: CPT

## 2019-01-27 PROCEDURE — 36415 COLL VENOUS BLD VENIPUNCTURE: CPT

## 2019-01-27 PROCEDURE — 25000003 PHARM REV CODE 250: Performed by: NURSE PRACTITIONER

## 2019-01-27 PROCEDURE — 99900035 HC TECH TIME PER 15 MIN (STAT)

## 2019-01-27 RX ADMIN — POLYETHYLENE GLYCOL 3350 17 G: 17 POWDER, FOR SOLUTION ORAL at 08:01

## 2019-01-27 RX ADMIN — HEPARIN SODIUM 5000 UNITS: 5000 INJECTION, SOLUTION INTRAVENOUS; SUBCUTANEOUS at 01:01

## 2019-01-27 RX ADMIN — POLYETHYLENE GLYCOL 3350 17 G: 17 POWDER, FOR SOLUTION ORAL at 09:01

## 2019-01-27 RX ADMIN — Medication 3 ML: at 10:01

## 2019-01-27 RX ADMIN — CHLORHEXIDINE GLUCONATE 0.12% ORAL RINSE 15 ML: 1.2 LIQUID ORAL at 08:01

## 2019-01-27 RX ADMIN — CHLORHEXIDINE GLUCONATE 0.12% ORAL RINSE 15 ML: 1.2 LIQUID ORAL at 04:01

## 2019-01-27 RX ADMIN — METOPROLOL TARTRATE 100 MG: 100 TABLET, FILM COATED ORAL at 01:01

## 2019-01-27 RX ADMIN — PRAVASTATIN SODIUM 40 MG: 40 TABLET ORAL at 08:01

## 2019-01-27 RX ADMIN — Medication 3 ML: at 06:01

## 2019-01-27 RX ADMIN — STANDARDIZED SENNA CONCENTRATE AND DOCUSATE SODIUM 1 TABLET: 8.6; 5 TABLET, FILM COATED ORAL at 08:01

## 2019-01-27 RX ADMIN — CHLORHEXIDINE GLUCONATE 0.12% ORAL RINSE 15 ML: 1.2 LIQUID ORAL at 01:01

## 2019-01-27 RX ADMIN — METOPROLOL TARTRATE 100 MG: 100 TABLET, FILM COATED ORAL at 05:01

## 2019-01-27 RX ADMIN — DICLOFENAC: 10 GEL TOPICAL at 03:01

## 2019-01-27 RX ADMIN — SILODOSIN 4 MG: 4 CAPSULE ORAL at 08:01

## 2019-01-27 RX ADMIN — DICLOFENAC: 10 GEL TOPICAL at 08:01

## 2019-01-27 RX ADMIN — LACOSAMIDE 50 MG: 50 TABLET, FILM COATED ORAL at 08:01

## 2019-01-27 RX ADMIN — HEPARIN SODIUM 5000 UNITS: 5000 INJECTION, SOLUTION INTRAVENOUS; SUBCUTANEOUS at 10:01

## 2019-01-27 RX ADMIN — SODIUM CHLORIDE TAB 1 GM 2 G: 1 TAB at 08:01

## 2019-01-27 RX ADMIN — METOPROLOL TARTRATE 100 MG: 100 TABLET, FILM COATED ORAL at 10:01

## 2019-01-27 RX ADMIN — FAMOTIDINE 20 MG: 20 TABLET ORAL at 08:01

## 2019-01-27 RX ADMIN — Medication 3 ML: at 01:01

## 2019-01-27 RX ADMIN — SODIUM CHLORIDE TAB 1 GM 2 G: 1 TAB at 03:01

## 2019-01-27 RX ADMIN — HEPARIN SODIUM 5000 UNITS: 5000 INJECTION, SOLUTION INTRAVENOUS; SUBCUTANEOUS at 05:01

## 2019-01-27 RX ADMIN — FINASTERIDE 5 MG: 5 TABLET, FILM COATED ORAL at 08:01

## 2019-01-27 RX ADMIN — AMLODIPINE BESYLATE 10 MG: 10 TABLET ORAL at 08:01

## 2019-01-27 NOTE — PLAN OF CARE
Problem: Adult Inpatient Plan of Care  Goal: Plan of Care Review  Outcome: Ongoing (interventions implemented as appropriate)  POC reviewed with pt at 0500. Pt unable to verbalize understanding. No acute events overnight. Pt progressing toward goals. Will continue to monitor. See flowsheets for full assessment and VS info

## 2019-01-27 NOTE — SUBJECTIVE & OBJECTIVE
Interval History: no acute events, wife states patient complaining of pain.     Review of Systems   Neurological: Positive for speech difficulty.     Objective:     Vitals:  Temp: 97.7 °F (36.5 °C)  Pulse: 73  Rhythm: atrial rhythm  BP: (!) 173/87  MAP (mmHg): 121  Resp: (!) 24  SpO2: 97 %  Oxygen Concentration (%): 40  O2 Device (Oxygen Therapy): Trach Collar    Temp  Min: 97.5 °F (36.4 °C)  Max: 98.1 °F (36.7 °C)  Pulse  Min: 70  Max: 101  BP  Min: 138/86  Max: 183/86  MAP (mmHg)  Min: 105  Max: 139  Resp  Min: 16  Max: 37  SpO2  Min: 91 %  Max: 100 %  Oxygen Concentration (%)  Min: 40  Max: 40    01/26 0701 - 01/27 0700  In: 1500   Out: 1205 [Urine:1205]   Unmeasured Output  Urine Occurrence: 1  Stool Occurrence: 0       Physical Exam   Eyes:   - opened eyes to voice   Pulmonary/Chest: Effort normal. No respiratory distress.   - trach collar in place   Abdominal:   PEG tube in place w/ feeds running    Neurological:   - Not following commands  - Nonverbal  - Muscle strength no movement noted  RUE: 0/5.  LUE: 0/5.  RLE: 0/5.  LLE: 0/5.   Skin: Skin is warm and dry.   Nursing note and vitals reviewed.        Medications:  Continuous Scheduled    amLODIPine 10 mg Daily   chlorhexidine 15 mL QID   diclofenac sodium  TID   famotidine 20 mg BID   finasteride 5 mg Daily   heparin (porcine) 5,000 Units Q8H   lacosamide 50 mg BID   metoprolol tartrate 100 mg Q8H   polyethylene glycol 17 g BID   pravastatin 40 mg QHS   senna-docusate 8.6-50 mg 1 tablet BID   silodosin 4 mg Daily   sodium chloride 0.9% 3 mL Q8H   sodium chloride 2 g TID   PRN    acetaminophen 650 mg Q4H PRN   acetaminophen 650 mg Q8H PRN   albuterol sulfate 2.5 mg Q4H PRN   fentaNYL 25 mcg Q2H PRN   hydrALAZINE 10 mg Q4H PRN   magnesium oxide 800 mg PRN   magnesium oxide 800 mg PRN   ondansetron 4 mg Q8H PRN   potassium chloride 10% 40 mEq PRN   potassium chloride 10% 40 mEq PRN   potassium chloride 10% 60 mEq PRN   potassium, sodium phosphates 2 packet PRN    potassium, sodium phosphates 2 packet PRN   potassium, sodium phosphates 2 packet PRN   sodium chloride 3% 4 mL Q4H PRN     Today I personally reviewed pertinent medications, lines/drains/airways, imaging, cardiology results, laboratory results, notably:    Diet  Diet NPO  Diet NPO    Alberto Hartley MD

## 2019-01-27 NOTE — ASSESSMENT & PLAN NOTE
Epidural with subdural component s/p evac 12/20  Complicated by status epilepticus  -Continue Vimpat 50 mg BID  Neuro checks Q2Hr in am / Q4Hr in pm

## 2019-01-27 NOTE — PLAN OF CARE
Problem: Adult Inpatient Plan of Care  Goal: Plan of Care Review  POC reviewed with pt and spouse. spouse  verbalized understanding. Questions and concerns addressed. No acute events today. Pt tolerated tf well. No bleeding noted from trach. Pt had to be suctioned often from trach because he has thick white secretions. No bleeding noted from suctioning. Pt awaiting sniff placement. Pt progressing toward goals. Will continue to monitor. See flowsheets for full assessment and VS info.

## 2019-01-27 NOTE — PROGRESS NOTES
[facility]  Neurocritical Care  Progress Note    Admit Date: 12/20/2018  Service Date: 01/27/2019  Length of Stay: 38    Subjective:     Chief Complaint: SDH (subdural hematoma)    History of Present Illness: 63 year old male h/o CABG on ASA 81 mg (last dose 72 hours PTA) presenting as transfer from OSH for EDH with SDH. Patient reportedly had a mechanical fall from standing on Sunday and went to ED where CTH was negative for any acute intracranial pathology. Overnight, patient developed confusion and presented to OSH ED where CTH was consistent with EDH with SDH. Patient has progressively worsened throughout the day developing intermittent global aphasia. NSGY [place] [facility] and [place]TH has been ordered. NCC consulted for admission and medical management. Patient on weaning off of cardene at time of evaluation with sbp of 120. Reported presenting sbp in 150s.     Interval HPI: course complicated by S.E.; was on ketamine gtt, now controlled on vimpat. T&P, opens eyes to voice, not following requests. Hyponatremic, on salt tabs, resolved. Insurance issues prolonged post-acute care placement. Stable, ready for LTAC.    Hospital Course: 12/22: no major events overnight. Weaned off of cardene. This morning AOx3. Drain removed by NSGY at 11 AM, and planned to step down to NSGY.  AMS noted around 1PM, patient aphasic and not following commands, STAT CTH unremarkable.   12/26: wean ketamine, follow EEG, send urine studuies-Na 133, NS bolus, advance TF  12/27: off ketamine, d/c levo, continue EEG, and AEDs, 500 NS bolus, increase enteral water flushes, continue abx for another day  1/2: extubation trial.   1/3: failed extubation trial. Plan for trach and peg  1/4: plan for trach, general surgery consult  1/5: NAEO  1/6: NAEO. Awaiting trach and peg  1/7: plan for trach/peg, opens eyes to voice no commands  1/8: s/p trach/peg today  1/9: restarting tf this afternoon, ltac planning, eeg today to  help guide aed weaning  1/12 Trach trials tolerating well. Neuro checks q2 in am/ q4in pm metoprolol prn HR.120  1/13 Continues to tolerate Trach. Start Norvasc. eval thyroid function baseline.   1/14 hyponatremic, pending urine studies, finasteride, increased secretions, CPT, pending LTAC  01/15: Awaiting approval for transfer to LTAC  01/16: Gen surg about sutures and trach pressure wound  01/17: Med route adjustments and awaiting wound care recs, Pending LTAC placement  01/18: Placed orders for LTAC placement, Insurance company issues  1/19 No  Significant events overnight. No changes in assessment. Awaiting LTAC placement  1/20 no significant changes overnight. Continue trach collar. No vent over night. Hyperventilation therapy - bag/suction q6h. CXR in am pending  LTAC pending  1/20 alk phos, AST elevated, will check ggt. cEEG stable will decrease vimpat 50mg bid. [facility]  1/21: Dispo Planning  1/22: Pending Case Management Rounds  1/23: dc to LTAC  1/24: discharge held due to documentation   1/27: reports of some confusion at night, rozerem hs prn    Interval History: no acute events, wife states patient complaining of pain.     Review of Systems   Neurological: Positive for speech difficulty.     Objective:     Vitals:  Temp: 97.7 °F (36.5 °C)  Pulse: 73  Rhythm: atrial rhythm  BP: (!) 173/87  MAP (mmHg): 121  Resp: (!) 24  SpO2: 97 %  Oxygen Concentration (%): 40  O2 Device (Oxygen Therapy): Trach Collar    Temp  Min: 97.5 °F (36.4 °C)  Max: 98.1 °F (36.7 °C)  Pulse  Min: 70  Max: 101  BP  Min: 138/86  Max: 183/86  MAP (mmHg)  Min: 105  Max: 139  Resp  Min: 16  Max: 37  SpO2  Min: 91 %  Max: 100 %  Oxygen Concentration (%)  Min: 40  Max: 40    01/26 0701 - 01/27 0700  In: 1500   Out: 1205 [Urine:1205]   Unmeasured Output  Urine Occurrence: 1  Stool Occurrence: 0       Physical Exam   Eyes:   - opened eyes to voice   Pulmonary/Chest: Effort normal.  No respiratory distress.   - trach collar in place   Abdominal:   PEG tube in place w/ feeds running    Neurological:   - Not following commands  - Nonverbal  - Muscle strength no movement noted  RUE: 0/5.  LUE: 0/5.  RLE: 0/5.  LLE: 0/5.   Skin: Skin is warm and dry.   Nursing note and vitals reviewed.        Medications:  Continuous Scheduled    amLODIPine 10 mg Daily   chlorhexidine 15 mL QID   diclofenac sodium  TID   famotidine 20 mg BID   finasteride 5 mg Daily   heparin (porcine) 5,000 Units Q8H   lacosamide 50 mg BID   metoprolol tartrate 100 mg Q8H   polyethylene glycol 17 g BID   pravastatin 40 mg QHS   senna-docusate 8.6-50 mg 1 tablet BID   silodosin 4 mg Daily   sodium chloride 0.9% 3 mL Q8H   sodium chloride 2 g TID   PRN    acetaminophen 650 mg Q4H PRN   acetaminophen 650 mg Q8H PRN   albuterol sulfate 2.5 mg Q4H PRN   fentaNYL 25 mcg Q2H PRN   hydrALAZINE 10 mg Q4H PRN   magnesium oxide 800 mg PRN   magnesium oxide 800 mg PRN   ondansetron 4 mg Q8H PRN   potassium chloride 10% 40 mEq PRN   potassium chloride 10% 40 mEq PRN   potassium chloride 10% 60 mEq PRN   potassium, sodium phosphates 2 packet PRN   potassium, sodium phosphates 2 packet PRN   potassium, sodium phosphates 2 packet PRN   sodium chloride 3% 4 mL Q4H PRN     Today I personally reviewed pertinent medications, lines/drains/airways, imaging, cardiology results, laboratory results, notably:    Diet  Diet NPO  Diet NPO    Alberto Hartley MD    Assessment/Plan:     Neuro   * SDH (subdural hematoma)    Epidural with subdural component s/p evac 12/20  Complicated by status epilepticus  -Continue Vimpat 50 mg BID  Neuro checks Q2Hr in am / Q4Hr in pm               Status epilepticus    Mental status stable  Lacosamide 50mg BID     Pulmonary   Acute respiratory failure    S/p trach  Trach collar trial daily, tolerating well        Cardiac/Vascular   New onset atrial fibrillation    -A- Fib With RVR now resolved  -In normal sinus rhythm        Essential hypertension    -SBP<180  -MAP>65  -PRN hydralazine               The patient is being Prophylaxed for:  Venous Thromboembolism with: Chemical  Stress Ulcer with: None  Ventilator Pneumonia with: not applicable    Activity Orders          None        Full Code    Charli Rg MD  Neurocritical Care  [facility]

## 2019-01-28 VITALS
HEIGHT: 71 IN | TEMPERATURE: 98 F | RESPIRATION RATE: 21 BRPM | HEART RATE: 98 BPM | OXYGEN SATURATION: 98 % | BODY MASS INDEX: 34.72 KG/M2 | DIASTOLIC BLOOD PRESSURE: 112 MMHG | WEIGHT: 248 LBS | SYSTOLIC BLOOD PRESSURE: 156 MMHG

## 2019-01-28 PROBLEM — N40.0 BPH (BENIGN PROSTATIC HYPERPLASIA): Status: ACTIVE | Noted: 2019-01-28

## 2019-01-28 PROBLEM — R23.9 ALTERATION IN SKIN INTEGRITY: Status: ACTIVE | Noted: 2019-01-28

## 2019-01-28 PROBLEM — E87.0 HYPERNATREMIA: Status: ACTIVE | Noted: 2019-01-28

## 2019-01-28 LAB
ALBUMIN SERPL BCP-MCNC: 2.6 G/DL
ALP SERPL-CCNC: 490 U/L
ALT SERPL W/O P-5'-P-CCNC: 43 U/L
ANION GAP SERPL CALC-SCNC: 10 MMOL/L
AST SERPL-CCNC: 53 U/L
BILIRUB SERPL-MCNC: 1.1 MG/DL
BUN SERPL-MCNC: 35 MG/DL
CALCIUM SERPL-MCNC: 9.4 MG/DL
CHLORIDE SERPL-SCNC: 116 MMOL/L
CO2 SERPL-SCNC: 24 MMOL/L
CREAT SERPL-MCNC: 0.5 MG/DL
EST. GFR  (AFRICAN AMERICAN): >60 ML/MIN/1.73 M^2
EST. GFR  (NON AFRICAN AMERICAN): >60 ML/MIN/1.73 M^2
GLUCOSE SERPL-MCNC: 125 MG/DL
MAGNESIUM SERPL-MCNC: 2 MG/DL
PHOSPHATE SERPL-MCNC: 3.1 MG/DL
POTASSIUM SERPL-SCNC: 3.6 MMOL/L
PROT SERPL-MCNC: 6.7 G/DL
SODIUM SERPL-SCNC: 150 MMOL/L

## 2019-01-28 PROCEDURE — 25000003 PHARM REV CODE 250: Performed by: PSYCHIATRY & NEUROLOGY

## 2019-01-28 PROCEDURE — 84100 ASSAY OF PHOSPHORUS: CPT

## 2019-01-28 PROCEDURE — 99900035 HC TECH TIME PER 15 MIN (STAT)

## 2019-01-28 PROCEDURE — 27000221 HC OXYGEN, UP TO 24 HOURS

## 2019-01-28 PROCEDURE — A4216 STERILE WATER/SALINE, 10 ML: HCPCS | Performed by: PSYCHIATRY & NEUROLOGY

## 2019-01-28 PROCEDURE — 94761 N-INVAS EAR/PLS OXIMETRY MLT: CPT

## 2019-01-28 PROCEDURE — 25000003 PHARM REV CODE 250: Performed by: PHYSICIAN ASSISTANT

## 2019-01-28 PROCEDURE — 80053 COMPREHEN METABOLIC PANEL: CPT

## 2019-01-28 PROCEDURE — 63600175 PHARM REV CODE 636 W HCPCS: Performed by: STUDENT IN AN ORGANIZED HEALTH CARE EDUCATION/TRAINING PROGRAM

## 2019-01-28 PROCEDURE — 97110 THERAPEUTIC EXERCISES: CPT

## 2019-01-28 PROCEDURE — 36415 COLL VENOUS BLD VENIPUNCTURE: CPT

## 2019-01-28 PROCEDURE — 63600175 PHARM REV CODE 636 W HCPCS: Performed by: NURSE PRACTITIONER

## 2019-01-28 PROCEDURE — 99900026 HC AIRWAY MAINTENANCE (STAT)

## 2019-01-28 PROCEDURE — 25000003 PHARM REV CODE 250: Performed by: NURSE PRACTITIONER

## 2019-01-28 PROCEDURE — 25000003 PHARM REV CODE 250: Performed by: STUDENT IN AN ORGANIZED HEALTH CARE EDUCATION/TRAINING PROGRAM

## 2019-01-28 PROCEDURE — 92523 SPEECH SOUND LANG COMPREHEN: CPT

## 2019-01-28 PROCEDURE — 83735 ASSAY OF MAGNESIUM: CPT

## 2019-01-28 RX ORDER — FAMOTIDINE 10 MG/ML
20 INJECTION INTRAVENOUS 2 TIMES DAILY
Status: DISCONTINUED | OUTPATIENT
Start: 2019-01-28 | End: 2019-01-28

## 2019-01-28 RX ORDER — AMOXICILLIN 250 MG
1 CAPSULE ORAL 2 TIMES DAILY
Status: DISCONTINUED | OUTPATIENT
Start: 2019-01-28 | End: 2019-01-28 | Stop reason: HOSPADM

## 2019-01-28 RX ORDER — ONDANSETRON 8 MG/1
8 TABLET, ORALLY DISINTEGRATING ORAL EVERY 8 HOURS PRN
Status: DISCONTINUED | OUTPATIENT
Start: 2019-01-28 | End: 2019-01-28 | Stop reason: HOSPADM

## 2019-01-28 RX ORDER — POLYETHYLENE GLYCOL 3350 17 G/17G
17 POWDER, FOR SOLUTION ORAL DAILY
Status: DISCONTINUED | OUTPATIENT
Start: 2019-01-28 | End: 2019-01-28 | Stop reason: HOSPADM

## 2019-01-28 RX ADMIN — METOPROLOL TARTRATE 100 MG: 100 TABLET, FILM COATED ORAL at 05:01

## 2019-01-28 RX ADMIN — Medication 3 ML: at 06:01

## 2019-01-28 RX ADMIN — METOPROLOL TARTRATE 100 MG: 100 TABLET, FILM COATED ORAL at 01:01

## 2019-01-28 RX ADMIN — STANDARDIZED SENNA CONCENTRATE AND DOCUSATE SODIUM 1 TABLET: 8.6; 5 TABLET, FILM COATED ORAL at 08:01

## 2019-01-28 RX ADMIN — FINASTERIDE 5 MG: 5 TABLET, FILM COATED ORAL at 08:01

## 2019-01-28 RX ADMIN — HEPARIN SODIUM 5000 UNITS: 5000 INJECTION, SOLUTION INTRAVENOUS; SUBCUTANEOUS at 01:01

## 2019-01-28 RX ADMIN — POLYETHYLENE GLYCOL 3350 17 G: 17 POWDER, FOR SOLUTION ORAL at 08:01

## 2019-01-28 RX ADMIN — FAMOTIDINE 20 MG: 20 TABLET ORAL at 08:01

## 2019-01-28 RX ADMIN — SILODOSIN 4 MG: 4 CAPSULE ORAL at 08:01

## 2019-01-28 RX ADMIN — LACOSAMIDE 50 MG: 50 TABLET, FILM COATED ORAL at 08:01

## 2019-01-28 RX ADMIN — DICLOFENAC: 10 GEL TOPICAL at 08:01

## 2019-01-28 RX ADMIN — HEPARIN SODIUM 5000 UNITS: 5000 INJECTION, SOLUTION INTRAVENOUS; SUBCUTANEOUS at 06:01

## 2019-01-28 RX ADMIN — AMLODIPINE BESYLATE 10 MG: 10 TABLET ORAL at 08:01

## 2019-01-28 RX ADMIN — HYDRALAZINE HYDROCHLORIDE 10 MG: 20 INJECTION INTRAMUSCULAR; INTRAVENOUS at 04:01

## 2019-01-28 RX ADMIN — CHLORHEXIDINE GLUCONATE 0.12% ORAL RINSE 15 ML: 1.2 LIQUID ORAL at 08:01

## 2019-01-28 RX ADMIN — CHLORHEXIDINE GLUCONATE 0.12% ORAL RINSE 15 ML: 1.2 LIQUID ORAL at 01:01

## 2019-01-28 NOTE — PT/OT/SLP PROGRESS
"Occupational Therapy   Treatment    Name: Peewee Nunes  MRN: [id number]  Admitting Diagnosis:  SDH (subdural hematoma)  20 Days Post-Op    Recommendations:     Discharge Recommendations: LTACH (long-term acute care hospital)  Discharge Equipment Recommendations:  hospital bed, lift device  Barriers to discharge:  Inaccessible home environment, Decreased caregiver support    Assessment:     Peewee Nunes is a 63 y.o. male with a medical diagnosis of SDH (subdural hematoma).  He presents with performance deficits affecting function are weakness, impaired sensation, impaired functional mobilty, impaired balance, impaired cognition, decreased upper extremity function, decreased safety awareness, impaired endurance, impaired self care skills, gait instability, decreased coordination, decreased lower extremity function, decreased ROM, impaired coordination.     Rehab Prognosis:  Good; patient would benefit from acute skilled OT services to address these deficits and reach maximum level of function.       Plan:     Patient to be seen 3 x/week to address the above listed problems via self-care/home management, therapeutic activities, neuromuscular re-education, therapeutic groups, cognitive retraining, therapeutic exercises  · Plan of Care Expires: 02/22/19  · Plan of Care Reviewed with: patient    Subjective   Wife:  "We're suppose to be going to LTAC today."  "He just took a medication that makes him sleepy; this is his nap time."  Pain/Comfort:  · Pain Rating 1: 0/10  · Pain Rating Post-Intervention 1: 0/10    Objective:     Communicated with: Nurse prior to session.  Patient found supine with blood pressure cuff, bed alarm, telemetry, SCD, tracheostomy, pulse ox (continuous), peripheral IV, PEG Tube, pressure relief boots upon OT entry to room.  Wife present.    General Precautions: Standard, aspiration, fall, aphasia, NPO   Orthopedic Precautions:N/A   Braces: N/A     Occupational Performance:     Bed Mobility:  "   · Patient completed Rolling/Turning to Left with  total assistance  · Patient completed Rolling/Turning to Right with total assistance     Functional Mobility/Transfers:  · Dependent drawsheet transfers    Activities of Daily Living:  · Feeding:  NPO    · Grooming: maximal assistance while supine with HOB elevated      [facility] 6 Click ADL: 6    Treatment & Education:  Patient education provided on role of OT, daily orientation, ROM, positioning and need for continued OT upon discharge.  Continued education, patient/ family training recommended. Daily orientation provided.  AAROM performed bilateral UE/LEs one set x 10 rep in all planes of motion with stretches provided at end range; sustained stretch provided for right UE external rotation, supination and shoulder flexion as well as bilateral LE ankle dorsiflexion.  Assistance and facilitation provided for upward rotation of the scapula during shoulder flexion and abduction.   Patient actively moving all 4 extremities.  Patient alert 30% of the session.  Patient followed 2/5 one step commands during the session.   Positioning provided for midline orientation with bilateral UEs elevated and heels lifted off mattress.  Gentle cervical rotation provided.   Family present during the session.   White board updated in patient's room.      Patient left supine with all lines intact, call button in reach and bed alarm onEducation:      GOALS:   Multidisciplinary Problems     Occupational Therapy Goals        Problem: Occupational Therapy Goal    Goal Priority Disciplines Outcome Interventions   Occupational Therapy Goal     OT, PT/OT     Description:  Goals set 1/23 to be addressed for 14 days with expiration date, 2/6:  Patient will increase functional independence with ADLs by performing:    Patient will demonstrate rolling to the right with max assist.  Not met   Patient will demonstrate rolling to the left with max assist.   Not met  Patient will demonstrate grooming  while upright in bed with max assist.   Not met  Patient will demonstrate upper body dressing with Max assist.  Not met   Patient will demonstrate ability to follow 4/5 one step commands.   Not met  Patient's family / caregiver will demonstrate independence with providing ROM and changes in bed positioning.   Not met                             Time Tracking:     OT Date of Treatment: 01/28/19  OT Start Time: 1003  OT Stop Time: 1027  OT Total Time (min): 24 min    Billable Minutes:Therapeutic Exercise 24    ASHLEY Oliva  1/28/2019

## 2019-01-28 NOTE — PLAN OF CARE
Problem: Occupational Therapy Goal  Goal: Occupational Therapy Goal  Goals set 1/23 to be addressed for 14 days with expiration date, 2/6:  Patient will increase functional independence with ADLs by performing:    Patient will demonstrate rolling to the right with max assist.  Not met   Patient will demonstrate rolling to the left with max assist.   Not met  Patient will demonstrate grooming while upright in bed with max assist.   Not met  Patient will demonstrate upper body dressing with Max assist.  Not met   Patient will demonstrate ability to follow 4/5 one step commands.   Not met  Patient's family / caregiver will demonstrate independence with providing ROM and changes in bed positioning.   Not met            Goals remain appropriate.  ASHLEY Oliva  1/28/2019

## 2019-01-28 NOTE — PLAN OF CARE
ALFONSO advised by NICHOLAS that Ninfa with [facility] has stated the appeal was overturned and they can take the Pt today.     ALFONSO advised by Ninfa the orders look good. They are good to accept the Pt to room 205, report number [phone]. SW set up transportation via [facility] orders and placed envelope at bedside with RN. Advised Pt wife and RN at bedside.     Caroline Soares, AMBREEN  Neurocritical Care   [facility]  [id number]

## 2019-01-28 NOTE — PROVIDER TRANSFER
[facility]    FACILITY TRANSFER ORDERS      Patient Name: Peewee Nunes  YOB: 1955    PCP: Jacob Piedra MD   PCP Address: [facility] [address] [place] [address]  PCP Phone Number: [phone]  PCP Fax: [phone]    Encounter Date: 01/28/2019    Admit to: [facility]    Vital Signs:  Routine    Diagnoses:   Active Hospital Problems    Diagnosis  POA    *SDH (subdural hematoma) [S06.5X9A]  Unknown    Hyponatremia [E87.1]  Yes    Weight loss, unintentional [R63.4]  Yes     History of 200lb wt loss over last year  Reports early satiety and change in taste      Seizure [R56.9]  No    Status epilepticus [G40.901]  No    Acute respiratory failure [J96.00]  No    Septic shock [A41.9, R65.21]  No    New onset atrial fibrillation [I48.91]  No    Pressure injury of buttock, stage 2 [L89.302]  Yes    Severe malnutrition [E43]  Unknown    Subarachnoid hemorrhage [I60.9]  Unknown    Epidural hematoma [S06.4X9A]  Yes    Brain compression [G93.5]  Yes    Cerebral edema [G93.6]  Yes    Essential hypertension [I10]  Unknown      Resolved Hospital Problems    Diagnosis Date Resolved POA    AYLIN (acute kidney injury) [N17.9] 01/16/2019 No       Allergies:  Review of patient's allergies indicates:   Allergen Reactions    Levaquin [levofloxacin] Nausea Only       Diet: NPO    Activities: Bed rest    Nursing:      Labs: CBC and CMP Once     CONSULTS:    Physical Therapy to evaluate and treat. , Occupational Therapy to evaluate and treat., Speech Therapy to evaluate and treat for Language, Swallowing and Cognition. and  to evaluate for community resources/long-range planning.    MISCELLANEOUS CARE:  PEG Care: Clean site every 24 hours.  and Routine Skin for Bedridden Patients: Apply moisture barrier cream to all skin folds and wet areas in perineal area daily and after baths and all bowel movements.    WOUND CARE ORDERS  None    Medications: Review discharge  medications with patient and family and provide education.      Current Discharge Medication List      START taking these medications    Details   acetaminophen (TYLENOL) 325 MG tablet 2 tablets (650 mg total) by Per G Tube route every 8 (eight) hours as needed for Temperature greater than (or equal to 101 degree F).  Refills: 0      acetaminophen (TYLENOL) 650 MG Supp Place 1 suppository (650 mg total) rectally every 4 (four) hours as needed.  Refills: 0      albuterol sulfate 2.5 mg/0.5 mL Nebu Take 2.5 mg by nebulization every 4 (four) hours as needed (SOB). Rescue      amLODIPine (NORVASC) 10 MG tablet 1 tablet (10 mg total) by Per G Tube route once daily.  Qty: 30 tablet      diclofenac sodium (VOLTAREN) 1 % Gel Apply topically 3 (three) times daily.      finasteride (PROSCAR) 5 mg tablet 1 tablet (5 mg total) by Per G Tube route once daily.  Qty: 30 tablet      heparin sodium,porcine (HEPARIN, PORCINE,) 5,000 unit/mL injection Inject 1 mL (5,000 Units total) into the skin every 8 (eight) hours.      !! lacosamide (VIMPAT) 50 mg Tab 2 tablets (100 mg total) by Per G Tube route 2 (two) times daily.  Qty: 120 tablet, Refills: 11      !! lacosamide (VIMPAT) 50 mg Tab 1 tablet (50 mg total) by Per G Tube route 2 (two) times daily.  Qty: 60 tablet, Refills: 11      !! magnesium oxide (MAG-OX) 400 mg (241.3 mg magnesium) tablet 2 tablets (800 mg total) by Per G Tube route as needed (if magnesium level is 1.5-1.8 mg/dL give 800 mg every 4 hours x 2 doses).  Refills: 0      !! magnesium oxide (MAG-OX) 400 mg (241.3 mg magnesium) tablet 2 tablets (800 mg total) by Per G Tube route as needed (if magnesium level is 1.4 mg/dL or less give 800 mg 4 hours x 3 doses).  Refills: 0      metoprolol tartrate (LOPRESSOR) 100 MG tablet 1 tablet (100 mg total) by Per G Tube route every 8 (eight) hours.  Qty: 90 tablet      !! ondansetron 4 mg/2 mL Soln Inject 4 mg into the vein every 12 (twelve) hours as needed.      !! ondansetron  4 mg/2 mL Soln Inject 4 mg into the vein every 8 (eight) hours as needed.      polyethylene glycol (GLYCOLAX) 17 gram PwPk 17 g by Per G Tube route 2 (two) times daily.  Refills: 0      !! potassium chloride 10% (KAYCIEL) 20 mEq/15 mL oral solution 30 mLs (40 mEq total) by Per G Tube route as needed (if potassium level is 3.5-3.8 mmol/L give 40 mEq x 1 dose).  Refills: 0      !! potassium chloride 10% (KAYCIEL) 20 mEq/15 mL oral solution 30 mLs (40 mEq total) by Per G Tube route as needed (if potassium level is 3.1-3.4 mmol/L give 40 mEq every 2 hours x 2 doses).  Refills: 0      !! potassium chloride 10% (KAYCIEL) 20 mEq/15 mL oral solution 45 mLs (60 mEq total) by Per G Tube route as needed (if potassium level is 3 mmol/L or less give 60 mEq every 2 hours x 2 doses).  Refills: 0      !! potassium, sodium phosphates (PHOS-NAK) 280-160-250 mg PwPk 2 packets by Per G Tube route as needed (if phosphorous is 2.3-2.7 mg/dL give 2 packets every 4 hours x 2 doses).  Refills: 0      !! potassium, sodium phosphates (PHOS-NAK) 280-160-250 mg PwPk 2 packets by Per G Tube route as needed (if phosphorous level is 1.6-2.2 mg/dL give 2 packets every 4 hours x 3 doses).  Refills: 0      !! potassium, sodium phosphates (PHOS-NAK) 280-160-250 mg PwPk 2 packets by Per G Tube route as needed (if phosphorous level is 1.5 mg/dL or less give 2 packets every 4 hours x 4 doses).  Refills: 0      senna-docusate 8.6-50 mg (PERICOLACE) 8.6-50 mg per tablet 1 tablet by Per G Tube route 2 (two) times daily.      silodosin (RAPAFLO) 4 mg Cap capsule Take 1 capsule (4 mg total) by mouth once daily.  Qty: 30 capsule      sodium chloride 1 gram tablet 2 tablets (2 g total) by Per G Tube route 3 (three) times daily.      sodium chloride 3% 3 % nebulizer solution Take 4 mLs by nebulization every 4 (four) hours as needed (Thick secretions).       !! - Potential duplicate medications found. Please discuss with provider.      CONTINUE these medications  which have NOT CHANGED    Details   pravastatin (PRAVACHOL) 40 MG tablet Take 1 tablet (40 mg total) by mouth every evening.  Qty: 90 tablet, Refills: 1    Associated Diagnoses: Familial hypercholesterolemia         STOP taking these medications       aspirin (ECOTRIN) 81 MG EC tablet Comments:   Reason for Stopping:         bisoprolol (ZEBETA) 10 MG tablet Comments:   Reason for Stopping:         cholecalciferol, vitamin D3, 50,000 unit capsule Comments:   Reason for Stopping:         linagliptin (TRADJENTA) 5 mg Tab tablet Comments:   Reason for Stopping:         losartan (COZAAR) 100 MG tablet Comments:   Reason for Stopping:         NIFEdipine (ADALAT CC) 60 MG TbSR Comments:   Reason for Stopping:         mv-min 16/folic acid/lut/lycop (REQ49+ ORAL) Comments:   Reason for Stopping:                    _________________________________  Hermes Singleton NP  01/28/2019

## 2019-01-28 NOTE — PT/OT/SLP EVAL
Speech Language Pathology Evaluation  Cognitive Communication    Patient Name:  Peewee Nunes   MRN:  [id number]  Admitting Diagnosis: SDH (subdural hematoma)    Recommendations:     Recommendations:                General Recommendations:  Dysphagia therapy, Cognitive-linguistic therapy and One Way Speaking Valve  Diet recommendations:  NPO, NPO   Aspiration Precautions: Alternate means of nutrition/hydration   General Precautions: Standard, aspiration, fall, aphasia    History:     Past Medical History:   Diagnosis Date    Coronary artery disease     CVD (cardiovascular disease)     Diabetes mellitus type II     Hyperlipidemia     Hypertension     Ischemic cardiomyopathy     Obesity     Obesity, morbid        Past Surgical History:   Procedure Laterality Date    APPENDECTOMY      CORONARY ARTERY BYPASS GRAFT      CRANIOTOMY, FOR SUBDURAL HEMATOMA EVACUATION Left 12/20/2018    Performed by Stevan Hull MD at [facility] OR [facility] FLR    CREATION, TRACHEOSTOMY N/A 1/8/2019    Performed by Andrew Durbin MD at [facility] OR [address] [place] [address]    HEMORRHOID SURGERY      INSERTION, PEG TUBE N/A 1/8/2019    Performed by Andrew Durbin MD at [facility] OR [address] [place] [address]       Social History: Please refer to initial assessment for complete background information     Subjective     Pt was awake, spouse at the bedside      Pain/Comfort:  · Pain Rating 1: 0/10  · Pain Rating Post-Intervention 2: 0/10    Objective:   Cognitive Status:    Unable to assess     Receptive Language:   Comprehension:   Questions Simple yes/no 30% acc; inconsistent responses  Commands  One step ~ 50% acc w/ spouse encouragement to participate     Pragmatics:    spontaneous smile appreciated x1    Expressive Language:  Verbal:    Automatic Speech  Counting no attempts made to mouth words despite SLP max assistance across all modalities  and Days of the week same as counting SLP encouraged spouse to continue to practice         Motor Speech:  Dysarthria suspected  given overall clinical picture ongoing assessment warranted     Voice:   Essentially aphonic at this time given trach in place; see passy link speaking valve assessment below   Quality strong appreciated around trach x2  spontaneously     Passy [place] Speaking Valve  Trach size/type: shiley 8.0 cuffed with cuff fully deflated   Able to phonate around trach: spontaneously x2 appreciated with strong phonation   Vocal quality with digital finger occlusion: unable to produce functional phonation this date   O2 sats at rest: 100%  O2 sats with PMSV in place: not assessed given pt with thick copious tracheal secretions contraindicative of PMSV trials at this time   Vocal quality with valve in place: not yet assessed given pt with thick copious tracheal secretions contraindicative of PMSV trials at this time   Back pressure upon removal: not yet assessed given pt with thick copious tracheal secretions contraindicative of PMSV trials at this time   Minutes PMSV worn: not yet assessed given pt with thick copious tracheal secretions contraindicative of PMSV trials at this time   Education topics addressed: SLP discussed with pt contraindications for valve placement at this time given thick copious secretions, anatomy of trach and one way valve Technicolor     Visual-Spatial:  not yet assssed    Reading:   not yet assesed     Written Expression:   not yet assessed     Treatment:  Education provided to Pt and spouse  re: SLP role in acute care setting, overall impressions and therapeutic goals. Whiteboard updated.      Assessment:   Peewee Nunes is a 63 y.o. male with an SLP diagnosis of Aphasia, Dysphagia, Dysarthria, S/P Trach and One Way Speaking Valve Training.     Goals:   Multidisciplinary Problems     SLP Goals        Problem: SLP Goal    Goal Priority Disciplines Outcome   SLP Goal     SLP    Description:  Speech Language Pathology Goals  Goals expected to be met by 12/28    1. Pt will tolerate puree diet/thin  liquids without overt clinical signs of airway compromise.   2. Pt will participate in ongoing swallow assessment  3. Pt will participate in speech language cognitive eval                       Plan:   · Patient to be seen:  3 x/week   · Plan of Care expires:  02/26/19  · Plan of Care reviewed with:  patient, spouse   · SLP Follow-Up:  Yes       Discharge recommendations:  Discharge Facility/Level of Care Needs: LTACH (long-term acute care hospital)   Barriers to Discharge:  None    Time Tracking:   SLP Treatment Date:   01/28/19  Speech Start Time:  1030  Speech Stop Time:  1040     Speech Total Time (min):  10 min    Billable Minutes: Eval 10     Amy Noe CCC-SLP  01/28/2019

## 2019-01-28 NOTE — PROGRESS NOTES
1355- [facility] at bedside to transfer pt to LTAC. Transfer/discharge instructions reviewed with pt and pt wife. All questions answered. All vss.

## 2019-01-28 NOTE — PLAN OF CARE
01/28/19 1654   Final Note   Assessment Type Final Discharge Note   Anticipated Discharge Disposition [facility]   Hospital Follow Up  Appt(s) scheduled? No   Discharge plans and expectations educations in teach back method with documentation complete? Yes   Right Care Referral Info   Post Acute Recommendation Other   Referral Type LTAC   Facility Name [facility] [address] [place], [place] [address]       Follow-up Information     Jacob Piedra MD.    Specialty:  Family Medicine  Why:  when discharged from [facility]  Contact information:  [address] [place] [address] [facility] [id number] [phone]             [facility].    Why:  LTAC/ [facility]  Contact information:  [address] [facility] [id number]                 Azeb Flores RN, CCRN-K, [facility]  Neuro-Critical Care   [phone]

## 2019-01-28 NOTE — PLAN OF CARE
01/28/19 1000   Discharge Reassessment   Assessment Type Discharge Planning Reassessment   Provided patient/caregiver education on the expected discharge date and the discharge plan Yes   Do you have any problems affording any of your prescribed medications? No   Discharge Plan A Long-term acute care facility (LTAC)   Discharge Plan B Skilled Nursing Facility   DME Needed Upon Discharge  (tbd)   Patient choice form signed by patient/caregiver Yes   Anticipated Discharge Disposition LTAC   Can the patient answer the patient profile reliably? No, cognitively impaired   How does the patient rate their overall health at the present time? (mamadou)   Describe the patient's ability to walk at the present time. Does not walk or unable to take any steps at all   How often would a person be available to care for the patient? Occasionally   Number of comorbid conditions (as recorded on the chart) Four   During the past month, has the patient often been bothered by feeling down, depressed or hopeless? (mamadou)   During the past month, has the patient often been bothered by little interest or pleasure in doing things? (mamadou)   Post-Acute Status   Post-Acute Authorization Placement   Post-Acute Placement Status Authorization Obtained       CM received a phone call from Ninfa at [facility].  Per Ninfa, [facility] has approved LTAC.  Patient's wife informed.  [facility] team to update LTAC orders.     Azeb Flores RN, CCRN-K, [facility]  Neuro-Critical Care   [phone]

## 2019-01-28 NOTE — PT/OT/SLP DISCHARGE
Occupational Therapy Discharge Summary    Peewee Nunes  MRN: [id number]   Principal Problem: SDH (subdural hematoma)      Patient Discharged from acute Occupational Therapy on 1/28.  Please refer to prior OT note dated 1/28 for functional status.    Assessment:      Patient appropriate for care in another setting.    Objective:     GOALS:   Multidisciplinary Problems     Occupational Therapy Goals        Problem: Occupational Therapy Goal    Goal Priority Disciplines Outcome Interventions   Occupational Therapy Goal     OT, PT/OT     Description:  Goals set 1/23 to be addressed for 14 days with expiration date, 2/6:  Patient will increase functional independence with ADLs by performing:    Patient will demonstrate rolling to the right with max assist.  Not met   Patient will demonstrate rolling to the left with max assist.   Not met  Patient will demonstrate grooming while upright in bed with max assist.   Not met  Patient will demonstrate upper body dressing with Max assist.  Not met   Patient will demonstrate ability to follow 4/5 one step commands.   Not met  Patient's family / caregiver will demonstrate independence with providing ROM and changes in bed positioning.   Not met                             Reasons for Discontinuation of Therapy Services  Transfer to alternate level of care.      Plan:     Patient Discharged to: Long Term Acute Care    ASHLEY Oliva  1/28/2019

## 2019-01-29 PROBLEM — Z93.0 TRACHEOSTOMY DEPENDENT: Status: ACTIVE | Noted: 2019-01-29

## 2019-02-01 PROBLEM — R74.01 TRANSAMINITIS: Status: ACTIVE | Noted: 2019-02-01

## 2019-02-02 PROBLEM — E87.0 HYPERNATREMIA: Status: RESOLVED | Noted: 2019-01-28 | Resolved: 2019-02-02

## 2019-02-02 PROBLEM — R74.8 ABNORMAL LIVER ENZYMES: Status: ACTIVE | Noted: 2019-02-02

## 2019-02-03 PROBLEM — N39.0 UTI (URINARY TRACT INFECTION): Status: ACTIVE | Noted: 2019-02-03

## 2019-02-03 PROBLEM — D72.829 LEUKOCYTOSIS: Status: ACTIVE | Noted: 2019-02-03

## 2019-02-10 NOTE — PROGRESS NOTES
[facility]  Neurosurgery  Progress Note    Subjective:     History of Present Illness: 62 yo M with PMH of CAD, CABG in 2006, DM, HTN, HLD on ASA presents to the ED for AMS. Patient fell 4 days ago with about 2 minutes of LOC. Family is unsure of what caused the fall. Full workup done including a CTH at [place] that was negative. Patient went home and stopped taking his ASA. This morning, his family noticed he was more confused. He went to [place] and CTH was done that revealed a 1.7cm left frontalpariental SDH with a 5 mm right sided midline shift and a left SAH. Patient was transferred to [facility]. History was obtained per family as patient unable to give history 2/2 confusion with expressive and receptive aphasia.    Post-Op Info:  Procedure(s) (LRB):  CRANIOTOMY, FOR SUBDURAL HEMATOMA EVACUATION (Left)   5 Days Post-Op     Interval History: no AE. AFVSS.      Medications:  Continuous Infusions:   ketamine (KETALAR) infusion 50 mcg/kg/min (12/25/18 0700)    norepinephrine bitartrate-D5W 0.01 mcg/kg/min (12/25/18 0700)     Scheduled Meds:   ceFEPime (MAXIPIME) IVPB  1 g Intravenous Q12H    chlorhexidine  15 mL Mouth/Throat BID    famotidine  20 mg Per NG tube BID    heparin (porcine)  5,000 Units Subcutaneous Q8H    lacosamide (VIMPAT) in saline IVPB  200 mg Intravenous Q12H    levetiracetam IVPB  1,000 mg Intravenous Q12H    lorazepam  2 mg Intravenous Once    mupirocin  1 g Nasal BID    pravastatin  40 mg Oral QHS    senna-docusate 8.6-50 mg  1 tablet Oral Daily    sodium chloride 0.9%  3 mL Intravenous Q8H    vancomycin (VANCOCIN) IVPB  15 mg/kg (Dosing Weight) Intravenous Q24H     PRN Meds:sodium chloride, acetaminophen, diclofenac sodium, HYDROcodone-acetaminophen, magnesium oxide, magnesium oxide, magnesium sulfate IVPB, magnesium sulfate IVPB, morphine, ondansetron, potassium chloride 10%, potassium chloride 10%, potassium chloride 10%, potassium, sodium phosphates, potassium,  sodium phosphates, potassium, sodium phosphates       Objective:     Weight: 85.7 kg (189 lb)  Body mass index is 27.12 kg/m².  Vital Signs (Most Recent):  Temp: 97.3 °F (36.3 °C) (12/25/18 0702)  Pulse: 93 (12/25/18 0702)  Resp: 12 (12/25/18 0702)  BP: 110/75 (12/25/18 0702)  SpO2: 95 % (12/25/18 0702) Vital Signs (24h Range):  Temp:  [97.3 °F (36.3 °C)-98.5 °F (36.9 °C)] 97.3 °F (36.3 °C)  Pulse:  [] 93  Resp:  [5-27] 12  SpO2:  [93 %-100 %] 95 %  BP: (102-188)/() 110/75  Arterial Line BP: ()/() 111/77     Date 12/25/18 0700 - 12/26/18 0659   Shift 7714-2698 1845-1806 1670-4512 24 Hour Total   INTAKE   I.V.(mL/kg) 36.5(0.4)   36.5(0.4)   Shift Total(mL/kg) 36.5(0.4)   36.5(0.4)   OUTPUT   Urine(mL/kg/hr) 10   10   Shift Total(mL/kg) 10(0.1)   10(0.1)   Weight (kg) 85.7 85.7 85.7 85.7              Vent Mode: A/C  Oxygen Concentration (%):  [] 71  Resp Rate Total:  [16 br/min-20 br/min] 16 br/min  Vt Set:  [500 mL] 500 mL  PEEP/CPAP:  [5 cmH20] 5 cmH20  Mean Airway Pressure:  [8.4 cmH20-10 cmH20] 8.8 cmH20         NG/OG Tube 12/23/18 1500 Center mouth (Active)   Placement Check placement verified by aspirate characteristics;physician notified 12/24/2018  7:02 AM   Securement taped to commercial device 12/24/2018  7:02 AM   Clamp Status/Tolerance no abdominal discomfort;no abdominal distention;no emesis;no nausea;no residual;no restlessness;clamped 12/24/2018  7:02 AM   Suction Setting/Drainage Method dependent drainage 12/24/2018  7:02 AM   Insertion Site Appearance no redness, warmth, tenderness, skin breakdown, drainage 12/24/2018  7:02 AM   Drainage None 12/24/2018  7:02 AM   Flush/Irrigation flushed w/;water;no resistance met 12/24/2018  7:02 AM   Intake (mL) 30 mL 12/24/2018  9:00 AM   Residual Amount (ml) 0 ml 12/24/2018  7:02 AM            Urethral Catheter 12/20/18 2350 Non-latex;Straight-tip 16 Fr. (Active)   Site Assessment Clean;Intact 12/24/2018  7:02 AM   Collection  "Container Urimeter 12/24/2018  7:02 AM   Securement Method secured to top of thigh w/ adhesive device 12/24/2018  7:02 AM   Catheter Care Performed yes 12/24/2018  7:02 AM   Reason for Continuing Urinary Catheterization Urinary retention 12/24/2018  7:02 AM   CAUTI Prevention Bundle StatLock in place w 1" slack;Intact seal between catheter & drainage tubing;Drainage bag off the floor;Green sheeting clip in use;No dependent loops or kinks;Drainage bag not overfilled (<2/3 full);Drainage bag below bladder 12/24/2018  7:02 AM   Output (mL) 10 mL 12/24/2018 11:00 AM       Neurosurgery Physical Exam     E1VTM5  Intubated., sedated. ketamine. Levo.   Does not open eyes  PERRL. + cough/gag.   WithDraws to stimx4      Significant Labs:  Recent Labs   Lab 12/23/18 2245 12/24/18 0248 12/24/18  0954 12/25/18  0232   * 124*  --  96    135*  --  135*   K 3.7 3.7 4.1 4.0   CL 99 99  --  103   CO2 25 23  --  22*   BUN 18 20  --  26*   CREATININE 0.9 1.1  --  0.8   CALCIUM 8.9 8.8  --  8.7   MG 2.1 2.0  --  2.0     Recent Labs   Lab 12/23/18 2245 12/24/18 0248 12/25/18  0232   WBC 22.78*  22.78* 24.31* 17.06*   HGB 11.3*  11.3* 11.6* 10.3*   HCT 32.4*  32.4* 33.0* 31.5*     204 255 220     No results for input(s): LABPT, INR, APTT in the last 48 hours.  Microbiology Results (last 7 days)     Procedure Component Value Units Date/Time    Blood culture [[id number]] Collected:  12/24/18 0005    Order Status:  Completed Specimen:  Blood from Peripheral, Hand, Left Updated:  12/25/18 0612     Blood Culture, Routine No Growth to date     Blood Culture, Routine No Growth to date    Blood culture [[id number]] Collected:  12/24/18 0006    Order Status:  Completed Specimen:  Blood from Line, Arterial, Right Updated:  12/25/18 0612     Blood Culture, Routine No Growth to date     Blood Culture, Routine No Growth to date    Culture, Respiratory with Gram Stain [[id number]] Collected:  12/24/18 0446    Order Status:  " Completed Specimen:  Respiratory from Endotracheal Aspirate Updated:  12/24/18 0905     Gram Stain (Respiratory) <10 epithelial cells per low power field.     Gram Stain (Respiratory) Moderate WBC's     Gram Stain (Respiratory) No organisms seen    Urine culture [[id number]]     Order Status:  Canceled Specimen:  Urine, Catheterized         Recent Lab Results       12/25/18  0330   12/25/18  0232   12/24/18  0954   12/24/18  0843        Immature Granulocytes   0.5         Immature Grans (Abs)   0.09  Comment:  Mild elevation in immature granulocytes is non specific and   can be seen in a variety of conditions including stress response,   acute inflammation, trauma and pregnancy. Correlation with other   laboratory and clinical findings is essential.           TB Induration 48 - 72 hr read       0     Albumin   1.8         Alkaline Phosphatase   59         Allens Test N/A           ALT   <5         Anion Gap   10         AST   10         Baso #   0.02         Basophil%   0.1         Total Bilirubin   1.5  Comment:  For infants and newborns, interpretation of results should be based  on gestational age, weight and in agreement with clinical  observations.  Premature Infant recommended reference ranges:  Up to 24 hours.............<8.0 mg/dL  Up to 48 hours............<12.0 mg/dL  3-5 days..................<15.0 mg/dL  6-29 days.................<15.0 mg/dL           Site [facility]           BUN, Bld   26         Calcium   8.7         Chloride   103         CO2   22         Creatinine   0.8         DelSys Adult Vent           Differential Method   Automated         eGFR if    >60.0         eGFR if non    >60.0  Comment:  Calculation used to obtain the estimated glomerular filtration  rate (eGFR) is the CKD-EPI equation.            Eos #   0.0         Eosinophil%   0.2         FiO2 70           Glucose   96         Gran # (ANC)   14.0         Gran%   82.0         Hematocrit   31.5          <<----- Click to add NO pertinent Family History Hemoglobin   10.3         Lactate, Hemal     1.0  Comment:  Falsely low lactic acid results can be found in samples   containing >=13.0 mg/dL total bilirubin and/or >=3.5 mg/dL   direct bilirubin.         Lymph #   1.5         Lymph%   8.7         Magnesium   2.0         MCH   31.3         MCHC   32.7         MCV   96         Min Vol 9.48           Mode AC/PRVC           Mono #   1.5         Mono%   8.5         MPV   10.9         nRBC   0         PEEP 5           Phosphorus   2.8         PiP 21           Platelets   220         POC BE 3           POC HCO3 26.2           POC PCO2 35.5           POC PH 7.476           POC PO2 104           POC SATURATED O2 98           POC TCO2 27           Potassium   4.0 4.1       Total Protein   4.8         Rate 16           RBC   3.29         RDW   14.6         Sample ARTERIAL           Sodium   135         Sp02 98           Vt 500           WBC   17.06               Significant Diagnostics:  I have reviewed all pertinent imaging results/findings within the past 24 hours.    Assessment/Plan:     * SDH (subdural hematoma)    64 yo male with L aSDH now s/p craniotomy for evacuation (12/20).    Neuro stable  Cont neuro checks  --Continue care per primary team.  --Continue AED per NCC, f/u cEEG  SBP < 160  --WTE per primary team when safe.   Goal eunatremia.   SARAH/SCD's, sqh. Gi ppx.     dispo- continue ICU care.              Joo Buckley MD  Neurosurgery  [facility]

## 2019-02-26 ENCOUNTER — HOSPITAL ENCOUNTER (OUTPATIENT)
Dept: RADIOLOGY | Facility: HOSPITAL | Age: 64
Discharge: HOME | End: 2019-02-26
Attending: FAMILY MEDICINE
Payer: MEDICARE

## 2019-02-26 ENCOUNTER — OFFICE VISIT (OUTPATIENT)
Dept: NEUROSURGERY | Facility: CLINIC | Age: 64
End: 2019-02-26
Payer: MEDICARE

## 2019-02-26 VITALS — HEART RATE: 46 BPM | TEMPERATURE: 94 F | SYSTOLIC BLOOD PRESSURE: 123 MMHG | DIASTOLIC BLOOD PRESSURE: 88 MMHG

## 2019-02-26 DIAGNOSIS — Z98.890 S/P CRANIOTOMY: ICD-10-CM

## 2019-02-26 DIAGNOSIS — S06.5XAA SDH (SUBDURAL HEMATOMA): Primary | ICD-10-CM

## 2019-02-26 DIAGNOSIS — S06.5XAA SDH (SUBDURAL HEMATOMA): ICD-10-CM

## 2019-02-26 PROCEDURE — 70450 CT HEAD/BRAIN W/O DYE: CPT | Mod: 26,,, | Performed by: RADIOLOGY

## 2019-02-26 PROCEDURE — 99999 PR PBB SHADOW E&M-EST. PATIENT-LVL III: CPT | Mod: PBBFAC,,, | Performed by: PHYSICIAN ASSISTANT

## 2019-02-26 PROCEDURE — 99999 PR PBB SHADOW E&M-EST. PATIENT-LVL III: ICD-10-PCS | Mod: PBBFAC,,, | Performed by: PHYSICIAN ASSISTANT

## 2019-02-26 PROCEDURE — 99024 PR POST-OP FOLLOW-UP VISIT: ICD-10-PCS | Mod: S$GLB,,, | Performed by: PHYSICIAN ASSISTANT

## 2019-02-26 PROCEDURE — 70450 CT HEAD/BRAIN W/O DYE: CPT | Mod: TC

## 2019-02-26 PROCEDURE — 99024 POSTOP FOLLOW-UP VISIT: CPT | Mod: S$GLB,,, | Performed by: PHYSICIAN ASSISTANT

## 2019-02-26 PROCEDURE — 70450 CT HEAD WITHOUT CONTRAST: ICD-10-PCS | Mod: 26,,, | Performed by: RADIOLOGY

## 2019-02-26 RX ORDER — CIPROFLOXACIN 500 MG/5ML
250 KIT ORAL 2 TIMES DAILY
COMMUNITY
End: 2019-04-25

## 2019-02-26 NOTE — PROGRESS NOTES
[facility] - Neurosurgery 7th Fl  Neurosurgery  Post Operative    Patient Name: Peewee Nunes  MRN: [id number]  Primary Care Provider: Jacob Piedra MD    Subjective:     Chief Complaint/Reason for Admission: FU s/p crani for SDH    History of Present Illness:   Mr. Peewee Nunes is a 63 year old male with a PMHx of CAD, CABG in 2006, on ASA, DM, HTN, and HLD, who originally presented to [facility] with a complaint of confusion that began after a fall 4 days prior. Family reported LOC for about 2 mins. Head CT that was initially done was negative for any detrimental findings. He was discharged home. When the confusion progressed, family brought him back to the ED. Head CT at that time showed a 1.7cm left frontalpariental SDH with 5 mm of midline shift and a left SAH. He was transferred to [facility] on 12/20/19 for higher level of care. On 12/20, he underwent a craniotomy for evacuation of the SDH by Dr. Hull. On 1/24/19, he was discharged to [facility]. He presents to clinic today for post operative follow up after imaging. He arrives via [facility]. Family member present during appointment. Mr. Nunes denies any headaches, pain, dizziness, N/V, vision changes, seizure like activity, or worsening weakness. He has completed his course of Keppra for seizure prevention. Family member states that his speech continues to improve. She denies any symptoms of concern besides his bradycardia.         (Not in a hospital admission)    Review of patient's allergies indicates:   Allergen Reactions    Levaquin [levofloxacin] Nausea Only       Past Medical History:   Diagnosis Date    Coronary artery disease     CVD (cardiovascular disease)     Diabetes mellitus type II     Hyperlipidemia     Hypertension     Ischemic cardiomyopathy     Obesity     Obesity, morbid      Past Surgical History:   Procedure Laterality Date    APPENDECTOMY      CORONARY ARTERY BYPASS GRAFT      CRANIOTOMY, FOR SUBDURAL HEMATOMA  EVACUATION Left 2018    Performed by Stevan Hull MD at [facility] OR 2ND FLR    CREATION, TRACHEOSTOMY N/A 2019    Performed by Andrew Durbin MD at [facility] OR 2ND FLR    HEMORRHOID SURGERY      INSERTION, PEG TUBE N/A 2019    Performed by Andrew Durbin MD at [facility] OR 2ND FLR     Family History     Problem Relation (Age of Onset)    Cancer Father    Diabetes Mother    Heart disease Mother, Father    Hyperlipidemia Mother    Hypertension Mother    Stroke Mother        Tobacco Use    Smoking status: Former Smoker     Packs/day: 1.50     Years: 25.00     Pack years: 37.50     Types: Cigarettes     Last attempt to quit: 2002     Years since quittin.2    Smokeless tobacco: Never Used   Substance and Sexual Activity    Alcohol use: No    Drug use: No    Sexual activity: Yes     Review of Systems  Objective:        There is no height or weight on file to calculate BMI.  Vital Signs (Most Recent):  Temp: (!) 93.8 °F (34.3 °C) (19 1140)  Pulse: (!) 46 (19 1140)  BP: 123/88 (19 1140) Vital Signs (24h Range):  [unfilled]            Gastrostomy/Enterostomy 19 1441 Percutaneous endoscopic gastrostomy (PEG) midline (Active)            Urethral Catheter 19 1800 (Active)       Neurosurgery Physical Exam  General: well developed, well nourished, no distress.   Head: normocephalic, craniotomy incision well healed  Neurologic: Alert and awake. Continued expressive aphasia and dysarthria. AAO to person. Oriented to place (hospital) if given choices. Not oriented to year.   Cranial nerves: Face symmetric, tongue midline, CN II-XII grossly intact.   Eyes: pupils equal, round, reactive to light with accomodation.    Pulmonary: No signs of respiratory distress  Sensory: response to light touch throughout  Motor Strength: Moves all extremities spontaneously. Generalized weakness. BUE 4+/5.  BLE 4/5.  Pronator Drift: no drift  Finger-to-nose: intact bilaterally  Babinski:  absent  Clonus: absent        Significant Diagnostics:  Head CT:  I independently reviewed the imaging.     Advanced microvascular ischemic type changes and involution.  Findings are stable compared with the prior study.    Postoperative changes from prior left-sided craniotomy.      Assessment/Plan:     Assessment:  Mr. Peewee Nunes is a 63 year old male with a PMHx of CAD, CABG in 2006, on ASA, DM, HTN, and HLD, who originally presented to [facility] with a complaint of confusion that began after a fall 4 days prior. Family reported LOC for about 2 mins. Head CT that was initially done was negative for any detrimental findings. He was discharged home. When the confusion progressed, family brought him back to the ED. Head CT at that time showed a 1.7cm left frontalpariental SDH with 5 mm of midline shift and a left SAH. He was transferred to [facility] on 12/20/19 for higher level of care. On 12/20, he underwent a craniotomy for evacuation of the SDH by Dr. Hull.    Plan:  -Patient neurologically stable on exam  -Head CT shows resolution of previous hemorrhage. No evidence of acute hemorrhage.  -Incision is well healed  -No activity or medication restrictions from a NSGY standpoint  -FU with PCP or Cardiology in 1-2 weeks for bradycardia. Patient's family member informed that it is his responsibility to schedule that appt. She voiced understanding.   -DC from clinic. RTC PRN.   -Encouraged patient and family to call the clinic with any questions, concerns, or exam changes prior to follow up appt.       NANDINI Deluca  Neurosurgery  [facility] - Neurosurgery 7th Fl

## 2019-04-15 ENCOUNTER — TELEPHONE (OUTPATIENT)
Dept: FAMILY MEDICINE | Facility: CLINIC | Age: 64
End: 2019-04-15

## 2019-04-15 NOTE — TELEPHONE ENCOUNTER
Rebekah called stating    Dec 16 th fell at home and went to ER  Release home 04/14/19  270 lb leading up to Dec, decrease appetite    Released from [facility] for Rehab     PT OT and ST order for     Trac  Failed swallow test  Having Seizures not on Seizure medication  Has mobley catheter  May need referral to Urology and Neurology     Pts wife Dona [phone]

## 2019-04-15 NOTE — TELEPHONE ENCOUNTER
Contacted pts wife and informed her I have and 8 am opening on Thursday this week. She will call [facility] to see if they can bring pt to appt. She will give me a call back

## 2019-04-15 NOTE — TELEPHONE ENCOUNTER
----- Message from Jaskaran Man sent at 4/15/2019  4:19 PM CDT -----  Contact: Rebekah @ [facility]  Peewee Nunes  MRN: [id number]  : 1955  PCP: Jacob Piedra  Home Phone      [phone]  Work Phone      Not on file.  Mobile          [phone]      MESSAGE: recently released to home after hospital stay & rehab since Dec -- has list of issues that need to be addressed -- asking to speak with Dr Piedra's Nurse    Call [phone]    PCP: Tadeo

## 2019-04-17 DIAGNOSIS — E11.9 TYPE 2 DIABETES MELLITUS WITHOUT COMPLICATION, UNSPECIFIED WHETHER LONG TERM INSULIN USE: ICD-10-CM

## 2019-04-22 ENCOUNTER — TELEPHONE (OUTPATIENT)
Dept: FAMILY MEDICINE | Facility: CLINIC | Age: 64
End: 2019-04-22

## 2019-04-22 NOTE — TELEPHONE ENCOUNTER
Spoke to patients wife and she verbally understood that Peewee's new appt is set for 4/25 at 10am.

## 2019-04-22 NOTE — TELEPHONE ENCOUNTER
----- Message from Deirdre Weeks sent at 2019  3:59 PM CDT -----  Contact: Rebekah Maciel [facility]   Peewee Nunes  MRN: [id number]  : 1955  PCP: Jacob Piedra  Home Phone      [phone]  Work Phone      Not on file.  Mobile          [phone]      MESSAGE:   Pt requests a sooner appointment than the  can schedule.  Does patient feel like they need to be seen today:  This week before Thursday   What is the nature of the appointment:  Follow up - medications, health issues  What visit type:  EP  Did you check other providers/department schedules for availability:  yes  Comments: pt will be coming by [facility], he can not sit in a wheel chair for long.     Phone:  [phone]

## 2019-04-25 ENCOUNTER — TELEPHONE (OUTPATIENT)
Dept: NEUROLOGY | Facility: CLINIC | Age: 64
End: 2019-04-25

## 2019-04-25 ENCOUNTER — OFFICE VISIT (OUTPATIENT)
Dept: FAMILY MEDICINE | Facility: CLINIC | Age: 64
End: 2019-04-25
Payer: MEDICARE

## 2019-04-25 ENCOUNTER — TELEPHONE (OUTPATIENT)
Dept: FAMILY MEDICINE | Facility: CLINIC | Age: 64
End: 2019-04-25

## 2019-04-25 VITALS
HEART RATE: 76 BPM | SYSTOLIC BLOOD PRESSURE: 118 MMHG | DIASTOLIC BLOOD PRESSURE: 72 MMHG | RESPIRATION RATE: 16 BRPM | HEIGHT: 71 IN | BODY MASS INDEX: 25.2 KG/M2 | WEIGHT: 180 LBS

## 2019-04-25 DIAGNOSIS — Z78.9 IMPAIRED MOBILITY AND ACTIVITIES OF DAILY LIVING: ICD-10-CM

## 2019-04-25 DIAGNOSIS — Z74.09 IMPAIRED MOBILITY AND ACTIVITIES OF DAILY LIVING: ICD-10-CM

## 2019-04-25 DIAGNOSIS — I10 ESSENTIAL HYPERTENSION: ICD-10-CM

## 2019-04-25 DIAGNOSIS — G40.909 SEIZURE DISORDER: Primary | ICD-10-CM

## 2019-04-25 DIAGNOSIS — R33.9 URINARY RETENTION: ICD-10-CM

## 2019-04-25 DIAGNOSIS — G93.5 BRAIN COMPRESSION: ICD-10-CM

## 2019-04-25 PROCEDURE — 99999 PR PBB SHADOW E&M-EST. PATIENT-LVL III: ICD-10-PCS | Mod: PBBFAC,,, | Performed by: FAMILY MEDICINE

## 2019-04-25 PROCEDURE — 3074F SYST BP LT 130 MM HG: CPT | Mod: CPTII,S$GLB,, | Performed by: FAMILY MEDICINE

## 2019-04-25 PROCEDURE — 1101F PT FALLS ASSESS-DOCD LE1/YR: CPT | Mod: CPTII,S$GLB,, | Performed by: FAMILY MEDICINE

## 2019-04-25 PROCEDURE — 99999 PR PBB SHADOW E&M-EST. PATIENT-LVL III: CPT | Mod: PBBFAC,,, | Performed by: FAMILY MEDICINE

## 2019-04-25 PROCEDURE — 1101F PR PT FALLS ASSESS DOC 0-1 FALLS W/OUT INJ PAST YR: ICD-10-PCS | Mod: CPTII,S$GLB,, | Performed by: FAMILY MEDICINE

## 2019-04-25 PROCEDURE — 99215 OFFICE O/P EST HI 40 MIN: CPT | Mod: S$GLB,,, | Performed by: FAMILY MEDICINE

## 2019-04-25 PROCEDURE — 99215 PR OFFICE/OUTPT VISIT, EST, LEVL V, 40-54 MIN: ICD-10-PCS | Mod: S$GLB,,, | Performed by: FAMILY MEDICINE

## 2019-04-25 PROCEDURE — 3074F PR MOST RECENT SYSTOLIC BLOOD PRESSURE < 130 MM HG: ICD-10-PCS | Mod: CPTII,S$GLB,, | Performed by: FAMILY MEDICINE

## 2019-04-25 PROCEDURE — 3078F PR MOST RECENT DIASTOLIC BLOOD PRESSURE < 80 MM HG: ICD-10-PCS | Mod: CPTII,S$GLB,, | Performed by: FAMILY MEDICINE

## 2019-04-25 PROCEDURE — 3078F DIAST BP <80 MM HG: CPT | Mod: CPTII,S$GLB,, | Performed by: FAMILY MEDICINE

## 2019-04-25 RX ORDER — AMLODIPINE BESYLATE 10 MG/1
10 TABLET ORAL DAILY
Qty: 90 TABLET | Refills: 3 | Status: SHIPPED | OUTPATIENT
Start: 2019-04-25 | End: 2020-04-30 | Stop reason: SDUPTHER

## 2019-04-25 RX ORDER — METOPROLOL TARTRATE 100 MG/1
100 TABLET ORAL 4 TIMES DAILY
Qty: 360 TABLET | Refills: 3 | Status: SHIPPED | OUTPATIENT
Start: 2019-04-25 | End: 2019-04-30 | Stop reason: ALTCHOICE

## 2019-04-25 RX ORDER — FINASTERIDE 5 MG/1
5 TABLET, FILM COATED ORAL DAILY
Qty: 90 TABLET | Refills: 3 | Status: SHIPPED | OUTPATIENT
Start: 2019-04-25 | End: 2020-04-30 | Stop reason: SDUPTHER

## 2019-04-25 RX ORDER — SILODOSIN 4 MG/1
CAPSULE ORAL
Qty: 30 CAPSULE | Refills: 11
Start: 2019-04-25 | End: 2019-04-25 | Stop reason: SDUPTHER

## 2019-04-25 RX ORDER — SODIUM CHLORIDE FOR INHALATION 0.9 %
3 VIAL, NEBULIZER (ML) INHALATION
Qty: 180 ML | Refills: 12 | Status: SHIPPED | OUTPATIENT
Start: 2019-04-25 | End: 2020-04-24

## 2019-04-25 RX ORDER — SILODOSIN 4 MG/1
4 CAPSULE ORAL DAILY
Qty: 30 CAPSULE | Refills: 11 | Status: SHIPPED | OUTPATIENT
Start: 2019-04-25 | End: 2019-04-30 | Stop reason: SDUPTHER

## 2019-04-25 RX ORDER — AMOXICILLIN 250 MG
1 CAPSULE ORAL DAILY
Qty: 180 TABLET | Refills: 3 | Status: SHIPPED | OUTPATIENT
Start: 2019-04-25 | End: 2019-05-27

## 2019-04-25 RX ORDER — POLYETHYLENE GLYCOL 3350 17 G/17G
17 POWDER, FOR SOLUTION ORAL 2 TIMES DAILY
Qty: 120 EACH | Refills: 3 | Status: SHIPPED | OUTPATIENT
Start: 2019-04-25 | End: 2019-05-27

## 2019-04-25 RX ORDER — MECLIZINE HYDROCHLORIDE CHEWABLE TABLETS 25 MG/1
32 TABLET, CHEWABLE ORAL 2 TIMES DAILY PRN
Qty: 180 TABLET | Refills: 3 | Status: SHIPPED | OUTPATIENT
Start: 2019-04-25 | End: 2019-05-27 | Stop reason: SDUPTHER

## 2019-04-25 RX ORDER — MECLIZINE HYDROCHLORIDE CHEWABLE TABLETS 25 MG/1
25 TABLET, CHEWABLE ORAL 3 TIMES DAILY PRN
COMMUNITY
End: 2019-04-25

## 2019-04-25 NOTE — TELEPHONE ENCOUNTER
Tammy speech therapist reported patient is refusing to do swallowing therapy and feels he should remain NPO.

## 2019-04-25 NOTE — PROGRESS NOTES
Subjective:       Patient ID: Peewee Nunes is a 64 y.o. male.    Chief Complaint: Follow-up (check up)    Pt is a 64 y.o. male who presents for check up for S/P CVA, subdural hematoma and seizure disorder. Doing well on current meds. Denies any side effects. Prevention is up to date.    Review of Systems   Constitutional: Positive for activity change. Negative for appetite change.        Pt is only able to sit up with some assistance, so will need a  hospital bed for positioning; best that he remain at 30 degrees bec/ of his PEG tubes feedings, which he has bec/ of his recent CVA and is not able to swallow.   HENT: Negative for congestion, ear pain, sneezing and sore throat.    Eyes: Negative for redness and visual disturbance.   Respiratory: Negative for cough, chest tightness and stridor.         Pt needs frequent throat suctioning bec/ he is unable to expectorate or cough from general weakness and deconditioning. His tidal exchange is very limited and poor so frequent suctioning of his throat is necessary since his CVA injury & tracheostomy.   Cardiovascular: Negative for chest pain.   Gastrointestinal: Negative for abdominal pain, blood in stool, diarrhea, nausea and vomiting.   Genitourinary: Negative for difficulty urinating, dysuria and hematuria.   Musculoskeletal: Negative for arthralgias, back pain, joint swelling, myalgias and neck pain.   Skin: Negative for rash.   Neurological: Negative for dizziness.        Able to sit up and good hand and foot movement   Psychiatric/Behavioral: Negative for agitation. The patient is not nervous/anxious.        Objective:      Physical Exam   Constitutional: He appears well-developed.   Bed bound 65 y/o W M with dysarthria, dysphagia and poor swallowing   HENT:   Head: Normocephalic.   Eyes: Pupils are equal, round, and reactive to light.   Neck: Normal range of motion. Neck supple. No thyromegaly present.   Cardiovascular: Normal rate and regular rhythm. Exam  reveals no friction rub.   No murmur heard.  Pulmonary/Chest: Effort normal. No respiratory distress. He has no wheezes.   Abdominal: There is no tenderness. There is no rebound and no guarding.   Musculoskeletal: He exhibits no edema or tenderness.   Lymphadenopathy:     He has no cervical adenopathy.   Neurological: He is alert. He displays abnormal reflex. A sensory deficit is present. No cranial nerve deficit. He exhibits abnormal muscle tone. Coordination abnormal.   Moves all extremities; weak    Skin: Skin is warm and dry.   Psychiatric: He has a normal mood and affect. Judgment and thought content normal.       Assessment:       1. Seizure disorder    2. Urinary retention    3. Essential hypertension    4. Impaired mobility and activities of daily living    5. Brain compression        Plan:   Peewee was seen today for follow-up.    Diagnoses and all orders for this visit:    Seizure disorder  -     Ambulatory referral to Neurology    Urinary retention  -     Ambulatory referral to Urology    Essential hypertension    Impaired mobility and activities of daily living    Brain compression    Other orders  -     silodosin (RAPAFLO) 4 mg Cap capsule; Take 1 capsule (4 mg total) by mouth once daily.  -     sodium chloride for inhalation (SODIUM CHLORIDE 0.9%) 0.9 % nebulizer solution; Take 3 mLs by nebulization as needed.

## 2019-04-25 NOTE — TELEPHONE ENCOUNTER
Spoke with wife, she wanted to hold on appointment for a month due to getting practice for patient with lift and wheelchair. Appointment scheduled for 5/27/2019.

## 2019-04-26 ENCOUNTER — TELEPHONE (OUTPATIENT)
Dept: FAMILY MEDICINE | Facility: CLINIC | Age: 64
End: 2019-04-26

## 2019-04-26 DIAGNOSIS — G89.28 OTHER CHRONIC POSTPROCEDURAL PAIN: Primary | ICD-10-CM

## 2019-04-26 NOTE — TELEPHONE ENCOUNTER
----- Message from Jaskaran Man sent at 2019  1:42 PM CDT -----  Contact: Rebekah @ [facility]  Peewee Nunes  MRN: [id number]  : 1955  PCP: Jacob Piedra  Home Phone      [phone]  Work Phone      Not on file.  Mobile          [phone]      MESSAGE: Requesting Dr Piedra add ROBERT air mattress to equipment list -- diagnosis of bed bound - incontinence - CVA  --- also, do you want labs drawn at any point?    Call Rebekah @ [phone]    PCP: Tadeo

## 2019-04-26 NOTE — TELEPHONE ENCOUNTER
----- Message from Christine Clark sent at 2019  8:52 AM CDT -----  Contact: Nancy from [facility]  MRN: [id number]  : 1955  PCP: Jacob Piedra  Home Phone      [phone]  Work Phone      Not on file.  Mobile          [phone]      MESSAGE:   Nancy is asking for chart notes to be amended to support hospital bed and suction. Please have them say:    He needs hospital bed for positioning that is not possible with ordinary bed. His head needs to be elevated more than 30 degrees.     She needs to discuss wording for suction. Please advise.    Nancy  [phone]    Fax:  [phone]

## 2019-04-26 NOTE — TELEPHONE ENCOUNTER
Spoke to Shad, she verbally understood and requested that I faxed the updated notes to the office at [phone].

## 2019-04-26 NOTE — TELEPHONE ENCOUNTER
Nancy is asking for chart notes to be amended to support hospital bed and suction. Please have them say:     He needs hospital bed for positioning that is not possible with ordinary bed. His head needs to be elevated more than 30 degrees.     For suction:    He received a tracheostomy tube and a PEG tube. He was since had the tracheostomy removed though remains NPO, receiving all nutrition and medications via PEG tube.     Add Why Patient would need suctioning

## 2019-04-29 ENCOUNTER — TELEPHONE (OUTPATIENT)
Dept: FAMILY MEDICINE | Facility: CLINIC | Age: 64
End: 2019-04-29

## 2019-04-29 RX ORDER — TRAMADOL HYDROCHLORIDE 50 MG/1
50 TABLET ORAL EVERY 12 HOURS PRN
Qty: 90 TABLET | Refills: 5 | Status: SHIPPED | OUTPATIENT
Start: 2019-04-29 | End: 2019-05-27

## 2019-04-29 NOTE — TELEPHONE ENCOUNTER
Spoke to Keara to let her know that the Tramadol script is ready to be picked up in Dr. Piedra's box and it will picked up by his wife.

## 2019-04-29 NOTE — TELEPHONE ENCOUNTER
----- Message from Jaskaran Man sent at 2019  4:19 PM CDT -----  Contact: Rebekah @ [facility]  Peewee Nunes  MRN: [id number]  : 1955  PCP: Jacob Piedra  Home Phone      [phone]  Work Phone      Not on file.  Mobile          [phone]      MESSAGE: Dr Piedra ordered suction through [facility] -- office note must state dysfunction of swallowing or throat muscles; dysphagia; dysarthria -- [facility] states they did not receive copy of last note -- please fax to [phone]    PCP: Tadeo

## 2019-04-29 NOTE — TELEPHONE ENCOUNTER
----- Message from Roselyn Concepcion sent at 2019  9:00 AM CDT -----  Contact: Hannah Maciel  Peewee Nunes  MRN: [id number]  : 1955  PCP: Jacob Piedra  Home Phone      [phone]  Work Phone      Not on file.  Mobile          [phone]      MESSAGE:   Comments: Keara states he has another womb, she would like someone to call her to get an update.    Phone: [phone]

## 2019-04-29 NOTE — TELEPHONE ENCOUNTER
Patient has another wound to sacrum which has split  and they will use a foam dressing and will change dressing weekly.   Keara with home health requesting a script for an alternating pressure pad for hospital bed.

## 2019-04-29 NOTE — TELEPHONE ENCOUNTER
Please change last office note dated 04/25/2019  to state dysfunction of swallowing or throat muscles; dysphagia; dysarthria per home health.    Thank you

## 2019-04-29 NOTE — TELEPHONE ENCOUNTER
Spoke to Rebekah and verbally understood, please tell them to add to his Hospital bed order; labs of a CBC, CMP & U/A in weeks. But Rebekah also stated that the pt has a stage 2 ulcer to the sacrum and want to know if he can take tramadol because the tylenol isn't working for his pain.

## 2019-04-30 ENCOUNTER — TELEPHONE (OUTPATIENT)
Dept: FAMILY MEDICINE | Facility: CLINIC | Age: 64
End: 2019-04-30

## 2019-04-30 ENCOUNTER — PATIENT MESSAGE (OUTPATIENT)
Dept: FAMILY MEDICINE | Facility: CLINIC | Age: 64
End: 2019-04-30

## 2019-04-30 RX ORDER — SILODOSIN 4 MG/1
4 CAPSULE ORAL DAILY
Qty: 90 CAPSULE | Refills: 3 | Status: SHIPPED | OUTPATIENT
Start: 2019-04-30 | End: 2020-01-27

## 2019-04-30 RX ORDER — METOPROLOL TARTRATE 100 MG/1
50 TABLET ORAL 2 TIMES DAILY
Qty: 90 TABLET | Refills: 0 | OUTPATIENT
Start: 2019-04-30 | End: 2019-07-29

## 2019-04-30 RX ORDER — SILODOSIN 4 MG/1
4 CAPSULE ORAL DAILY
Qty: 90 CAPSULE | Refills: 0 | Status: CANCELLED | OUTPATIENT
Start: 2019-04-30 | End: 2019-07-29

## 2019-04-30 RX ORDER — METOPROLOL SUCCINATE 50 MG/1
50 TABLET, EXTENDED RELEASE ORAL DAILY
Qty: 180 TABLET | Refills: 3 | Status: SHIPPED | OUTPATIENT
Start: 2019-04-30 | End: 2019-05-06 | Stop reason: SDUPTHER

## 2019-04-30 RX ORDER — MECLIZINE HCL 12.5 MG 12.5 MG/1
12.5 TABLET ORAL 3 TIMES DAILY PRN
Qty: 180 TABLET | Refills: 1 | Status: SHIPPED | OUTPATIENT
Start: 2019-04-30

## 2019-04-30 RX ORDER — METOPROLOL TARTRATE 50 MG/1
50 TABLET ORAL 2 TIMES DAILY
Qty: 180 TABLET | Refills: 3 | Status: SHIPPED | OUTPATIENT
Start: 2019-04-30 | End: 2020-04-30 | Stop reason: SDUPTHER

## 2019-04-30 RX ORDER — SILODOSIN 4 MG/1
4 CAPSULE ORAL DAILY
Qty: 90 CAPSULE | Refills: 3 | Status: SHIPPED | OUTPATIENT
Start: 2019-04-30 | End: 2019-04-30 | Stop reason: SDUPTHER

## 2019-04-30 NOTE — TELEPHONE ENCOUNTER
----- Message from Roselyn Concepcion sent at 2019  2:32 PM CDT -----  Contact: Suzy Maciel  Peewee Nunes  MRN: [id number]  : 1955  PCP: Jacob Piedra  Home Phone      [phone]  Work Phone      Not on file.  Mobile          [phone]      MESSAGE:   States patient is refusing speach therapy, needs a verbal to discharge him as of today.  Patient states there is to much going on right now with other therapist and he was not participating in speech therapy.    Phone:  [phone]

## 2019-04-30 NOTE — TELEPHONE ENCOUNTER
LOV:04/25/2019  Spoke to ms Munoz-stated pt wants speech therapy discharged-doesn't feel like it-ms. Munoz said she will be okay with a verbal order-please advise

## 2019-04-30 NOTE — TELEPHONE ENCOUNTER
Meclizine is only available in 12.5mg & 25mg. Please choose from these strengths and send new Rx to [facility].

## 2019-05-02 ENCOUNTER — LAB VISIT (OUTPATIENT)
Dept: LAB | Facility: HOSPITAL | Age: 64
End: 2019-05-02
Attending: FAMILY MEDICINE
Payer: MEDICARE

## 2019-05-02 ENCOUNTER — TELEPHONE (OUTPATIENT)
Dept: FAMILY MEDICINE | Facility: CLINIC | Age: 64
End: 2019-05-02

## 2019-05-02 DIAGNOSIS — I10 HTN (HYPERTENSION): Primary | ICD-10-CM

## 2019-05-02 DIAGNOSIS — N39.0 UTI (URINARY TRACT INFECTION): ICD-10-CM

## 2019-05-02 DIAGNOSIS — E11.9 DM (DIABETES MELLITUS): ICD-10-CM

## 2019-05-02 LAB
ALBUMIN SERPL BCP-MCNC: 2.7 G/DL (ref 3.5–5.2)
ALP SERPL-CCNC: 398 U/L (ref 55–135)
ALT SERPL W/O P-5'-P-CCNC: 24 U/L (ref 10–44)
ANION GAP SERPL CALC-SCNC: 8 MMOL/L (ref 8–16)
AST SERPL-CCNC: 35 U/L (ref 10–40)
BACTERIA #/AREA URNS HPF: ABNORMAL /HPF
BASOPHILS # BLD AUTO: 0.08 K/UL (ref 0–0.2)
BASOPHILS NFR BLD: 0.9 % (ref 0–1.9)
BILIRUB SERPL-MCNC: 1.3 MG/DL (ref 0.1–1)
BILIRUB UR QL STRIP: NEGATIVE
BUN SERPL-MCNC: 17 MG/DL (ref 8–23)
CALCIUM SERPL-MCNC: 8.8 MG/DL (ref 8.7–10.5)
CHLORIDE SERPL-SCNC: 106 MMOL/L (ref 95–110)
CLARITY UR: ABNORMAL
CO2 SERPL-SCNC: 24 MMOL/L (ref 23–29)
COLOR UR: YELLOW
CREAT SERPL-MCNC: 0.6 MG/DL (ref 0.5–1.4)
DIFFERENTIAL METHOD: ABNORMAL
EOSINOPHIL # BLD AUTO: 0.4 K/UL (ref 0–0.5)
EOSINOPHIL NFR BLD: 3.8 % (ref 0–8)
ERYTHROCYTE [DISTWIDTH] IN BLOOD BY AUTOMATED COUNT: 15.2 % (ref 11.5–14.5)
EST. GFR  (AFRICAN AMERICAN): >60 ML/MIN/1.73 M^2
EST. GFR  (NON AFRICAN AMERICAN): >60 ML/MIN/1.73 M^2
GLUCOSE SERPL-MCNC: 128 MG/DL (ref 70–110)
GLUCOSE UR QL STRIP: NEGATIVE
HCT VFR BLD AUTO: 37.2 % (ref 40–54)
HGB BLD-MCNC: 12.1 G/DL (ref 14–18)
HGB UR QL STRIP: ABNORMAL
KETONES UR QL STRIP: NEGATIVE
LEUKOCYTE ESTERASE UR QL STRIP: ABNORMAL
LYMPHOCYTES # BLD AUTO: 1.7 K/UL (ref 1–4.8)
LYMPHOCYTES NFR BLD: 18.4 % (ref 18–48)
MCH RBC QN AUTO: 31.1 PG (ref 27–31)
MCHC RBC AUTO-ENTMCNC: 32.5 G/DL (ref 32–36)
MCV RBC AUTO: 96 FL (ref 82–98)
MICROSCOPIC COMMENT: ABNORMAL
MONOCYTES # BLD AUTO: 0.7 K/UL (ref 0.3–1)
MONOCYTES NFR BLD: 8.1 % (ref 4–15)
NEUTROPHILS # BLD AUTO: 6.3 K/UL (ref 1.8–7.7)
NEUTROPHILS NFR BLD: 68.8 % (ref 38–73)
NITRITE UR QL STRIP: POSITIVE
PH UR STRIP: 7 [PH] (ref 5–8)
PLATELET # BLD AUTO: 259 K/UL (ref 150–350)
PMV BLD AUTO: 12 FL (ref 9.2–12.9)
POTASSIUM SERPL-SCNC: 3.9 MMOL/L (ref 3.5–5.1)
PROT SERPL-MCNC: 6.3 G/DL (ref 6–8.4)
PROT UR QL STRIP: ABNORMAL
RBC # BLD AUTO: 3.89 M/UL (ref 4.6–6.2)
RBC #/AREA URNS HPF: 25 /HPF (ref 0–4)
SODIUM SERPL-SCNC: 138 MMOL/L (ref 136–145)
SP GR UR STRIP: 1.01 (ref 1–1.03)
URN SPEC COLLECT METH UR: ABNORMAL
UROBILINOGEN UR STRIP-ACNC: NEGATIVE EU/DL
WBC # BLD AUTO: 9.17 K/UL (ref 3.9–12.7)
WBC #/AREA URNS HPF: >100 /HPF (ref 0–5)

## 2019-05-02 PROCEDURE — 87088 URINE BACTERIA CULTURE: CPT

## 2019-05-02 PROCEDURE — 80053 COMPREHEN METABOLIC PANEL: CPT

## 2019-05-02 PROCEDURE — 87086 URINE CULTURE/COLONY COUNT: CPT

## 2019-05-02 PROCEDURE — 87077 CULTURE AEROBIC IDENTIFY: CPT

## 2019-05-02 PROCEDURE — 81000 URINALYSIS NONAUTO W/SCOPE: CPT

## 2019-05-02 PROCEDURE — 85025 COMPLETE CBC W/AUTO DIFF WBC: CPT

## 2019-05-02 PROCEDURE — 87186 SC STD MICRODIL/AGAR DIL: CPT

## 2019-05-02 NOTE — TELEPHONE ENCOUNTER
----- Message from Deirdre Weeks sent at 2019  2:16 PM CDT -----  Contact: john Nunes  MRN: [id number]  : 1955  PCP: Jacob Piedra  Home Phone      [phone]  Work Phone      Not on file.  [employer]          [phone]      MESSAGE:  John called to check on the status of approval/paper work to discharge the pt. Said she called at the beginning of the week, so she is just calling to touch base with the nurse.     [phone]

## 2019-05-02 NOTE — TELEPHONE ENCOUNTER
Tammy called to check on the status of approval/paper work to discharge the pt. Said she called at the beginning of the week,    Did you do this?

## 2019-05-04 ENCOUNTER — PATIENT MESSAGE (OUTPATIENT)
Dept: FAMILY MEDICINE | Facility: CLINIC | Age: 64
End: 2019-05-04

## 2019-05-06 ENCOUNTER — PATIENT MESSAGE (OUTPATIENT)
Dept: FAMILY MEDICINE | Facility: CLINIC | Age: 64
End: 2019-05-06

## 2019-05-06 LAB — BACTERIA UR CULT: NORMAL

## 2019-05-06 RX ORDER — SULFAMETHOXAZOLE AND TRIMETHOPRIM 200; 40 MG/5ML; MG/5ML
10 SUSPENSION ORAL EVERY 12 HOURS
Qty: 200 ML | Refills: 0 | Status: SHIPPED | OUTPATIENT
Start: 2019-05-06 | End: 2019-05-07 | Stop reason: SDUPTHER

## 2019-05-06 NOTE — TELEPHONE ENCOUNTER
Pt is taking Metoprolol tartrate, but a Rx for metoprolol succinate was sent as well. Which Rx do you want him on?

## 2019-05-06 NOTE — PROGRESS NOTES
The following lab results were abnormal. The following recommendations were made:Am sending in Bactrim liquid for the E Coli infection for 10 days

## 2019-05-07 ENCOUNTER — TELEPHONE (OUTPATIENT)
Dept: FAMILY MEDICINE | Facility: CLINIC | Age: 64
End: 2019-05-07

## 2019-05-07 ENCOUNTER — PATIENT MESSAGE (OUTPATIENT)
Dept: FAMILY MEDICINE | Facility: CLINIC | Age: 64
End: 2019-05-07

## 2019-05-07 RX ORDER — SULFAMETHOXAZOLE AND TRIMETHOPRIM 200; 40 MG/5ML; MG/5ML
10 SUSPENSION ORAL EVERY 12 HOURS
Qty: 200 ML | Refills: 0 | Status: SHIPPED | OUTPATIENT
Start: 2019-05-07 | End: 2019-05-17

## 2019-05-09 ENCOUNTER — TELEPHONE (OUTPATIENT)
Dept: FAMILY MEDICINE | Facility: CLINIC | Age: 64
End: 2019-05-09

## 2019-05-09 ENCOUNTER — PATIENT MESSAGE (OUTPATIENT)
Dept: FAMILY MEDICINE | Facility: CLINIC | Age: 64
End: 2019-05-09

## 2019-05-09 NOTE — TELEPHONE ENCOUNTER
----- Message from Roselyn Concepcion sent at 2019  2:34 PM CDT -----  Contact: Ubaldo Ellison  Peewee Nunes  MRN: [id number]  : 1955  PCP: Jacob Piedra  Home Phone      [phone]  Work Phone      Not on file.  Mobile          [phone]      MESSAGE: Jose Alfredo would like to get a verbal order for the patient to continue Physical therapy at home.     Phone:  [phone]

## 2019-05-09 NOTE — TELEPHONE ENCOUNTER
----- Message from Deirdre Weeks sent at 2019 10:34 AM CDT -----  Contact: keara lira [facility]  Peewee Nunes  MRN: [id number]  : 1955  PCP: Jacob Piedra  Home Phone      [phone]  Work Phone      Not on file.  [employer]          [phone]      MESSAGE:  Keara would like to talk the nurse about the pt's medication and lab work    [phone]

## 2019-05-10 NOTE — TELEPHONE ENCOUNTER
Spoke to Jose Alfredo and gave him the verbal to continue the PT. Jose Alfredo also stated that pt has been having dizziness when getting up but has meclizine and is following up with Neuro soon.

## 2019-05-13 ENCOUNTER — TELEPHONE (OUTPATIENT)
Dept: FAMILY MEDICINE | Facility: CLINIC | Age: 64
End: 2019-05-13

## 2019-05-13 DIAGNOSIS — N32.89 BLADDER SPASM: Primary | ICD-10-CM

## 2019-05-13 RX ORDER — OXYBUTYNIN CHLORIDE 5 MG/1
5 TABLET ORAL 2 TIMES DAILY
Qty: 60 TABLET | Refills: 11 | Status: SHIPPED | OUTPATIENT
Start: 2019-05-13 | End: 2020-05-12

## 2019-05-13 RX ORDER — PHENAZOPYRIDINE HYDROCHLORIDE 200 MG/1
200 TABLET, FILM COATED ORAL
Qty: 21 TABLET | Refills: 0 | Status: SHIPPED | OUTPATIENT
Start: 2019-05-13 | End: 2019-05-23

## 2019-05-13 NOTE — TELEPHONE ENCOUNTER
----- Message from Marnie Ingram sent at 2019  9:23 AM CDT -----  Contact: [facility] Sarah Manju  MRN: [id number]  : 1955  PCP: Jacob Piedra  Home Phone      [phone]  Work Phone      Not on file.  Mobile          [phone]      MESSAGE:   Requesting call back from nurse regarding Pt. Please advise.  Keara, [phone]

## 2019-05-13 NOTE — TELEPHONE ENCOUNTER
----- Message from Jaskaran Man sent at 2019  1:32 PM CDT -----  Contact: Rebekah @  [facility]  Peewee Nunes  MRN: [id number]  : 1955  PCP: Jacob Piedra  Home Phone      [phone]  Work Phone      Not on file.  Mobile          [phone]      MESSAGE: still having bladder spasms -- taking the antibiotics -- hard to pass urin with spasms -- trying to get to the Urologist, but patient still does not have a wheel chair -- wants to know if Dr Piedra has an idea of what else can be done to help with spasms -- please advise    Call Rebekah @ [phone]    PCP: Tadeo

## 2019-05-13 NOTE — TELEPHONE ENCOUNTER
----- Message from Deirdre Weeks sent at 2019  9:27 AM CDT -----  Contact: [facility]  Peewee Nunes  MRN: [id number]  : 1955  PCP: Jacob Piedra  Home Phone      [phone]  Work Phone      Not on file.  Mobile          [phone]      MESSAGE:    [facility] calling to check the status of the cmn that was sent over. She stated that  already sent this over to them signed and dated however he forgot to check the boxes that needed to be done. So [facility] resent it to us to be refilled out.   It was for a mattress for the pt.     [phone]

## 2019-05-13 NOTE — TELEPHONE ENCOUNTER
Notified Rebekah with [facility] about both medications that were called in. She verbally understood

## 2019-05-14 ENCOUNTER — TELEPHONE (OUTPATIENT)
Dept: FAMILY MEDICINE | Facility: CLINIC | Age: 64
End: 2019-05-14

## 2019-05-14 NOTE — TELEPHONE ENCOUNTER
----- Message from Marnie Ingram sent at 2019  1:12 PM CDT -----  Contact: [facility]  Peewee Nunes  MRN: [id number]  : 1955  PCP: Jacob Piedra  Home Phone      [phone]  Work Phone      Not on file.  Mobile          [phone]      MESSAGE:   Nancy with [facility] states they delivered the pt's hospital bed. Wife is refusing the bed because she does not want him to have the pressure mattress, she wants him to have the air mattress. However, pt does not qualify for air mattress. Please call Nancy [phone]

## 2019-05-14 NOTE — TELEPHONE ENCOUNTER
Spoke with Nancy at [facility] and she stated the pts wife refused the hospital bed and pressure mattress they tried to deliver, because the pt already has that exact bed and mattress.    Pts wife is wanting an air mattress, but he does not qualify for if. In order to qualify for air mattress by must have multiple stage 2 pressure ulcers on the sacrum or hip region OR Stage 3/4 ulcers on hip or sacrum region.    At this time pt only has one stage 2 pressure ulcer.Pt wife was informed of this by [facility]'s .

## 2019-05-21 DIAGNOSIS — N39.0 CHRONIC UTI: Primary | ICD-10-CM

## 2019-05-21 RX ORDER — DOXYCYCLINE HYCLATE 100 MG
100 TABLET ORAL 2 TIMES DAILY
Qty: 14 TABLET | Refills: 0 | Status: SHIPPED | OUTPATIENT
Start: 2019-05-21 | End: 2019-05-29

## 2019-05-22 ENCOUNTER — APPOINTMENT (OUTPATIENT)
Dept: LAB | Facility: HOSPITAL | Age: 64
End: 2019-05-22
Attending: FAMILY MEDICINE
Payer: MEDICARE

## 2019-05-22 DIAGNOSIS — N39.0 CHRONIC UTI (URINARY TRACT INFECTION): Primary | ICD-10-CM

## 2019-05-22 LAB
BACTERIA #/AREA URNS HPF: ABNORMAL /HPF
BILIRUB UR QL STRIP: NEGATIVE
CLARITY UR: ABNORMAL
COLOR UR: YELLOW
GLUCOSE UR QL STRIP: NEGATIVE
HGB UR QL STRIP: ABNORMAL
HYALINE CASTS #/AREA URNS LPF: 0 /LPF
KETONES UR QL STRIP: NEGATIVE
LEUKOCYTE ESTERASE UR QL STRIP: ABNORMAL
MICROSCOPIC COMMENT: ABNORMAL
NITRITE UR QL STRIP: NEGATIVE
PH UR STRIP: 7 [PH] (ref 5–8)
PROT UR QL STRIP: ABNORMAL
RBC #/AREA URNS HPF: >100 /HPF (ref 0–4)
SP GR UR STRIP: 1.02 (ref 1–1.03)
URN SPEC COLLECT METH UR: ABNORMAL
UROBILINOGEN UR STRIP-ACNC: 1 EU/DL
WBC #/AREA URNS HPF: 30 /HPF (ref 0–5)

## 2019-05-22 PROCEDURE — 87086 URINE CULTURE/COLONY COUNT: CPT

## 2019-05-22 PROCEDURE — 81000 URINALYSIS NONAUTO W/SCOPE: CPT

## 2019-05-23 LAB
BACTERIA UR CULT: NORMAL
BACTERIA UR CULT: NORMAL

## 2019-05-24 ENCOUNTER — PATIENT MESSAGE (OUTPATIENT)
Dept: FAMILY MEDICINE | Facility: CLINIC | Age: 64
End: 2019-05-24

## 2019-05-26 ENCOUNTER — PATIENT MESSAGE (OUTPATIENT)
Dept: UROLOGY | Facility: CLINIC | Age: 64
End: 2019-05-26

## 2019-05-27 ENCOUNTER — OFFICE VISIT (OUTPATIENT)
Dept: NEUROLOGY | Facility: CLINIC | Age: 64
End: 2019-05-27
Payer: MEDICARE

## 2019-05-27 VITALS
DIASTOLIC BLOOD PRESSURE: 58 MMHG | RESPIRATION RATE: 18 BRPM | SYSTOLIC BLOOD PRESSURE: 98 MMHG | HEART RATE: 84 BPM | HEIGHT: 71 IN | BODY MASS INDEX: 25.2 KG/M2 | WEIGHT: 180 LBS

## 2019-05-27 DIAGNOSIS — I95.9 HYPOTENSION, UNSPECIFIED HYPOTENSION TYPE: ICD-10-CM

## 2019-05-27 DIAGNOSIS — Z87.898 HISTORY OF SEIZURE: Primary | ICD-10-CM

## 2019-05-27 DIAGNOSIS — R13.19 OTHER DYSPHAGIA: ICD-10-CM

## 2019-05-27 DIAGNOSIS — H61.21 IMPACTED CERUMEN OF RIGHT EAR: ICD-10-CM

## 2019-05-27 DIAGNOSIS — S06.5XAA SDH (SUBDURAL HEMATOMA): ICD-10-CM

## 2019-05-27 PROCEDURE — 1101F PR PT FALLS ASSESS DOC 0-1 FALLS W/OUT INJ PAST YR: ICD-10-PCS | Mod: CPTII,S$GLB,, | Performed by: PSYCHIATRY & NEUROLOGY

## 2019-05-27 PROCEDURE — 99215 OFFICE O/P EST HI 40 MIN: CPT | Mod: S$GLB,,, | Performed by: PSYCHIATRY & NEUROLOGY

## 2019-05-27 PROCEDURE — 3078F PR MOST RECENT DIASTOLIC BLOOD PRESSURE < 80 MM HG: ICD-10-PCS | Mod: CPTII,S$GLB,, | Performed by: PSYCHIATRY & NEUROLOGY

## 2019-05-27 PROCEDURE — 99215 PR OFFICE/OUTPT VISIT, EST, LEVL V, 40-54 MIN: ICD-10-PCS | Mod: S$GLB,,, | Performed by: PSYCHIATRY & NEUROLOGY

## 2019-05-27 PROCEDURE — 3074F PR MOST RECENT SYSTOLIC BLOOD PRESSURE < 130 MM HG: ICD-10-PCS | Mod: CPTII,S$GLB,, | Performed by: PSYCHIATRY & NEUROLOGY

## 2019-05-27 PROCEDURE — 1101F PT FALLS ASSESS-DOCD LE1/YR: CPT | Mod: CPTII,S$GLB,, | Performed by: PSYCHIATRY & NEUROLOGY

## 2019-05-27 PROCEDURE — 99999 PR PBB SHADOW E&M-EST. PATIENT-LVL III: ICD-10-PCS | Mod: PBBFAC,,, | Performed by: PSYCHIATRY & NEUROLOGY

## 2019-05-27 PROCEDURE — 3078F DIAST BP <80 MM HG: CPT | Mod: CPTII,S$GLB,, | Performed by: PSYCHIATRY & NEUROLOGY

## 2019-05-27 PROCEDURE — 3074F SYST BP LT 130 MM HG: CPT | Mod: CPTII,S$GLB,, | Performed by: PSYCHIATRY & NEUROLOGY

## 2019-05-27 PROCEDURE — 99999 PR PBB SHADOW E&M-EST. PATIENT-LVL III: CPT | Mod: PBBFAC,,, | Performed by: PSYCHIATRY & NEUROLOGY

## 2019-05-27 RX ORDER — PHENAZOPYRIDINE HYDROCHLORIDE 200 MG/1
200 TABLET, FILM COATED ORAL 3 TIMES DAILY PRN
COMMUNITY

## 2019-05-27 NOTE — LETTER
May 27, 2019      Jacob Piedra MD  [address] [place] [address]           [facility]  [address] [facility] [address]  Phone: [phone]  Fax: [phone]          Patient: Peewee Nunes   MR Number: [id number]   YOB: 1955   Date of Visit: 5/27/2019       Dear Dr. Jacob Piedra:    Thank you for referring Peewee Nunes to me for evaluation. Attached you will find relevant portions of my assessment and plan of care.    If you have questions, please do not hesitate to call me. I look forward to following Peewee Nunes along with you.    Sincerely,    Rigo Bangura MD    Enclosure  CC:  No Recipients    If you would like to receive this communication electronically, please contact [email or web address] or [phone] to request more information on [employer] Link access.    For providers and/or their staff who would like to refer a patient to [facility], please contact us through our one-stop-shop provider referral line, [facility], at [phone].    If you feel you have received this communication in error or would no longer like to receive these types of communications, please e-mail [email or web address]

## 2019-05-27 NOTE — PROGRESS NOTES
Consult from Dr Piedra    HPI: Peewee Nunes is a 64 y.o. male with seizure.     The patient has a history of epidural/subdural hematoma after fall in 12/2018. He was getting out of his recliner and fell, impacting his head on a hinge. He had developed progressive weakness and altered mental status after the fall.  He required evacuation of the SDH by neurosurgery at [facility]  He was started on Keppra for seizure prophylaxis.   He had continuous EEG monitoring and prolonged hospital stay (difficulty with weaning vent), status epilepticus and required PEG/Trach. He required [facility] them home health.   He was ultimatlely d/juancho on salt tablets and Vimpat-- medically no longer requires salt tablets.  Wife states he stopped Vimpat at some point (had elevated LFTs)  He had clinical seizures early on but nothing since discharge.     He did follow up with Neurosurgery and was discharged after post d/c CT head was unchanged/improved (see below) .   He is wheelchair bound and requires a aubrey lift at home. He lives with his wife. He continues to require PEG.  He continues to have home health. He is in PT continued and has dysphagia. He has difficulty with dizziness upon standing which he describes as light headedness.   He had had this difficulty since the entire event. Meclizine does not help. He does not have dizziness with sitting or laying down.   The patient did see cardiology and is pending further appointment with Dr Hager.   He was on ASA at the time of the fall.  The patient has a history of CABG.    He is retired from oil industry.   Review of Systems   Unable to perform ROS: Language         I have reviewed all of this patient's past medical and surgical histories as well as family and social histories and active allergies and medications as documented in the electronic medical record.        Exam:  Gen Appearance, well developed/nourished in no apparent distress  CV: 2+ distal pulses with no edema or  swelling  Neuro:  MS: Awake, alert, Sustains attention. Aphasia limits responses. But he is aware the month. Recent recall is fairly well intact/remote memory intact, Language is reduced to spontaneous speech/repetition but improved to naming/comprehension. Fund of Knowledge is full. He can get out 6 words at time.   CN: Optic discs are flat with normal vasculature, PERRL, Extraoccular movements and visual fields are full. Normal facial sensation and strength, Hearing symmetric, Tongue and Palate are midline and strong. Shoulder Shrug symmetric and strong.  Motor: Normal bulk, tone, no abnormal movements. 4/5 strength bilateral upper/lower extremities (gross deconditioning) with 1+ reflexes and no clonus  Sensory: symmetric to temp, and vibration Romberg not done  Cerebellar: Limited by weakness  Gait: Currently not ambulatory  TM: impacted cerumen on the right and excessive cerumen on the left.     Imagin2019 CT head: Advanced microvascular ischemic type changes and involution.  Findings are stable compared with the prior study.    Postoperative changes from prior left-sided craniotomy.    EEG: Periodic left lateralized epileptiform discharges were noted early in course, 2019 but this improved    2018 MRI brain: Linear subcortical focus of restricted diffusion in the anterior left parietal lobe most suggestive of recent infarct.  No hemorrhagic conversion.  Additional areas of restricted diffusion along the margin of the left frontal lobe are most likely related to known subdural hematoma/postoperative change    Postoperative changes of left craniotomy for evacuation of subdural hematoma with trace subdural collection over the left frontal lobe.    Moderate chronic microvascular ischemic changes.    Labs: 2019 CMP reviewed. Sodium normal    Assessment/Plan: Peewee Nunes is a 64 y.o. male with a history of left frontal epidural/subdural hematoma after mechanical fall in 2018 which required  craniotomy for evacuation. He also suffered status epilepticus and a punctate stroke of the left parietal lobe found post operatively and was found to have new onset Afib  During that hospital stay  I recommend:     1. Afib resolved. He was treated with rate controlling medications and obviously could not be treated with anticoagulation  -Cardiology follow up with Dr Hager advised. The patient had resolved afib during his prolonged hospital stay. However, I think some of his dizziness which limits his standing is likely due to some Orthostatic hypotension. He may require lowering of BP meds. Antivert does not help this dizziness which as occurred since hospital stay. Some dizziness could also be late effect CVA  2. He has not been on Vimpat for months and has not had any further seizures. This was, per wife, for increased LFTs (resolved)  -Check another EEG to be sure no clear need for AED. Consider restarted Keppra if this is abnormal which he was on during status epilepticus in house.   -Otherwise can monitor him clinically for seizures, as there is a risk of further seizures, but patient's wife supervises him, he will not return to driving and she would rather avoid the risk of medications   3. He continues with home health with PT and a PEG tube. He is wheelchair bound with aphasia which has improved.   4. See ENT for impacted cerumen on the right which can also contribute to dizziness.   RTC 4 months.   CC: Dr Piedra

## 2019-05-28 ENCOUNTER — PATIENT MESSAGE (OUTPATIENT)
Dept: NEUROLOGY | Facility: CLINIC | Age: 64
End: 2019-05-28

## 2019-05-29 ENCOUNTER — OFFICE VISIT (OUTPATIENT)
Dept: UROLOGY | Facility: CLINIC | Age: 64
End: 2019-05-29
Attending: UROLOGY
Payer: MEDICARE

## 2019-05-29 VITALS
HEART RATE: 76 BPM | DIASTOLIC BLOOD PRESSURE: 65 MMHG | SYSTOLIC BLOOD PRESSURE: 97 MMHG | BODY MASS INDEX: 25.18 KG/M2 | WEIGHT: 179.88 LBS | HEIGHT: 71 IN

## 2019-05-29 DIAGNOSIS — N31.9 NEUROGENIC BLADDER: ICD-10-CM

## 2019-05-29 DIAGNOSIS — N20.0 RENAL CALCULUS: Primary | ICD-10-CM

## 2019-05-29 PROCEDURE — 1101F PR PT FALLS ASSESS DOC 0-1 FALLS W/OUT INJ PAST YR: ICD-10-PCS | Mod: CPTII,S$GLB,, | Performed by: UROLOGY

## 2019-05-29 PROCEDURE — 87186 SC STD MICRODIL/AGAR DIL: CPT

## 2019-05-29 PROCEDURE — 3074F PR MOST RECENT SYSTOLIC BLOOD PRESSURE < 130 MM HG: ICD-10-PCS | Mod: CPTII,S$GLB,, | Performed by: UROLOGY

## 2019-05-29 PROCEDURE — 99204 PR OFFICE/OUTPT VISIT, NEW, LEVL IV, 45-59 MIN: ICD-10-PCS | Mod: S$GLB,,, | Performed by: UROLOGY

## 2019-05-29 PROCEDURE — 99999 PR PBB SHADOW E&M-EST. PATIENT-LVL III: CPT | Mod: PBBFAC,,, | Performed by: UROLOGY

## 2019-05-29 PROCEDURE — 87086 URINE CULTURE/COLONY COUNT: CPT

## 2019-05-29 PROCEDURE — 3074F SYST BP LT 130 MM HG: CPT | Mod: CPTII,S$GLB,, | Performed by: UROLOGY

## 2019-05-29 PROCEDURE — 3078F PR MOST RECENT DIASTOLIC BLOOD PRESSURE < 80 MM HG: ICD-10-PCS | Mod: CPTII,S$GLB,, | Performed by: UROLOGY

## 2019-05-29 PROCEDURE — 87088 URINE BACTERIA CULTURE: CPT

## 2019-05-29 PROCEDURE — 3078F DIAST BP <80 MM HG: CPT | Mod: CPTII,S$GLB,, | Performed by: UROLOGY

## 2019-05-29 PROCEDURE — 99204 OFFICE O/P NEW MOD 45 MIN: CPT | Mod: S$GLB,,, | Performed by: UROLOGY

## 2019-05-29 PROCEDURE — 1101F PT FALLS ASSESS-DOCD LE1/YR: CPT | Mod: CPTII,S$GLB,, | Performed by: UROLOGY

## 2019-05-29 PROCEDURE — 99999 PR PBB SHADOW E&M-EST. PATIENT-LVL III: ICD-10-PCS | Mod: PBBFAC,,, | Performed by: UROLOGY

## 2019-05-29 PROCEDURE — 87077 CULTURE AEROBIC IDENTIFY: CPT

## 2019-05-29 RX ORDER — OXYBUTYNIN CHLORIDE 5 MG/5ML
5 SYRUP ORAL 4 TIMES DAILY
Qty: 600 ML | Refills: 11 | Status: SHIPPED | OUTPATIENT
Start: 2019-05-29 | End: 2019-11-21 | Stop reason: SDUPTHER

## 2019-05-29 NOTE — LETTER
May 29, 2019      Jacob Piedra MD  [address]  [facility] [address]           [facility]  [address] [facility] [address]  Phone: [phone]          Patient: Peewee Nunes   MR Number: [id number]   YOB: 1955   Date of Visit: 5/29/2019       Dear Dr. Jacob Piedra:    Thank you for referring Peewee Nunes to me for evaluation. Attached you will find relevant portions of my assessment and plan of care.    If you have questions, please do not hesitate to call me. I look forward to following Peewee Nunes along with you.    Sincerely,    Rodney Massey Jr., MD    Enclosure  CC:  No Recipients    If you would like to receive this communication electronically, please contact [email or web address] or [phone] to request more information on [email or web address]@ [email or web address] Link access.    For providers and/or their staff who would like to refer a patient to [facility], please contact us through our one-stop-shop provider referral line, [facility] , at [phone].    If you feel you have received this communication in error or would no longer like to receive these types of communications, please e-mail [email or web address]

## 2019-05-29 NOTE — PROGRESS NOTES
Subjective:       Patient ID: Peewee Nunes is a 64 y.o. male.    Chief Complaint: bladder spasm (pt has home health. c/o that cath is not draining properly.)    HPI patient is status post head injury and is status post CVA.  He is basically quadriplegic.  He has indwelling Rader catheter and has difficulty draining his bladder with that his well.  His wife needs to reposition the catheter and he  Has lots of bladder spasms.  He takes Ditropan 5 mg b.i.d..  His urine looks grossly clear at this time but I am going to send a urine culture.  Occasionally he gets file smelling urine but not at this time.  He also had a KUB in the past showing a possible right renal stone  Past Medical History:   Diagnosis Date    Coronary artery disease     CVD (cardiovascular disease)     Diabetes mellitus type II     Hyperlipidemia     Hypertension     Ischemic cardiomyopathy     Obesity     Obesity, morbid        Past Surgical History:   Procedure Laterality Date    APPENDECTOMY      CORONARY ARTERY BYPASS GRAFT      CRANIOTOMY, FOR SUBDURAL HEMATOMA EVACUATION Left 12/20/2018    Performed by [facility] Hull [facility] at [facility] OR 2ND FLR    CREATION, TRACHEOSTOMY N/A 1/8/2019    Performed by Andrew Durbin MD at [facility] OR 2ND FLR    HEMORRHOID SURGERY      INSERTION, PEG TUBE N/A 1/8/2019    Performed by [facility] at [facility] OR [facility] F[facility]       Family History   Problem Relation Age of Onset    Heart disease Mother     Hypertension Mother     Hyperlipidemia Mother     Diabetes Mother     Stroke Mother     Heart disease Father     Cancer Father         LUNG       Social History     Socioeconomic History    Marital status:      Spouse name: Not on file    Number of children: Not on file    Years of education: Not on file    Highest education level: Not on file   Occupational History    Not on file   Social Needs    Financial resource strain: Not on file    Food insecurity:     Worry: Not on file      Inability: Not on file    Transportation needs:     Medical: Not on file     Non-medical: Not on file   Tobacco Use    Smoking status: Former Smoker     Packs/day: 1.50     Years: 25.00     Pack years: 37.50     Types: Cigarettes     Last attempt to quit: 2002     Years since quittin.5    Smokeless tobacco: Never Used   Substance and Sexual Activity    Alcohol use: No    Drug use: No    Sexual activity: Yes   Lifestyle    Physical activity:     Days per week: Not on file     Minutes per session: Not on file    Stress: Not on file   Relationships    Social connections:     Talks on phone: Not on file     Gets together: Not on file     Attends Yazidi service: Not on file     Active member of club or organization: Not on file     Attends meetings of clubs or organizations: Not on file     Relationship status: Not on file   Other Topics Concern    Not on file   Social History Narrative    Not on file       Allergies:  Levaquin [levofloxacin]    Medications:    Current Outpatient Medications:     albuterol sulfate 2.5 mg/0.5 mL Nebu, Take 2.5 mg by nebulization every 4 (four) hours as needed (SOB). Rescue, Disp: , Rfl:     amLODIPine (NORVASC) 10 MG tablet, 1 tablet (10 mg total) by Per G Tube route once daily., Disp: 90 tablet, Rfl: 3    finasteride (PROSCAR) 5 mg tablet, 1 tablet (5 mg total) by Per G Tube route once daily., Disp: 90 tablet, Rfl: 3    meclizine (ANTIVERT) 12.5 mg tablet, Take 1 tablet (12.5 mg total) by mouth 3 (three) times daily as needed., Disp: 180 tablet, Rfl: 1    metoprolol tartrate (LOPRESSOR) 50 MG tablet, Take 1 tablet (50 mg total) by mouth 2 (two) times daily., Disp: 180 tablet, Rfl: 3    oxybutynin (DITROPAN) 5 MG Tab, Take 1 tablet (5 mg total) by mouth 2 (two) times daily., Disp: 60 tablet, Rfl: 11    oxybutynin (DITROPAN) 5 mg/5 mL syrup, Take 5 mLs (5 mg total) by mouth 4 (four) times daily., Disp: 600 mL, Rfl: 11    phenazopyridine (PYRIDIUM) 200 MG  tablet, Take 200 mg by mouth 3 (three) times daily as needed for Pain., Disp: , Rfl:     silodosin (RAPAFLO) 4 mg Cap capsule, Take 1 capsule (4 mg total) by mouth once daily., Disp: 90 capsule, Rfl: 3    sodium chloride for inhalation (SODIUM CHLORIDE 0.9%) 0.9 % nebulizer solution, Take 3 mLs by nebulization as needed., Disp: 180 mL, Rfl: 12    Review of Systems   Constitutional: Negative for activity change, appetite change, chills, diaphoresis, fatigue, fever and unexpected weight change.   HENT: Negative for congestion, dental problem, hearing loss, mouth sores, postnasal drip, rhinorrhea, sinus pressure and trouble swallowing.    Eyes: Negative for pain, discharge and itching.   Respiratory: Negative for apnea, cough, choking, chest tightness, shortness of breath and wheezing.    Cardiovascular: Negative for chest pain, palpitations and leg swelling.   Gastrointestinal: Negative for abdominal distention, abdominal pain, anal bleeding, blood in stool, constipation, diarrhea, nausea, rectal pain and vomiting.   Endocrine: Negative for polydipsia and polyuria.   Genitourinary: Negative for decreased urine volume, difficulty urinating, discharge, dysuria, enuresis, flank pain, frequency, genital sores, hematuria, penile pain, penile swelling, scrotal swelling, testicular pain and urgency.   Musculoskeletal: Negative for arthralgias, back pain and myalgias.   Skin: Negative for color change, rash and wound.   Neurological: Negative for dizziness, syncope, speech difficulty, light-headedness and headaches.   Hematological: Negative for adenopathy. Does not bruise/bleed easily.   Psychiatric/Behavioral: Negative for behavioral problems, confusion, hallucinations and sleep disturbance.       Objective:      Physical Exam   Constitutional: He appears well-developed.   HENT:   Head: Normocephalic.   Cardiovascular: Normal rate.    Pulmonary/Chest: Effort normal.   Abdominal: Soft.   Neurological: He is alert.   Skin:  Skin is warm.     Psychiatric: He has a normal mood and affect.       Assessment:       1. Renal calculus    2. Neurogenic bladder        Plan:       Peewee was seen today for bladder spasm.    Diagnoses and all orders for this visit:    Renal calculus  -     US Retroperitoneal Complete (Kidney and; Future  -     X-Ray Abdomen AP 1 View; Future    Neurogenic bladder  -     CULTURE, URINE    Other orders  -     oxybutynin (DITROPAN) 5 mg/5 mL syrup; Take 5 mLs (5 mg total) by mouth 4 (four) times daily.      we discussed the pros and cons of a suprapubic tube.  They would prefer to increase the dose of the Ditropan 5 mg p.o. per PEG tube.  I gave it to him in liquid form.  Would not treat any UTIs at this time since he is asymptomatic.  I will get a renal ultrasound and KUB to follow his stone.

## 2019-05-30 ENCOUNTER — PATIENT MESSAGE (OUTPATIENT)
Dept: UROLOGY | Facility: CLINIC | Age: 64
End: 2019-05-30

## 2019-05-31 LAB — BACTERIA UR CULT: NORMAL

## 2019-06-03 ENCOUNTER — HOSPITAL ENCOUNTER (OUTPATIENT)
Dept: RADIOLOGY | Facility: HOSPITAL | Age: 64
Discharge: HOME | End: 2019-06-03
Attending: UROLOGY
Payer: MEDICARE

## 2019-06-03 DIAGNOSIS — N20.0 RENAL CALCULUS: ICD-10-CM

## 2019-06-03 PROCEDURE — 74018 RADEX ABDOMEN 1 VIEW: CPT | Mod: 26,,, | Performed by: RADIOLOGY

## 2019-06-03 PROCEDURE — 76770 US EXAM ABDO BACK WALL COMP: CPT | Mod: TC

## 2019-06-03 PROCEDURE — 74018 RADEX ABDOMEN 1 VIEW: CPT | Mod: TC

## 2019-06-03 PROCEDURE — 76770 US RETROPERITONEAL COMPLETE: ICD-10-PCS | Mod: 26,,, | Performed by: RADIOLOGY

## 2019-06-03 PROCEDURE — 76770 US EXAM ABDO BACK WALL COMP: CPT | Mod: 26,,, | Performed by: RADIOLOGY

## 2019-06-03 PROCEDURE — 74018 XR ABDOMEN AP 1 VIEW: ICD-10-PCS | Mod: 26,,, | Performed by: RADIOLOGY

## 2019-06-05 ENCOUNTER — PATIENT MESSAGE (OUTPATIENT)
Dept: UROLOGY | Facility: CLINIC | Age: 64
End: 2019-06-05

## 2019-06-07 ENCOUNTER — TELEPHONE (OUTPATIENT)
Dept: NEUROLOGY | Facility: CLINIC | Age: 64
End: 2019-06-07

## 2019-06-07 NOTE — TELEPHONE ENCOUNTER
Spoke with Mrs. Solorio and informed her you are out of the office and will review the results and get back with them.  Please advise.

## 2019-06-07 NOTE — TELEPHONE ENCOUNTER
----- Message from Sally Duran sent at 2019  1:25 PM CDT -----  Contact: selena/wife  Peewee Nunes  MRN: [id number]  : 1955  PCP: Jacob Piedra  Home Phone      [phone]  Work Phone      Not on file.  Mobile          Not on file.      MESSAGE:   Pt's wife states that pt had an EEG done last week and  told her if they didn't hear anything back within a week to call.  Pt's wife would like the results whenever they are ready. Please advise, thanks.  Phone: [phone]

## 2019-06-10 RX ORDER — LEVETIRACETAM 500 MG/1
TABLET ORAL
Qty: 60 TABLET | Refills: 11 | Status: SHIPPED | OUTPATIENT
Start: 2019-06-10 | End: 2020-06-08 | Stop reason: SDUPTHER

## 2019-06-10 NOTE — TELEPHONE ENCOUNTER
Spoke with Yelena in EEG at [facility], it was suppose to have been fixed and results should have been going to your in box. Yelena will again request to have this done.

## 2019-06-10 NOTE — TELEPHONE ENCOUNTER
First please let someone from [facility] know (again) that I am not getting these results sent to me.  Second, Please inform patient's wife that the EEG is abnormal and suggests he is at risk for more seizures. I recommend starting a low dose of Keppra again per orders. Let me know if there are any side effects or seizures.

## 2019-06-13 ENCOUNTER — TELEPHONE (OUTPATIENT)
Dept: FAMILY MEDICINE | Facility: CLINIC | Age: 64
End: 2019-06-13

## 2019-06-13 NOTE — TELEPHONE ENCOUNTER
----- Message from Christine Clark sent at 2019  9:06 AM CDT -----  Contact: Jose Alfredo  from [employer]  Peewee Nunes  MRN: [id number]  : 1955  PCP: Jacob Piedra  Home Phone      [phone]  Work Phone      Not on file.  Mobile          Not on file.      MESSAGE:   Requesting orders for home health physical therapy. Please advise.    Jose Alfredo  [phone]

## 2019-06-19 ENCOUNTER — PATIENT MESSAGE (OUTPATIENT)
Dept: NEUROLOGY | Facility: CLINIC | Age: 64
End: 2019-06-19

## 2019-06-19 ENCOUNTER — PATIENT MESSAGE (OUTPATIENT)
Dept: FAMILY MEDICINE | Facility: CLINIC | Age: 64
End: 2019-06-19

## 2019-06-25 ENCOUNTER — TELEPHONE (OUTPATIENT)
Dept: FAMILY MEDICINE | Facility: CLINIC | Age: 64
End: 2019-06-25

## 2019-06-25 NOTE — TELEPHONE ENCOUNTER
----- Message from Christine Clark sent at 2019  3:34 PM CDT -----  Contact: Rebekah from [facility]  Peewee Nunes  MRN: [id number]  : 1955  PCP: Jacob Piedra  Home Phone      [phone]  Work Phone      Not on file.  Mobile          Not on file.      MESSAGE:   Had to change cathter because it was 1 month old. He has been having bladder spasms, nothing new, on going. Catheter was disgusting pus, bloody, doug. Patient was experiencing pain. She wants to drop it off, please advise.    Rebekah  [phone]

## 2019-06-28 ENCOUNTER — TELEPHONE (OUTPATIENT)
Dept: FAMILY MEDICINE | Facility: CLINIC | Age: 64
End: 2019-06-28

## 2019-06-28 ENCOUNTER — PATIENT MESSAGE (OUTPATIENT)
Dept: FAMILY MEDICINE | Facility: CLINIC | Age: 64
End: 2019-06-28

## 2019-06-28 DIAGNOSIS — R31.9 URINARY TRACT INFECTION WITH HEMATURIA, SITE UNSPECIFIED: Primary | ICD-10-CM

## 2019-06-28 DIAGNOSIS — E11.9 TYPE 2 DIABETES MELLITUS WITHOUT COMPLICATION: ICD-10-CM

## 2019-06-28 DIAGNOSIS — N39.0 URINARY TRACT INFECTION WITH HEMATURIA, SITE UNSPECIFIED: Primary | ICD-10-CM

## 2019-06-28 RX ORDER — SULFAMETHOXAZOLE AND TRIMETHOPRIM 800; 160 MG/1; MG/1
1 TABLET ORAL 2 TIMES DAILY
Qty: 10 TABLET | Refills: 0 | Status: SHIPPED | OUTPATIENT
Start: 2019-06-28 | End: 2019-06-29 | Stop reason: ALTCHOICE

## 2019-06-28 NOTE — TELEPHONE ENCOUNTER
----- Message from Christine Clark sent at 2019  9:11 AM CDT -----  Contact: Self  Peewee Nunes  MRN: [id number]  : 1955  PCP: Jacob Piedra  Home Phone      [phone]  Work Phone      Not on file.  Mobile          Not on file.      MESSAGE:   Rebekah from [employer] calling again regarding patient's UA. Culture faxed over. Please send meds.    Rebekah  [phone]

## 2019-06-28 NOTE — TELEPHONE ENCOUNTER
Bactrim was sent to pharmacy home health notified     Culture came back positive for Escherichia Coli and Providencia stuartii

## 2019-06-29 ENCOUNTER — TELEPHONE (OUTPATIENT)
Dept: FAMILY MEDICINE | Facility: CLINIC | Age: 64
End: 2019-06-29

## 2019-06-29 RX ORDER — SULFAMETHOXAZOLE AND TRIMETHOPRIM 200; 40 MG/5ML; MG/5ML
10 SUSPENSION ORAL EVERY 12 HOURS
Qty: 200 ML | Refills: 0 | Status: SHIPPED | OUTPATIENT
Start: 2019-06-29 | End: 2019-07-09

## 2019-06-29 NOTE — TELEPHONE ENCOUNTER
----- Message from Jaskaran Man sent at 2019 10:09 AM CDT -----  Contact: Wife - Lyndsey  Peewee Nunes  MRN: [id number]  : 1955  PCP: Jacob Piedra  Home Phone      [phone]  Work Phone      Not on file.  Mobile          Not on file.      MESSAGE: received Rx for B actrim yesterday from Dr Piedra -- patient is tube fed - tablets clogging tube -- requesting new Rx for liquid be sent to [facility] in [place]    Call Lyndsey @ [phone]    PCP: Tadeo

## 2019-07-10 ENCOUNTER — PATIENT MESSAGE (OUTPATIENT)
Dept: FAMILY MEDICINE | Facility: CLINIC | Age: 64
End: 2019-07-10

## 2019-07-19 ENCOUNTER — TELEPHONE (OUTPATIENT)
Dept: FAMILY MEDICINE | Facility: CLINIC | Age: 64
End: 2019-07-19

## 2019-07-19 DIAGNOSIS — Z78.9 IMPAIRED MOBILITY AND ACTIVITIES OF DAILY LIVING: Primary | ICD-10-CM

## 2019-07-19 DIAGNOSIS — Z74.09 IMPAIRED MOBILITY AND ACTIVITIES OF DAILY LIVING: Primary | ICD-10-CM

## 2019-07-19 NOTE — TELEPHONE ENCOUNTER
----- Message from Rachael Saleh sent at 2019  8:41 AM CDT -----  Contact: [facility]- Jose Alfredo   Peewee Nunes  MRN: [id number]  : 1955  PCP: Jacob Piedra  Home Phone      [phone]  Work Phone      Not on file.  Mobile          Not on file.      MESSAGE:   Jose Alfredo Need orders to continues home health physical therapy.      [phone]  Jose Alfredo

## 2019-07-26 ENCOUNTER — TELEPHONE (OUTPATIENT)
Dept: FAMILY MEDICINE | Facility: CLINIC | Age: 64
End: 2019-07-26

## 2019-07-26 NOTE — TELEPHONE ENCOUNTER
----- Message from Christine Clark sent at 2019  2:31 PM CDT -----  Contact: Rebekah from [facility]  Peewee Nunes  MRN: [id number]  : 1955  PCP: Jacob Piedra  Home Phone      [phone]  Work Phone      Not on file.  Mobile          Not on file.      MESSAGE:   Patient was seen on Wednesday and was fine. Wife called today to say there was a decrease in urine output. She said it was darker. He is taking in the same amount of water. She was told to track over the weekend. \    Rebekah [phone]

## 2019-07-29 ENCOUNTER — PATIENT MESSAGE (OUTPATIENT)
Dept: ADMINISTRATIVE | Facility: HOSPITAL | Age: 64
End: 2019-07-29

## 2019-07-30 ENCOUNTER — PATIENT MESSAGE (OUTPATIENT)
Dept: FAMILY MEDICINE | Facility: CLINIC | Age: 64
End: 2019-07-30

## 2019-07-30 ENCOUNTER — LAB VISIT (OUTPATIENT)
Dept: LAB | Facility: HOSPITAL | Age: 64
End: 2019-07-30
Attending: FAMILY MEDICINE
Payer: MEDICARE

## 2019-07-30 DIAGNOSIS — N39.0 UTI (URINARY TRACT INFECTION): Primary | ICD-10-CM

## 2019-07-30 DIAGNOSIS — R31.9 URINARY TRACT INFECTION WITH HEMATURIA, SITE UNSPECIFIED: ICD-10-CM

## 2019-07-30 DIAGNOSIS — Z79.899 ENCOUNTER FOR LONG-TERM (CURRENT) USE OF MEDICATIONS: ICD-10-CM

## 2019-07-30 DIAGNOSIS — E11.9 DM (DIABETES MELLITUS): ICD-10-CM

## 2019-07-30 DIAGNOSIS — N39.0 URINARY TRACT INFECTION WITH HEMATURIA, SITE UNSPECIFIED: ICD-10-CM

## 2019-07-30 LAB
ANION GAP SERPL CALC-SCNC: 10 MMOL/L (ref 8–16)
BACTERIA #/AREA URNS HPF: ABNORMAL /HPF
BILIRUB UR QL STRIP: NEGATIVE
BUN SERPL-MCNC: 14 MG/DL (ref 8–23)
CALCIUM SERPL-MCNC: 9.1 MG/DL (ref 8.7–10.5)
CHLORIDE SERPL-SCNC: 104 MMOL/L (ref 95–110)
CLARITY UR: ABNORMAL
CO2 SERPL-SCNC: 25 MMOL/L (ref 23–29)
COLOR UR: YELLOW
CREAT SERPL-MCNC: 0.6 MG/DL (ref 0.5–1.4)
EST. GFR  (AFRICAN AMERICAN): >60 ML/MIN/1.73 M^2
EST. GFR  (NON AFRICAN AMERICAN): >60 ML/MIN/1.73 M^2
ESTIMATED AVG GLUCOSE: 85 MG/DL (ref 68–131)
GLUCOSE SERPL-MCNC: 107 MG/DL (ref 70–110)
GLUCOSE UR QL STRIP: NEGATIVE
HBA1C MFR BLD HPLC: 4.6 % (ref 4–5.6)
HGB UR QL STRIP: ABNORMAL
KETONES UR QL STRIP: NEGATIVE
LEUKOCYTE ESTERASE UR QL STRIP: ABNORMAL
MICROSCOPIC COMMENT: ABNORMAL
NITRITE UR QL STRIP: POSITIVE
PH UR STRIP: >8 [PH] (ref 5–8)
POTASSIUM SERPL-SCNC: 4.1 MMOL/L (ref 3.5–5.1)
PROT UR QL STRIP: NEGATIVE
RBC #/AREA URNS HPF: 40 /HPF (ref 0–4)
SODIUM SERPL-SCNC: 139 MMOL/L (ref 136–145)
SP GR UR STRIP: 1.02 (ref 1–1.03)
URN SPEC COLLECT METH UR: ABNORMAL
UROBILINOGEN UR STRIP-ACNC: NEGATIVE EU/DL
WBC #/AREA URNS HPF: 15 /HPF (ref 0–5)

## 2019-07-30 PROCEDURE — 87077 CULTURE AEROBIC IDENTIFY: CPT | Mod: 59

## 2019-07-30 PROCEDURE — 81000 URINALYSIS NONAUTO W/SCOPE: CPT

## 2019-07-30 PROCEDURE — 87086 URINE CULTURE/COLONY COUNT: CPT

## 2019-07-30 PROCEDURE — 80048 BASIC METABOLIC PNL TOTAL CA: CPT

## 2019-07-30 PROCEDURE — 83036 HEMOGLOBIN GLYCOSYLATED A1C: CPT

## 2019-07-30 PROCEDURE — 87186 SC STD MICRODIL/AGAR DIL: CPT | Mod: 59

## 2019-07-30 PROCEDURE — 87088 URINE BACTERIA CULTURE: CPT

## 2019-07-31 ENCOUNTER — PATIENT MESSAGE (OUTPATIENT)
Dept: FAMILY MEDICINE | Facility: CLINIC | Age: 64
End: 2019-07-31

## 2019-07-31 RX ORDER — SULFAMETHOXAZOLE AND TRIMETHOPRIM 800; 160 MG/1; MG/1
1 TABLET ORAL 2 TIMES DAILY
Qty: 20 TABLET | Refills: 0 | Status: SHIPPED | OUTPATIENT
Start: 2019-07-31 | End: 2019-08-03 | Stop reason: ALTCHOICE

## 2019-08-01 LAB
BACTERIA UR CULT: ABNORMAL
BACTERIA UR CULT: ABNORMAL

## 2019-08-03 RX ORDER — SULFAMETHOXAZOLE AND TRIMETHOPRIM 200; 40 MG/5ML; MG/5ML
10 SUSPENSION ORAL EVERY 12 HOURS
Qty: 200 ML | Refills: 1 | Status: SHIPPED | OUTPATIENT
Start: 2019-08-03 | End: 2019-08-13

## 2019-08-05 ENCOUNTER — PATIENT MESSAGE (OUTPATIENT)
Dept: FAMILY MEDICINE | Facility: CLINIC | Age: 64
End: 2019-08-05

## 2019-08-12 ENCOUNTER — TELEPHONE (OUTPATIENT)
Dept: FAMILY MEDICINE | Facility: CLINIC | Age: 64
End: 2019-08-12

## 2019-08-12 NOTE — TELEPHONE ENCOUNTER
----- Message from Rachael Saleh sent at 2019  3:31 PM CDT -----  Contact: [facility]  Peewee Nunes  MRN: [id number]  : 1955  PCP: Jacob Piedra  Home Phone      [phone]  Work Phone      Not on file.  Mobile          Not on file.      MESSAGE: Jose Alfredo is calling because they need orders to continues home health.     [phone]  Jose Alfredo

## 2019-08-13 ENCOUNTER — PATIENT OUTREACH (OUTPATIENT)
Dept: ADMINISTRATIVE | Facility: HOSPITAL | Age: 64
End: 2019-08-13

## 2019-08-13 PROCEDURE — G0179 MD RECERTIFICATION HHA PT: HCPCS | Mod: ,,, | Performed by: FAMILY MEDICINE

## 2019-08-13 PROCEDURE — G0179 PR HOME HEALTH MD RECERTIFICATION: ICD-10-PCS | Mod: ,,, | Performed by: FAMILY MEDICINE

## 2019-08-14 NOTE — TELEPHONE ENCOUNTER
Spoke w/ Jose Alfredo, he states he will be faxing over paperwork for Dr. Piedra to sign to be able to continue home health services.

## 2019-08-16 ENCOUNTER — TELEPHONE (OUTPATIENT)
Dept: FAMILY MEDICINE | Facility: CLINIC | Age: 64
End: 2019-08-16

## 2019-08-16 RX ORDER — TRIMETHOPRIM 100 MG/1
100 TABLET ORAL ONCE
Qty: 90 TABLET | Refills: 3 | Status: SHIPPED | OUTPATIENT
Start: 2019-08-16 | End: 2019-08-16

## 2019-08-16 NOTE — TELEPHONE ENCOUNTER
----- Message from Deirdre Weeks sent at 2019  8:09 AM CDT -----  [facility]  Peewee Nunes  MRN: [id number]  : 1955  PCP: Jacob Piedra  Home Phone      [phone]  Work Phone      Not on file.  Mobile          Not on file.      MESSAGE:     Pt wife is asking if pt can be on a long term antibiotic (bactrum). He normally takes it for a bladder infection, but only for 10 days, it is not until the last two days of the antibiotic that his urine clears up.    [phone]

## 2019-08-19 ENCOUNTER — PATIENT MESSAGE (OUTPATIENT)
Dept: FAMILY MEDICINE | Facility: CLINIC | Age: 64
End: 2019-08-19

## 2019-08-20 NOTE — TELEPHONE ENCOUNTER
Pts wife is requesting a change in preventive abx that was sent to the pharmacy.      Does the abx come in a solution that she could be in his peg tube?    [facility]

## 2019-08-21 ENCOUNTER — TELEPHONE (OUTPATIENT)
Dept: FAMILY MEDICINE | Facility: CLINIC | Age: 64
End: 2019-08-21

## 2019-08-21 NOTE — TELEPHONE ENCOUNTER
----- Message from Christine Clark sent at 2019  9:40 AM CDT -----  Contact: Marnie with [facility]  Peewee Nunes  MRN: [id number]  : 1955  PCP: Jacob Piedra  Home Phone      [phone]  Work Phone      Not on file.  Mobile          Not on file.      MESSAGE:   Pharmacy is calling to clarify an RX.  RX name:   trimethoprim 50 mg/5 mL Soln  What do they need to clarify:  directions  Pharmacy name and location:  [facility]  Comments:       Phone:  [phone], ref # [id number]

## 2019-08-22 ENCOUNTER — TELEPHONE (OUTPATIENT)
Dept: FAMILY MEDICINE | Facility: CLINIC | Age: 64
End: 2019-08-22

## 2019-08-22 ENCOUNTER — PATIENT MESSAGE (OUTPATIENT)
Dept: FAMILY MEDICINE | Facility: CLINIC | Age: 64
End: 2019-08-22

## 2019-08-22 RX ORDER — NITROFURANTOIN (MACROCRYSTALS) 100 MG/1
100 CAPSULE ORAL EVERY 12 HOURS
Qty: 90 CAPSULE | Refills: 3 | Status: SHIPPED | OUTPATIENT
Start: 2019-08-22 | End: 2020-07-23 | Stop reason: SDUPTHER

## 2019-08-22 NOTE — TELEPHONE ENCOUNTER
Trimethoprim is not available at any pharmacy. Rebekah with home health suggest using Nitrofurantoin as a long term abx. Stated the pt has had this before with no problems. It comes in capsule form which is easier to open and administer in his peg tube. Please advise

## 2019-08-22 NOTE — TELEPHONE ENCOUNTER
----- Message from Rachael Saleh sent at 2019  3:26 PM CDT -----  Contact: linda schneider  Peewee Nunes  MRN: [id number]  : 1955  PCP: Jacob Piedra  Home Phone      [phone]  Work Phone      Not on file.  Mobile          Not on file.      MESSAGE:   Rebekah is calling to see if something else can get sent in that is a liquid for trimethoprim 50 mg/5 mL Soln because they cant be crush for his tube.     [phone]

## 2019-08-26 ENCOUNTER — PATIENT MESSAGE (OUTPATIENT)
Dept: FAMILY MEDICINE | Facility: CLINIC | Age: 64
End: 2019-08-26

## 2019-09-03 ENCOUNTER — TELEPHONE (OUTPATIENT)
Dept: FAMILY MEDICINE | Facility: CLINIC | Age: 64
End: 2019-09-03

## 2019-09-03 NOTE — TELEPHONE ENCOUNTER
Spoke with Jose Alfredo from [facility] states pt is starting PT twice a week. Three times a week is too much.

## 2019-09-03 NOTE — TELEPHONE ENCOUNTER
----- Message from Rachael Saleh sent at 9/3/2019  2:13 PM CDT -----  Contact: taeAftab  Peewee Nunes  MRN: [id number]  : 1955  PCP: Jacob Piedra  Home Phone      [phone]  Work Phone      Not on file.  Mobile          Not on file.      MESSAGE:   Tae is calling to let doctor know that they are changing his physical therapy.    [phone]

## 2019-09-05 ENCOUNTER — HOSPITAL ENCOUNTER (OUTPATIENT)
Dept: RADIOLOGY | Facility: HOSPITAL | Age: 64
Discharge: HOME | End: 2019-09-05
Attending: ORTHOPAEDIC SURGERY
Payer: MEDICARE

## 2019-09-05 DIAGNOSIS — M25.562 BILATERAL KNEE PAIN: ICD-10-CM

## 2019-09-05 DIAGNOSIS — M25.561 BILATERAL KNEE PAIN: ICD-10-CM

## 2019-09-05 PROCEDURE — 73560 X-RAY EXAM OF KNEE 1 OR 2: CPT | Mod: 50,TC

## 2019-09-12 ENCOUNTER — TELEPHONE (OUTPATIENT)
Dept: NEUROLOGY | Facility: CLINIC | Age: 64
End: 2019-09-12

## 2019-09-12 NOTE — TELEPHONE ENCOUNTER
Notify: I received a not from [facility] about him possibly started Eliqius for 40 days prior to possibly having the watchman device placed for his afib (instead of long term blood thinners).  I would need to see him to discuss this. If this is more urgent than the Appointment he has scheduled in a few weeks, can we place him in another spot to review?  Thanks.     MD note: Patient has chronic afib and question is whether he could be on Eliqious for 40 days pending review of his candidacy for  possible Watchman device placement. May needed up date CT/ needs risks/ benefits discussion

## 2019-09-12 NOTE — TELEPHONE ENCOUNTER
Patient opted out of having the procedure done due to not being able to travel to [place] to have this done. Wife states that they will keep his appointment in October unless something else comes before hand.

## 2019-09-16 ENCOUNTER — EXTERNAL HOME HEALTH (OUTPATIENT)
Dept: HOME HEALTH SERVICES | Facility: HOSPITAL | Age: 64
End: 2019-09-16
Payer: MEDICARE

## 2019-09-18 ENCOUNTER — TELEPHONE (OUTPATIENT)
Dept: FAMILY MEDICINE | Facility: CLINIC | Age: 64
End: 2019-09-18

## 2019-09-18 NOTE — TELEPHONE ENCOUNTER
----- Message from Rachael Saleh sent at 2019 10:32 AM CDT -----  Contact: rebekahMarianali  Peewee Nunes  MRN: [id number]  : 1955  PCP: Jacob Piedra  Home Phone      [phone]  Work Phone      Not on file.  Mobile          Not on file.      MESSAGE:   Rebekah was calling to see if Doctor can write a prescription for 2.2 ron because it does not have fiber in it and the one that he is currently on has fiber it in and give hm cramps.     [phone]  Rebekah

## 2019-09-23 ENCOUNTER — TELEPHONE (OUTPATIENT)
Dept: FAMILY MEDICINE | Facility: CLINIC | Age: 64
End: 2019-09-23

## 2019-09-23 NOTE — TELEPHONE ENCOUNTER
----- Message from Jaskaran Man sent at 2019 10:07 AM CDT -----  Contact: Jose Alfredo @ [facility]  Peewee Nunes  MRN: [id number]  : 1955  PCP: Jacob Piedra  Home Phone      [phone]  Work Phone      Not on file.  Mobile          Not on file.      MESSAGE: FYI:  declined PT session last week -- will resume this week     PCP: Tadeo

## 2019-09-24 ENCOUNTER — PATIENT MESSAGE (OUTPATIENT)
Dept: FAMILY MEDICINE | Facility: CLINIC | Age: 64
End: 2019-09-24

## 2019-09-25 ENCOUNTER — TELEPHONE (OUTPATIENT)
Dept: NEUROLOGY | Facility: CLINIC | Age: 64
End: 2019-09-25

## 2019-09-25 NOTE — TELEPHONE ENCOUNTER
----- Message from Deirdre Weeks sent at 2019 12:09 PM CDT -----  Contact: [facility]   Peewee Nunes  MRN: [id number]  : 1955  PCP: Jacob Peidra  Home Phone      [phone]  Work Phone      Not on file.  Mobile          Not on file.      MESSAGE:     Script for tube feedings received however it needs to say: Isosource 1.2 ron HN 6 cans a day.   Needs a new script faxed to [phone]

## 2019-09-27 ENCOUNTER — TELEPHONE (OUTPATIENT)
Dept: FAMILY MEDICINE | Facility: CLINIC | Age: 64
End: 2019-09-27

## 2019-09-27 NOTE — TELEPHONE ENCOUNTER
----- Message from Jaskaran Man sent at 2019  3:49 PM CDT -----  Contact: Jos Ealfredo @ [facility]  Peewee Nunes  MRN: [id number]  : 1955  PCP: Jacob Piedra  Home Phone      [phone]  Work Phone      Not on file.  Mobile          Not on file.      MESSAGE:  FYI:   patient cancelled another physical therapy session this week, due to pain -- he will follow up with ortho for pain next week    PCP: Tadeo  
(3) adequate

## 2019-10-04 ENCOUNTER — TELEPHONE (OUTPATIENT)
Dept: FAMILY MEDICINE | Facility: CLINIC | Age: 64
End: 2019-10-04

## 2019-10-04 NOTE — TELEPHONE ENCOUNTER
----- Message from Christine Clark sent at 10/4/2019  1:37 PM CDT -----  Contact: Jose Alfredo from [facility]  Peewee Nunes  MRN: [id number]  : 1955  PCP: Jacob Piedra  Home Phone      [phone]  Work Phone      Not on file.  Mobile          Not on file.      MESSAGE:   Patient had to skip PT  This week due to injections from ortho. Please advise.    Jose Alfredo  [phone]

## 2019-10-12 PROCEDURE — G0179 MD RECERTIFICATION HHA PT: HCPCS | Mod: ,,, | Performed by: FAMILY MEDICINE

## 2019-10-12 PROCEDURE — G0179 PR HOME HEALTH MD RECERTIFICATION: ICD-10-PCS | Mod: ,,, | Performed by: FAMILY MEDICINE

## 2019-10-15 ENCOUNTER — TELEPHONE (OUTPATIENT)
Dept: FAMILY MEDICINE | Facility: CLINIC | Age: 64
End: 2019-10-15

## 2019-10-15 NOTE — TELEPHONE ENCOUNTER
----- Message from Christine Clark sent at 10/15/2019  2:39 PM CDT -----  Contact: [facility]  Peewee Nunes  MRN: [id number]  : 1955  PCP: Jacob Piedra  Home Phone      [phone]  Work Phone      Not on file.  Mobile          Not on file.      MESSAGE:    is requesting orders to continue home health therapy. He didn't get therapy last week due to injections. He'd like to continue therapy. Please advise.    Jose Alfredo  [phone]

## 2019-10-18 DIAGNOSIS — E11.9 TYPE 2 DIABETES MELLITUS WITHOUT COMPLICATION: ICD-10-CM

## 2019-10-23 ENCOUNTER — TELEPHONE (OUTPATIENT)
Dept: HOME HEALTH SERVICES | Facility: HOSPITAL | Age: 64
End: 2019-10-23

## 2019-10-28 ENCOUNTER — EXTERNAL HOME HEALTH (OUTPATIENT)
Dept: HOME HEALTH SERVICES | Facility: HOSPITAL | Age: 64
End: 2019-10-28
Payer: MEDICARE

## 2019-11-21 RX ORDER — OXYBUTYNIN CHLORIDE 5 MG/5ML
5 SYRUP ORAL 4 TIMES DAILY
Qty: 600 ML | Refills: 11 | Status: SHIPPED | OUTPATIENT
Start: 2019-11-21 | End: 2019-12-09 | Stop reason: SDUPTHER

## 2019-12-09 ENCOUNTER — PATIENT MESSAGE (OUTPATIENT)
Dept: FAMILY MEDICINE | Facility: CLINIC | Age: 64
End: 2019-12-09

## 2019-12-09 ENCOUNTER — TELEPHONE (OUTPATIENT)
Dept: FAMILY MEDICINE | Facility: CLINIC | Age: 64
End: 2019-12-09

## 2019-12-09 RX ORDER — OXYBUTYNIN CHLORIDE 5 MG/5ML
5 SYRUP ORAL 4 TIMES DAILY
Qty: 600 ML | Refills: 11 | Status: SHIPPED | OUTPATIENT
Start: 2019-12-09 | End: 2019-12-10 | Stop reason: SDUPTHER

## 2019-12-09 NOTE — TELEPHONE ENCOUNTER
----- Message from Rachael Saleh sent at 2019  1:24 PM CST -----  Contact: Blake aguilar sarah Nunes  MRN: [id number]  : 1955  PCP: Jacob Piedra  Home Phone      [phone]  Work Phone      Not on file.  Mobile          Not on file.      MESSAGE:   Ness is calling because patient normally take  Tablets for oxybutynin but Doc sent in a syrup for the same medication. Trying to see what he suppose to be taking.    [phone]  Ref:[id number]

## 2019-12-09 NOTE — TELEPHONE ENCOUNTER
*-*-*This message has not been handled.*-*-*    Hi, Sharmila Bell says they need a 90 day prescription for Peewee's Oxybutinin 5mg/5 ml liquid as we discussed on 11/21/19. They have the 1 tsp, 4x per day, but what you sent is a script for a 30 day supply. Please expedite their request for clarification and also request fast fill and shipment as I have only a few days left. Thanks, Lyndsey.

## 2019-12-10 RX ORDER — OXYBUTYNIN CHLORIDE 5 MG/5ML
5 SYRUP ORAL 4 TIMES DAILY
Qty: 1800 ML | Refills: 3 | Status: SHIPPED | OUTPATIENT
Start: 2019-12-10 | End: 2020-12-09

## 2019-12-10 NOTE — TELEPHONE ENCOUNTER
----- Message from Roselyn Concepcion sent at 12/10/2019 11:32 AM CST -----  Contact: Lyndsey- Spouse  Peewee Nunes  MRN: [id number]  : 1955  PCP: Jacob Piedra  Home Phone      [phone]  Work Phone      Not on file.  Mobile          Not on file.      MESSAGE: spouse states the patient needs oxybutynin (DITROPAN) 5 mg/5 mL syrup 1800ML per month. States [facility] only take 90 day RX's.    Phone:  [phone]

## 2019-12-11 PROCEDURE — G0179 MD RECERTIFICATION HHA PT: HCPCS | Mod: ,,, | Performed by: FAMILY MEDICINE

## 2019-12-11 PROCEDURE — G0179 PR HOME HEALTH MD RECERTIFICATION: ICD-10-PCS | Mod: ,,, | Performed by: FAMILY MEDICINE

## 2019-12-23 ENCOUNTER — EXTERNAL HOME HEALTH (OUTPATIENT)
Dept: HOME HEALTH SERVICES | Facility: HOSPITAL | Age: 64
End: 2019-12-23
Payer: MEDICARE

## 2019-12-26 DIAGNOSIS — E11.9 TYPE 2 DIABETES MELLITUS WITHOUT COMPLICATION: ICD-10-CM

## 2020-01-20 ENCOUNTER — PATIENT MESSAGE (OUTPATIENT)
Dept: FAMILY MEDICINE | Facility: CLINIC | Age: 65
End: 2020-01-20

## 2020-01-20 RX ORDER — TRAMADOL HYDROCHLORIDE 50 MG/1
TABLET ORAL
Qty: 90 TABLET | Refills: 3 | Status: SHIPPED | OUTPATIENT
Start: 2020-01-20

## 2020-01-20 NOTE — TELEPHONE ENCOUNTER
LOV and tramadol last printed on 4/25/19    traMADol (ULTRAM) 50 mg tablet (Discontinued) 90 tablet 5 4/29/2019 5/27/2019 --   Sig - Route: Take 1 tablet (50 mg total) by mouth every 12 (twelve) hours as needed for Pain. - Oral

## 2020-01-27 RX ORDER — SILODOSIN 4 MG/1
CAPSULE ORAL
Qty: 90 CAPSULE | Refills: 3 | Status: SHIPPED | OUTPATIENT
Start: 2020-01-27

## 2020-02-05 ENCOUNTER — TELEPHONE (OUTPATIENT)
Dept: FAMILY MEDICINE | Facility: CLINIC | Age: 65
End: 2020-02-05

## 2020-02-05 NOTE — TELEPHONE ENCOUNTER
----- Message from Sally Duran sent at 2020 11:08 AM CST -----  Contact: Rebekah with [facility]  Peewee Nunes  MRN: [id number]  : 1955  PCP: aJcob Piedra  Home Phone      [phone]  Work Phone      Not on file.  Mobile          Not on file.      MESSAGE:  Pt has been having a sinus/ cold for 9 days with no fever, lungs are clear, but does have a very junky cough. Rebekah would like to remind you if order any medication it needs to be able to go down a peg tube.  Phone: [phone]

## 2020-02-07 ENCOUNTER — PATIENT MESSAGE (OUTPATIENT)
Dept: FAMILY MEDICINE | Facility: CLINIC | Age: 65
End: 2020-02-07

## 2020-02-07 RX ORDER — AMOXICILLIN 400 MG/5ML
800 POWDER, FOR SUSPENSION ORAL EVERY 12 HOURS
Qty: 200 ML | Refills: 0 | Status: SHIPPED | OUTPATIENT
Start: 2020-02-07 | End: 2020-02-17

## 2020-02-07 RX ORDER — AMOXICILLIN 400 MG/5ML
800 POWDER, FOR SUSPENSION ORAL EVERY 12 HOURS
Qty: 200 ML | Refills: 0 | Status: SHIPPED | OUTPATIENT
Start: 2020-02-07 | End: 2020-02-07 | Stop reason: SDUPTHER

## 2020-02-09 PROCEDURE — G0179 PR HOME HEALTH MD RECERTIFICATION: ICD-10-PCS | Mod: ,,, | Performed by: FAMILY MEDICINE

## 2020-02-09 PROCEDURE — G0179 MD RECERTIFICATION HHA PT: HCPCS | Mod: ,,, | Performed by: FAMILY MEDICINE

## 2020-02-18 ENCOUNTER — EXTERNAL HOME HEALTH (OUTPATIENT)
Dept: HOME HEALTH SERVICES | Facility: HOSPITAL | Age: 65
End: 2020-02-18
Payer: MEDICARE

## 2020-04-09 PROCEDURE — G0179 MD RECERTIFICATION HHA PT: HCPCS | Mod: ,,, | Performed by: FAMILY MEDICINE

## 2020-04-09 PROCEDURE — G0179 PR HOME HEALTH MD RECERTIFICATION: ICD-10-PCS | Mod: ,,, | Performed by: FAMILY MEDICINE

## 2020-04-13 ENCOUNTER — EXTERNAL HOME HEALTH (OUTPATIENT)
Dept: HOME HEALTH SERVICES | Facility: HOSPITAL | Age: 65
End: 2020-04-13
Payer: MEDICARE

## 2020-04-30 ENCOUNTER — PATIENT MESSAGE (OUTPATIENT)
Dept: FAMILY MEDICINE | Facility: CLINIC | Age: 65
End: 2020-04-30

## 2020-05-01 RX ORDER — AMLODIPINE BESYLATE 10 MG/1
10 TABLET ORAL DAILY
Qty: 90 TABLET | Refills: 3 | Status: SHIPPED | OUTPATIENT
Start: 2020-05-01 | End: 2021-05-01

## 2020-05-01 RX ORDER — FINASTERIDE 5 MG/1
5 TABLET, FILM COATED ORAL DAILY
Qty: 90 TABLET | Refills: 3 | Status: SHIPPED | OUTPATIENT
Start: 2020-05-01 | End: 2021-05-01

## 2020-05-01 RX ORDER — METOPROLOL TARTRATE 50 MG/1
50 TABLET ORAL 2 TIMES DAILY
Qty: 180 TABLET | Refills: 3 | Status: SHIPPED | OUTPATIENT
Start: 2020-05-01 | End: 2020-07-30

## 2020-05-12 ENCOUNTER — PATIENT MESSAGE (OUTPATIENT)
Dept: ADMINISTRATIVE | Facility: HOSPITAL | Age: 65
End: 2020-05-12

## 2020-05-16 ENCOUNTER — PATIENT MESSAGE (OUTPATIENT)
Dept: FAMILY MEDICINE | Facility: CLINIC | Age: 65
End: 2020-05-16

## 2020-05-18 RX ORDER — LEVETIRACETAM 100 MG/ML
500 SOLUTION ORAL 2 TIMES DAILY
Qty: 300 ML | Refills: 0 | Status: SHIPPED | OUTPATIENT
Start: 2020-05-18 | End: 2020-06-08 | Stop reason: SDUPTHER

## 2020-05-18 NOTE — TELEPHONE ENCOUNTER
----- Message from Rachael Saleh sent at 2020  9:23 AM CDT -----  Contact: elva briones  Peewee Nunes  MRN: [id number]  : 1955  PCP: Jacob Piedra  Home Phone      [phone]  Work Phone      Not on file.  Mobile          Not on file.      MESSAGE:   Patient has been having seizers and Rebekah would like to go over patients medications with a nurse.     [phone]

## 2020-05-18 NOTE — TELEPHONE ENCOUNTER
Dr. Piedra's pt:      Ashanti with [facility] states pt was not taking Keppra 50mg tabs, but since he has been having seizures he has started taking them over the weekend. Pt is bed bound, and has trouble swallowing sometimes. He does have a feeding tube . They would like to know if Rx can be changed to liquid form?        He was prescribed levetiracetam 500mg tab by Dr. Bangura. Since it has been over 6 months since he has seen her, so she will not change pts Rx to the liquid form      It is also difficult for pt to get to an appt, as he has not been out of the bed in over 6 months.     [facility]

## 2020-06-08 ENCOUNTER — PATIENT MESSAGE (OUTPATIENT)
Dept: FAMILY MEDICINE | Facility: CLINIC | Age: 65
End: 2020-06-08

## 2020-06-08 PROCEDURE — G0179 PR HOME HEALTH MD RECERTIFICATION: ICD-10-PCS | Mod: ,,, | Performed by: FAMILY MEDICINE

## 2020-06-08 PROCEDURE — G0179 MD RECERTIFICATION HHA PT: HCPCS | Mod: ,,, | Performed by: FAMILY MEDICINE

## 2020-06-08 RX ORDER — LIDOCAINE HYDROCHLORIDE 20 MG/ML
JELLY TOPICAL
Qty: 30 ML | Refills: 11 | Status: SHIPPED | OUTPATIENT
Start: 2020-06-08

## 2020-06-08 RX ORDER — NYSTATIN 100000 [USP'U]/ML
2 SUSPENSION ORAL 2 TIMES DAILY
Qty: 160 ML | Refills: 11 | Status: SHIPPED | OUTPATIENT
Start: 2020-06-08 | End: 2020-06-18

## 2020-06-08 RX ORDER — LEVETIRACETAM 500 MG/1
TABLET ORAL
Qty: 180 TABLET | Refills: 3 | Status: SHIPPED | OUTPATIENT
Start: 2020-06-08

## 2020-06-08 RX ORDER — LEVETIRACETAM 100 MG/ML
500 SOLUTION ORAL 2 TIMES DAILY
Qty: 900 ML | Refills: 3 | Status: SHIPPED | OUTPATIENT
Start: 2020-06-08 | End: 2020-06-10

## 2020-06-08 NOTE — TELEPHONE ENCOUNTER
----- Message from Rachael Saleh sent at 2020  2:37 PM CDT -----  Contact: drew Nunes  MRN: [id number]  : 1955  PCP: Jacob Piedra  Home Phone      [phone]  Work Phone      Not on file.  Mobile          Not on file.      MESSAGE:   Patient would rather get the pills for levETIRAcetam (KEPPRA) 500 MG Tab instead of the liquid. Would like to get it resent    [facility]    [phone]

## 2020-06-10 ENCOUNTER — TELEPHONE (OUTPATIENT)
Dept: FAMILY MEDICINE | Facility: CLINIC | Age: 65
End: 2020-06-10

## 2020-06-10 NOTE — TELEPHONE ENCOUNTER
Completed PA by phone for lidocaine 2% gel    Approved until 09/08/20   For 30gram/30days    Case# [id number]

## 2020-06-15 ENCOUNTER — EXTERNAL HOME HEALTH (OUTPATIENT)
Dept: HOME HEALTH SERVICES | Facility: HOSPITAL | Age: 65
End: 2020-06-15
Payer: MEDICARE

## 2020-06-18 ENCOUNTER — TELEPHONE (OUTPATIENT)
Dept: FAMILY MEDICINE | Facility: CLINIC | Age: 65
End: 2020-06-18

## 2020-06-18 RX ORDER — AMOXICILLIN 400 MG/5ML
400 POWDER, FOR SUSPENSION ORAL 4 TIMES DAILY
Qty: 200 ML | Refills: 0 | Status: SHIPPED | OUTPATIENT
Start: 2020-06-18 | End: 2020-06-28

## 2020-06-18 NOTE — TELEPHONE ENCOUNTER
----- Message from Jaskaran Man sent at 2020  8:54 AM CDT -----  Contact: Rebekah @ [facility]  Peewee Nunes  MRN: [id number]  : 1955  PCP: Jacob Piedra  Home Phone      [phone]  Work Phone      Not on file.  Mobile          Not on file.      MESSAGE:  low grade fever since yesterday (around 100) -- generalized pain - just doesn't feel well - she is going out to see him today -- he has not had labs or urine in about a year -- does Dr Piedra want anything done -- please advise    Call Rebekah @ [phone]    PCP: Tadeo

## 2020-06-18 NOTE — TELEPHONE ENCOUNTER
Contacted and informed HH verbalized understanding. HH states pt needs a virtual visit with pt wife within the next two weeks. Please advise, thank you.

## 2020-06-19 NOTE — TELEPHONE ENCOUNTER
Contacted and scheduled apt for 07/06/2020 at 2:30 HH verbalized understanding. Visit needs to be charted as a virtual visit instead of audio.

## 2020-06-25 ENCOUNTER — DOCUMENT SCAN (OUTPATIENT)
Dept: HOME HEALTH SERVICES | Facility: HOSPITAL | Age: 65
End: 2020-06-25
Payer: MEDICARE

## 2020-07-06 ENCOUNTER — OFFICE VISIT (OUTPATIENT)
Dept: FAMILY MEDICINE | Facility: CLINIC | Age: 65
End: 2020-07-06
Payer: MEDICARE

## 2020-07-06 DIAGNOSIS — R63.4 WEIGHT LOSS: Primary | ICD-10-CM

## 2020-07-06 DIAGNOSIS — Z74.09 IMPAIRED MOBILITY AND ACTIVITIES OF DAILY LIVING: ICD-10-CM

## 2020-07-06 DIAGNOSIS — I10 ESSENTIAL HYPERTENSION: ICD-10-CM

## 2020-07-06 DIAGNOSIS — G40.909 SEIZURE DISORDER: ICD-10-CM

## 2020-07-06 DIAGNOSIS — R63.0 ANOREXIA: ICD-10-CM

## 2020-07-06 DIAGNOSIS — Z78.9 IMPAIRED MOBILITY AND ACTIVITIES OF DAILY LIVING: ICD-10-CM

## 2020-07-06 PROCEDURE — 1159F MED LIST DOCD IN RCRD: CPT | Mod: ,,, | Performed by: FAMILY MEDICINE

## 2020-07-06 PROCEDURE — 1159F PR MEDICATION LIST DOCUMENTED IN MEDICAL RECORD: ICD-10-PCS | Mod: ,,, | Performed by: FAMILY MEDICINE

## 2020-07-06 PROCEDURE — 99441 PR PHYSICIAN TELEPHONE EVALUATION 5-10 MIN: ICD-10-PCS | Mod: 95,,, | Performed by: FAMILY MEDICINE

## 2020-07-06 PROCEDURE — 99441 PR PHYSICIAN TELEPHONE EVALUATION 5-10 MIN: CPT | Mod: 95,,, | Performed by: FAMILY MEDICINE

## 2020-07-06 NOTE — PROGRESS NOTES
Subjective:       Patient ID: Peewee Nunes is a 65 y.o. male.    Chief Complaint: No chief complaint on file.    Pt is a 65 y.o. male who presents for check up for chronic pain and has been bedbound for over 3 months. Doing well on current meds. Denies any side effects. Prevention is not up to date.    Review of Systems   Constitutional: Positive for activity change. Negative for appetite change.        In a hospital bed fo rover 3 months   HENT: Negative for congestion, ear pain, sneezing and sore throat.    Eyes: Negative for redness and visual disturbance.   Respiratory: Negative for cough, chest tightness and stridor.    Cardiovascular: Negative for chest pain.   Gastrointestinal: Negative for abdominal pain, blood in stool, diarrhea, nausea and vomiting.        Has PEG and tolaerating tube feedings OK 2-3/day   Genitourinary: Negative for difficulty urinating, dysuria and hematuria.        Long term urinary cath care and drainage   Musculoskeletal: Positive for back pain. Negative for arthralgias, joint swelling, myalgias and neck pain.   Skin: Negative for rash.        No pressure lesions   Neurological: Negative for dizziness.   Psychiatric/Behavioral: Negative for agitation. The patient is not nervous/anxious.        Objective:      Physical Exam  Constitutional:       Appearance: He is well-developed. He is obese.      Comments: Weighing about 150#s   HENT:      Head: Normocephalic.   Eyes:      Pupils: Pupils are equal, round, and reactive to light.   Neck:      Musculoskeletal: Normal range of motion and neck supple.      Thyroid: No thyromegaly.   Cardiovascular:      Rate and Rhythm: Normal rate and regular rhythm.      Heart sounds: No murmur. No friction rub.   Pulmonary:      Effort: Pulmonary effort is normal. No respiratory distress.      Breath sounds: No wheezing.   Abdominal:      Tenderness: There is no abdominal tenderness. There is no guarding or rebound.   Musculoskeletal: Normal range  of motion.         General: No tenderness.   Lymphadenopathy:      Cervical: No cervical adenopathy.   Skin:     General: Skin is warm and dry.   Neurological:      Mental Status: He is alert and oriented to person, place, and time.      Cranial Nerves: No cranial nerve deficit.      Deep Tendon Reflexes: Reflexes are normal and symmetric.      Comments: Good accurate memory and good speech   Psychiatric:         Thought Content: Thought content normal.         Judgment: Judgment normal.         Assessment:       1. Weight loss    2. Anorexia    3. Essential hypertension    4. Seizure disorder    5. Impaired mobility and activities of daily living        Plan:   Diagnoses and all orders for this visit:    Weight loss    Anorexia    Essential hypertension    Seizure disorder    Impaired mobility and activities of daily living    Needs continued  nursing and skilled nursing  Visit completed via a Telehealth encounter

## 2020-07-24 RX ORDER — NITROFURANTOIN (MACROCRYSTALS) 100 MG/1
100 CAPSULE ORAL EVERY 12 HOURS
Qty: 90 CAPSULE | Refills: 3 | Status: SHIPPED | OUTPATIENT
Start: 2020-07-24

## 2020-08-12 ENCOUNTER — TELEPHONE (OUTPATIENT)
Dept: FAMILY MEDICINE | Facility: CLINIC | Age: 65
End: 2020-08-12

## 2020-08-12 NOTE — TELEPHONE ENCOUNTER
"Terra needs last visit to be addended. Instead of saying "Pt's encounter was done via his wife over the phone," it needs to say "Telehealth visit completed - This should suffice as a face to face."   "

## 2020-08-14 ENCOUNTER — EXTERNAL HOME HEALTH (OUTPATIENT)
Dept: HOME HEALTH SERVICES | Facility: HOSPITAL | Age: 65
End: 2020-08-14
Payer: MEDICARE

## 2020-08-26 ENCOUNTER — TELEPHONE (OUTPATIENT)
Dept: FAMILY MEDICINE | Facility: CLINIC | Age: 65
End: 2020-08-26

## 2020-08-26 NOTE — TELEPHONE ENCOUNTER
----- Message from Jaskaran Man sent at 2020 10:29 AM CDT -----  Contact: Rebekah @ [employer]  Peewee Nunes  MRN: [id number]  : 1955  PCP: Jacob Piedra  Home Phone      [phone]  Work Phone      Not on file.  Mobile          [phone]      MESSAGE:  Rebekah @ [employer] -- sees patient once a month -- saw him today, thinks patient may be trying to starve himself to death -- allows 1 1/2 - 2 cans of tube feeding daily (approx 400 calories total) -- states he is skin & bones -- patient did make himself a DNR a few months ago -- please advise    Call Rebekah @ [phone]    PCP: Tadeo

## 2020-08-26 NOTE — TELEPHONE ENCOUNTER
DUDLEY    Rebekah saw pt today, and stated he is not allowing his wife to tube feed him more that 1 1/2 can a day. He is just skin a bone.    Stated he is taking his medications, except the Tramadol. Rebekah stated she would consider Hospice, but she does not think it would be beneficial to the pt since he is refusing pain meds.    He is alert and in sound mind. He has also become a DNR. Wife did mention he started becoming exhausted with everything a few months ago

## 2020-08-27 NOTE — TELEPHONE ENCOUNTER
Spoke with Rebekah @ [employer], Dr Piedra's recommendations given.  Rebekah @ [employer] will speak with wife

## 2020-08-28 DIAGNOSIS — E11.9 TYPE 2 DIABETES MELLITUS WITHOUT COMPLICATION: ICD-10-CM

## 2020-09-17 DIAGNOSIS — Z12.11 COLON CANCER SCREENING: ICD-10-CM

## 2020-09-23 ENCOUNTER — LAB VISIT (OUTPATIENT)
Dept: LAB | Facility: HOSPITAL | Age: 65
End: 2020-09-23
Attending: FAMILY MEDICINE
Payer: MEDICARE

## 2020-09-23 ENCOUNTER — TELEPHONE (OUTPATIENT)
Dept: FAMILY MEDICINE | Facility: CLINIC | Age: 65
End: 2020-09-23

## 2020-09-23 DIAGNOSIS — R30.0 DYSURIA: Primary | ICD-10-CM

## 2020-09-23 LAB
AMORPH CRY URNS QL MICRO: ABNORMAL
BACTERIA #/AREA URNS HPF: ABNORMAL /HPF
BILIRUB UR QL STRIP: ABNORMAL
CLARITY UR: ABNORMAL
COLOR UR: YELLOW
GLUCOSE UR QL STRIP: NEGATIVE
HGB UR QL STRIP: ABNORMAL
HYALINE CASTS #/AREA URNS LPF: 0 /LPF
KETONES UR QL STRIP: ABNORMAL
LEUKOCYTE ESTERASE UR QL STRIP: ABNORMAL
MICROSCOPIC COMMENT: ABNORMAL
NITRITE UR QL STRIP: POSITIVE
PH UR STRIP: 6 [PH] (ref 5–8)
PROT UR QL STRIP: ABNORMAL
RBC #/AREA URNS HPF: 20 /HPF (ref 0–4)
SP GR UR STRIP: 1.02 (ref 1–1.03)
URN SPEC COLLECT METH UR: ABNORMAL
UROBILINOGEN UR STRIP-ACNC: NEGATIVE EU/DL
WBC #/AREA URNS HPF: >100 /HPF (ref 0–5)

## 2020-09-23 PROCEDURE — 81000 URINALYSIS NONAUTO W/SCOPE: CPT

## 2020-09-23 PROCEDURE — 87086 URINE CULTURE/COLONY COUNT: CPT

## 2020-09-23 NOTE — TELEPHONE ENCOUNTER
Ok. If he doesn't want hospice, he will need to go to ED for labs and admission for what sounds like some renal failure   Thanks

## 2020-09-23 NOTE — TELEPHONE ENCOUNTER
----- Message from Jaskaran Man sent at 2020 11:42 AM CDT -----  Contact: Rebekah @ [employer]  Peewee Nunes  MRN: [id number]  : 1955  PCP: Jacob Piedra  Home Phone      [phone]  Work Phone      Not on file.  Mobile          [phone]      MESSAGE:  catheter changed 9 days ago, due to it being clogged -- changed again today - urine in tube was dark & nasty -- patient still refusing to eat what he needs - no urine output -- possible kidney failure -- no concrete answer as to whether or not he wants transfer to hospice -- Rebekah states she will go out & check him again this afternoon - she will call us back     Call Rebekah @ [facility] [phone]    PCP: Tadeo

## 2020-09-24 ENCOUNTER — TELEPHONE (OUTPATIENT)
Dept: FAMILY MEDICINE | Facility: CLINIC | Age: 65
End: 2020-09-24

## 2020-09-24 DIAGNOSIS — N39.0 URINARY TRACT INFECTION WITHOUT HEMATURIA, SITE UNSPECIFIED: Primary | ICD-10-CM

## 2020-09-24 LAB
BACTERIA UR CULT: NORMAL
BACTERIA UR CULT: NORMAL

## 2020-09-24 RX ORDER — AMOXICILLIN AND CLAVULANATE POTASSIUM 400; 57 MG/5ML; MG/5ML
800 POWDER, FOR SUSPENSION ORAL EVERY 12 HOURS
Qty: 100 ML | Refills: 0 | Status: SHIPPED | OUTPATIENT
Start: 2020-09-24 | End: 2020-09-29

## 2020-09-24 RX ORDER — AMOXICILLIN AND CLAVULANATE POTASSIUM 875; 125 MG/1; MG/1
1 TABLET, FILM COATED ORAL 2 TIMES DAILY
Qty: 10 TABLET | Refills: 0 | Status: SHIPPED | OUTPATIENT
Start: 2020-09-24 | End: 2020-09-29

## 2020-09-24 NOTE — TELEPHONE ENCOUNTER
----- Message from Jaskaran Man sent at 2020 11:15 AM CDT -----  Contact: Rebekah @ [employer]  Peewee Nunes  MRN: [id number]  : 1955  PCP: Jacob Piedra  Home Phone      [phone]  Work Phone      Not on file.  Mobile          [phone]      MESSAGE:  Rebekah @ [employer] - spoke with Christineobey ozuna - patient put out 700 cc of urine, looks clearer - urinalysis results show bad UTI - does he want to prescribe something or wait for culture -- please advise    Call Rebekah @ [phone]    PCP: Tadeo ([place])    Urinalysis results in Epic, please review.

## 2020-09-24 NOTE — TELEPHONE ENCOUNTER
Spoke w/ juan antonio, notified of Dr. PATRICK's recommendations. States they did tell the patient something similar that he needed to go to the ER for this, but he is adamently refusing. Patient wants no type of treatment, intervention or anything, but still refusing hospice.    Juan Antonio states they were able to obtain a U/A yesterday, and patient did end up putting out about 200ccs of urine over the rest of the day yesterday. States she has not talked to the wife yet today but will touch base with her and let us know.

## 2020-09-27 ENCOUNTER — PATIENT MESSAGE (OUTPATIENT)
Dept: FAMILY MEDICINE | Facility: CLINIC | Age: 65
End: 2020-09-27

## 2020-09-27 PROCEDURE — G0179 MD RECERTIFICATION HHA PT: HCPCS | Mod: ,,, | Performed by: FAMILY MEDICINE

## 2020-09-27 PROCEDURE — G0179 PR HOME HEALTH MD RECERTIFICATION: ICD-10-PCS | Mod: ,,, | Performed by: FAMILY MEDICINE

## 2020-09-28 DIAGNOSIS — R21 RASH: Primary | ICD-10-CM

## 2020-09-28 RX ORDER — CLOTRIMAZOLE AND BETAMETHASONE DIPROPIONATE 10; .64 MG/G; MG/G
CREAM TOPICAL 2 TIMES DAILY
Qty: 45 G | Refills: 3 | Status: SHIPPED | OUTPATIENT
Start: 2020-09-28

## 2020-09-30 ENCOUNTER — DOCUMENT SCAN (OUTPATIENT)
Dept: HOME HEALTH SERVICES | Facility: HOSPITAL | Age: 65
End: 2020-09-30
Payer: MEDICARE

## 2020-10-05 ENCOUNTER — PATIENT MESSAGE (OUTPATIENT)
Dept: ADMINISTRATIVE | Facility: HOSPITAL | Age: 65
End: 2020-10-05

## 2020-10-13 NOTE — PLAN OF CARE
Problem: Adult Inpatient Plan of Care  Goal: Plan of Care Review  Admit Date: 12/20/18    Admit Dx:    Past Medical History:  No date: Coronary artery disease  No date: CVD (cardiovascular disease)  No date: Diabetes mellitus type II  No date: Hyperlipidemia  No date: Hypertension  No date: Ischemic cardiomyopathy  No date: Obesity  No date: Obesity, morbid    Past Surgical History:  No date: APPENDECTOMY  No date: CORONARY ARTERY BYPASS GRAFT  12/20/2018: CRANIOTOMY, FOR SUBDURAL HEMATOMA EVACUATION; Left      Comment:  Performed by Stevan Hull MD at [facility] OR [address] [place] [address]  No date: HEMORRHOID SURGERY    Individualization:   1. Pt goes by Peewee    Restraints: 1/1/19 @ 0505 L mitt for pulling lines       Outcome: Ongoing (interventions implemented as appropriate)  POC reviewed with peewee at 0500. Pt is intubated/sedated and cannot verbalized understanding. Pt progressing toward goals. Will continue to monitor. Propofol drip started at 5mcg/kg/min. Intermittent catheterization done once for urinary retention. See flowsheets for full assessment and VS info        absent

## 2020-10-14 ENCOUNTER — DOCUMENT SCAN (OUTPATIENT)
Dept: HOME HEALTH SERVICES | Facility: HOSPITAL | Age: 65
End: 2020-10-14
Payer: MEDICARE

## 2020-10-14 ENCOUNTER — PATIENT MESSAGE (OUTPATIENT)
Dept: FAMILY MEDICINE | Facility: CLINIC | Age: 65
End: 2020-10-14

## 2020-10-15 DIAGNOSIS — N39.0 URINARY TRACT INFECTION WITH HEMATURIA, SITE UNSPECIFIED: Primary | ICD-10-CM

## 2020-10-15 DIAGNOSIS — R31.9 URINARY TRACT INFECTION WITH HEMATURIA, SITE UNSPECIFIED: Primary | ICD-10-CM

## 2020-10-15 NOTE — TELEPHONE ENCOUNTER
Pt was prescribed Augmentin 400-57mg on 09/24 for a UTI. He did not complete the abx due to breaking out with a rash. No other abx was prescribed.    Pt has an excessive amount of sediment in catheter that caused blockage.    Rebekah with [facility] needs to if another UA and abx are needed. Please advise. Needs liquid abx if required      Rebekah  [phone]

## 2020-10-16 ENCOUNTER — LAB VISIT (OUTPATIENT)
Dept: LAB | Facility: HOSPITAL | Age: 65
End: 2020-10-16
Attending: FAMILY MEDICINE
Payer: MEDICARE

## 2020-10-16 DIAGNOSIS — R30.0 DYSURIA: Primary | ICD-10-CM

## 2020-10-16 LAB
AMORPH CRY URNS QL MICRO: ABNORMAL
BACTERIA #/AREA URNS HPF: ABNORMAL /HPF
BILIRUB UR QL STRIP: NEGATIVE
CLARITY UR: ABNORMAL
COLOR UR: YELLOW
GLUCOSE UR QL STRIP: NEGATIVE
HGB UR QL STRIP: ABNORMAL
KETONES UR QL STRIP: NEGATIVE
LEUKOCYTE ESTERASE UR QL STRIP: ABNORMAL
MICROSCOPIC COMMENT: ABNORMAL
NITRITE UR QL STRIP: POSITIVE
PH UR STRIP: >8 [PH] (ref 5–8)
PROT UR QL STRIP: NEGATIVE
RBC #/AREA URNS HPF: 2 /HPF (ref 0–4)
SP GR UR STRIP: 1.02 (ref 1–1.03)
URN SPEC COLLECT METH UR: ABNORMAL
UROBILINOGEN UR STRIP-ACNC: NEGATIVE EU/DL
WBC #/AREA URNS HPF: 5 /HPF (ref 0–5)

## 2020-10-16 PROCEDURE — 87186 SC STD MICRODIL/AGAR DIL: CPT

## 2020-10-16 PROCEDURE — 87077 CULTURE AEROBIC IDENTIFY: CPT

## 2020-10-16 PROCEDURE — 87088 URINE BACTERIA CULTURE: CPT

## 2020-10-16 PROCEDURE — 81000 URINALYSIS NONAUTO W/SCOPE: CPT

## 2020-10-16 PROCEDURE — 87086 URINE CULTURE/COLONY COUNT: CPT

## 2020-10-18 LAB
BACTERIA UR CULT: ABNORMAL
BACTERIA UR CULT: ABNORMAL

## 2020-10-19 ENCOUNTER — EXTERNAL HOME HEALTH (OUTPATIENT)
Dept: HOME HEALTH SERVICES | Facility: HOSPITAL | Age: 65
End: 2020-10-19
Payer: MEDICARE

## 2020-10-19 ENCOUNTER — PATIENT MESSAGE (OUTPATIENT)
Dept: FAMILY MEDICINE | Facility: CLINIC | Age: 65
End: 2020-10-19

## 2020-10-19 DIAGNOSIS — T83.511D URINARY TRACT INFECTION ASSOCIATED WITH CATHETERIZATION OF URINARY TRACT, UNSPECIFIED INDWELLING URINARY CATHETER TYPE, SUBSEQUENT ENCOUNTER: Primary | ICD-10-CM

## 2020-10-19 DIAGNOSIS — N39.0 URINARY TRACT INFECTION ASSOCIATED WITH CATHETERIZATION OF URINARY TRACT, UNSPECIFIED INDWELLING URINARY CATHETER TYPE, SUBSEQUENT ENCOUNTER: Primary | ICD-10-CM

## 2020-10-19 RX ORDER — SULFAMETHOXAZOLE AND TRIMETHOPRIM 200; 40 MG/5ML; MG/5ML
10 SUSPENSION ORAL EVERY 12 HOURS
Qty: 200 ML | Refills: 0 | Status: SHIPPED | OUTPATIENT
Start: 2020-10-19 | End: 2020-10-29

## 2020-10-19 RX ORDER — SULFAMETHOXAZOLE AND TRIMETHOPRIM 200; 40 MG/5ML; MG/5ML
10 SUSPENSION ORAL EVERY 12 HOURS
Qty: 200 ML | Refills: 0 | Status: SHIPPED | OUTPATIENT
Start: 2020-10-19 | End: 2020-10-19 | Stop reason: SDUPTHER

## 2020-10-23 ENCOUNTER — DOCUMENT SCAN (OUTPATIENT)
Dept: HOME HEALTH SERVICES | Facility: HOSPITAL | Age: 65
End: 2020-10-23
Payer: MEDICARE

## 2020-10-30 ENCOUNTER — PATIENT MESSAGE (OUTPATIENT)
Dept: FAMILY MEDICINE | Facility: CLINIC | Age: 65
End: 2020-10-30

## 2020-10-30 ENCOUNTER — PATIENT MESSAGE (OUTPATIENT)
Dept: ADMINISTRATIVE | Facility: HOSPITAL | Age: 65
End: 2020-10-30

## 2020-10-30 NOTE — TELEPHONE ENCOUNTER
Key: ANTHONY DELATORRE completed by phone for Lidocaine 2%gel  Case # [id number]  Approved from 08/01/2020 - 01/28/2021

## 2020-11-02 ENCOUNTER — PATIENT MESSAGE (OUTPATIENT)
Dept: FAMILY MEDICINE | Facility: CLINIC | Age: 65
End: 2020-11-02

## 2020-11-16 ENCOUNTER — TELEPHONE (OUTPATIENT)
Dept: ADMINISTRATIVE | Facility: HOSPITAL | Age: 65
End: 2020-11-16

## 2020-11-16 NOTE — LETTER
AUTHORIZATION FOR RELEASE OF   CONFIDENTIAL INFORMATION     Medical Records,    We are seeing Peewee Nunes, date of birth 1955, in the clinic at [facility]. Jacob Piedra MD is the patient's PCP. Peewee Nunes has an outstanding lab/procedure at the time we reviewed his chart. In order to help keep his health information updated, he has authorized us to request the following medical record(s):                                                 ( x )  OUTSIDE LAB RESULTS         Please fax records to [facility], Michael J Marcello, MD, [phone]       If you have any questions, please contact Lauren at [phone].           Patient Name: Peewee Nunes  : 1955  Patient Phone #: [phone]

## 2020-11-18 ENCOUNTER — TELEPHONE (OUTPATIENT)
Dept: ADMINISTRATIVE | Facility: HOSPITAL | Age: 65
End: 2020-11-18

## 2020-11-26 PROCEDURE — G0179 PR HOME HEALTH MD RECERTIFICATION: ICD-10-PCS | Mod: ,,, | Performed by: FAMILY MEDICINE

## 2020-11-26 PROCEDURE — G0179 MD RECERTIFICATION HHA PT: HCPCS | Mod: ,,, | Performed by: FAMILY MEDICINE

## 2020-11-27 ENCOUNTER — HOSPITAL ENCOUNTER (INPATIENT)
Facility: OTHER | Age: 65
LOS: 1 days | Discharge: HOME HEALTH | DRG: 699 | End: 2020-11-28
Attending: INTERNAL MEDICINE | Admitting: INTERNAL MEDICINE
Payer: MEDICARE

## 2020-11-27 DIAGNOSIS — I48.11 LONGSTANDING PERSISTENT ATRIAL FIBRILLATION: ICD-10-CM

## 2020-11-27 DIAGNOSIS — R33.9 URINARY RETENTION: ICD-10-CM

## 2020-11-27 DIAGNOSIS — R13.10 DYSPHAGIA, UNSPECIFIED TYPE: ICD-10-CM

## 2020-11-27 DIAGNOSIS — I10 BENIGN ESSENTIAL HTN: ICD-10-CM

## 2020-11-27 DIAGNOSIS — E11.9 TYPE 2 DIABETES MELLITUS WITHOUT COMPLICATION, WITHOUT LONG-TERM CURRENT USE OF INSULIN: ICD-10-CM

## 2020-11-27 DIAGNOSIS — R31.9 HEMATURIA: ICD-10-CM

## 2020-11-27 DIAGNOSIS — Z86.79 HISTORY OF SUBARACHNOID HEMORRHAGE: ICD-10-CM

## 2020-11-27 DIAGNOSIS — I25.10 CORONARY ARTERY DISEASE INVOLVING NATIVE CORONARY ARTERY OF NATIVE HEART WITHOUT ANGINA PECTORIS: ICD-10-CM

## 2020-11-27 DIAGNOSIS — R31.0 GROSS HEMATURIA: ICD-10-CM

## 2020-11-27 DIAGNOSIS — R31.9 HEMATURIA, UNSPECIFIED TYPE: ICD-10-CM

## 2020-11-27 LAB
ANION GAP SERPL CALC-SCNC: 4 MMOL/L (ref 8–16)
BASOPHILS # BLD AUTO: 0.03 K/UL (ref 0–0.2)
BASOPHILS NFR BLD: 0.3 % (ref 0–1.9)
BUN SERPL-MCNC: 13 MG/DL (ref 8–23)
CALCIUM SERPL-MCNC: 8.7 MG/DL (ref 8.7–10.5)
CHLORIDE SERPL-SCNC: 105 MMOL/L (ref 95–110)
CO2 SERPL-SCNC: 26 MMOL/L (ref 23–29)
CREAT SERPL-MCNC: 0.5 MG/DL (ref 0.5–1.4)
DIFFERENTIAL METHOD: ABNORMAL
EOSINOPHIL # BLD AUTO: 0 K/UL (ref 0–0.5)
EOSINOPHIL NFR BLD: 0.4 % (ref 0–8)
ERYTHROCYTE [DISTWIDTH] IN BLOOD BY AUTOMATED COUNT: 14.1 % (ref 11.5–14.5)
EST. GFR  (AFRICAN AMERICAN): >60 ML/MIN/1.73 M^2
EST. GFR  (NON AFRICAN AMERICAN): >60 ML/MIN/1.73 M^2
ESTIMATED AVG GLUCOSE: 77 MG/DL (ref 68–131)
GLUCOSE SERPL-MCNC: 80 MG/DL (ref 70–110)
HBA1C MFR BLD HPLC: 4.3 % (ref 4–5.6)
HCT VFR BLD AUTO: 41 % (ref 40–54)
HGB BLD-MCNC: 13.3 G/DL (ref 14–18)
IMM GRANULOCYTES # BLD AUTO: 0.04 K/UL (ref 0–0.04)
IMM GRANULOCYTES NFR BLD AUTO: 0.4 % (ref 0–0.5)
LYMPHOCYTES # BLD AUTO: 0.8 K/UL (ref 1–4.8)
LYMPHOCYTES NFR BLD: 8.3 % (ref 18–48)
MAGNESIUM SERPL-MCNC: 1.7 MG/DL (ref 1.6–2.6)
MCH RBC QN AUTO: 32 PG (ref 27–31)
MCHC RBC AUTO-ENTMCNC: 32.4 G/DL (ref 32–36)
MCV RBC AUTO: 99 FL (ref 82–98)
MONOCYTES # BLD AUTO: 0.5 K/UL (ref 0.3–1)
MONOCYTES NFR BLD: 4.7 % (ref 4–15)
NEUTROPHILS # BLD AUTO: 8.4 K/UL (ref 1.8–7.7)
NEUTROPHILS NFR BLD: 85.9 % (ref 38–73)
NRBC BLD-RTO: 0 /100 WBC
PLATELET # BLD AUTO: 143 K/UL (ref 150–350)
PMV BLD AUTO: 11 FL (ref 9.2–12.9)
POTASSIUM SERPL-SCNC: 5.6 MMOL/L (ref 3.5–5.1)
RBC # BLD AUTO: 4.15 M/UL (ref 4.6–6.2)
SODIUM SERPL-SCNC: 135 MMOL/L (ref 136–145)
WBC # BLD AUTO: 9.73 K/UL (ref 3.9–12.7)

## 2020-11-27 PROCEDURE — 36415 COLL VENOUS BLD VENIPUNCTURE: CPT

## 2020-11-27 PROCEDURE — 99223 PR INITIAL HOSPITAL CARE,LEVL III: ICD-10-PCS | Mod: ,,, | Performed by: INTERNAL MEDICINE

## 2020-11-27 PROCEDURE — 94761 N-INVAS EAR/PLS OXIMETRY MLT: CPT

## 2020-11-27 PROCEDURE — 85025 COMPLETE CBC W/AUTO DIFF WBC: CPT

## 2020-11-27 PROCEDURE — 25000003 PHARM REV CODE 250: Performed by: INTERNAL MEDICINE

## 2020-11-27 PROCEDURE — 99223 1ST HOSP IP/OBS HIGH 75: CPT | Mod: ,,, | Performed by: INTERNAL MEDICINE

## 2020-11-27 PROCEDURE — 11000001 HC ACUTE MED/SURG PRIVATE ROOM

## 2020-11-27 PROCEDURE — 83036 HEMOGLOBIN GLYCOSYLATED A1C: CPT

## 2020-11-27 PROCEDURE — 80048 BASIC METABOLIC PNL TOTAL CA: CPT

## 2020-11-27 PROCEDURE — 83735 ASSAY OF MAGNESIUM: CPT

## 2020-11-27 RX ORDER — TALC
6 POWDER (GRAM) TOPICAL NIGHTLY PRN
Status: DISCONTINUED | OUTPATIENT
Start: 2020-11-27 | End: 2020-11-28 | Stop reason: HOSPADM

## 2020-11-27 RX ORDER — ACETAMINOPHEN 325 MG/1
650 TABLET ORAL EVERY 8 HOURS PRN
Status: DISCONTINUED | OUTPATIENT
Start: 2020-11-27 | End: 2020-11-28 | Stop reason: HOSPADM

## 2020-11-27 RX ORDER — FINASTERIDE 5 MG/1
5 TABLET, FILM COATED ORAL NIGHTLY
Status: DISCONTINUED | OUTPATIENT
Start: 2020-11-27 | End: 2020-11-28 | Stop reason: HOSPADM

## 2020-11-27 RX ORDER — METOPROLOL TARTRATE 50 MG/1
50 TABLET ORAL 2 TIMES DAILY
Status: DISCONTINUED | OUTPATIENT
Start: 2020-11-27 | End: 2020-11-28 | Stop reason: HOSPADM

## 2020-11-27 RX ORDER — POLYETHYLENE GLYCOL 3350 17 G/17G
17 POWDER, FOR SOLUTION ORAL 2 TIMES DAILY PRN
Status: DISCONTINUED | OUTPATIENT
Start: 2020-11-27 | End: 2020-11-28 | Stop reason: HOSPADM

## 2020-11-27 RX ORDER — IBUPROFEN 200 MG
24 TABLET ORAL
Status: DISCONTINUED | OUTPATIENT
Start: 2020-11-27 | End: 2020-11-28 | Stop reason: HOSPADM

## 2020-11-27 RX ORDER — GLUCAGON 1 MG
1 KIT INJECTION
Status: DISCONTINUED | OUTPATIENT
Start: 2020-11-27 | End: 2020-11-28 | Stop reason: HOSPADM

## 2020-11-27 RX ORDER — NITROFURANTOIN 25 MG/5ML
100 SUSPENSION ORAL NIGHTLY
Status: DISCONTINUED | OUTPATIENT
Start: 2020-11-27 | End: 2020-11-28 | Stop reason: HOSPADM

## 2020-11-27 RX ORDER — MUPIROCIN 20 MG/G
OINTMENT TOPICAL 2 TIMES DAILY
Status: DISCONTINUED | OUTPATIENT
Start: 2020-11-27 | End: 2020-11-28 | Stop reason: HOSPADM

## 2020-11-27 RX ORDER — LEVETIRACETAM 100 MG/ML
500 SOLUTION ORAL 2 TIMES DAILY
Status: DISCONTINUED | OUTPATIENT
Start: 2020-11-27 | End: 2020-11-28 | Stop reason: HOSPADM

## 2020-11-27 RX ORDER — TRAMADOL HYDROCHLORIDE 50 MG/1
50 TABLET ORAL EVERY 6 HOURS PRN
Status: DISCONTINUED | OUTPATIENT
Start: 2020-11-27 | End: 2020-11-28 | Stop reason: HOSPADM

## 2020-11-27 RX ORDER — AMLODIPINE BESYLATE 5 MG/1
10 TABLET ORAL DAILY
Status: DISCONTINUED | OUTPATIENT
Start: 2020-11-28 | End: 2020-11-28 | Stop reason: HOSPADM

## 2020-11-27 RX ORDER — LIDOCAINE HYDROCHLORIDE 20 MG/ML
JELLY TOPICAL 2 TIMES DAILY PRN
Status: DISCONTINUED | OUTPATIENT
Start: 2020-11-27 | End: 2020-11-28 | Stop reason: HOSPADM

## 2020-11-27 RX ORDER — OXYBUTYNIN CHLORIDE 5 MG/1
5 TABLET ORAL 3 TIMES DAILY
Status: DISCONTINUED | OUTPATIENT
Start: 2020-11-27 | End: 2020-11-28 | Stop reason: HOSPADM

## 2020-11-27 RX ORDER — LIDOCAINE HYDROCHLORIDE 20 MG/ML
JELLY TOPICAL ONCE
Status: COMPLETED | OUTPATIENT
Start: 2020-11-27 | End: 2020-11-27

## 2020-11-27 RX ORDER — ONDANSETRON 2 MG/ML
4 INJECTION INTRAMUSCULAR; INTRAVENOUS EVERY 8 HOURS PRN
Status: DISCONTINUED | OUTPATIENT
Start: 2020-11-27 | End: 2020-11-28 | Stop reason: HOSPADM

## 2020-11-27 RX ORDER — OXYBUTYNIN CHLORIDE 5 MG/1
5 TABLET ORAL ONCE
Status: COMPLETED | OUTPATIENT
Start: 2020-11-27 | End: 2020-11-27

## 2020-11-27 RX ORDER — OXYCODONE HYDROCHLORIDE 5 MG/1
10 TABLET ORAL EVERY 6 HOURS PRN
Status: DISCONTINUED | OUTPATIENT
Start: 2020-11-27 | End: 2020-11-28 | Stop reason: HOSPADM

## 2020-11-27 RX ORDER — IBUPROFEN 200 MG
16 TABLET ORAL
Status: DISCONTINUED | OUTPATIENT
Start: 2020-11-27 | End: 2020-11-28 | Stop reason: HOSPADM

## 2020-11-27 RX ORDER — SODIUM CHLORIDE 0.9 % (FLUSH) 0.9 %
10 SYRINGE (ML) INJECTION
Status: DISCONTINUED | OUTPATIENT
Start: 2020-11-27 | End: 2020-11-28 | Stop reason: HOSPADM

## 2020-11-27 RX ORDER — IPRATROPIUM BROMIDE AND ALBUTEROL SULFATE 2.5; .5 MG/3ML; MG/3ML
3 SOLUTION RESPIRATORY (INHALATION) EVERY 6 HOURS PRN
Status: DISCONTINUED | OUTPATIENT
Start: 2020-11-27 | End: 2020-11-28 | Stop reason: HOSPADM

## 2020-11-27 RX ORDER — ACETAMINOPHEN 325 MG/1
650 TABLET ORAL EVERY 4 HOURS PRN
Status: DISCONTINUED | OUTPATIENT
Start: 2020-11-27 | End: 2020-11-28 | Stop reason: HOSPADM

## 2020-11-27 RX ADMIN — LIDOCAINE HYDROCHLORIDE: 20 JELLY TOPICAL at 05:11

## 2020-11-27 RX ADMIN — LEVETIRACETAM 500 MG: 500 SOLUTION ORAL at 10:11

## 2020-11-27 RX ADMIN — FINASTERIDE 5 MG: 5 TABLET, FILM COATED ORAL at 10:11

## 2020-11-27 RX ADMIN — OXYCODONE HYDROCHLORIDE 10 MG: 5 TABLET ORAL at 11:11

## 2020-11-27 RX ADMIN — METOPROLOL TARTRATE 50 MG: 50 TABLET, FILM COATED ORAL at 11:11

## 2020-11-27 RX ADMIN — LEVETIRACETAM 500 MG: 500 SOLUTION ORAL at 12:11

## 2020-11-27 RX ADMIN — OXYBUTYNIN CHLORIDE 5 MG: 5 TABLET ORAL at 10:11

## 2020-11-27 RX ADMIN — METOPROLOL TARTRATE 50 MG: 50 TABLET, FILM COATED ORAL at 10:11

## 2020-11-27 RX ADMIN — MUPIROCIN: 20 OINTMENT TOPICAL at 11:11

## 2020-11-27 RX ADMIN — OXYBUTYNIN CHLORIDE 5 MG: 5 TABLET ORAL at 12:11

## 2020-11-27 RX ADMIN — OXYBUTYNIN CHLORIDE 5 MG: 5 TABLET ORAL at 05:11

## 2020-11-27 RX ADMIN — NITROFURANTOIN 100 MG: 25 SUSPENSION ORAL at 10:11

## 2020-11-27 NOTE — SUBJECTIVE & OBJECTIVE
Past Medical History:   Diagnosis Date    Arthritis     knees    Atrial fibrillation     Coronary artery disease     MI and s/p CABG 2005.  ? LAD    Diabetes mellitus     when he was obese.  Off meds now    Hypertension     Seizures     related to SAH - on Keppra    Stroke     SAH in 2018 and old stroke found on imaging    Urinary retention     chronic mobley        Past Surgical History:   Procedure Laterality Date    CRANIOTOMY      2018    INSERTION OF PERCUTANEOUS ENDOSCOPIC GASTROSTOMY (PEG) FEEDING TUBE      2018    TRACHEOSTOMY      removed in 2019       Review of patient's allergies indicates:   Allergen Reactions    Levofloxacin        Family History     None          Tobacco Use    Smoking status: Former Smoker     Packs/day: 2.00     Years: 35.00     Pack years: 70.00     Quit date: 1/1/2005     Years since quitting: 15.9    Smokeless tobacco: Never Used   Substance and Sexual Activity    Alcohol use: Not Currently     Comment: none since 2005    Drug use: Never    Sexual activity: Not Currently     Partners: Female       Review of Systems   Unable to perform ROS: Other   Patient nonverbal    Objective:     Temp:  [97.7 °F (36.5 °C)-98.8 °F (37.1 °C)] 98.8 °F (37.1 °C)  Pulse:  [] 84  Resp:  [16-21] 16  SpO2:  [95 %-100 %] 97 %  BP: (106-134)/(69-81) 134/81     There is no height or weight on file to calculate BMI.           Drains     Drain                 Urethral Catheter 11/26/20 0100 Triple-lumen 1 day                Physical Exam  Constitutional:       General: He is not in acute distress.     Appearance: He is well-developed.   Cardiovascular:      Rate and Rhythm: Normal rate.   Pulmonary:      Effort: Pulmonary effort is normal. No respiratory distress.   Abdominal:      General: There is no distension.      Palpations: Abdomen is soft.      Tenderness: There is no abdominal tenderness.   Genitourinary:     Comments: Uncircumsized penis. Orthotopic meatus, no stenosis. 22 Fr  3-way catheter in place, on CBI. Fluid in tubing pink tinged without clots. No plaques, ulcers, or lesions along penis.  Musculoskeletal: Normal range of motion.         General: No tenderness.   Skin:     General: Skin is warm and dry.      Findings: No rash.   Neurological:      Mental Status: He is alert and oriented to person, place, and time.   Psychiatric:         Judgment: Judgment normal.         Significant Labs:    BMP:  No results for input(s): NA, K, CL, CO2, BUN, CREATININE, LABGLOM, GLUCOSE, CALCIUM in the last 168 hours.    CBC:  Recent Labs   Lab 11/27/20  1128   WBC 9.73   HGB 13.3*   HCT 41.0   *       All pertinent labs results from the past 24 hours have been reviewed.    Significant Imaging:  All pertinent imaging results/findings from the past 24 hours have been reviewed.

## 2020-11-27 NOTE — ASSESSMENT & PLAN NOTE
S/p PEG after SAH  Takes some pills by mouth  -SLP consult for swallow evaluation  -Nutrition for tube feed recommendations

## 2020-11-27 NOTE — PLAN OF CARE
(Physician in Lead of Transfers)   Outside Transfer Acceptance Note / Regional Referral Center      Transferring Physician: Callie Larry NP    Accepting Physician: Jae Hopper MD / Dr. Rivera    Date of Acceptance: 11/27/2020    Transferring Facility: [facility]     Reason for Transfer: needs urology     Report from Transferring Physician/Hospital course: The patient is a 66 y/o male with PMH of BPH, CVA, a-fib, CAD, DM, HTN, SAH, seizures, PEG, and neurogenic bladder. He has a chronic mobley due to neurogenic bladder. Per patient's wife, he had seen a urologist a while ago and was recommended to have a suprapubic cath placed but patient refused and never followed up. His PCP then took over his care and ordered for an indwelling mobley and he has had this for several months now.  normally changes out his cath without any complications but today apparently a new person came and changed the cath and there was some trauma. Patient not on any blood thinners since his SAH. He was noted to have BRB in the ER. Bedside US showed clots as well. They were able to successfully get a mobley in and have tried manual and continuous irrigation but he continues to bleed. Transferring for urology evaluation. Hb 13 and hemodynamically stable.        Labs & Radiographs: see transfer notes       To Do List:   1) Admit to Obs  2) Telemetry  3) Urology consult  4) Continue irrigation       Jae Hopper MD  Hospital Medicine Staff

## 2020-11-27 NOTE — NURSING
Report received from ANTONIO Welch at [facility]. Patient is en route via [facility] as of now, per RN. Wife made aware of current visitation hours.

## 2020-11-27 NOTE — CONSULTS
[facility]  Urology  Consult Note    Patient Name: Peewee Nunes  MRN: [id number]  Admission Date: 11/27/2020  Hospital Length of Stay: 0   Code Status: DNR   Attending Provider: Nasim Benson MD   Consulting Provider: Juan José Corado MD  Primary Care Physician: Primary Doctor No  Principal Problem:Gross hematuria    Inpatient consult to Urology  Consult performed by: Juan José Corado MD  Consult ordered by: Nasim Benson MD  Reason for consult: traumatic mobley          Subjective:     HPI:  Patient is a 65 year old male mostly bed-bound with a PMH significant for SAH (12/2018 secondary to Fall - s/p Craniotomy - s/p Trach which was removed in 2019 and PEG), CAD s/p MI in 2005 and CABG in 2006, T2D (no longer on meds after weight loss), HTN, Persistent Afib (on ASA only given SAH), Seizures (after SAH - none since 2/2020 and on Keppra), BPH with urinary retention and chronic mobley who presented to [facility] with hematuria.  Patient has a chronic mobley and has it changed once a month by Home Health Nurse.  He had a traumatic mobley with hematuria and clots during most recent mobley exchange yesterday. Per the patient's wife, he got a bladder ultrasound in the ED which showed some clot/sediment in the bladder. The mobley catheter (presumed 16 or 18 Fr) was exchanged for a 22 Fr 3-way catheter and was hand irrigated with removal of clots. At the time, the urine was dark red. He was then placed on CBI and transferred to [facility] for continued care.  Patient has an expressive aphasia and history given mostly by his wife at bedside. Patient with intermittent bladder spasms with pain.  He denies f/c, n/v/d.  Patient denies cough, chest pain, SOB.    Past Medical History:   Diagnosis Date    Arthritis     knees    Atrial fibrillation     Coronary artery disease     MI and s/p CABG 2005.  ? LAD    Diabetes mellitus     when he was obese.  Off meds now    Hypertension     Seizures      related to SAH - on Keppra    Stroke     SAH in 2018 and old stroke found on imaging    Urinary retention     chronic mobley        Past Surgical History:   Procedure Laterality Date    CRANIOTOMY      2018    INSERTION OF PERCUTANEOUS ENDOSCOPIC GASTROSTOMY (PEG) FEEDING TUBE      2018    TRACHEOSTOMY      removed in 2019       Review of patient's allergies indicates:   Allergen Reactions    Levofloxacin        Family History     None          Tobacco Use    Smoking status: Former Smoker     Packs/day: 2.00     Years: 35.00     Pack years: 70.00     Quit date: 1/1/2005     Years since quitting: 15.9    Smokeless tobacco: Never Used   Substance and Sexual Activity    Alcohol use: Not Currently     Comment: none since 2005    Drug use: Never    Sexual activity: Not Currently     Partners: Female       Review of Systems   Unable to perform ROS: Other   Patient nonverbal    Objective:     Temp:  [97.7 °F (36.5 °C)-98.8 °F (37.1 °C)] 98.8 °F (37.1 °C)  Pulse:  [] 84  Resp:  [16-21] 16  SpO2:  [95 %-100 %] 97 %  BP: (106-134)/(69-81) 134/81     There is no height or weight on file to calculate BMI.           Drains     Drain                 Urethral Catheter 11/26/20 0100 Triple-lumen 1 day                Physical Exam  Constitutional:       General: He is not in acute distress.     Appearance: He is well-developed.   Cardiovascular:      Rate and Rhythm: Normal rate.   Pulmonary:      Effort: Pulmonary effort is normal. No respiratory distress.   Abdominal:      General: There is no distension.      Palpations: Abdomen is soft.      Tenderness: There is no abdominal tenderness.   Genitourinary:     Comments: Uncircumsized penis. Orthotopic meatus, no stenosis. 22 Fr 3-way catheter in place, on CBI. Fluid in tubing pink tinged without clots. No plaques, ulcers, or lesions along penis.  Musculoskeletal: Normal range of motion.         General: No tenderness.   Skin:     General: Skin is warm and dry.       Findings: No rash.   Neurological:      Mental Status: He is alert and oriented to person, place, and time.   Psychiatric:         Judgment: Judgment normal.         Significant Labs:    BMP:  No results for input(s): NA, K, CL, CO2, BUN, CREATININE, LABGLOM, GLUCOSE, CALCIUM in the last 168 hours.    CBC:  Recent Labs   Lab 11/27/20  1128   WBC 9.73   HGB 13.3*   HCT 41.0   *       All pertinent labs results from the past 24 hours have been reviewed.    Significant Imaging:  All pertinent imaging results/findings from the past 24 hours have been reviewed.                    Assessment and Plan:     * Gross hematuria  64 yo male with urinary retention after SAH in 2019 presenting with gross hematuria due to traumatic mobley.    - Irrigated in ED with removal of clots and placed on CBI  - Maintain catheter  - Nursing to titrate CBI to light pink. May irrigate with 60 cc cathter tip syringe using sterile water PRN for clots.  - Irrigation performed this morning at the bedside with return of red-tinged urine and a small, fresh clot. No significant clot burden suspected in bladder.  - Trend H/H and creatinine  - May continue ASA as medically necessary.      VTE Risk Mitigation (From admission, onward)         Ordered     Place sequential compression device  Until discontinued      11/27/20 1019     IP VTE LOW RISK PATIENT  Once      11/27/20 1019                Thank you for your consult. I will follow-up with patient. Please contact us if you have any additional questions.    Juan José Corado MD  Urology  [facility]

## 2020-11-27 NOTE — ASSESSMENT & PLAN NOTE
2018 after Fall.    Incidental old CVA also found at that time.  S/p Craniotomy.  S/p Trach - removed in 2019.  S/p PEG - still has it.  Seizures - none since 2/2020 and now on Keppra.  Bilateral LE weakness and mostly bed-bound now.  -Continue Keppra

## 2020-11-27 NOTE — H&P
[facility] Medicine  History & Physical    Patient Name: Peewee Nunes  MRN: [id number]  Admission Date: 11/27/2020  Attending Physician: Nasim Benson MD   Primary Care Provider: Primary Doctor No         Patient information was obtained from patient, spouse/SO, past medical records and ER records.     Subjective:     Principal Problem:Gross hematuria    Chief Complaint:   Chief Complaint   Patient presents with    Hematuria     From Traumatic Mobley placement by Home Health Nurse        HPI: Patient is a 65 year old male mostly bed-bound with a PMH significant for SAH (12/2018 secondary to Fall - s/p Craniotomy - s/p Trach which was removed in 2019 and PEG), CAD s/p MI in 2005 and CABG in 2006, T2D (no longer on meds after weight loss), HTN, Persistent Afib (on ASA only given SAH), Seizures (after SAH - none since 2/2020 and on Keppra), BPH with urinary retention and chronic mobley who [place].  Patient has a chronic mobley and has it changed once a month by Home Health Nurse.  He had a traumatic mobley with hematuria and clots noted.  He was placed on a CBI and transferred to [facility].  Patient has an expressive aphasia and history given mostly by his wife at bedside. Patient with intermittent bladder spasms with pain.  He denies f/c, n/v/d.  He has a PEG, but takes some pills by mouth.  Patient denies cough, chest pain, SOB.  No headache or change in vision.  No headache or change in vision.      Past Medical History:   Diagnosis Date    Arthritis     knees    Atrial fibrillation     Coronary artery disease     MI and s/p CABG 2005.  ? LAD    Diabetes mellitus     when he was obese.  Off meds now    Hypertension     Seizures     related to SAH - on Keppra    Stroke     SAH in 2018 and old stroke found on imaging    Urinary retention     chronic mobley        Past Surgical History:   Procedure Laterality Date    CRANIOTOMY      2018     INSERTION OF PERCUTANEOUS ENDOSCOPIC GASTROSTOMY (PEG) FEEDING TUBE      2018    TRACHEOSTOMY      removed in 2019       Review of patient's allergies indicates:   Allergen Reactions    Levofloxacin        No current facility-administered medications on file prior to encounter.      No current outpatient medications on file prior to encounter.     Family History     None        Tobacco Use    Smoking status: Former Smoker     Packs/day: 2.00     Years: 35.00     Pack years: 70.00     Quit date: 1/1/2005     Years since quitting: 15.9    Smokeless tobacco: Never Used   Substance and Sexual Activity    Alcohol use: Not Currently     Comment: none since 2005    Drug use: Never    Sexual activity: Not Currently     Partners: Female     Review of Systems   Constitutional: Positive for activity change. Negative for appetite change, chills and fever.   HENT: Negative for ear pain.    Eyes: Negative for pain.   Respiratory: Negative for cough, chest tightness, shortness of breath and wheezing.    Cardiovascular: Negative for chest pain, palpitations and leg swelling.   Gastrointestinal: Positive for abdominal pain (from bladder spasm).   Endocrine: Negative for polydipsia and polyphagia.   Genitourinary: Positive for difficulty urinating and hematuria.   Musculoskeletal: Positive for arthralgias (knees). Negative for neck pain.   Skin: Negative for rash.   Neurological: Positive for speech difficulty (chronic expressive aphasia) and weakness. Negative for headaches.   Hematological: Does not bruise/bleed easily.   Psychiatric/Behavioral: Negative for agitation and behavioral problems.     Objective:     Vital Signs (Most Recent):  Temp: 98.1 °F (36.7 °C) (11/27/20 0849)  Pulse: 76 (11/27/20 0849)  Resp: 20 (11/27/20 0849)  BP: 106/69 (11/27/20 0849)  SpO2: 96 % (11/27/20 0849) Vital Signs (24h Range):  Temp:  [97.7 °F (36.5 °C)-98.1 °F (36.7 °C)] 98.1 °F (36.7 °C)  Pulse:  [] 76  Resp:  [19-21] 20  SpO2:  [95  %-100 %] 96 %  BP: (106-118)/(69-74) 106/69        There is no height or weight on file to calculate BMI.    Physical Exam  Vitals signs reviewed.   Constitutional:       Appearance: He is ill-appearing (chronic). He is not toxic-appearing.   HENT:      Head: Normocephalic.      Comments: Craniotomy scars     Right Ear: External ear normal.      Left Ear: External ear normal.      Nose: Nose normal.      Mouth/Throat:      Mouth: Mucous membranes are moist.      Pharynx: Oropharynx is clear.   Eyes:      General: No scleral icterus.     Conjunctiva/sclera: Conjunctivae normal.   Neck:      Musculoskeletal: Normal range of motion and neck supple.      Comments: Healed trach scar  Cardiovascular:      Rate and Rhythm: Tachycardia present. Rhythm irregular.      Heart sounds: Normal heart sounds. No murmur.   Pulmonary:      Effort: Pulmonary effort is normal.      Breath sounds: Normal breath sounds. No wheezing or rales.   Abdominal:      General: Bowel sounds are normal. There is no distension.      Tenderness: There is abdominal tenderness. There is no rebound.   Genitourinary:     Penis: Normal.    Musculoskeletal:         General: Deformity present.      Comments: feet   Skin:     General: Skin is warm and dry.      Comments: Chronic LE skin changes   Neurological:      Mental Status: He is alert. Mental status is at baseline.   Psychiatric:         Mood and Affect: Mood normal.         Behavior: Behavior normal.             Significant Labs: All pertinent labs within the past 24 hours have been reviewed.    Significant Imaging: I have reviewed all pertinent imaging results/findings within the past 24 hours.    Assessment/Plan:     * Gross hematuria  Patient with BPH and Urinary Retention with Chronic Mobley since his SAH.  Has monthly Catheter changes and had traumatic mobley placement by  nurse.  Placed on CBI and transferred to [facility] for Urology Consult.  Hgb in ED was 13    Plan:  Urology Consult  Continue  "CBI  Hold ASA and SQH  Follow Hgb  Pain control      Dysphagia  S/p PEG after SAH  Takes some pills by mouth  -SLP consult for swallow evaluation  -Nutrition for tube feed recommendations    Urinary retention  BPH with Chronic Rader - as above.  On Nitrofurantoin qHS for UTI prophylaxis  -Continue Oxybutynin   -Continue Nitrofurantoin  -Hold Rapaflo for now - non-formulary      Longstanding persistent atrial fibrillation  No on AC given SAH  On ASA 81mg - hold given above.  -Continue Metoprolol      Benign essential HTN  Home Meds:  Metoprolol 50mg bid and Amlodipine 10mg qda  Continue Home Meds  Follow BP      Type 2 diabetes mellitus, without long-term current use of insulin  Lost weight after SAH and came off meds.  No longer checks FS glucose given continued normal readings.  Glucose 77 in ED.  No POCT or SSI at this time.  Check A1C      History of subarachnoid hemorrhage  2018 after Fall.    Incidental old CVA also found at that time.  S/p Craniotomy.  S/p Trach - removed in 2019.  S/p PEG - still has it.  Seizures - none since 2/2020 and now on Keppra.  Bilateral LE weakness and mostly bed-bound now.  -Continue Keppra        Coronary artery disease involving native coronary artery without angina pectoris  S/p MI in 2005 and CABG in 2006.  States it was " maker".  On ASA,  Not on a statin.  No chest pain on admission.        VTE Risk Mitigation (From admission, onward)         Ordered     Place sequential compression device  Until discontinued      11/27/20 1019     IP VTE LOW RISK PATIENT  Once      11/27/20 1019                   Nasim Benson MD  Department of Hospital Medicine   [facility]  "

## 2020-11-27 NOTE — ASSESSMENT & PLAN NOTE
"S/p MI in 2005 and CABG in 2006.  States it was " maker".  On ASA,  Not on a statin.  No chest pain on admission.    "

## 2020-11-27 NOTE — ASSESSMENT & PLAN NOTE
Patient with BPH and Urinary Retention with Chronic Mobley since his SAH.  Has monthly Catheter changes and had traumatic mobley placement by  nurse.  Placed on CBI and transferred to [facility] for Urology Consult.  Hgb in ED was 13    Plan:  Urology Consult  Continue CBI  Hold ASA and SQH  Follow Hgb  Pain control

## 2020-11-27 NOTE — ASSESSMENT & PLAN NOTE
66 yo male with urinary retention after SAH in 2019 presenting with gross hematuria due to traumatic mobley.    - Irrigated in ED with removal of clots and placed on CBI  - Maintain catheter  - Nursing to titrate CBI to light pink. May irrigate with 60 cc cathter tip syringe using sterile water PRN for clots.  - Irrigation performed this morning at the bedside with return of red-tinged urine and a small, fresh clot. No significant clot burden suspected in bladder.  - Trend H/H and creatinine

## 2020-11-27 NOTE — PT/OT/SLP PROGRESS
Speech Language Pathology      Peewee Nunes  MRN: [id number]    Consult received, medical chart reviewed. Failed attempt  today secondary to (procedure at bedside). Will follow-up 11/28/2020    Yelena Ventura, FILIPE-SLP

## 2020-11-27 NOTE — HPI
Patient is a 65 year old male mostly bed-bound with a PMH significant for SAH (12/2018 secondary to Fall - s/p Craniotomy - s/p Trach which was removed in 2019 and PEG), CAD s/p MI in 2005 and CABG in 2006, T2D (no longer on meds after weight loss), HTN, Persistent Afib (on ASA only given SAH), Seizures (after SAH - none since 2/2020 and on Keppra), BPH with urinary retention and chronic mobley who presented to [facility] with hematuria.  Patient has a chronic mobley and has it changed once a month by Home Health Nurse.  He had a traumatic mobley with hematuria and clots during most recent mobley exchange yesterday. Per the patient's wife, he got a bladder ultrasound in the ED which showed some clot/sediment in the bladder. The mobley catheter (presumed 16 or 18 Fr) was exchanged for a 22 Fr 3-way catheter and was hand irrigated with removal of clots. At the time, the urine was dark red. He was then placed on CBI and transferred to [facility] for continued care.  Patient has an expressive aphasia and history given mostly by his wife at bedside. Patient with intermittent bladder spasms with pain.  He denies f/c, n/v/d.  Patient denies cough, chest pain, SOB.

## 2020-11-27 NOTE — ASSESSMENT & PLAN NOTE
Lost weight after SAH and came off meds.  No longer checks FS glucose given continued normal readings.  Glucose 77 in ED.  No POCT or SSI at this time.  Check A1C

## 2020-11-27 NOTE — PLAN OF CARE
CM met with pt and spouse for initial discharge planning assessment. Pt is alert and oriented at time of assessment, but does not talk, spouse does all the talking. Pt is somewhat hard of hearing.    Pt with Peg, indwelling mobley, and is bedbound, completely dependent. Spouse provides all the care, she does have HH,but for nurse only to change mobley monthly. Spouse states she did have an aide, but with Covid they did not want the aide to return. Spouse provides all care including tube feeds. Pt is able to eat by mouth somewhat but does not like very much.    Pt transferred here for Urology consult, pending.    Pt will need ambulance transportation home as he was transferred here. Pt will need HH orders updated.    CM to follow for plans and arrangemetns.   11/27/20 1038   Discharge Assessment   Assessment Type Discharge Planning Assessment   Confirmed/corrected address and phone number on facesheet? Yes   Assessment information obtained from? Caregiver;Medical Record   Expected Length of Stay (days) 3   Communicated expected length of stay with patient/caregiver yes   Prior to hospitilization cognitive status: Alert/Oriented  (pt hard of hearing and does not talk, but is alert and oriented)   Prior to hospitalization functional status: Completely Dependent   Current Functional Status: Completely Dependent   Facility Arrived From: [facility] With spouse   Able to Return to Prior Arrangements yes   Is patient able to care for self after discharge? No   Patient's perception of discharge disposition home health   Patient currently being followed by outpatient case management? No   Patient currently receives any other outside agency services? Yes   Name and contact number of agency or person providing outside services [facility]   Is it the patient/care giver preference to resume care with the current outside agency? Yes   Equipment Currently Used at Home hospital bed;lift device;wheelchair   Do you  have any problems affording any of your prescribed medications? No   Is the patient taking medications as prescribed? yes   Does the patient have transportation home? No   Does the patient receive services at the [facility]? No   Discharge Plan A Home Health   DME Needed Upon Discharge  none   Patient/Family in Agreement with Plan yes

## 2020-11-27 NOTE — HPI
Patient is a 65 year old male mostly bed-bound with a PMH significant for SAH (12/2018 secondary to Fall - s/p Craniotomy - s/p Trach which was removed in 2019 and PEG), CAD s/p MI in 2005 and CABG in 2006, T2D (no longer on meds after weight loss), HTN, Persistent Afib (on ASA only given SAH), Seizures (after SAH - none since 2/2020 and on Keppra), BPH with urinary retention and chronic mobley who presented to [facility] with hematuria.  Patient has a chronic mobley and has it changed once a month by Home Health Nurse.  He had a traumatic mobley with hematuria and clots noted.  He was placed on a CBI and transferred to [facility].  Patient has an expressive aphasia and history given mostly by his wife at bedside. Patient with intermittent bladder spasms with pain.  He denies f/c, n/v/d.  He has a PEG, but takes some pills by mouth.  Patient denies cough, chest pain, SOB.  No headache or change in vision.  No headache or change in vision.

## 2020-11-27 NOTE — SUBJECTIVE & OBJECTIVE
Past Medical History:   Diagnosis Date    Arthritis     knees    Atrial fibrillation     Coronary artery disease     MI and s/p CABG 2005.  ? LAD    Diabetes mellitus     when he was obese.  Off meds now    Hypertension     Seizures     related to SAH - on Keppra    Stroke     SAH in 2018 and old stroke found on imaging    Urinary retention     chronic mobley        Past Surgical History:   Procedure Laterality Date    CRANIOTOMY      2018    INSERTION OF PERCUTANEOUS ENDOSCOPIC GASTROSTOMY (PEG) FEEDING TUBE      2018    TRACHEOSTOMY      removed in 2019       Review of patient's allergies indicates:   Allergen Reactions    Levofloxacin        No current facility-administered medications on file prior to encounter.      No current outpatient medications on file prior to encounter.     Family History     None        Tobacco Use    Smoking status: Former Smoker     Packs/day: 2.00     Years: 35.00     Pack years: 70.00     Quit date: 1/1/2005     Years since quitting: 15.9    Smokeless tobacco: Never Used   Substance and Sexual Activity    Alcohol use: Not Currently     Comment: none since 2005    Drug use: Never    Sexual activity: Not Currently     Partners: Female     Review of Systems   Constitutional: Positive for activity change. Negative for appetite change, chills and fever.   HENT: Negative for ear pain.    Eyes: Negative for pain.   Respiratory: Negative for cough, chest tightness, shortness of breath and wheezing.    Cardiovascular: Negative for chest pain, palpitations and leg swelling.   Gastrointestinal: Positive for abdominal pain (from bladder spasm).   Endocrine: Negative for polydipsia and polyphagia.   Genitourinary: Positive for difficulty urinating and hematuria.   Musculoskeletal: Positive for arthralgias (knees). Negative for neck pain.   Skin: Negative for rash.   Neurological: Positive for speech difficulty (chronic expressive aphasia) and weakness. Negative for headaches.    Hematological: Does not bruise/bleed easily.   Psychiatric/Behavioral: Negative for agitation and behavioral problems.     Objective:     Vital Signs (Most Recent):  Temp: 98.1 °F (36.7 °C) (11/27/20 0849)  Pulse: 76 (11/27/20 0849)  Resp: 20 (11/27/20 0849)  BP: 106/69 (11/27/20 0849)  SpO2: 96 % (11/27/20 0849) Vital Signs (24h Range):  Temp:  [97.7 °F (36.5 °C)-98.1 °F (36.7 °C)] 98.1 °F (36.7 °C)  Pulse:  [] 76  Resp:  [19-21] 20  SpO2:  [95 %-100 %] 96 %  BP: (106-118)/(69-74) 106/69        There is no height or weight on file to calculate BMI.    Physical Exam  Vitals signs reviewed.   Constitutional:       Appearance: He is ill-appearing (chronic). He is not toxic-appearing.   HENT:      Head: Normocephalic.      Comments: Craniotomy scars     Right Ear: External ear normal.      Left Ear: External ear normal.      Nose: Nose normal.      Mouth/Throat:      Mouth: Mucous membranes are moist.      Pharynx: Oropharynx is clear.   Eyes:      General: No scleral icterus.     Conjunctiva/sclera: Conjunctivae normal.   Neck:      Musculoskeletal: Normal range of motion and neck supple.      Comments: Healed trach scar  Cardiovascular:      Rate and Rhythm: Tachycardia present. Rhythm irregular.      Heart sounds: Normal heart sounds. No murmur.   Pulmonary:      Effort: Pulmonary effort is normal.      Breath sounds: Normal breath sounds. No wheezing or rales.   Abdominal:      General: Bowel sounds are normal. There is no distension.      Tenderness: There is abdominal tenderness. There is no rebound.   Genitourinary:     Penis: Normal.    Musculoskeletal:         General: Deformity present.      Comments: feet   Skin:     General: Skin is warm and dry.      Comments: Chronic LE skin changes   Neurological:      Mental Status: He is alert. Mental status is at baseline.   Psychiatric:         Mood and Affect: Mood normal.         Behavior: Behavior normal.             Significant Labs: All pertinent labs  within the past 24 hours have been reviewed.    Significant Imaging: I have reviewed all pertinent imaging results/findings within the past 24 hours.

## 2020-11-27 NOTE — ASSESSMENT & PLAN NOTE
BPH with Chronic Rader - as above.  On Nitrofurantoin qHS for UTI prophylaxis  -Continue Oxybutynin   -Continue Nitrofurantoin  -Hold Rapaflo for now - non-formulary

## 2020-11-28 VITALS
BODY MASS INDEX: 19.07 KG/M2 | TEMPERATURE: 98 F | HEIGHT: 71 IN | DIASTOLIC BLOOD PRESSURE: 62 MMHG | WEIGHT: 136.25 LBS | OXYGEN SATURATION: 98 % | RESPIRATION RATE: 20 BRPM | SYSTOLIC BLOOD PRESSURE: 113 MMHG | HEART RATE: 103 BPM

## 2020-11-28 PROBLEM — E87.1 HYPONATREMIA: Status: ACTIVE | Noted: 2020-11-28

## 2020-11-28 PROBLEM — R31.9 HEMATURIA: Status: RESOLVED | Noted: 2020-11-27 | Resolved: 2020-11-28

## 2020-11-28 LAB
ANION GAP SERPL CALC-SCNC: 7 MMOL/L (ref 8–16)
ANISOCYTOSIS BLD QL SMEAR: SLIGHT
BASOPHILS # BLD AUTO: 0.03 K/UL (ref 0–0.2)
BASOPHILS NFR BLD: 0.4 % (ref 0–1.9)
BUN SERPL-MCNC: 14 MG/DL (ref 8–23)
CALCIUM SERPL-MCNC: 8.3 MG/DL (ref 8.7–10.5)
CHLORIDE SERPL-SCNC: 103 MMOL/L (ref 95–110)
CO2 SERPL-SCNC: 21 MMOL/L (ref 23–29)
CREAT SERPL-MCNC: 0.5 MG/DL (ref 0.5–1.4)
DIFFERENTIAL METHOD: ABNORMAL
EOSINOPHIL # BLD AUTO: 0 K/UL (ref 0–0.5)
EOSINOPHIL NFR BLD: 0.3 % (ref 0–8)
ERYTHROCYTE [DISTWIDTH] IN BLOOD BY AUTOMATED COUNT: 14.1 % (ref 11.5–14.5)
EST. GFR  (AFRICAN AMERICAN): >60 ML/MIN/1.73 M^2
EST. GFR  (NON AFRICAN AMERICAN): >60 ML/MIN/1.73 M^2
FERRITIN SERPL-MCNC: 587 NG/ML (ref 20–300)
FOLATE SERPL-MCNC: 11.5 NG/ML (ref 4–24)
GLUCOSE SERPL-MCNC: 73 MG/DL (ref 70–110)
HCT VFR BLD AUTO: 37.3 % (ref 40–54)
HGB BLD-MCNC: 11.8 G/DL (ref 14–18)
IMM GRANULOCYTES # BLD AUTO: 0.03 K/UL (ref 0–0.04)
IMM GRANULOCYTES NFR BLD AUTO: 0.4 % (ref 0–0.5)
IRON SERPL-MCNC: 30 UG/DL (ref 45–160)
LYMPHOCYTES # BLD AUTO: 0.8 K/UL (ref 1–4.8)
LYMPHOCYTES NFR BLD: 10.4 % (ref 18–48)
MAGNESIUM SERPL-MCNC: 1.8 MG/DL (ref 1.6–2.6)
MCH RBC QN AUTO: 31.2 PG (ref 27–31)
MCHC RBC AUTO-ENTMCNC: 31.6 G/DL (ref 32–36)
MCV RBC AUTO: 99 FL (ref 82–98)
MONOCYTES # BLD AUTO: 0.5 K/UL (ref 0.3–1)
MONOCYTES NFR BLD: 6.4 % (ref 4–15)
NEUTROPHILS # BLD AUTO: 6 K/UL (ref 1.8–7.7)
NEUTROPHILS NFR BLD: 82.1 % (ref 38–73)
NRBC BLD-RTO: 0 /100 WBC
PHOSPHATE SERPL-MCNC: 3.7 MG/DL (ref 2.7–4.5)
PLATELET # BLD AUTO: 119 K/UL (ref 150–350)
PLATELET BLD QL SMEAR: ABNORMAL
PMV BLD AUTO: 11.3 FL (ref 9.2–12.9)
POTASSIUM SERPL-SCNC: 4.1 MMOL/L (ref 3.5–5.1)
RBC # BLD AUTO: 3.78 M/UL (ref 4.6–6.2)
SARS-COV-2 RDRP RESP QL NAA+PROBE: NEGATIVE
SATURATED IRON: 25 % (ref 20–50)
SODIUM SERPL-SCNC: 131 MMOL/L (ref 136–145)
TOTAL IRON BINDING CAPACITY: 120 UG/DL (ref 250–450)
TRANSFERRIN SERPL-MCNC: 81 MG/DL (ref 200–375)
VIT B12 SERPL-MCNC: 1663 PG/ML (ref 210–950)
WBC # BLD AUTO: 7.29 K/UL (ref 3.9–12.7)

## 2020-11-28 PROCEDURE — 83540 ASSAY OF IRON: CPT

## 2020-11-28 PROCEDURE — 83735 ASSAY OF MAGNESIUM: CPT

## 2020-11-28 PROCEDURE — 80048 BASIC METABOLIC PNL TOTAL CA: CPT

## 2020-11-28 PROCEDURE — 85025 COMPLETE CBC W/AUTO DIFF WBC: CPT

## 2020-11-28 PROCEDURE — 25000003 PHARM REV CODE 250: Performed by: INTERNAL MEDICINE

## 2020-11-28 PROCEDURE — 99239 PR HOSPITAL DISCHARGE DAY,>30 MIN: ICD-10-PCS | Mod: ,,, | Performed by: INTERNAL MEDICINE

## 2020-11-28 PROCEDURE — U0002 COVID-19 LAB TEST NON-CDC: HCPCS

## 2020-11-28 PROCEDURE — 82607 VITAMIN B-12: CPT

## 2020-11-28 PROCEDURE — 84100 ASSAY OF PHOSPHORUS: CPT

## 2020-11-28 PROCEDURE — 99232 SBSQ HOSP IP/OBS MODERATE 35: CPT | Mod: ,,, | Performed by: UROLOGY

## 2020-11-28 PROCEDURE — 36415 COLL VENOUS BLD VENIPUNCTURE: CPT

## 2020-11-28 PROCEDURE — 99232 PR SUBSEQUENT HOSPITAL CARE,LEVL II: ICD-10-PCS | Mod: ,,, | Performed by: UROLOGY

## 2020-11-28 PROCEDURE — 82728 ASSAY OF FERRITIN: CPT

## 2020-11-28 PROCEDURE — 82746 ASSAY OF FOLIC ACID SERUM: CPT

## 2020-11-28 PROCEDURE — 99239 HOSP IP/OBS DSCHRG MGMT >30: CPT | Mod: ,,, | Performed by: INTERNAL MEDICINE

## 2020-11-28 PROCEDURE — 63600175 PHARM REV CODE 636 W HCPCS: Performed by: INTERNAL MEDICINE

## 2020-11-28 PROCEDURE — 94761 N-INVAS EAR/PLS OXIMETRY MLT: CPT

## 2020-11-28 RX ORDER — AMLODIPINE BESYLATE 10 MG/1
10 TABLET ORAL DAILY
Qty: 30 TABLET | Refills: 0
Start: 2020-11-29 | End: 2021-11-29

## 2020-11-28 RX ORDER — POLYETHYLENE GLYCOL 3350 17 G/17G
17 POWDER, FOR SOLUTION ORAL 2 TIMES DAILY PRN
Qty: 60 PACKET | Refills: 0
Start: 2020-11-28

## 2020-11-28 RX ORDER — NITROFURANTOIN 25 MG/5ML
100 SUSPENSION ORAL NIGHTLY
Qty: 230 ML | Refills: 0
Start: 2020-11-28

## 2020-11-28 RX ORDER — SODIUM CHLORIDE, SODIUM LACTATE, POTASSIUM CHLORIDE, CALCIUM CHLORIDE 600; 310; 30; 20 MG/100ML; MG/100ML; MG/100ML; MG/100ML
INJECTION, SOLUTION INTRAVENOUS CONTINUOUS
Status: DISCONTINUED | OUTPATIENT
Start: 2020-11-28 | End: 2020-11-28 | Stop reason: HOSPADM

## 2020-11-28 RX ORDER — LEVETIRACETAM 100 MG/ML
500 SOLUTION ORAL 2 TIMES DAILY
Qty: 300 ML | Refills: 0
Start: 2020-11-28 | End: 2021-11-28

## 2020-11-28 RX ORDER — METOPROLOL TARTRATE 50 MG/1
50 TABLET ORAL 2 TIMES DAILY
Qty: 60 TABLET | Refills: 0
Start: 2020-11-28 | End: 2021-11-28

## 2020-11-28 RX ORDER — FINASTERIDE 5 MG/1
5 TABLET, FILM COATED ORAL NIGHTLY
Qty: 30 TABLET | Refills: 0
Start: 2020-11-28 | End: 2021-11-28

## 2020-11-28 RX ORDER — OXYBUTYNIN CHLORIDE 5 MG/1
5 TABLET ORAL 3 TIMES DAILY
Qty: 90 TABLET | Refills: 11
Start: 2020-11-28 | End: 2021-11-28

## 2020-11-28 RX ORDER — TRAMADOL HYDROCHLORIDE 50 MG/1
50 TABLET ORAL EVERY 6 HOURS PRN
Qty: 30 EACH | Refills: 0
Start: 2020-11-28

## 2020-11-28 RX ORDER — OXYCODONE HYDROCHLORIDE 10 MG/1
10 TABLET ORAL EVERY 8 HOURS PRN
Qty: 21 TABLET | Refills: 0 | Status: SHIPPED | OUTPATIENT
Start: 2020-11-28 | End: 2020-12-05

## 2020-11-28 RX ADMIN — SODIUM CHLORIDE, SODIUM LACTATE, POTASSIUM CHLORIDE, AND CALCIUM CHLORIDE: .6; .31; .03; .02 INJECTION, SOLUTION INTRAVENOUS at 11:11

## 2020-11-28 RX ADMIN — AMLODIPINE BESYLATE 10 MG: 5 TABLET ORAL at 11:11

## 2020-11-28 RX ADMIN — METOPROLOL TARTRATE 50 MG: 50 TABLET, FILM COATED ORAL at 11:11

## 2020-11-28 RX ADMIN — OXYBUTYNIN CHLORIDE 5 MG: 5 TABLET ORAL at 02:11

## 2020-11-28 RX ADMIN — LEVETIRACETAM 500 MG: 500 SOLUTION ORAL at 11:11

## 2020-11-28 RX ADMIN — OXYBUTYNIN CHLORIDE 5 MG: 5 TABLET ORAL at 11:11

## 2020-11-28 RX ADMIN — MUPIROCIN: 20 OINTMENT TOPICAL at 11:11

## 2020-11-28 RX ADMIN — OXYCODONE HYDROCHLORIDE 10 MG: 5 TABLET ORAL at 11:11

## 2020-11-28 NOTE — ASSESSMENT & PLAN NOTE
Contributing Nutrition Diagnosis  Inadequate energy intake    Related to (etiology):   Swallowing difficulty    Signs and Symptoms (as evidenced by):   NPO     Interventions(treatment strategy):  Collaboration with other providers  EN- Shaunsoolga 1.5    Nutrition Diagnosis Status:   New

## 2020-11-28 NOTE — ASSESSMENT & PLAN NOTE
Lost weight after SAH and came off meds.  No longer checks FS glucose given continued normal readings.  Glucose 77 in ED.  A1C 4.3 on admission  No POCT or SSI at this time.

## 2020-11-28 NOTE — DISCHARGE SUMMARY
[facility], [place], operated by [facility]  Discharge Summary      Patient Name: Peewee Nunes  MRN: [id number]  Admission Date: 11/27/2020  Hospital Length of Stay: 1 days  Discharge Date and Time:  11/28/2020 2:26 PM  Attending Physician: Rico Parekh MD   Discharging Provider: Rico Parekh MD  Primary Care Provider: Primary Doctor No      HPI:   Patient is a 65 year old male mostly bed-bound with a PMH significant for SAH (12/2018 secondary to Fall - s/p Craniotomy - s/p Trach which was removed in 2019 and PEG), CAD s/p MI in 2005 and CABG in 2006, T2D (no longer on meds after weight loss), HTN, Persistent Afib (on ASA only given SAH), Seizures (after SAH - none since 2/2020 and on Keppra), BPH with urinary retention and chronic mobley who presented to [facility] with hematuria.  Patient has a chronic mobley and has it changed once a month by Home Health Nurse.  He had a traumatic mobley with hematuria and clots noted.  He was placed on a CBI and transferred to [facility].  Patient has an expressive aphasia and history given mostly by his wife at bedside. Patient with intermittent bladder spasms with pain.  He denies f/c, n/v/d.  He has a PEG, but takes some pills by mouth.  Patient denies cough, chest pain, SOB.  No headache or change in vision.  No headache or change in vision.      * No surgery found *      Hospital Course:   Patient admitted to Inpatient status.  See below     Consults:   Consults (From admission, onward)        Status Ordering Provider     Inpatient consult to Registered Dietitian/Nutritionist  Once     Provider:  (Not yet assigned)    Completed RICO PAREKH     Inpatient consult to Urology  Once     Provider:  Alphonse Little MD    Completed RICO PAREKH     IP consult to case management  Once     Provider:  (Not yet assigned)    Completed RICO PAREKH          * Gross hematuria  Patient with BPH and Urinary Retention with Chronic Mobley since his  "SAH.  Has monthly Catheter changes and had traumatic mobley placement by  nurse.  Placed on CBI and transferred to [facility] for Urology Consult.  Hgb in ED was 13 - dropped to 11.8 today.  CBI d/c this morning and clear urine in Mobley.    Urology cleared for discharge.    Seen by Urology - "64 yo male with urinary retention after SAH in 2019 presenting with gross hematuria due to traumatic mobley.  Home with current Mobley  Resume HH changes or follow up with local urologist".    Plan:  Resume ASA on discharge.  Follow up with Urology      Hyponatremia  Na 131 with BUN/Cr 14/0.5.  Suspect some volume depletion  -IVFs today        Dysphagia  S/p PEG after SAH  Takes some pills by mouth  -SLP consult for swallow evaluation - patient and wife declined evaluation,  -Resume previous tube feeds and FWF at home    Urinary retention  BPH with Chronic Mobley - as above.  On Nitrofurantoin qHS for UTI prophylaxis  -Continue Oxybutynin   -Continue Nitrofurantoin  -Hold Rapaflo for now - resume on discharge      Longstanding persistent atrial fibrillation  Not on AC given SAH  On ASA 81mg - resume  -Continue Metoprolol      Benign essential HTN  Home Meds:  Metoprolol 50mg bid and Amlodipine 10mg qda  Continue Home Meds  BP stable      Type 2 diabetes mellitus, without long-term current use of insulin  Lost weight after SAH and came off meds.  No longer checks FS glucose given continued normal readings.  Glucose 77 in ED.  A1C 4.3 on admission  No POCT or SSI at this time.        History of subarachnoid hemorrhage  2018 after Fall.    Incidental old CVA also found at that time.  S/p Craniotomy.  S/p Trach - removed in 2019.  S/p PEG - still has it.  Seizures - none since 2/2020 and now on Keppra.  Bilateral LE weakness and mostly bed-bound now.  -Continue Keppra        Coronary artery disease involving native coronary artery without angina pectoris  S/p MI in 2005 and CABG in 2006.  States it was " maker".  On ASA,  Not on a " statin.  No chest pain on admission.        Final Active Diagnoses:    Diagnosis Date Noted POA    PRINCIPAL PROBLEM:  Gross hematuria [R31.0] 11/27/2020 Yes    Hyponatremia [E87.1] 11/28/2020 Yes    Coronary artery disease involving native coronary artery without angina pectoris [I25.10] 11/27/2020 Yes    History of subarachnoid hemorrhage [Z86.79] 11/27/2020 Not Applicable    Type 2 diabetes mellitus, without long-term current use of insulin [E11.9] 11/27/2020 Yes    Benign essential HTN [I10] 11/27/2020 Yes    Longstanding persistent atrial fibrillation [I48.11] 11/27/2020 Yes    Urinary retention [R33.9] 11/27/2020 Yes    Dysphagia [R13.10] 11/27/2020 Yes      Problems Resolved During this Admission:    Diagnosis Date Noted Date Resolved POA    Hematuria [R31.9] 11/27/2020 11/28/2020 Yes       Discharged Condition: stable    Disposition: Home with services    Follow Up:  Follow-up Information     Primary Doctor No.    Why: Please follow up with your PCP in 1 week               Patient Instructions:   No discharge procedures on file.    Significant Diagnostic Studies: Labs:   BMP:   Recent Labs   Lab 11/27/20  1128 11/28/20  0441   GLU 80 73   * 131*   K 5.6* 4.1    103   CO2 26 21*   BUN 13 14   CREATININE 0.5 0.5   CALCIUM 8.7 8.3*   MG 1.7 1.8    and CBC   Recent Labs   Lab 11/27/20  1128 11/28/20  0441   WBC 9.73 7.29   HGB 13.3* 11.8*   HCT 41.0 37.3*   * 119*       Pending Diagnostic Studies:     None         Medications:  Reconciled Home Medications:      Medication List      START taking these medications    amLODIPine 10 MG tablet  Commonly known as: NORVASC  1 tablet (10 mg total) by Per G Tube route once daily.  Start taking on: November 29, 2020     finasteride 5 mg tablet  Commonly known as: PROSCAR  1 tablet (5 mg total) by Per G Tube route every evening.     levetiracetam oral soln 500 mg/5 mL (5 mL) Soln  5 mLs (500 mg total) by Per G Tube route 2 (two) times daily.      metoprolol tartrate 50 MG tablet  Commonly known as: LOPRESSOR  1 tablet (50 mg total) by Per G Tube route 2 (two) times daily.     nitrofurantoin 25 mg/5 mL Susp  Commonly known as: FURADANTIN  Take 20 mLs (100 mg total) by mouth every evening.     oxybutynin 5 MG Tab  Commonly known as: DITROPAN  1 tablet (5 mg total) by Per G Tube route 3 (three) times daily.     polyethylene glycol 17 gram Pwpk  Commonly known as: GLYCOLAX  Take 17 g by mouth 2 (two) times daily as needed.     traMADoL 50 mg tablet  Commonly known as: ULTRAM  1 tablet (50 mg total) by Per G Tube route every 6 (six) hours as needed.            Indwelling Lines/Drains at time of discharge:   Lines/Drains/Airways     Drain                 Gastrostomy/Enterostomy 11/27/20 0000 LUQ feeding 1 day         Urethral Catheter 11/27/20 1600 20 Fr. less than 1 day                Time spent on the discharge of patient: 35 minutes  Patient was seen and examined on the date of discharge and determined to be suitable for discharge.         Nasim Benson MD  Department of Hospital Medicine  [facility]

## 2020-11-28 NOTE — PLAN OF CARE
POC reviewed w pt at the bedside. AAOX4, VSS on RA. Pt did not complain of pain during shift. When irrigating catheter, a thin tissue-like substance was irrigated out of the bladder. No injuries or falls noted during shift. Safety precautions in place, call bell within reach, side rails up X2, will continue to monitor.

## 2020-11-28 NOTE — SUBJECTIVE & OBJECTIVE
Interval History: Rader changed yesterday.  Now with yellow urine, no more clots or hematuria.    Review of Systems   Constitutional: Negative.  Negative for fever.   HENT: Negative.    Eyes: Negative.    Respiratory: Negative for cough, chest tightness and shortness of breath.    Cardiovascular: Negative for chest pain.   Gastrointestinal: Negative.  Negative for constipation, diarrhea and nausea.   Genitourinary: Positive for difficulty urinating. Negative for hematuria.   Musculoskeletal: Negative.    Neurological: Negative.    Psychiatric/Behavioral: Negative.      Objective:     Temp:  [97.4 °F (36.3 °C)-99 °F (37.2 °C)] 98.2 °F (36.8 °C)  Pulse:  [] 94  Resp:  [16-18] 18  SpO2:  [96 %-98 %] 98 %  BP: (100-134)/(71-81) 114/74     Body mass index is 19 kg/m².           Drains     Drain                 Gastrostomy/Enterostomy 11/27/20 0000 LUQ feeding 1 day         Urethral Catheter 11/27/20 1600 20 Fr. less than 1 day                Physical Exam  Vitals signs and nursing note reviewed.   Constitutional:       Appearance: He is well-developed.   HENT:      Head: Normocephalic.   Eyes:      Conjunctiva/sclera: Conjunctivae normal.   Neck:      Musculoskeletal: Normal range of motion and neck supple.      Thyroid: No thyromegaly.      Trachea: No tracheal deviation.   Cardiovascular:      Rate and Rhythm: Normal rate.      Heart sounds: Normal heart sounds.   Pulmonary:      Effort: Pulmonary effort is normal. No respiratory distress.      Breath sounds: Normal breath sounds. No wheezing.   Abdominal:      General: Bowel sounds are normal.      Palpations: Abdomen is soft.      Tenderness: There is no abdominal tenderness. There is no rebound.      Hernia: No hernia is present.   Genitourinary:     Penis: Uncircumcised. Paraphimosis present.       Scrotum/Testes: Normal.      Comments: 20 Fr Rader in place, yellow urine    Paraphimosis reduced.  Patient tolerated.  Musculoskeletal: Normal range of motion.          General: No tenderness.   Lymphadenopathy:      Cervical: No cervical adenopathy.   Skin:     General: Skin is warm and dry.      Findings: No erythema or rash.   Neurological:      Mental Status: He is alert and oriented to person, place, and time.   Psychiatric:         Behavior: Behavior normal.         Thought Content: Thought content normal.         Judgment: Judgment normal.         Significant Labs:    BMP:  Recent Labs   Lab 11/27/20  1128 11/28/20  0441   * 131*   K 5.6* 4.1    103   CO2 26 21*   BUN 13 14   CREATININE 0.5 0.5   CALCIUM 8.7 8.3*       CBC:   Recent Labs   Lab 11/27/20  1128 11/28/20  0441   WBC 9.73 7.29   HGB 13.3* 11.8*   HCT 41.0 37.3*   * 119*       Blood Culture: No results for input(s): LABBLOO in the last 168 hours.  Urine Culture: No results for input(s): LABURIN in the last 168 hours.    Significant Imaging:

## 2020-11-28 NOTE — PROGRESS NOTES
[facility]  Urology  Progress Note    Patient Name: Peewee Nunes  MRN: [id number]  Admission Date: 11/27/2020  Hospital Length of Stay: 1 days  Code Status: DNR   Attending Provider: Nasim Benson MD   Primary Care Physician: Primary Doctor No    Subjective:     HPI:  Patient is a 65 year old male mostly bed-bound with a PMH significant for SAH (12/2018 secondary to Fall - s/p Craniotomy - s/p Trach which was removed in 2019 and PEG), CAD s/p MI in 2005 and CABG in 2006, T2D (no longer on meds after weight loss), HTN, Persistent Afib (on ASA only given SAH), Seizures (after SAH - none since 2/2020 and on Keppra), BPH with urinary retention and chronic mobley who presented to [facility] with hematuria.  Patient has a chronic mobley and has it changed once a month by Home Health Nurse.  He had a traumatic mobley with hematuria and clots during most recent mobley exchange yesterday. Per the patient's wife, he got a bladder ultrasound in the ED which showed some clot/sediment in the bladder. The mobley catheter (presumed 16 or 18 Fr) was exchanged for a 22 Fr 3-way catheter and was hand irrigated with removal of clots. At the time, the urine was dark red. He was then placed on CBI and transferred to [facility] for continued care.  Patient has an expressive aphasia and history given mostly by his wife at bedside. Patient with intermittent bladder spasms with pain.  He denies f/c, n/v/d.  Patient denies cough, chest pain, SOB.    Interval History: Mobley changed yesterday.  Now with yellow urine, no more clots or hematuria.    Review of Systems   Constitutional: Negative.  Negative for fever.   HENT: Negative.    Eyes: Negative.    Respiratory: Negative for cough, chest tightness and shortness of breath.    Cardiovascular: Negative for chest pain.   Gastrointestinal: Negative.  Negative for constipation, diarrhea and nausea.   Genitourinary: Positive for difficulty urinating. Negative for  hematuria.   Musculoskeletal: Negative.    Neurological: Negative.    Psychiatric/Behavioral: Negative.      Objective:     Temp:  [97.4 °F (36.3 °C)-99 °F (37.2 °C)] 98.2 °F (36.8 °C)  Pulse:  [] 94  Resp:  [16-18] 18  SpO2:  [96 %-98 %] 98 %  BP: (100-134)/(71-81) 114/74     Body mass index is 19 kg/m².           Drains     Drain                 Gastrostomy/Enterostomy 11/27/20 0000 LUQ feeding 1 day         Urethral Catheter 11/27/20 1600 20 Fr. less than 1 day                Physical Exam  Vitals signs and nursing note reviewed.   Constitutional:       Appearance: He is well-developed.   HENT:      Head: Normocephalic.   Eyes:      Conjunctiva/sclera: Conjunctivae normal.   Neck:      Musculoskeletal: Normal range of motion and neck supple.      Thyroid: No thyromegaly.      Trachea: No tracheal deviation.   Cardiovascular:      Rate and Rhythm: Normal rate.      Heart sounds: Normal heart sounds.   Pulmonary:      Effort: Pulmonary effort is normal. No respiratory distress.      Breath sounds: Normal breath sounds. No wheezing.   Abdominal:      General: Bowel sounds are normal.      Palpations: Abdomen is soft.      Tenderness: There is no abdominal tenderness. There is no rebound.      Hernia: No hernia is present.   Genitourinary:     Penis: Uncircumcised. Paraphimosis present.       Scrotum/Testes: Normal.      Comments: 20 Fr Rader in place, yellow urine    Paraphimosis reduced.  Patient tolerated.  Musculoskeletal: Normal range of motion.         General: No tenderness.   Lymphadenopathy:      Cervical: No cervical adenopathy.   Skin:     General: Skin is warm and dry.      Findings: No erythema or rash.   Neurological:      Mental Status: He is alert and oriented to person, place, and time.   Psychiatric:         Behavior: Behavior normal.         Thought Content: Thought content normal.         Judgment: Judgment normal.         Significant Labs:    BMP:  Recent Labs   Lab 11/27/20  1128  11/28/20  0441   * 131*   K 5.6* 4.1    103   CO2 26 21*   BUN 13 14   CREATININE 0.5 0.5   CALCIUM 8.7 8.3*       CBC:   Recent Labs   Lab 11/27/20  1128 11/28/20  0441   WBC 9.73 7.29   HGB 13.3* 11.8*   HCT 41.0 37.3*   * 119*       Blood Culture: No results for input(s): LABBLOO in the last 168 hours.  Urine Culture: No results for input(s): LABURIN in the last 168 hours.    Significant Imaging:                    Assessment/Plan:     * Gross hematuria  66 yo male with urinary retention after SAH in 2019 presenting with gross hematuria due to traumatic mobley.    Home with current Mobley  Resume HH changes or follow up with local urologist        VTE Risk Mitigation (From admission, onward)         Ordered     Place sequential compression device  Until discontinued      11/27/20 1019     IP VTE LOW RISK PATIENT  Once      11/27/20 1019                W Beltran Whitmore MD  Urology  [facility]

## 2020-11-28 NOTE — DISCHARGE INSTRUCTIONS
Please follow up with a local Urologist for Your Chronic Rader Catheter  Continue with Home Health on Discharge  No new medications were prescribed.   Please continue your previous medications and Tube Feeds

## 2020-11-28 NOTE — ASSESSMENT & PLAN NOTE
64 yo male with urinary retention after SAH in 2019 presenting with gross hematuria due to traumatic mobley.    Home with current Mobley  Resume HH changes or follow up with local urologist

## 2020-11-28 NOTE — PLAN OF CARE
[facility] HOME HEALTH ORDERS  FACE TO FACE ENCOUNTER    Patient Name: Peewee Nunes  YOB: 1955    PCP: Primary Doctor No   PCP Address: None  PCP Phone Number: None  PCP Fax: None    Encounter Date: 11/28/2020    Admit to Home Health    Diagnoses:  Active Hospital Problems    Diagnosis  POA    *Gross hematuria [R31.0]  Yes     Priority: 1 - High    Hyponatremia [E87.1]  Yes    Coronary artery disease involving native coronary artery without angina pectoris [I25.10]  Yes    History of subarachnoid hemorrhage [Z86.79]  Not Applicable    Type 2 diabetes mellitus, without long-term current use of insulin [E11.9]  Yes    Benign essential HTN [I10]  Yes    Longstanding persistent atrial fibrillation [I48.11]  Yes    Urinary retention [R33.9]  Yes    Dysphagia [R13.10]  Yes      Resolved Hospital Problems    Diagnosis Date Resolved POA    Hematuria [R31.9] 11/28/2020 Yes       No future appointments.  Follow-up Information     Primary Doctor No.    Why: Please follow up with your PCP in 1 week                   I have seen and examined this patient face to face today. My clinical findings that support the need for the home health skilled services and home bound status are the following:  Weakness/numbness causing balance and gait disturbance due to Weakness/Debility making it taxing to leave home.  Medical restrictions requiring assistance of another human to leave home due to  bed bound with chronic Rader Catheter.    Allergies:  Review of patient's allergies indicates:   Allergen Reactions    Levofloxacin        Diet: cardiac diet    Activities: activity as tolerated    Nursing:   SN to complete comprehensive assessment including routine vital signs. Instruct on disease process and s/s of complications to report to MD. Review/verify medication list sent home with the patient at time of discharge  and instruct patient/caregiver as needed. Frequency may be adjusted  depending on start of care date.    Notify MD if SBP > 160 or < 90; DBP > 90 or < 50; HR > 120 or < 50; Temp > 101; Other:         CONSULTS:    Physical Therapy to evaluate and treat. Evaluate for home safety and equipment needs; Establish/upgrade home exercise program. Perform / instruct on therapeutic exercises, gait training, transfer training, and Range of Motion.  Occupational Therapy to evaluate and treat. Evaluate home environment for safety and equipment needs. Perform/Instruct on transfers, ADL training, ROM, and therapeutic exercises.  Aide to provide assistance with personal care, ADLs, and vital signs.    MISCELLANEOUS CARE:  Rader Care: Instruct patient/caregiver to empty Rader bag.  Change Rader every month.  Routine Skin for Bedridden Patients: Instruct patient/caregiver to apply moisture barrier cream to all skin folds and wet areas in perineal area daily and after baths and all bowel movements.    WOUND CARE ORDERS  n/a      Medications: Review discharge medications with patient and family and provide education.      Current Discharge Medication List      START taking these medications    Details   amLODIPine (NORVASC) 10 MG tablet 1 tablet (10 mg total) by Per G Tube route once daily.  Qty: 30 tablet, Refills: 0    Comments: .      finasteride (PROSCAR) 5 mg tablet 1 tablet (5 mg total) by Per G Tube route every evening.  Qty: 30 tablet, Refills: 0      levetiracetam oral soln 500 mg/5 mL (5 mL) Soln 5 mLs (500 mg total) by Per G Tube route 2 (two) times daily.  Qty: 300 mL, Refills: 0      metoprolol tartrate (LOPRESSOR) 50 MG tablet 1 tablet (50 mg total) by Per G Tube route 2 (two) times daily.  Qty: 60 tablet, Refills: 0    Comments: .      nitrofurantoin (FURADANTIN) 25 mg/5 mL Susp Take 20 mLs (100 mg total) by mouth every evening.  Qty: 230 mL, Refills: 0      oxybutynin (DITROPAN) 5 MG Tab 1 tablet (5 mg total) by Per G Tube route 3 (three) times daily.  Qty: 90 tablet, Refills: 11       polyethylene glycol (GLYCOLAX) 17 gram PwPk Take 17 g by mouth 2 (two) times daily as needed.  Qty: 60 packet, Refills: 0      traMADoL (ULTRAM) 50 mg tablet 1 tablet (50 mg total) by Per G Tube route every 6 (six) hours as needed.  Qty: 30 each, Refills: 0    Comments: Quantity prescribed more than 7 day supply? No             I certify that this patient is confined to his home and needs intermittent skilled nursing care, physical therapy and occupational therapy.

## 2020-11-28 NOTE — ASSESSMENT & PLAN NOTE
"Patient with BPH and Urinary Retention with Chronic Mobley since his SAH.  Has monthly Catheter changes and had traumatic mobley placement by  nurse.  Placed on CBI and transferred to [facility] for Urology Consult.  Hgb in ED was 13 - dropped to 11.8 today.  CBI d/c this morning and clear urine in Mobley.      Seen by Urology - "66 yo male with urinary retention after SAH in 2019 presenting with gross hematuria due to traumatic mobley.  - Irrigated in ED with removal of clots and placed on CBI  - Maintain catheter  - Nursing to titrate CBI to light pink. May irrigate with 60 cc cathter tip syringe using sterile water PRN for clots.  - Irrigation performed this morning at the bedside with return of red-tinged urine and a small, fresh clot. No significant clot burden suspected in bladder.  - Trend H/H and creatinine  - May continue ASA as medically necessary".    Plan:  Holding ASA and SQH for now  Follow Hgb  Pain control  Follow up with Urology recommendations    "

## 2020-11-28 NOTE — PT/OT/SLP PROGRESS
"Speech Language Pathology      Peewee Nunes  MRN: [id number]    Failed  Swallow evaluation attempt today secondary to (Patient and wife declined swallow evaluation). Wife present at bedside who states:    · Patient is s/p speech pathology services during SNF admission and has also received Home Health speech services  · She reports patient is also s/p an outpatient MBS (she is unable to recall what year or where) that placed patient in pureed diet.   · She states patient declined purees and was unwilling to cooperative with the recommended interventions at that time  · She states that patient has also declined  speech services due to that therapist not being samira to obtain records from prior hospitalizations and  "want to start from the beginng" with bedside and instrumental swallow evaluations. She stated that patient became frustrated with having to start over and  Stated he did not want SLP services  · She states reason for ped was that patient had a tracheostomy  · She states pt eats very little at home, does not have teeth, but does not want food altered in any way.   · She states he has lost his sense of taste and does not enjoy eating  · She states she feeds him solid foods occasionally, that he does cough or aspiration, but that it "does not last long." and that his lungs are fine  · This SLP offer swallow evaluations while inpatient, discussed ability to repeat MBS while he was hospitalized  · Wife declined any intervention this date feeling it would be a "waste of time" as pt has been offered all of that in the past and does not actively participate  · Wife also stating she feels like they are going home today and are waiting for MD to discharge them  · She declined all outpatient referrals for ongoing remediation of dysphagia and cognitive communication issues    Will discuss with MD Yelena Ventura, CCC-SLP    "

## 2020-11-28 NOTE — ASSESSMENT & PLAN NOTE
BPH with Chronic Rader - as above.  On Nitrofurantoin qHS for UTI prophylaxis  -Continue Oxybutynin   -Continue Nitrofurantoin  -Hold Rapaflo for now - resume on discharge

## 2020-11-28 NOTE — ASSESSMENT & PLAN NOTE
S/p PEG after SAH  Takes some pills by mouth  -SLP consult for swallow evaluation - patient and wife declined evaluation,  -Resume previous tube feeds and FWF at home

## 2020-11-28 NOTE — PLAN OF CARE
Patient received on unit at approximately 8am via stretcher with wife at bedside. VS stable on room air. AAOx4 with forgetfulness. Requires total assistance for ADL's. Placed on CBI via 3 way catheter by urologist at admit, patient initially tolerated well with large output of dark pink urine. Patient noted with abdominal distention this afternoon, decreased output from catheter. Bladder scan noted 1500mL. Urologist notified and placed new 20Fr. Rader. CBI discontinued per MD order. Rader patent, draining dark yellow/laura urine. Irrigated q 2 hours per order. Peg tube patent, tolerated meds and water flushes without difficulty. Isosource tube feeding order placed per MD order. All safety precautions in place, call bell in reach. Hourly rounding completed.

## 2020-11-28 NOTE — PLAN OF CARE
Patient current with [facility] - updated clinicals & home health orders forwarded via [facility] to resume care - ADT30 completed for transport home via ambulance with requested pickup time of 5pm - wife & RN Caridad updated at bedside    11/28/20 6459   Final Note   Assessment Type Final Discharge Note   Anticipated Discharge Disposition Home-Health   What phone number can be called within the next 1-3 days to see how you are doing after discharge? [phone]   Discharge plans and expectations educations in teach back method with documentation complete? Yes   Right Care Referral Info   Post Acute Recommendation Home-care   Facility Name [facility]   Post-Acute Status   Post-Acute Authorization Home Health   Home Health Status Set-up Complete

## 2020-11-28 NOTE — ASSESSMENT & PLAN NOTE
"Patient with BPH and Urinary Retention with Chronic Mobley since his SAH.  Has monthly Catheter changes and had traumatic mobley placement by  nurse.  Placed on CBI and transferred to [facility] for Urology Consult.  Hgb in ED was 13 - dropped to 11.8 today.  CBI d/c this morning and clear urine in Mobley.    Urology cleared for discharge.    Seen by Urology - "66 yo male with urinary retention after SAH in 2019 presenting with gross hematuria due to traumatic mobley.  Home with current Mobley  Resume HH changes or follow up with local urologist".    Plan:  Resume ASA on discharge.  Follow up with Urology    "

## 2020-11-28 NOTE — SUBJECTIVE & OBJECTIVE
Interval History: Patient overall stable this morning.  States he feels sore.  No f/c, n/v/d.  No cough or SOB.  Clear urine in Mobley.    Review of Systems   Constitutional: Positive for activity change. Negative for appetite change, chills and fever.   HENT: Negative for ear pain.    Eyes: Negative for pain.   Respiratory: Negative for cough, chest tightness, shortness of breath and wheezing.    Cardiovascular: Negative for chest pain, palpitations and leg swelling.   Gastrointestinal: Positive for abdominal pain (feels sore).   Endocrine: Negative for polydipsia and polyphagia.   Genitourinary: Positive for difficulty urinating (chronic mobley). Negative for hematuria.   Musculoskeletal: Positive for arthralgias (knees). Negative for neck pain.   Skin: Negative for rash.   Neurological: Positive for speech difficulty (chronic expressive aphasia) and weakness. Negative for headaches.   Hematological: Does not bruise/bleed easily.   Psychiatric/Behavioral: Negative for agitation and behavioral problems.     Objective:     Vital Signs (Most Recent):  Temp: 98.2 °F (36.8 °C) (11/28/20 0746)  Pulse: 94 (11/28/20 0746)  Resp: 18 (11/28/20 0746)  BP: 114/74 (11/28/20 0746)  SpO2: 98 % (11/28/20 0746) Vital Signs (24h Range):  Temp:  [97.4 °F (36.3 °C)-99 °F (37.2 °C)] 98.2 °F (36.8 °C)  Pulse:  [] 94  Resp:  [16-20] 18  SpO2:  [96 %-98 %] 98 %  BP: (100-134)/(69-81) 114/74     Weight: 61.8 kg (136 lb 3.9 oz)  Body mass index is 19 kg/m².    Intake/Output Summary (Last 24 hours) at 11/28/2020 0816  Last data filed at 11/28/2020 0600  Gross per 24 hour   Intake 540 ml   Output 5600 ml   Net -5060 ml      Physical Exam  Vitals signs reviewed.   Constitutional:       Appearance: He is ill-appearing (chronic). He is not toxic-appearing.   HENT:      Head: Normocephalic.      Comments: Craniotomy scars     Right Ear: External ear normal.      Left Ear: External ear normal.      Nose: Nose normal.      Mouth/Throat:       Mouth: Mucous membranes are moist.      Pharynx: Oropharynx is clear.   Eyes:      General: No scleral icterus.     Conjunctiva/sclera: Conjunctivae normal.   Neck:      Musculoskeletal: Normal range of motion and neck supple.      Comments: Healed trach scar  Cardiovascular:      Rate and Rhythm: Tachycardia present. Rhythm irregular.      Heart sounds: Normal heart sounds. No murmur.   Pulmonary:      Effort: Pulmonary effort is normal.      Breath sounds: Normal breath sounds. No wheezing or rales.   Abdominal:      General: Bowel sounds are normal. There is no distension.      Tenderness: There is abdominal tenderness. There is no rebound.   Genitourinary:     Penis: Normal.    Musculoskeletal:         General: Deformity present.      Comments: feet   Skin:     General: Skin is warm and dry.      Comments: Chronic LE skin changes   Neurological:      Mental Status: He is alert. Mental status is at baseline.   Psychiatric:         Mood and Affect: Mood normal.         Behavior: Behavior normal.         Significant Labs: All pertinent labs within the past 24 hours have been reviewed.    Significant Imaging: I have reviewed all pertinent imaging results/findings within the past 24 hours.

## 2020-11-28 NOTE — PROGRESS NOTES
[facility] Hospital Medicine  Progress Note    Patient Name: Peewee Nunes  MRN: [id number]  Patient Class: IP- Inpatient   Admission Date: 11/27/2020  Length of Stay: 1 days  Attending Physician: Nasim Benson MD  Primary Care Provider: Primary Doctor No        Subjective:     Principal Problem:Gross hematuria        HPI:  Patient is a 65 year old male mostly bed-bound with a PMH significant for SAH (12/2018 secondary to Fall - s/p Craniotomy - s/p Trach which was removed in 2019 and PEG), CAD s/p MI in 2005 and CABG in 2006, T2D (no longer on meds after weight loss), HTN, Persistent Afib (on ASA only given SAH), Seizures (after SAH - none since 2/2020 and on Keppra), BPH with urinary retention and chronic mobley who presented to [facility] with hematuria.  Patient has a chronic mobley and has it changed once a month by Home Health Nurse.  He had a traumatic mobley with hematuria and clots noted.  He was placed on a CBI and transferred to [facility].  Patient has an expressive aphasia and history given mostly by his wife at bedside. Patient with intermittent bladder spasms with pain.  He denies f/c, n/v/d.  He has a PEG, but takes some pills by mouth.  Patient denies cough, chest pain, SOB.  No headache or change in vision.  No headache or change in vision.      Overview/Hospital Course:  Patient admitted to Inpatient status      Interval History: Patient overall stable this morning.  States he feels sore.  No f/c, n/v/d.  No cough or SOB.  Clear urine in Mobley.    Review of Systems   Constitutional: Positive for activity change. Negative for appetite change, chills and fever.   HENT: Negative for ear pain.    Eyes: Negative for pain.   Respiratory: Negative for cough, chest tightness, shortness of breath and wheezing.    Cardiovascular: Negative for chest pain, palpitations and leg swelling.   Gastrointestinal: Positive for abdominal pain (feels sore).   Endocrine: Negative for  polydipsia and polyphagia.   Genitourinary: Positive for difficulty urinating (chronic mobley). Negative for hematuria.   Musculoskeletal: Positive for arthralgias (knees). Negative for neck pain.   Skin: Negative for rash.   Neurological: Positive for speech difficulty (chronic expressive aphasia) and weakness. Negative for headaches.   Hematological: Does not bruise/bleed easily.   Psychiatric/Behavioral: Negative for agitation and behavioral problems.     Objective:     Vital Signs (Most Recent):  Temp: 98.2 °F (36.8 °C) (11/28/20 0746)  Pulse: 94 (11/28/20 0746)  Resp: 18 (11/28/20 0746)  BP: 114/74 (11/28/20 0746)  SpO2: 98 % (11/28/20 0746) Vital Signs (24h Range):  Temp:  [97.4 °F (36.3 °C)-99 °F (37.2 °C)] 98.2 °F (36.8 °C)  Pulse:  [] 94  Resp:  [16-20] 18  SpO2:  [96 %-98 %] 98 %  BP: (100-134)/(69-81) 114/74     Weight: 61.8 kg (136 lb 3.9 oz)  Body mass index is 19 kg/m².    Intake/Output Summary (Last 24 hours) at 11/28/2020 0816  Last data filed at 11/28/2020 0600  Gross per 24 hour   Intake 540 ml   Output 5600 ml   Net -5060 ml      Physical Exam  Vitals signs reviewed.   Constitutional:       Appearance: He is ill-appearing (chronic). He is not toxic-appearing.   HENT:      Head: Normocephalic.      Comments: Craniotomy scars     Right Ear: External ear normal.      Left Ear: External ear normal.      Nose: Nose normal.      Mouth/Throat:      Mouth: Mucous membranes are moist.      Pharynx: Oropharynx is clear.   Eyes:      General: No scleral icterus.     Conjunctiva/sclera: Conjunctivae normal.   Neck:      Musculoskeletal: Normal range of motion and neck supple.      Comments: Healed trach scar  Cardiovascular:      Rate and Rhythm: Tachycardia present. Rhythm irregular.      Heart sounds: Normal heart sounds. No murmur.   Pulmonary:      Effort: Pulmonary effort is normal.      Breath sounds: Normal breath sounds. No wheezing or rales.   Abdominal:      General: Bowel sounds are normal.  "There is no distension.      Tenderness: There is abdominal tenderness. There is no rebound.   Genitourinary:     Penis: Normal.    Musculoskeletal:         General: Deformity present.      Comments: feet   Skin:     General: Skin is warm and dry.      Comments: Chronic LE skin changes   Neurological:      Mental Status: He is alert. Mental status is at baseline.   Psychiatric:         Mood and Affect: Mood normal.         Behavior: Behavior normal.         Significant Labs: All pertinent labs within the past 24 hours have been reviewed.    Significant Imaging: I have reviewed all pertinent imaging results/findings within the past 24 hours.      Assessment/Plan:      * Gross hematuria  Patient with BPH and Urinary Retention with Chronic Mobley since his SAH.  Has monthly Catheter changes and had traumatic mobley placement by  nurse.  Placed on CBI and transferred to [facility] for Urology Consult.  Hgb in ED was 13 - dropped to 11.8 today.  CBI d/c this morning and clear urine in Mobley.      Seen by Urology - "64 yo male with urinary retention after SAH in 2019 presenting with gross hematuria due to traumatic mobley.  - Irrigated in ED with removal of clots and placed on CBI  - Maintain catheter  - Nursing to titrate CBI to light pink. May irrigate with 60 cc cathter tip syringe using sterile water PRN for clots.  - Irrigation performed this morning at the bedside with return of red-tinged urine and a small, fresh clot. No significant clot burden suspected in bladder.  - Trend H/H and creatinine  - May continue ASA as medically necessary".    Plan:  Holding ASA and SQH for now  Follow Hgb  Pain control  Follow up with Urology recommendations      Hyponatremia  Na 131 with BUN/Cr 14/0.5.  Suspect some volume depletion  -IVFs today  -Follow      Dysphagia  S/p PEG after SAH  Takes some pills by mouth  -SLP consult for swallow evaluation  -Nutrition for tube feed recommendations    Urinary retention  BPH with Chronic Mobley " "- as above.  On Nitrofurantoin qHS for UTI prophylaxis  -Continue Oxybutynin   -Continue Nitrofurantoin  -Hold Rapaflo for now - non-formulary      Longstanding persistent atrial fibrillation  Not on AC given SAH  On ASA 81mg - holding given above.  -Continue Metoprolol      Benign essential HTN  Home Meds:  Metoprolol 50mg bid and Amlodipine 10mg qda  Continue Home Meds  BP stable      Type 2 diabetes mellitus, without long-term current use of insulin  Lost weight after SAH and came off meds.  No longer checks FS glucose given continued normal readings.  Glucose 77 in ED.  A1C 4.3 on admission  No POCT or SSI at this time.        History of subarachnoid hemorrhage  2018 after Fall.    Incidental old CVA also found at that time.  S/p Craniotomy.  S/p Trach - removed in 2019.  S/p PEG - still has it.  Seizures - none since 2/2020 and now on Keppra.  Bilateral LE weakness and mostly bed-bound now.  -Continue Keppra        Coronary artery disease involving native coronary artery without angina pectoris  S/p MI in 2005 and CABG in 2006.  States it was " maker".  On ASA,  Not on a statin.  No chest pain on admission.        VTE Risk Mitigation (From admission, onward)         Ordered     Place sequential compression device  Until discontinued      11/27/20 1019     IP VTE LOW RISK PATIENT  Once      11/27/20 1019                Discharge Planning   ADRY:      Code Status: DNR   Is the patient medically ready for discharge?:     Reason for patient still in hospital (select all that apply): Patient trending condition, Treatment and Consult recommendations  Discharge Plan A: (P) Home Health                  Nasim Benson MD  Department of Hospital Medicine   [facility]  "

## 2020-11-28 NOTE — CONSULTS
"  [facility]  Adult Nutrition  Consult Note    SUMMARY     Recommendations    1. Recommend Increasing Isosource to 45 ml /hr. If bolus needed: 5 cartons daily.    2. Daily weights.     Goals: 1.>75% of EEN, EPN by RD follow up.  Nutrition Goal Status: new  Communication of RD Recs: (POC)    Reason for Assessment    Reason For Assessment: consult  Diagnosis: (Gross hematuria)  Relevant Medical History: CAD, DM, Stroke, HTN  Interdisciplinary Rounds: did not attend  General Information Comments: 11.28.20- Pt presened with hematuria. Pt NPO with PEG. Trace edema noted. UBW unknown. Pt unable to be seen today, NFPE on RD follow up. Recommend increasing Isosource to meet EEN, EPN.   Nutrition Discharge Planning: Adequate nutrition to meet needs via-Isosource 1.5 @ 45 ml/hr.     Nutrition Risk Screen    Nutrition Risk Screen: tube feeding or parenteral nutrition, dysphagia or difficulty swallowing    Anthropometrics    Temp: 98.2 °F (36.8 °C)  Height Method: Stated  Height: 5' 11" (180.3 cm)  Height (inches): 71 in  Weight Method: Bed Scale  Weight: 61.8 kg (136 lb 3.9 oz)  Weight (lb): 136.25 lb  Ideal Body Weight (IBW), Male: 172 lb  % Ideal Body Weight, Male (lb): 79.22 %  BMI (Calculated): 19  BMI Grade: 18.5-24.9 - normal     Lab/Procedures/Meds    Pertinent Labs Reviewed: reviewed  Pertinent Labs Comments: Na 131, Ca 8.3  Pertinent Medications Reviewed: reviewed  Pertinent Medications Comments: Metoprolol    Estimated/Assessed Needs    Weight Used For Calorie Calculations: 61.8 kg (136 lb 3.9 oz)  Energy Calorie Requirements (kcal): 3616-4899 kcals/day(30-35 kcals/kg)  Energy Need Method: [place]  Protein Requirements: 74.32 g/day(1.2 g/kg)  Weight Used For Protein Calculations: 61.8 kg (136 lb 3.9 oz)  Fluid Requirements (mL): 1mL/kcal or per MD  Estimated Fluid Requirement Method: RDA Method  RDA Method (mL): 1854     Nutrition Prescription Ordered    Current Diet Order: NPO  Current " Nutrition Support Formula Ordered: Isosource 1.5  Current Nutrition Support Frequency Ordered: 1 carton    Evaluation of Received Nutrient/Fluid Intake    Energy Calories Required: not meeting needs  Protein Required: not meeting needs  Fluid Required: not meeting needs  Comments: LBM 11.26.20  % Intake of Estimated Energy Needs: 0 - 25 %  % Meal Intake: NPO    Nutrition Risk    Level of Risk/Frequency of Follow-up: (2x/weekly)     Assessment and Plan    Dysphagia  Contributing Nutrition Diagnosis  Inadequate energy intake    Related to (etiology):   Swallowing difficulty    Signs and Symptoms (as evidenced by):   NPO     Interventions(treatment strategy):  Collaboration with other providers  EN- Isosource 1.5    Nutrition Diagnosis Status:   New    Monitor and Evaluation    Food and Nutrient Intake: energy intake  Food and Nutrient Adminstration: enteral and parenteral nutrition administration  Knowledge/Beliefs/Attitudes: food and nutrition knowledge/skill  Physical Activity and Function: nutrition-related ADLs and IADLs  Anthropometric Measurements: weight  Biochemical Data, Medical Tests and Procedures: glucose/endocrine profile, lipid profile, electrolyte and renal panel  Nutrition-Focused Physical Findings: overall appearance     Malnutrition Assessment     Skin (Micronutrient): (Jim Score:14)     Nutrition Follow-Up    RD Follow-up?: Yes

## 2020-11-28 NOTE — PLAN OF CARE
Recommendations    1. Recommend Increasing Isosource to 45 ml /hr. If bolus needed: 5 cartons daily.    2. Daily weights.     Goals: 1.>75% of EEN, EPN by RD follow up.  Nutrition Goal Status: new  Communication of RD Recs: (POC)

## 2020-11-29 NOTE — PROGRESS NOTES
All discharge instructions reviewed with patient and spouse. All questions answered and verbalized understanding. Peripheral IV removed per order prior to discharge, patient tolerated well. Patient transported off unit via stretcher, to be driven home by ambulance. Safety precautions maintained. Surgical mask in place per policy.

## 2020-11-30 ENCOUNTER — TELEPHONE (OUTPATIENT)
Dept: FAMILY MEDICINE | Facility: CLINIC | Age: 65
End: 2020-11-30

## 2020-11-30 NOTE — TELEPHONE ENCOUNTER
Pt was just discharged from [facility]. He now has a stage Stage 2 sacral pressure ulcer. While admitted his catheter was changed from a 16 Congolese to a 20 Congolese      Vesna with [facility] states pt is refusing to see a urologist.    So she needs to know if you want them to continue the pt with the 20 Congolese catheter or go back to the 16 Congolese      Pts has been very miller, and wife is asking if there are any antidepressants medications that are crushable that can be prescribed. Please advise    Call vesna at [phone]

## 2020-11-30 NOTE — TELEPHONE ENCOUNTER
----- Message from Rachael Saleh sent at 2020  2:00 PM CST -----  Contact: surinder Evanscorrina Nunes  MRN: [id number]  : 1955  PCP: Jacob Piedra  Home Phone      [phone]  Work Phone      Not on file.  [employer]          [phone]      MESSAGE:   Patient has some question regarding patient wound on his rear end.         [phone]

## 2020-12-01 ENCOUNTER — EXTERNAL HOME HEALTH (OUTPATIENT)
Dept: HOME HEALTH SERVICES | Facility: HOSPITAL | Age: 65
End: 2020-12-01
Payer: MEDICARE

## 2020-12-03 ENCOUNTER — PATIENT OUTREACH (OUTPATIENT)
Dept: ADMINISTRATIVE | Facility: CLINIC | Age: 65
End: 2020-12-03

## 2020-12-03 ENCOUNTER — PATIENT MESSAGE (OUTPATIENT)
Dept: FAMILY MEDICINE | Facility: CLINIC | Age: 65
End: 2020-12-03

## 2020-12-03 NOTE — PATIENT INSTRUCTIONS
Bladder Infection, Male (Adult)    You have a bladder infection.  Urine is normally free of bacteria. But bacteria can get into the urinary tract from the skin around the rectum or it may travel in the blood from elsewhere in the body.  This is called a urinary tract infection (UTI). An infection can occur anywhere in the urinary tract. It could be in a kidney (pyelonephritis)or in the bladder (cystitis) and urethra (urethritis). The urethra is the tube that drains the urine from the bladder through the tip of the penis.  The most common place for a UTI is in the bladder. This is called a bladder infection. Most bladder infections are easily treated. They are not serious unless the infection spreads up to the kidney.  The terms bladder infection, UTI, and cystitis are often used to describe the same thing, but they arent always the same. Cystitis is an inflammation of the bladder. The most common cause of cystitis is an infection.   Keep in mind:  · Infections in the urine are called UTIs.  · Cystitis is usually caused by a UTI.  · Not all UTIs and cases of cystitis are bladder infections.  · Bladder infections are the most common type of cystitis.  Symptoms of a bladder infection  The infection causes inflammation in the urethra and bladder. This inflammation causes many of the symptoms. The most common symptoms of a bladder infection are:  · Pain or burning when urinating  · Having to go more often than usual  · Feeling like you need to go right away  · Only a small amount comes out  · Blood in urine  · Discomfort in your belly (abdomen), usually in the lower abdomen, above the pubic bone  · Cloudy, strong, or bad smelling urine  · Unable to urinate (retention)  · Urinary incontinence  · Fever  · Loss of appetite  Older adults may also feel confused.  Causes of a bladder infection  Bladder infections are not contagious. You can't get one from someone else, from a toilet seat, or from sharing a bath.  The most  common cause of bladder infections is bacteria from the bowels. The bacteria get onto the skin around the opening of the urethra. From there they can get into the urine and travel up to the bladder. This causes inflammation and an infection. This usually happens because of:  · An enlarged prostate  · Poor cleaning of the genitals  · Procedures that put a tube in your bladder, like a Rader catheter  · Bowel incontinence  · Older age  · Not emptying your bladder (The urine stays there, giving the bacteria a chance to grow.)  · Dehydration (This allows urine to stay in the bladder longer.)  · Constipation (This can cause the bowels to push on the bladder or urethra and keep the bladder from emptying.)  Treatment  Bladder infections are treated with antibiotics. They usually clear up quickly without complications. Treatment helps prevent a more serious kidney infection.  Medicines  Medicines can help in the treatment of a bladder infection:  · You may have been given phenazopyridine to ease burning when you urinate. It will cause your urine to be bright orange. It can stain clothing.  · You may have been prescribed antibiotics. Take this medicine until you have finished it, even if you feel better. Taking all of the medicine will make sure the infection has cleared.  You can use acetaminophen or ibuprofen for pain, fever, or discomfort, unless another medicine was prescribed. You can also alternate them, or use both together. They work differently and are a different class of medicines, so taking them together is not an overdose. If you have chronic liver or kidney disease, talk with your healthcare provider before using these medicines. Also talk with your provider if youve had a stomach ulcer or GI bleeding or are taking blood thinner medicines.  Home care  Here are some guidelines to help you care for yourself at home:  · Drink plenty of fluids, unless your healthcare provider told you not to. Fluids will prevent  dehydration and flush out your bladder.  · Use good personal hygiene. Wipe from front to back after using the toilet, and clean your penis regularly. If you arent circumcised, retract the foreskin when cleaning.  · Urinate more frequently, and dont try to hold it in for long periods of time, if possible.  · Wear loose-fitting clothes and cotton underwear. Avoid tight-fitting pants. This helps keep you clean and dry.  · Change your diet to prevent constipation. This means eating more fresh foods and more fiber, and less junk and fatty foods.  · Avoid sex until your symptoms are gone.  · Avoid caffeine, alcohol, and spicy foods. These can irritate the bladder.  Follow-up care  Follow up with your healthcare provider, or as advised if all symptoms have not cleared up within 5 days. It is important to keep your follow-up appointment. You can talk with your provider to see if you need more tests of the urinary tract. This is especially important if you have infections that keep coming back.  If a culture was done, you will be told if your treatment needs to be changed. If directed, you can call to find out the results.  If X-rays were taken, you will be told of any findings that may affect your care.  Call 911  Call 911 if any of these occur:  · Trouble breathing  · Difficulty waking up  · Feeling confused  · Fainting or loss of consciousness  · Rapid heart rate  When to seek medical advice  Call your healthcare provider right away if any of these occur:  · Fever of 100.4ºF (38ºC) or higher, or as directed by your healthcare provider  · Your symptoms dont improve after 2 days of treatment  · Back or abdominal pain that gets worse  · Repeated vomiting, or you arent able to keep medicine down  · Weakness or dizziness  Date Last Reviewed: 10/1/2016  © 5267-0923 [employer]. [address] [place] [address], [place] [address]. All rights reserved. This information is not intended as a substitute for professional  medical care. Always follow your healthcare professional's instructions.

## 2020-12-04 ENCOUNTER — DOCUMENT SCAN (OUTPATIENT)
Dept: HOME HEALTH SERVICES | Facility: HOSPITAL | Age: 65
End: 2020-12-04
Payer: MEDICARE

## 2020-12-10 ENCOUNTER — TELEPHONE (OUTPATIENT)
Dept: FAMILY MEDICINE | Facility: CLINIC | Age: 65
End: 2020-12-10

## 2020-12-10 DIAGNOSIS — F41.1 GAD (GENERALIZED ANXIETY DISORDER): Primary | ICD-10-CM

## 2020-12-10 RX ORDER — LORAZEPAM 2 MG/ML
2 CONCENTRATE ORAL 2 TIMES DAILY
Qty: 120 ML | Refills: 5 | Status: SHIPPED | OUTPATIENT
Start: 2020-12-10 | End: 2021-01-09

## 2020-12-10 NOTE — TELEPHONE ENCOUNTER
DUDLEY     Spoke with Ofelia at [facility] and she stated they would like to keep the 20 Syriac catheter instead, of goig back to the 16 Syriac    This has been the longest pt has gone without sediment blockage so they would like to keep the 20 Syriac catheter

## 2020-12-10 NOTE — TELEPHONE ENCOUNTER
pts wife is also asking about an antidepressant that comes in liquid form or can be crushed     Orders sent to [employer] to collect urine culture and culture of discharge from urethra

## 2020-12-18 ENCOUNTER — PATIENT MESSAGE (OUTPATIENT)
Dept: FAMILY MEDICINE | Facility: CLINIC | Age: 65
End: 2020-12-18

## 2020-12-21 ENCOUNTER — TELEPHONE (OUTPATIENT)
Dept: FAMILY MEDICINE | Facility: CLINIC | Age: 65
End: 2020-12-21

## 2020-12-21 DIAGNOSIS — G89.4 CHRONIC PAIN SYNDROME: Primary | ICD-10-CM

## 2020-12-21 RX ORDER — FENTANYL 12.5 UG/1
1 PATCH TRANSDERMAL
Qty: 10 PATCH | Refills: 0 | Status: SHIPPED | OUTPATIENT
Start: 2020-12-21

## 2020-12-21 NOTE — TELEPHONE ENCOUNTER
----- Message from Jaskaran Man sent at 2020 12:59 PM CST -----  Contact: Azalia @ [facility]  Peewee Nunes  MRN: [id number]  : 1955  PCP: Jacob Piedra  Home Phone      [phone]  Work Phone      Not on file.  Mobile          [phone]      MESSAGE: Azalia @ [facility] -- patient in a lot of pain - combative - resistant - refusing meds -- would like Rx for possibly Haldol -- please advise    Call Azalia @ [phone]    PCP: Tadeo

## 2020-12-21 NOTE — TELEPHONE ENCOUNTER
----- Message from Rachael Saleh sent at 2020 10:42 AM CST -----  Contact: [facility]- azalia  Peewee Nunes  MRN: [id number]  : 1955  PCP: Jacob Piedra  Home Phone      [phone]  Work Phone      Not on file.  [employer]          [phone]      MESSAGE:     Azalia from [facility] is calling to get a order for 12.5 Fentanyl pain patch.    [phone]

## 2020-12-21 NOTE — TELEPHONE ENCOUNTER
Yue with [facility] called stating pt was discharged from [facility] with hospice services for respiratory failure.    Pt is requesting Dr. Piedra remain his attending physician    Per Dr. Piedra he will remain pts attending, Yue notified     Yue [phone]

## 2020-12-22 NOTE — TELEPHONE ENCOUNTER
Spoke with Yue at [facility]--they got a verbal ok for the Fentanyl patches. Printed rx to be discarded.

## 2021-01-04 ENCOUNTER — PATIENT MESSAGE (OUTPATIENT)
Dept: ADMINISTRATIVE | Facility: HOSPITAL | Age: 66
End: 2021-01-04

## 2021-02-19 NOTE — ASSESSMENT & PLAN NOTE
-SBP<180  -PRN hydralazine  -Increased Amlodopine to 5mg - 10mg 1/13     18-Feb-2021 09:30 16-Feb-2021 10:00 19-Feb-2021 09:15

## 2021-07-01 NOTE — ASSESSMENT & PLAN NOTE
62 yo male with L aSDH now s/p craniotomy for evacuation (12/20).    Pt with persistent poor clinical exam.     -- Continue care per primary team.  -- CTH with expected postoperative changes, nothing to explain current exam.  -- Recommend consideration for early tracheostomy and gastrostomy placement.  -- Continue seizure treatment per neurology.   -- Staple removal 1/3.  -- We will continue to monitor closely, please contact us with any questions or concerns.     oral

## 2021-09-28 NOTE — ASSESSMENT & PLAN NOTE
No further seizures off ketamine,   Mental status remains poor    Reduce keppra to , and lacosamide 200mg BID  continuous EEG  Consider repeat imaging if mental status doesn't improve   Recommendation Preamble: The following recommendations were made during the visit: Render Risk Assessment In Note?: yes Detail Level: Zone

## 2022-01-31 NOTE — ASSESSMENT & PLAN NOTE
No further seizures off ketamine and keppra   Mental status with continued improvement today    Cont kp and lac  Spot eeg in several days   11:00

## 2022-06-30 NOTE — ASSESSMENT & PLAN NOTE
Mental status continuing to improve  Spot eeg without any epileptiform activity    Decrease lacosamide 100mg bid 1/12   Stroke Alert Note    Location: Pediatric ED  Stroke alert time: 1639  Patient arrival time: [address] arrived in triage  LKW: 1430 patient reports onset of headache, left-arm numbness, black lines/floaters in vision. Initial assessment reveals LUE numbness from shoulder to elbow, moves all extremities equal bilaterally, speech clear and fluent, visual fields intact, EOMI. Denies headache at present. NIH Stroke Scale 1 (06/30/22 1640)    Assessment Interval  Initial   Level of Consciousness 0 Alert   LOC Questions 0 Answers both questions correctly   LOC Commands 0 Performs both tasks correctly   Best Gaze 0 Normal   Visual 0 No visual loss   Facial Palsy 0 Normal   Motor Arm-Left 0 No drift   Motor Arm-Right 0 No drift   Motor Leg-Left 0 No drift   Motor Leg-Right 0 No drift   Limb Ataxia 0 Absent   Sensory 1 Mild-to-moderate sensory loss   Best Language 0 No aphasia   Dysarthria 0 Normal articulation   Extinction and Inattention 0 No abnormality   NIHSS Score 1      VAN: negative      Vital signs  Vitals:    06/30/22 1655   BP: (!) 147/93   Pulse: 86   Resp: (!) 21   Temp:      Blood glucose:  No results available in last 24 hours     History:  Past Medical History:   Diagnosis Date   â¢ Otitis media      No past surgical history on file. Medications:  Current Facility-Administered Medications   Medication Dose Route Frequency Provider Last Rate Last Admin   â¢ sodium chloride (NORMAL SALINE) 0.9 % bolus 1,000 mL  1,000 mL Intravenous Once Charito Guadalupe, DO         No current outpatient medications on file. Thrombolysis/Intervention:  Patient is not a thrombolytic or interventional candidate as symptoms not consistent with stroke or LVO. Recommended CT head wo contrast and neurology consult. Fortunato Jasso from neurovascular present for assessment and recommendations to ED physician.      Andres Pisano, CNS

## 2022-09-30 NOTE — PLAN OF CARE
Dr. Erik Caldwell from Surgery dept at bedside for consult.      Sourav Garrett RN  09/29/22 3014 Problem: Adult Inpatient Plan of Care  Goal: Plan of Care Review  Outcome: Ongoing (interventions implemented as appropriate)  POC reviewed with pt at 0500. Pt nodded to understanding, pt trached. 1 PRN dose of hydralazine given overnight to maintain SBP<180. No acute events overnight. Pt progressing toward goals. Will continue to monitor. See flowsheets for full assessment and VS info

## 2023-11-15 NOTE — PLAN OF CARE
Problem: Adult Inpatient Plan of Care  Goal: Plan of Care Review  POC reviewed with pt and family. Family verbalized understanding. Questions and concerns addressed. New seizure like activity noted during shift ativan and keppra given in attempt to control the seizure. Pt placed on EEG. While in MRI after transferring pt from MRI table to pt bed, Pt desat to 60s. Resp, MD and Charge called. Pt intubated. Pt O2 sats in the high 90s post intubation. New stroke noted after MRI scan. Family made aware of new findings by MD WHITTAKER of NCC team.  Will continue to monitor. See flowsheets for full assessment and VS info.            Island Pedicle Flap-Requiring Vessel Identification Text: The defect edges were debeveled with a #15 scalpel blade.  Given the location of the defect, shape of the defect and the proximity to free margins an island pedicle advancement flap was deemed most appropriate.  Using a sterile surgical marker, an appropriate advancement flap was drawn, based on the axial vessel mentioned above, incorporating the defect, outlining the appropriate donor tissue and placing the expected incisions within the relaxed skin tension lines where possible.    The area thus outlined was incised deep to adipose tissue with a #15 scalpel blade.  The skin margins were undermined to an appropriate distance in all directions around the primary defect and laterally outward around the island pedicle utilizing iris scissors.  There was minimal undermining beneath the pedicle flap.

## (undated) DEVICE — CARTRIDGE OIL

## (undated) DEVICE — SEE MEDLINE ITEM 146292

## (undated) DEVICE — Device

## (undated) DEVICE — MARKER SKIN STND TIP BLUE BARR

## (undated) DEVICE — DRESSING SURGICAL 1/2X1/2

## (undated) DEVICE — TAPE MEDIPORE 4IN X 2YDS

## (undated) DEVICE — HOOK LONE STAR BLUNT 12MM

## (undated) DEVICE — HEMOSTAT SURGICEL 2X14IN

## (undated) DEVICE — SUT D SPECIAL VICRYL 2-0

## (undated) DEVICE — ROUTER TAPERED 2.3MM

## (undated) DEVICE — CHLORAPREP 10.5 ML APPLICATOR

## (undated) DEVICE — TRACH TUBE CUFF FLEX DISP 8.5

## (undated) DEVICE — DRESSING ADH FILM TELFA 2X3IN

## (undated) DEVICE — ELECTRODE BLADE INSULATED 1 IN

## (undated) DEVICE — SEE MEDLINE ITEM 156905

## (undated) DEVICE — DIFFUSER

## (undated) DEVICE — DURAPREP SURG SCRUB 26ML

## (undated) DEVICE — LUBRICANT SURGILUBE 2 OZ

## (undated) DEVICE — SEE MEDLINE ITEM 157131

## (undated) DEVICE — KIT PEG PULL STANDARD 20F

## (undated) DEVICE — BURR ROUTER TAPERED

## (undated) DEVICE — SUT PROLENE 2-0 30 SH

## (undated) DEVICE — KIT EVACUATOR 3-SPRING 1/8 DRN

## (undated) DEVICE — SEE MEDLINE ITEM 146313

## (undated) DEVICE — CORD BIPOLAR 12 FOOT

## (undated) DEVICE — SUT VICRYL PLUS 3-0 SH 18IN

## (undated) DEVICE — WARMER DRAPE STERILE LF

## (undated) DEVICE — SUT VICRYL+ 2-0 SH 18IN

## (undated) DEVICE — URINARY DRAINAGE BAG

## (undated) DEVICE — BURR ACORN 7.5MM

## (undated) DEVICE — SUT 4/0 18IN NUROLON BLK B

## (undated) DEVICE — SUT CTD VICRYL VIL BR SH 27

## (undated) DEVICE — DRESSING SURGICAL 3/4X3/4

## (undated) DEVICE — GOWN SURGICAL X-LARGE

## (undated) DEVICE — SEE MEDLINE ITEM 152487

## (undated) DEVICE — TUBE FRAZIER 5MM 2FT SOFT TIP

## (undated) DEVICE — STAPLER SKIN PROXIMATE WIDE

## (undated) DEVICE — BLADE SURG CARBON STEEL SZ11

## (undated) DEVICE — SPRAY MASTISOL

## (undated) DEVICE — DRESSING SURGICAL 1X3

## (undated) DEVICE — SEE MEDLINE ITEM 157117

## (undated) DEVICE — ELECTRODE REM PLYHSV RETURN 9

## (undated) DEVICE — DRAPE THYROID WITH ARMBOARD